# Patient Record
Sex: FEMALE | Race: WHITE | NOT HISPANIC OR LATINO | Employment: OTHER | URBAN - METROPOLITAN AREA
[De-identification: names, ages, dates, MRNs, and addresses within clinical notes are randomized per-mention and may not be internally consistent; named-entity substitution may affect disease eponyms.]

---

## 2017-01-09 ENCOUNTER — TREATMENT (OUTPATIENT)
Dept: CARDIAC REHAB | Facility: HOSPITAL | Age: 77
End: 2017-01-09

## 2017-01-09 DIAGNOSIS — Z98.890 S/P MITRAL VALVE REPAIR: Primary | ICD-10-CM

## 2017-01-11 ENCOUNTER — TREATMENT (OUTPATIENT)
Dept: CARDIAC REHAB | Facility: HOSPITAL | Age: 77
End: 2017-01-11

## 2017-01-11 DIAGNOSIS — Z98.890 S/P MITRAL VALVE REPAIR: Primary | ICD-10-CM

## 2017-01-13 ENCOUNTER — TREATMENT (OUTPATIENT)
Dept: CARDIAC REHAB | Facility: HOSPITAL | Age: 77
End: 2017-01-13

## 2017-01-13 DIAGNOSIS — Z98.890 S/P MITRAL VALVE REPAIR: Primary | ICD-10-CM

## 2017-01-16 ENCOUNTER — TREATMENT (OUTPATIENT)
Dept: CARDIAC REHAB | Facility: HOSPITAL | Age: 77
End: 2017-01-16

## 2017-01-16 DIAGNOSIS — Z98.890 S/P MITRAL VALVE REPAIR: Primary | ICD-10-CM

## 2017-01-18 ENCOUNTER — TREATMENT (OUTPATIENT)
Dept: CARDIAC REHAB | Facility: HOSPITAL | Age: 77
End: 2017-01-18

## 2017-01-18 DIAGNOSIS — Z98.890 S/P MITRAL VALVE REPAIR: Primary | ICD-10-CM

## 2017-01-20 ENCOUNTER — TREATMENT (OUTPATIENT)
Dept: CARDIAC REHAB | Facility: HOSPITAL | Age: 77
End: 2017-01-20

## 2017-01-20 DIAGNOSIS — Z98.890 S/P MITRAL VALVE REPAIR: Primary | ICD-10-CM

## 2017-01-23 ENCOUNTER — TREATMENT (OUTPATIENT)
Dept: CARDIAC REHAB | Facility: HOSPITAL | Age: 77
End: 2017-01-23

## 2017-01-23 DIAGNOSIS — Z98.890 S/P MITRAL VALVE REPAIR: Primary | ICD-10-CM

## 2017-01-27 ENCOUNTER — TREATMENT (OUTPATIENT)
Dept: CARDIAC REHAB | Facility: HOSPITAL | Age: 77
End: 2017-01-27

## 2017-01-27 DIAGNOSIS — Z98.890 S/P MITRAL VALVE REPAIR: Primary | ICD-10-CM

## 2017-01-30 ENCOUNTER — TREATMENT (OUTPATIENT)
Dept: CARDIAC REHAB | Facility: HOSPITAL | Age: 77
End: 2017-01-30

## 2017-01-30 DIAGNOSIS — Z98.890 S/P MITRAL VALVE REPAIR: Primary | ICD-10-CM

## 2017-02-01 ENCOUNTER — TREATMENT (OUTPATIENT)
Dept: CARDIAC REHAB | Facility: HOSPITAL | Age: 77
End: 2017-02-01

## 2017-02-01 DIAGNOSIS — Z98.890 S/P MITRAL VALVE REPAIR: Primary | ICD-10-CM

## 2017-02-03 ENCOUNTER — TREATMENT (OUTPATIENT)
Dept: CARDIAC REHAB | Facility: HOSPITAL | Age: 77
End: 2017-02-03

## 2017-02-03 DIAGNOSIS — Z98.890 S/P MITRAL VALVE REPAIR: Primary | ICD-10-CM

## 2017-02-06 ENCOUNTER — TREATMENT (OUTPATIENT)
Dept: CARDIAC REHAB | Facility: HOSPITAL | Age: 77
End: 2017-02-06

## 2017-02-06 DIAGNOSIS — Z98.890 S/P MITRAL VALVE REPAIR: Primary | ICD-10-CM

## 2017-02-08 ENCOUNTER — TREATMENT (OUTPATIENT)
Dept: CARDIAC REHAB | Facility: HOSPITAL | Age: 77
End: 2017-02-08

## 2017-02-08 DIAGNOSIS — Z98.890 S/P MITRAL VALVE REPAIR: Primary | ICD-10-CM

## 2017-02-13 ENCOUNTER — TREATMENT (OUTPATIENT)
Dept: CARDIAC REHAB | Facility: HOSPITAL | Age: 77
End: 2017-02-13

## 2017-02-13 DIAGNOSIS — Z98.890 S/P MITRAL VALVE REPAIR: Primary | ICD-10-CM

## 2017-02-17 ENCOUNTER — TREATMENT (OUTPATIENT)
Dept: CARDIAC REHAB | Facility: HOSPITAL | Age: 77
End: 2017-02-17

## 2017-02-17 DIAGNOSIS — Z98.890 S/P MITRAL VALVE REPAIR: Primary | ICD-10-CM

## 2017-02-20 ENCOUNTER — TREATMENT (OUTPATIENT)
Dept: CARDIAC REHAB | Facility: HOSPITAL | Age: 77
End: 2017-02-20

## 2017-02-20 DIAGNOSIS — Z98.890 S/P MITRAL VALVE REPAIR: Primary | ICD-10-CM

## 2017-02-24 ENCOUNTER — TREATMENT (OUTPATIENT)
Dept: CARDIAC REHAB | Facility: HOSPITAL | Age: 77
End: 2017-02-24

## 2017-02-24 DIAGNOSIS — Z98.890 S/P MITRAL VALVE REPAIR: Primary | ICD-10-CM

## 2017-02-27 ENCOUNTER — TREATMENT (OUTPATIENT)
Dept: CARDIAC REHAB | Facility: HOSPITAL | Age: 77
End: 2017-02-27

## 2017-02-27 DIAGNOSIS — Z98.890 S/P MITRAL VALVE REPAIR: Primary | ICD-10-CM

## 2017-03-01 ENCOUNTER — TREATMENT (OUTPATIENT)
Dept: CARDIAC REHAB | Facility: HOSPITAL | Age: 77
End: 2017-03-01

## 2017-03-01 DIAGNOSIS — Z98.890 S/P MITRAL VALVE REPAIR: Primary | ICD-10-CM

## 2017-03-03 ENCOUNTER — TREATMENT (OUTPATIENT)
Dept: CARDIAC REHAB | Facility: HOSPITAL | Age: 77
End: 2017-03-03

## 2017-03-03 DIAGNOSIS — Z98.890 S/P MITRAL VALVE REPAIR: Primary | ICD-10-CM

## 2017-03-06 ENCOUNTER — TREATMENT (OUTPATIENT)
Dept: CARDIAC REHAB | Facility: HOSPITAL | Age: 77
End: 2017-03-06

## 2017-03-06 DIAGNOSIS — Z98.890 S/P MITRAL VALVE REPAIR: Primary | ICD-10-CM

## 2017-03-13 ENCOUNTER — TREATMENT (OUTPATIENT)
Dept: CARDIAC REHAB | Facility: HOSPITAL | Age: 77
End: 2017-03-13

## 2017-03-13 DIAGNOSIS — Z98.890 S/P MITRAL VALVE REPAIR: Primary | ICD-10-CM

## 2017-03-15 ENCOUNTER — OFFICE VISIT (OUTPATIENT)
Dept: CARDIAC SURGERY | Facility: CLINIC | Age: 77
End: 2017-03-15

## 2017-03-15 VITALS
RESPIRATION RATE: 20 BRPM | BODY MASS INDEX: 37.76 KG/M2 | OXYGEN SATURATION: 96 % | HEIGHT: 61 IN | TEMPERATURE: 97.6 F | WEIGHT: 200 LBS | HEART RATE: 81 BPM | SYSTOLIC BLOOD PRESSURE: 125 MMHG | DIASTOLIC BLOOD PRESSURE: 81 MMHG

## 2017-03-15 DIAGNOSIS — Z98.890 S/P MITRAL VALVE REPAIR: ICD-10-CM

## 2017-03-15 DIAGNOSIS — Z98.890 S/P TRICUSPID VALVE REPAIR: ICD-10-CM

## 2017-03-15 DIAGNOSIS — Z98.890 S/P MVR (MITRAL VALVE REPAIR): Primary | ICD-10-CM

## 2017-03-15 DIAGNOSIS — Z98.890 S/P MAZE OPERATION FOR ATRIAL FIBRILLATION: ICD-10-CM

## 2017-03-15 DIAGNOSIS — Z86.79 S/P MAZE OPERATION FOR ATRIAL FIBRILLATION: ICD-10-CM

## 2017-03-15 PROCEDURE — 99212 OFFICE O/P EST SF 10 MIN: CPT | Performed by: THORACIC SURGERY (CARDIOTHORACIC VASCULAR SURGERY)

## 2017-03-15 RX ORDER — ESTRADIOL 0.1 MG/G
1 CREAM VAGINAL DAILY
COMMUNITY
Start: 2014-12-29 | End: 2017-09-19

## 2017-03-15 RX ORDER — ARFORMOTEROL TARTRATE 15 UG/2ML
15 SOLUTION RESPIRATORY (INHALATION) AS NEEDED
COMMUNITY
End: 2018-02-16

## 2017-03-15 RX ORDER — VITAMIN B COMPLEX
1 CAPSULE ORAL
COMMUNITY
End: 2017-09-19

## 2017-03-15 RX ORDER — POTASSIUM CHLORIDE 1500 MG/1
20 TABLET, FILM COATED, EXTENDED RELEASE ORAL DAILY
COMMUNITY
Start: 2016-06-15 | End: 2018-02-16 | Stop reason: SDUPTHER

## 2017-03-15 RX ORDER — ATORVASTATIN CALCIUM 40 MG/1
20 TABLET, FILM COATED ORAL TAKE AS DIRECTED
COMMUNITY
End: 2018-02-16 | Stop reason: SDUPTHER

## 2017-03-15 RX ORDER — MULTIVITAMIN
1 TABLET ORAL DAILY
COMMUNITY

## 2017-03-15 RX ORDER — UBIDECARENONE 200 MG
200 CAPSULE ORAL
COMMUNITY
End: 2017-09-19 | Stop reason: SDUPTHER

## 2017-03-15 RX ORDER — PANTOPRAZOLE SODIUM 40 MG/1
40 TABLET, DELAYED RELEASE ORAL DAILY
COMMUNITY
End: 2018-02-16 | Stop reason: SDUPTHER

## 2017-03-15 RX ORDER — AMOXICILLIN 500 MG
CAPSULE ORAL
COMMUNITY
End: 2017-09-19 | Stop reason: SDUPTHER

## 2017-03-15 RX ORDER — AZELASTINE HCL 205.5 UG/1
2 SPRAY NASAL 2 TIMES DAILY
COMMUNITY
Start: 2017-03-07 | End: 2017-09-19

## 2017-03-15 RX ORDER — METFORMIN HYDROCHLORIDE 500 MG/1
TABLET, EXTENDED RELEASE ORAL
COMMUNITY
Start: 2016-07-07 | End: 2017-03-15 | Stop reason: DRUGHIGH

## 2017-03-15 RX ORDER — FUROSEMIDE 40 MG/1
60 TABLET ORAL DAILY
COMMUNITY
End: 2018-02-16 | Stop reason: SDUPTHER

## 2017-03-15 RX ORDER — DIGOXIN 250 MCG
TABLET ORAL
COMMUNITY
Start: 2016-05-05 | End: 2017-03-15

## 2017-03-15 RX ORDER — MULTIVIT-MIN/IRON/FOLIC ACID/K 18-600-40
CAPSULE ORAL
COMMUNITY
End: 2017-09-19 | Stop reason: SDUPTHER

## 2017-03-15 RX ORDER — SPIRONOLACTONE 25 MG/1
25 TABLET ORAL
COMMUNITY
End: 2017-03-15

## 2017-03-15 RX ORDER — LEVOTHYROXINE AND LIOTHYRONINE 57; 13.5 UG/1; UG/1
90 TABLET ORAL EVERY MORNING
COMMUNITY
Start: 2016-06-27 | End: 2017-11-30 | Stop reason: SDUPTHER

## 2017-03-15 RX ORDER — POTASSIUM CHLORIDE 1500 MG/1
TABLET, FILM COATED, EXTENDED RELEASE ORAL
COMMUNITY
Start: 2017-02-28 | End: 2017-03-15

## 2017-03-15 RX ORDER — PEN NEEDLE, DIABETIC 32GX 5/32"
NEEDLE, DISPOSABLE MISCELLANEOUS
COMMUNITY
Start: 2017-01-16 | End: 2017-12-12 | Stop reason: SDUPTHER

## 2017-03-15 NOTE — PROGRESS NOTES
"CARDIOVASCULAR SURGERY FOLLOW-UP PROGRESS NOTE  Chief Complaint: Here for follow-up after mitral valve repair tricuspid valve repair and a cryo-maze        HPI:   Dear Dr. Blair Torres MD and colleagues:    It was nice to see Fernanda Molina in follow up 15 months  after surgery.  As you know, she is a 76 y.o. female with mitral incompetence tricuspid incompetence and chronic persistent atrial fibrillation who underwent mitral valve repair tricuspid valve repair and a cryo-maze.. She did well postoperatively and continues to do well. She comes in today complaining of nothing.  Her activity level has been She continues to do cardiac rehabilitation 3 times a week.       Physical Exam:         Visit Vitals   • /81 (BP Location: Right arm, Patient Position: Sitting, Cuff Size: Adult)   • Pulse 81   • Temp 97.6 °F (36.4 °C) (Oral)   • Resp 20   • Ht 61\" (154.9 cm)   • Wt 200 lb (90.7 kg)   • SpO2 96%   • BMI 37.79 kg/m2     Heart:  regular rate and rhythm, S1, S2 normal, no murmur, click, rub or gallop, normal prosthetic valve sounds  Lungs:  clear to auscultation bilaterally  Extremities:  no edema  Incision(s):  mid chest healing well, sternum stable    Assessment/Plan:     S/P mitral valve repair, tricuspid valve repair and Maze procedure. Overall, she is doing well.    I think she is in regular rhythm now and she is doing well and her continued cardiac rehabilitation.    Return to clinic in 1 year(s) with an echocardiogram that I ordered today.    No restrictions of activity.      Thank you for allowing me to participate in the care of your   patient.  Regards,  Ben Saavedra MD  "

## 2017-03-17 ENCOUNTER — TREATMENT (OUTPATIENT)
Dept: CARDIAC REHAB | Facility: HOSPITAL | Age: 77
End: 2017-03-17

## 2017-03-17 DIAGNOSIS — Z98.890 S/P MITRAL VALVE REPAIR: Primary | ICD-10-CM

## 2017-03-20 ENCOUNTER — TREATMENT (OUTPATIENT)
Dept: CARDIAC REHAB | Facility: HOSPITAL | Age: 77
End: 2017-03-20

## 2017-03-20 DIAGNOSIS — Z98.890 S/P MITRAL VALVE REPAIR: Primary | ICD-10-CM

## 2017-03-21 ENCOUNTER — APPOINTMENT (OUTPATIENT)
Dept: CARDIOLOGY | Facility: HOSPITAL | Age: 77
End: 2017-03-21

## 2017-03-22 ENCOUNTER — HOSPITAL ENCOUNTER (OUTPATIENT)
Dept: CARDIOLOGY | Facility: HOSPITAL | Age: 77
Discharge: HOME | End: 2017-03-22
Admitting: THORACIC SURGERY (CARDIOTHORACIC VASCULAR SURGERY)

## 2017-03-22 VITALS
BODY MASS INDEX: 37.76 KG/M2 | HEART RATE: 68 BPM | DIASTOLIC BLOOD PRESSURE: 61 MMHG | HEIGHT: 61 IN | SYSTOLIC BLOOD PRESSURE: 140 MMHG | WEIGHT: 200 LBS

## 2017-03-22 DIAGNOSIS — Z98.890 S/P MVR (MITRAL VALVE REPAIR): ICD-10-CM

## 2017-03-22 LAB
AORTIC ARCH: 2.3 CM
ASCENDING AORTA: 3.1 CM
BH CV ECHO MEAS - ACS: 1.7 CM
BH CV ECHO MEAS - AO MAX PG (FULL): 7.8 MMHG
BH CV ECHO MEAS - AO MAX PG: 11.5 MMHG
BH CV ECHO MEAS - AO MEAN PG (FULL): 4.7 MMHG
BH CV ECHO MEAS - AO MEAN PG: 6.7 MMHG
BH CV ECHO MEAS - AO ROOT AREA (BSA CORRECTED): 1.5
BH CV ECHO MEAS - AO ROOT AREA: 6.2 CM^2
BH CV ECHO MEAS - AO ROOT DIAM: 2.8 CM
BH CV ECHO MEAS - AO V2 MAX: 169.4 CM/SEC
BH CV ECHO MEAS - AO V2 MEAN: 122.5 CM/SEC
BH CV ECHO MEAS - AO V2 VTI: 35.4 CM
BH CV ECHO MEAS - ASC AORTA: 3.1 CM
BH CV ECHO MEAS - AVA(I,A): 1.8 CM^2
BH CV ECHO MEAS - AVA(I,D): 1.8 CM^2
BH CV ECHO MEAS - AVA(V,A): 1.7 CM^2
BH CV ECHO MEAS - AVA(V,D): 1.7 CM^2
BH CV ECHO MEAS - BSA(HAYCOCK): 2 M^2
BH CV ECHO MEAS - BSA: 1.9 M^2
BH CV ECHO MEAS - BZI_BMI: 37.8 KILOGRAMS/M^2
BH CV ECHO MEAS - BZI_METRIC_HEIGHT: 154.9 CM
BH CV ECHO MEAS - BZI_METRIC_WEIGHT: 90.7 KG
BH CV ECHO MEAS - CONTRAST EF (2CH): 52.8 ML/M^2
BH CV ECHO MEAS - CONTRAST EF 4CH: 54.7 ML/M^2
BH CV ECHO MEAS - EDV(CUBED): 162.4 ML
BH CV ECHO MEAS - EDV(MOD-SP2): 159 ML
BH CV ECHO MEAS - EDV(MOD-SP4): 192 ML
BH CV ECHO MEAS - EDV(TEICH): 144.7 ML
BH CV ECHO MEAS - EF(CUBED): 64.3 %
BH CV ECHO MEAS - EF(MOD-SP2): 52.8 %
BH CV ECHO MEAS - EF(MOD-SP4): 54.7 %
BH CV ECHO MEAS - EF(TEICH): 55.3 %
BH CV ECHO MEAS - ESV(CUBED): 58 ML
BH CV ECHO MEAS - ESV(MOD-SP2): 75 ML
BH CV ECHO MEAS - ESV(MOD-SP4): 87 ML
BH CV ECHO MEAS - ESV(TEICH): 64.7 ML
BH CV ECHO MEAS - FS: 29.1 %
BH CV ECHO MEAS - IVS/LVPW: 1.4
BH CV ECHO MEAS - IVSD: 1.2 CM
BH CV ECHO MEAS - LA DIMENSION: 4.3 CM
BH CV ECHO MEAS - LA/AO: 1.5
BH CV ECHO MEAS - LAT PEAK E' VEL: 7 CM/SEC
BH CV ECHO MEAS - LV DIASTOLIC VOL/BSA (35-75): 101.6 ML/M^2
BH CV ECHO MEAS - LV MASS(C)D: 226.2 GRAMS
BH CV ECHO MEAS - LV MASS(C)DI: 119.7 GRAMS/M^2
BH CV ECHO MEAS - LV MAX PG: 3.7 MMHG
BH CV ECHO MEAS - LV MEAN PG: 2 MMHG
BH CV ECHO MEAS - LV SYSTOLIC VOL/BSA (12-30): 46.1 ML/M^2
BH CV ECHO MEAS - LV V1 MAX: 96 CM/SEC
BH CV ECHO MEAS - LV V1 MEAN: 66.4 CM/SEC
BH CV ECHO MEAS - LV V1 VTI: 20.6 CM
BH CV ECHO MEAS - LVIDD: 5.5 CM
BH CV ECHO MEAS - LVIDS: 3.9 CM
BH CV ECHO MEAS - LVLD AP2: 7.6 CM
BH CV ECHO MEAS - LVLD AP4: 8 CM
BH CV ECHO MEAS - LVLS AP2: 6.5 CM
BH CV ECHO MEAS - LVLS AP4: 7 CM
BH CV ECHO MEAS - LVOT AREA (M): 3.1 CM^2
BH CV ECHO MEAS - LVOT AREA: 3 CM^2
BH CV ECHO MEAS - LVOT DIAM: 2 CM
BH CV ECHO MEAS - LVPWD: 0.88 CM
BH CV ECHO MEAS - MED PEAK E' VEL: 6 CM/SEC
BH CV ECHO MEAS - MR MAX PG: 87.2 MMHG
BH CV ECHO MEAS - MR MAX VEL: 466.9 CM/SEC
BH CV ECHO MEAS - MV A DUR: 0.09 SEC
BH CV ECHO MEAS - MV A MAX VEL: 52.8 CM/SEC
BH CV ECHO MEAS - MV DEC SLOPE: 561.3 CM/SEC^2
BH CV ECHO MEAS - MV DEC TIME: 0.3 SEC
BH CV ECHO MEAS - MV E MAX VEL: 209.1 CM/SEC
BH CV ECHO MEAS - MV E/A: 4
BH CV ECHO MEAS - MV MAX PG: 22.2 MMHG
BH CV ECHO MEAS - MV MEAN PG: 6.7 MMHG
BH CV ECHO MEAS - MV P1/2T MAX VEL: 242.3 CM/SEC
BH CV ECHO MEAS - MV P1/2T: 126.4 MSEC
BH CV ECHO MEAS - MV V2 MAX: 235.5 CM/SEC
BH CV ECHO MEAS - MV V2 MEAN: 111.3 CM/SEC
BH CV ECHO MEAS - MV V2 VTI: 71.1 CM
BH CV ECHO MEAS - MVA P1/2T LCG: 0.91 CM^2
BH CV ECHO MEAS - MVA(P1/2T): 1.7 CM^2
BH CV ECHO MEAS - MVA(VTI): 0.88 CM^2
BH CV ECHO MEAS - PA ACC SLOPE: 61.7 CM/SEC^2
BH CV ECHO MEAS - PA ACC TIME: 0.09 SEC
BH CV ECHO MEAS - PA MAX PG (FULL): 2.8 MMHG
BH CV ECHO MEAS - PA MAX PG: 4.5 MMHG
BH CV ECHO MEAS - PA PR(ACCEL): 39.4 MMHG
BH CV ECHO MEAS - PA V2 MAX: 105.9 CM/SEC
BH CV ECHO MEAS - PI END-D VEL: 95 CM/SEC
BH CV ECHO MEAS - PULM A REVS DUR: 0.08 SEC
BH CV ECHO MEAS - PULM A REVS VEL: 14.3 CM/SEC
BH CV ECHO MEAS - PULM DIAS VEL: 93.1 CM/SEC
BH CV ECHO MEAS - PULM S/D: 0.33
BH CV ECHO MEAS - PULM SYS VEL: 30.3 CM/SEC
BH CV ECHO MEAS - PVA(V,A): 3.2 CM^2
BH CV ECHO MEAS - PVA(V,D): 3.2 CM^2
BH CV ECHO MEAS - QP/QS: 0.91
BH CV ECHO MEAS - RAP SYSTOLE: 15 MMHG
BH CV ECHO MEAS - RV MAX PG: 1.7 MMHG
BH CV ECHO MEAS - RV MEAN PG: 1 MMHG
BH CV ECHO MEAS - RV V1 MAX: 64.7 CM/SEC
BH CV ECHO MEAS - RV V1 MEAN: 47.8 CM/SEC
BH CV ECHO MEAS - RV V1 VTI: 10.8 CM
BH CV ECHO MEAS - RVOT AREA: 5.3 CM^2
BH CV ECHO MEAS - RVOT DIAM: 2.6 CM
BH CV ECHO MEAS - RVSP: 83 MMHG
BH CV ECHO MEAS - SI(AO): 115.4 ML/M^2
BH CV ECHO MEAS - SI(CUBED): 55.3 ML/M^2
BH CV ECHO MEAS - SI(LVOT): 32.9 ML/M^2
BH CV ECHO MEAS - SI(MOD-SP2): 44.5 ML/M^2
BH CV ECHO MEAS - SI(MOD-SP4): 55.6 ML/M^2
BH CV ECHO MEAS - SI(TEICH): 42.3 ML/M^2
BH CV ECHO MEAS - SUP REN AO DIAM: 2 CM
BH CV ECHO MEAS - SV(AO): 218.1 ML
BH CV ECHO MEAS - SV(CUBED): 104.4 ML
BH CV ECHO MEAS - SV(LVOT): 62.2 ML
BH CV ECHO MEAS - SV(MOD-SP2): 84 ML
BH CV ECHO MEAS - SV(MOD-SP4): 105 ML
BH CV ECHO MEAS - SV(RVOT): 56.8 ML
BH CV ECHO MEAS - SV(TEICH): 80 ML
BH CV ECHO MEAS - TAPSE (>1.6): 1.7 CM2
BH CV ECHO MEAS - TR MAX VEL: 411 CM/SEC
BH CV ECHO MEAS - TV MAX PG: 44.6 MMHG
BH CV ECHO MEAS - TV V2 MAX: 328.9 CM/SEC
BH CV XLRA - RV BASE: 3.8 CM
BH CV XLRA - TDI S': 10 CM/SEC
E/E' RATIO: 34
LEFT ATRIUM VOLUME INDEX: 53 ML/M2
LV EF 2D ECHO EST: 55 %

## 2017-03-22 PROCEDURE — 93306 TTE W/DOPPLER COMPLETE: CPT

## 2017-03-22 PROCEDURE — 93306 TTE W/DOPPLER COMPLETE: CPT | Performed by: INTERNAL MEDICINE

## 2017-04-07 ENCOUNTER — TREATMENT (OUTPATIENT)
Dept: CARDIAC REHAB | Facility: HOSPITAL | Age: 77
End: 2017-04-07

## 2017-04-07 DIAGNOSIS — Z98.890 S/P MITRAL VALVE REPAIR: Primary | ICD-10-CM

## 2017-04-10 ENCOUNTER — TREATMENT (OUTPATIENT)
Dept: CARDIAC REHAB | Facility: HOSPITAL | Age: 77
End: 2017-04-10

## 2017-04-10 DIAGNOSIS — Z98.890 S/P MITRAL VALVE REPAIR: Primary | ICD-10-CM

## 2017-04-12 ENCOUNTER — TREATMENT (OUTPATIENT)
Dept: CARDIAC REHAB | Facility: HOSPITAL | Age: 77
End: 2017-04-12

## 2017-04-12 DIAGNOSIS — Z98.890 S/P MITRAL VALVE REPAIR: Primary | ICD-10-CM

## 2017-04-17 ENCOUNTER — TREATMENT (OUTPATIENT)
Dept: CARDIAC REHAB | Facility: HOSPITAL | Age: 77
End: 2017-04-17

## 2017-04-17 DIAGNOSIS — Z98.890 S/P MITRAL VALVE REPAIR: Primary | ICD-10-CM

## 2017-04-19 ENCOUNTER — TREATMENT (OUTPATIENT)
Dept: CARDIAC REHAB | Facility: HOSPITAL | Age: 77
End: 2017-04-19

## 2017-04-19 DIAGNOSIS — Z98.890 S/P MITRAL VALVE REPAIR: Primary | ICD-10-CM

## 2017-04-21 ENCOUNTER — TREATMENT (OUTPATIENT)
Dept: CARDIAC REHAB | Facility: HOSPITAL | Age: 77
End: 2017-04-21

## 2017-04-21 DIAGNOSIS — Z98.890 S/P MITRAL VALVE REPAIR: Primary | ICD-10-CM

## 2017-04-24 ENCOUNTER — TREATMENT (OUTPATIENT)
Dept: CARDIAC REHAB | Facility: HOSPITAL | Age: 77
End: 2017-04-24

## 2017-04-24 DIAGNOSIS — Z98.890 S/P MITRAL VALVE REPAIR: Primary | ICD-10-CM

## 2017-04-26 ENCOUNTER — TREATMENT (OUTPATIENT)
Dept: CARDIAC REHAB | Facility: HOSPITAL | Age: 77
End: 2017-04-26

## 2017-04-26 DIAGNOSIS — Z98.890 S/P MITRAL VALVE REPAIR: Primary | ICD-10-CM

## 2017-04-28 ENCOUNTER — TREATMENT (OUTPATIENT)
Dept: CARDIAC REHAB | Facility: HOSPITAL | Age: 77
End: 2017-04-28

## 2017-04-28 DIAGNOSIS — Z98.890 S/P MITRAL VALVE REPAIR: Primary | ICD-10-CM

## 2017-05-03 ENCOUNTER — TREATMENT (OUTPATIENT)
Dept: CARDIAC REHAB | Facility: HOSPITAL | Age: 77
End: 2017-05-03

## 2017-05-03 DIAGNOSIS — Z98.890 S/P MITRAL VALVE REPAIR: Primary | ICD-10-CM

## 2017-05-05 ENCOUNTER — TREATMENT (OUTPATIENT)
Dept: CARDIAC REHAB | Facility: HOSPITAL | Age: 77
End: 2017-05-05

## 2017-05-05 DIAGNOSIS — Z98.890 S/P MITRAL VALVE REPAIR: Primary | ICD-10-CM

## 2017-05-08 ENCOUNTER — TREATMENT (OUTPATIENT)
Dept: CARDIAC REHAB | Facility: HOSPITAL | Age: 77
End: 2017-05-08

## 2017-05-08 DIAGNOSIS — Z98.890 S/P MITRAL VALVE REPAIR: Primary | ICD-10-CM

## 2017-05-10 ENCOUNTER — TREATMENT (OUTPATIENT)
Dept: CARDIAC REHAB | Facility: HOSPITAL | Age: 77
End: 2017-05-10

## 2017-05-10 DIAGNOSIS — Z98.890 S/P MITRAL VALVE REPAIR: Primary | ICD-10-CM

## 2017-05-22 ENCOUNTER — TREATMENT (OUTPATIENT)
Dept: CARDIAC REHAB | Facility: HOSPITAL | Age: 77
End: 2017-05-22

## 2017-05-22 DIAGNOSIS — Z98.890 S/P MITRAL VALVE REPAIR: Primary | ICD-10-CM

## 2017-05-24 ENCOUNTER — TREATMENT (OUTPATIENT)
Dept: CARDIAC REHAB | Facility: HOSPITAL | Age: 77
End: 2017-05-24

## 2017-05-24 DIAGNOSIS — Z98.890 S/P MITRAL VALVE REPAIR: Primary | ICD-10-CM

## 2017-05-26 ENCOUNTER — TREATMENT (OUTPATIENT)
Dept: CARDIAC REHAB | Facility: HOSPITAL | Age: 77
End: 2017-05-26

## 2017-05-26 DIAGNOSIS — Z98.890 S/P MITRAL VALVE REPAIR: Primary | ICD-10-CM

## 2017-06-02 ENCOUNTER — TREATMENT (OUTPATIENT)
Dept: CARDIAC REHAB | Facility: HOSPITAL | Age: 77
End: 2017-06-02

## 2017-06-02 DIAGNOSIS — Z98.890 S/P MITRAL VALVE REPAIR: Primary | ICD-10-CM

## 2017-06-05 ENCOUNTER — TREATMENT (OUTPATIENT)
Dept: CARDIAC REHAB | Facility: HOSPITAL | Age: 77
End: 2017-06-05

## 2017-06-05 DIAGNOSIS — Z98.890 S/P MITRAL VALVE REPAIR: Primary | ICD-10-CM

## 2017-06-07 ENCOUNTER — TREATMENT (OUTPATIENT)
Dept: CARDIAC REHAB | Facility: HOSPITAL | Age: 77
End: 2017-06-07

## 2017-06-07 DIAGNOSIS — Z98.890 S/P MITRAL VALVE REPAIR: Primary | ICD-10-CM

## 2017-06-09 ENCOUNTER — TREATMENT (OUTPATIENT)
Dept: CARDIAC REHAB | Facility: HOSPITAL | Age: 77
End: 2017-06-09

## 2017-06-09 DIAGNOSIS — Z98.890 S/P MITRAL VALVE REPAIR: Primary | ICD-10-CM

## 2017-06-12 ENCOUNTER — TREATMENT (OUTPATIENT)
Dept: CARDIAC REHAB | Facility: HOSPITAL | Age: 77
End: 2017-06-12

## 2017-06-12 DIAGNOSIS — Z98.890 S/P MITRAL VALVE REPAIR: Primary | ICD-10-CM

## 2017-06-14 ENCOUNTER — TREATMENT (OUTPATIENT)
Dept: CARDIAC REHAB | Facility: HOSPITAL | Age: 77
End: 2017-06-14

## 2017-06-14 DIAGNOSIS — Z98.890 S/P MITRAL VALVE REPAIR: Primary | ICD-10-CM

## 2017-06-16 ENCOUNTER — TREATMENT (OUTPATIENT)
Dept: CARDIAC REHAB | Facility: HOSPITAL | Age: 77
End: 2017-06-16

## 2017-06-16 DIAGNOSIS — Z98.890 S/P MITRAL VALVE REPAIR: Primary | ICD-10-CM

## 2017-06-19 ENCOUNTER — TREATMENT (OUTPATIENT)
Dept: CARDIAC REHAB | Facility: HOSPITAL | Age: 77
End: 2017-06-19

## 2017-06-19 DIAGNOSIS — Z98.890 S/P MITRAL VALVE REPAIR: Primary | ICD-10-CM

## 2017-06-21 ENCOUNTER — TREATMENT (OUTPATIENT)
Dept: CARDIAC REHAB | Facility: HOSPITAL | Age: 77
End: 2017-06-21

## 2017-06-21 DIAGNOSIS — Z98.890 S/P MITRAL VALVE REPAIR: Primary | ICD-10-CM

## 2017-06-28 ENCOUNTER — TREATMENT (OUTPATIENT)
Dept: CARDIAC REHAB | Facility: HOSPITAL | Age: 77
End: 2017-06-28

## 2017-06-28 DIAGNOSIS — Z98.890 S/P MITRAL VALVE REPAIR: Primary | ICD-10-CM

## 2017-06-30 ENCOUNTER — TREATMENT (OUTPATIENT)
Dept: CARDIAC REHAB | Facility: HOSPITAL | Age: 77
End: 2017-06-30

## 2017-06-30 DIAGNOSIS — Z98.890 S/P MITRAL VALVE REPAIR: Primary | ICD-10-CM

## 2017-07-03 ENCOUNTER — TREATMENT (OUTPATIENT)
Dept: CARDIAC REHAB | Facility: HOSPITAL | Age: 77
End: 2017-07-03

## 2017-07-03 DIAGNOSIS — Z98.890 S/P MITRAL VALVE REPAIR: Primary | ICD-10-CM

## 2017-07-07 ENCOUNTER — TREATMENT (OUTPATIENT)
Dept: CARDIAC REHAB | Facility: HOSPITAL | Age: 77
End: 2017-07-07

## 2017-07-07 DIAGNOSIS — Z98.890 S/P MITRAL VALVE REPAIR: Primary | ICD-10-CM

## 2017-07-10 ENCOUNTER — TREATMENT (OUTPATIENT)
Dept: CARDIAC REHAB | Facility: HOSPITAL | Age: 77
End: 2017-07-10

## 2017-07-10 DIAGNOSIS — Z98.890 S/P MITRAL VALVE REPAIR: Primary | ICD-10-CM

## 2017-07-12 ENCOUNTER — TREATMENT (OUTPATIENT)
Dept: CARDIAC REHAB | Facility: HOSPITAL | Age: 77
End: 2017-07-12

## 2017-07-12 DIAGNOSIS — Z98.890 S/P MITRAL VALVE REPAIR: Primary | ICD-10-CM

## 2017-07-13 ENCOUNTER — HOSPITAL ENCOUNTER (OUTPATIENT)
Dept: SLEEP MEDICINE | Facility: HOSPITAL | Age: 77
Discharge: HOME | End: 2017-07-13
Attending: INTERNAL MEDICINE

## 2017-07-13 PROCEDURE — 99213 OFFICE O/P EST LOW 20 MIN: CPT | Performed by: INTERNAL MEDICINE

## 2017-07-14 ENCOUNTER — TREATMENT (OUTPATIENT)
Dept: CARDIAC REHAB | Facility: HOSPITAL | Age: 77
End: 2017-07-14

## 2017-07-14 DIAGNOSIS — Z98.890 S/P MITRAL VALVE REPAIR: Primary | ICD-10-CM

## 2017-07-17 ENCOUNTER — TREATMENT (OUTPATIENT)
Dept: CARDIAC REHAB | Facility: HOSPITAL | Age: 77
End: 2017-07-17

## 2017-07-17 DIAGNOSIS — Z98.890 S/P MITRAL VALVE REPAIR: Primary | ICD-10-CM

## 2017-07-19 ENCOUNTER — TREATMENT (OUTPATIENT)
Dept: CARDIAC REHAB | Facility: HOSPITAL | Age: 77
End: 2017-07-19

## 2017-07-19 DIAGNOSIS — Z98.890 S/P MITRAL VALVE REPAIR: Primary | ICD-10-CM

## 2017-07-21 ENCOUNTER — TREATMENT (OUTPATIENT)
Dept: CARDIAC REHAB | Facility: HOSPITAL | Age: 77
End: 2017-07-21

## 2017-07-21 DIAGNOSIS — Z98.890 S/P MITRAL VALVE REPAIR: Primary | ICD-10-CM

## 2017-07-22 NOTE — PROGRESS NOTES
[facility] Up [facility] Note       Patient Care Team:  Thong Jones MD as Consulting Physician (Cardiology)  Mahin Galeas MD as Consulting Physician (Sleep Medicine)    Chief Complaint:  HAMLET     Interval History:   The patient was last seen by me in July 2016.  He feels like she is worse?  He goes to bed 11 PM and awakens at 8 AM.  She awakens once during the nighttime.  She feels like she has less energy and she is increasingly sleepy.  Her Washburn Sleepiness Scale is abnormal at 12.  Previously it was 6.    Review of Systems:  Recorded on the Sleep Questionnaire.  Unremarkable except for BROOKS.    Social History:  She does not smoke cigarettes.  No alcohol.  One caffeinated beverage a day.    Allergies:  Reviewed.     Medication Review:  Her list was reviewed.      Vital Signs:  Height 61 inches and weight is stable at 200 pounds and she is obese with a body mass index of 37-38.    Physical Exam:    Constitutional:  Well developed white female and appears in no apparent distress.  Awake & oriented times 3.  Normal mood with normal recent and remote memory and normal judgement.  Eyes:  Conjunctivae normal.  Oropharynx:  moist mucous membranes without exudate and a large tongue and class III MP airway and posterior pharyngeal region not well seen.      Results Review:  DME is [employer] and she uses nasal pillows.  Downloads between June 13 and July 12, 2017 compliances 100% and average usage 8 hours and 29 minutes and average AHI is normal without leak.  95th percentile pressure is 14.2 and her auto CPAP is 12-16.       Impression:   Obstructive sleep apnea adequately treated with auto titrating CPAP with good compliance and usage.  The patient does have some worsening complaints of hypersomnolence.  He does not appear to be related to her obstructive sleep apnea?.      Plan:  Good sleep hygiene measures should be maintained.  Weight loss would be beneficial in this patient who is obese by  BMI.    The patient will continue auto CPAP as she is doing.  A new prescription will be sent to her DME for all needed supplies.    The patient is in need of a new primary care and this will be arranged.  She also wishes to follow-up with pulmonary.    The patient will call for any problems and will follow up in one year.      Mahin Galeas MD  07/22/17  8:35 AM

## 2017-07-26 ENCOUNTER — TREATMENT (OUTPATIENT)
Dept: CARDIAC REHAB | Facility: HOSPITAL | Age: 77
End: 2017-07-26

## 2017-07-26 DIAGNOSIS — Z98.890 S/P MITRAL VALVE REPAIR: Primary | ICD-10-CM

## 2017-07-28 ENCOUNTER — TREATMENT (OUTPATIENT)
Dept: CARDIAC REHAB | Facility: HOSPITAL | Age: 77
End: 2017-07-28

## 2017-07-28 DIAGNOSIS — Z98.890 S/P MITRAL VALVE REPAIR: Primary | ICD-10-CM

## 2017-08-02 ENCOUNTER — TREATMENT (OUTPATIENT)
Dept: CARDIAC REHAB | Facility: HOSPITAL | Age: 77
End: 2017-08-02

## 2017-08-02 DIAGNOSIS — Z98.890 S/P MITRAL VALVE REPAIR: Primary | ICD-10-CM

## 2017-08-04 ENCOUNTER — TREATMENT (OUTPATIENT)
Dept: CARDIAC REHAB | Facility: HOSPITAL | Age: 77
End: 2017-08-04

## 2017-08-04 DIAGNOSIS — Z98.890 S/P MITRAL VALVE REPAIR: Primary | ICD-10-CM

## 2017-08-07 ENCOUNTER — TREATMENT (OUTPATIENT)
Dept: CARDIAC REHAB | Facility: HOSPITAL | Age: 77
End: 2017-08-07

## 2017-08-07 DIAGNOSIS — Z98.890 S/P MITRAL VALVE REPAIR: Primary | ICD-10-CM

## 2017-08-09 ENCOUNTER — TREATMENT (OUTPATIENT)
Dept: CARDIAC REHAB | Facility: HOSPITAL | Age: 77
End: 2017-08-09

## 2017-08-09 DIAGNOSIS — Z98.890 S/P MITRAL VALVE REPAIR: Primary | ICD-10-CM

## 2017-08-11 ENCOUNTER — TREATMENT (OUTPATIENT)
Dept: CARDIAC REHAB | Facility: HOSPITAL | Age: 77
End: 2017-08-11

## 2017-08-11 DIAGNOSIS — Z98.890 S/P MITRAL VALVE REPAIR: Primary | ICD-10-CM

## 2017-08-18 ENCOUNTER — TREATMENT (OUTPATIENT)
Dept: CARDIAC REHAB | Facility: HOSPITAL | Age: 77
End: 2017-08-18

## 2017-08-18 DIAGNOSIS — Z98.890 S/P MITRAL VALVE REPAIR: Primary | ICD-10-CM

## 2017-08-21 ENCOUNTER — TREATMENT (OUTPATIENT)
Dept: CARDIAC REHAB | Facility: HOSPITAL | Age: 77
End: 2017-08-21

## 2017-08-21 DIAGNOSIS — Z98.890 S/P MITRAL VALVE REPAIR: Primary | ICD-10-CM

## 2017-08-23 ENCOUNTER — TREATMENT (OUTPATIENT)
Dept: CARDIAC REHAB | Facility: HOSPITAL | Age: 77
End: 2017-08-23

## 2017-08-23 DIAGNOSIS — Z98.890 S/P MITRAL VALVE REPAIR: Primary | ICD-10-CM

## 2017-08-25 ENCOUNTER — TREATMENT (OUTPATIENT)
Dept: CARDIAC REHAB | Facility: HOSPITAL | Age: 77
End: 2017-08-25

## 2017-08-25 DIAGNOSIS — Z98.890 S/P MITRAL VALVE REPAIR: Primary | ICD-10-CM

## 2017-08-30 ENCOUNTER — TREATMENT (OUTPATIENT)
Dept: CARDIAC REHAB | Facility: HOSPITAL | Age: 77
End: 2017-08-30

## 2017-08-30 DIAGNOSIS — Z98.890 S/P MITRAL VALVE REPAIR: Primary | ICD-10-CM

## 2017-09-12 ENCOUNTER — TRANSCRIBE ORDERS (OUTPATIENT)
Dept: ADMINISTRATIVE | Facility: HOSPITAL | Age: 77
End: 2017-09-12

## 2017-09-12 ENCOUNTER — HOSPITAL ENCOUNTER (OUTPATIENT)
Dept: GENERAL RADIOLOGY | Facility: HOSPITAL | Age: 77
Discharge: HOME | End: 2017-09-12
Attending: UROLOGY | Admitting: UROLOGY

## 2017-09-12 DIAGNOSIS — N20.0 KIDNEY STONE: Primary | ICD-10-CM

## 2017-09-12 DIAGNOSIS — N20.0 KIDNEY STONE: ICD-10-CM

## 2017-09-12 PROCEDURE — 74000 HC ABDOMEN KUB: CPT

## 2017-09-15 ENCOUNTER — TREATMENT (OUTPATIENT)
Dept: CARDIAC REHAB | Facility: HOSPITAL | Age: 77
End: 2017-09-15

## 2017-09-15 DIAGNOSIS — Z98.890 S/P MITRAL VALVE REPAIR: Primary | ICD-10-CM

## 2017-09-19 RX ORDER — SPIRONOLACTONE 25 MG/1
12.5 TABLET ORAL EVERY OTHER DAY
COMMUNITY
End: 2018-02-16 | Stop reason: SDUPTHER

## 2017-09-20 ENCOUNTER — ANESTHESIA EVENT (OUTPATIENT)
Dept: PERIOP | Facility: HOSPITAL | Age: 77
End: 2017-09-20

## 2017-09-20 ENCOUNTER — HOSPITAL ENCOUNTER (OUTPATIENT)
Facility: HOSPITAL | Age: 77
Setting detail: HOSPITAL OUTPATIENT SURGERY
Discharge: HOME | End: 2017-09-20
Attending: UROLOGY | Admitting: UROLOGY

## 2017-09-20 ENCOUNTER — ANESTHESIA (OUTPATIENT)
Dept: PERIOP | Facility: HOSPITAL | Age: 77
End: 2017-09-20

## 2017-09-20 VITALS
HEIGHT: 61 IN | OXYGEN SATURATION: 95 % | BODY MASS INDEX: 37.76 KG/M2 | SYSTOLIC BLOOD PRESSURE: 114 MMHG | TEMPERATURE: 97.6 F | HEART RATE: 71 BPM | RESPIRATION RATE: 16 BRPM | DIASTOLIC BLOOD PRESSURE: 78 MMHG | WEIGHT: 200 LBS

## 2017-09-20 LAB
GLUCOSE BLDC GLUCOMTR-MCNC: 123 MG/DL (ref 70–130)
INR PPP: 1.07 (ref 0.9–1.1)
PROTHROMBIN TIME: 13.5 SECONDS (ref 11.7–14.2)

## 2017-09-20 PROCEDURE — 25010000002 MIDAZOLAM PER 1 MG: Performed by: ANESTHESIOLOGY

## 2017-09-20 PROCEDURE — 82962 GLUCOSE BLOOD TEST: CPT

## 2017-09-20 PROCEDURE — 25010000002 ONDANSETRON PER 1 MG: Performed by: ANESTHESIOLOGY

## 2017-09-20 PROCEDURE — 25010000002 PROPOFOL 10 MG/ML EMULSION: Performed by: ANESTHESIOLOGY

## 2017-09-20 PROCEDURE — 85610 PROTHROMBIN TIME: CPT | Performed by: UROLOGY

## 2017-09-20 PROCEDURE — 25010000002 KETOROLAC TROMETHAMINE PER 15 MG: Performed by: ANESTHESIOLOGY

## 2017-09-20 RX ORDER — HYDRALAZINE HYDROCHLORIDE 20 MG/ML
5 INJECTION INTRAMUSCULAR; INTRAVENOUS
Status: DISCONTINUED | OUTPATIENT
Start: 2017-09-20 | End: 2017-09-20 | Stop reason: HOSPADM

## 2017-09-20 RX ORDER — FAMOTIDINE 10 MG/ML
20 INJECTION, SOLUTION INTRAVENOUS ONCE
Status: COMPLETED | OUTPATIENT
Start: 2017-09-20 | End: 2017-09-20

## 2017-09-20 RX ORDER — PROPOFOL 10 MG/ML
VIAL (ML) INTRAVENOUS AS NEEDED
Status: DISCONTINUED | OUTPATIENT
Start: 2017-09-20 | End: 2017-09-20 | Stop reason: SURG

## 2017-09-20 RX ORDER — SODIUM CHLORIDE, SODIUM LACTATE, POTASSIUM CHLORIDE, CALCIUM CHLORIDE 600; 310; 30; 20 MG/100ML; MG/100ML; MG/100ML; MG/100ML
9 INJECTION, SOLUTION INTRAVENOUS CONTINUOUS
Status: DISCONTINUED | OUTPATIENT
Start: 2017-09-20 | End: 2017-09-20 | Stop reason: HOSPADM

## 2017-09-20 RX ORDER — FENTANYL CITRATE 50 UG/ML
50 INJECTION, SOLUTION INTRAMUSCULAR; INTRAVENOUS
Status: DISCONTINUED | OUTPATIENT
Start: 2017-09-20 | End: 2017-09-20 | Stop reason: HOSPADM

## 2017-09-20 RX ORDER — PROMETHAZINE HYDROCHLORIDE 25 MG/ML
6.25 INJECTION, SOLUTION INTRAMUSCULAR; INTRAVENOUS ONCE AS NEEDED
Status: DISCONTINUED | OUTPATIENT
Start: 2017-09-20 | End: 2017-09-20 | Stop reason: HOSPADM

## 2017-09-20 RX ORDER — PROMETHAZINE HYDROCHLORIDE 25 MG/1
25 TABLET ORAL ONCE AS NEEDED
Status: DISCONTINUED | OUTPATIENT
Start: 2017-09-20 | End: 2017-09-20 | Stop reason: HOSPADM

## 2017-09-20 RX ORDER — HYDROMORPHONE HYDROCHLORIDE 1 MG/ML
0.25 INJECTION, SOLUTION INTRAMUSCULAR; INTRAVENOUS; SUBCUTANEOUS
Status: DISCONTINUED | OUTPATIENT
Start: 2017-09-20 | End: 2017-09-20 | Stop reason: HOSPADM

## 2017-09-20 RX ORDER — ONDANSETRON 2 MG/ML
INJECTION INTRAMUSCULAR; INTRAVENOUS AS NEEDED
Status: DISCONTINUED | OUTPATIENT
Start: 2017-09-20 | End: 2017-09-20 | Stop reason: SURG

## 2017-09-20 RX ORDER — PROMETHAZINE HYDROCHLORIDE 25 MG/1
25 SUPPOSITORY RECTAL ONCE AS NEEDED
Status: DISCONTINUED | OUTPATIENT
Start: 2017-09-20 | End: 2017-09-20 | Stop reason: HOSPADM

## 2017-09-20 RX ORDER — OXYCODONE HYDROCHLORIDE AND ACETAMINOPHEN 5; 325 MG/1; MG/1
1 TABLET ORAL ONCE AS NEEDED
Status: DISCONTINUED | OUTPATIENT
Start: 2017-09-20 | End: 2017-09-20 | Stop reason: HOSPADM

## 2017-09-20 RX ORDER — NALOXONE HCL 0.4 MG/ML
0.4 VIAL (ML) INJECTION AS NEEDED
Status: DISCONTINUED | OUTPATIENT
Start: 2017-09-20 | End: 2017-09-20 | Stop reason: HOSPADM

## 2017-09-20 RX ORDER — KETOROLAC TROMETHAMINE 30 MG/ML
INJECTION, SOLUTION INTRAMUSCULAR; INTRAVENOUS AS NEEDED
Status: DISCONTINUED | OUTPATIENT
Start: 2017-09-20 | End: 2017-09-20 | Stop reason: SURG

## 2017-09-20 RX ORDER — LIDOCAINE HYDROCHLORIDE 10 MG/ML
0.5 INJECTION, SOLUTION EPIDURAL; INFILTRATION; INTRACAUDAL; PERINEURAL ONCE AS NEEDED
Status: COMPLETED | OUTPATIENT
Start: 2017-09-20 | End: 2017-09-20

## 2017-09-20 RX ORDER — DIPHENHYDRAMINE HYDROCHLORIDE 50 MG/ML
12.5 INJECTION INTRAMUSCULAR; INTRAVENOUS
Status: DISCONTINUED | OUTPATIENT
Start: 2017-09-20 | End: 2017-09-20 | Stop reason: HOSPADM

## 2017-09-20 RX ORDER — MIDAZOLAM HYDROCHLORIDE 1 MG/ML
2 INJECTION INTRAMUSCULAR; INTRAVENOUS
Status: DISCONTINUED | OUTPATIENT
Start: 2017-09-20 | End: 2017-09-20 | Stop reason: HOSPADM

## 2017-09-20 RX ORDER — MIDAZOLAM HYDROCHLORIDE 1 MG/ML
1 INJECTION INTRAMUSCULAR; INTRAVENOUS
Status: DISCONTINUED | OUTPATIENT
Start: 2017-09-20 | End: 2017-09-20 | Stop reason: HOSPADM

## 2017-09-20 RX ORDER — SODIUM CHLORIDE 0.9 % (FLUSH) 0.9 %
1-10 SYRINGE (ML) INJECTION AS NEEDED
Status: DISCONTINUED | OUTPATIENT
Start: 2017-09-20 | End: 2017-09-20 | Stop reason: HOSPADM

## 2017-09-20 RX ADMIN — SODIUM CHLORIDE, POTASSIUM CHLORIDE, SODIUM LACTATE AND CALCIUM CHLORIDE 9 ML/HR: 600; 310; 30; 20 INJECTION, SOLUTION INTRAVENOUS at 14:46

## 2017-09-20 RX ADMIN — MIDAZOLAM 1 MG: 1 INJECTION INTRAMUSCULAR; INTRAVENOUS at 14:46

## 2017-09-20 RX ADMIN — PROPOFOL 150 MG: 10 INJECTION, EMULSION INTRAVENOUS at 15:29

## 2017-09-20 RX ADMIN — OXYCODONE HYDROCHLORIDE AND ACETAMINOPHEN 1 TABLET: 5; 325 TABLET ORAL at 16:26

## 2017-09-20 RX ADMIN — KETOROLAC TROMETHAMINE 30 MG: 30 INJECTION, SOLUTION INTRAMUSCULAR; INTRAVENOUS at 15:29

## 2017-09-20 RX ADMIN — ONDANSETRON 4 MG: 2 INJECTION INTRAMUSCULAR; INTRAVENOUS at 15:32

## 2017-09-20 RX ADMIN — FAMOTIDINE 20 MG: 10 INJECTION, SOLUTION INTRAVENOUS at 14:45

## 2017-09-20 RX ADMIN — LIDOCAINE HYDROCHLORIDE 5 ML: 10 INJECTION, SOLUTION EPIDURAL; INFILTRATION; INTRACAUDAL; PERINEURAL at 14:46

## 2017-09-20 NOTE — H&P
[facility] History and Physical    Patient Care Team:  Rashid Ge MD as PCP - General (Family Medicine)  Thong Jones MD as Consulting Physician (Cardiology)  Mahin Galeas MD as Consulting Physician (Sleep Medicine)    Chief complaint left flank pain    Subjective     Patient is a 76 y.o. female presents with multiple stones on left for ESWL. Recent right eswl. Onset of symptoms was gradual starting several weeks ago.   Associated symptoms include pain and rare nausea. KUB with residual stones oin proximal ureter and small stone on left.      Review of Systems   Pertinent items are noted in HPI    Past Medical History:   Diagnosis Date   • Arthritis    • Atrial fibrillation    • CAD (coronary artery disease)    • Cancer     BASAL CELL MELANOMA   • Congenital heart disease    • COPD (chronic obstructive pulmonary disease)    • Hypertension    • Hypothyroidism    • Kidney stone     RIGHT SIDE   • Lymph edema    • Myocardial infarction 2010   • Obesity    • PONV (postoperative nausea and vomiting)     long ago   • Sleep apnea     USES CPAP     Past Surgical History:   Procedure Laterality Date   • CARDIAC CATHETERIZATION     • CARDIAC DEFIBRILLATOR PLACEMENT     •  SECTION     •  SECTION     • CHOLECYSTECTOMY     • EPIDURAL BLOCK  [id number]    LUMBAR   • EXTRACORPOREAL SHOCK WAVE LITHOTRIPSY (ESWL) Right 2017   • EYE SURGERY Right 09/10/2007    CATARACT   • EYE SURGERY Left 10/29/2007    CATARACT   • JOINT REPLACEMENT Left 2007    KNEE   • JOINT REPLACEMENT Right 2010    KNEE   • KNEE ARTHROSCOPY     • MITRAL VALVE REPAIR/REPLACEMENT     • OTHER SURGICAL HISTORY      cryoMaze 2015   • PACEMAKER IMPLANTATION     • ID ERCP DX COLLECTION SPECIMEN BRUSHING/WASHING N/A 2016    Procedure: ENDOSCOPIC RETROGRADE CHOLANGIOPANCREATOGRAPHY WITH SPHINCTEROTOMY AND BALLOON SWEEP OF CBD;  Surgeon: Lucas Campos MD;  Location: [facility]  ENDOSCOPY;  Service: Gastroenterology   • TRICUSPID VALVE SURGERY  07/31/2015    Dr Saavedra     Family History   Problem Relation Age of Onset   • Heart attack Mother    • Heart attack Father    • Malig Hyperthermia Neg Hx      Social History   Substance Use Topics   • Smoking status: Never Smoker   • Smokeless tobacco: Never Used   • Alcohol use No     Prescriptions Prior to Admission   Medication Sig Dispense Refill Last Dose   • arformoterol (BROVANA) 15 MCG/2ML nebulizer solution Inhale 15 mcg As Needed.   Taking   • Ascorbic Acid (VITAMIN C) 500 MG capsule Take 500 mg by mouth 2 (Two) Times a Day.   9/19/2017 at Unknown time   • atorvastatin (LIPITOR) 40 MG tablet Take 40 mg by mouth Every Night.   9/19/2017 at Unknown time   • B Complex Vitamins (VITAMIN B COMPLEX IJ) Take 1 tablet by mouth Daily.   9/19/2017 at Unknown time   • BD PEN NEEDLE YAIR U/F 32G X 4 MM misc    Taking   • Canagliflozin 300 MG tablet Take 300 mg by mouth Daily.   9/19/2017 at Unknown time   • Coenzyme Q10 (COQ10) 100 MG capsule Take 120 mg by mouth Daily.   9/19/2017 at Unknown time   • DHEA 10 MG capsule Take 10 mg by mouth daily. Takes two 5 mg tabs   9/19/2017 at Unknown time   • digoxin (LANOXIN) 125 MCG tablet Take 0.25 mg by mouth Daily.   9/20/2017 at Unknown time   • ferrous sulfate 325 (65 FE) MG EC tablet Take 65 mg by mouth 1 (one) time per week. On Sunday 9/19/2017 at Unknown time   • Fish Oil-Cholecalciferol (FISH OIL + D3) 9175-7545 MG-UNIT capsule Take 2 capsules by mouth every night.   9/1/2017   • furosemide (LASIX) 40 MG tablet Take 60 mg by mouth Daily.   9/19/2017 at Unknown time   • insulin detemir (LEVEMIR FLEXTOUCH) 100 UNIT/ML injection Inject 35 Units under the skin Daily.   9/19/2017 at Unknown time   • Insulin Pen Needle (BD PEN NEEDLE YAIR U/F) 32G X 4 MM misc    9/19/2017 at Unknown time   • Magnesium 200 MG tablet Take 200 mg by mouth daily.   9/19/2017 at Unknown time   • metFORMIN (GLUCOPHAGE) 1000 MG  "tablet Take 1,000 mg by mouth Every 12 (Twelve) Hours. 500mg AM, 1000mg PM   9/19/2017 at Unknown time   • metoprolol tartrate (LOPRESSOR) 25 MG tablet Take 75 mg by mouth 2 (two) times a day.   9/20/2017 at Unknown time   • Multiple Vitamin (MULTIVITAMIN) tablet Take 1 tablet by mouth Daily.   9/19/2017 at Unknown time   • pantoprazole (PROTONIX) 40 MG EC tablet Take 40 mg by mouth Daily.   9/19/2017 at Unknown time   • potassium chloride ER (K-TAB) 20 MEQ tablet controlled-release ER tablet Take 20 mEq by mouth Daily.   9/19/2017 at Unknown time   • Probiotic capsule Take 1 capsule by mouth Daily.   9/19/2017 at Unknown time   • spironolactone (ALDACTONE) 25 MG tablet Take 12.5 mg by mouth Every Other Day.   9/19/2017 at Unknown time   • Thyroid (ARMOUR THYROID) 90 MG PO tablet Take 90 mg by mouth Daily.   9/19/2017 at Unknown time   • vitamin B-12 (CYANOCOBALAMIN) 500 MCG tablet Take 500 mcg by mouth Daily.   9/19/2017 at Unknown time   • Vitamin D, Cholecalciferol, 1000 UNITS capsule Take 8,000 Int'l Units by mouth daily. Takes four 2000 IU daily   9/19/2017 at Unknown time   • warfarin (COUMADIN) 5 MG tablet 5 mg daily and check Protime INR with cardiology (Patient taking differently: Take 5 mg by mouth Daily. 5 mg daily and check Protime INR with cardiology  HOLDING FOR PROCEDURE)   9/14/2017   • Zinc 30 MG capsule Take 30 mg by mouth Daily.   9/19/2017 at Unknown time     Allergies:  Corticosteroids; Hydrocodone-acetaminophen; Adhesive tape; Hydrocodone; and Lisinopril    Objective     Vital Signs  Temp:  [98.4 °F (36.9 °C)] 98.4 °F (36.9 °C)  Heart Rate:  [67] 67  Resp:  [16] 16  BP: (128)/(74) 128/74  No intake or output data in the 24 hours ending 09/20/17 1447  Flowsheet Rows         First Filed Value    Admission Height  61\" (154.9 cm) Documented at 09/19/2017 1016    Admission Weight  200 lb (90.7 kg) Documented at 09/19/2017 1016           Physical Exam:      General Appearance:    Alert, cooperative, " in no acute distress   Head:    Normocephalic, without obvious abnormality, atraumatic   Eyes:            Lids and lashes normal, conjunctivae and sclerae normal, no   icterus, no pallor, corneas clear, PERRLA   Ears:    Ears appear intact with no abnormalities noted   Throat:   No oral lesions, no thrush, oral mucosa moist   Neck:   No adenopathy, supple, trachea midline, no thyromegaly, no   carotid bruit, no JVD   Back:     No kyphosis present, no scoliosis present, no skin lesions,      erythema or scars, no tenderness to percussion or                   palpation,   range of motion normal   Lungs:     Clear to auscultation,respirations regular, even and                  unlabored    Heart:    Regular rhythm and normal rate, normal S1 and S2, no            murmur, no gallop, no rub, no click   Chest Wall:    No abnormalities observed   Abdomen:     Normal bowel sounds, no masses, no organomegaly, soft        non-tender, non-distended, no guarding, no rebound                tenderness   Rectal:     Deferred   Extremities:   Moves all extremities well, no edema, no cyanosis, no             redness   Pulses:   Pulses palpable and equal bilaterally   Skin:   No bleeding, bruising or rash   Lymph nodes:   No palpable adenopathy   Neurologic:   Cranial nerves 2 - 12 grossly intact, sensation intact, DTR       present and equal bilaterally       Results Review:    I reviewed the patient's new clinical results.Results for orders placed or performed during the hospital encounter of 09/20/17   POC Glucose Fingerstick   Result Value Ref Range    Glucose 123 70 - 130 mg/dL        Assessment/Plan:  Assessment/Plan     Active Problems:    * No active hospital problems. *      Right Renal Stone    Plan- ESWL    I discussed the patients findings and my recommendations with patient.     Moisés Everett MD  09/20/17  2:47 PM

## 2017-09-20 NOTE — ANESTHESIA PROCEDURE NOTES
Airway  Urgency: elective      General Information and Staff    Patient location during procedure: OR  Anesthesiologist: RENDER, MARIEL RAY    Indications and Patient Condition  Indications for airway management: airway protection    Preoxygenated: yes  Mask difficulty assessment: 1 - vent by mask    Final Airway Details  Final airway type: supraglottic airway      Successful airway: classic

## 2017-09-20 NOTE — PLAN OF CARE
Problem: Patient Care Overview (Adult)  Goal: Plan of Care Review    09/20/17 1648   Coping/Psychosocial Response Interventions   Plan Of Care Reviewed With patient   Patient Care Overview   Progress improving         09/20/17 1648   Coping/Psychosocial Response Interventions   Plan Of Care Reviewed With patient   Patient Care Overview   Progress improving   Outcome Evaluation   Outcome Summary/Follow up Plan vss, pt tolerating po and pain meds       Goal: Adult Individualization and Mutuality  Outcome: Ongoing (interventions implemented as appropriate)  Goal: Discharge Needs Assessment  Outcome: Ongoing (interventions implemented as appropriate)    09/20/17 1648   Discharge Needs Assessment   Concerns To Be Addressed no discharge needs identified         Problem: Perioperative Period (Adult)  Goal: Signs and Symptoms of Listed Potential Problems Will be Absent or Manageable (Perioperative Period)  Outcome: Ongoing (interventions implemented as appropriate)    09/20/17 1648   Perioperative Period   Problems Assessed (Perioperative Period) all   Problems Present (Perioperative Period) none

## 2017-09-20 NOTE — PLAN OF CARE
Problem: Patient Care Overview (Adult)  Goal: Plan of Care Review  Outcome: Ongoing (interventions implemented as appropriate)    09/20/17 1341   Coping/Psychosocial Response Interventions   Plan Of Care Reviewed With patient   Patient Care Overview   Progress improving       Goal: Adult Individualization and Mutuality  Outcome: Ongoing (interventions implemented as appropriate)  Goal: Discharge Needs Assessment  Outcome: Ongoing (interventions implemented as appropriate)    09/20/17 1341   Discharge Needs Assessment   Concerns To Be Addressed no discharge needs identified

## 2017-09-20 NOTE — ANESTHESIA POSTPROCEDURE EVALUATION
Patient: Fernanda Molina    Procedure Summary     Date Anesthesia Start Anesthesia Stop Room / Location    09/20/17 1524 1610  [facility] /  [facility]       Procedure Diagnosis Surgeon Provider    RT EXTRACORPOREAL SHOCKWAVE LITHOTRIPSY (Right ) Renal calculus, right MD Rick Frost MD          Anesthesia Type: general  Last vitals  BP        Temp        Pulse       Resp        SpO2          Post Anesthesia Care and Evaluation    Patient location during evaluation: PACU  Patient participation: complete - patient participated  Level of consciousness: awake and alert  Pain management: adequate  Airway patency: patent  Anesthetic complications: No anesthetic complications    Cardiovascular status: acceptable  Respiratory status: acceptable  Hydration status: acceptable    Comments: --------------------            09/20/17               1656     --------------------   BP:       114/78     Pulse:      71       Resp:       16       Temp:                SpO2:      95%      --------------------

## 2017-09-20 NOTE — PLAN OF CARE
Problem: Patient Care Overview (Adult)  Goal: Plan of Care Review  Outcome: Outcome(s) achieved Date Met:  09/20/17  Goal: Discharge Needs Assessment  Outcome: Outcome(s) achieved Date Met:  09/20/17    Problem: Perioperative Period (Adult)  Intervention: Promote Pulmonary Hygiene and Secretion Clearance    09/20/17 1721   Promote Aggressive Pulmonary Hygiene/Secretion Management   Cough And Deep Breathing done with encouragement         Goal: Signs and Symptoms of Listed Potential Problems Will be Absent or Manageable (Perioperative Period)  Outcome: Outcome(s) achieved Date Met:  09/20/17

## 2017-09-20 NOTE — PLAN OF CARE
Problem: Perioperative Period (Adult)  Goal: Signs and Symptoms of Listed Potential Problems Will be Absent or Manageable (Perioperative Period)  Outcome: Ongoing (interventions implemented as appropriate)    09/20/17 1341   Perioperative Period   Problems Assessed (Perioperative Period) all   Problems Present (Perioperative Period) none

## 2017-09-20 NOTE — OP NOTE
Operative Report     [facility]    Patient: Fernanda Molina  Age:      76 y.o.  :     1940  Sex:      female    Medical Record:  [id number]    Date of Operation/Procedure:  2017    Pre-operative Diagnosis Code: [id number]    Pre-operative Diagnosis Free Text:  Right Renal Calculus    Post-operative Diagnosis: same    Surgeon(s) and Role:     * Moisés Everett MD - Primary     Name of Operation/Procedure:  Procedure(s) and Anesthesia Type:     * RT EXTRACORPOREAL SHOCKWAVE LITHOTRIPSY - General with Block    Findings/Complications:  Large stone in the lower pole the kidney adjacent to the UPJ    Description of procedure:  The patient was taken to the lithotripsy suite and placed under general anesthesia in supine position.  Bi-planar fluoroscopic imaging was used to identify and target the 11 mm stone in the lower pole right kidney.  ESWL was commenced using 18 kV at 60 shocks/minute for 300 shocks followed by a 3 minute pause.  The rate was increased to 120 shocks/minute at rapidly escalating voltage to 22 kV.  Intermittent fluoroscopy to confirm proper stone targeting was used throughout the case.  Possible pulverization was observed.    Specimens: none    Estimated Blood Loss: none    Fluids/Drains: confirmed    Moisés Everett MD  2017  3:55 PM

## 2017-09-25 ENCOUNTER — TREATMENT (OUTPATIENT)
Dept: CARDIAC REHAB | Facility: HOSPITAL | Age: 77
End: 2017-09-25

## 2017-09-25 DIAGNOSIS — Z98.890 S/P MITRAL VALVE REPAIR: Primary | ICD-10-CM

## 2017-09-27 ENCOUNTER — TREATMENT (OUTPATIENT)
Dept: CARDIAC REHAB | Facility: HOSPITAL | Age: 77
End: 2017-09-27

## 2017-09-27 DIAGNOSIS — Z98.890 S/P MITRAL VALVE REPAIR: Primary | ICD-10-CM

## 2017-09-29 ENCOUNTER — TREATMENT (OUTPATIENT)
Dept: CARDIAC REHAB | Facility: HOSPITAL | Age: 77
End: 2017-09-29

## 2017-09-29 DIAGNOSIS — Z98.890 S/P MITRAL VALVE REPAIR: Primary | ICD-10-CM

## 2017-10-02 ENCOUNTER — TREATMENT (OUTPATIENT)
Dept: CARDIAC REHAB | Facility: HOSPITAL | Age: 77
End: 2017-10-02

## 2017-10-02 DIAGNOSIS — Z98.890 S/P MITRAL VALVE REPAIR: Primary | ICD-10-CM

## 2017-10-04 ENCOUNTER — TREATMENT (OUTPATIENT)
Dept: CARDIAC REHAB | Facility: HOSPITAL | Age: 77
End: 2017-10-04

## 2017-10-04 DIAGNOSIS — Z98.890 S/P MITRAL VALVE REPAIR: Primary | ICD-10-CM

## 2017-10-09 ENCOUNTER — TREATMENT (OUTPATIENT)
Dept: CARDIAC REHAB | Facility: HOSPITAL | Age: 77
End: 2017-10-09

## 2017-10-09 DIAGNOSIS — Z98.890 S/P MITRAL VALVE REPAIR: Primary | ICD-10-CM

## 2017-10-11 ENCOUNTER — TREATMENT (OUTPATIENT)
Dept: CARDIAC REHAB | Facility: HOSPITAL | Age: 77
End: 2017-10-11

## 2017-10-11 DIAGNOSIS — Z98.890 S/P MITRAL VALVE REPAIR: Primary | ICD-10-CM

## 2017-10-13 ENCOUNTER — TREATMENT (OUTPATIENT)
Dept: CARDIAC REHAB | Facility: HOSPITAL | Age: 77
End: 2017-10-13

## 2017-10-13 DIAGNOSIS — Z98.890 S/P MITRAL VALVE REPAIR: Primary | ICD-10-CM

## 2017-10-16 ENCOUNTER — TREATMENT (OUTPATIENT)
Dept: CARDIAC REHAB | Facility: HOSPITAL | Age: 77
End: 2017-10-16

## 2017-10-16 DIAGNOSIS — Z98.890 S/P MITRAL VALVE REPAIR: Primary | ICD-10-CM

## 2017-10-18 ENCOUNTER — TREATMENT (OUTPATIENT)
Dept: CARDIAC REHAB | Facility: HOSPITAL | Age: 77
End: 2017-10-18

## 2017-10-18 DIAGNOSIS — Z98.890 S/P MITRAL VALVE REPAIR: Primary | ICD-10-CM

## 2017-10-30 ENCOUNTER — TREATMENT (OUTPATIENT)
Dept: CARDIAC REHAB | Facility: HOSPITAL | Age: 77
End: 2017-10-30

## 2017-10-30 DIAGNOSIS — Z98.890 S/P MITRAL VALVE REPAIR: Primary | ICD-10-CM

## 2017-11-01 ENCOUNTER — TREATMENT (OUTPATIENT)
Dept: CARDIAC REHAB | Facility: HOSPITAL | Age: 77
End: 2017-11-01

## 2017-11-01 DIAGNOSIS — Z98.890 S/P MITRAL VALVE REPAIR: Primary | ICD-10-CM

## 2017-11-03 ENCOUNTER — TREATMENT (OUTPATIENT)
Dept: CARDIAC REHAB | Facility: HOSPITAL | Age: 77
End: 2017-11-03

## 2017-11-03 DIAGNOSIS — Z98.890 S/P MITRAL VALVE REPAIR: Primary | ICD-10-CM

## 2017-11-06 ENCOUNTER — TREATMENT (OUTPATIENT)
Dept: CARDIAC REHAB | Facility: HOSPITAL | Age: 77
End: 2017-11-06

## 2017-11-06 DIAGNOSIS — Z98.890 S/P MITRAL VALVE REPAIR: Primary | ICD-10-CM

## 2017-11-08 ENCOUNTER — TREATMENT (OUTPATIENT)
Dept: CARDIAC REHAB | Facility: HOSPITAL | Age: 77
End: 2017-11-08

## 2017-11-08 DIAGNOSIS — Z98.890 S/P MITRAL VALVE REPAIR: Primary | ICD-10-CM

## 2017-11-09 ENCOUNTER — APPOINTMENT (OUTPATIENT)
Dept: PREADMISSION TESTING | Facility: HOSPITAL | Age: 77
End: 2017-11-09

## 2017-11-09 VITALS
SYSTOLIC BLOOD PRESSURE: 106 MMHG | OXYGEN SATURATION: 96 % | HEART RATE: 65 BPM | WEIGHT: 201.4 LBS | TEMPERATURE: 97.1 F | RESPIRATION RATE: 16 BRPM | BODY MASS INDEX: 38.02 KG/M2 | HEIGHT: 61 IN | DIASTOLIC BLOOD PRESSURE: 64 MMHG

## 2017-11-09 LAB
ANION GAP SERPL CALCULATED.3IONS-SCNC: 14.4 MMOL/L
BACTERIA UR QL AUTO: ABNORMAL /HPF
BILIRUB UR QL STRIP: NEGATIVE
BUN BLD-MCNC: 22 MG/DL (ref 8–23)
BUN/CREAT SERPL: 21.6 (ref 7–25)
CALCIUM SPEC-SCNC: 10.8 MG/DL (ref 8.6–10.5)
CHLORIDE SERPL-SCNC: 101 MMOL/L (ref 98–107)
CLARITY UR: CLEAR
CO2 SERPL-SCNC: 22.6 MMOL/L (ref 22–29)
COLOR UR: YELLOW
CREAT BLD-MCNC: 1.02 MG/DL (ref 0.57–1)
DEPRECATED RDW RBC AUTO: 49.5 FL (ref 37–54)
ERYTHROCYTE [DISTWIDTH] IN BLOOD BY AUTOMATED COUNT: 15.3 % (ref 11.7–13)
GFR SERPL CREATININE-BSD FRML MDRD: 53 ML/MIN/1.73
GLUCOSE BLD-MCNC: 122 MG/DL (ref 65–99)
GLUCOSE UR STRIP-MCNC: ABNORMAL MG/DL
HCT VFR BLD AUTO: 42.8 % (ref 35.6–45.5)
HGB BLD-MCNC: 13.9 G/DL (ref 11.9–15.5)
HGB UR QL STRIP.AUTO: ABNORMAL
HYALINE CASTS UR QL AUTO: ABNORMAL /LPF
INR PPP: 2.6 (ref 0.9–1.1)
KETONES UR QL STRIP: NEGATIVE
LEUKOCYTE ESTERASE UR QL STRIP.AUTO: ABNORMAL
MCH RBC QN AUTO: 28.8 PG (ref 26.9–32)
MCHC RBC AUTO-ENTMCNC: 32.5 G/DL (ref 32.4–36.3)
MCV RBC AUTO: 88.6 FL (ref 80.5–98.2)
NITRITE UR QL STRIP: NEGATIVE
PH UR STRIP.AUTO: 6.5 [PH] (ref 5–8)
PLATELET # BLD AUTO: 187 10*3/MM3 (ref 140–500)
PMV BLD AUTO: 11 FL (ref 6–12)
POTASSIUM BLD-SCNC: 4.1 MMOL/L (ref 3.5–5.2)
PROT UR QL STRIP: NEGATIVE
PROTHROMBIN TIME: 27 SECONDS (ref 11.7–14.2)
RBC # BLD AUTO: 4.83 10*6/MM3 (ref 3.9–5.2)
RBC # UR: ABNORMAL /HPF
REF LAB TEST METHOD: ABNORMAL
SODIUM BLD-SCNC: 138 MMOL/L (ref 136–145)
SP GR UR STRIP: 1.01 (ref 1–1.03)
SQUAMOUS #/AREA URNS HPF: ABNORMAL /HPF
UROBILINOGEN UR QL STRIP: ABNORMAL
WBC NRBC COR # BLD: 6.46 10*3/MM3 (ref 4.5–10.7)
WBC UR QL AUTO: ABNORMAL /HPF

## 2017-11-09 PROCEDURE — 85610 PROTHROMBIN TIME: CPT | Performed by: UROLOGY

## 2017-11-09 PROCEDURE — 87186 SC STD MICRODIL/AGAR DIL: CPT | Performed by: UROLOGY

## 2017-11-09 PROCEDURE — 85027 COMPLETE CBC AUTOMATED: CPT | Performed by: UROLOGY

## 2017-11-09 PROCEDURE — 80048 BASIC METABOLIC PNL TOTAL CA: CPT | Performed by: UROLOGY

## 2017-11-09 PROCEDURE — 87086 URINE CULTURE/COLONY COUNT: CPT | Performed by: UROLOGY

## 2017-11-09 PROCEDURE — 36415 COLL VENOUS BLD VENIPUNCTURE: CPT

## 2017-11-09 PROCEDURE — 81001 URINALYSIS AUTO W/SCOPE: CPT | Performed by: UROLOGY

## 2017-11-09 RX ORDER — WARFARIN SODIUM 7.5 MG/1
7.5 TABLET ORAL
COMMUNITY
End: 2017-11-10 | Stop reason: DRUGHIGH

## 2017-11-09 RX ORDER — WARFARIN SODIUM 5 MG/1
5 TABLET ORAL
COMMUNITY
End: 2019-05-10

## 2017-11-09 RX ORDER — LEVOTHYROXINE AND LIOTHYRONINE 57; 13.5 UG/1; UG/1
45 TABLET ORAL EVERY EVENING
COMMUNITY
End: 2017-11-10 | Stop reason: CLARIF

## 2017-11-09 RX ORDER — AMOXICILLIN 500 MG
2400 CAPSULE ORAL DAILY
COMMUNITY
End: 2017-11-10

## 2017-11-09 RX ORDER — DIGOXIN 250 MCG
250 TABLET ORAL
COMMUNITY
End: 2018-02-16 | Stop reason: SDUPTHER

## 2017-11-09 RX ORDER — UBIDECARENONE 60 MG
120 CAPSULE ORAL EVERY MORNING
COMMUNITY
End: 2017-11-10

## 2017-11-09 NOTE — DISCHARGE INSTRUCTIONS
Take the following medications the morning of surgery with a small sip of water:  PANTOPRAZOLE, DIGOXIN, METOPROLOL    Arrive to hospital on your day of surgery at 7:30 AM.      General Instructions:  • Do not eat or drink anything after midnight or 8 hours prior to surgery.  • Infants may have breast milk up to four hours before surgery.  • Infants drinking formula may drink formula up to six hours before surgery.   • Patients who avoid smoking, chewing tobacco and alcohol for 4 weeks prior to surgery have a reduced risk of post-operative complications.  Quit smoking as many days before surgery as you can.  • Do not smoke, use chewing tobacco or drink alcohol the day of surgery.   • If applicable bring your C-PAP/ BI-PAP machine.  • Bring any papers given to you in the doctor’s office.  • Wear clean comfortable clothes and socks.  • Do not wear contact lenses or make-up.  Bring a case for your glasses.   • Bring crutches or walker if applicable.  • Remove all piercings.  Leave jewelry and any other valuables at home.  • The Pre-Admission Testing nurse will instruct you to bring medications if unable to obtain an accurate list in Pre-Admission Testing.        If you were given a blood bank ID arm band remember to bring it with you the day of surgery.    Preventing a Surgical Site Infection:  • For 2 to 3 days before surgery, avoid shaving with a razor because the razor can irritate skin and make it easier to develop an infection.  • The night prior to surgery sleep in a clean bed with clean clothing.  Do not allow pets to sleep with you.  • Shower on the morning of surgery using a fresh bar of anti-bacterial soap (such as Dial) and clean washcloth.  Dry with a clean towel and dress in clean clothing.  • Ask your surgeon if you will be receiving antibiotics prior to surgery.  • Make sure you, your family, and all healthcare providers clean their hands with soap and water or an alcohol based hand  before  caring for you or your wound.    Day of surgery:  Upon arrival, a Pre-op nurse and Anesthesiologist will review your health history, obtain vital signs, and answer questions you may have.  The only belongings needed at this time will be your home medications and if applicable your C-PAP/BI-PAP machine.  If you are staying overnight your family can leave the rest of your belongings in the car and bring them to your room later.  A Pre-op nurse will start an IV and you may receive medication in preparation for surgery, including something to help you relax.  Your family will be able to see you in the Pre-op area.  While you are in surgery your family should notify the waiting room  if they leave the waiting room area and provide a contact phone number.    Please be aware that surgery does come with discomfort.  We want to make every effort to control your discomfort so please discuss any uncontrolled symptoms with your nurse.   Your doctor will most likely have prescribed pain medications.      If you are going home after surgery you will receive individualized written care instructions before being discharged.  A responsible adult must drive you to and from the hospital on the day of your surgery and stay with you for 24 hours.    If you are staying overnight following surgery, you will be transported to your hospital room following the recovery period.  [facility] has all private rooms.    If you have any questions please call Pre-Admission Testing at [phone].  Deductibles and co-payments are collected on the day of service. Please be prepared to pay the required co-pay, deductible or deposit on the day of service as defined by your plan.

## 2017-11-10 ENCOUNTER — OFFICE VISIT (OUTPATIENT)
Dept: FAMILY MEDICINE CLINIC | Facility: CLINIC | Age: 77
End: 2017-11-10

## 2017-11-10 VITALS
SYSTOLIC BLOOD PRESSURE: 118 MMHG | HEART RATE: 79 BPM | OXYGEN SATURATION: 95 % | DIASTOLIC BLOOD PRESSURE: 68 MMHG | WEIGHT: 200.5 LBS | HEIGHT: 61 IN | BODY MASS INDEX: 37.86 KG/M2 | TEMPERATURE: 97.5 F

## 2017-11-10 DIAGNOSIS — IMO0001 CLASS 2 OBESITY DUE TO EXCESS CALORIES WITH SERIOUS COMORBIDITY AND BODY MASS INDEX (BMI) OF 37.0 TO 37.9 IN ADULT: ICD-10-CM

## 2017-11-10 DIAGNOSIS — R94.31 ABNORMAL ELECTROCARDIOGRAM: ICD-10-CM

## 2017-11-10 DIAGNOSIS — Z51.81 MEDICATION MONITORING ENCOUNTER: ICD-10-CM

## 2017-11-10 DIAGNOSIS — E11.9 TYPE 2 DIABETES MELLITUS WITHOUT COMPLICATION, WITHOUT LONG-TERM CURRENT USE OF INSULIN (HCC): ICD-10-CM

## 2017-11-10 DIAGNOSIS — E03.9 ACQUIRED HYPOTHYROIDISM: Primary | ICD-10-CM

## 2017-11-10 DIAGNOSIS — Z79.01 WARFARIN ANTICOAGULATION: ICD-10-CM

## 2017-11-10 PROCEDURE — 99203 OFFICE O/P NEW LOW 30 MIN: CPT | Performed by: FAMILY MEDICINE

## 2017-11-10 RX ORDER — WARFARIN SODIUM 2.5 MG/1
TABLET ORAL
COMMUNITY
Start: 2017-08-07 | End: 2018-01-03 | Stop reason: SDUPTHER

## 2017-11-10 NOTE — PROGRESS NOTES
Subjective   Fernanda Molina is a 76 y.o. female who is here for   Chief Complaint   Patient presents with   • New Patient   • Hypothyroidism   • Diabetes   .     History of Present Illness   New patient from Dr KEO Torres's closed practice  See's Dr keo Everett for kidney stones.  Is an insulin requiring DM2  And on coumadin for life for cardiac valve replacement  Her  is also here today for est care.  She is struggling on diet at home, trying to balance vit K coumadin diet, diabetes and heart healthy and weight loss.  Is ok on meds  We reviewed her list  Ok to stop most of her OTC supplements.  Ok on labs.          The following portions of the patient's history were reviewed and updated as appropriate: allergies, current medications, past family history, past medical history, past social history, past surgical history and problem list.    Review of Systems    Objective   Physical Exam   Constitutional: She appears well-developed and well-nourished.   Eyes: Conjunctivae are normal.   Cardiovascular: Normal rate.    Pulmonary/Chest: Effort normal.   Neurological: She is alert.   Nursing note and vitals reviewed.      Assessment/Plan   Fernanda was seen today for new patient, hypothyroidism and diabetes.    Diagnoses and all orders for this visit:    Acquired hypothyroidism  -     Lipid Panel; Future  -     TSH; Future    Type 2 diabetes mellitus without complication, without long-term current use of insulin  -     Ambulatory Referral to Diabetic Education  -     Lipid Panel; Future  -     MicroAlbumin, Urine, Random - Urine, Clean Catch; Future  -     Basic Metabolic Panel; Future  -     Hemoglobin A1c; Future    Warfarin anticoagulation  -     Ambulatory Referral to Diabetic Education    Class 2 obesity due to excess calories with serious comorbidity and body mass index (BMI) of 37.0 to 37.9 in adult    Medication monitoring encounter  -     ALT; Future  -     Digoxin Level; Future    Abnormal electrocardiogram    -     Digoxin Level; Future      There are no Patient Instructions on file for this visit.    Medications Discontinued During This Encounter   Medication Reason   • warfarin (COUMADIN) 7.5 MG tablet Dose adjustment   • Thyroid 90 MG PO tablet Formulary change   • Ascorbic Acid (VITAMIN C) 500 MG capsule Not Efficacious   • B Complex Vitamins (VITAMIN B COMPLEX IJ) Not Efficacious   • Coenzyme Q10 (CO Q10) 60 MG capsule Not Efficacious   • DHEA 10 MG capsule Not Efficacious   • Magnesium 200 MG tablet Not Efficacious   • Omega-3 Fatty Acids (FISH OIL) 1200 MG capsule capsule Not Efficacious   • Unable to find Not Efficacious   • vitamin B-12 (CYANOCOBALAMIN) 500 MCG tablet Not Efficacious   • Zinc 30 MG capsule Not Efficacious        Return in about 3 months (around 2/10/2018) for diabetes.    Dr. Rashid Ge  [facility]  [place], [place]

## 2017-11-11 LAB
BACTERIA SPEC AEROBE CULT: ABNORMAL
BACTERIA SPEC AEROBE CULT: ABNORMAL

## 2017-11-13 ENCOUNTER — TREATMENT (OUTPATIENT)
Dept: CARDIAC REHAB | Facility: HOSPITAL | Age: 77
End: 2017-11-13

## 2017-11-13 DIAGNOSIS — Z98.890 S/P MITRAL VALVE REPAIR: Primary | ICD-10-CM

## 2017-11-13 PROBLEM — Z51.81 MEDICATION MONITORING ENCOUNTER: Status: ACTIVE | Noted: 2017-11-13

## 2017-11-15 ENCOUNTER — HOSPITAL ENCOUNTER (OUTPATIENT)
Facility: HOSPITAL | Age: 77
Setting detail: HOSPITAL OUTPATIENT SURGERY
Discharge: HOME | End: 2017-11-15
Attending: UROLOGY | Admitting: UROLOGY

## 2017-11-15 ENCOUNTER — ANESTHESIA (OUTPATIENT)
Dept: PERIOP | Facility: HOSPITAL | Age: 77
End: 2017-11-15

## 2017-11-15 ENCOUNTER — ANESTHESIA EVENT (OUTPATIENT)
Dept: PERIOP | Facility: HOSPITAL | Age: 77
End: 2017-11-15

## 2017-11-15 ENCOUNTER — APPOINTMENT (OUTPATIENT)
Dept: GENERAL RADIOLOGY | Facility: HOSPITAL | Age: 77
End: 2017-11-15

## 2017-11-15 VITALS
HEART RATE: 69 BPM | OXYGEN SATURATION: 94 % | WEIGHT: 202.8 LBS | DIASTOLIC BLOOD PRESSURE: 73 MMHG | RESPIRATION RATE: 16 BRPM | BODY MASS INDEX: 38.29 KG/M2 | HEIGHT: 61 IN | TEMPERATURE: 98.7 F | SYSTOLIC BLOOD PRESSURE: 124 MMHG

## 2017-11-15 DIAGNOSIS — N20.0 RENAL CALCULUS, RIGHT: Primary | ICD-10-CM

## 2017-11-15 LAB — GLUCOSE BLDC GLUCOMTR-MCNC: 149 MG/DL (ref 70–130)

## 2017-11-15 PROCEDURE — 25010000002 MIDAZOLAM PER 1 MG: Performed by: ANESTHESIOLOGY

## 2017-11-15 PROCEDURE — 82962 GLUCOSE BLOOD TEST: CPT

## 2017-11-15 PROCEDURE — 25010000002 LEVOFLOXACIN PER 250 MG: Performed by: UROLOGY

## 2017-11-15 PROCEDURE — 76000 FLUOROSCOPY <1 HR PHYS/QHP: CPT

## 2017-11-15 PROCEDURE — 25010000002 FENTANYL CITRATE (PF) 100 MCG/2ML SOLUTION: Performed by: ANESTHESIOLOGY

## 2017-11-15 PROCEDURE — C2617 STENT, NON-COR, TEM W/O DEL: HCPCS | Performed by: UROLOGY

## 2017-11-15 PROCEDURE — 25010000002 ONDANSETRON PER 1 MG: Performed by: ANESTHESIOLOGY

## 2017-11-15 PROCEDURE — 74000 HC ABDOMEN KUB: CPT

## 2017-11-15 PROCEDURE — 25010000002 PROPOFOL 10 MG/ML EMULSION: Performed by: ANESTHESIOLOGY

## 2017-11-15 DEVICE — URETERAL STENT
Type: IMPLANTABLE DEVICE | Site: URETHRA | Status: FUNCTIONAL
Brand: CONTOUR™

## 2017-11-15 RX ORDER — PROMETHAZINE HYDROCHLORIDE 25 MG/1
25 SUPPOSITORY RECTAL ONCE AS NEEDED
Status: DISCONTINUED | OUTPATIENT
Start: 2017-11-15 | End: 2017-11-15 | Stop reason: HOSPADM

## 2017-11-15 RX ORDER — HYDROMORPHONE HYDROCHLORIDE 1 MG/ML
0.5 INJECTION, SOLUTION INTRAMUSCULAR; INTRAVENOUS; SUBCUTANEOUS
Status: DISCONTINUED | OUTPATIENT
Start: 2017-11-15 | End: 2017-11-15 | Stop reason: HOSPADM

## 2017-11-15 RX ORDER — OXYCODONE AND ACETAMINOPHEN 7.5; 325 MG/1; MG/1
1 TABLET ORAL ONCE AS NEEDED
Status: DISCONTINUED | OUTPATIENT
Start: 2017-11-15 | End: 2017-11-15 | Stop reason: HOSPADM

## 2017-11-15 RX ORDER — FAMOTIDINE 10 MG/ML
20 INJECTION, SOLUTION INTRAVENOUS ONCE
Status: COMPLETED | OUTPATIENT
Start: 2017-11-15 | End: 2017-11-15

## 2017-11-15 RX ORDER — MIDAZOLAM HYDROCHLORIDE 1 MG/ML
2 INJECTION INTRAMUSCULAR; INTRAVENOUS
Status: DISCONTINUED | OUTPATIENT
Start: 2017-11-15 | End: 2017-11-15 | Stop reason: HOSPADM

## 2017-11-15 RX ORDER — HYDRALAZINE HYDROCHLORIDE 20 MG/ML
5 INJECTION INTRAMUSCULAR; INTRAVENOUS
Status: DISCONTINUED | OUTPATIENT
Start: 2017-11-15 | End: 2017-11-15 | Stop reason: HOSPADM

## 2017-11-15 RX ORDER — DIPHENHYDRAMINE HYDROCHLORIDE 50 MG/ML
12.5 INJECTION INTRAMUSCULAR; INTRAVENOUS
Status: DISCONTINUED | OUTPATIENT
Start: 2017-11-15 | End: 2017-11-15 | Stop reason: HOSPADM

## 2017-11-15 RX ORDER — CEPHALEXIN 500 MG/1
500 CAPSULE ORAL 3 TIMES DAILY
Qty: 21 CAPSULE | Refills: 0 | Status: SHIPPED | OUTPATIENT
Start: 2017-11-15 | End: 2017-11-25

## 2017-11-15 RX ORDER — ONDANSETRON 2 MG/ML
INJECTION INTRAMUSCULAR; INTRAVENOUS AS NEEDED
Status: DISCONTINUED | OUTPATIENT
Start: 2017-11-15 | End: 2017-11-15 | Stop reason: SURG

## 2017-11-15 RX ORDER — ONDANSETRON 2 MG/ML
4 INJECTION INTRAMUSCULAR; INTRAVENOUS ONCE AS NEEDED
Status: COMPLETED | OUTPATIENT
Start: 2017-11-15 | End: 2017-11-15

## 2017-11-15 RX ORDER — FLUMAZENIL 0.1 MG/ML
0.2 INJECTION INTRAVENOUS AS NEEDED
Status: DISCONTINUED | OUTPATIENT
Start: 2017-11-15 | End: 2017-11-15 | Stop reason: HOSPADM

## 2017-11-15 RX ORDER — PROMETHAZINE HYDROCHLORIDE 25 MG/1
12.5 TABLET ORAL ONCE AS NEEDED
Status: DISCONTINUED | OUTPATIENT
Start: 2017-11-15 | End: 2017-11-15 | Stop reason: HOSPADM

## 2017-11-15 RX ORDER — LABETALOL HYDROCHLORIDE 5 MG/ML
5 INJECTION, SOLUTION INTRAVENOUS
Status: DISCONTINUED | OUTPATIENT
Start: 2017-11-15 | End: 2017-11-15 | Stop reason: HOSPADM

## 2017-11-15 RX ORDER — SODIUM CHLORIDE, SODIUM LACTATE, POTASSIUM CHLORIDE, CALCIUM CHLORIDE 600; 310; 30; 20 MG/100ML; MG/100ML; MG/100ML; MG/100ML
9 INJECTION, SOLUTION INTRAVENOUS CONTINUOUS
Status: DISCONTINUED | OUTPATIENT
Start: 2017-11-15 | End: 2017-11-15 | Stop reason: HOSPADM

## 2017-11-15 RX ORDER — LIDOCAINE HYDROCHLORIDE 20 MG/ML
INJECTION, SOLUTION INFILTRATION; PERINEURAL AS NEEDED
Status: DISCONTINUED | OUTPATIENT
Start: 2017-11-15 | End: 2017-11-15 | Stop reason: SURG

## 2017-11-15 RX ORDER — MAGNESIUM HYDROXIDE 1200 MG/15ML
LIQUID ORAL AS NEEDED
Status: DISCONTINUED | OUTPATIENT
Start: 2017-11-15 | End: 2017-11-15 | Stop reason: HOSPADM

## 2017-11-15 RX ORDER — PROMETHAZINE HYDROCHLORIDE 25 MG/ML
12.5 INJECTION, SOLUTION INTRAMUSCULAR; INTRAVENOUS ONCE AS NEEDED
Status: DISCONTINUED | OUTPATIENT
Start: 2017-11-15 | End: 2017-11-15 | Stop reason: HOSPADM

## 2017-11-15 RX ORDER — NALOXONE HCL 0.4 MG/ML
0.2 VIAL (ML) INJECTION AS NEEDED
Status: DISCONTINUED | OUTPATIENT
Start: 2017-11-15 | End: 2017-11-15 | Stop reason: HOSPADM

## 2017-11-15 RX ORDER — LEVOFLOXACIN 5 MG/ML
500 INJECTION, SOLUTION INTRAVENOUS ONCE
Status: COMPLETED | OUTPATIENT
Start: 2017-11-15 | End: 2017-11-15

## 2017-11-15 RX ORDER — SODIUM CHLORIDE 0.9 % (FLUSH) 0.9 %
1-10 SYRINGE (ML) INJECTION AS NEEDED
Status: DISCONTINUED | OUTPATIENT
Start: 2017-11-15 | End: 2017-11-15 | Stop reason: HOSPADM

## 2017-11-15 RX ORDER — PROPOFOL 10 MG/ML
VIAL (ML) INTRAVENOUS AS NEEDED
Status: DISCONTINUED | OUTPATIENT
Start: 2017-11-15 | End: 2017-11-15 | Stop reason: SURG

## 2017-11-15 RX ORDER — EPHEDRINE SULFATE 50 MG/ML
5 INJECTION, SOLUTION INTRAVENOUS ONCE AS NEEDED
Status: DISCONTINUED | OUTPATIENT
Start: 2017-11-15 | End: 2017-11-15 | Stop reason: HOSPADM

## 2017-11-15 RX ORDER — FENTANYL CITRATE 50 UG/ML
25 INJECTION, SOLUTION INTRAMUSCULAR; INTRAVENOUS
Status: DISCONTINUED | OUTPATIENT
Start: 2017-11-15 | End: 2017-11-15 | Stop reason: HOSPADM

## 2017-11-15 RX ORDER — MIDAZOLAM HYDROCHLORIDE 1 MG/ML
1 INJECTION INTRAMUSCULAR; INTRAVENOUS
Status: DISCONTINUED | OUTPATIENT
Start: 2017-11-15 | End: 2017-11-15 | Stop reason: HOSPADM

## 2017-11-15 RX ORDER — PROMETHAZINE HYDROCHLORIDE 25 MG/1
25 TABLET ORAL ONCE AS NEEDED
Status: DISCONTINUED | OUTPATIENT
Start: 2017-11-15 | End: 2017-11-15 | Stop reason: HOSPADM

## 2017-11-15 RX ORDER — FENTANYL CITRATE 50 UG/ML
50 INJECTION, SOLUTION INTRAMUSCULAR; INTRAVENOUS
Status: DISCONTINUED | OUTPATIENT
Start: 2017-11-15 | End: 2017-11-15 | Stop reason: HOSPADM

## 2017-11-15 RX ADMIN — FAMOTIDINE 20 MG: 10 INJECTION, SOLUTION INTRAVENOUS at 08:43

## 2017-11-15 RX ADMIN — ONDANSETRON 4 MG: 2 INJECTION INTRAMUSCULAR; INTRAVENOUS at 11:29

## 2017-11-15 RX ADMIN — LIDOCAINE HYDROCHLORIDE 50 MG: 20 INJECTION, SOLUTION INFILTRATION; PERINEURAL at 09:26

## 2017-11-15 RX ADMIN — MIDAZOLAM 1 MG: 1 INJECTION INTRAMUSCULAR; INTRAVENOUS at 08:43

## 2017-11-15 RX ADMIN — LEVOFLOXACIN 500 MG: 5 INJECTION, SOLUTION INTRAVENOUS at 08:43

## 2017-11-15 RX ADMIN — SODIUM CHLORIDE, POTASSIUM CHLORIDE, SODIUM LACTATE AND CALCIUM CHLORIDE 9 ML/HR: 600; 310; 30; 20 INJECTION, SOLUTION INTRAVENOUS at 08:43

## 2017-11-15 RX ADMIN — FENTANYL CITRATE 50 MCG: 50 INJECTION INTRAMUSCULAR; INTRAVENOUS at 10:22

## 2017-11-15 RX ADMIN — FENTANYL CITRATE 50 MCG: 50 INJECTION, SOLUTION INTRAMUSCULAR; INTRAVENOUS at 09:26

## 2017-11-15 RX ADMIN — FENTANYL CITRATE 50 MCG: 50 INJECTION INTRAMUSCULAR; INTRAVENOUS at 10:12

## 2017-11-15 RX ADMIN — ONDANSETRON 4 MG: 2 INJECTION INTRAMUSCULAR; INTRAVENOUS at 09:35

## 2017-11-15 RX ADMIN — PROPOFOL 200 MG: 10 INJECTION, EMULSION INTRAVENOUS at 09:26

## 2017-11-15 NOTE — H&P
[facility] History and Physical    Patient Care Team:  Rashid Ge MD as PCP - General (Family Medicine)  Thong Jones MD as Consulting Physician (Cardiology)  Mahin Galeas MD as Consulting Physician (Sleep Medicine)  Lara Ortiz MD as Consulting Physician (Obstetrics and Gynecology)  Betty Horowitz MD as Consulting Physician (Dermatology)  Ben Saavedra MD as Surgeon (Cardiothoracic Surgery)  Mahin Galeas MD as Consulting Physician (Pulmonary Disease)  Chirag Rai MD as Consulting Physician (Ophthalmology)    Chief complaint right flank pain    Subjective     Patient is a 76 y.o. female presents with large right renal stone who recently underwent ESWL with incomplete fragmentation. She will undergo right ureteroscopy. Onset of symptoms was abrupt starting several weeks ago.   Associated symptoms include nausea.      Review of Systems   Pertinent items are noted in HPI    Past Medical History:   Diagnosis Date   • AICD (automatic cardioverter/defibrillator) present    • Arthritis    • Atrial fibrillation    • Cancer     HISTORY OF MELANOMA ON KNEE   • CHF (congestive heart failure)    • Congenital heart disease    • COPD (chronic obstructive pulmonary disease)    • Coronary artery disease    • Diabetes mellitus    • GERD (gastroesophageal reflux disease)    • High cholesterol    • History of MI (myocardial infarction) 2010   • Hypertension    • Hypothyroidism     SINCE HAD IRRADIATED FOR HYPERTHYROIDISM   • Kidney stone     RIGHT SIDE   • Lymph edema    • Obesity    • On anticoagulant therapy    • PONV (postoperative nausea and vomiting)    • Shortness of breath    • Sleep apnea     USES CPAP   • Spinal stenosis    • Valvular heart disease 08/31/2015    HAD MITRAL AND TRICUSPID VALVES REPAIRED     Past Surgical History:   Procedure Laterality Date   • CARDIAC CATHETERIZATION     • CARDIAC DEFIBRILLATOR PLACEMENT     • CARDIAC DEFIBRILLATOR PLACEMENT      •  SECTION     •  SECTION     • CHOLECYSTECTOMY     • EPIDURAL BLOCK  [id number]    LUMBAR   • EXTRACORPOREAL SHOCK WAVE LITHOTRIPSY (ESWL) Right 2017   • EXTRACORPOREAL SHOCK WAVE LITHOTRIPSY (ESWL) Right 2017    Procedure: RT EXTRACORPOREAL SHOCKWAVE LITHOTRIPSY;  Surgeon: Moisés Everett MD;  Location: [facility] OR [facility];  Service:    • EYE SURGERY Right 09/10/2007    CATARACT   • EYE SURGERY Left 10/29/2007    CATARACT   • JOINT REPLACEMENT Left 2007    KNEE   • JOINT REPLACEMENT Right 2010    KNEE   • KNEE ARTHROSCOPY  1980   • MITRAL VALVE REPAIR/REPLACEMENT     • OTHER SURGICAL HISTORY      cryoMaze 2015   • PA ERCP DX COLLECTION SPECIMEN BRUSHING/WASHING N/A 2016    Procedure: ENDOSCOPIC RETROGRADE CHOLANGIOPANCREATOGRAPHY WITH SPHINCTEROTOMY AND BALLOON SWEEP OF CBD;  Surgeon: Lucas Campos MD;  Location: [facility] ENDOSCOPY;  Service: Gastroenterology   • TRICUSPID VALVE SURGERY  2015    Dr Saavedra     Family History   Problem Relation Age of Onset   • Heart attack Mother    • Heart disease Mother    • Hypertension Mother    • Stroke Mother    • Macular degeneration Mother    • Heart attack Father    • Malig Hyperthermia Neg Hx      Social History   Substance Use Topics   • Smoking status: Never Smoker   • Smokeless tobacco: Never Used   • Alcohol use No     Prescriptions Prior to Admission   Medication Sig Dispense Refill Last Dose   • Canagliflozin 300 MG tablet Take 300 mg by mouth Daily.   2017 at 0800   • digoxin (LANOXIN) 250 MCG tablet Take 250 mcg by mouth Daily.   11/15/2017 at 0630   • furosemide (LASIX) 40 MG tablet Take 60 mg by mouth Daily.   2017 at 0800   • insulin detemir (LEVEMIR FLEXTOUCH) 100 UNIT/ML injection Inject 35 Units under the skin Daily.   2017 at 0800   • metFORMIN (GLUCOPHAGE) 1000 MG tablet Take 1,000 mg by mouth Every Evening. 500mg AM, 1000mg PM   2017 at 1900   • metFORMIN  (GLUCOPHAGE) 500 MG tablet Take 500 mg by mouth Every Morning.   11/14/2017 at 0800   • metoprolol tartrate (LOPRESSOR) 25 MG tablet Take 75 mg by mouth 2 (two) times a day.   11/15/2017 at 0630   • Multiple Vitamin (MULTIVITAMIN) tablet Take 1 tablet by mouth Daily.   Past Week at Unknown time   • pantoprazole (PROTONIX) 40 MG EC tablet Take 40 mg by mouth Daily.   11/15/2017 at 0630   • potassium chloride ER (K-TAB) 20 MEQ tablet controlled-release ER tablet Take 20 mEq by mouth Daily.   11/14/2017 at 0800   • Probiotic capsule Take 1 capsule by mouth Daily.   11/14/2017 at 0800   • spironolactone (ALDACTONE) 25 MG tablet Take 12.5 mg by mouth Every Other Day.   11/14/2017 at 0800   • Thyroid (ARMOUR THYROID) 90 MG PO tablet Take 90 mg by mouth Every Morning.   11/14/2017 at 1700   • Vitamin D, Cholecalciferol, 1000 UNITS capsule Take 8,000 Int'l Units by mouth Daily. Takes four 2000 IU daily  PT HOLDING FOR SURGERY   Past Week at Unknown time   • arformoterol (BROVANA) 15 MCG/2ML nebulizer solution Inhale 15 mcg As Needed.   Unknown at Unknown time   • atorvastatin (LIPITOR) 40 MG tablet Take 20 mg by mouth Take As Directed. MONDAYS AND FRIDAYS 11/13/2017   • BD PEN NEEDLE YAIR U/F 32G X 4 MM misc    Unknown at Unknown time   • ferrous sulfate 325 (65 FE) MG EC tablet Take 65 mg by mouth 1 (one) time per week. On Sunday 11/12/2017   • Insulin Pen Needle (BD PEN NEEDLE YAIR U/F) 32G X 4 MM misc    Unknown at Unknown time   • warfarin (COUMADIN) 2.5 MG tablet    11/7/2017   • warfarin (COUMADIN) 5 MG tablet Take 5 mg by mouth Daily. MONDAYS, WEDNESDAYS AND FRIDAYS  PT TO HOLD 7 DAYS PRIOR TO SURGERY   11/7/2017     Allergies:  Corticosteroids; Hydrocodone-acetaminophen; Adhesive tape; Hydrocodone; and Lisinopril    Objective     Vital Signs  Temp:  [97.5 °F (36.4 °C)] 97.5 °F (36.4 °C)  Heart Rate:  [71] 71  Resp:  [20] 20  BP: (128)/(83) 128/83  No intake or output data in the 24 hours ending 11/15/17  "0827  Flowsheet Rows         First Filed Value    Admission Height  61\" (154.9 cm) Documented at 11/15/2017 0821    Admission Weight  202 lb 12.8 oz (92 kg) Documented at 11/15/2017 0821           Physical Exam:      General Appearance:    Alert, cooperative, in no acute distress   Head:    Normocephalic, without obvious abnormality, atraumatic   Eyes:            Lids and lashes normal, conjunctivae and sclerae normal, no   icterus, no pallor, corneas clear, PERRLA   Ears:    Ears appear intact with no abnormalities noted   Throat:   No oral lesions, no thrush, oral mucosa moist   Neck:   No adenopathy, supple, trachea midline, no thyromegaly, no     carotid bruit, no JVD   Back:     No kyphosis present, no scoliosis present, no skin lesions,       erythema or scars, no tenderness to percussion or                   palpation,   range of motion normal   Lungs:     Clear to auscultation,respirations regular, even and                   unlabored    Heart:    Regular rhythm and normal rate, normal S1 and S2, no            murmur, no gallop, no rub, no click   Breast Exam:    Deferred   Abdomen:     Normal bowel sounds, no masses, no organomegaly, soft        non-tender, non-distended, no guarding, no rebound                 tenderness   Genitalia:    Deferred   Extremities:   Moves all extremities well, no edema, no cyanosis, no              redness   Pulses:   Pulses palpable and equal bilaterally   Skin:   No bleeding, bruising or rash   Lymph nodes:   No palpable adenopathy   Neurologic:   Cranial nerves 2 - 12 grossly intact, sensation intact, DTR        present and equal bilaterally       Results Review:    I reviewed the patient's new clinical results.Results for orders placed or performed during the hospital encounter of 11/15/17   POC Glucose Fingerstick   Result Value Ref Range    Glucose 149 (H) 70 - 130 mg/dL        Assessment/Plan:  Assessment/Plan     Active Problems:    * No active hospital problems. " *      Right Renal Stone    Plan- cysto, uscope laserlitho    I discussed the patients findings and my recommendations with patient.     Moisés Everett MD  11/15/17  8:27 AM

## 2017-11-15 NOTE — PLAN OF CARE
Problem: Patient Care Overview (Adult)  Goal: Plan of Care Review  Outcome: Ongoing (interventions implemented as appropriate)    11/15/17 0820   Coping/Psychosocial Response Interventions   Plan Of Care Reviewed With patient   Patient Care Overview   Progress no change       Goal: Adult Individualization and Mutuality  Outcome: Ongoing (interventions implemented as appropriate)    11/15/17 0820   Individualization   Patient Specific Preferences Call pt Fernanda   Patient Specific Goals No infection after surgery.   Patient Specific Interventions Teach good hand hygiene.       Goal: Discharge Needs Assessment  Outcome: Ongoing (interventions implemented as appropriate)    11/15/17 0820   Discharge Needs Assessment   Concerns To Be Addressed denies needs/concerns at this time   Current Health   Anticipated Changes Related to Illness none   Self-Care   Equipment Currently Used at Home none         Problem: Perioperative Period (Adult)  Goal: Signs and Symptoms of Listed Potential Problems Will be Absent or Manageable (Perioperative Period)  Outcome: Ongoing (interventions implemented as appropriate)    11/15/17 0820   Perioperative Period   Problems Present (Perioperative Period) none

## 2017-11-15 NOTE — ANESTHESIA PREPROCEDURE EVALUATION
Anesthesia Evaluation     Patient summary reviewed and Nursing notes reviewed   history of anesthetic complications: PONV  NPO Solid Status: > 6 hours  NPO Liquid Status: > 6 hours     Airway   Mallampati: II  TM distance: >3 FB  Neck ROM: full  no difficulty expected  Dental - normal exam     Pulmonary - normal exam    breath sounds clear to auscultation  (+) COPD, shortness of breath, sleep apnea,   (-) rhonchi, decreased breath sounds, wheezes, rales, stridor  Cardiovascular - normal exam    Rhythm: regular  Rate: normal    (+) pacemaker ICD, hypertension, valvular problems/murmurs, CAD, dysrhythmias Atrial Fib, CHF, hyperlipidemia  (-) murmur, weak pulses, friction rub, systolic click, carotid bruits, JVD, peripheral edema      Neuro/Psych- negative ROS  GI/Hepatic/Renal/Endo    (+) obesity, morbid obesity, GERD, diabetes mellitus type 2 well controlled, hypothyroidism,     Musculoskeletal     Abdominal  - normal exam    Abdomen: soft.   Substance History - negative use     OB/GYN negative ob/gyn ROS         Other   (+) arthritis                                   Anesthesia Plan    ASA 3     general     intravenous induction   Anesthetic plan and risks discussed with patient.

## 2017-11-15 NOTE — ANESTHESIA POSTPROCEDURE EVALUATION
"Patient: Fernanda Molina    Procedure Summary     Date Anesthesia Start Anesthesia Stop Room / Location    11/15/17 0915 1001  [facility] / [facility]       Procedure Diagnosis Surgeon Provider    CYSTOSCOPY; RIGHT URETEROSCOPY LASER LITHOTRIPSY WITH STENT INSERTION (Right ) Renal calculus, right MD Ky Frost MD          Anesthesia Type: general  Last vitals  BP   124/73 (11/15/17 1130)   Temp   37.1 °C (98.7 °F) (11/15/17 1058)   Pulse   69 (11/15/17 1130)   Resp   16 (11/15/17 1130)     SpO2   94 % (11/15/17 1130)     Post Anesthesia Care and Evaluation    Patient location during evaluation: bedside  Patient participation: complete - patient participated  Level of consciousness: awake  Pain score: 0  Pain management: adequate  Airway patency: patent  Anesthetic complications: No anesthetic complications    Cardiovascular status: acceptable  Respiratory status: acceptable  Hydration status: acceptable    Comments: Blood pressure 124/73, pulse 69, temperature 37.1 °C (98.7 °F), temperature source Oral, resp. rate 16, height 61\" (154.9 cm), weight 202 lb 12.8 oz (92 kg), SpO2 94 %.    No anesthesia care post op    "

## 2017-11-15 NOTE — ANESTHESIA PROCEDURE NOTES
Airway  Urgency: elective    Date/Time: 11/15/2017 9:27 AM  Airway not difficult    General Information and Staff    Patient location during procedure: OR  Anesthesiologist: LAVELL RUBIN    Indications and Patient Condition  Indications for airway management: airway protection      Final Airway Details  Final airway type: supraglottic airway      Successful airway: classic  Size 4    Number of attempts at approach: 1

## 2017-11-15 NOTE — PLAN OF CARE
Problem: Patient Care Overview (Adult)  Goal: Plan of Care Review  Outcome: Outcome(s) achieved Date Met:  11/15/17    11/15/17 1148   Coping/Psychosocial Response Interventions   Plan Of Care Reviewed With patient;spouse   Patient Care Overview   Progress improving   Outcome Evaluation   Outcome Summary/Follow up Plan ready for dc       Goal: Adult Individualization and Mutuality  Outcome: Outcome(s) achieved Date Met:  11/15/17  Goal: Discharge Needs Assessment  Outcome: Outcome(s) achieved Date Met:  11/15/17    Problem: Perioperative Period (Adult)  Goal: Signs and Symptoms of Listed Potential Problems Will be Absent or Manageable (Perioperative Period)  Outcome: Outcome(s) achieved Date Met:  11/15/17

## 2017-11-15 NOTE — OP NOTE
URETEROSCOPY LASER LITHOTRIPSY WITH STENT INSERTION  Procedure Note    Fernanda Molina  11/15/2017    Pre-op Diagnosis:   Right renal stones    Post-op Diagnosis:     Post-Op Diagnosis Codes:     * Renal calculus, right [N20.0]    Procedure(s):  CYSTOSCOPY; RIGHT URETEROSCOPY LASER LITHOTRIPSY WITH STENT INSERTION    Surgeon(s):  Moisés Everett MD    Anesthesia: General    Staff:   Circulator: Mary Sanz RN  Radiology Technologist: Estephania Clark RRT  Scrub Person: Betty Solo  Other: Palomo Galeas RN    Estimated Blood Loss: 0 mL    Specimens:                * No orders in the log *      Drains:           Findings: small renal stone fragments    Complications: none    Indications: 77yo CF with bilat renal stones with incomplete fragmentation    Procedure: Patient was taken up she given general anesthesia.  She's placed in lithotomy.  She is prepped and draped in sterile fashion.  20 [place]bourger scope was inserted in the bladder.  The bladder surgery visualized.  She had no evidence of any abnormalities of her bladder lumen.  A single orifice on each side.  The right orifice was cannulated with a guidewire passed up the renal pelvis.  She had a low-lying almost pelvic right kidney with a transverse lie.  Guidewires passed up the renal pelvis.  Flexor ureteroscope passed over the guidewire.  Pelvis was thoroughly visualized.  Each infundibulum wasn't cannulated.  Multiple stone fractures were seen throughout the kidney disease were then laser lithotripsy and broke up in their entirety.  All debris was then flushed out.  She had no sizable fragments that I could appreciate.  Guidewire was replaced.  A 6 Luxembourger by 24 cm doubly sent was in place with a tether which was trimmed and tucked into her vagina.  She was woken taken to recovery in stable condition.       Moisés Everett MD     Date: 11/15/2017  Time: 10:11 AM

## 2017-11-18 ENCOUNTER — RESULTS ENCOUNTER (OUTPATIENT)
Dept: FAMILY MEDICINE CLINIC | Facility: CLINIC | Age: 77
End: 2017-11-18

## 2017-11-18 DIAGNOSIS — R94.31 ABNORMAL ELECTROCARDIOGRAM: ICD-10-CM

## 2017-11-18 DIAGNOSIS — E03.9 ACQUIRED HYPOTHYROIDISM: ICD-10-CM

## 2017-11-18 DIAGNOSIS — Z51.81 MEDICATION MONITORING ENCOUNTER: ICD-10-CM

## 2017-11-18 DIAGNOSIS — E11.9 TYPE 2 DIABETES MELLITUS WITHOUT COMPLICATION, WITHOUT LONG-TERM CURRENT USE OF INSULIN (HCC): ICD-10-CM

## 2017-11-27 ENCOUNTER — TREATMENT (OUTPATIENT)
Dept: CARDIAC REHAB | Facility: HOSPITAL | Age: 77
End: 2017-11-27

## 2017-11-27 DIAGNOSIS — Z98.890 S/P MITRAL VALVE REPAIR: Primary | ICD-10-CM

## 2017-11-29 ENCOUNTER — TREATMENT (OUTPATIENT)
Dept: CARDIAC REHAB | Facility: HOSPITAL | Age: 77
End: 2017-11-29

## 2017-11-29 DIAGNOSIS — Z98.890 S/P MITRAL VALVE REPAIR: Primary | ICD-10-CM

## 2017-11-30 RX ORDER — LEVOTHYROXINE AND LIOTHYRONINE 57; 13.5 UG/1; UG/1
90 TABLET ORAL EVERY MORNING
Qty: 30 TABLET | Refills: 2 | Status: SHIPPED | OUTPATIENT
Start: 2017-11-30 | End: 2018-02-16 | Stop reason: SDUPTHER

## 2017-12-01 ENCOUNTER — HOSPITAL ENCOUNTER (OUTPATIENT)
Dept: DIABETES SERVICES | Facility: HOSPITAL | Age: 77
Discharge: HOME | End: 2017-12-01
Admitting: FAMILY MEDICINE

## 2017-12-01 PROCEDURE — G0108 DIAB MANAGE TRN  PER INDIV: HCPCS

## 2017-12-01 PROCEDURE — G0109 DIAB MANAGE TRN IND/GROUP: HCPCS

## 2017-12-04 ENCOUNTER — OFFICE VISIT (OUTPATIENT)
Dept: FAMILY MEDICINE CLINIC | Facility: CLINIC | Age: 77
End: 2017-12-04

## 2017-12-04 VITALS
BODY MASS INDEX: 38.89 KG/M2 | OXYGEN SATURATION: 97 % | HEART RATE: 54 BPM | WEIGHT: 206 LBS | HEIGHT: 61 IN | RESPIRATION RATE: 22 BRPM | TEMPERATURE: 97.3 F

## 2017-12-04 DIAGNOSIS — J01.10 ACUTE NON-RECURRENT FRONTAL SINUSITIS: Primary | ICD-10-CM

## 2017-12-04 PROCEDURE — 99213 OFFICE O/P EST LOW 20 MIN: CPT | Performed by: NURSE PRACTITIONER

## 2017-12-04 RX ORDER — AMOXICILLIN AND CLAVULANATE POTASSIUM 875; 125 MG/1; MG/1
1 TABLET, FILM COATED ORAL 2 TIMES DAILY
Qty: 14 TABLET | Refills: 0 | Status: SHIPPED | OUTPATIENT
Start: 2017-12-04 | End: 2017-12-11

## 2017-12-04 NOTE — PROGRESS NOTES
Subjective   Fernanda Molina is a 77 y.o. female.     Chief Complaint   Patient presents with   • Sinusitis     cough, congestion, drainage x 3 days     Social History   Substance Use Topics   • Smoking status: Never Smoker   • Smokeless tobacco: Never Used   • Alcohol use No       HPI Comments: Got this last year and was hospitalized for pneumonia.  Previously doctor (Dr. Torres) told her to always come in for medicine when this due to her history of massive MI and pneumonia. Has a defib/pacemaker.     URI    This is a new problem. The current episode started in the past 7 days (3 days). The problem has been gradually worsening. There has been no fever. Associated symptoms include congestion, coughing (sometimes productive), headaches, a plugged ear sensation, rhinorrhea and wheezing. Pertinent negatives include no sinus pain. Associated symptoms comments: Hoarse voice  . She has tried antihistamine and inhaler use for the symptoms. The treatment provided mild relief.     Review of Systems   Constitutional: Positive for fatigue. Negative for chills, diaphoresis and fever.   HENT: Positive for congestion and rhinorrhea. Negative for sinus pain.    Respiratory: Positive for cough (sometimes productive), shortness of breath and wheezing.    Neurological: Positive for headaches.   All other systems reviewed and are negative.    Physical Exam   Gen: mildly ill appearing, alert  Ears: Tm's bulging without redness  Nose:  Congestion  Throat:  Red without exudate, some drainage, tonsils okay  Neck: no LAD  Lung: good air movement, regular RR  Heart: RR without murmur  Skin: no rash.    The following portions of the patient's history were reviewed and updated as appropriate: allergies, current medications,past medical hx, family hx, past social history an...    Chief Complaint   Patient presents with   • Sinusitis     cough, congestion, drainage x 3 days       Vitals:    12/04/17 1157   Pulse: 54   Resp: 22   Temp: 97.3 °F  "(36.3 °C)   TempSrc: Oral   SpO2: 97%   Weight: 206 lb (93.4 kg)   Height: 61\" (154.9 cm)       Assessment/Plan   Problems Addressed this Visit     None      Visit Diagnoses     Acute non-recurrent frontal sinusitis    -  Primary    Relevant Medications    amoxicillin-clavulanate (AUGMENTIN) 875-125 MG per tablet      Please try to wait at least 2-3 days before starting antibiotic and only take if symptoms continue to worsen.          Tylenol or Advil as needed for pain, fever, muscle aches  Plenty of fluids  Hand washing discussed  Off work or school note given if needed.  Warm tea for throat.      Deepika DAHL  Family Practice  [place], [place]  [facility]  "

## 2017-12-08 ENCOUNTER — TREATMENT (OUTPATIENT)
Dept: CARDIAC REHAB | Facility: HOSPITAL | Age: 77
End: 2017-12-08

## 2017-12-08 DIAGNOSIS — Z98.890 S/P MITRAL VALVE REPAIR: Primary | ICD-10-CM

## 2017-12-11 ENCOUNTER — TREATMENT (OUTPATIENT)
Dept: CARDIAC REHAB | Facility: HOSPITAL | Age: 77
End: 2017-12-11

## 2017-12-11 DIAGNOSIS — Z98.890 S/P MITRAL VALVE REPAIR: Primary | ICD-10-CM

## 2017-12-12 DIAGNOSIS — E11.8 TYPE 2 DIABETES MELLITUS WITH COMPLICATION, WITH LONG-TERM CURRENT USE OF INSULIN (HCC): Primary | ICD-10-CM

## 2017-12-12 DIAGNOSIS — Z79.4 TYPE 2 DIABETES MELLITUS WITH COMPLICATION, WITH LONG-TERM CURRENT USE OF INSULIN (HCC): Primary | ICD-10-CM

## 2017-12-12 RX ORDER — PEN NEEDLE, DIABETIC 32GX 5/32"
NEEDLE, DISPOSABLE MISCELLANEOUS
Qty: 300 EACH | Refills: 3 | Status: SHIPPED | OUTPATIENT
Start: 2017-12-12 | End: 2019-05-13

## 2017-12-13 ENCOUNTER — TELEPHONE (OUTPATIENT)
Dept: FAMILY MEDICINE CLINIC | Facility: CLINIC | Age: 77
End: 2017-12-13

## 2017-12-13 RX ORDER — CEPHALEXIN 500 MG/1
500 CAPSULE ORAL 3 TIMES DAILY
Qty: 21 CAPSULE | Refills: 0 | Status: SHIPPED | OUTPATIENT
Start: 2017-12-13 | End: 2018-02-16

## 2017-12-13 NOTE — TELEPHONE ENCOUNTER
Pt called and said she saw Deepika about a week and half ago. She gave her antibiotic and told her if she was not better to call back. Pt stopped antibiotic after 3 days because of diarrhea and now her sickness is coming back. Would like something different.

## 2017-12-15 ENCOUNTER — TREATMENT (OUTPATIENT)
Dept: CARDIAC REHAB | Facility: HOSPITAL | Age: 77
End: 2017-12-15

## 2017-12-15 DIAGNOSIS — Z98.890 S/P MITRAL VALVE REPAIR: Primary | ICD-10-CM

## 2017-12-18 ENCOUNTER — TREATMENT (OUTPATIENT)
Dept: CARDIAC REHAB | Facility: HOSPITAL | Age: 77
End: 2017-12-18

## 2017-12-18 DIAGNOSIS — Z98.890 S/P MITRAL VALVE REPAIR: Primary | ICD-10-CM

## 2017-12-20 ENCOUNTER — TREATMENT (OUTPATIENT)
Dept: CARDIAC REHAB | Facility: HOSPITAL | Age: 77
End: 2017-12-20

## 2017-12-20 DIAGNOSIS — Z98.890 S/P MITRAL VALVE REPAIR: Primary | ICD-10-CM

## 2017-12-27 ENCOUNTER — TREATMENT (OUTPATIENT)
Dept: CARDIAC REHAB | Facility: HOSPITAL | Age: 77
End: 2017-12-27

## 2017-12-27 DIAGNOSIS — Z98.890 S/P MITRAL VALVE REPAIR: Primary | ICD-10-CM

## 2018-01-03 ENCOUNTER — TREATMENT (OUTPATIENT)
Dept: CARDIAC REHAB | Facility: HOSPITAL | Age: 78
End: 2018-01-03

## 2018-01-03 DIAGNOSIS — Z98.890 S/P MITRAL VALVE REPAIR: Primary | ICD-10-CM

## 2018-01-03 RX ORDER — WARFARIN SODIUM 2.5 MG/1
TABLET ORAL
Qty: 40 TABLET | Refills: 0 | Status: SHIPPED | OUTPATIENT
Start: 2018-01-03 | End: 2019-05-10

## 2018-01-05 ENCOUNTER — TREATMENT (OUTPATIENT)
Dept: CARDIAC REHAB | Facility: HOSPITAL | Age: 78
End: 2018-01-05

## 2018-01-05 DIAGNOSIS — Z98.890 S/P MITRAL VALVE REPAIR: Primary | ICD-10-CM

## 2018-01-15 ENCOUNTER — TREATMENT (OUTPATIENT)
Dept: CARDIAC REHAB | Facility: HOSPITAL | Age: 78
End: 2018-01-15

## 2018-01-15 DIAGNOSIS — Z98.890 S/P MITRAL VALVE REPAIR: Primary | ICD-10-CM

## 2018-01-19 ENCOUNTER — TREATMENT (OUTPATIENT)
Dept: CARDIAC REHAB | Facility: HOSPITAL | Age: 78
End: 2018-01-19

## 2018-01-19 DIAGNOSIS — Z98.890 S/P MITRAL VALVE REPAIR: Primary | ICD-10-CM

## 2018-01-22 ENCOUNTER — TREATMENT (OUTPATIENT)
Dept: CARDIAC REHAB | Facility: HOSPITAL | Age: 78
End: 2018-01-22

## 2018-01-22 DIAGNOSIS — Z98.890 S/P MITRAL VALVE REPAIR: Primary | ICD-10-CM

## 2018-01-24 ENCOUNTER — TREATMENT (OUTPATIENT)
Dept: CARDIAC REHAB | Facility: HOSPITAL | Age: 78
End: 2018-01-24

## 2018-01-24 DIAGNOSIS — Z98.890 S/P MITRAL VALVE REPAIR: Primary | ICD-10-CM

## 2018-01-31 ENCOUNTER — TREATMENT (OUTPATIENT)
Dept: CARDIAC REHAB | Facility: HOSPITAL | Age: 78
End: 2018-01-31

## 2018-01-31 DIAGNOSIS — Z98.890 S/P MITRAL VALVE REPAIR: Primary | ICD-10-CM

## 2018-02-02 ENCOUNTER — TREATMENT (OUTPATIENT)
Dept: CARDIAC REHAB | Facility: HOSPITAL | Age: 78
End: 2018-02-02

## 2018-02-02 DIAGNOSIS — Z98.890 S/P MITRAL VALVE REPAIR: Primary | ICD-10-CM

## 2018-02-09 ENCOUNTER — TREATMENT (OUTPATIENT)
Dept: CARDIAC REHAB | Facility: HOSPITAL | Age: 78
End: 2018-02-09

## 2018-02-09 DIAGNOSIS — Z98.890 S/P MITRAL VALVE REPAIR: Primary | ICD-10-CM

## 2018-02-12 ENCOUNTER — TRANSCRIBE ORDERS (OUTPATIENT)
Dept: ADMINISTRATIVE | Facility: HOSPITAL | Age: 78
End: 2018-02-12

## 2018-02-12 ENCOUNTER — HOSPITAL ENCOUNTER (OUTPATIENT)
Dept: GENERAL RADIOLOGY | Facility: HOSPITAL | Age: 78
Discharge: HOME | End: 2018-02-12
Attending: UROLOGY | Admitting: UROLOGY

## 2018-02-12 DIAGNOSIS — N20.0 CALCULUS, RENAL: ICD-10-CM

## 2018-02-12 DIAGNOSIS — N20.0 CALCULUS, RENAL: Primary | ICD-10-CM

## 2018-02-12 DIAGNOSIS — N20.0 RENAL CALCULUS: Primary | ICD-10-CM

## 2018-02-12 DIAGNOSIS — N13.30 HYDRONEPHROSIS, UNSPECIFIED HYDRONEPHROSIS TYPE: ICD-10-CM

## 2018-02-12 PROCEDURE — 74018 RADEX ABDOMEN 1 VIEW: CPT

## 2018-02-13 ENCOUNTER — RESULTS ENCOUNTER (OUTPATIENT)
Dept: FAMILY MEDICINE CLINIC | Facility: CLINIC | Age: 78
End: 2018-02-13

## 2018-02-13 DIAGNOSIS — E03.9 ACQUIRED HYPOTHYROIDISM: ICD-10-CM

## 2018-02-13 DIAGNOSIS — E11.9 TYPE 2 DIABETES MELLITUS WITHOUT COMPLICATION, WITHOUT LONG-TERM CURRENT USE OF INSULIN (HCC): ICD-10-CM

## 2018-02-14 ENCOUNTER — TREATMENT (OUTPATIENT)
Dept: CARDIAC REHAB | Facility: HOSPITAL | Age: 78
End: 2018-02-14

## 2018-02-14 ENCOUNTER — HOSPITAL ENCOUNTER (OUTPATIENT)
Dept: ULTRASOUND IMAGING | Facility: HOSPITAL | Age: 78
Discharge: HOME | End: 2018-02-14
Attending: UROLOGY | Admitting: UROLOGY

## 2018-02-14 DIAGNOSIS — N13.30 HYDRONEPHROSIS, UNSPECIFIED HYDRONEPHROSIS TYPE: ICD-10-CM

## 2018-02-14 DIAGNOSIS — Z98.890 S/P MITRAL VALVE REPAIR: Primary | ICD-10-CM

## 2018-02-14 DIAGNOSIS — N20.0 RENAL CALCULUS: ICD-10-CM

## 2018-02-14 PROCEDURE — 76775 US EXAM ABDO BACK WALL LIM: CPT

## 2018-02-16 ENCOUNTER — OFFICE VISIT (OUTPATIENT)
Dept: FAMILY MEDICINE CLINIC | Facility: CLINIC | Age: 78
End: 2018-02-16

## 2018-02-16 ENCOUNTER — RESULTS ENCOUNTER (OUTPATIENT)
Dept: FAMILY MEDICINE CLINIC | Facility: CLINIC | Age: 78
End: 2018-02-16

## 2018-02-16 VITALS
WEIGHT: 199.7 LBS | BODY MASS INDEX: 37.7 KG/M2 | HEIGHT: 61 IN | HEART RATE: 83 BPM | OXYGEN SATURATION: 93 % | DIASTOLIC BLOOD PRESSURE: 70 MMHG | TEMPERATURE: 98.1 F | SYSTOLIC BLOOD PRESSURE: 118 MMHG

## 2018-02-16 DIAGNOSIS — E11.9 TYPE 2 DIABETES MELLITUS WITHOUT COMPLICATION, WITHOUT LONG-TERM CURRENT USE OF INSULIN (HCC): ICD-10-CM

## 2018-02-16 DIAGNOSIS — Z12.11 SCREEN FOR COLON CANCER: ICD-10-CM

## 2018-02-16 DIAGNOSIS — E03.9 ACQUIRED HYPOTHYROIDISM: Primary | ICD-10-CM

## 2018-02-16 DIAGNOSIS — I48.20 CHRONIC ATRIAL FIBRILLATION (HCC): ICD-10-CM

## 2018-02-16 DIAGNOSIS — I25.10 CORONARY ARTERY DISEASE INVOLVING NATIVE HEART WITHOUT ANGINA PECTORIS, UNSPECIFIED VESSEL OR LESION TYPE: ICD-10-CM

## 2018-02-16 DIAGNOSIS — Z00.00 MEDICARE ANNUAL WELLNESS VISIT, SUBSEQUENT: ICD-10-CM

## 2018-02-16 PROBLEM — Z95.0 PACEMAKER: Status: ACTIVE | Noted: 2018-02-16

## 2018-02-16 PROBLEM — Z95.810 AICD (AUTOMATIC CARDIOVERTER/DEFIBRILLATOR) PRESENT: Status: ACTIVE | Noted: 2018-02-16

## 2018-02-16 PROCEDURE — G0439 PPPS, SUBSEQ VISIT: HCPCS | Performed by: FAMILY MEDICINE

## 2018-02-16 RX ORDER — POTASSIUM CHLORIDE 1500 MG/1
20 TABLET, FILM COATED, EXTENDED RELEASE ORAL DAILY
Qty: 90 TABLET | Refills: 3 | Status: SHIPPED | OUTPATIENT
Start: 2018-02-16 | End: 2019-02-16 | Stop reason: SDUPTHER

## 2018-02-16 RX ORDER — ALBUTEROL SULFATE 2.5 MG/3ML
SOLUTION RESPIRATORY (INHALATION)
COMMUNITY
Start: 2017-10-20 | End: 2019-05-13

## 2018-02-16 RX ORDER — FUROSEMIDE 40 MG/1
60 TABLET ORAL DAILY
Qty: 135 TABLET | Refills: 3 | Status: SHIPPED | OUTPATIENT
Start: 2018-02-16 | End: 2019-02-16 | Stop reason: SDUPTHER

## 2018-02-16 RX ORDER — ATORVASTATIN CALCIUM 20 MG/1
20 TABLET, FILM COATED ORAL 2 TIMES WEEKLY
Qty: 24 TABLET | Refills: 3 | Status: SHIPPED | OUTPATIENT
Start: 2018-02-19 | End: 2019-06-01 | Stop reason: SDUPTHER

## 2018-02-16 RX ORDER — PANTOPRAZOLE SODIUM 40 MG/1
40 TABLET, DELAYED RELEASE ORAL DAILY
Qty: 90 TABLET | Refills: 3 | Status: SHIPPED | OUTPATIENT
Start: 2018-02-16 | End: 2019-02-16 | Stop reason: SDUPTHER

## 2018-02-16 RX ORDER — FLUCONAZOLE 150 MG/1
150 TABLET ORAL DAILY
Qty: 3 TABLET | Refills: 0 | Status: SHIPPED | OUTPATIENT
Start: 2018-02-16 | End: 2018-08-31

## 2018-02-16 RX ORDER — LEVOTHYROXINE AND LIOTHYRONINE 57; 13.5 UG/1; UG/1
135 TABLET ORAL EVERY MORNING
Qty: 135 TABLET | Refills: 3 | Status: SHIPPED | OUTPATIENT
Start: 2018-02-16 | End: 2018-05-17

## 2018-02-16 RX ORDER — SPIRONOLACTONE 25 MG/1
12.5 TABLET ORAL EVERY OTHER DAY
Qty: 23 TABLET | Refills: 3 | Status: SHIPPED | OUTPATIENT
Start: 2018-02-16 | End: 2019-06-01 | Stop reason: SDUPTHER

## 2018-02-16 RX ORDER — SOLIFENACIN SUCCINATE 5 MG/1
TABLET, FILM COATED ORAL DAILY
COMMUNITY
End: 2018-08-31 | Stop reason: SINTOL

## 2018-02-16 RX ORDER — DIGOXIN 250 MCG
250 TABLET ORAL
Qty: 90 TABLET | Refills: 3 | Status: SHIPPED | OUTPATIENT
Start: 2018-02-16 | End: 2019-04-16 | Stop reason: SDUPTHER

## 2018-02-16 NOTE — PROGRESS NOTES
QUICK REFERENCE INFORMATION:  The ABCs of the Annual Wellness Visit    Subsequent Medicare Wellness Visit    HEALTH RISK ASSESSMENT    1940    Recent Hospitalizations:  Recently treated at the following:  [facility].        Current Medical Providers:  Patient Care Team:  Rashid Ge MD as PCP - General (Family Medicine)  Thong Jones MD as Consulting Physician (Cardiology)  Mahin Galeas MD as Consulting Physician (Sleep Medicine)  Lara Ortiz MD as Consulting Physician (Obstetrics and Gynecology)  Betty Horowitz MD as Consulting Physician (Dermatology)  Ben Saavedra MD as Surgeon (Cardiothoracic Surgery)  Mahin Galeas MD as Consulting Physician (Pulmonary Disease)  Chirag Rai MD as Consulting Physician (Ophthalmology)  Moisés Everett MD as Consulting Physician (Urology)        Smoking Status:  History   Smoking Status   • Never Smoker   Smokeless Tobacco   • Never Used       Alcohol Consumption:  History   Alcohol Use No       Depression Screen:   PHQ-2/PHQ-9 Depression Screening 2/16/2018   Little interest or pleasure in doing things 0   Feeling down, depressed, or hopeless 0   Total Score 0       Health Habits and Functional and Cognitive Screening:  Functional & Cognitive Status 2/16/2018   Do you have difficulty preparing food and eating? No   Do you have difficulty bathing yourself, getting dressed or grooming yourself? No   Do you have difficulty using the toilet? No   Do you have difficulty moving around from place to place? No   Do you have trouble with steps or getting out of a bed or a chair? No   In the past year have you fallen or experienced a near fall? No   Current Diet Well Balanced Diet   Dental Exam Up to date   Eye Exam Up to date   Exercise (times per week) 3 times per week   Current Exercise Activities Include Cardiovasular Workout on Exercise Equipment   Do you need help using the phone?  No   Are you deaf or do  you have serious difficulty hearing?  No   Do you need help with transportation? No   Do you need help shopping? No   Do you need help preparing meals?  No   Do you need help with housework?  No   Do you need help with laundry? No   Do you need help taking your medications? No   Do you need help managing money? No   Have you felt unusual stress, anger or loneliness in the last month? No   Who do you live with? Spouse   If you need help, do you have trouble finding someone available to you? No   Have you been bothered in the last four weeks by sexual problems? No   Do you have difficulty concentrating, remembering or making decisions? No           Does the patient have evidence of cognitive impairment? No    Aspirin use counseling: on coumadin      Recent Lab Results:  CMP:  Lab Results   Component Value Date    BUN 22 11/09/2017    CREATININE 1.02 (H) 11/09/2017    EGFRIFNONA 53 (L) 11/09/2017    EGFRIFAFRI  08/25/2016      Comment:      <15 Indicative of kidney failure.    BCR 21.6 11/09/2017     11/09/2017    K 4.1 11/09/2017    CO2 22.6 11/09/2017    CALCIUM 10.8 (H) 11/09/2017    ALBUMIN 4.10 08/25/2016    LABIL2 1.5 08/25/2016    BILITOT 1.6 (H) 08/25/2016    ALKPHOS 46 08/25/2016    AST 27 08/25/2016    ALT 28 08/25/2016     Lipid Panel:     HbA1c:  Lab Results   Component Value Date    HGBA1C 6.09 (H) 08/17/2016       Visual Acuity:  No exam data present    Age-appropriate Screening Schedule:  Refer to the list below for future screening recommendations based on patient's age, sex and/or medical conditions. Orders for these recommended tests are listed in the plan section. The patient has been provided with a written plan.    Health Maintenance   Topic Date Due   • DIABETIC FOOT EXAM  1940   • URINE MICROALBUMIN  1940   • TDAP/TD VACCINES (1 - Tdap) 11/28/1961   • PNEUMOCOCCAL VACCINES (65+ LOW/MEDIUM RISK) (1 of 2 - PCV13) 11/28/2007   • HEMOGLOBIN A1C  02/17/2017   • LIPID PANEL   11/09/2017   • DIABETIC EYE EXAM  09/15/2018   • MAMMOGRAM  01/15/2020   • COLONOSCOPY  10/31/2024   • INFLUENZA VACCINE  Completed   • ZOSTER VACCINE  Completed        Subjective   History of Present Illness    Fernanda Molina is a 77 y.o. female who presents for an Subsequent Wellness Visit.    The following portions of the patient's history were reviewed and updated as appropriate: allergies, current medications, past medical history, past social history, past surgical history and problem list.    Outpatient Medications Prior to Visit   Medication Sig Dispense Refill   • BD PEN NEEDLE YAIR U/F 32G X 4 MM misc Use with insulin pens 300 each 3   • ferrous sulfate 325 (65 FE) MG EC tablet Take 65 mg by mouth 1 (one) time per week. On Sunday     • Insulin Pen Needle (BD PEN NEEDLE YAIR U/F) 32G X 4 MM misc      • Multiple Vitamin (MULTIVITAMIN) tablet Take 1 tablet by mouth Daily.     • warfarin (COUMADIN) 2.5 MG tablet Take one tablet by mouth daily or as directed by MD. 40 tablet 0   • warfarin (COUMADIN) 5 MG tablet Take 5 mg by mouth Daily. MONDAYS, WEDNESDAYS AND FRIDAYS  PT TO HOLD 7 DAYS PRIOR TO SURGERY     • atorvastatin (LIPITOR) 40 MG tablet Take 20 mg by mouth Take As Directed. MONDAYS AND FRIDAYS     • Canagliflozin (INVOKANA) 300 MG tablet Take 300 mg by mouth Daily. 90 tablet 0   • cephalexin (KEFLEX) 500 MG capsule Take 1 capsule by mouth 3 (Three) Times a Day. 21 capsule 0   • digoxin (LANOXIN) 250 MCG tablet Take 250 mcg by mouth Daily.     • furosemide (LASIX) 40 MG tablet Take 60 mg by mouth Daily.     • insulin detemir (LEVEMIR FLEXTOUCH) 100 UNIT/ML injection Inject 35 Units under the skin Daily. 9 pen 0   • metFORMIN (GLUCOPHAGE) 1000 MG tablet Take 1,000 mg by mouth Every Evening. 500mg AM, 1000mg PM     • metFORMIN (GLUCOPHAGE) 500 MG tablet Take 500 mg by mouth Every Morning.     • metoprolol tartrate (LOPRESSOR) 25 MG tablet Take 75 mg by mouth 2 (two) times a day.     • pantoprazole  (PROTONIX) 40 MG EC tablet Take 40 mg by mouth Daily.     • potassium chloride ER (K-TAB) 20 MEQ tablet controlled-release ER tablet Take 20 mEq by mouth Daily.     • spironolactone (ALDACTONE) 25 MG tablet Take 12.5 mg by mouth Every Other Day.     • Thyroid (ARMOUR THYROID) 90 MG PO tablet Take 1 tablet by mouth Every Morning. 30 tablet 2   • arformoterol (BROVANA) 15 MCG/2ML nebulizer solution Inhale 15 mcg As Needed.     • Probiotic capsule Take 1 capsule by mouth Daily.     • Vitamin D, Cholecalciferol, 1000 UNITS capsule Take 8,000 Int'l Units by mouth Daily. Takes four 2000 IU daily  PT HOLDING FOR SURGERY       No facility-administered medications prior to visit.        Patient Active Problem List   Diagnosis   • S/P mitral valve repair   • S/P tricuspid valve repair   • S/P Maze operation for atrial fibrillation   • Class 2 obesity due to excess calories with serious comorbidity and body mass index (BMI) of 37.0 to 37.9 in adult   • Atrial fibrillation   • Acquired hypothyroidism   • COPD (chronic obstructive pulmonary disease)   • CAD (coronary artery disease), hx MI   • HAMLET on autoCPAP   • Hypercalcemia   • Renal calculus, right   • Hyperparathyroidism   • Postmenopausal bleeding   • Type 2 diabetes mellitus without complication, without long-term current use of insulin   • Warfarin anticoagulation   • Medication monitoring encounter   • Medicare annual wellness visit, subsequent   • Pacemaker   • AICD (automatic cardioverter/defibrillator) present       Advance Care Planning:  has an advance directive - a copy HAS NOT been provided    Identification of Risk Factors:  Risk factors include: weight , unhealthy diet, cardiovascular risk and polypharmacy.    Review of Systems    Compared to one year ago, the patient feels her physical health is worse.  Compared to one year ago, the patient feels her mental health is worse.    Objective     Physical Exam   Constitutional: She appears well-developed and  "well-nourished. No distress.   Eyes: Conjunctivae are normal.   Cardiovascular: Normal rate.    Musculoskeletal: She exhibits edema and tenderness.   Neurological: She is alert.   Nursing note and vitals reviewed.      Vitals:    02/16/18 1512   BP: 118/70   BP Location: Left arm   Patient Position: Sitting   Pulse: 83   Temp: 98.1 °F (36.7 °C)   SpO2: 93%   Weight: 90.6 kg (199 lb 11.2 oz)   Height: 154.9 cm (61\")   PainSc: 0-No pain       Body mass index is 37.73 kg/(m^2).  Discussed the patient's BMI with her. BMI is above normal parameters. Follow-up plan includes:  no follow-up required.    Assessment/Plan   Patient Self-Management and Personalized Health Advice  The patient has been provided with information about: diet, prevention of cardiac or vascular disease and fall prevention and preventive services including:   · Colorectal cancer screening, cologuard test ordered.    Visit Diagnoses:    ICD-10-CM ICD-9-CM   1. Acquired hypothyroidism E03.9 244.9   2. Type 2 diabetes mellitus without complication, without long-term current use of insulin E11.9 250.00   3. Medicare annual wellness visit, subsequent Z00.00 V70.0   4. Screen for colon cancer Z12.11 V76.51   5. Chronic atrial fibrillation I48.2 427.31   6. Coronary artery disease involving native heart without angina pectoris, unspecified vessel or lesion type I25.10 414.01       Orders Placed This Encounter   Procedures   • Cologuard - Stool, Per Rectum     Standing Status:   Future     Standing Expiration Date:   2/16/2019       Outpatient Encounter Prescriptions as of 2/16/2018   Medication Sig Dispense Refill   • albuterol (PROVENTIL) (2.5 MG/3ML) 0.083% nebulizer solution as needed      • [START ON 2/19/2018] atorvastatin (LIPITOR) 20 MG tablet Take 1 tablet by mouth 2 (Two) Times a Week for 90 days. MONDAYS AND FRIDAYS  Indications: High Amount of Fats in the Blood 24 tablet 3   • BD PEN NEEDLE YAIR U/F 32G X 4 MM misc Use with insulin pens 300 each 3 "   • Canagliflozin (INVOKANA) 300 MG tablet Take 300 mg by mouth Daily. Indications: Type 2 Diabetes 90 tablet 3   • digoxin (LANOXIN) 250 MCG tablet Take 1 tablet by mouth Daily. Indications: Chronic Atrial Fibrillation 90 tablet 3   • ferrous sulfate 325 (65 FE) MG EC tablet Take 65 mg by mouth 1 (one) time per week. On Sunday     • furosemide (LASIX) 40 MG tablet Take 1.5 tablets by mouth Daily for 90 days. Indications: Edema 135 tablet 3   • insulin detemir (LEVEMIR FLEXTOUCH) 100 UNIT/ML injection Inject 35 Units under the skin Daily for 90 days. Indications: Type 2 Diabetes 11 pen 3   • Insulin Pen Needle (BD PEN NEEDLE YAIR U/F) 32G X 4 MM misc      • metFORMIN (GLUCOPHAGE) 1000 MG tablet Take 1 tablet by mouth Every Evening. 500mg AM, 1000mg PM  Indications: Type 2 Diabetes 90 tablet 3   • metFORMIN (GLUCOPHAGE) 500 MG tablet Take 1 tablet by mouth Every Morning. Indications: Type 2 Diabetes 90 tablet 3   • metoprolol tartrate (LOPRESSOR) 25 MG tablet Take 3 tablets by mouth Every 12 (Twelve) Hours for 90 days. Indications: Ischemic Heart Disease 540 tablet 3   • Multiple Vitamin (MULTIVITAMIN) tablet Take 1 tablet by mouth Daily.     • pantoprazole (PROTONIX) 40 MG EC tablet Take 1 tablet by mouth Daily. Indications: Gastroesophageal Reflux Disease 90 tablet 3   • potassium chloride ER (K-TAB) 20 MEQ tablet controlled-release ER tablet Take 1 tablet by mouth Daily. 90 tablet 3   • solifenacin (VESICARE) 5 MG tablet Take  by mouth Daily.     • spironolactone (ALDACTONE) 25 MG tablet Take 0.5 tablets by mouth Every Other Day for 90 days. Indications: Edema 23 tablet 3   • Thyroid (ARMOUR THYROID) 90 MG PO tablet Take 1.5 tablets by mouth Every Morning for 90 days. Indications: Underactive Thyroid 135 tablet 3   • warfarin (COUMADIN) 2.5 MG tablet Take one tablet by mouth daily or as directed by MD. 40 tablet 0   • warfarin (COUMADIN) 5 MG tablet Take 5 mg by mouth Daily. MONDAYS, WEDNESDAYS AND FRIDAYS  PT TO  HOLD 7 DAYS PRIOR TO SURGERY     • [DISCONTINUED] atorvastatin (LIPITOR) 40 MG tablet Take 20 mg by mouth Take As Directed. MONDAYS AND FRIDAYS     • [DISCONTINUED] Canagliflozin (INVOKANA) 300 MG tablet Take 300 mg by mouth Daily. 90 tablet 0   • [DISCONTINUED] cephalexin (KEFLEX) 500 MG capsule Take 1 capsule by mouth 3 (Three) Times a Day. 21 capsule 0   • [DISCONTINUED] digoxin (LANOXIN) 250 MCG tablet Take 250 mcg by mouth Daily.     • [DISCONTINUED] furosemide (LASIX) 40 MG tablet Take 60 mg by mouth Daily.     • [DISCONTINUED] insulin detemir (LEVEMIR FLEXTOUCH) 100 UNIT/ML injection Inject 35 Units under the skin Daily. 9 pen 0   • [DISCONTINUED] metFORMIN (GLUCOPHAGE) 1000 MG tablet Take 1,000 mg by mouth Every Evening. 500mg AM, 1000mg PM     • [DISCONTINUED] metFORMIN (GLUCOPHAGE) 500 MG tablet Take 500 mg by mouth Every Morning.     • [DISCONTINUED] metoprolol tartrate (LOPRESSOR) 25 MG tablet Take 75 mg by mouth 2 (two) times a day.     • [DISCONTINUED] pantoprazole (PROTONIX) 40 MG EC tablet Take 40 mg by mouth Daily.     • [DISCONTINUED] potassium chloride ER (K-TAB) 20 MEQ tablet controlled-release ER tablet Take 20 mEq by mouth Daily.     • [DISCONTINUED] spironolactone (ALDACTONE) 25 MG tablet Take 12.5 mg by mouth Every Other Day.     • [DISCONTINUED] Thyroid (ARMOUR THYROID) 90 MG PO tablet Take 1 tablet by mouth Every Morning. 30 tablet 2   • fluconazole (DIFLUCAN) 150 MG tablet Take 1 tablet by mouth Daily. 3 tablet 0   • [DISCONTINUED] arformoterol (BROVANA) 15 MCG/2ML nebulizer solution Inhale 15 mcg As Needed.     • [DISCONTINUED] Probiotic capsule Take 1 capsule by mouth Daily.     • [DISCONTINUED] Vitamin D, Cholecalciferol, 1000 UNITS capsule Take 8,000 Int'l Units by mouth Daily. Takes four 2000 IU daily  PT HOLDING FOR SURGERY       No facility-administered encounter medications on file as of 2/16/2018.        Reviewed use of high risk medication in the elderly: yes  Reviewed for  potential of harmful drug interactions in the elderly: yes   Battling kidney stones now  Reviewed her med record here and over at [facility].  Went thru all her meds and refilled all in detail.  Diflucan for her yeast infection  Bath tub soaks for her vulvar abscess.      Follow Up:  Return in about 6 months (around 8/16/2018) for blood pressure, diabetes.     An After Visit Summary and PPPS with all of these plans were given to the patient.

## 2018-02-21 ENCOUNTER — TREATMENT (OUTPATIENT)
Dept: CARDIAC REHAB | Facility: HOSPITAL | Age: 78
End: 2018-02-21

## 2018-02-21 DIAGNOSIS — Z98.890 S/P MITRAL VALVE REPAIR: Primary | ICD-10-CM

## 2018-02-23 ENCOUNTER — TREATMENT (OUTPATIENT)
Dept: CARDIAC REHAB | Facility: HOSPITAL | Age: 78
End: 2018-02-23

## 2018-02-23 DIAGNOSIS — Z98.890 S/P MITRAL VALVE REPAIR: Primary | ICD-10-CM

## 2018-02-23 LAB
ALT SERPL-CCNC: 35 U/L (ref 5–33)
BUN SERPL-MCNC: 19 MG/DL (ref 8–23)
BUN/CREAT SERPL: 21.3 (ref 7–25)
CALCIUM SERPL-MCNC: 10.5 MG/DL (ref 8.8–10.5)
CHLORIDE SERPL-SCNC: 98 MMOL/L (ref 98–107)
CO2 SERPL-SCNC: 27.4 MMOL/L (ref 22–29)
CREAT SERPL-MCNC: 0.89 MG/DL (ref 0.57–1)
DIGOXIN SERPL-MCNC: 1.11 NG/ML (ref 0.8–2)
GFR SERPLBLD CREATININE-BSD FMLA CKD-EPI: 62 ML/MIN/1.73
GFR SERPLBLD CREATININE-BSD FMLA CKD-EPI: 75 ML/MIN/1.73
GLUCOSE SERPL-MCNC: 145 MG/DL (ref 65–99)
HBA1C MFR BLD: 6.3 % (ref 4.8–5.6)
MICROALBUMIN UR-MCNC: 52 UG/ML
POTASSIUM SERPL-SCNC: 4.3 MMOL/L (ref 3.5–5.2)
SODIUM SERPL-SCNC: 139 MMOL/L (ref 136–145)
TSH SERPL DL<=0.005 MIU/L-ACNC: 0.59 MIU/ML (ref 0.27–4.2)

## 2018-02-26 ENCOUNTER — TREATMENT (OUTPATIENT)
Dept: CARDIAC REHAB | Facility: HOSPITAL | Age: 78
End: 2018-02-26

## 2018-02-26 DIAGNOSIS — Z98.890 S/P MITRAL VALVE REPAIR: Primary | ICD-10-CM

## 2018-03-02 ENCOUNTER — TREATMENT (OUTPATIENT)
Dept: CARDIAC REHAB | Facility: HOSPITAL | Age: 78
End: 2018-03-02

## 2018-03-02 DIAGNOSIS — Z98.890 S/P MITRAL VALVE REPAIR: Primary | ICD-10-CM

## 2018-03-05 ENCOUNTER — TREATMENT (OUTPATIENT)
Dept: CARDIAC REHAB | Facility: HOSPITAL | Age: 78
End: 2018-03-05

## 2018-03-05 DIAGNOSIS — Z98.890 S/P MITRAL VALVE REPAIR: Primary | ICD-10-CM

## 2018-03-07 ENCOUNTER — TREATMENT (OUTPATIENT)
Dept: CARDIAC REHAB | Facility: HOSPITAL | Age: 78
End: 2018-03-07

## 2018-03-07 DIAGNOSIS — Z98.890 S/P MITRAL VALVE REPAIR: Primary | ICD-10-CM

## 2018-03-12 ENCOUNTER — TREATMENT (OUTPATIENT)
Dept: CARDIAC REHAB | Facility: HOSPITAL | Age: 78
End: 2018-03-12

## 2018-03-12 DIAGNOSIS — Z98.890 S/P MITRAL VALVE REPAIR: Primary | ICD-10-CM

## 2018-03-14 ENCOUNTER — TREATMENT (OUTPATIENT)
Dept: CARDIAC REHAB | Facility: HOSPITAL | Age: 78
End: 2018-03-14

## 2018-03-14 DIAGNOSIS — Z98.890 S/P MITRAL VALVE REPAIR: Primary | ICD-10-CM

## 2018-03-15 ENCOUNTER — OFFICE VISIT (OUTPATIENT)
Dept: CARDIAC SURGERY | Facility: CLINIC | Age: 78
End: 2018-03-15

## 2018-03-15 VITALS
SYSTOLIC BLOOD PRESSURE: 104 MMHG | HEIGHT: 61 IN | DIASTOLIC BLOOD PRESSURE: 71 MMHG | TEMPERATURE: 97.7 F | OXYGEN SATURATION: 94 % | HEART RATE: 80 BPM | BODY MASS INDEX: 38.71 KG/M2 | WEIGHT: 205 LBS

## 2018-03-15 DIAGNOSIS — Z98.890 S/P MITRAL VALVE REPAIR: ICD-10-CM

## 2018-03-15 DIAGNOSIS — Z98.890 S/P MAZE OPERATION FOR ATRIAL FIBRILLATION: ICD-10-CM

## 2018-03-15 DIAGNOSIS — Z86.79 S/P MAZE OPERATION FOR ATRIAL FIBRILLATION: ICD-10-CM

## 2018-03-15 DIAGNOSIS — Z98.890 S/P TRICUSPID VALVE REPAIR: Primary | ICD-10-CM

## 2018-03-15 PROCEDURE — 99213 OFFICE O/P EST LOW 20 MIN: CPT | Performed by: THORACIC SURGERY (CARDIOTHORACIC VASCULAR SURGERY)

## 2018-03-15 NOTE — PROGRESS NOTES
"CARDIOVASCULAR SURGERY FOLLOW-UP PROGRESS NOTE  Chief Complaint: Here for follow-up after mitral valve repair tricuspid valve repair and Maze procedure.        HPI:   Dear Dr. Rashid Ge MD and colleagues:    It was nice to see Fernanda Molina in follow up 3 years  after surgery.  As you know, she is a 77 y.o. female with mitral and tricuspid incompetence and chronic atrial fibrillation who underwent mitral valve repair and tricuspid valve repair.. She did well postoperatively and continues to do well. She comes in today complaining of nothing.  Her activity level has been good.     I last echo shows trace mitral incompetence and trace tricuspid incompetence.  On this exam she had pulmonary hypertension around 80.  There was a mention of possible mitral stenosis but I really don't see this on the exam.  She follows up with  and he is pleased with her.      Physical Exam:         /71 (BP Location: Left arm, Patient Position: Sitting)   Pulse 80   Temp 97.7 °F (36.5 °C) (Oral)   Ht 154.9 cm (61\")   Wt 93 kg (205 lb)   SpO2 94%   BMI 38.73 kg/m²   Heart:  regular rate and rhythm, S1, S2 normal, no murmur, click, rub or gallop, normal prosthetic valve sounds  Lungs:  clear to auscultation bilaterally  Extremities:  no edema  Incision(s):  sternum stable    Assessment/Plan:     S/P mitral valve repair, tricuspid valve repair and Maze procedure. Overall, she is doing well.    No significant post-op complications    Follow-up as scheduled with cardiology  Follow-up as scheduled with PCP  Return to clinic in 1 year(s) with no new studies    No restrictions of activity.      Thank you for allowing me to participate in the care of your   patient.  Regards,  Ben Saavedra MD    "

## 2018-03-19 ENCOUNTER — TREATMENT (OUTPATIENT)
Dept: CARDIAC REHAB | Facility: HOSPITAL | Age: 78
End: 2018-03-19

## 2018-03-19 DIAGNOSIS — Z98.890 S/P MITRAL VALVE REPAIR: Primary | ICD-10-CM

## 2018-03-23 ENCOUNTER — TREATMENT (OUTPATIENT)
Dept: CARDIAC REHAB | Facility: HOSPITAL | Age: 78
End: 2018-03-23

## 2018-03-23 DIAGNOSIS — Z98.890 S/P MITRAL VALVE REPAIR: Primary | ICD-10-CM

## 2018-03-26 ENCOUNTER — TREATMENT (OUTPATIENT)
Dept: CARDIAC REHAB | Facility: HOSPITAL | Age: 78
End: 2018-03-26

## 2018-03-26 DIAGNOSIS — Z98.890 S/P MITRAL VALVE REPAIR: Primary | ICD-10-CM

## 2018-03-28 ENCOUNTER — TREATMENT (OUTPATIENT)
Dept: CARDIAC REHAB | Facility: HOSPITAL | Age: 78
End: 2018-03-28

## 2018-03-28 DIAGNOSIS — Z98.890 S/P MITRAL VALVE REPAIR: Primary | ICD-10-CM

## 2018-04-04 ENCOUNTER — TREATMENT (OUTPATIENT)
Dept: CARDIAC REHAB | Facility: HOSPITAL | Age: 78
End: 2018-04-04

## 2018-04-04 DIAGNOSIS — Z98.890 S/P MITRAL VALVE REPAIR: Primary | ICD-10-CM

## 2018-04-06 ENCOUNTER — TREATMENT (OUTPATIENT)
Dept: CARDIAC REHAB | Facility: HOSPITAL | Age: 78
End: 2018-04-06

## 2018-04-06 DIAGNOSIS — Z98.890 S/P MITRAL VALVE REPAIR: Primary | ICD-10-CM

## 2018-04-09 ENCOUNTER — TREATMENT (OUTPATIENT)
Dept: CARDIAC REHAB | Facility: HOSPITAL | Age: 78
End: 2018-04-09

## 2018-04-09 DIAGNOSIS — Z98.890 S/P MITRAL VALVE REPAIR: Primary | ICD-10-CM

## 2018-04-11 ENCOUNTER — TREATMENT (OUTPATIENT)
Dept: CARDIAC REHAB | Facility: HOSPITAL | Age: 78
End: 2018-04-11

## 2018-04-11 DIAGNOSIS — Z98.890 S/P MITRAL VALVE REPAIR: Primary | ICD-10-CM

## 2018-04-13 ENCOUNTER — TREATMENT (OUTPATIENT)
Dept: CARDIAC REHAB | Facility: HOSPITAL | Age: 78
End: 2018-04-13

## 2018-04-13 DIAGNOSIS — Z98.890 S/P MITRAL VALVE REPAIR: Primary | ICD-10-CM

## 2018-04-16 ENCOUNTER — TREATMENT (OUTPATIENT)
Dept: CARDIAC REHAB | Facility: HOSPITAL | Age: 78
End: 2018-04-16

## 2018-04-16 DIAGNOSIS — Z98.890 S/P MITRAL VALVE REPAIR: Primary | ICD-10-CM

## 2018-04-18 ENCOUNTER — TREATMENT (OUTPATIENT)
Dept: CARDIAC REHAB | Facility: HOSPITAL | Age: 78
End: 2018-04-18

## 2018-04-18 DIAGNOSIS — Z98.890 S/P MITRAL VALVE REPAIR: Primary | ICD-10-CM

## 2018-04-23 ENCOUNTER — TREATMENT (OUTPATIENT)
Dept: CARDIAC REHAB | Facility: HOSPITAL | Age: 78
End: 2018-04-23

## 2018-04-23 DIAGNOSIS — Z98.890 S/P MITRAL VALVE REPAIR: Primary | ICD-10-CM

## 2018-04-25 ENCOUNTER — TREATMENT (OUTPATIENT)
Dept: CARDIAC REHAB | Facility: HOSPITAL | Age: 78
End: 2018-04-25

## 2018-04-25 DIAGNOSIS — Z98.890 S/P MITRAL VALVE REPAIR: Primary | ICD-10-CM

## 2018-04-30 ENCOUNTER — TREATMENT (OUTPATIENT)
Dept: CARDIAC REHAB | Facility: HOSPITAL | Age: 78
End: 2018-04-30

## 2018-04-30 DIAGNOSIS — Z98.890 S/P MITRAL VALVE REPAIR: Primary | ICD-10-CM

## 2018-05-02 ENCOUNTER — TREATMENT (OUTPATIENT)
Dept: CARDIAC REHAB | Facility: HOSPITAL | Age: 78
End: 2018-05-02

## 2018-05-02 DIAGNOSIS — Z98.890 S/P MITRAL VALVE REPAIR: Primary | ICD-10-CM

## 2018-05-04 ENCOUNTER — TREATMENT (OUTPATIENT)
Dept: CARDIAC REHAB | Facility: HOSPITAL | Age: 78
End: 2018-05-04

## 2018-05-04 DIAGNOSIS — Z98.890 S/P MITRAL VALVE REPAIR: Primary | ICD-10-CM

## 2018-05-07 ENCOUNTER — TREATMENT (OUTPATIENT)
Dept: CARDIAC REHAB | Facility: HOSPITAL | Age: 78
End: 2018-05-07

## 2018-05-07 DIAGNOSIS — Z98.890 S/P MITRAL VALVE REPAIR: Primary | ICD-10-CM

## 2018-05-09 ENCOUNTER — TREATMENT (OUTPATIENT)
Dept: CARDIAC REHAB | Facility: HOSPITAL | Age: 78
End: 2018-05-09

## 2018-05-09 DIAGNOSIS — Z98.890 S/P MITRAL VALVE REPAIR: Primary | ICD-10-CM

## 2018-05-11 ENCOUNTER — TREATMENT (OUTPATIENT)
Dept: CARDIAC REHAB | Facility: HOSPITAL | Age: 78
End: 2018-05-11

## 2018-05-11 DIAGNOSIS — Z98.890 S/P MITRAL VALVE REPAIR: Primary | ICD-10-CM

## 2018-05-14 ENCOUNTER — TREATMENT (OUTPATIENT)
Dept: CARDIAC REHAB | Facility: HOSPITAL | Age: 78
End: 2018-05-14

## 2018-05-14 DIAGNOSIS — Z98.890 S/P MITRAL VALVE REPAIR: Primary | ICD-10-CM

## 2018-05-16 ENCOUNTER — TREATMENT (OUTPATIENT)
Dept: CARDIAC REHAB | Facility: HOSPITAL | Age: 78
End: 2018-05-16

## 2018-05-16 DIAGNOSIS — Z98.890 S/P MITRAL VALVE REPAIR: Primary | ICD-10-CM

## 2018-05-21 ENCOUNTER — TREATMENT (OUTPATIENT)
Dept: CARDIAC REHAB | Facility: HOSPITAL | Age: 78
End: 2018-05-21

## 2018-05-21 DIAGNOSIS — Z98.890 S/P MITRAL VALVE REPAIR: Primary | ICD-10-CM

## 2018-05-23 ENCOUNTER — TREATMENT (OUTPATIENT)
Dept: CARDIAC REHAB | Facility: HOSPITAL | Age: 78
End: 2018-05-23

## 2018-05-23 DIAGNOSIS — Z98.890 S/P MITRAL VALVE REPAIR: Primary | ICD-10-CM

## 2018-05-30 ENCOUNTER — TREATMENT (OUTPATIENT)
Dept: CARDIAC REHAB | Facility: HOSPITAL | Age: 78
End: 2018-05-30

## 2018-05-30 DIAGNOSIS — Z98.890 S/P MITRAL VALVE REPAIR: Primary | ICD-10-CM

## 2018-05-31 DIAGNOSIS — I25.10 CORONARY ARTERY DISEASE INVOLVING NATIVE HEART WITHOUT ANGINA PECTORIS, UNSPECIFIED VESSEL OR LESION TYPE: ICD-10-CM

## 2018-06-01 ENCOUNTER — TREATMENT (OUTPATIENT)
Dept: CARDIAC REHAB | Facility: HOSPITAL | Age: 78
End: 2018-06-01

## 2018-06-01 DIAGNOSIS — Z98.890 S/P MITRAL VALVE REPAIR: Primary | ICD-10-CM

## 2018-06-04 ENCOUNTER — TREATMENT (OUTPATIENT)
Dept: CARDIAC REHAB | Facility: HOSPITAL | Age: 78
End: 2018-06-04

## 2018-06-04 DIAGNOSIS — Z98.890 S/P MITRAL VALVE REPAIR: Primary | ICD-10-CM

## 2018-06-11 ENCOUNTER — TREATMENT (OUTPATIENT)
Dept: CARDIAC REHAB | Facility: HOSPITAL | Age: 78
End: 2018-06-11

## 2018-06-11 DIAGNOSIS — Z98.890 S/P MITRAL VALVE REPAIR: Primary | ICD-10-CM

## 2018-06-22 ENCOUNTER — TREATMENT (OUTPATIENT)
Dept: CARDIAC REHAB | Facility: HOSPITAL | Age: 78
End: 2018-06-22

## 2018-06-22 DIAGNOSIS — Z98.890 S/P MITRAL VALVE REPAIR: Primary | ICD-10-CM

## 2018-06-27 ENCOUNTER — TREATMENT (OUTPATIENT)
Dept: CARDIAC REHAB | Facility: HOSPITAL | Age: 78
End: 2018-06-27

## 2018-06-27 DIAGNOSIS — Z98.890 S/P MITRAL VALVE REPAIR: Primary | ICD-10-CM

## 2018-06-29 ENCOUNTER — TREATMENT (OUTPATIENT)
Dept: CARDIAC REHAB | Facility: HOSPITAL | Age: 78
End: 2018-06-29

## 2018-06-29 DIAGNOSIS — Z98.890 S/P MITRAL VALVE REPAIR: Primary | ICD-10-CM

## 2018-07-02 ENCOUNTER — TREATMENT (OUTPATIENT)
Dept: CARDIAC REHAB | Facility: HOSPITAL | Age: 78
End: 2018-07-02

## 2018-07-02 DIAGNOSIS — Z98.890 S/P MITRAL VALVE REPAIR: Primary | ICD-10-CM

## 2018-07-06 ENCOUNTER — TREATMENT (OUTPATIENT)
Dept: CARDIAC REHAB | Facility: HOSPITAL | Age: 78
End: 2018-07-06

## 2018-07-06 DIAGNOSIS — Z98.890 S/P MITRAL VALVE REPAIR: Primary | ICD-10-CM

## 2018-07-09 ENCOUNTER — TREATMENT (OUTPATIENT)
Dept: CARDIAC REHAB | Facility: HOSPITAL | Age: 78
End: 2018-07-09

## 2018-07-09 DIAGNOSIS — Z98.890 S/P MITRAL VALVE REPAIR: Primary | ICD-10-CM

## 2018-07-11 ENCOUNTER — TREATMENT (OUTPATIENT)
Dept: CARDIAC REHAB | Facility: HOSPITAL | Age: 78
End: 2018-07-11

## 2018-07-11 DIAGNOSIS — Z98.890 S/P MITRAL VALVE REPAIR: Primary | ICD-10-CM

## 2018-07-13 ENCOUNTER — TREATMENT (OUTPATIENT)
Dept: CARDIAC REHAB | Facility: HOSPITAL | Age: 78
End: 2018-07-13

## 2018-07-13 DIAGNOSIS — Z98.890 S/P MITRAL VALVE REPAIR: Primary | ICD-10-CM

## 2018-07-16 ENCOUNTER — TREATMENT (OUTPATIENT)
Dept: CARDIAC REHAB | Facility: HOSPITAL | Age: 78
End: 2018-07-16

## 2018-07-16 DIAGNOSIS — Z98.890 S/P MITRAL VALVE REPAIR: Primary | ICD-10-CM

## 2018-07-18 ENCOUNTER — TREATMENT (OUTPATIENT)
Dept: CARDIAC REHAB | Facility: HOSPITAL | Age: 78
End: 2018-07-18

## 2018-07-18 DIAGNOSIS — Z98.890 S/P MITRAL VALVE REPAIR: Primary | ICD-10-CM

## 2018-07-23 ENCOUNTER — TREATMENT (OUTPATIENT)
Dept: CARDIAC REHAB | Facility: HOSPITAL | Age: 78
End: 2018-07-23

## 2018-07-23 DIAGNOSIS — Z98.890 S/P MITRAL VALVE REPAIR: Primary | ICD-10-CM

## 2018-07-25 ENCOUNTER — TREATMENT (OUTPATIENT)
Dept: CARDIAC REHAB | Facility: HOSPITAL | Age: 78
End: 2018-07-25

## 2018-07-25 DIAGNOSIS — Z98.890 S/P MITRAL VALVE REPAIR: Primary | ICD-10-CM

## 2018-07-26 ENCOUNTER — OFFICE VISIT (OUTPATIENT)
Dept: SLEEP MEDICINE | Facility: HOSPITAL | Age: 78
End: 2018-07-26
Attending: INTERNAL MEDICINE

## 2018-07-26 DIAGNOSIS — G47.33 OSA ON CPAP: Primary | ICD-10-CM

## 2018-07-26 DIAGNOSIS — Z99.89 OSA ON CPAP: Primary | ICD-10-CM

## 2018-07-26 PROCEDURE — G0463 HOSPITAL OUTPT CLINIC VISIT: HCPCS

## 2018-07-26 PROCEDURE — 99213 OFFICE O/P EST LOW 20 MIN: CPT | Performed by: INTERNAL MEDICINE

## 2018-07-26 NOTE — PROGRESS NOTES
Follow Up [facility] Note     Chief Complaint:  HAMLET     Primary Care Physician: Rashdi Ge MD    Interval History:   The patient was last seen by me in July 2017.  She is stable.  She goes to bed 11 PM and awakens between 8 and 9 AM.  [place] Sleepiness Scale is normal at 6.    Since I last saw her, she has had kidney stones.    Review of Systems:  Recorded on the Sleep Questionnaire.  Unremarkable except for postnasal drip and BROOKS    Social History:    Social History     Social History   • Marital status:      Social History Main Topics   • Smoking status: Never Smoker   • Smokeless tobacco: Never Used   • Alcohol use No   • Drug use: No   • Sexual activity: Defer     Other Topics Concern   • Not on file       Allergies:  Corticosteroids; Hydrocodone-acetaminophen; Adhesive tape; Hydrocodone; and Lisinopril     Medication Review:  Reviewed.      Vital Signs:  Height 61 inches, weight 195 pounds and BMI obese at 37.    Physical Exam:    Constitutional:  Well developed white female and appears in no apparent distress.  Awake & oriented times 3.  Normal mood with normal recent and remote memory and normal judgement.  Eyes:  Conjunctivae normal.  Oropharynx:  moist mucous membranes without exudate and a large tongue and class III MP airway      Results Review:  DME is [facility] and she uses a nasal interface.  Downloads between April 27 and July 25, 2018 compliances 100%.  Average usage is 8 hours and 47 minutes.  Average AHI is normal without a significant leak.  Average AutoCPAP pressure is 14 and her auto CPAP is 12-60.       Impression:   Obstructive sleep apnea adequately treated with auto titrating CPAP with good compliance and usage and no significant complaints of hypersomnolence      Plan:  Good sleep hygiene measures should be maintained.  Weight loss would be beneficial in this patient who is obese by BMI.  The patient is benefiting from the treatment being provided.     The patient  will continue auto CPAP.  Orders will be sent to her DME.    The patient will call for any problems and will follow up in one year.      Mahin Galeas MD  Sleep Medicine  07/26/18  10:01 AM

## 2018-08-01 ENCOUNTER — TREATMENT (OUTPATIENT)
Dept: CARDIAC REHAB | Facility: HOSPITAL | Age: 78
End: 2018-08-01

## 2018-08-01 DIAGNOSIS — Z98.890 S/P MITRAL VALVE REPAIR: Primary | ICD-10-CM

## 2018-08-03 ENCOUNTER — TREATMENT (OUTPATIENT)
Dept: CARDIAC REHAB | Facility: HOSPITAL | Age: 78
End: 2018-08-03

## 2018-08-03 DIAGNOSIS — Z98.890 S/P MITRAL VALVE REPAIR: Primary | ICD-10-CM

## 2018-08-06 ENCOUNTER — TREATMENT (OUTPATIENT)
Dept: CARDIAC REHAB | Facility: HOSPITAL | Age: 78
End: 2018-08-06

## 2018-08-06 DIAGNOSIS — Z98.890 S/P MITRAL VALVE REPAIR: Primary | ICD-10-CM

## 2018-08-08 ENCOUNTER — TREATMENT (OUTPATIENT)
Dept: CARDIAC REHAB | Facility: HOSPITAL | Age: 78
End: 2018-08-08

## 2018-08-08 DIAGNOSIS — Z98.890 S/P MITRAL VALVE REPAIR: Primary | ICD-10-CM

## 2018-08-10 ENCOUNTER — TREATMENT (OUTPATIENT)
Dept: CARDIAC REHAB | Facility: HOSPITAL | Age: 78
End: 2018-08-10

## 2018-08-10 DIAGNOSIS — Z98.890 S/P MITRAL VALVE REPAIR: Primary | ICD-10-CM

## 2018-08-15 ENCOUNTER — TREATMENT (OUTPATIENT)
Dept: CARDIAC REHAB | Facility: HOSPITAL | Age: 78
End: 2018-08-15

## 2018-08-15 DIAGNOSIS — Z98.890 S/P MITRAL VALVE REPAIR: Primary | ICD-10-CM

## 2018-08-17 ENCOUNTER — TREATMENT (OUTPATIENT)
Dept: CARDIAC REHAB | Facility: HOSPITAL | Age: 78
End: 2018-08-17

## 2018-08-17 DIAGNOSIS — Z98.890 S/P MITRAL VALVE REPAIR: Primary | ICD-10-CM

## 2018-08-20 ENCOUNTER — TREATMENT (OUTPATIENT)
Dept: CARDIAC REHAB | Facility: HOSPITAL | Age: 78
End: 2018-08-20

## 2018-08-20 DIAGNOSIS — Z98.890 S/P MITRAL VALVE REPAIR: Primary | ICD-10-CM

## 2018-08-22 ENCOUNTER — TREATMENT (OUTPATIENT)
Dept: CARDIAC REHAB | Facility: HOSPITAL | Age: 78
End: 2018-08-22

## 2018-08-22 DIAGNOSIS — Z98.890 S/P MITRAL VALVE REPAIR: Primary | ICD-10-CM

## 2018-08-24 DIAGNOSIS — E03.9 ACQUIRED HYPOTHYROIDISM: ICD-10-CM

## 2018-08-24 DIAGNOSIS — Z51.81 MEDICATION MONITORING ENCOUNTER: ICD-10-CM

## 2018-08-24 DIAGNOSIS — E11.9 TYPE 2 DIABETES MELLITUS WITHOUT COMPLICATION, WITHOUT LONG-TERM CURRENT USE OF INSULIN (HCC): Primary | ICD-10-CM

## 2018-08-24 DIAGNOSIS — R53.83 FATIGUE, UNSPECIFIED TYPE: ICD-10-CM

## 2018-08-24 DIAGNOSIS — E78.00 ELEVATED LDL CHOLESTEROL LEVEL: ICD-10-CM

## 2018-08-24 DIAGNOSIS — E83.52 HYPERCALCEMIA: ICD-10-CM

## 2018-08-25 LAB
ALBUMIN SERPL-MCNC: 4.7 G/DL (ref 3.5–5.2)
ALBUMIN/CREAT UR: 22.1 MG/G CREAT (ref 0–30)
ALBUMIN/GLOB SERPL: 2 G/DL
ALP SERPL-CCNC: 55 U/L (ref 40–129)
ALT SERPL-CCNC: 35 U/L (ref 5–33)
APPEARANCE UR: CLEAR
AST SERPL-CCNC: 39 U/L (ref 5–32)
BACTERIA #/AREA URNS HPF: ABNORMAL /HPF
BILIRUB SERPL-MCNC: 0.9 MG/DL (ref 0.2–1.2)
BILIRUB UR QL STRIP: NEGATIVE
BUN SERPL-MCNC: 20 MG/DL (ref 8–23)
BUN/CREAT SERPL: 21.5 (ref 7–25)
CALCIUM SERPL-MCNC: 10.8 MG/DL (ref 8.8–10.5)
CHLORIDE SERPL-SCNC: 99 MMOL/L (ref 98–107)
CHOLEST SERPL-MCNC: 135 MG/DL (ref 0–200)
CO2 SERPL-SCNC: 27.2 MMOL/L (ref 22–29)
COLOR UR: YELLOW
CREAT SERPL-MCNC: 0.93 MG/DL (ref 0.57–1)
CREAT UR-MCNC: 144.5 MG/DL
DIGOXIN SERPL-MCNC: 0.92 NG/ML (ref 0.8–2)
EPI CELLS #/AREA URNS HPF: ABNORMAL /HPF
ERYTHROCYTE [DISTWIDTH] IN BLOOD BY AUTOMATED COUNT: 14.5 % (ref 11.5–14.5)
GLOBULIN SER CALC-MCNC: 2.4 GM/DL
GLUCOSE SERPL-MCNC: 138 MG/DL (ref 65–99)
GLUCOSE UR QL: ABNORMAL
HBA1C MFR BLD: 6.1 % (ref 4.8–5.6)
HCT VFR BLD AUTO: 42.9 % (ref 37–47)
HDLC SERPL-MCNC: 34 MG/DL (ref 40–60)
HGB BLD-MCNC: 13.9 G/DL (ref 12–16)
HGB UR QL STRIP: ABNORMAL
KETONES UR QL STRIP: ABNORMAL
LDLC SERPL CALC-MCNC: 52 MG/DL (ref 0–100)
LDLC/HDLC SERPL: 1.53 {RATIO}
LEUKOCYTE ESTERASE UR QL STRIP: ABNORMAL
MCH RBC QN AUTO: 29.3 PG (ref 27–31)
MCHC RBC AUTO-ENTMCNC: 32.4 G/DL (ref 31–37)
MCV RBC AUTO: 90.5 FL (ref 81–99)
MICROALBUMIN UR-MCNC: 31.9 UG/ML
NITRITE UR QL STRIP: NEGATIVE
PH UR STRIP: 6.5 [PH] (ref 4.5–8)
PLATELET # BLD AUTO: 206 10*3/MM3 (ref 140–500)
POTASSIUM SERPL-SCNC: 4.6 MMOL/L (ref 3.5–5.2)
PROT SERPL-MCNC: 7.1 G/DL (ref 6–8.5)
PROT UR QL STRIP: ABNORMAL
RBC # BLD AUTO: 4.74 10*6/MM3 (ref 4.2–5.4)
RBC #/AREA URNS HPF: ABNORMAL /HPF
SODIUM SERPL-SCNC: 140 MMOL/L (ref 136–145)
SP GR UR: 1.02 (ref 1–1.03)
TRIGL SERPL-MCNC: 245 MG/DL (ref 0–150)
TSH SERPL DL<=0.005 MIU/L-ACNC: 0.3 MIU/ML (ref 0.27–4.2)
UROBILINOGEN UR STRIP-MCNC: ABNORMAL MG/DL
VLDLC SERPL CALC-MCNC: 49 MG/DL (ref 7–27)
WBC # BLD AUTO: 6.52 10*3/MM3 (ref 4.8–10.8)
WBC #/AREA URNS HPF: ABNORMAL /HPF

## 2018-08-27 ENCOUNTER — TREATMENT (OUTPATIENT)
Dept: CARDIAC REHAB | Facility: HOSPITAL | Age: 78
End: 2018-08-27

## 2018-08-27 DIAGNOSIS — Z98.890 S/P MITRAL VALVE REPAIR: Primary | ICD-10-CM

## 2018-08-29 ENCOUNTER — TREATMENT (OUTPATIENT)
Dept: CARDIAC REHAB | Facility: HOSPITAL | Age: 78
End: 2018-08-29

## 2018-08-29 DIAGNOSIS — Z98.890 S/P MITRAL VALVE REPAIR: Primary | ICD-10-CM

## 2018-08-31 ENCOUNTER — OFFICE VISIT (OUTPATIENT)
Dept: FAMILY MEDICINE CLINIC | Facility: CLINIC | Age: 78
End: 2018-08-31

## 2018-08-31 VITALS
SYSTOLIC BLOOD PRESSURE: 112 MMHG | WEIGHT: 200 LBS | HEIGHT: 61 IN | DIASTOLIC BLOOD PRESSURE: 72 MMHG | BODY MASS INDEX: 37.76 KG/M2 | OXYGEN SATURATION: 92 % | HEART RATE: 88 BPM

## 2018-08-31 DIAGNOSIS — Z79.4 TYPE 2 DIABETES MELLITUS WITHOUT COMPLICATION, WITH LONG-TERM CURRENT USE OF INSULIN (HCC): ICD-10-CM

## 2018-08-31 DIAGNOSIS — E03.9 ACQUIRED HYPOTHYROIDISM: Primary | ICD-10-CM

## 2018-08-31 DIAGNOSIS — E11.9 TYPE 2 DIABETES MELLITUS WITHOUT COMPLICATION, WITH LONG-TERM CURRENT USE OF INSULIN (HCC): ICD-10-CM

## 2018-08-31 PROCEDURE — 99214 OFFICE O/P EST MOD 30 MIN: CPT | Performed by: FAMILY MEDICINE

## 2018-08-31 RX ORDER — ATORVASTATIN CALCIUM 20 MG/1
10 TABLET, FILM COATED ORAL 2 TIMES WEEKLY
COMMUNITY
Start: 2018-05-29 | End: 2019-08-29 | Stop reason: SDUPTHER

## 2018-08-31 RX ORDER — THYROID,PORK 90 MG
TABLET ORAL
COMMUNITY
Start: 2018-06-17 | End: 2019-04-16 | Stop reason: SDUPTHER

## 2018-08-31 RX ORDER — INSULIN DETEMIR 100 [IU]/ML
INJECTION, SOLUTION SUBCUTANEOUS
COMMUNITY
Start: 2018-07-06 | End: 2019-01-14 | Stop reason: CLARIF

## 2018-08-31 RX ORDER — SPIRONOLACTONE 25 MG/1
TABLET ORAL
COMMUNITY
Start: 2018-07-23 | End: 2018-08-31

## 2018-08-31 RX ORDER — FUROSEMIDE 40 MG/1
60 TABLET ORAL DAILY
Status: ON HOLD | COMMUNITY
Start: 2018-05-29 | End: 2019-05-20 | Stop reason: SDUPTHER

## 2018-09-05 ENCOUNTER — TREATMENT (OUTPATIENT)
Dept: CARDIAC REHAB | Facility: HOSPITAL | Age: 78
End: 2018-09-05

## 2018-09-05 DIAGNOSIS — Z98.890 S/P MITRAL VALVE REPAIR: Primary | ICD-10-CM

## 2018-09-07 ENCOUNTER — TREATMENT (OUTPATIENT)
Dept: CARDIAC REHAB | Facility: HOSPITAL | Age: 78
End: 2018-09-07

## 2018-09-07 DIAGNOSIS — Z98.890 S/P MITRAL VALVE REPAIR: Primary | ICD-10-CM

## 2018-09-10 ENCOUNTER — TREATMENT (OUTPATIENT)
Dept: CARDIAC REHAB | Facility: HOSPITAL | Age: 78
End: 2018-09-10

## 2018-09-10 DIAGNOSIS — Z98.890 S/P MITRAL VALVE REPAIR: Primary | ICD-10-CM

## 2018-09-12 ENCOUNTER — TREATMENT (OUTPATIENT)
Dept: CARDIAC REHAB | Facility: HOSPITAL | Age: 78
End: 2018-09-12

## 2018-09-12 DIAGNOSIS — Z98.890 S/P MITRAL VALVE REPAIR: Primary | ICD-10-CM

## 2018-09-17 ENCOUNTER — TREATMENT (OUTPATIENT)
Dept: CARDIAC REHAB | Facility: HOSPITAL | Age: 78
End: 2018-09-17

## 2018-09-17 DIAGNOSIS — Z98.890 S/P MITRAL VALVE REPAIR: Primary | ICD-10-CM

## 2018-09-18 DIAGNOSIS — Z79.4 TYPE 2 DIABETES MELLITUS WITHOUT COMPLICATION, WITH LONG-TERM CURRENT USE OF INSULIN (HCC): Primary | ICD-10-CM

## 2018-09-18 DIAGNOSIS — E11.9 TYPE 2 DIABETES MELLITUS WITHOUT COMPLICATION, WITH LONG-TERM CURRENT USE OF INSULIN (HCC): Primary | ICD-10-CM

## 2018-09-18 RX ORDER — LANCETS 33 GAUGE
EACH MISCELLANEOUS
Qty: 100 EACH | Refills: 5 | Status: SHIPPED | OUTPATIENT
Start: 2018-09-18 | End: 2019-05-10 | Stop reason: ALTCHOICE

## 2018-09-19 ENCOUNTER — TREATMENT (OUTPATIENT)
Dept: CARDIAC REHAB | Facility: HOSPITAL | Age: 78
End: 2018-09-19

## 2018-09-19 DIAGNOSIS — Z98.890 S/P MITRAL VALVE REPAIR: Primary | ICD-10-CM

## 2018-09-21 ENCOUNTER — TREATMENT (OUTPATIENT)
Dept: CARDIAC REHAB | Facility: HOSPITAL | Age: 78
End: 2018-09-21

## 2018-09-21 DIAGNOSIS — Z98.890 S/P MITRAL VALVE REPAIR: Primary | ICD-10-CM

## 2018-09-26 ENCOUNTER — TREATMENT (OUTPATIENT)
Dept: CARDIAC REHAB | Facility: HOSPITAL | Age: 78
End: 2018-09-26

## 2018-09-26 DIAGNOSIS — Z98.890 S/P MITRAL VALVE REPAIR: Primary | ICD-10-CM

## 2018-09-28 ENCOUNTER — TREATMENT (OUTPATIENT)
Dept: CARDIAC REHAB | Facility: HOSPITAL | Age: 78
End: 2018-09-28

## 2018-09-28 DIAGNOSIS — Z98.890 S/P MITRAL VALVE REPAIR: Primary | ICD-10-CM

## 2018-10-03 ENCOUNTER — TREATMENT (OUTPATIENT)
Dept: CARDIAC REHAB | Facility: HOSPITAL | Age: 78
End: 2018-10-03

## 2018-10-03 DIAGNOSIS — Z98.890 S/P MITRAL VALVE REPAIR: Primary | ICD-10-CM

## 2018-10-08 ENCOUNTER — TREATMENT (OUTPATIENT)
Dept: CARDIAC REHAB | Facility: HOSPITAL | Age: 78
End: 2018-10-08

## 2018-10-08 DIAGNOSIS — Z98.890 S/P MITRAL VALVE REPAIR: Primary | ICD-10-CM

## 2018-10-10 ENCOUNTER — TREATMENT (OUTPATIENT)
Dept: CARDIAC REHAB | Facility: HOSPITAL | Age: 78
End: 2018-10-10

## 2018-10-10 DIAGNOSIS — Z98.890 S/P MITRAL VALVE REPAIR: Primary | ICD-10-CM

## 2018-10-12 ENCOUNTER — TREATMENT (OUTPATIENT)
Dept: CARDIAC REHAB | Facility: HOSPITAL | Age: 78
End: 2018-10-12

## 2018-10-12 DIAGNOSIS — Z98.890 S/P MITRAL VALVE REPAIR: Primary | ICD-10-CM

## 2018-10-16 ENCOUNTER — TRANSCRIBE ORDERS (OUTPATIENT)
Dept: ADMINISTRATIVE | Facility: HOSPITAL | Age: 78
End: 2018-10-16

## 2018-10-16 DIAGNOSIS — N20.0 RENAL CALCULUS: Primary | ICD-10-CM

## 2018-10-17 ENCOUNTER — APPOINTMENT (OUTPATIENT)
Dept: CARDIAC REHAB | Facility: HOSPITAL | Age: 78
End: 2018-10-17

## 2018-10-18 NOTE — ANESTHESIA PREPROCEDURE EVALUATION
Anesthesia Evaluation     history of anesthetic complications: difficult airway  NPO Solid Status: > 8 hours       Airway   Mallampati: II  TM distance: >3 FB  Neck ROM: full  no difficulty expected  Dental - normal exam     Pulmonary - normal exam   (+) COPD, sleep apnea,   Cardiovascular     Rhythm: regular    (+) hypertension, past MI , CAD, dysrhythmias Tachycardia,   (-) murmur      Neuro/Psych  GI/Hepatic/Renal/Endo    (+) obesity, morbid obesity, hypothyroidism,     Musculoskeletal     Abdominal  - normal exam   Substance History      OB/GYN          Other   (+) arthritis                                     Anesthesia Plan    ASA 3     general   ([employer] ICD- needs magnet but no post op interrogation.    No steroids  Percocet OK)  intravenous induction        10/18/2018  Date of Hospital Admission: 10/5/18  Date of Hospital Discharge: 10/11/18  Risk of hospital readmission (high >=14%.  Medium >=10%) :Readmission Risk Score: 13    Care management risk score Rising risk (score 2-5) and Complex Care (Scores >=6): 9     Non face to face  following discharge, date last encounter closed (first attempt may have been earlier): 10/15/2018  2:31 PM    Call initiated 2 business days of discharge: Yes    Patient Active Problem List   Diagnosis    Abnormality of gait    Osteoporosis    Peptic ulcer    Disorder of kidney and ureter    Anemia    Pneumonia due to organism    Acute bronchitis    Osteoarthrosis, unspecified whether generalized or localized, pelvic region and thigh    Closed fracture of thoracic vertebra ([address]    Displacement of lumbar intervertebral disc without myelopathy    Scoliosis (and kyphoscoliosis), idiopathic    Enthesopathy of hip region    Thoracic or lumbosacral neuritis or radiculitis, unspecified    Dermatitis due to drug taken internally    Acute cystitis without hematuria    Edema    Lumbago    CTS (carpal tunnel syndrome)    Depression    Anxiety    Depression    Femoral distal fracture ([address]    Fibrotic lung diseases ([address]    Periprosthetic fracture around internal prosthetic left knee joint    Fracture of femur ([address]    Acute on chronic respiratory failure with hypoxia ([address]    Acute blood loss anemia    Pain from implanted hardware    Idiopathic pulmonary fibrosis (HCC)    Hypoxia    Abnormal CT scan, chest    Exertional dyspnea    Scleroderma ([address]    Closed intertrochanteric fracture of right femur ([address]    Chronic respiratory failure with hypoxia ([address]    Cystic-bullous disease of lung    ILD (interstitial lung disease) ([address]    Obstructive nephropathy    Open wound of hip, complicated, left, initial encounter    Acute post-operative pain    Sepsis ([address]    Fever    Tachycardia    Tachypnea    Pressure injury of skin of left buttock    Multifocal pneumonia    Abnormal chest CT    Urinary tract infection without hematuria       Allergies   Allergen Reactions    Ampicillin Hives    Ciprofloxacin Other (See Comments)     Sores in mouth      Nitrofurantoin Other (See Comments)     Unable to recall reaction    Sulfa Antibiotics Other (See Comments)     Unable to recall reaction    Penicillins Hives and Rash       Medications listed as ordered at the time of discharge from hospital   [address] Medication Instructions FELICE:    Printed on:10/18/18 2047   Medication Information                      alendronate (FOSAMAX) 35 MG tablet  Take 70 mg by mouth every 7 days             aspirin 81 MG tablet  Take 81 mg by mouth daily             buPROPion (WELLBUTRIN SR) 150 MG extended release tablet  TAKE 1 TABLET BY MOUTH TWICE DAILY             calcitRIOL (ROCALTROL) 0.25 MCG capsule  TAKE 1 CAPSULE BY MOUTH DAILY             Cholecalciferol ([employer] VITAMIN D) 1000 UNIT CAPS  Take 1 capsule by mouth daily              Cyanocobalamin (VITAMIN B 12 PO)  Take 1 tablet by mouth daily              Multiple Vitamins-Minerals (MULTIVITAMIN PO)  Take 1 tablet by mouth daily.              omeprazole (PRILOSEC) 40 MG delayed release capsule  TAKE 1 CAPSULE BY MOUTH TWICE DAILY             OXYGEN  Inhale 4 L into the lungs continuous             sertraline (ZOLOFT) 100 MG tablet  TAKE 1 TABLET BY MOUTH EVERY DAY             vitamin C (ASCORBIC ACID) 500 MG tablet  Take 500 mg by mouth daily                   Medications marked \"taking\" at this time  Outpatient Prescriptions Marked as Taking for the 10/18/18 encounter (Office Visit) with Rhonda Marie DO   Medication Sig Dispense Refill    omeprazole (PRILOSEC) 40 MG delayed release capsule TAKE 1 CAPSULE BY MOUTH TWICE DAILY 180 capsule 0    alendronate (FOSAMAX) 35 MG tablet Take 70 mg by mouth every 7 days      calcitRIOL (ROCALTROL) 0.25 MCG capsule TAKE 1 CAPSULE

## 2018-10-22 ENCOUNTER — APPOINTMENT (OUTPATIENT)
Dept: CT IMAGING | Facility: HOSPITAL | Age: 78
End: 2018-10-22
Attending: UROLOGY

## 2018-10-30 ENCOUNTER — FLU SHOT (OUTPATIENT)
Dept: FAMILY MEDICINE CLINIC | Facility: CLINIC | Age: 78
End: 2018-10-30

## 2018-10-30 DIAGNOSIS — Z23 NEED FOR IMMUNIZATION AGAINST INFLUENZA: Primary | ICD-10-CM

## 2018-10-30 PROCEDURE — G0008 ADMIN INFLUENZA VIRUS VAC: HCPCS | Performed by: FAMILY MEDICINE

## 2018-10-30 PROCEDURE — 90662 IIV NO PRSV INCREASED AG IM: CPT | Performed by: FAMILY MEDICINE

## 2018-10-31 ENCOUNTER — TREATMENT (OUTPATIENT)
Dept: CARDIAC REHAB | Facility: HOSPITAL | Age: 78
End: 2018-10-31

## 2018-10-31 DIAGNOSIS — Z98.890 S/P MITRAL VALVE REPAIR: Primary | ICD-10-CM

## 2018-11-02 ENCOUNTER — TREATMENT (OUTPATIENT)
Dept: CARDIAC REHAB | Facility: HOSPITAL | Age: 78
End: 2018-11-02

## 2018-11-02 DIAGNOSIS — Z98.890 S/P MITRAL VALVE REPAIR: Primary | ICD-10-CM

## 2018-11-05 ENCOUNTER — TREATMENT (OUTPATIENT)
Dept: CARDIAC REHAB | Facility: HOSPITAL | Age: 78
End: 2018-11-05

## 2018-11-05 DIAGNOSIS — Z98.890 S/P MITRAL VALVE REPAIR: Primary | ICD-10-CM

## 2018-11-07 ENCOUNTER — TREATMENT (OUTPATIENT)
Dept: CARDIAC REHAB | Facility: HOSPITAL | Age: 78
End: 2018-11-07

## 2018-11-07 DIAGNOSIS — Z98.890 S/P MITRAL VALVE REPAIR: Primary | ICD-10-CM

## 2018-11-09 ENCOUNTER — TREATMENT (OUTPATIENT)
Dept: CARDIAC REHAB | Facility: HOSPITAL | Age: 78
End: 2018-11-09

## 2018-11-09 DIAGNOSIS — Z98.890 S/P MITRAL VALVE REPAIR: Primary | ICD-10-CM

## 2018-11-12 ENCOUNTER — TREATMENT (OUTPATIENT)
Dept: CARDIAC REHAB | Facility: HOSPITAL | Age: 78
End: 2018-11-12

## 2018-11-12 DIAGNOSIS — Z98.890 S/P MITRAL VALVE REPAIR: Primary | ICD-10-CM

## 2018-11-16 ENCOUNTER — TREATMENT (OUTPATIENT)
Dept: CARDIAC REHAB | Facility: HOSPITAL | Age: 78
End: 2018-11-16

## 2018-11-16 DIAGNOSIS — Z98.890 S/P MITRAL VALVE REPAIR: Primary | ICD-10-CM

## 2018-11-26 ENCOUNTER — TREATMENT (OUTPATIENT)
Dept: CARDIAC REHAB | Facility: HOSPITAL | Age: 78
End: 2018-11-26

## 2018-11-26 DIAGNOSIS — Z98.890 S/P MITRAL VALVE REPAIR: Primary | ICD-10-CM

## 2018-11-28 ENCOUNTER — TREATMENT (OUTPATIENT)
Dept: CARDIAC REHAB | Facility: HOSPITAL | Age: 78
End: 2018-11-28

## 2018-11-28 DIAGNOSIS — Z98.890 S/P MITRAL VALVE REPAIR: Primary | ICD-10-CM

## 2018-12-03 ENCOUNTER — TREATMENT (OUTPATIENT)
Dept: CARDIAC REHAB | Facility: HOSPITAL | Age: 78
End: 2018-12-03

## 2018-12-03 DIAGNOSIS — Z98.890 S/P MITRAL VALVE REPAIR: Primary | ICD-10-CM

## 2018-12-05 ENCOUNTER — TREATMENT (OUTPATIENT)
Dept: CARDIAC REHAB | Facility: HOSPITAL | Age: 78
End: 2018-12-05

## 2018-12-05 DIAGNOSIS — Z98.890 S/P MITRAL VALVE REPAIR: Primary | ICD-10-CM

## 2018-12-07 ENCOUNTER — TREATMENT (OUTPATIENT)
Dept: CARDIAC REHAB | Facility: HOSPITAL | Age: 78
End: 2018-12-07

## 2018-12-07 DIAGNOSIS — Z98.890 S/P MITRAL VALVE REPAIR: Primary | ICD-10-CM

## 2018-12-12 ENCOUNTER — TREATMENT (OUTPATIENT)
Dept: CARDIAC REHAB | Facility: HOSPITAL | Age: 78
End: 2018-12-12

## 2018-12-12 DIAGNOSIS — Z98.890 S/P MITRAL VALVE REPAIR: Primary | ICD-10-CM

## 2018-12-17 ENCOUNTER — TREATMENT (OUTPATIENT)
Dept: CARDIAC REHAB | Facility: HOSPITAL | Age: 78
End: 2018-12-17

## 2018-12-17 DIAGNOSIS — Z98.890 S/P MITRAL VALVE REPAIR: Primary | ICD-10-CM

## 2018-12-19 ENCOUNTER — TREATMENT (OUTPATIENT)
Dept: CARDIAC REHAB | Facility: HOSPITAL | Age: 78
End: 2018-12-19

## 2018-12-19 DIAGNOSIS — Z98.890 S/P MITRAL VALVE REPAIR: Primary | ICD-10-CM

## 2018-12-21 ENCOUNTER — TREATMENT (OUTPATIENT)
Dept: CARDIAC REHAB | Facility: HOSPITAL | Age: 78
End: 2018-12-21

## 2018-12-21 DIAGNOSIS — Z98.890 S/P MITRAL VALVE REPAIR: Primary | ICD-10-CM

## 2018-12-26 ENCOUNTER — TREATMENT (OUTPATIENT)
Dept: CARDIAC REHAB | Facility: HOSPITAL | Age: 78
End: 2018-12-26

## 2018-12-26 DIAGNOSIS — Z98.890 S/P MITRAL VALVE REPAIR: Primary | ICD-10-CM

## 2018-12-28 ENCOUNTER — TREATMENT (OUTPATIENT)
Dept: CARDIAC REHAB | Facility: HOSPITAL | Age: 78
End: 2018-12-28

## 2018-12-28 DIAGNOSIS — Z98.890 S/P MITRAL VALVE REPAIR: Primary | ICD-10-CM

## 2019-01-02 ENCOUNTER — TREATMENT (OUTPATIENT)
Dept: CARDIAC REHAB | Facility: HOSPITAL | Age: 79
End: 2019-01-02

## 2019-01-02 DIAGNOSIS — Z98.890 S/P MITRAL VALVE REPAIR: Primary | ICD-10-CM

## 2019-01-04 ENCOUNTER — TREATMENT (OUTPATIENT)
Dept: CARDIAC REHAB | Facility: HOSPITAL | Age: 79
End: 2019-01-04

## 2019-01-04 DIAGNOSIS — Z98.890 S/P MITRAL VALVE REPAIR: Primary | ICD-10-CM

## 2019-01-07 ENCOUNTER — TREATMENT (OUTPATIENT)
Dept: CARDIAC REHAB | Facility: HOSPITAL | Age: 79
End: 2019-01-07

## 2019-01-07 DIAGNOSIS — Z98.890 S/P MITRAL VALVE REPAIR: Primary | ICD-10-CM

## 2019-01-11 ENCOUNTER — HOSPITAL ENCOUNTER (OUTPATIENT)
Dept: GENERAL RADIOLOGY | Facility: HOSPITAL | Age: 79
Discharge: HOME | End: 2019-01-11
Attending: FAMILY MEDICINE | Admitting: FAMILY MEDICINE

## 2019-01-11 ENCOUNTER — OFFICE VISIT (OUTPATIENT)
Dept: FAMILY MEDICINE CLINIC | Facility: CLINIC | Age: 79
End: 2019-01-11

## 2019-01-11 VITALS
BODY MASS INDEX: 37.95 KG/M2 | WEIGHT: 201 LBS | OXYGEN SATURATION: 91 % | DIASTOLIC BLOOD PRESSURE: 80 MMHG | SYSTOLIC BLOOD PRESSURE: 126 MMHG | HEART RATE: 82 BPM | HEIGHT: 61 IN

## 2019-01-11 DIAGNOSIS — J31.0 CHRONIC NONALLERGIC RHINITIS: ICD-10-CM

## 2019-01-11 DIAGNOSIS — M79.671 RIGHT FOOT PAIN: ICD-10-CM

## 2019-01-11 DIAGNOSIS — E03.9 ACQUIRED HYPOTHYROIDISM: Primary | ICD-10-CM

## 2019-01-11 DIAGNOSIS — Z79.4 TYPE 2 DIABETES MELLITUS WITHOUT COMPLICATION, WITH LONG-TERM CURRENT USE OF INSULIN (HCC): ICD-10-CM

## 2019-01-11 DIAGNOSIS — Z51.81 MEDICATION MONITORING ENCOUNTER: ICD-10-CM

## 2019-01-11 DIAGNOSIS — E11.9 TYPE 2 DIABETES MELLITUS WITHOUT COMPLICATION, WITH LONG-TERM CURRENT USE OF INSULIN (HCC): ICD-10-CM

## 2019-01-11 PROCEDURE — 99214 OFFICE O/P EST MOD 30 MIN: CPT | Performed by: FAMILY MEDICINE

## 2019-01-11 PROCEDURE — 73630 X-RAY EXAM OF FOOT: CPT

## 2019-01-11 RX ORDER — MOMETASONE FUROATE 50 UG/1
1 SPRAY, METERED NASAL 2 TIMES DAILY
Qty: 1 EACH | Refills: 11 | Status: SHIPPED | OUTPATIENT
Start: 2019-01-11 | End: 2019-05-13

## 2019-01-11 NOTE — PROGRESS NOTES
Chief Complaint   Patient presents with   • Follow-up   • Diabetes   • Earache     Right ear x 1 month    • Nasal Congestion   • Foot Pain     Right foot x 1 month        Subjective   Fernanda Molina is an 78 y.o. female who presents for follow up of diabetes. Current symptoms include: none. Patient denies foot ulcerations, hyperglycemia, hypoglycemia , paresthesia of the feet and visual disturbances. Evaluation to date has included: fasting blood sugar, fasting lipid panel, hemoglobin A1C and microalbuminuria. Home sugars: BGs consistently in an acceptable range. Current treatments: more intensive attention to diet which has been somewhat effective, low cholesterol diet which has been somewhat effective, increased aerobic exercise which has been somewhat effective, Continued insulin which has been effective, Continued metformin which has been effective, Continued statin which has been effective, Continued ACE inhibitor/ARB which has been effective and Continued Invokana which has been effective. Discussed importance of yearly eye exams and checking feet for skin integrity.    Her right mid foot hurts. No injury  Had a remote stress fracture, current exam is neg. Xray and try lace up foot/ankle brace    Year around watery dripping from both nostrils    Right ear feels plugged    Right upper quad abd pain  Remote bile duct surgery  If not better call her previous GI MD for eval.      The following portions of the patient's history were reviewed and updated as appropriate: allergies, current medications, past medical history, past social history, past surgical history and problem list.        Review of Systems  A comprehensive review of systems was negative except for: Ears, nose, mouth, throat, and face: positive for earaches, hoarseness and nasal congestion  Gastrointestinal: positive for abdominal pain  Musculoskeletal: positive for arthralgias and stiff joints     Vitals:    01/11/19 1107   BP: 126/80   BP  "Location: Left arm   Patient Position: Sitting   Pulse: 82   SpO2: 91%   Weight: 91.2 kg (201 lb)   Height: 154.9 cm (61\")       Objective    Gen:  Alert, pleasant  Ears: canals clear, TMs normal  Throat: clear , no thrush, teeth ok  Neck: no bruit, no LAD  Lungs: clear  Heart: RR no murmur  Feet:  No rash, no skin breakdown, sensation grossly normal.  Right foot; benign exam    Laboratory:       Assessment/Plan        Discussed general issues about diabetes pathophysiology and management.  Continued insulin: Levemir for now, but insurace will change brand this yr, she will call with name to change to.  Continued metformin; see  medication orders.  Continued statin drug see medication orders.  Continued ACE inhibitor; see medication orders.  Follow up in 6 months or as needed.    Fernanda was seen today for follow-up, diabetes, earache, nasal congestion and foot pain.    Diagnoses and all orders for this visit:    Acquired hypothyroidism  -     TSH; Future    Type 2 diabetes mellitus without complication, with long-term current use of insulin (CMS/[facility])  -     Lipid Panel; Future  -     Basic Metabolic Panel; Future  -     Hemoglobin A1c; Future  -     MicroAlbumin, Urine, Random - Urine, Clean Catch; Future    Right foot pain  -     XR Foot 3+ View Right; Future    Chronic nonallergic rhinitis  -     mometasone (NASONEX) 50 MCG/ACT nasal spray; 1 spray into the nostril(s) as directed by provider 2 (Two) Times a Day.    Medication monitoring encounter  -     ALT; Future        Return in about 6 months (around 7/11/2019) for blood pressure, Medicare Wellness visit, diabetes.  Patient Instructions   Zyrtec is ok to take for nose drip    Call back with name of insulin on formulary.      Medications Discontinued During This Encounter   Medication Reason   • Insulin Pen Needle (BD PEN NEEDLE YAIR U/F) 32G X 4 MM misc Duplicate order         Dr. Rashid Ge MD  [place], [place]  [facility] " Group.

## 2019-01-14 ENCOUNTER — RESULTS ENCOUNTER (OUTPATIENT)
Dept: FAMILY MEDICINE CLINIC | Facility: CLINIC | Age: 79
End: 2019-01-14

## 2019-01-14 ENCOUNTER — TREATMENT (OUTPATIENT)
Dept: CARDIAC REHAB | Facility: HOSPITAL | Age: 79
End: 2019-01-14

## 2019-01-14 DIAGNOSIS — E03.9 ACQUIRED HYPOTHYROIDISM: ICD-10-CM

## 2019-01-14 DIAGNOSIS — Z51.81 MEDICATION MONITORING ENCOUNTER: ICD-10-CM

## 2019-01-14 DIAGNOSIS — E11.9 TYPE 2 DIABETES MELLITUS WITHOUT COMPLICATION, WITH LONG-TERM CURRENT USE OF INSULIN (HCC): ICD-10-CM

## 2019-01-14 DIAGNOSIS — Z79.4 TYPE 2 DIABETES MELLITUS WITHOUT COMPLICATION, WITH LONG-TERM CURRENT USE OF INSULIN (HCC): ICD-10-CM

## 2019-01-14 DIAGNOSIS — Z98.890 S/P MITRAL VALVE REPAIR: Primary | ICD-10-CM

## 2019-01-16 ENCOUNTER — TREATMENT (OUTPATIENT)
Dept: CARDIAC REHAB | Facility: HOSPITAL | Age: 79
End: 2019-01-16

## 2019-01-16 DIAGNOSIS — Z98.890 S/P MITRAL VALVE REPAIR: Primary | ICD-10-CM

## 2019-01-17 LAB
ALT SERPL-CCNC: 23 U/L (ref 5–33)
BUN SERPL-MCNC: 13 MG/DL (ref 8–23)
BUN/CREAT SERPL: 19.7 (ref 7–25)
CALCIUM SERPL-MCNC: 10.5 MG/DL (ref 8.8–10.5)
CHLORIDE SERPL-SCNC: 104 MMOL/L (ref 98–107)
CHOLEST SERPL-MCNC: 105 MG/DL (ref 0–200)
CO2 SERPL-SCNC: 25.4 MMOL/L (ref 22–29)
CREAT SERPL-MCNC: 0.66 MG/DL (ref 0.57–1)
GLUCOSE SERPL-MCNC: 115 MG/DL (ref 65–99)
HBA1C MFR BLD: 5.8 % (ref 4.8–5.6)
HDLC SERPL-MCNC: 31 MG/DL (ref 40–60)
LDLC SERPL CALC-MCNC: 43 MG/DL (ref 0–100)
MICROALBUMIN UR-MCNC: 155.2 UG/ML
POTASSIUM SERPL-SCNC: 4.2 MMOL/L (ref 3.5–5.2)
SODIUM SERPL-SCNC: 142 MMOL/L (ref 136–145)
TRIGL SERPL-MCNC: 153 MG/DL (ref 0–150)
TSH SERPL DL<=0.005 MIU/L-ACNC: 0.9 MIU/ML (ref 0.27–4.2)
VLDLC SERPL CALC-MCNC: 30.6 MG/DL (ref 7–27)

## 2019-01-22 ENCOUNTER — OFFICE VISIT (OUTPATIENT)
Dept: FAMILY MEDICINE CLINIC | Facility: CLINIC | Age: 79
End: 2019-01-22

## 2019-01-22 ENCOUNTER — HOSPITAL ENCOUNTER (OUTPATIENT)
Dept: GENERAL RADIOLOGY | Facility: HOSPITAL | Age: 79
Discharge: HOME | End: 2019-01-22
Admitting: NURSE PRACTITIONER

## 2019-01-22 VITALS
DIASTOLIC BLOOD PRESSURE: 84 MMHG | HEART RATE: 99 BPM | OXYGEN SATURATION: 97 % | HEIGHT: 61 IN | WEIGHT: 203 LBS | TEMPERATURE: 97.6 F | BODY MASS INDEX: 38.33 KG/M2 | SYSTOLIC BLOOD PRESSURE: 140 MMHG | RESPIRATION RATE: 16 BRPM

## 2019-01-22 DIAGNOSIS — R06.02 SHORTNESS OF BREATH: Primary | ICD-10-CM

## 2019-01-22 DIAGNOSIS — J06.9 UPPER RESPIRATORY TRACT INFECTION, UNSPECIFIED TYPE: ICD-10-CM

## 2019-01-22 PROCEDURE — 99213 OFFICE O/P EST LOW 20 MIN: CPT | Performed by: NURSE PRACTITIONER

## 2019-01-22 PROCEDURE — 71046 X-RAY EXAM CHEST 2 VIEWS: CPT

## 2019-01-22 RX ORDER — ALBUTEROL SULFATE 90 UG/1
2 AEROSOL, METERED RESPIRATORY (INHALATION) EVERY 4 HOURS PRN
Qty: 1 INHALER | Refills: 0 | Status: SHIPPED | OUTPATIENT
Start: 2019-01-22 | End: 2019-05-13

## 2019-01-22 RX ORDER — LEVOFLOXACIN 500 MG/1
500 TABLET, FILM COATED ORAL DAILY
Qty: 10 TABLET | Refills: 0 | Status: SHIPPED | OUTPATIENT
Start: 2019-01-22 | End: 2019-02-01

## 2019-01-22 RX ORDER — PREDNISONE 10 MG/1
10 TABLET ORAL DAILY
Qty: 10 TABLET | Refills: 0 | Status: SHIPPED | OUTPATIENT
Start: 2019-01-22 | End: 2019-02-01

## 2019-01-22 NOTE — PROGRESS NOTES
"Chief Complaint   Patient presents with   • Sinusitis   • Diarrhea   • Shortness of Breath       Upper Respiratory Infection: Patient complains of symptoms of a URI. Symptoms include congestion, cough and shortness of breath. Onset of symptoms was 5 days ago, gradually worsening since that time. She also c/o achiness, congestion, low grade fever, nasal congestion and shortness of breath for the past 5 days .  She is drinking plenty of fluids. Evaluation to date: Seen at [facility] on 1/19/2019. Treatment to date: antibiotics (Augmentin) which has been causing her diarrhea.  Has been using breathing treatments about 4 times per day.  Unable to tell if helping.    Flu testing at urgent care was negative.    The following portions of the patient's history were reviewed and updated as appropriate: allergies, current medications, past family history, past medical history, past social history, past surgical history and problem list.    A comprehensive review of systems was negative except for: Ears, nose, mouth, throat, and face: positive for nasal congestion  Respiratory: positive for cough and shortness of breath  Gastrointestinal: positive for diarrhea    Vitals:    01/22/19 1304   BP: 140/84   BP Location: Left arm   Patient Position: Sitting   Cuff Size: Adult   Pulse: 99   Resp: 16   Temp: 97.6 °F (36.4 °C)   SpO2: 97%   Weight: 92.1 kg (203 lb)   Height: 154.9 cm (61\")     Gen: Mildly ill appearing, alert  Ears: TM's bulging without redness  Nose:  Congestion  Throat:  Red without exudate, some drainage, tonsils okay  Neck: No LAD  Lung: Good air movement, regular RR  Heart: RR without murmur  Skin: No rash      Assessment/Plan   Fernanda was seen today for sinusitis, diarrhea and shortness of breath.    Diagnoses and all orders for this visit:    Shortness of breath  -     XR Chest PA & Lateral    Upper respiratory tract infection, unspecified type  -     predniSONE (DELTASONE) 10 MG tablet; Take 1 tablet " "by mouth Daily for 10 days.  -     levoFLOXacin (LEVAQUIN) 500 MG tablet; Take 1 tablet by mouth Daily for 10 days.    Other orders  -     albuterol sulfate  (90 Base) MCG/ACT inhaler; Inhale 2 puffs Every 4 (Four) Hours As Needed for Wheezing or Shortness of Air.           Patient Instructions   Upper Respiratory Infection, Adult  Most upper respiratory infections (URIs) are caused by a virus. A URI affects the nose, throat, and upper air passages. The most common type of URI is often called \"the common cold.\"  Follow these instructions at home:  · Take medicines only as told by your doctor.  · Gargle warm saltwater or take cough drops to comfort your throat as told by your doctor.  · Use a warm mist humidifier or inhale steam from a shower to increase air moisture. This may make it easier to breathe.  · Drink enough fluid to keep your pee (urine) clear or pale yellow.  · Eat soups and other clear broths.  · Have a healthy diet.  · Rest as needed.  · Go back to work when your fever is gone or your doctor says it is okay.  ? You may need to stay home longer to avoid giving your URI to others.  ? You can also wear a face mask and wash your hands often to prevent spread of the virus.  · Use your inhaler more if you have asthma.  · Do not use any tobacco products, including cigarettes, chewing tobacco, or electronic cigarettes. If you need help quitting, ask your doctor.  Contact a doctor if:  · You are getting worse, not better.  · Your symptoms are not helped by medicine.  · You have chills.  · You are getting more short of breath.  · You have brown or red mucus.  · You have yellow or brown discharge from your nose.  · You have pain in your face, especially when you bend forward.  · You have a fever.  · You have puffy (swollen) neck glands.  · You have pain while swallowing.  · You have white areas in the back of your throat.  Get help right away if:  · You have very bad or constant:  ? Headache.  ? Ear " pain.  ? Pain in your forehead, behind your eyes, and over your cheekbones (sinus pain).  ? Chest pain.  · You have long-lasting (chronic) lung disease and any of the following:  ? Wheezing.  ? Long-lasting cough.  ? Coughing up blood.  ? A change in your usual mucus.  · You have a stiff neck.  · You have changes in your:  ? Vision.  ? Hearing.  ? Thinking.  ? Mood.  This information is not intended to replace advice given to you by your health care provider. Make sure you discuss any questions you have with your health care provider.  Document Released: 06/05/2009 Document Revised: 08/20/2017 Document Reviewed: 03/25/2015  [employer] Patient Education © 2018 [employer]        Tylenol or Advil as needed for pain, fever, muscle aches  Plenty of fluids  Hand washing discussed  Off work or school note given if needed.  Warm tea for throat.  Pros and cons of antibiotic use discussed     Stop Augmentin due to diarrhea.  Start Levaquin as directed which she has tolerated in the past.  Prednisone low dose of 10 mg daily for 10 days.  She states able to tolerate low dose.  Higher doses increase B/P.  Instructed to monitor B/P at home and notify us if worsen.  Antibiotics can also affect INR levels which she is scheduled for INR check on Friday.  Will call with CXR results.  If symptoms worsen, go to nearest ER.     HESHAM Amaya  Family Practice  [facility] [place]

## 2019-01-22 NOTE — PATIENT INSTRUCTIONS

## 2019-01-24 NOTE — PROGRESS NOTES
Notify Fernanda Molina her CXR was stable.  No evidence of pneumonia.  Low lung volumes noted however inhaler should help and focus on deep breathing.

## 2019-02-04 ENCOUNTER — TREATMENT (OUTPATIENT)
Dept: CARDIAC REHAB | Facility: HOSPITAL | Age: 79
End: 2019-02-04

## 2019-02-04 DIAGNOSIS — Z98.890 S/P MITRAL VALVE REPAIR: Primary | ICD-10-CM

## 2019-02-06 ENCOUNTER — TREATMENT (OUTPATIENT)
Dept: CARDIAC REHAB | Facility: HOSPITAL | Age: 79
End: 2019-02-06

## 2019-02-06 DIAGNOSIS — Z98.890 S/P MITRAL VALVE REPAIR: Primary | ICD-10-CM

## 2019-02-08 ENCOUNTER — TREATMENT (OUTPATIENT)
Dept: CARDIAC REHAB | Facility: HOSPITAL | Age: 79
End: 2019-02-08

## 2019-02-08 DIAGNOSIS — Z98.890 S/P MITRAL VALVE REPAIR: Primary | ICD-10-CM

## 2019-02-11 ENCOUNTER — TREATMENT (OUTPATIENT)
Dept: CARDIAC REHAB | Facility: HOSPITAL | Age: 79
End: 2019-02-11

## 2019-02-11 DIAGNOSIS — Z98.890 S/P MITRAL VALVE REPAIR: Primary | ICD-10-CM

## 2019-02-13 ENCOUNTER — TREATMENT (OUTPATIENT)
Dept: CARDIAC REHAB | Facility: HOSPITAL | Age: 79
End: 2019-02-13

## 2019-02-13 DIAGNOSIS — Z98.890 S/P MITRAL VALVE REPAIR: Primary | ICD-10-CM

## 2019-02-16 DIAGNOSIS — I25.10 CORONARY ARTERY DISEASE INVOLVING NATIVE HEART WITHOUT ANGINA PECTORIS, UNSPECIFIED VESSEL OR LESION TYPE: ICD-10-CM

## 2019-02-16 DIAGNOSIS — E11.9 TYPE 2 DIABETES MELLITUS WITHOUT COMPLICATION, WITHOUT LONG-TERM CURRENT USE OF INSULIN (HCC): ICD-10-CM

## 2019-02-18 ENCOUNTER — TREATMENT (OUTPATIENT)
Dept: CARDIAC REHAB | Facility: HOSPITAL | Age: 79
End: 2019-02-18

## 2019-02-18 DIAGNOSIS — Z98.890 S/P MITRAL VALVE REPAIR: Primary | ICD-10-CM

## 2019-02-18 RX ORDER — POTASSIUM CHLORIDE 20 MEQ/1
TABLET, EXTENDED RELEASE ORAL
Qty: 90 TABLET | Refills: 0 | Status: SHIPPED | OUTPATIENT
Start: 2019-02-18 | End: 2019-05-02 | Stop reason: SDUPTHER

## 2019-02-18 RX ORDER — PANTOPRAZOLE SODIUM 40 MG/1
TABLET, DELAYED RELEASE ORAL
Qty: 90 TABLET | Refills: 0 | Status: SHIPPED | OUTPATIENT
Start: 2019-02-18 | End: 2019-03-06

## 2019-02-18 RX ORDER — FUROSEMIDE 40 MG/1
TABLET ORAL
Qty: 135 TABLET | Refills: 0 | Status: SHIPPED | OUTPATIENT
Start: 2019-02-18 | End: 2019-03-06

## 2019-02-20 ENCOUNTER — TREATMENT (OUTPATIENT)
Dept: CARDIAC REHAB | Facility: HOSPITAL | Age: 79
End: 2019-02-20

## 2019-02-20 DIAGNOSIS — Z98.890 S/P MITRAL VALVE REPAIR: Primary | ICD-10-CM

## 2019-02-25 ENCOUNTER — TREATMENT (OUTPATIENT)
Dept: CARDIAC REHAB | Facility: HOSPITAL | Age: 79
End: 2019-02-25

## 2019-02-25 DIAGNOSIS — Z98.890 S/P MITRAL VALVE REPAIR: Primary | ICD-10-CM

## 2019-02-27 ENCOUNTER — TREATMENT (OUTPATIENT)
Dept: CARDIAC REHAB | Facility: HOSPITAL | Age: 79
End: 2019-02-27

## 2019-02-27 DIAGNOSIS — Z98.890 S/P MITRAL VALVE REPAIR: Primary | ICD-10-CM

## 2019-03-01 ENCOUNTER — TREATMENT (OUTPATIENT)
Dept: CARDIAC REHAB | Facility: HOSPITAL | Age: 79
End: 2019-03-01

## 2019-03-01 DIAGNOSIS — Z98.890 S/P MITRAL VALVE REPAIR: Primary | ICD-10-CM

## 2019-03-04 ENCOUNTER — TREATMENT (OUTPATIENT)
Dept: CARDIAC REHAB | Facility: HOSPITAL | Age: 79
End: 2019-03-04

## 2019-03-04 DIAGNOSIS — Z98.890 S/P MITRAL VALVE REPAIR: Primary | ICD-10-CM

## 2019-03-05 DIAGNOSIS — I25.10 CORONARY ARTERY DISEASE INVOLVING NATIVE HEART WITHOUT ANGINA PECTORIS, UNSPECIFIED VESSEL OR LESION TYPE: ICD-10-CM

## 2019-03-06 ENCOUNTER — OFFICE VISIT (OUTPATIENT)
Dept: FAMILY MEDICINE CLINIC | Facility: CLINIC | Age: 79
End: 2019-03-06

## 2019-03-06 VITALS
SYSTOLIC BLOOD PRESSURE: 124 MMHG | DIASTOLIC BLOOD PRESSURE: 78 MMHG | HEIGHT: 61 IN | OXYGEN SATURATION: 94 % | BODY MASS INDEX: 37 KG/M2 | WEIGHT: 196 LBS | HEART RATE: 67 BPM

## 2019-03-06 DIAGNOSIS — E03.9 ACQUIRED HYPOTHYROIDISM: Primary | ICD-10-CM

## 2019-03-06 DIAGNOSIS — Z79.4 TYPE 2 DIABETES MELLITUS WITHOUT COMPLICATION, WITH LONG-TERM CURRENT USE OF INSULIN (HCC): ICD-10-CM

## 2019-03-06 DIAGNOSIS — E11.9 TYPE 2 DIABETES MELLITUS WITHOUT COMPLICATION, WITHOUT LONG-TERM CURRENT USE OF INSULIN (HCC): ICD-10-CM

## 2019-03-06 DIAGNOSIS — Z00.00 MEDICARE ANNUAL WELLNESS VISIT, SUBSEQUENT: ICD-10-CM

## 2019-03-06 DIAGNOSIS — E11.9 TYPE 2 DIABETES MELLITUS WITHOUT COMPLICATION, WITH LONG-TERM CURRENT USE OF INSULIN (HCC): ICD-10-CM

## 2019-03-06 DIAGNOSIS — Z79.899 HIGH RISK MEDICATION USE: ICD-10-CM

## 2019-03-06 PROCEDURE — G0439 PPPS, SUBSEQ VISIT: HCPCS | Performed by: FAMILY MEDICINE

## 2019-03-06 NOTE — PROGRESS NOTES
QUICK REFERENCE INFORMATION:  The ABCs of the Annual Wellness Visit    Subsequent Medicare Wellness Visit    HEALTH RISK ASSESSMENT    1940    Recent Hospitalizations:  No hospitalization(s) within the last year..        Current Medical Providers:  Patient Care Team:  Rashid Ge MD as PCP - General (Family Medicine)  Thong Jones MD as Consulting Physician (Cardiology)  Mahin Galeas MD as Consulting Physician (Sleep Medicine)  Lara Ortiz MD as Consulting Physician (Obstetrics and Gynecology)  Betty Horowitz MD as Consulting Physician (Dermatology)  Ben Saavedra MD as Surgeon (Cardiothoracic Surgery)  Mahin Galeas MD as Consulting Physician (Pulmonary Disease)  Chirag Rai MD as Consulting Physician (Ophthalmology)  Moisés Everett MD as Consulting Physician (Urology)        Smoking Status:  Social History     Tobacco Use   Smoking Status Never Smoker   Smokeless Tobacco Never Used       Alcohol Consumption:  Social History     Substance and Sexual Activity   Alcohol Use No       Depression Screen:   PHQ-2/PHQ-9 Depression Screening 3/6/2019   Little interest or pleasure in doing things 0   Feeling down, depressed, or hopeless 0   Total Score 0       Health Habits and Functional and Cognitive Screening:  Functional & Cognitive Status 3/6/2019   Do you have difficulty preparing food and eating? No   Do you have difficulty bathing yourself, getting dressed or grooming yourself? No   Do you have difficulty using the toilet? No   Do you have difficulty moving around from place to place? No   Do you have trouble with steps or getting out of a bed or a chair? No   In the past year have you fallen or experienced a near fall? No   Current Diet Well Balanced Diet   Dental Exam Up to date   Eye Exam Up to date   Exercise (times per week) 3 times per week   Current Exercise Activities Include Cardiovasular Workout on Exercise Equipment   Do you need  help using the phone?  No   Are you deaf or do you have serious difficulty hearing?  No   Do you need help with transportation? No   Do you need help shopping? No   Do you need help preparing meals?  No   Do you need help with housework?  No   Do you need help with laundry? No   Do you need help taking your medications? No   Do you need help managing money? No   Do you ever drive or ride in a car without wearing a seat belt? No   Have you felt unusual stress, anger or loneliness in the last month? No   Who do you live with? Spouse   If you need help, do you have trouble finding someone available to you? No   Have you been bothered in the last four weeks by sexual problems? No   Do you have difficulty concentrating, remembering or making decisions? No           Does the patient have evidence of cognitive impairment? No    Aspirin use counseling: Contraindicated from taking ASA      Recent Lab Results:  CMP:  Lab Results   Component Value Date     (H) 01/16/2019    BUN 13 01/16/2019    CREATININE 0.66 01/16/2019    EGFRIFNONA 87 01/16/2019    EGFRIFAFRI 106 01/16/2019    BCR 19.7 01/16/2019     01/16/2019    K 4.2 01/16/2019    CO2 25.4 01/16/2019    CALCIUM 10.5 01/16/2019    PROTENTOTREF 7.1 08/24/2018    ALBUMIN 4.70 08/24/2018    LABGLOBREF 2.4 08/24/2018    LABIL2 2.0 08/24/2018    BILITOT 0.9 08/24/2018    ALKPHOS 55 08/24/2018    AST 39 (H) 08/24/2018    ALT 23 01/16/2019     Lipid Panel:  Lab Results   Component Value Date    TRIG 153 (H) 01/16/2019    HDL 31 (L) 01/16/2019    VLDL 30.6 (H) 01/16/2019    LDLHDL 1.53 08/24/2018     HbA1c:  Lab Results   Component Value Date    HGBA1C 5.80 (H) 01/16/2019       Visual Acuity:  No exam data present    Age-appropriate Screening Schedule:  Refer to the list below for future screening recommendations based on patient's age, sex and/or medical conditions. Orders for these recommended tests are listed in the plan section. The patient has been provided with  a written plan.    Health Maintenance   Topic Date Due   • TDAP/TD VACCINES (1 - Tdap) 11/28/1961   • ZOSTER VACCINE (2 of 3) 08/10/2014   • HEMOGLOBIN A1C  07/16/2019   • LIPID PANEL  01/16/2020   • URINE MICROALBUMIN  01/16/2020   • MAMMOGRAM  12/10/2020   • COLONOSCOPY  10/31/2024   • INFLUENZA VACCINE  Completed   • PNEUMOCOCCAL VACCINES (65+ LOW/MEDIUM RISK)  Completed        Subjective   History of Present Illness    Fernanda Molina is a 78 y.o. female who presents for an Subsequent Wellness Visit.    The following portions of the patient's history were reviewed and updated as appropriate: allergies, current medications, past family history, past medical history, past social history, past surgical history and problem list.    Outpatient Medications Prior to Visit   Medication Sig Dispense Refill   • albuterol (PROVENTIL) (2.5 MG/3ML) 0.083% nebulizer solution as needed      • albuterol sulfate  (90 Base) MCG/ACT inhaler Inhale 2 puffs Every 4 (Four) Hours As Needed for Wheezing or Shortness of Air. 1 inhaler 0   • ARMOUR THYROID 90 MG tablet      • atorvastatin (LIPITOR) 20 MG tablet      • BD PEN NEEDLE YAIR U/F 32G X 4 MM misc Use with insulin pens 300 each 3   • digoxin (LANOXIN) 250 MCG tablet Take 1 tablet by mouth Daily. Indications: Chronic Atrial Fibrillation 90 tablet 3   • ferrous sulfate 325 (65 FE) MG EC tablet Take 65 mg by mouth 1 (one) time per week. On Sunday     • furosemide (LASIX) 40 MG tablet      • glucose blood test strip Use to test three times a day 100 each 11   • metoprolol tartrate (LOPRESSOR) 25 MG tablet TAKE 3 TABLETS EVERY 12 HOURS FOR ISCHEMIC HEART DISEASE 540 tablet 1   • mometasone (NASONEX) 50 MCG/ACT nasal spray 1 spray into the nostril(s) as directed by provider 2 (Two) Times a Day. 1 each 11   • Multiple Vitamin (MULTIVITAMIN) tablet Take 1 tablet by mouth Daily.     • ONETOUCH DELICA LANCETS 33G misc Use with test sugar three times a day. DX code: E11.9 100 each 5    • potassium chloride (K-DUR,KLOR-CON) 20 MEQ CR tablet TAKE 1 TABLET DAILY 90 tablet 0   • warfarin (COUMADIN) 2.5 MG tablet Take one tablet by mouth daily or as directed by MD. 40 tablet 0   • warfarin (COUMADIN) 5 MG tablet Take 5 mg by mouth Daily. MONDAYS, WEDNESDAYS AND FRIDAYS  PT TO HOLD 7 DAYS PRIOR TO SURGERY     • Canagliflozin (INVOKANA) 300 MG tablet Take 300 mg by mouth Daily. Indications: Type 2 Diabetes 90 tablet 3   • Insulin Glargine (LANTUS SOLOSTAR) 100 UNIT/ML injection pen Inject 35 Units under the skin Daily for 90 days. Indications: Type 2 Diabetes 4 pen 11   • metFORMIN (GLUCOPHAGE) 1000 MG tablet TAKE 1 TABLET AT BEDTIME (TAKE ONE METFORMIN 500 MG EVERY MORNING) 90 tablet 0   • pantoprazole (PROTONIX) 40 MG EC tablet TAKE 1 TABLET DAILY FOR GASTROESOPHAGEAL REFLUX DISEASE 90 tablet 0   • furosemide (LASIX) 40 MG tablet TAKE ONE AND ONE-HALF TABLETS DAILY FOR EDEMA 135 tablet 0   • metFORMIN (GLUCOPHAGE) 500 MG tablet TAKE 1 TABLET EVERY MORNING FOR TYPE 2 DIABETES 90 tablet 0     No facility-administered medications prior to visit.        Patient Active Problem List   Diagnosis   • S/P mitral valve repair   • S/P tricuspid valve repair   • S/P Maze operation for atrial fibrillation   • Class 2 obesity due to excess calories with serious comorbidity and body mass index (BMI) of 37.0 to 37.9 in adult   • Atrial fibrillation (CMS/HCC)   • Acquired hypothyroidism   • COPD (chronic obstructive pulmonary disease) (CMS/HCC)   • CAD (coronary artery disease), hx MI   • HAMLET on autoCPAP   • Hypercalcemia   • Renal calculus, right   • Hyperparathyroidism (CMS/HCC)   • Type 2 diabetes mellitus without complication, with long-term current use of insulin (CMS/HCC)   • Warfarin anticoagulation   • Medication monitoring encounter   • Medicare annual wellness visit, subsequent   • Pacemaker   • AICD (automatic cardioverter/defibrillator) present   • Right foot pain   • Chronic nonallergic rhinitis  "      Advance Care Planning:  has an advance directive - a copy HAS NOT been provided    Identification of Risk Factors:  Risk factors include: weight , unhealthy diet, cardiovascular risk and polypharmacy.    Review of Systems    Compared to one year ago, the patient feels her physical health is the same.  Compared to one year ago, the patient feels her mental health is the same.    Objective     Physical Exam   Constitutional: She appears well-developed and well-nourished.   HENT:   Mouth/Throat: Oropharynx is clear and moist.   Eyes: Conjunctivae are normal.   Neck: Normal range of motion.   Cardiovascular: Normal rate.   No murmur heard.  Pulmonary/Chest: Effort normal.   Musculoskeletal: She exhibits no edema.   Neurological: She is alert.   Psychiatric: She has a normal mood and affect.   Nursing note and vitals reviewed.      Vitals:    03/06/19 1002   BP: 124/78   BP Location: Left arm   Patient Position: Sitting   Pulse: 67   SpO2: 94%   Weight: 88.9 kg (196 lb)   Height: 154.9 cm (61\")   PainSc: 0-No pain       Patient's Body mass index is 37.03 kg/m². BMI is above normal parameters. Recommendations include: exercise counseling and nutrition counseling.      Assessment/Plan   Patient Self-Management and Personalized Health Advice  The patient has been provided with information about: diet, exercise, weight management and prevention of cardiac or vascular disease and preventive services including:   · Diabetes screening, see lab orders.    Visit Diagnoses:    ICD-10-CM ICD-9-CM   1. Acquired hypothyroidism E03.9 244.9   2. Medicare annual wellness visit, subsequent Z00.00 V70.0   3. Type 2 diabetes mellitus without complication, with long-term current use of insulin (CMS/[facility]) E11.9 250.00    Z79.4 V58.67   4. Type 2 diabetes mellitus without complication, without long-term current use of insulin (CMS/[facility]) E11.9 250.00   5. High risk medication use Z79.899 V58.69       Orders Placed This Encounter "   Procedures   • Lipid Panel     Standing Status:   Future     Standing Expiration Date:   3/7/2020   • MicroAlbumin, Urine, Random - Urine, Clean Catch     Standing Status:   Future   • ALT     Standing Status:   Future     Standing Expiration Date:   3/7/2020   • Basic Metabolic Panel     Standing Status:   Future     Standing Expiration Date:   3/7/2020   • Hemoglobin A1c     Standing Status:   Future   • TSH     Standing Status:   Future     Standing Expiration Date:   3/7/2020   • Digoxin level     Standing Status:   Future     Standing Expiration Date:   3/7/2020       Outpatient Encounter Medications as of 3/6/2019   Medication Sig Dispense Refill   • albuterol (PROVENTIL) (2.5 MG/3ML) 0.083% nebulizer solution as needed      • albuterol sulfate  (90 Base) MCG/ACT inhaler Inhale 2 puffs Every 4 (Four) Hours As Needed for Wheezing or Shortness of Air. 1 inhaler 0   • ARMOUR THYROID 90 MG tablet      • atorvastatin (LIPITOR) 20 MG tablet      • BD PEN NEEDLE YAIR U/F 32G X 4 MM misc Use with insulin pens 300 each 3   • Canagliflozin (INVOKANA) 300 MG tablet Take 300 mg by mouth Daily. 90 tablet 3   • digoxin (LANOXIN) 250 MCG tablet Take 1 tablet by mouth Daily. Indications: Chronic Atrial Fibrillation 90 tablet 3   • ferrous sulfate 325 (65 FE) MG EC tablet Take 65 mg by mouth 1 (one) time per week. On Sunday     • furosemide (LASIX) 40 MG tablet      • glucose blood test strip Use to test three times a day 100 each 11   • Insulin Glargine (LANTUS SOLOSTAR) 100 UNIT/ML injection pen Inject 40 Units under the skin Daily for 90 days. Indications: Type 2 Diabetes 4 pen 11   • metFORMIN (GLUCOPHAGE) 1000 MG tablet Take 1 tablet by mouth Daily Before Supper. 90 tablet 3   • metoprolol tartrate (LOPRESSOR) 25 MG tablet TAKE 3 TABLETS EVERY 12 HOURS FOR ISCHEMIC HEART DISEASE 540 tablet 1   • mometasone (NASONEX) 50 MCG/ACT nasal spray 1 spray into the nostril(s) as directed by provider 2 (Two) Times a Day. 1  each 11   • Multiple Vitamin (MULTIVITAMIN) tablet Take 1 tablet by mouth Daily.     • ONETOUCH DELICA LANCETS 33G misc Use with test sugar three times a day. DX code: E11.9 100 each 5   • potassium chloride (K-DUR,KLOR-CON) 20 MEQ CR tablet TAKE 1 TABLET DAILY 90 tablet 0   • warfarin (COUMADIN) 2.5 MG tablet Take one tablet by mouth daily or as directed by MD. 40 tablet 0   • warfarin (COUMADIN) 5 MG tablet Take 5 mg by mouth Daily. MONDAYS, WEDNESDAYS AND FRIDAYS  PT TO HOLD 7 DAYS PRIOR TO SURGERY     • [DISCONTINUED] Canagliflozin (INVOKANA) 300 MG tablet Take 300 mg by mouth Daily. Indications: Type 2 Diabetes 90 tablet 3   • [DISCONTINUED] Insulin Glargine (LANTUS SOLOSTAR) 100 UNIT/ML injection pen Inject 35 Units under the skin Daily for 90 days. Indications: Type 2 Diabetes 4 pen 11   • [DISCONTINUED] metFORMIN (GLUCOPHAGE) 1000 MG tablet TAKE 1 TABLET AT BEDTIME (TAKE ONE METFORMIN 500 MG EVERY MORNING) 90 tablet 0   • [DISCONTINUED] pantoprazole (PROTONIX) 40 MG EC tablet TAKE 1 TABLET DAILY FOR GASTROESOPHAGEAL REFLUX DISEASE 90 tablet 0   • metFORMIN (GLUCOPHAGE) 500 MG tablet Take 1 tablet by mouth Daily With Breakfast. 90 tablet 3   • [DISCONTINUED] furosemide (LASIX) 40 MG tablet TAKE ONE AND ONE-HALF TABLETS DAILY FOR EDEMA 135 tablet 0   • [DISCONTINUED] metFORMIN (GLUCOPHAGE) 500 MG tablet TAKE 1 TABLET EVERY MORNING FOR TYPE 2 DIABETES 90 tablet 0     No facility-administered encounter medications on file as of 3/6/2019.        Reviewed use of high risk medication in the elderly: yes  Reviewed for potential of harmful drug interactions in the elderly: yes   Diabetes well controlled  Refilled meds  Labs reviewed  On warfarin, enrolled in coumadin clinic  O/w UTD on most all preventive issues.  Negative colo Guard last yr    Follow Up:  Return in about 6 months (around 9/6/2019) for diabetes.     An After Visit Summary and PPPS with all of these plans were given to the patient.

## 2019-03-11 ENCOUNTER — TREATMENT (OUTPATIENT)
Dept: CARDIAC REHAB | Facility: HOSPITAL | Age: 79
End: 2019-03-11

## 2019-03-11 DIAGNOSIS — Z98.890 S/P MITRAL VALVE REPAIR: Primary | ICD-10-CM

## 2019-03-13 ENCOUNTER — TREATMENT (OUTPATIENT)
Dept: CARDIAC REHAB | Facility: HOSPITAL | Age: 79
End: 2019-03-13

## 2019-03-13 DIAGNOSIS — Z98.890 S/P MITRAL VALVE REPAIR: Primary | ICD-10-CM

## 2019-03-18 ENCOUNTER — OFFICE VISIT (OUTPATIENT)
Dept: CARDIAC SURGERY | Facility: CLINIC | Age: 79
End: 2019-03-18

## 2019-03-18 VITALS
TEMPERATURE: 97.5 F | WEIGHT: 195 LBS | OXYGEN SATURATION: 93 % | HEIGHT: 62 IN | RESPIRATION RATE: 20 BRPM | SYSTOLIC BLOOD PRESSURE: 117 MMHG | HEART RATE: 89 BPM | BODY MASS INDEX: 35.88 KG/M2 | DIASTOLIC BLOOD PRESSURE: 71 MMHG

## 2019-03-18 DIAGNOSIS — Z98.890 S/P MITRAL VALVE REPAIR: ICD-10-CM

## 2019-03-18 DIAGNOSIS — E66.01 MORBIDLY OBESE (HCC): ICD-10-CM

## 2019-03-18 DIAGNOSIS — Z98.890 S/P TRICUSPID VALVE REPAIR: ICD-10-CM

## 2019-03-18 DIAGNOSIS — Z95.810 AICD (AUTOMATIC CARDIOVERTER/DEFIBRILLATOR) PRESENT: Primary | ICD-10-CM

## 2019-03-18 DIAGNOSIS — Z98.890 S/P MAZE OPERATION FOR ATRIAL FIBRILLATION: ICD-10-CM

## 2019-03-18 DIAGNOSIS — I48.20 CHRONIC ATRIAL FIBRILLATION (HCC): ICD-10-CM

## 2019-03-18 DIAGNOSIS — J44.9 CHRONIC OBSTRUCTIVE PULMONARY DISEASE, UNSPECIFIED COPD TYPE (HCC): ICD-10-CM

## 2019-03-18 DIAGNOSIS — E21.3 HYPERPARATHYROIDISM (HCC): ICD-10-CM

## 2019-03-18 DIAGNOSIS — I27.20 PULMONARY HYPERTENSION (HCC): ICD-10-CM

## 2019-03-18 DIAGNOSIS — Z86.79 S/P MAZE OPERATION FOR ATRIAL FIBRILLATION: ICD-10-CM

## 2019-03-18 PROCEDURE — 99213 OFFICE O/P EST LOW 20 MIN: CPT | Performed by: THORACIC SURGERY (CARDIOTHORACIC VASCULAR SURGERY)

## 2019-03-18 NOTE — PROGRESS NOTES
"CARDIOVASCULAR SURGERY FOLLOW-UP PROGRESS NOTE  Chief Complaint: Here for follow-up 4 years after mitral and tricuspid repairs.        HPI:   Dear Dr. Ge, Rashid JOHN MD and colleagues:    It was nice to see Fernanda Molina in follow up for years after surgery.  As you know, she is a 78 y.o. female with mitral and tricuspid incompetence with pulmonary hypertension who underwent mitral valve repair and tricuspid valve repair. She  continues to do well.  She is felt very well the last couple of months. She comes in today complaining of mild shortness of air.  Her activity level has been good.   He continues in cardiac rehab.  She has paroxysmal atrial fibrillation and I think is in regular rhythm today.  Continues on Coumadin.    Physical Exam:         /71   Pulse 89   Temp 97.5 °F (36.4 °C)   Resp 20   Ht 157.5 cm (62\")   Wt 88.5 kg (195 lb)   SpO2 93%   BMI 35.67 kg/m²   Heart:  regular rate and rhythm, S1, S2 normal, no murmur, click, rub or gallop  Lungs:  clear to auscultation bilaterally  Extremities:  no edema  Incision(s):  sternum stable    Assessment/Plan:     S/P mitral valve repair and tricuspid valve repair with Maze procedure. Overall, she is doing well.  She still has pulmonary artery hypertension.  Her last echo I can see was satisfactory with trace MR and trace TR    She continues to do well.  She still has pulmonary hypertension that may be fixed.    Follow-up as scheduled with cardiology  Follow-up as scheduled with PCP  Return to clinic in 1 year(s) with no new studies    No restrictions of activity.      Thank you for allowing me to participate in the care of your   patient.  Regards,  Ben Saavedra MD    "

## 2019-03-20 ENCOUNTER — TREATMENT (OUTPATIENT)
Dept: CARDIAC REHAB | Facility: HOSPITAL | Age: 79
End: 2019-03-20

## 2019-03-20 DIAGNOSIS — Z98.890 S/P MITRAL VALVE REPAIR: Primary | ICD-10-CM

## 2019-03-22 ENCOUNTER — TREATMENT (OUTPATIENT)
Dept: CARDIAC REHAB | Facility: HOSPITAL | Age: 79
End: 2019-03-22

## 2019-03-22 DIAGNOSIS — Z98.890 S/P MITRAL VALVE REPAIR: Primary | ICD-10-CM

## 2019-03-27 ENCOUNTER — TREATMENT (OUTPATIENT)
Dept: CARDIAC REHAB | Facility: HOSPITAL | Age: 79
End: 2019-03-27

## 2019-03-27 DIAGNOSIS — Z98.890 S/P MITRAL VALVE REPAIR: Primary | ICD-10-CM

## 2019-04-01 ENCOUNTER — TREATMENT (OUTPATIENT)
Dept: CARDIAC REHAB | Facility: HOSPITAL | Age: 79
End: 2019-04-01

## 2019-04-01 DIAGNOSIS — Z98.890 S/P MITRAL VALVE REPAIR: Primary | ICD-10-CM

## 2019-04-03 ENCOUNTER — TREATMENT (OUTPATIENT)
Dept: CARDIAC REHAB | Facility: HOSPITAL | Age: 79
End: 2019-04-03

## 2019-04-03 DIAGNOSIS — Z98.890 S/P MITRAL VALVE REPAIR: Primary | ICD-10-CM

## 2019-04-15 ENCOUNTER — TREATMENT (OUTPATIENT)
Dept: CARDIAC REHAB | Facility: HOSPITAL | Age: 79
End: 2019-04-15

## 2019-04-15 DIAGNOSIS — Z98.890 S/P MITRAL VALVE REPAIR: Primary | ICD-10-CM

## 2019-04-16 DIAGNOSIS — I25.10 CORONARY ARTERY DISEASE INVOLVING NATIVE HEART WITHOUT ANGINA PECTORIS, UNSPECIFIED VESSEL OR LESION TYPE: ICD-10-CM

## 2019-04-16 RX ORDER — THYROID,PORK 90 MG
TABLET ORAL
Qty: 135 TABLET | Refills: 3 | Status: SHIPPED | OUTPATIENT
Start: 2019-04-16 | End: 2019-05-13

## 2019-04-16 RX ORDER — DIGOXIN 250 UG/1
TABLET ORAL
Qty: 90 TABLET | Refills: 3 | Status: SHIPPED | OUTPATIENT
Start: 2019-04-16 | End: 2019-05-13

## 2019-04-29 ENCOUNTER — APPOINTMENT (OUTPATIENT)
Dept: CARDIAC REHAB | Facility: HOSPITAL | Age: 79
End: 2019-04-29

## 2019-05-01 ENCOUNTER — APPOINTMENT (OUTPATIENT)
Dept: CARDIAC REHAB | Facility: HOSPITAL | Age: 79
End: 2019-05-01

## 2019-05-02 DIAGNOSIS — I25.10 CORONARY ARTERY DISEASE INVOLVING NATIVE HEART WITHOUT ANGINA PECTORIS, UNSPECIFIED VESSEL OR LESION TYPE: ICD-10-CM

## 2019-05-02 RX ORDER — POTASSIUM CHLORIDE 20 MEQ/1
TABLET, EXTENDED RELEASE ORAL
Qty: 90 TABLET | Refills: 0 | Status: SHIPPED | OUTPATIENT
Start: 2019-05-02 | End: 2019-05-13

## 2019-05-03 ENCOUNTER — APPOINTMENT (OUTPATIENT)
Dept: CARDIAC REHAB | Facility: HOSPITAL | Age: 79
End: 2019-05-03

## 2019-05-06 ENCOUNTER — APPOINTMENT (OUTPATIENT)
Dept: CARDIAC REHAB | Facility: HOSPITAL | Age: 79
End: 2019-05-06

## 2019-05-08 ENCOUNTER — APPOINTMENT (OUTPATIENT)
Dept: CARDIAC REHAB | Facility: HOSPITAL | Age: 79
End: 2019-05-08

## 2019-05-10 ENCOUNTER — TELEPHONE (OUTPATIENT)
Dept: CARDIAC SURGERY | Facility: CLINIC | Age: 79
End: 2019-05-10

## 2019-05-10 ENCOUNTER — PREP FOR SURGERY (OUTPATIENT)
Dept: OTHER | Facility: HOSPITAL | Age: 79
End: 2019-05-10

## 2019-05-10 ENCOUNTER — OFFICE VISIT (OUTPATIENT)
Dept: CARDIAC SURGERY | Facility: CLINIC | Age: 79
End: 2019-05-10

## 2019-05-10 ENCOUNTER — APPOINTMENT (OUTPATIENT)
Dept: CARDIAC REHAB | Facility: HOSPITAL | Age: 79
End: 2019-05-10

## 2019-05-10 VITALS
WEIGHT: 200 LBS | HEART RATE: 77 BPM | HEIGHT: 62 IN | OXYGEN SATURATION: 94 % | BODY MASS INDEX: 36.8 KG/M2 | RESPIRATION RATE: 20 BRPM | SYSTOLIC BLOOD PRESSURE: 115 MMHG | TEMPERATURE: 97.8 F | DIASTOLIC BLOOD PRESSURE: 69 MMHG

## 2019-05-10 DIAGNOSIS — R79.1 ABNORMAL COAGULATION PROFILE: ICD-10-CM

## 2019-05-10 DIAGNOSIS — I05.9 MITRAL VALVE DISEASE: Primary | ICD-10-CM

## 2019-05-10 DIAGNOSIS — G82.21 COMPLETE PARAPLEGIA (HCC): ICD-10-CM

## 2019-05-10 DIAGNOSIS — I48.20 CHRONIC ATRIAL FIBRILLATION (HCC): ICD-10-CM

## 2019-05-10 DIAGNOSIS — R79.9 ABNORMAL FINDING OF BLOOD CHEMISTRY: ICD-10-CM

## 2019-05-10 DIAGNOSIS — Z98.890 S/P MAZE OPERATION FOR ATRIAL FIBRILLATION: ICD-10-CM

## 2019-05-10 DIAGNOSIS — Z86.79 S/P MAZE OPERATION FOR ATRIAL FIBRILLATION: ICD-10-CM

## 2019-05-10 DIAGNOSIS — R93.3 ABNORMAL FINDINGS ON DIAGNOSTIC IMAGING OF OTHER PARTS OF DIGESTIVE TRACT: ICD-10-CM

## 2019-05-10 DIAGNOSIS — Z98.890 S/P MITRAL VALVE REPAIR: ICD-10-CM

## 2019-05-10 DIAGNOSIS — Z98.890 S/P TRICUSPID VALVE REPAIR: ICD-10-CM

## 2019-05-10 DIAGNOSIS — I13.0 HYPERTENSIVE HEART AND CHRONIC KIDNEY DISEASE WITH HEART FAILURE AND STAGE 1 THROUGH STAGE 4 CHRONIC KIDNEY DISEASE, OR CHRONIC KIDNEY DISEASE (HCC): ICD-10-CM

## 2019-05-10 PROCEDURE — 99024 POSTOP FOLLOW-UP VISIT: CPT | Performed by: THORACIC SURGERY (CARDIOTHORACIC VASCULAR SURGERY)

## 2019-05-10 RX ORDER — CEFAZOLIN SODIUM 2 G/100ML
2 INJECTION, SOLUTION INTRAVENOUS
Status: CANCELLED | OUTPATIENT
Start: 2019-05-14 | End: 2019-05-15

## 2019-05-10 RX ORDER — CHLORHEXIDINE GLUCONATE 0.12 MG/ML
15 RINSE ORAL ONCE
Status: CANCELLED | OUTPATIENT
Start: 2019-05-14 | End: 2019-05-10

## 2019-05-10 RX ORDER — CHLORHEXIDINE GLUCONATE 500 MG/1
1 CLOTH TOPICAL EVERY 12 HOURS PRN
Status: CANCELLED | OUTPATIENT
Start: 2019-05-14

## 2019-05-10 RX ORDER — CHLORHEXIDINE GLUCONATE 500 MG/1
1 CLOTH TOPICAL EVERY 12 HOURS PRN
Status: CANCELLED | OUTPATIENT
Start: 2019-05-10

## 2019-05-10 RX ORDER — CHLORHEXIDINE GLUCONATE 0.12 MG/ML
15 RINSE ORAL EVERY 12 HOURS
Status: CANCELLED | OUTPATIENT
Start: 2019-05-10 | End: 2019-05-11

## 2019-05-10 NOTE — PROGRESS NOTES
She has had worsening of her shortness of air.  A transesophageal echo done at [facility] shows worsened mitral incompetence.  In March it was mild.  It looks to me like there is a paravalvular leak around the ring.  Therefore we need to reoperate.  She will stop her Coumadin today and will check a PT/INR next Monday.  We may have to replace the valve although I would like to try to re-repair it if we can.  I discussed all this with the patient and family.

## 2019-05-10 NOTE — TELEPHONE ENCOUNTER
Spoke to Mrs. Molina with her PAT and surgery times for Monday, May 13 at 0630 at the Respiratory Department. She is to arrive at 0500 on 5- for surgery. Dr. Saavedra instructed her in the stoppage of her COUMADIN.

## 2019-05-13 ENCOUNTER — HOSPITAL ENCOUNTER (OUTPATIENT)
Dept: GENERAL RADIOLOGY | Facility: HOSPITAL | Age: 79
Discharge: HOME | End: 2019-05-13

## 2019-05-13 ENCOUNTER — APPOINTMENT (OUTPATIENT)
Dept: PREADMISSION TESTING | Facility: HOSPITAL | Age: 79
End: 2019-05-13

## 2019-05-13 ENCOUNTER — ANESTHESIA EVENT (OUTPATIENT)
Dept: PERIOP | Facility: HOSPITAL | Age: 79
End: 2019-05-13

## 2019-05-13 ENCOUNTER — APPOINTMENT (OUTPATIENT)
Dept: CARDIAC REHAB | Facility: HOSPITAL | Age: 79
End: 2019-05-13

## 2019-05-13 ENCOUNTER — HOSPITAL ENCOUNTER (OUTPATIENT)
Dept: CARDIOLOGY | Facility: HOSPITAL | Age: 79
Discharge: HOME | End: 2019-05-13

## 2019-05-13 ENCOUNTER — HOSPITAL ENCOUNTER (OUTPATIENT)
Dept: RESPIRATORY THERAPY | Facility: HOSPITAL | Age: 79
Discharge: HOME | End: 2019-05-13
Admitting: FAMILY MEDICINE

## 2019-05-13 VITALS
DIASTOLIC BLOOD PRESSURE: 68 MMHG | SYSTOLIC BLOOD PRESSURE: 103 MMHG | RESPIRATION RATE: 20 BRPM | TEMPERATURE: 97.5 F | HEIGHT: 62 IN | WEIGHT: 203 LBS | BODY MASS INDEX: 37.36 KG/M2 | HEART RATE: 62 BPM | OXYGEN SATURATION: 95 %

## 2019-05-13 DIAGNOSIS — I13.0 HYPERTENSIVE HEART AND CHRONIC KIDNEY DISEASE WITH HEART FAILURE AND STAGE 1 THROUGH STAGE 4 CHRONIC KIDNEY DISEASE, OR CHRONIC KIDNEY DISEASE (HCC): ICD-10-CM

## 2019-05-13 DIAGNOSIS — G82.21 COMPLETE PARAPLEGIA (HCC): ICD-10-CM

## 2019-05-13 DIAGNOSIS — I05.9 MITRAL VALVE DISEASE: ICD-10-CM

## 2019-05-13 DIAGNOSIS — R93.3 ABNORMAL FINDINGS ON DIAGNOSTIC IMAGING OF OTHER PARTS OF DIGESTIVE TRACT: ICD-10-CM

## 2019-05-13 DIAGNOSIS — Z01.811 PREOP RESPIRATORY EXAM: Primary | ICD-10-CM

## 2019-05-13 DIAGNOSIS — R79.1 ABNORMAL COAGULATION PROFILE: ICD-10-CM

## 2019-05-13 DIAGNOSIS — R79.9 ABNORMAL FINDING OF BLOOD CHEMISTRY: ICD-10-CM

## 2019-05-13 LAB
ABO GROUP BLD: NORMAL
ALBUMIN SERPL-MCNC: 4.5 G/DL (ref 3.5–5.2)
ALBUMIN/GLOB SERPL: 1.6 G/DL
ALP SERPL-CCNC: 67 U/L (ref 39–117)
ALT SERPL W P-5'-P-CCNC: 25 U/L (ref 1–33)
ANION GAP SERPL CALCULATED.3IONS-SCNC: 14.1 MMOL/L
APTT PPP: 41 SECONDS (ref 22.7–35.4)
ARTERIAL PATENCY WRIST A: POSITIVE
AST SERPL-CCNC: 38 U/L (ref 1–32)
ATMOSPHERIC PRESS: 748 MMHG
BACTERIA UR QL AUTO: ABNORMAL /HPF
BASE EXCESS BLDA CALC-SCNC: -3.1 MMOL/L (ref 0–2)
BASOPHILS # BLD AUTO: 0.05 10*3/MM3 (ref 0–0.2)
BASOPHILS NFR BLD AUTO: 0.8 % (ref 0–1.5)
BDY SITE: ABNORMAL
BH CV XLRA MEAS LEFT DIST CCA EDV: 12.2 CM/SEC
BH CV XLRA MEAS LEFT DIST CCA PSV: 58.1 CM/SEC
BH CV XLRA MEAS LEFT DIST ICA EDV: 16.1 CM/SEC
BH CV XLRA MEAS LEFT DIST ICA PSV: 42.6 CM/SEC
BH CV XLRA MEAS LEFT ICA/CCA RATIO: 0.97
BH CV XLRA MEAS LEFT MID ICA EDV: -18.6 CM/SEC
BH CV XLRA MEAS LEFT MID ICA PSV: -49.5 CM/SEC
BH CV XLRA MEAS LEFT PROX CCA EDV: -10.2 CM/SEC
BH CV XLRA MEAS LEFT PROX CCA PSV: -48.1 CM/SEC
BH CV XLRA MEAS LEFT PROX ECA EDV: -10.2 CM/SEC
BH CV XLRA MEAS LEFT PROX ECA PSV: -63.6 CM/SEC
BH CV XLRA MEAS LEFT PROX ICA EDV: 13.1 CM/SEC
BH CV XLRA MEAS LEFT PROX ICA PSV: 44 CM/SEC
BH CV XLRA MEAS LEFT PROX SCLA PSV: 104 CM/SEC
BH CV XLRA MEAS LEFT VERTEBRAL A EDV: 12.6 CM/SEC
BH CV XLRA MEAS LEFT VERTEBRAL A PSV: 40.7 CM/SEC
BH CV XLRA MEAS RIGHT CCA RATIO VEL: 69.9 CM/SEC
BH CV XLRA MEAS RIGHT DIST CCA EDV: 15.7 CM/SEC
BH CV XLRA MEAS RIGHT DIST CCA PSV: 69.9 CM/SEC
BH CV XLRA MEAS RIGHT DIST ICA EDV: -20 CM/SEC
BH CV XLRA MEAS RIGHT DIST ICA PSV: -56.6 CM/SEC
BH CV XLRA MEAS RIGHT ICA RATIO VEL: -60.9 CM/SEC
BH CV XLRA MEAS RIGHT ICA/CCA RATIO: -0.87
BH CV XLRA MEAS RIGHT MID ICA EDV: -17.6 CM/SEC
BH CV XLRA MEAS RIGHT MID ICA PSV: -52.3 CM/SEC
BH CV XLRA MEAS RIGHT PROX CCA EDV: 12.3 CM/SEC
BH CV XLRA MEAS RIGHT PROX CCA PSV: 75 CM/SEC
BH CV XLRA MEAS RIGHT PROX ECA EDV: -6.7 CM/SEC
BH CV XLRA MEAS RIGHT PROX ECA PSV: -71.1 CM/SEC
BH CV XLRA MEAS RIGHT PROX ICA EDV: -13.8 CM/SEC
BH CV XLRA MEAS RIGHT PROX ICA PSV: -44.4 CM/SEC
BH CV XLRA MEAS RIGHT PROX SCLA PSV: 57.4 CM/SEC
BH CV XLRA MEAS RIGHT VERTEBRAL A EDV: 10.5 CM/SEC
BH CV XLRA MEAS RIGHT VERTEBRAL A PSV: 40.9 CM/SEC
BILIRUB SERPL-MCNC: 1.2 MG/DL (ref 0.2–1.2)
BILIRUB UR QL STRIP: NEGATIVE
BLD GP AB SCN SERPL QL: NEGATIVE
BUN BLD-MCNC: 19 MG/DL (ref 8–23)
BUN/CREAT SERPL: 22.9 (ref 7–25)
CALCIUM SPEC-SCNC: 10.6 MG/DL (ref 8.6–10.5)
CHLORIDE SERPL-SCNC: 103 MMOL/L (ref 98–107)
CHOLEST SERPL-MCNC: 103 MG/DL (ref 0–200)
CLARITY UR: CLEAR
CLOSE TME COLL+ADP + EPINEP PNL BLD: 76 % (ref 86–100)
CO2 SERPL-SCNC: 21.9 MMOL/L (ref 22–29)
COLOR UR: YELLOW
CREAT BLD-MCNC: 0.83 MG/DL (ref 0.57–1)
DEPRECATED RDW RBC AUTO: 50.1 FL (ref 37–54)
EOSINOPHIL # BLD AUTO: 0.17 10*3/MM3 (ref 0–0.4)
EOSINOPHIL NFR BLD AUTO: 2.6 % (ref 0.3–6.2)
ERYTHROCYTE [DISTWIDTH] IN BLOOD BY AUTOMATED COUNT: 15.2 % (ref 12.3–15.4)
GFR SERPL CREATININE-BSD FRML MDRD: 67 ML/MIN/1.73
GLOBULIN UR ELPH-MCNC: 2.9 GM/DL
GLUCOSE BLD-MCNC: 143 MG/DL (ref 65–99)
GLUCOSE UR STRIP-MCNC: ABNORMAL MG/DL
HBA1C MFR BLD: 5.6 % (ref 4.8–5.6)
HCO3 BLDA-SCNC: 20.7 MMOL/L (ref 22–28)
HCT VFR BLD AUTO: 43 % (ref 34–46.6)
HDLC SERPL-MCNC: 33 MG/DL (ref 40–60)
HGB BLD-MCNC: 13.7 G/DL (ref 12–15.9)
HGB UR QL STRIP.AUTO: ABNORMAL
HYALINE CASTS UR QL AUTO: ABNORMAL /LPF
IMM GRANULOCYTES # BLD AUTO: 0.01 10*3/MM3 (ref 0–0.05)
IMM GRANULOCYTES NFR BLD AUTO: 0.2 % (ref 0–0.5)
INR PPP: 1.47 (ref 0.9–1.1)
KETONES UR QL STRIP: NEGATIVE
LDLC SERPL CALC-MCNC: 20 MG/DL (ref 0–100)
LDLC/HDLC SERPL: 0.62 {RATIO}
LEFT ARM BP: NORMAL MMHG
LEUKOCYTE ESTERASE UR QL STRIP.AUTO: NEGATIVE
LYMPHOCYTES # BLD AUTO: 1.59 10*3/MM3 (ref 0.7–3.1)
LYMPHOCYTES NFR BLD AUTO: 24.7 % (ref 19.6–45.3)
MAGNESIUM SERPL-MCNC: 1.8 MG/DL (ref 1.6–2.4)
MCH RBC QN AUTO: 28.7 PG (ref 26.6–33)
MCHC RBC AUTO-ENTMCNC: 31.9 G/DL (ref 31.5–35.7)
MCV RBC AUTO: 90.1 FL (ref 79–97)
MODALITY: ABNORMAL
MONOCYTES # BLD AUTO: 0.6 10*3/MM3 (ref 0.1–0.9)
MONOCYTES NFR BLD AUTO: 9.3 % (ref 5–12)
NEUTROPHILS # BLD AUTO: 4.01 10*3/MM3 (ref 1.7–7)
NEUTROPHILS NFR BLD AUTO: 62.4 % (ref 42.7–76)
NITRITE UR QL STRIP: NEGATIVE
NRBC BLD AUTO-RTO: 0 /100 WBC (ref 0–0.2)
NT-PROBNP SERPL-MCNC: 971.5 PG/ML (ref 5–1800)
PCO2 BLDA: 32.7 MM HG (ref 35–45)
PH BLDA: 7.41 PH UNITS (ref 7.35–7.45)
PH UR STRIP.AUTO: 6 [PH] (ref 5–8)
PLATELET # BLD AUTO: 197 10*3/MM3 (ref 140–450)
PMV BLD AUTO: 11.8 FL (ref 6–12)
PO2 BLDA: 72.5 MM HG (ref 80–100)
POTASSIUM BLD-SCNC: 3.7 MMOL/L (ref 3.5–5.2)
PROT SERPL-MCNC: 7.4 G/DL (ref 6–8.5)
PROT UR QL STRIP: ABNORMAL
PROTHROMBIN TIME: 17.5 SECONDS (ref 11.7–14.2)
RBC # BLD AUTO: 4.77 10*6/MM3 (ref 3.77–5.28)
RBC # UR: ABNORMAL /HPF
REF LAB TEST METHOD: ABNORMAL
RH BLD: POSITIVE
RIGHT ARM BP: NORMAL MMHG
SAO2 % BLDCOA: 94.7 % (ref 92–99)
SODIUM BLD-SCNC: 139 MMOL/L (ref 136–145)
SP GR UR STRIP: 1.02 (ref 1–1.03)
SQUAMOUS #/AREA URNS HPF: ABNORMAL /HPF
T&S EXPIRATION DATE: NORMAL
TOTAL RATE: 20 BREATHS/MINUTE
TRIGL SERPL-MCNC: 248 MG/DL (ref 0–150)
UROBILINOGEN UR QL STRIP: ABNORMAL
VLDLC SERPL-MCNC: 49.6 MG/DL (ref 5–40)
WBC NRBC COR # BLD: 6.43 10*3/MM3 (ref 3.4–10.8)
WBC UR QL AUTO: ABNORMAL /HPF

## 2019-05-13 PROCEDURE — S0260 H&P FOR SURGERY: HCPCS | Performed by: NURSE PRACTITIONER

## 2019-05-13 PROCEDURE — 86850 RBC ANTIBODY SCREEN: CPT | Performed by: NURSE PRACTITIONER

## 2019-05-13 PROCEDURE — 83735 ASSAY OF MAGNESIUM: CPT | Performed by: NURSE PRACTITIONER

## 2019-05-13 PROCEDURE — 85576 BLOOD PLATELET AGGREGATION: CPT | Performed by: NURSE PRACTITIONER

## 2019-05-13 PROCEDURE — 93005 ELECTROCARDIOGRAM TRACING: CPT

## 2019-05-13 PROCEDURE — 86901 BLOOD TYPING SEROLOGIC RH(D): CPT | Performed by: NURSE PRACTITIONER

## 2019-05-13 PROCEDURE — 82803 BLOOD GASES ANY COMBINATION: CPT

## 2019-05-13 PROCEDURE — 71046 X-RAY EXAM CHEST 2 VIEWS: CPT

## 2019-05-13 PROCEDURE — 86900 BLOOD TYPING SEROLOGIC ABO: CPT | Performed by: NURSE PRACTITIONER

## 2019-05-13 PROCEDURE — 83880 ASSAY OF NATRIURETIC PEPTIDE: CPT | Performed by: NURSE PRACTITIONER

## 2019-05-13 PROCEDURE — 93880 EXTRACRANIAL BILAT STUDY: CPT

## 2019-05-13 PROCEDURE — 36600 WITHDRAWAL OF ARTERIAL BLOOD: CPT

## 2019-05-13 PROCEDURE — 94799 UNLISTED PULMONARY SVC/PX: CPT

## 2019-05-13 PROCEDURE — 81001 URINALYSIS AUTO W/SCOPE: CPT | Performed by: NURSE PRACTITIONER

## 2019-05-13 PROCEDURE — 85730 THROMBOPLASTIN TIME PARTIAL: CPT | Performed by: NURSE PRACTITIONER

## 2019-05-13 PROCEDURE — 86920 COMPATIBILITY TEST SPIN: CPT

## 2019-05-13 PROCEDURE — 85610 PROTHROMBIN TIME: CPT | Performed by: NURSE PRACTITIONER

## 2019-05-13 PROCEDURE — 80061 LIPID PANEL: CPT | Performed by: NURSE PRACTITIONER

## 2019-05-13 PROCEDURE — 83036 HEMOGLOBIN GLYCOSYLATED A1C: CPT | Performed by: NURSE PRACTITIONER

## 2019-05-13 PROCEDURE — 85025 COMPLETE CBC W/AUTO DIFF WBC: CPT | Performed by: NURSE PRACTITIONER

## 2019-05-13 PROCEDURE — 80053 COMPREHEN METABOLIC PANEL: CPT | Performed by: NURSE PRACTITIONER

## 2019-05-13 PROCEDURE — 87086 URINE CULTURE/COLONY COUNT: CPT | Performed by: NURSE PRACTITIONER

## 2019-05-13 PROCEDURE — 36415 COLL VENOUS BLD VENIPUNCTURE: CPT

## 2019-05-13 RX ORDER — ALBUTEROL SULFATE 90 UG/1
2 AEROSOL, METERED RESPIRATORY (INHALATION) EVERY 4 HOURS PRN
COMMUNITY
End: 2021-03-15 | Stop reason: SDUPTHER

## 2019-05-13 RX ORDER — PANTOPRAZOLE SODIUM 40 MG/1
40 TABLET, DELAYED RELEASE ORAL DAILY
COMMUNITY
End: 2019-06-01 | Stop reason: SDUPTHER

## 2019-05-13 RX ORDER — LEVOTHYROXINE AND LIOTHYRONINE 57; 13.5 UG/1; UG/1
90 TABLET ORAL DAILY
COMMUNITY
End: 2019-11-25 | Stop reason: SDUPTHER

## 2019-05-13 RX ORDER — LEVOTHYROXINE AND LIOTHYRONINE 57; 13.5 UG/1; UG/1
45 TABLET ORAL NIGHTLY
COMMUNITY
End: 2019-11-25 | Stop reason: SDUPTHER

## 2019-05-13 RX ORDER — ALBUTEROL SULFATE 2.5 MG/3ML
2.5 SOLUTION RESPIRATORY (INHALATION) EVERY 4 HOURS PRN
COMMUNITY
End: 2020-01-02 | Stop reason: SDUPTHER

## 2019-05-13 RX ORDER — CHLORHEXIDINE GLUCONATE 0.12 MG/ML
15 RINSE ORAL EVERY 12 HOURS SCHEDULED
Status: DISCONTINUED | OUTPATIENT
Start: 2019-05-13 | End: 2019-05-13

## 2019-05-13 RX ORDER — CHLORHEXIDINE GLUCONATE 500 MG/1
CLOTH TOPICAL SEE ADMIN INSTRUCTIONS
COMMUNITY
End: 2019-05-20 | Stop reason: HOSPADM

## 2019-05-13 RX ORDER — CHLORHEXIDINE GLUCONATE 0.12 MG/ML
15 RINSE ORAL EVERY 12 HOURS
Status: DISPENSED | OUTPATIENT
Start: 2019-05-13 | End: 2019-05-14

## 2019-05-13 RX ORDER — MOMETASONE FUROATE 50 UG/1
1 SPRAY, METERED NASAL AS NEEDED
Status: ON HOLD | COMMUNITY
End: 2020-03-26

## 2019-05-13 RX ORDER — WARFARIN SODIUM 5 MG/1
5 TABLET ORAL NIGHTLY
COMMUNITY

## 2019-05-13 RX ORDER — WARFARIN SODIUM 7.5 MG/1
7.5 TABLET ORAL
COMMUNITY
End: 2020-01-28

## 2019-05-13 RX ORDER — POTASSIUM CHLORIDE 1500 MG/1
20 TABLET, FILM COATED, EXTENDED RELEASE ORAL DAILY
COMMUNITY
End: 2019-11-25 | Stop reason: SDUPTHER

## 2019-05-13 RX ORDER — DIGOXIN 250 MCG
250 TABLET ORAL
COMMUNITY
End: 2019-11-25 | Stop reason: SDUPTHER

## 2019-05-13 NOTE — DISCHARGE INSTRUCTIONS
2% CHLORAHEXIDINE GLUCONATE* CLOTH  Preparing or “prepping” skin before surgery can reduce the risk of infection at the surgical site. To make the process easier, [facility] has chosen disposable cloths moistened with a rinse-free, 2% Chlorhexidine Gluconate (CHG) antiseptic solution. The steps below outline the prepping process and should be carefully followed.        Use the prep cloth on the area that is circled in the diagram             Directions Night before Surgery  1) Shower using a fresh bar of anti-bacterial soap (such as Dial) and clean washcloth.  Use a clean towel to completely dry your skin.  2) Do not use any lotions, oils or creams on your skin.  3) Open the package and remove 1 cloth, wipe your skin for 30 seconds in a circular motion.  Allow to dry for 3 minutes.  4) Repeat #3 with second cloth.  5) Do not touch your eyes, ears, or mouth with the prep cloth.  6) Allow the wet prep solution to air dry.  7) Discard the prep cloth and wash your hands with soap and water.   8) Dress in clean bed clothes and sleep on fresh clean bed sheets.   9) You may experience some temporary itching after the prep.    Directions Day of Surgery  1) Repeat steps 1,2,3,4,5,6,7, and 9.   2) Dress in clean clothes before coming to the hospital.    BACTROBAN NASAL OINTMENT  There are many germs normally in your nose. Bactroban is an ointment that will help reduce these germs. Please follow these instructions for Bactroban use:      ____The day before surgery in the morning  Date________    ____The day before surgery in the evening              Date________    ____The day of surgery in the morning    Date________    **Squirt ½ package of Bactroban Ointment onto a cotton applicator and apply to inside of 1st nostril.  Squirt the remaining Bactroban and apply to the inside of the other nostril.    PERIDEX- ORAL:  Use only if your surgeon has ordered  Use the night before and morning of surgery - Kalyansh,  gargle, and spit - do not swallow.      Take the following medications the morning of surgery with a small sip of water:  NONE    ARRIVAL TIME 0500 TO MAIN OR         General Instructions:  • Do not eat solid food after midnight the night before surgery.  • You may drink clear liquids day of surgery but must stop at least one hour before your hospital arrival time.  • It is beneficial for you to have a clear drink that contains carbohydrates the day of surgery.  We suggest a 12 to 20 ounce bottle of Gatorade or Powerade for non-diabetic patients or a 12 to 20 ounce bottle of G2 or Powerade Zero for diabetic patients. (Pediatric patients, are not advised to drink a 12 to 20 ounce carbohydrate drink)    Clear liquids are liquids you can see through.  Nothing red in color.     Plain water                               Sports drinks  Sodas                                   Gelatin (Jell-O)  Fruit juices without pulp such as white grape juice and apple juice  Popsicles that contain no fruit or yogurt  Tea or coffee (no cream or milk added)  Gatorade / Powerade  G2 / Powerade Zero    • Infants may have breast milk up to four hours before surgery.  • Infants drinking formula may drink formula up to six hours before surgery.   • Patients who avoid smoking, chewing tobacco and alcohol for 4 weeks prior to surgery have a reduced risk of post-operative complications.  Quit smoking as many days before surgery as you can.  • Do not smoke, use chewing tobacco or drink alcohol the day of surgery.   • If applicable bring your C-PAP/ BI-PAP machine.  • Bring any papers given to you in the doctor’s office.  • Wear clean comfortable clothes and socks.  • Do not wear contact lenses, false eyelashes or make-up.  Bring a case for your glasses.   • Bring crutches or walker if applicable.  • Remove all piercings.  Leave jewelry and any other valuables at home.  • Hair extensions with metal clips must be removed prior to surgery.  • The  Pre-Admission Testing nurse will instruct you to bring medications if unable to obtain an accurate list in Pre-Admission Testing.        If you were given a blood bank ID arm band remember to bring it with you the day of surgery.    Preventing a Surgical Site Infection:  • For 2 to 3 days before surgery, avoid shaving with a razor because the razor can irritate skin and make it easier to develop an infection.    • Any areas of open skin can increase the risk of a post-operative wound infection by allowing bacteria to enter and travel throughout the body.  Notify your surgeon if you have any skin wounds / rashes even if it is not near the expected surgical site.  The area will need assessed to determine if surgery should be delayed until it is healed.  • The night prior to surgery sleep in a clean bed with clean clothing.  Do not allow pets to sleep with you.  • Shower on the morning of surgery using a fresh bar of anti-bacterial soap (such as Dial) and clean washcloth.  Dry with a clean towel and dress in clean clothing.  • Ask your surgeon if you will be receiving antibiotics prior to surgery.  • Make sure you, your family, and all healthcare providers clean their hands with soap and water or an alcohol based hand  before caring for you or your wound.    Day of surgery:  Upon arrival, a Pre-op nurse and Anesthesiologist will review your health history, obtain vital signs, and answer questions you may have.  The only belongings needed at this time will be a list of your home medications and if applicable your C-PAP/BI-PAP machine.  If you are staying overnight your family can leave the rest of your belongings in the car and bring them to your room later.  A Pre-op nurse will start an IV and you may receive medication in preparation for surgery, including something to help you relax.  Your family will be able to see you in the Pre-op area.  While you are in surgery your family should notify the waiting room   if they leave the waiting room area and provide a contact phone number.    Please be aware that surgery does come with discomfort.  We want to make every effort to control your discomfort so please discuss any uncontrolled symptoms with your nurse.   Your doctor will most likely have prescribed pain medications.      If you are going home after surgery you will receive individualized written care instructions before being discharged.  A responsible adult must drive you to and from the hospital on the day of your surgery and stay with you for 24 hours.    If you are staying overnight following surgery, you will be transported to your hospital room following the recovery period.  [facility] has all private rooms.    You have received a list of surgical assistants for your reference.  If you have any questions please call Pre-Admission Testing at [phone].  Deductibles and co-payments are collected on the day of service. Please be prepared to pay the required co-pay, deductible or deposit on the day of service as defined by your plan.

## 2019-05-13 NOTE — H&P
Patient Care Team:  Rashid Ge MD as PCP - General (Family Medicine)  Thong Jones MD as Consulting Physician (Cardiology)  Mahin Galeas MD as Consulting Physician (Sleep Medicine)  Lara Ortiz MD as Consulting Physician (Obstetrics and Gynecology)  Betty Horowitz MD as Consulting Physician (Dermatology)  Ben Saavedra MD as Surgeon (Cardiothoracic Surgery)  Mahin Galeas MD as Consulting Physician (Pulmonary Disease)  Chirag Rai MD as Consulting Physician (Ophthalmology)  Moisés Everett MD as Consulting Physician (Urology)    Chief complaint:  PAT, preop MVR    Subjective     History of Present Illness: Mrs. Molina is a 78-year-old female I am seeing for the first time in [facility].  She last saw Dr. Saavedra in the office on 5/10/2019, she states that there have been no changes to her H&P since then.  She underwent a mitral valve repair, tricuspid valve repair, Maze procedure in 2015.      Review of Systems   Constitutional: Negative for activity change.   HENT: Negative for dental problem and mouth sores.    Genitourinary: Negative for difficulty urinating, dysuria, flank pain, frequency, hematuria and urgency.   Skin: Negative for rash and wound.        Past Medical History:   Diagnosis Date   • AICD (automatic cardioverter/defibrillator) present    • Arthritis    • Atrial fibrillation (CMS/[facility])    • CHF (congestive heart failure) (CMS/[facility])    • Congenital heart disease    • COPD (chronic obstructive pulmonary disease) (CMS/[facility])    • Coronary artery disease    • Diabetes mellitus (CMS/[facility])    • GERD (gastroesophageal reflux disease)    • High cholesterol    • History of kidney stones    • History of melanoma 2002    RIGHT KNEE   • History of MI (myocardial infarction) 2010   • Hypertension    • Hypothyroidism    • Lymph edema    • On anticoagulant therapy    • HAMLET on CPAP    • PONV (postoperative nausea and vomiting)    • Shortness of breath    •  Spinal stenosis    • Valvular heart disease 2015    S/p MVR & TVR      Past Surgical History:   Procedure Laterality Date   • CARDIAC CATHETERIZATION     • CARDIAC DEFIBRILLATOR PLACEMENT      MEDTRONIC   • CARDIAC DEFIBRILLATOR PLACEMENT      MEDTRONIC   •  SECTION     •  SECTION     • CHOLECYSTECTOMY     • COLONOSCOPY     • EPIDURAL BLOCK  [id number]    Lumbar    • EXTRACORPOREAL SHOCK WAVE LITHOTRIPSY (ESWL) Right 2017   • EXTRACORPOREAL SHOCK WAVE LITHOTRIPSY (ESWL) Right 2017    Procedure: RT EXTRACORPOREAL SHOCKWAVE LITHOTRIPSY;  Surgeon: Moisés Everett MD;  Location: [facility];  Service:    • EYE SURGERY Right 09/10/2007    Cataract   • EYE SURGERY Left 10/29/2007    Cataract   • JOINT REPLACEMENT Left 2007    Knee   • JOINT REPLACEMENT Right 2010    Knee   • KNEE ARTHROSCOPY     • MAZE PROCEDURE  2015    Dr. Saavedra   • MITRAL VALVE REPAIR/REPLACEMENT     • MA ERCP DX COLLECTION SPECIMEN BRUSHING/WASHING N/A 2016    Procedure: ENDOSCOPIC RETROGRADE CHOLANGIOPANCREATOGRAPHY WITH SPHINCTEROTOMY AND BALLOON SWEEP OF CBD;  Surgeon: Lucas Campos MD;  Location: [facility] ENDOSCOPY;  Service: Gastroenterology   • SKIN CANCER EXCISION      MELANOMA RIGHT KNEE AREA -    • TRICUSPID VALVE SURGERY  2015    Dr. Saavedra   • URETEROSCOPY LASER LITHOTRIPSY WITH STENT INSERTION Right 11/15/2017    Procedure: CYSTOSCOPY; RIGHT URETEROSCOPY LASER LITHOTRIPSY WITH STENT INSERTION;  Surgeon: Moisés Everett MD;  Location: [facility] OR;  Service:      Family History   Problem Relation Age of Onset   • Heart attack Mother    • Heart disease Mother    • Hypertension Mother    • Stroke Mother    • Macular degeneration Mother    • Heart attack Father    • Malig Hyperthermia Neg Hx      Social History     Tobacco Use   • Smoking status: Never Smoker   • Smokeless tobacco: Never Used   Substance Use Topics   • Alcohol use:  No   • Drug use: No     No medications prior to admission.       Allergies:  Corticosteroids; Adhesive tape; Hydrocodone; and Lisinopril    Objective      Vital Signs  Temp:  [97.5 °F (36.4 °C)] 97.5 °F (36.4 °C)  Heart Rate:  [62] 62  Resp:  [20] 20  BP: (103)/(68) 103/68           Physical Exam   Constitutional: She is oriented to person, place, and time. She appears well-developed and well-nourished.   Pulmonary/Chest: Effort normal.   Neurological: She is alert and oriented to person, place, and time.   Skin: Skin is warm and dry. No rash noted. No erythema.   Psychiatric: She has a normal mood and affect. Her behavior is normal. Judgment and thought content normal.       Results Review:   Lab Results (last 24 hours)     ** No results found for the last 24 hours. **              Assessment/Plan       Mitral valve disease      Assessment & Plan    She states that her last dose of Coumadin was on Thursday, her INR is 1.4 today with a platelet aggregation of 76% and platelet count of 197.  Her CMP is within normal limits except for an AST of 38 which is consistent with previous AST in 2018.  She did have 6-12 white cells in her urine, no bacteria, no epithelials, no leukocytes, no nitrates, and she is asymptomatic.  Her chest x-ray and carotid studies are still pending.  She is scheduled for a preop mitral valve replacement tomorrow with Dr. Saavedra.  I answered all questions that she and her spouse may have had.    Qi Gleason, HESHAM  05/13/19  4:03 PM

## 2019-05-14 ENCOUNTER — APPOINTMENT (OUTPATIENT)
Dept: GENERAL RADIOLOGY | Facility: HOSPITAL | Age: 79
End: 2019-05-14

## 2019-05-14 ENCOUNTER — ANESTHESIA (OUTPATIENT)
Dept: PERIOP | Facility: HOSPITAL | Age: 79
End: 2019-05-14

## 2019-05-14 ENCOUNTER — HOSPITAL ENCOUNTER (INPATIENT)
Facility: HOSPITAL | Age: 79
LOS: 6 days | Discharge: HOME HEALTH | End: 2019-05-20
Attending: THORACIC SURGERY (CARDIOTHORACIC VASCULAR SURGERY) | Admitting: THORACIC SURGERY (CARDIOTHORACIC VASCULAR SURGERY)

## 2019-05-14 ENCOUNTER — ANCILLARY PROCEDURE (OUTPATIENT)
Dept: PERIOP | Facility: HOSPITAL | Age: 79
End: 2019-05-14

## 2019-05-14 DIAGNOSIS — I05.9 MITRAL VALVE DISEASE: ICD-10-CM

## 2019-05-14 DIAGNOSIS — I48.20 CHRONIC ATRIAL FIBRILLATION (HCC): ICD-10-CM

## 2019-05-14 DIAGNOSIS — Z95.2 S/P MVR (MITRAL VALVE REPLACEMENT): ICD-10-CM

## 2019-05-14 DIAGNOSIS — I48.0 PAROXYSMAL ATRIAL FIBRILLATION (HCC): ICD-10-CM

## 2019-05-14 DIAGNOSIS — Z74.09 IMPAIRED MOBILITY: Primary | ICD-10-CM

## 2019-05-14 LAB
ACT BLD: 120 SECONDS (ref 82–152)
ACT BLD: 136 SECONDS (ref 82–152)
ACT BLD: 467 SECONDS (ref 82–152)
ACT BLD: 477 SECONDS (ref 82–152)
ACT BLD: 521 SECONDS (ref 82–152)
ALBUMIN SERPL-MCNC: 4.1 G/DL (ref 3.5–5.2)
ALBUMIN SERPL-MCNC: 4.9 G/DL (ref 3.5–5.2)
ANION GAP SERPL CALCULATED.3IONS-SCNC: 12.7 MMOL/L
ANION GAP SERPL CALCULATED.3IONS-SCNC: 18.3 MMOL/L
APTT PPP: 35.2 SECONDS (ref 22.7–35.4)
APTT PPP: 36.4 SECONDS (ref 22.7–35.4)
ARTERIAL PATENCY WRIST A: ABNORMAL
ATMOSPHERIC PRESS: 751.1 MMHG
ATMOSPHERIC PRESS: 753.1 MMHG
ATMOSPHERIC PRESS: 753.6 MMHG
BACTERIA SPEC AEROBE CULT: NO GROWTH
BASE EXCESS BLDA CALC-SCNC: -0.8 MMOL/L (ref 0–2)
BASE EXCESS BLDA CALC-SCNC: -0.9 MMOL/L (ref 0–2)
BASE EXCESS BLDA CALC-SCNC: -3.4 MMOL/L (ref 0–2)
BASE EXCESS BLDA CALC-SCNC: -4 MMOL/L (ref -5–5)
BASE EXCESS BLDA CALC-SCNC: 0 MMOL/L (ref -5–5)
BASE EXCESS BLDA CALC-SCNC: 0 MMOL/L (ref -5–5)
BASE EXCESS BLDA CALC-SCNC: 2 MMOL/L (ref -5–5)
BASE EXCESS BLDA CALC-SCNC: 4 MMOL/L (ref -5–5)
BASOPHILS # BLD AUTO: 0.03 10*3/MM3 (ref 0–0.2)
BASOPHILS NFR BLD AUTO: 0.3 % (ref 0–1.5)
BDY SITE: ABNORMAL
BUN BLD-MCNC: 19 MG/DL (ref 8–23)
BUN BLD-MCNC: 20 MG/DL (ref 8–23)
BUN/CREAT SERPL: 24.7 (ref 7–25)
BUN/CREAT SERPL: 25 (ref 7–25)
CA-I BLD-MCNC: 5.4 MG/DL (ref 4.6–5.4)
CA-I SERPL ISE-MCNC: 1.36 MMOL/L (ref 1.15–1.35)
CALCIUM SPEC-SCNC: 10.2 MG/DL (ref 8.6–10.5)
CALCIUM SPEC-SCNC: 9.3 MG/DL (ref 8.6–10.5)
CHLORIDE SERPL-SCNC: 104 MMOL/L (ref 98–107)
CHLORIDE SERPL-SCNC: 107 MMOL/L (ref 98–107)
CO2 BLDA-SCNC: 23 MMOL/L (ref 24–29)
CO2 BLDA-SCNC: 26 MMOL/L (ref 24–29)
CO2 BLDA-SCNC: 27 MMOL/L (ref 24–29)
CO2 BLDA-SCNC: 29 MMOL/L (ref 24–29)
CO2 BLDA-SCNC: 29 MMOL/L (ref 24–29)
CO2 SERPL-SCNC: 20.7 MMOL/L (ref 22–29)
CO2 SERPL-SCNC: 23.3 MMOL/L (ref 22–29)
CREAT BLD-MCNC: 0.77 MG/DL (ref 0.57–1)
CREAT BLD-MCNC: 0.8 MG/DL (ref 0.57–1)
DEPRECATED RDW RBC AUTO: 49.2 FL (ref 37–54)
DEPRECATED RDW RBC AUTO: 49.5 FL (ref 37–54)
EOSINOPHIL # BLD AUTO: 0.17 10*3/MM3 (ref 0–0.4)
EOSINOPHIL NFR BLD AUTO: 1.5 % (ref 0.3–6.2)
ERYTHROCYTE [DISTWIDTH] IN BLOOD BY AUTOMATED COUNT: 15.2 % (ref 12.3–15.4)
ERYTHROCYTE [DISTWIDTH] IN BLOOD BY AUTOMATED COUNT: 15.3 % (ref 12.3–15.4)
FIBRINOGEN PPP-MCNC: 197 MG/DL (ref 219–464)
GFR SERPL CREATININE-BSD FRML MDRD: 70 ML/MIN/1.73
GFR SERPL CREATININE-BSD FRML MDRD: 73 ML/MIN/1.73
GLUCOSE BLD-MCNC: 161 MG/DL (ref 65–99)
GLUCOSE BLD-MCNC: 162 MG/DL (ref 65–99)
GLUCOSE BLDC GLUCOMTR-MCNC: 110 MG/DL (ref 70–130)
GLUCOSE BLDC GLUCOMTR-MCNC: 119 MG/DL (ref 70–130)
GLUCOSE BLDC GLUCOMTR-MCNC: 121 MG/DL (ref 70–130)
GLUCOSE BLDC GLUCOMTR-MCNC: 124 MG/DL (ref 70–130)
GLUCOSE BLDC GLUCOMTR-MCNC: 140 MG/DL (ref 70–130)
GLUCOSE BLDC GLUCOMTR-MCNC: 144 MG/DL (ref 70–130)
GLUCOSE BLDC GLUCOMTR-MCNC: 149 MG/DL (ref 70–130)
GLUCOSE BLDC GLUCOMTR-MCNC: 151 MG/DL (ref 70–130)
GLUCOSE BLDC GLUCOMTR-MCNC: 152 MG/DL (ref 70–130)
GLUCOSE BLDC GLUCOMTR-MCNC: 152 MG/DL (ref 70–130)
GLUCOSE BLDC GLUCOMTR-MCNC: 155 MG/DL (ref 70–130)
GLUCOSE BLDC GLUCOMTR-MCNC: 156 MG/DL (ref 70–130)
GLUCOSE BLDC GLUCOMTR-MCNC: 157 MG/DL (ref 70–130)
GLUCOSE BLDC GLUCOMTR-MCNC: 162 MG/DL (ref 70–130)
GLUCOSE BLDC GLUCOMTR-MCNC: 166 MG/DL (ref 70–130)
GLUCOSE BLDC GLUCOMTR-MCNC: 167 MG/DL (ref 70–130)
GLUCOSE BLDC GLUCOMTR-MCNC: 167 MG/DL (ref 70–130)
HCO3 BLDA-SCNC: 21.4 MMOL/L (ref 22–28)
HCO3 BLDA-SCNC: 21.7 MMOL/L (ref 22–26)
HCO3 BLDA-SCNC: 24.1 MMOL/L (ref 22–28)
HCO3 BLDA-SCNC: 24.2 MMOL/L (ref 22–28)
HCO3 BLDA-SCNC: 24.7 MMOL/L (ref 22–26)
HCO3 BLDA-SCNC: 25.7 MMOL/L (ref 22–26)
HCO3 BLDA-SCNC: 27.8 MMOL/L (ref 22–26)
HCO3 BLDA-SCNC: 27.8 MMOL/L (ref 22–26)
HCT VFR BLD AUTO: 33.4 % (ref 34–46.6)
HCT VFR BLD AUTO: 33.5 % (ref 34–46.6)
HCT VFR BLDA CALC: 27 % (ref 38–51)
HCT VFR BLDA CALC: 27 % (ref 38–51)
HCT VFR BLDA CALC: 28 % (ref 38–51)
HCT VFR BLDA CALC: 28 % (ref 38–51)
HCT VFR BLDA CALC: 36 % (ref 38–51)
HGB BLD-MCNC: 10.7 G/DL (ref 12–15.9)
HGB BLD-MCNC: 10.8 G/DL (ref 12–15.9)
HGB BLDA-MCNC: 12.2 G/DL (ref 12–17)
HGB BLDA-MCNC: 9.2 G/DL (ref 12–17)
HGB BLDA-MCNC: 9.2 G/DL (ref 12–17)
HGB BLDA-MCNC: 9.5 G/DL (ref 12–17)
HGB BLDA-MCNC: 9.5 G/DL (ref 12–17)
HOROWITZ INDEX BLD+IHG-RTO: 100 %
HOROWITZ INDEX BLD+IHG-RTO: 30 %
HOROWITZ INDEX BLD+IHG-RTO: 40 %
IMM GRANULOCYTES # BLD AUTO: 0.08 10*3/MM3 (ref 0–0.05)
IMM GRANULOCYTES NFR BLD AUTO: 0.7 % (ref 0–0.5)
INR PPP: 1.32 (ref 0.9–1.1)
INR PPP: 1.4 (ref 0.8–1.2)
INR PPP: 1.62 (ref 0.9–1.1)
LYMPHOCYTES # BLD AUTO: 1.16 10*3/MM3 (ref 0.7–3.1)
LYMPHOCYTES NFR BLD AUTO: 10.3 % (ref 19.6–45.3)
MAGNESIUM SERPL-MCNC: 1.9 MG/DL (ref 1.6–2.4)
MAGNESIUM SERPL-MCNC: 2 MG/DL (ref 1.6–2.4)
MAGNESIUM SERPL-MCNC: 2.1 MG/DL (ref 1.6–2.4)
MCH RBC QN AUTO: 28.2 PG (ref 26.6–33)
MCH RBC QN AUTO: 28.6 PG (ref 26.6–33)
MCHC RBC AUTO-ENTMCNC: 31.9 G/DL (ref 31.5–35.7)
MCHC RBC AUTO-ENTMCNC: 32.3 G/DL (ref 31.5–35.7)
MCV RBC AUTO: 88.2 FL (ref 79–97)
MCV RBC AUTO: 88.4 FL (ref 79–97)
MODALITY: ABNORMAL
MONOCYTES # BLD AUTO: 0.73 10*3/MM3 (ref 0.1–0.9)
MONOCYTES NFR BLD AUTO: 6.5 % (ref 5–12)
NEUTROPHILS # BLD AUTO: 9.08 10*3/MM3 (ref 1.7–7)
NEUTROPHILS NFR BLD AUTO: 80.7 % (ref 42.7–76)
NRBC BLD AUTO-RTO: 0 /100 WBC (ref 0–0.2)
O2 A-A PPRESDIFF RESPIRATORY: 0.2 MMHG
O2 A-A PPRESDIFF RESPIRATORY: 0.4 MMHG
O2 A-A PPRESDIFF RESPIRATORY: 0.5 MMHG
PCO2 BLDA: 36.9 MM HG (ref 35–45)
PCO2 BLDA: 39.3 MM HG (ref 35–45)
PCO2 BLDA: 40.6 MM HG (ref 35–45)
PCO2 BLDA: 40.7 MM HG (ref 35–45)
PCO2 BLDA: 40.8 MM HG (ref 35–45)
PCO2 BLDA: 42 MM HG (ref 35–45)
PCO2 BLDA: 45.1 MM HG (ref 35–45)
PCO2 BLDA: 53.8 MM HG (ref 35–45)
PEEP RESPIRATORY: 5 CM[H2O]
PH BLDA: 7.32 PH UNITS (ref 7.35–7.6)
PH BLDA: 7.35 PH UNITS (ref 7.35–7.6)
PH BLDA: 7.36 PH UNITS (ref 7.35–7.6)
PH BLDA: 7.37 PH UNITS (ref 7.35–7.45)
PH BLDA: 7.38 PH UNITS (ref 7.35–7.45)
PH BLDA: 7.38 PH UNITS (ref 7.35–7.45)
PH BLDA: 7.38 PH UNITS (ref 7.35–7.6)
PH BLDA: 7.44 PH UNITS (ref 7.35–7.6)
PHOSPHATE SERPL-MCNC: 3 MG/DL (ref 2.5–4.5)
PHOSPHATE SERPL-MCNC: 3.4 MG/DL (ref 2.5–4.5)
PLATELET # BLD AUTO: 171 10*3/MM3 (ref 140–450)
PLATELET # BLD AUTO: 206 10*3/MM3 (ref 140–450)
PMV BLD AUTO: 11.2 FL (ref 6–12)
PMV BLD AUTO: 11.4 FL (ref 6–12)
PO2 BLDA: 105.4 MM HG (ref 80–100)
PO2 BLDA: 128.7 MM HG (ref 80–100)
PO2 BLDA: 375 MMHG (ref 80–105)
PO2 BLDA: 439 MMHG (ref 80–105)
PO2 BLDA: 45 MMHG (ref 80–105)
PO2 BLDA: 47 MMHG (ref 80–105)
PO2 BLDA: 484 MMHG (ref 80–105)
PO2 BLDA: 97.7 MM HG (ref 80–100)
POTASSIUM BLD-SCNC: 3.5 MMOL/L (ref 3.5–5.2)
POTASSIUM BLD-SCNC: 3.6 MMOL/L (ref 3.5–5.2)
POTASSIUM BLD-SCNC: 4 MMOL/L (ref 3.5–5.2)
POTASSIUM BLDA-SCNC: 3 MMOL/L (ref 3.5–4.9)
POTASSIUM BLDA-SCNC: 3.2 MMOL/L (ref 3.5–4.9)
POTASSIUM BLDA-SCNC: 3.5 MMOL/L (ref 3.5–4.9)
POTASSIUM BLDA-SCNC: 3.7 MMOL/L (ref 3.5–4.9)
POTASSIUM BLDA-SCNC: 4.6 MMOL/L (ref 3.5–4.9)
PROTHROMBIN TIME: 16.1 SECONDS (ref 11.7–14.2)
PROTHROMBIN TIME: 16.3 SECONDS (ref 12.8–15.2)
PROTHROMBIN TIME: 18.9 SECONDS (ref 11.7–14.2)
PSV: 10 CMH2O
RBC # BLD AUTO: 3.78 10*6/MM3 (ref 3.77–5.28)
RBC # BLD AUTO: 3.8 10*6/MM3 (ref 3.77–5.28)
SAO2 % BLDA: 100 % (ref 95–98)
SAO2 % BLDA: 78 % (ref 95–98)
SAO2 % BLDA: 81 % (ref 95–98)
SAO2 % BLDCOA: 97.4 % (ref 92–99)
SAO2 % BLDCOA: 98 % (ref 92–99)
SAO2 % BLDCOA: 98.9 % (ref 92–99)
SET MECH RESP RATE: 12
SET MECH RESP RATE: 12
SET MECH RESP RATE: 26
SODIUM BLD-SCNC: 143 MMOL/L (ref 136–145)
SODIUM BLD-SCNC: 143 MMOL/L (ref 136–145)
TOTAL RATE: 14 BREATHS/MINUTE
TOTAL RATE: 14 BREATHS/MINUTE
VENTILATOR MODE: ABNORMAL
VENTILATOR MODE: AC
VENTILATOR MODE: AC
VT ON VENT VENT: 500 ML
VT ON VENT VENT: 500 ML
VT ON VENT VENT: 510 ML
WBC NRBC COR # BLD: 11.25 10*3/MM3 (ref 3.4–10.8)
WBC NRBC COR # BLD: 11.66 10*3/MM3 (ref 3.4–10.8)

## 2019-05-14 PROCEDURE — 25010000002 VANCOMYCIN 1 G RECONSTITUTED SOLUTION

## 2019-05-14 PROCEDURE — 83735 ASSAY OF MAGNESIUM: CPT | Performed by: THORACIC SURGERY (CARDIOTHORACIC VASCULAR SURGERY)

## 2019-05-14 PROCEDURE — 94799 UNLISTED PULMONARY SVC/PX: CPT

## 2019-05-14 PROCEDURE — 25010000003 CEFAZOLIN IN DEXTROSE 2-4 GM/100ML-% SOLUTION: Performed by: THORACIC SURGERY (CARDIOTHORACIC VASCULAR SURGERY)

## 2019-05-14 PROCEDURE — 86901 BLOOD TYPING SEROLOGIC RH(D): CPT

## 2019-05-14 PROCEDURE — 25010000002 MIDAZOLAM PER 1 MG: Performed by: ANESTHESIOLOGY

## 2019-05-14 PROCEDURE — 93005 ELECTROCARDIOGRAM TRACING: CPT | Performed by: THORACIC SURGERY (CARDIOTHORACIC VASCULAR SURGERY)

## 2019-05-14 PROCEDURE — 84132 ASSAY OF SERUM POTASSIUM: CPT | Performed by: THORACIC SURGERY (CARDIOTHORACIC VASCULAR SURGERY)

## 2019-05-14 PROCEDURE — 5A1221Z PERFORMANCE OF CARDIAC OUTPUT, CONTINUOUS: ICD-10-PCS | Performed by: THORACIC SURGERY (CARDIOTHORACIC VASCULAR SURGERY)

## 2019-05-14 PROCEDURE — 85025 COMPLETE CBC W/AUTO DIFF WBC: CPT | Performed by: THORACIC SURGERY (CARDIOTHORACIC VASCULAR SURGERY)

## 2019-05-14 PROCEDURE — 02PA0YZ REMOVAL OF OTHER DEVICE FROM HEART, OPEN APPROACH: ICD-10-PCS | Performed by: THORACIC SURGERY (CARDIOTHORACIC VASCULAR SURGERY)

## 2019-05-14 PROCEDURE — 02RG08Z REPLACEMENT OF MITRAL VALVE WITH ZOOPLASTIC TISSUE, OPEN APPROACH: ICD-10-PCS | Performed by: THORACIC SURGERY (CARDIOTHORACIC VASCULAR SURGERY)

## 2019-05-14 PROCEDURE — 82803 BLOOD GASES ANY COMBINATION: CPT

## 2019-05-14 PROCEDURE — 25010000003 CEFAZOLIN IN DEXTROSE 2-4 GM/100ML-% SOLUTION: Performed by: NURSE PRACTITIONER

## 2019-05-14 PROCEDURE — 33530 CORONARY ARTERY BYPASS/REOP: CPT | Performed by: THORACIC SURGERY (CARDIOTHORACIC VASCULAR SURGERY)

## 2019-05-14 PROCEDURE — 85347 COAGULATION TIME ACTIVATED: CPT

## 2019-05-14 PROCEDURE — 25010000002 HEPARIN (PORCINE) PER 1000 UNITS

## 2019-05-14 PROCEDURE — C1751 CATH, INF, PER/CENT/MIDLINE: HCPCS | Performed by: ANESTHESIOLOGY

## 2019-05-14 PROCEDURE — 86900 BLOOD TYPING SEROLOGIC ABO: CPT

## 2019-05-14 PROCEDURE — 85730 THROMBOPLASTIN TIME PARTIAL: CPT | Performed by: THORACIC SURGERY (CARDIOTHORACIC VASCULAR SURGERY)

## 2019-05-14 PROCEDURE — 36430 TRANSFUSION BLD/BLD COMPNT: CPT

## 2019-05-14 PROCEDURE — 25010000002 ALBUMIN HUMAN 25% PER 50 ML

## 2019-05-14 PROCEDURE — 85027 COMPLETE CBC AUTOMATED: CPT | Performed by: THORACIC SURGERY (CARDIOTHORACIC VASCULAR SURGERY)

## 2019-05-14 PROCEDURE — 25010000002 FENTANYL CITRATE (PF) 100 MCG/2ML SOLUTION: Performed by: THORACIC SURGERY (CARDIOTHORACIC VASCULAR SURGERY)

## 2019-05-14 PROCEDURE — 25010000002 ALBUMIN HUMAN 5% PER 50 ML: Performed by: THORACIC SURGERY (CARDIOTHORACIC VASCULAR SURGERY)

## 2019-05-14 PROCEDURE — 88305 TISSUE EXAM BY PATHOLOGIST: CPT | Performed by: THORACIC SURGERY (CARDIOTHORACIC VASCULAR SURGERY)

## 2019-05-14 PROCEDURE — C1713 ANCHOR/SCREW BN/BN,TIS/BN: HCPCS | Performed by: THORACIC SURGERY (CARDIOTHORACIC VASCULAR SURGERY)

## 2019-05-14 PROCEDURE — 82947 ASSAY GLUCOSE BLOOD QUANT: CPT

## 2019-05-14 PROCEDURE — 25010000003 POTASSIUM CHLORIDE PER 2 MEQ: Performed by: THORACIC SURGERY (CARDIOTHORACIC VASCULAR SURGERY)

## 2019-05-14 PROCEDURE — 93318 ECHO TRANSESOPHAGEAL INTRAOP: CPT

## 2019-05-14 PROCEDURE — P9047 ALBUMIN (HUMAN), 25%, 50ML: HCPCS

## 2019-05-14 PROCEDURE — C1729 CATH, DRAINAGE: HCPCS | Performed by: THORACIC SURGERY (CARDIOTHORACIC VASCULAR SURGERY)

## 2019-05-14 PROCEDURE — 33430 REPLACEMENT OF MITRAL VALVE: CPT | Performed by: THORACIC SURGERY (CARDIOTHORACIC VASCULAR SURGERY)

## 2019-05-14 PROCEDURE — 25010000002 PROPOFOL 10 MG/ML EMULSION: Performed by: ANESTHESIOLOGY

## 2019-05-14 PROCEDURE — 25010000002 MORPHINE PER 10 MG: Performed by: THORACIC SURGERY (CARDIOTHORACIC VASCULAR SURGERY)

## 2019-05-14 PROCEDURE — B246ZZ4 ULTRASONOGRAPHY OF RIGHT AND LEFT HEART, TRANSESOPHAGEAL: ICD-10-PCS | Performed by: ANESTHESIOLOGY

## 2019-05-14 PROCEDURE — 85014 HEMATOCRIT: CPT

## 2019-05-14 PROCEDURE — 82962 GLUCOSE BLOOD TEST: CPT

## 2019-05-14 PROCEDURE — 25010000002 FENTANYL CITRATE (PF) 100 MCG/2ML SOLUTION: Performed by: ANESTHESIOLOGY

## 2019-05-14 PROCEDURE — P9035 PLATELET PHERES LEUKOREDUCED: HCPCS

## 2019-05-14 PROCEDURE — P9041 ALBUMIN (HUMAN),5%, 50ML: HCPCS | Performed by: THORACIC SURGERY (CARDIOTHORACIC VASCULAR SURGERY)

## 2019-05-14 PROCEDURE — 25010000002 FUROSEMIDE PER 20 MG

## 2019-05-14 PROCEDURE — 25010000003 PROTAMINE SULFATE PER 10 MG: Performed by: THORACIC SURGERY (CARDIOTHORACIC VASCULAR SURGERY)

## 2019-05-14 PROCEDURE — 25010000002 HEPARIN (PORCINE) PER 1000 UNITS: Performed by: ANESTHESIOLOGY

## 2019-05-14 PROCEDURE — 94002 VENT MGMT INPAT INIT DAY: CPT

## 2019-05-14 PROCEDURE — 71045 X-RAY EXAM CHEST 1 VIEW: CPT

## 2019-05-14 PROCEDURE — 4A10X4G MONITORING OF CENTRAL NERVOUS ELECTRICAL ACTIVITY, INTRAOPERATIVE, EXTERNAL APPROACH: ICD-10-PCS | Performed by: THORACIC SURGERY (CARDIOTHORACIC VASCULAR SURGERY)

## 2019-05-14 PROCEDURE — 80069 RENAL FUNCTION PANEL: CPT | Performed by: THORACIC SURGERY (CARDIOTHORACIC VASCULAR SURGERY)

## 2019-05-14 PROCEDURE — 25010000002 PHENYLEPHRINE PER 1 ML: Performed by: ANESTHESIOLOGY

## 2019-05-14 PROCEDURE — 25010000002 MAGNESIUM SULFATE IN D5W 1G/100ML (PREMIX) 1-5 GM/100ML-% SOLUTION: Performed by: THORACIC SURGERY (CARDIOTHORACIC VASCULAR SURGERY)

## 2019-05-14 PROCEDURE — 82330 ASSAY OF CALCIUM: CPT | Performed by: THORACIC SURGERY (CARDIOTHORACIC VASCULAR SURGERY)

## 2019-05-14 PROCEDURE — 85384 FIBRINOGEN ACTIVITY: CPT | Performed by: THORACIC SURGERY (CARDIOTHORACIC VASCULAR SURGERY)

## 2019-05-14 PROCEDURE — 25010000002 METOCLOPRAMIDE PER 10 MG: Performed by: THORACIC SURGERY (CARDIOTHORACIC VASCULAR SURGERY)

## 2019-05-14 PROCEDURE — 25010000002 ONDANSETRON PER 1 MG: Performed by: ANESTHESIOLOGY

## 2019-05-14 PROCEDURE — 85018 HEMOGLOBIN: CPT

## 2019-05-14 PROCEDURE — 85610 PROTHROMBIN TIME: CPT | Performed by: THORACIC SURGERY (CARDIOTHORACIC VASCULAR SURGERY)

## 2019-05-14 PROCEDURE — 85610 PROTHROMBIN TIME: CPT

## 2019-05-14 DEVICE — COR-KNOT MINI® COMBO KITBASE PACKAGE TYPE - KITEACH STERILE PACKAGE KIT CONTAINS (2) SINGLE PATIENT USE COR-KNOT MINI® DEVICES AND (12) COR-KNOT® QUICK LOADS®.
Type: IMPLANTABLE DEVICE | Site: HEART | Status: FUNCTIONAL
Brand: COR-KNOT MINI®

## 2019-05-14 DEVICE — VLV MITRAL HANCOCK 2 ULTR T510 27MM: Type: IMPLANTABLE DEVICE | Site: HEART | Status: FUNCTIONAL

## 2019-05-14 DEVICE — SS SUTURE, 3 PER SLEEVE
Type: IMPLANTABLE DEVICE | Site: STERNUM | Status: FUNCTIONAL
Brand: MYO/WIRE II

## 2019-05-14 DEVICE — SS SUTURE, 4 PER SLEEVE
Type: IMPLANTABLE DEVICE | Site: STERNUM | Status: FUNCTIONAL
Brand: MYO/WIRE II

## 2019-05-14 RX ORDER — POTASSIUM CHLORIDE 7.45 MG/ML
10 INJECTION INTRAVENOUS
Status: DISCONTINUED | OUTPATIENT
Start: 2019-05-14 | End: 2019-05-20 | Stop reason: HOSPADM

## 2019-05-14 RX ORDER — SODIUM CHLORIDE 0.9 % (FLUSH) 0.9 %
1-10 SYRINGE (ML) INJECTION AS NEEDED
Status: DISCONTINUED | OUTPATIENT
Start: 2019-05-14 | End: 2019-05-14 | Stop reason: HOSPADM

## 2019-05-14 RX ORDER — MEPERIDINE HYDROCHLORIDE 25 MG/ML
25 INJECTION INTRAMUSCULAR; INTRAVENOUS; SUBCUTANEOUS EVERY 4 HOURS PRN
Status: ACTIVE | OUTPATIENT
Start: 2019-05-14 | End: 2019-05-14

## 2019-05-14 RX ORDER — HEPARIN SODIUM 1000 [USP'U]/ML
INJECTION, SOLUTION INTRAVENOUS; SUBCUTANEOUS AS NEEDED
Status: DISCONTINUED | OUTPATIENT
Start: 2019-05-14 | End: 2019-05-14 | Stop reason: SURG

## 2019-05-14 RX ORDER — LIDOCAINE HYDROCHLORIDE 10 MG/ML
0.5 INJECTION, SOLUTION EPIDURAL; INFILTRATION; INTRACAUDAL; PERINEURAL ONCE AS NEEDED
Status: DISCONTINUED | OUTPATIENT
Start: 2019-05-14 | End: 2019-05-14 | Stop reason: HOSPADM

## 2019-05-14 RX ORDER — PANTOPRAZOLE SODIUM 40 MG/1
40 TABLET, DELAYED RELEASE ORAL
Status: DISCONTINUED | OUTPATIENT
Start: 2019-05-15 | End: 2019-05-20 | Stop reason: HOSPADM

## 2019-05-14 RX ORDER — CHLORHEXIDINE GLUCONATE 500 MG/1
1 CLOTH TOPICAL EVERY 12 HOURS PRN
Status: DISCONTINUED | OUTPATIENT
Start: 2019-05-14 | End: 2019-05-14 | Stop reason: HOSPADM

## 2019-05-14 RX ORDER — SODIUM CHLORIDE 0.9 % (FLUSH) 0.9 %
30 SYRINGE (ML) INJECTION ONCE AS NEEDED
Status: DISCONTINUED | OUTPATIENT
Start: 2019-05-14 | End: 2019-05-20 | Stop reason: HOSPADM

## 2019-05-14 RX ORDER — FAMOTIDINE 10 MG/ML
20 INJECTION, SOLUTION INTRAVENOUS ONCE
Status: COMPLETED | OUTPATIENT
Start: 2019-05-14 | End: 2019-05-14

## 2019-05-14 RX ORDER — SODIUM CHLORIDE 9 MG/ML
9 INJECTION, SOLUTION INTRAVENOUS CONTINUOUS
Status: DISCONTINUED | OUTPATIENT
Start: 2019-05-14 | End: 2019-05-14

## 2019-05-14 RX ORDER — PROTAMINE SULFATE 10 MG/ML
INJECTION, SOLUTION INTRAVENOUS AS NEEDED
Status: DISCONTINUED | OUTPATIENT
Start: 2019-05-14 | End: 2019-05-14 | Stop reason: HOSPADM

## 2019-05-14 RX ORDER — POTASSIUM CHLORIDE 750 MG/1
40 CAPSULE, EXTENDED RELEASE ORAL AS NEEDED
Status: DISCONTINUED | OUTPATIENT
Start: 2019-05-14 | End: 2019-05-20 | Stop reason: HOSPADM

## 2019-05-14 RX ORDER — CHLORHEXIDINE GLUCONATE 500 MG/1
1 CLOTH TOPICAL EVERY 12 HOURS PRN
Status: DISCONTINUED | OUTPATIENT
Start: 2019-05-14 | End: 2019-05-20 | Stop reason: HOSPADM

## 2019-05-14 RX ORDER — NALOXONE HCL 0.4 MG/ML
0.1 VIAL (ML) INJECTION
Status: DISCONTINUED | OUTPATIENT
Start: 2019-05-14 | End: 2019-05-20 | Stop reason: HOSPADM

## 2019-05-14 RX ORDER — ASPIRIN 81 MG/1
81 TABLET ORAL DAILY
Status: DISCONTINUED | OUTPATIENT
Start: 2019-05-15 | End: 2019-05-16

## 2019-05-14 RX ORDER — MIDAZOLAM HYDROCHLORIDE 1 MG/ML
INJECTION INTRAMUSCULAR; INTRAVENOUS AS NEEDED
Status: DISCONTINUED | OUTPATIENT
Start: 2019-05-14 | End: 2019-05-14 | Stop reason: SURG

## 2019-05-14 RX ORDER — OXYCODONE HYDROCHLORIDE 5 MG/1
10 TABLET ORAL EVERY 4 HOURS PRN
Status: DISCONTINUED | OUTPATIENT
Start: 2019-05-14 | End: 2019-05-16

## 2019-05-14 RX ORDER — FUROSEMIDE 10 MG/ML
40 INJECTION INTRAMUSCULAR; INTRAVENOUS EVERY 6 HOURS PRN
Status: DISCONTINUED | OUTPATIENT
Start: 2019-05-14 | End: 2019-05-15

## 2019-05-14 RX ORDER — DOPAMINE HYDROCHLORIDE 160 MG/100ML
2-20 INJECTION, SOLUTION INTRAVENOUS CONTINUOUS PRN
Status: DISCONTINUED | OUTPATIENT
Start: 2019-05-14 | End: 2019-05-15

## 2019-05-14 RX ORDER — MIDAZOLAM HYDROCHLORIDE 1 MG/ML
2 INJECTION INTRAMUSCULAR; INTRAVENOUS
Status: DISCONTINUED | OUTPATIENT
Start: 2019-05-14 | End: 2019-05-14 | Stop reason: HOSPADM

## 2019-05-14 RX ORDER — MIDAZOLAM HYDROCHLORIDE 1 MG/ML
2 INJECTION INTRAMUSCULAR; INTRAVENOUS
Status: DISCONTINUED | OUTPATIENT
Start: 2019-05-14 | End: 2019-05-15

## 2019-05-14 RX ORDER — DEXMEDETOMIDINE HYDROCHLORIDE 4 UG/ML
.2-1.5 INJECTION, SOLUTION INTRAVENOUS
Status: DISCONTINUED | OUTPATIENT
Start: 2019-05-14 | End: 2019-05-15

## 2019-05-14 RX ORDER — SODIUM CHLORIDE, SODIUM LACTATE, POTASSIUM CHLORIDE, CALCIUM CHLORIDE 600; 310; 30; 20 MG/100ML; MG/100ML; MG/100ML; MG/100ML
9 INJECTION, SOLUTION INTRAVENOUS CONTINUOUS
Status: DISCONTINUED | OUTPATIENT
Start: 2019-05-14 | End: 2019-05-14

## 2019-05-14 RX ORDER — SUFENTANIL CITRATE 50 UG/ML
INJECTION EPIDURAL; INTRAVENOUS AS NEEDED
Status: DISCONTINUED | OUTPATIENT
Start: 2019-05-14 | End: 2019-05-14 | Stop reason: SURG

## 2019-05-14 RX ORDER — ROCURONIUM BROMIDE 10 MG/ML
INJECTION, SOLUTION INTRAVENOUS AS NEEDED
Status: DISCONTINUED | OUTPATIENT
Start: 2019-05-14 | End: 2019-05-14 | Stop reason: SURG

## 2019-05-14 RX ORDER — ONDANSETRON 2 MG/ML
INJECTION INTRAMUSCULAR; INTRAVENOUS AS NEEDED
Status: DISCONTINUED | OUTPATIENT
Start: 2019-05-14 | End: 2019-05-14 | Stop reason: SURG

## 2019-05-14 RX ORDER — HYDROCODONE BITARTRATE AND ACETAMINOPHEN 5; 325 MG/1; MG/1
2 TABLET ORAL EVERY 4 HOURS PRN
Status: DISCONTINUED | OUTPATIENT
Start: 2019-05-14 | End: 2019-05-14

## 2019-05-14 RX ORDER — NITROGLYCERIN 20 MG/100ML
5-200 INJECTION INTRAVENOUS
Status: DISCONTINUED | OUTPATIENT
Start: 2019-05-14 | End: 2019-05-15

## 2019-05-14 RX ORDER — ATORVASTATIN CALCIUM 20 MG/1
20 TABLET, FILM COATED ORAL 2 TIMES WEEKLY
Status: DISCONTINUED | OUTPATIENT
Start: 2019-05-16 | End: 2019-05-20 | Stop reason: HOSPADM

## 2019-05-14 RX ORDER — SODIUM CHLORIDE 9 MG/ML
30 INJECTION, SOLUTION INTRAVENOUS CONTINUOUS
Status: DISCONTINUED | OUTPATIENT
Start: 2019-05-14 | End: 2019-05-20 | Stop reason: HOSPADM

## 2019-05-14 RX ORDER — MAGNESIUM SULFATE 1 G/100ML
1 INJECTION INTRAVENOUS EVERY 8 HOURS
Status: DISCONTINUED | OUTPATIENT
Start: 2019-05-14 | End: 2019-05-15

## 2019-05-14 RX ORDER — NALOXONE HCL 0.4 MG/ML
0.4 VIAL (ML) INJECTION
Status: DISCONTINUED | OUTPATIENT
Start: 2019-05-14 | End: 2019-05-20 | Stop reason: HOSPADM

## 2019-05-14 RX ORDER — POTASSIUM CHLORIDE 1.5 G/1.77G
40 POWDER, FOR SOLUTION ORAL AS NEEDED
Status: DISCONTINUED | OUTPATIENT
Start: 2019-05-14 | End: 2019-05-20 | Stop reason: HOSPADM

## 2019-05-14 RX ORDER — PROPOFOL 10 MG/ML
VIAL (ML) INTRAVENOUS CONTINUOUS PRN
Status: DISCONTINUED | OUTPATIENT
Start: 2019-05-14 | End: 2019-05-14 | Stop reason: SURG

## 2019-05-14 RX ORDER — POTASSIUM CHLORIDE 29.8 MG/ML
20 INJECTION INTRAVENOUS
Status: DISCONTINUED | OUTPATIENT
Start: 2019-05-14 | End: 2019-05-20 | Stop reason: HOSPADM

## 2019-05-14 RX ORDER — MIDAZOLAM HYDROCHLORIDE 1 MG/ML
1 INJECTION INTRAMUSCULAR; INTRAVENOUS
Status: DISCONTINUED | OUTPATIENT
Start: 2019-05-14 | End: 2019-05-14 | Stop reason: HOSPADM

## 2019-05-14 RX ORDER — MILRINONE LACTATE 0.2 MG/ML
.25-.75 INJECTION, SOLUTION INTRAVENOUS CONTINUOUS PRN
Status: DISCONTINUED | OUTPATIENT
Start: 2019-05-14 | End: 2019-05-15

## 2019-05-14 RX ORDER — ALPRAZOLAM 0.25 MG/1
0.25 TABLET ORAL EVERY 8 HOURS PRN
Status: DISCONTINUED | OUTPATIENT
Start: 2019-05-14 | End: 2019-05-20 | Stop reason: HOSPADM

## 2019-05-14 RX ORDER — PROMETHAZINE HYDROCHLORIDE 25 MG/ML
12.5 INJECTION, SOLUTION INTRAMUSCULAR; INTRAVENOUS EVERY 6 HOURS PRN
Status: DISCONTINUED | OUTPATIENT
Start: 2019-05-14 | End: 2019-05-20 | Stop reason: HOSPADM

## 2019-05-14 RX ORDER — CEFAZOLIN SODIUM 2 G/100ML
2 INJECTION, SOLUTION INTRAVENOUS
Status: COMPLETED | OUTPATIENT
Start: 2019-05-14 | End: 2019-05-14

## 2019-05-14 RX ORDER — CHLORHEXIDINE GLUCONATE 0.12 MG/ML
15 RINSE ORAL ONCE
Status: COMPLETED | OUTPATIENT
Start: 2019-05-14 | End: 2019-05-14

## 2019-05-14 RX ORDER — CEFAZOLIN SODIUM 2 G/100ML
2 INJECTION, SOLUTION INTRAVENOUS EVERY 8 HOURS
Status: COMPLETED | OUTPATIENT
Start: 2019-05-14 | End: 2019-05-16

## 2019-05-14 RX ORDER — MILRINONE LACTATE 1 MG/ML
INJECTION, SOLUTION INTRAVENOUS AS NEEDED
Status: DISCONTINUED | OUTPATIENT
Start: 2019-05-14 | End: 2019-05-14 | Stop reason: SURG

## 2019-05-14 RX ORDER — CYCLOBENZAPRINE HCL 10 MG
10 TABLET ORAL EVERY 8 HOURS PRN
Status: DISCONTINUED | OUTPATIENT
Start: 2019-05-15 | End: 2019-05-20 | Stop reason: HOSPADM

## 2019-05-14 RX ORDER — FENTANYL CITRATE 50 UG/ML
25 INJECTION, SOLUTION INTRAMUSCULAR; INTRAVENOUS
Status: DISCONTINUED | OUTPATIENT
Start: 2019-05-14 | End: 2019-05-20 | Stop reason: HOSPADM

## 2019-05-14 RX ORDER — POTASSIUM CHLORIDE 29.8 MG/ML
20 INJECTION INTRAVENOUS
Status: COMPLETED | OUTPATIENT
Start: 2019-05-14 | End: 2019-05-14

## 2019-05-14 RX ORDER — NOREPINEPHRINE BITARTRATE 1 MG/ML
INJECTION, SOLUTION INTRAVENOUS CONTINUOUS PRN
Status: DISCONTINUED | OUTPATIENT
Start: 2019-05-14 | End: 2019-05-14 | Stop reason: SURG

## 2019-05-14 RX ORDER — PROMETHAZINE HYDROCHLORIDE 25 MG/1
12.5 TABLET ORAL EVERY 6 HOURS PRN
Status: DISCONTINUED | OUTPATIENT
Start: 2019-05-14 | End: 2019-05-20 | Stop reason: HOSPADM

## 2019-05-14 RX ORDER — FENTANYL CITRATE 50 UG/ML
50 INJECTION, SOLUTION INTRAMUSCULAR; INTRAVENOUS
Status: DISCONTINUED | OUTPATIENT
Start: 2019-05-14 | End: 2019-05-14 | Stop reason: HOSPADM

## 2019-05-14 RX ORDER — SENNA AND DOCUSATE SODIUM 50; 8.6 MG/1; MG/1
2 TABLET, FILM COATED ORAL NIGHTLY
Status: DISCONTINUED | OUTPATIENT
Start: 2019-05-15 | End: 2019-05-20 | Stop reason: HOSPADM

## 2019-05-14 RX ORDER — ALBUMIN, HUMAN INJ 5% 5 %
1500 SOLUTION INTRAVENOUS AS NEEDED
Status: DISPENSED | OUTPATIENT
Start: 2019-05-14 | End: 2019-05-15

## 2019-05-14 RX ORDER — BISACODYL 5 MG/1
10 TABLET, DELAYED RELEASE ORAL DAILY PRN
Status: DISCONTINUED | OUTPATIENT
Start: 2019-05-14 | End: 2019-05-20 | Stop reason: HOSPADM

## 2019-05-14 RX ORDER — SODIUM CHLORIDE 9 MG/ML
30 INJECTION, SOLUTION INTRAVENOUS CONTINUOUS PRN
Status: DISCONTINUED | OUTPATIENT
Start: 2019-05-14 | End: 2019-05-20 | Stop reason: HOSPADM

## 2019-05-14 RX ORDER — PROPOFOL 10 MG/ML
VIAL (ML) INTRAVENOUS AS NEEDED
Status: DISCONTINUED | OUTPATIENT
Start: 2019-05-14 | End: 2019-05-14 | Stop reason: SURG

## 2019-05-14 RX ORDER — SODIUM CHLORIDE 9 MG/ML
INJECTION, SOLUTION INTRAVENOUS CONTINUOUS PRN
Status: DISCONTINUED | OUTPATIENT
Start: 2019-05-14 | End: 2019-05-14 | Stop reason: SURG

## 2019-05-14 RX ORDER — AMINOCAPROIC ACID 250 MG/ML
INJECTION, SOLUTION INTRAVENOUS AS NEEDED
Status: DISCONTINUED | OUTPATIENT
Start: 2019-05-14 | End: 2019-05-14 | Stop reason: SURG

## 2019-05-14 RX ORDER — ONDANSETRON 2 MG/ML
4 INJECTION INTRAMUSCULAR; INTRAVENOUS EVERY 6 HOURS PRN
Status: DISCONTINUED | OUTPATIENT
Start: 2019-05-14 | End: 2019-05-20 | Stop reason: HOSPADM

## 2019-05-14 RX ORDER — MORPHINE SULFATE 2 MG/ML
1 INJECTION, SOLUTION INTRAMUSCULAR; INTRAVENOUS EVERY 4 HOURS PRN
Status: DISCONTINUED | OUTPATIENT
Start: 2019-05-14 | End: 2019-05-20 | Stop reason: HOSPADM

## 2019-05-14 RX ORDER — ACETAMINOPHEN 650 MG/1
650 SUPPOSITORY RECTAL EVERY 4 HOURS PRN
Status: DISCONTINUED | OUTPATIENT
Start: 2019-05-14 | End: 2019-05-20 | Stop reason: HOSPADM

## 2019-05-14 RX ORDER — CHLORHEXIDINE GLUCONATE 0.12 MG/ML
15 RINSE ORAL EVERY 12 HOURS
Status: DISCONTINUED | OUTPATIENT
Start: 2019-05-14 | End: 2019-05-15

## 2019-05-14 RX ORDER — METOCLOPRAMIDE HYDROCHLORIDE 5 MG/ML
10 INJECTION INTRAMUSCULAR; INTRAVENOUS EVERY 6 HOURS
Status: DISCONTINUED | OUTPATIENT
Start: 2019-05-14 | End: 2019-05-15

## 2019-05-14 RX ORDER — FENTANYL CITRATE 50 UG/ML
INJECTION, SOLUTION INTRAMUSCULAR; INTRAVENOUS AS NEEDED
Status: DISCONTINUED | OUTPATIENT
Start: 2019-05-14 | End: 2019-05-14 | Stop reason: SURG

## 2019-05-14 RX ORDER — ACETAMINOPHEN 325 MG/1
650 TABLET ORAL EVERY 4 HOURS PRN
Status: DISCONTINUED | OUTPATIENT
Start: 2019-05-14 | End: 2019-05-20 | Stop reason: HOSPADM

## 2019-05-14 RX ORDER — BISACODYL 10 MG
10 SUPPOSITORY, RECTAL RECTAL DAILY PRN
Status: DISCONTINUED | OUTPATIENT
Start: 2019-05-15 | End: 2019-05-20 | Stop reason: HOSPADM

## 2019-05-14 RX ADMIN — SODIUM CHLORIDE: 9 INJECTION, SOLUTION INTRAVENOUS at 07:42

## 2019-05-14 RX ADMIN — POTASSIUM CHLORIDE 20 MEQ: 29.8 INJECTION, SOLUTION INTRAVENOUS at 12:24

## 2019-05-14 RX ADMIN — ROCURONIUM BROMIDE 50 MG: 10 INJECTION INTRAVENOUS at 07:19

## 2019-05-14 RX ADMIN — METOPROLOL TARTRATE 12.5 MG: 25 TABLET ORAL at 06:18

## 2019-05-14 RX ADMIN — SODIUM CHLORIDE 2.4 UNITS/HR: 9 INJECTION, SOLUTION INTRAVENOUS at 13:00

## 2019-05-14 RX ADMIN — SODIUM CHLORIDE 9 ML/HR: 9 INJECTION, SOLUTION INTRAVENOUS at 06:22

## 2019-05-14 RX ADMIN — FENTANYL CITRATE 50 MCG: 50 INJECTION INTRAMUSCULAR; INTRAVENOUS at 07:08

## 2019-05-14 RX ADMIN — HEPARIN SODIUM 28000 UNITS: 1000 INJECTION, SOLUTION INTRAVENOUS; SUBCUTANEOUS at 08:26

## 2019-05-14 RX ADMIN — SODIUM CHLORIDE: 9 INJECTION, SOLUTION INTRAVENOUS at 07:03

## 2019-05-14 RX ADMIN — CEFAZOLIN SODIUM 2 G: 2 INJECTION, SOLUTION INTRAVENOUS at 18:31

## 2019-05-14 RX ADMIN — ALBUMIN HUMAN 250 ML: 0.05 INJECTION, SOLUTION INTRAVENOUS at 13:18

## 2019-05-14 RX ADMIN — MILRINONE LACTATE 0.25 MCG/KG/MIN: 200 INJECTION, SOLUTION INTRAVENOUS at 20:55

## 2019-05-14 RX ADMIN — PROPOFOL 150 MG: 10 INJECTION, EMULSION INTRAVENOUS at 07:19

## 2019-05-14 RX ADMIN — MORPHINE SULFATE 2 MG: 2 INJECTION, SOLUTION INTRAMUSCULAR; INTRAVENOUS at 20:39

## 2019-05-14 RX ADMIN — MUPIROCIN 1 APPLICATION: 20 OINTMENT TOPICAL at 20:03

## 2019-05-14 RX ADMIN — SUFENTANIL CITRATE 25 MCG: 50 INJECTION, SOLUTION EPIDURAL; INTRAVENOUS at 10:00

## 2019-05-14 RX ADMIN — Medication 1 MG: at 07:10

## 2019-05-14 RX ADMIN — SODIUM CHLORIDE 1 MCG/KG/HR: 900 INJECTION, SOLUTION INTRAVENOUS at 07:48

## 2019-05-14 RX ADMIN — SUFENTANIL CITRATE 25 MCG: 50 INJECTION, SOLUTION EPIDURAL; INTRAVENOUS at 08:07

## 2019-05-14 RX ADMIN — NOREPINEPHRINE BITARTRATE 0.02 MCG/KG/MIN: 1 INJECTION, SOLUTION, CONCENTRATE INTRAVENOUS at 09:36

## 2019-05-14 RX ADMIN — SODIUM CHLORIDE 30 ML/HR: 9 INJECTION, SOLUTION INTRAVENOUS at 13:00

## 2019-05-14 RX ADMIN — METOCLOPRAMIDE 10 MG: 5 INJECTION, SOLUTION INTRAMUSCULAR; INTRAVENOUS at 18:32

## 2019-05-14 RX ADMIN — CHLORHEXIDINE GLUCONATE 15 ML: 1.2 RINSE ORAL at 12:14

## 2019-05-14 RX ADMIN — CHLORHEXIDINE GLUCONATE 15 ML: 1.2 RINSE ORAL at 06:18

## 2019-05-14 RX ADMIN — MUPIROCIN 1 APPLICATION: 20 OINTMENT TOPICAL at 12:14

## 2019-05-14 RX ADMIN — SODIUM CHLORIDE 1 MCG/KG/HR: 9 INJECTION, SOLUTION INTRAVENOUS at 21:58

## 2019-05-14 RX ADMIN — SUFENTANIL CITRATE 25 MCG: 50 INJECTION, SOLUTION EPIDURAL; INTRAVENOUS at 09:37

## 2019-05-14 RX ADMIN — PHENYLEPHRINE HYDROCHLORIDE 0.7 MCG/KG/MIN: 10 INJECTION, SOLUTION INTRAMUSCULAR; INTRAVENOUS; SUBCUTANEOUS at 07:17

## 2019-05-14 RX ADMIN — FENTANYL CITRATE 50 MCG: 50 INJECTION INTRAMUSCULAR; INTRAVENOUS at 07:16

## 2019-05-14 RX ADMIN — AMINOCAPROIC ACID 10 G: 250 INJECTION, SOLUTION INTRAVENOUS at 07:53

## 2019-05-14 RX ADMIN — SUFENTANIL CITRATE 25 MCG: 50 INJECTION, SOLUTION EPIDURAL; INTRAVENOUS at 09:54

## 2019-05-14 RX ADMIN — ONDANSETRON 4 MG: 2 INJECTION INTRAMUSCULAR; INTRAVENOUS at 10:22

## 2019-05-14 RX ADMIN — PROPOFOL 50 MCG/KG/MIN: 10 INJECTION, EMULSION INTRAVENOUS at 08:26

## 2019-05-14 RX ADMIN — FAMOTIDINE 20 MG: 10 INJECTION INTRAVENOUS at 06:40

## 2019-05-14 RX ADMIN — AMINOCAPROIC ACID 10 G: 250 INJECTION, SOLUTION INTRAVENOUS at 10:00

## 2019-05-14 RX ADMIN — POTASSIUM CHLORIDE 20 MEQ: 29.8 INJECTION, SOLUTION INTRAVENOUS at 13:35

## 2019-05-14 RX ADMIN — Medication 2 MG: at 09:28

## 2019-05-14 RX ADMIN — Medication 1 MG: at 08:06

## 2019-05-14 RX ADMIN — CEFAZOLIN SODIUM 2 G: 2 INJECTION, SOLUTION INTRAVENOUS at 07:30

## 2019-05-14 RX ADMIN — SUFENTANIL CITRATE 50 MCG: 50 INJECTION, SOLUTION EPIDURAL; INTRAVENOUS at 08:11

## 2019-05-14 RX ADMIN — FENTANYL CITRATE 25 MCG: 50 INJECTION, SOLUTION INTRAMUSCULAR; INTRAVENOUS at 21:03

## 2019-05-14 RX ADMIN — CEFAZOLIN SODIUM 2 G: 2 INJECTION, SOLUTION INTRAVENOUS at 09:53

## 2019-05-14 RX ADMIN — MIDAZOLAM 2 MG: 1 INJECTION INTRAMUSCULAR; INTRAVENOUS at 06:40

## 2019-05-14 RX ADMIN — MAGNESIUM SULFATE HEPTAHYDRATE 1 G: 1 INJECTION, SOLUTION INTRAVENOUS at 12:40

## 2019-05-14 RX ADMIN — Medication 1 MG: at 07:08

## 2019-05-14 RX ADMIN — MAGNESIUM SULFATE HEPTAHYDRATE 1 G: 1 INJECTION, SOLUTION INTRAVENOUS at 19:47

## 2019-05-14 RX ADMIN — MILRINONE LACTATE 1 MG: 1 INJECTION, SOLUTION INTRAVENOUS at 09:10

## 2019-05-14 RX ADMIN — MILRINONE LACTATE 1 MG: 1 INJECTION, SOLUTION INTRAVENOUS at 09:17

## 2019-05-14 RX ADMIN — ROCURONIUM BROMIDE 20 MG: 10 INJECTION INTRAVENOUS at 09:18

## 2019-05-14 RX ADMIN — SUFENTANIL CITRATE 25 MCG: 50 INJECTION, SOLUTION EPIDURAL; INTRAVENOUS at 07:52

## 2019-05-14 RX ADMIN — SODIUM CHLORIDE 0.25 MCG/KG/MIN: 0.9 INJECTION, SOLUTION INTRAVENOUS at 09:11

## 2019-05-14 RX ADMIN — MORPHINE SULFATE 1 MG: 2 INJECTION, SOLUTION INTRAMUSCULAR; INTRAVENOUS at 17:14

## 2019-05-14 NOTE — OP NOTE
[facility] CARDIAC SURGERY OP NOTE    Preop Diagnosis: Recurrent congestive heart failure class IV left systolic acute and chronic.  Recurrent mitral incompetence and mitral stenosis status post mitral valve repair with a 24 mm ring and tricuspid valve repair.  Severe pulmonary hypertension in the 100s.  Normal sinus rhythm status post Maze procedure.  Complete closed left atrial appendage status post atrial appendage closure.  Coumadin therapy.  Anemia.  Vaginal bleeding.    Postop Diagnosis: Same with mitral stenosis secondary to fibrous scarring and pannus formation around the mitral valve ring.  Trace tricuspid incompetence.    Indications: This patient had recurrent symptoms after mitral tricuspid repair.  Latest transesophageal echo tended to show severe mitral incompetence and I thought there was mitral stenosis.  The STS Risk and all risks and alternatives were discussed with the patient and family. Operation was advisable to prolong life and relieve symptoms.They understand and wish to proceed.    Procedure: Redo secondary sternotomy.  Removal of mitral valve ring mitral valve replacement with a 27 mm Medtronic Hutchinson 2 porcine valve with preservation of all sub-valvular apparatus.  Temporary cardiopulmonary bypass.  Antegrade and retrograde cold blood cardioplegia with warm reperfusion.  Neurologic monitoring.  Transesophageal echo.    Surgeon: Ben Saavedra MD    Assisistant: Leann Zhang CSA    Anesthesia: GET    Findings : With a systolic pressure of 110 the pulmonary pressure was 100.  The mitral valve ring showed something I never seen before.  I thought there might of been a paravalvular leak but the ring was well-seated.  There was pannus growth over the ring itself and it caused constriction of the leaflets and stenosis with distortion with more incompetence.  It could not be re-repaired.  Postoperatively with a blood pressure 120 the PA pressures are now in  the 50s.  The valve was well-seated with no paravalvular leaks.  The aortic valve was satisfactory and the LV function was good with ejection fraction of 60%.  There was minimal tricuspid incompetence.  She was in sinus rhythm.  The defibrillator went off one time before we placed a magnet on it secondary to the Bovie.    Operative Procedure: We lowered the systemic blood pressure to 80 and a secondary median sternotomy was safely made.  Adhesions were taken down as usual.  Cardiopulmonary bypass was established for 65 minutes drifting to 34 °C at appropriate flow rates.  The aorta was crossclamped 47 minutes and we gave a liter of antegrade cold blood cardioplegia than a liter of retrograde cold blood cardioplegia repeating doses every 10 to 15 minutes to good effect.  The left atrium was opened on the right side and the repair was examined.  We found that the pannus overgrowth it was obvious that this could not be repaired so the ring was removed and the annulus cleaned up.  The anterior leaflet was incised and split in the portion removed.  It was sewn into the posterior annulus that we placed our valve sutures.  A 27 mm porcine valve was washed and rinsed in the usual fashion and sewn into place with interrupted horizontal pledgeted 2-0 Ethibond sutures with pledgets on the ventricular side.  The valve was lowered into place seated nicely and the sutures were fixed with Cor Knot device.  The left atrium was closed with running 3-0 Prolene.  A warm dose of retrograde cardioplegia was given and then with strong suction on the aortic needle vent the cross-clamp was released.  The patient was rewarmed and complete de-airing was performed.  We gave 2 units of platelets because of the reoperation.  3 chest tubes were inserted and we applied vancomycin platelets plasma.  The sternum was closed with stainless steel wire.  The fascia and soft tissues were closed as usual.  The sponge, needle and instrument counts noted  to be correct.  The postoperative echo was satisfactory.  She went to the open heart recovery room in good condition.  It was noticed that she had some blood in her vagina preoperatively in holding area and we will investigate this postoperatively.  She was quite ill from the mitral disease so operation was advisable and work-up the bleeding later.    Complications: Platelet coagulopathy with 2 units platelets given.    Tubes: 3    Epicardial Wires: 3    Blood Loss: Minimal    Aortic X time: 47 min    CPB time: 65 min     Specimen: Portion of anterior leaflet.    Condition: Improved.      Patient Care Team:  Rashid Ge MD as PCP - General (Family Medicine)  Thong Jones MD as Consulting Physician (Cardiology)  Mahin Galeas MD as Consulting Physician (Sleep Medicine)  Lara Ortiz MD as Consulting Physician (Obstetrics and Gynecology)  Betty Horowitz MD as Consulting Physician (Dermatology)  Ben Saavedra MD as Surgeon (Cardiothoracic Surgery)  Mahin Galeas MD as Consulting Physician (Pulmonary Disease)  Chirag Rai MD as Consulting Physician (Ophthalmology)  Moisés Everett MD as Consulting Physician (Urology)        Ben Saavedra MD  5/14/2019  10:46 AM

## 2019-05-14 NOTE — PERIOPERATIVE NURSING NOTE
Dr Saavedra called x 3 this am with no response-OR RN Dario Sauceda contacted Dr Saavedra & notified of small amount of vaginal bleeding that started this am as stated by patient & notified of elevated pt/inr/ptt- ok'ed to go to OR.

## 2019-05-14 NOTE — ANESTHESIA PROCEDURE NOTES
Central Line      Patient reassessed immediately prior to procedure    Patient location during procedure: OR  Stop Time:5/14/2019 7:42 AM  Indications: vascular access, MD/Surgeon request and central pressure monitoring  Staff  Anesthesiologist: Crispin James MD  Preanesthetic Checklist  Completed: patient identified, surgical consent, pre-op evaluation, timeout performed, IV checked, risks and benefits discussed and monitors and equipment checked  Central Line Prep  Sterile Tech:cap, gloves, gown, mask and sterile barriers  Prep: chloraprep  Patient monitoring: blood pressure monitoring, continuous pulse oximetry and EKG  Central Line Procedure  Laterality:right  Location:internal jugular  Catheter Type:Union-August  Guidance:landmark technique  Assessment  Post procedure:biopatch applied, line sutured and occlusive dressing applied  Assessement:blood return through all ports, free fluid flow and Nabeel Test  Complications:no  Patient Tolerance:patient tolerated the procedure well with no apparent complications

## 2019-05-14 NOTE — ANESTHESIA PROCEDURE NOTES
Arterial Line      Patient reassessed immediately prior to procedure    Patient location during procedure: OR   Line placed for hemodynamic monitoring.  Performed By   Anesthesiologist: Crispin James MD  Preanesthetic Checklist  Completed: patient identified, surgical consent, pre-op evaluation, timeout performed, IV checked, risks and benefits discussed and monitors and equipment checked  Arterial Line Prep   Sterile Tech: gloves and cap  Prep: ChloraPrep  Patient monitoring: blood pressure monitoring, continuous pulse oximetry and EKG  Arterial Line Procedure   Laterality:left  Location:  radial artery  Catheter size: 20 G   Guidance: landmark technique  Number of attempts: 1  Successful placement: yes          Post Assessment   Dressing Type: occlusive dressing applied and wrist guard applied.   Complications no  Circ/Move/Sens Assessment: unchanged and normal.   Patient Tolerance: patient tolerated the procedure well with no apparent complications

## 2019-05-14 NOTE — ANESTHESIA PROCEDURE NOTES
Airway  Urgency: elective      General Information and Staff    Patient location during procedure: OR  Anesthesiologist: Crispin James MD    Indications and Patient Condition    Preoxygenated: yes      Final Airway Details  Final airway type: endotracheal airway      Successful airway: ETT  Cuffed: yes   Successful intubation technique: direct laryngoscopy  Endotracheal tube insertion site: oral  Blade: Michelle  Blade size: 3  ETT size (mm): 7.5  Cormack-Lehane Classification: grade I - full view of glottis  Placement verified by: chest auscultation   Number of attempts at approach: 1

## 2019-05-14 NOTE — ANESTHESIA PREPROCEDURE EVALUATION
Anesthesia Evaluation     history of anesthetic complications: PONV  NPO Solid Status: > 8 hours  NPO Liquid Status: > 8 hours           Airway   Mallampati: II  Dental      Pulmonary - normal exam   (+) COPD, shortness of breath, sleep apnea,   Cardiovascular - normal exam    (+) pacemaker ICD, hypertension less than 2 medications, CAD, dysrhythmias Atrial Fib, CHF, hyperlipidemia,       Neuro/Psych  GI/Hepatic/Renal/Endo    (+) obesity,  GERD,  diabetes mellitus type 2, hypothyroidism,     Musculoskeletal     Abdominal    Substance History      OB/GYN          Other   (+) arthritis                     Anesthesia Plan    ASA 4     general     intravenous induction   Anesthetic plan, all risks, benefits, and alternatives have been provided, discussed and informed consent has been obtained with: patient.

## 2019-05-14 NOTE — ANESTHESIA PROCEDURE NOTES
Central Line      Patient reassessed immediately prior to procedure    Patient location during procedure: OR  Start time: 5/14/2019 7:30 AM  Stop Time:5/14/2019 7:42 AM  Indications: vascular access, MD/Surgeon request and central pressure monitoring  Staff  Anesthesiologist: Crispin James MD  Preanesthetic Checklist  Completed: patient identified, surgical consent, pre-op evaluation, timeout performed, IV checked, risks and benefits discussed and monitors and equipment checked  Central Line Prep  Sterile Tech:cap, gloves, gown, mask and sterile barriers  Prep: chloraprep  Patient monitoring: blood pressure monitoring, continuous pulse oximetry and EKG  Central Line Procedure  Laterality:right  Location:internal jugular  Catheter Type:Cordis  Catheter Size:9 Fr  Guidance:landmark technique  Assessment  Post procedure:biopatch applied, line sutured and occlusive dressing applied  Assessement:blood return through all ports, free fluid flow and Nabeel Test  Complications:no  Patient Tolerance:patient tolerated the procedure well with no apparent complications

## 2019-05-14 NOTE — PRE-PROCEDURE NOTE
INFORMED DR. TORRES OF LABS AND VAGINAL BLEEDING.  DR. TORRES SAID TO CONTINUE WITH CASE.  SANDRA FREEMAN RN.

## 2019-05-14 NOTE — ANESTHESIA POSTPROCEDURE EVALUATION
Patient: Fernanda Molina    Procedure Summary     Date:  05/14/19 Room / Location:  [facility] OR [address] [place] [address] MAIN OR    Anesthesia Start:  0700 Anesthesia Stop:  1116    Procedure:  ALINE RE-OP STERNOTOMY MITRAL VALVE REPLACEMENT AND PRP (N/A Chest) Diagnosis:       Mitral valve disease      (Mitral valve disease [I05.9])    Surgeon:  Ben Saavedra MD Provider:  Crispin James MD    Anesthesia Type:  general ASA Status:  4          Anesthesia Type: general  Last vitals  BP   103/61 (05/14/19 1303)   Temp   36.7 °C (98.1 °F) (05/14/19 0550)   Pulse   80 (05/14/19 1303)   Resp   18 (05/14/19 0641)     SpO2   98 % (05/14/19 1303)     Post Anesthesia Care and Evaluation    Patient location during evaluation: ICU  Patient participation: waiting for patient participation  Pain management: adequate  Airway patency: patent  Anesthetic complications: No anesthetic complications  PONV Status: none  Cardiovascular status: acceptable  Respiratory status: acceptable, ETT, intubated and ventilator  Hydration status: acceptable

## 2019-05-14 NOTE — ANESTHESIA PROCEDURE NOTES
Procedure Performed: Emergent/Open-Heart Anesthesia ALINE     Start Time:        End Time:      Preanesthesia Checklist:  Patient identified, IV assessed, risks and benefits discussed, monitors and equipment assessed, procedure being performed at surgeon's request and anesthesia consent obtained.    General Procedure Information  ALINE Placed for monitoring purposes only -- This is not a diagnostic ALINE          Anesthesia Information      Echocardiogram Comments:       ALINE placed easily x 1 atempt by Dr Saavedra  DMP

## 2019-05-15 ENCOUNTER — APPOINTMENT (OUTPATIENT)
Dept: CARDIAC REHAB | Facility: HOSPITAL | Age: 79
End: 2019-05-15

## 2019-05-15 ENCOUNTER — APPOINTMENT (OUTPATIENT)
Dept: GENERAL RADIOLOGY | Facility: HOSPITAL | Age: 79
End: 2019-05-15

## 2019-05-15 LAB
ABO + RH BLD: NORMAL
ABO + RH BLD: NORMAL
ALBUMIN SERPL-MCNC: 4.8 G/DL (ref 3.5–5.2)
ANION GAP SERPL CALCULATED.3IONS-SCNC: 11.3 MMOL/L
BASOPHILS # BLD AUTO: 0.03 10*3/MM3 (ref 0–0.2)
BASOPHILS NFR BLD AUTO: 0.3 % (ref 0–1.5)
BH BB BLOOD EXPIRATION DATE: NORMAL
BH BB BLOOD EXPIRATION DATE: NORMAL
BH BB BLOOD TYPE BARCODE: 5100
BH BB BLOOD TYPE BARCODE: 600
BH BB DISPENSE STATUS: NORMAL
BH BB DISPENSE STATUS: NORMAL
BH BB PRODUCT CODE: NORMAL
BH BB PRODUCT CODE: NORMAL
BH BB UNIT NUMBER: NORMAL
BH BB UNIT NUMBER: NORMAL
BUN BLD-MCNC: 17 MG/DL (ref 8–23)
BUN/CREAT SERPL: 22.1 (ref 7–25)
CALCIUM SPEC-SCNC: 9.8 MG/DL (ref 8.6–10.5)
CHLORIDE SERPL-SCNC: 109 MMOL/L (ref 98–107)
CO2 SERPL-SCNC: 23.7 MMOL/L (ref 22–29)
CREAT BLD-MCNC: 0.77 MG/DL (ref 0.57–1)
DEPRECATED RDW RBC AUTO: 50 FL (ref 37–54)
EOSINOPHIL # BLD AUTO: 0 10*3/MM3 (ref 0–0.4)
EOSINOPHIL NFR BLD AUTO: 0 % (ref 0.3–6.2)
ERYTHROCYTE [DISTWIDTH] IN BLOOD BY AUTOMATED COUNT: 15.2 % (ref 12.3–15.4)
GFR SERPL CREATININE-BSD FRML MDRD: 73 ML/MIN/1.73
GLUCOSE BLD-MCNC: 135 MG/DL (ref 65–99)
GLUCOSE BLDC GLUCOMTR-MCNC: 117 MG/DL (ref 70–130)
GLUCOSE BLDC GLUCOMTR-MCNC: 119 MG/DL (ref 70–130)
GLUCOSE BLDC GLUCOMTR-MCNC: 124 MG/DL (ref 70–130)
GLUCOSE BLDC GLUCOMTR-MCNC: 125 MG/DL (ref 70–130)
GLUCOSE BLDC GLUCOMTR-MCNC: 127 MG/DL (ref 70–130)
GLUCOSE BLDC GLUCOMTR-MCNC: 135 MG/DL (ref 70–130)
GLUCOSE BLDC GLUCOMTR-MCNC: 172 MG/DL (ref 70–130)
GLUCOSE BLDC GLUCOMTR-MCNC: 187 MG/DL (ref 70–130)
GLUCOSE BLDC GLUCOMTR-MCNC: 192 MG/DL (ref 70–130)
GLUCOSE BLDC GLUCOMTR-MCNC: 195 MG/DL (ref 70–130)
HCT VFR BLD AUTO: 32.2 % (ref 34–46.6)
HGB BLD-MCNC: 10.2 G/DL (ref 12–15.9)
IMM GRANULOCYTES # BLD AUTO: 0.07 10*3/MM3 (ref 0–0.05)
IMM GRANULOCYTES NFR BLD AUTO: 0.7 % (ref 0–0.5)
INR PPP: 1.38 (ref 0.9–1.1)
LYMPHOCYTES # BLD AUTO: 0.46 10*3/MM3 (ref 0.7–3.1)
LYMPHOCYTES NFR BLD AUTO: 4.3 % (ref 19.6–45.3)
MAGNESIUM SERPL-MCNC: 2.4 MG/DL (ref 1.6–2.4)
MCH RBC QN AUTO: 28.1 PG (ref 26.6–33)
MCHC RBC AUTO-ENTMCNC: 31.7 G/DL (ref 31.5–35.7)
MCV RBC AUTO: 88.7 FL (ref 79–97)
MONOCYTES # BLD AUTO: 1 10*3/MM3 (ref 0.1–0.9)
MONOCYTES NFR BLD AUTO: 9.3 % (ref 5–12)
NEUTROPHILS # BLD AUTO: 9.2 10*3/MM3 (ref 1.7–7)
NEUTROPHILS NFR BLD AUTO: 85.4 % (ref 42.7–76)
NRBC BLD AUTO-RTO: 0 /100 WBC (ref 0–0.2)
PHOSPHATE SERPL-MCNC: 2.8 MG/DL (ref 2.5–4.5)
PLATELET # BLD AUTO: 169 10*3/MM3 (ref 140–450)
PMV BLD AUTO: 11 FL (ref 6–12)
POTASSIUM BLD-SCNC: 3.7 MMOL/L (ref 3.5–5.2)
POTASSIUM BLD-SCNC: 4.2 MMOL/L (ref 3.5–5.2)
PROTHROMBIN TIME: 16.7 SECONDS (ref 11.7–14.2)
RBC # BLD AUTO: 3.63 10*6/MM3 (ref 3.77–5.28)
SODIUM BLD-SCNC: 144 MMOL/L (ref 136–145)
UNIT  ABO: NORMAL
UNIT  ABO: NORMAL
UNIT  RH: NORMAL
UNIT  RH: NORMAL
WBC NRBC COR # BLD: 10.76 10*3/MM3 (ref 3.4–10.8)

## 2019-05-15 PROCEDURE — 97110 THERAPEUTIC EXERCISES: CPT

## 2019-05-15 PROCEDURE — 71045 X-RAY EXAM CHEST 1 VIEW: CPT

## 2019-05-15 PROCEDURE — 25010000002 FUROSEMIDE PER 20 MG: Performed by: NURSE PRACTITIONER

## 2019-05-15 PROCEDURE — 25010000002 ONDANSETRON PER 1 MG: Performed by: THORACIC SURGERY (CARDIOTHORACIC VASCULAR SURGERY)

## 2019-05-15 PROCEDURE — 84132 ASSAY OF SERUM POTASSIUM: CPT | Performed by: THORACIC SURGERY (CARDIOTHORACIC VASCULAR SURGERY)

## 2019-05-15 PROCEDURE — 85610 PROTHROMBIN TIME: CPT | Performed by: THORACIC SURGERY (CARDIOTHORACIC VASCULAR SURGERY)

## 2019-05-15 PROCEDURE — 25010000003 CEFAZOLIN IN DEXTROSE 2-4 GM/100ML-% SOLUTION: Performed by: THORACIC SURGERY (CARDIOTHORACIC VASCULAR SURGERY)

## 2019-05-15 PROCEDURE — 93005 ELECTROCARDIOGRAM TRACING: CPT | Performed by: THORACIC SURGERY (CARDIOTHORACIC VASCULAR SURGERY)

## 2019-05-15 PROCEDURE — 93005 ELECTROCARDIOGRAM TRACING: CPT | Performed by: NURSE PRACTITIONER

## 2019-05-15 PROCEDURE — 80069 RENAL FUNCTION PANEL: CPT | Performed by: THORACIC SURGERY (CARDIOTHORACIC VASCULAR SURGERY)

## 2019-05-15 PROCEDURE — 63710000001 INSULIN LISPRO (HUMAN) PER 5 UNITS: Performed by: THORACIC SURGERY (CARDIOTHORACIC VASCULAR SURGERY)

## 2019-05-15 PROCEDURE — 25010000002 ENOXAPARIN PER 10 MG: Performed by: THORACIC SURGERY (CARDIOTHORACIC VASCULAR SURGERY)

## 2019-05-15 PROCEDURE — 85025 COMPLETE CBC W/AUTO DIFF WBC: CPT | Performed by: THORACIC SURGERY (CARDIOTHORACIC VASCULAR SURGERY)

## 2019-05-15 PROCEDURE — 25010000003 POTASSIUM CHLORIDE PER 2 MEQ: Performed by: THORACIC SURGERY (CARDIOTHORACIC VASCULAR SURGERY)

## 2019-05-15 PROCEDURE — 82962 GLUCOSE BLOOD TEST: CPT

## 2019-05-15 PROCEDURE — 25010000002 DIGOXIN PER 500 MCG: Performed by: NURSE PRACTITIONER

## 2019-05-15 PROCEDURE — 93010 ELECTROCARDIOGRAM REPORT: CPT | Performed by: INTERNAL MEDICINE

## 2019-05-15 PROCEDURE — 97162 PT EVAL MOD COMPLEX 30 MIN: CPT

## 2019-05-15 PROCEDURE — 25010000002 METOCLOPRAMIDE PER 10 MG: Performed by: THORACIC SURGERY (CARDIOTHORACIC VASCULAR SURGERY)

## 2019-05-15 PROCEDURE — 83735 ASSAY OF MAGNESIUM: CPT | Performed by: THORACIC SURGERY (CARDIOTHORACIC VASCULAR SURGERY)

## 2019-05-15 PROCEDURE — 25010000002 MORPHINE PER 10 MG: Performed by: THORACIC SURGERY (CARDIOTHORACIC VASCULAR SURGERY)

## 2019-05-15 RX ORDER — FUROSEMIDE 10 MG/ML
40 INJECTION INTRAMUSCULAR; INTRAVENOUS ONCE
Status: COMPLETED | OUTPATIENT
Start: 2019-05-15 | End: 2019-05-15

## 2019-05-15 RX ORDER — DIGOXIN 0.25 MG/ML
250 INJECTION INTRAMUSCULAR; INTRAVENOUS ONCE
Status: COMPLETED | OUTPATIENT
Start: 2019-05-15 | End: 2019-05-15

## 2019-05-15 RX ORDER — IPRATROPIUM BROMIDE AND ALBUTEROL SULFATE 2.5; .5 MG/3ML; MG/3ML
3 SOLUTION RESPIRATORY (INHALATION) EVERY 6 HOURS PRN
Status: DISCONTINUED | OUTPATIENT
Start: 2019-05-15 | End: 2019-05-20 | Stop reason: HOSPADM

## 2019-05-15 RX ORDER — GUAIFENESIN 600 MG/1
1200 TABLET, EXTENDED RELEASE ORAL EVERY 12 HOURS SCHEDULED
Status: DISCONTINUED | OUTPATIENT
Start: 2019-05-15 | End: 2019-05-20 | Stop reason: HOSPADM

## 2019-05-15 RX ORDER — NICOTINE POLACRILEX 4 MG
15 LOZENGE BUCCAL
Status: DISCONTINUED | OUTPATIENT
Start: 2019-05-15 | End: 2019-05-20 | Stop reason: HOSPADM

## 2019-05-15 RX ORDER — DIGOXIN 250 MCG
250 TABLET ORAL
Status: DISCONTINUED | OUTPATIENT
Start: 2019-05-15 | End: 2019-05-15

## 2019-05-15 RX ORDER — DIGOXIN 250 MCG
250 TABLET ORAL
Status: DISCONTINUED | OUTPATIENT
Start: 2019-05-16 | End: 2019-05-20 | Stop reason: HOSPADM

## 2019-05-15 RX ORDER — POTASSIUM CHLORIDE 750 MG/1
20 CAPSULE, EXTENDED RELEASE ORAL DAILY
Status: DISCONTINUED | OUTPATIENT
Start: 2019-05-15 | End: 2019-05-20 | Stop reason: HOSPADM

## 2019-05-15 RX ORDER — DEXTROSE MONOHYDRATE 25 G/50ML
25 INJECTION, SOLUTION INTRAVENOUS
Status: DISCONTINUED | OUTPATIENT
Start: 2019-05-15 | End: 2019-05-20 | Stop reason: HOSPADM

## 2019-05-15 RX ORDER — CHLORHEXIDINE GLUCONATE 0.12 MG/ML
15 RINSE ORAL EVERY 12 HOURS
Status: COMPLETED | OUTPATIENT
Start: 2019-05-15 | End: 2019-05-17

## 2019-05-15 RX ADMIN — PANTOPRAZOLE SODIUM 40 MG: 40 TABLET, DELAYED RELEASE ORAL at 05:23

## 2019-05-15 RX ADMIN — OXYCODONE HYDROCHLORIDE 10 MG: 5 TABLET ORAL at 06:57

## 2019-05-15 RX ADMIN — GUAIFENESIN 1200 MG: 600 TABLET, EXTENDED RELEASE ORAL at 21:20

## 2019-05-15 RX ADMIN — METOCLOPRAMIDE 10 MG: 5 INJECTION, SOLUTION INTRAMUSCULAR; INTRAVENOUS at 00:03

## 2019-05-15 RX ADMIN — GUAIFENESIN 1200 MG: 600 TABLET, EXTENDED RELEASE ORAL at 09:17

## 2019-05-15 RX ADMIN — CEFAZOLIN SODIUM 2 G: 2 INJECTION, SOLUTION INTRAVENOUS at 17:30

## 2019-05-15 RX ADMIN — METOPROLOL TARTRATE 25 MG: 25 TABLET ORAL at 21:07

## 2019-05-15 RX ADMIN — ASPIRIN 81 MG: 81 TABLET, DELAYED RELEASE ORAL at 09:17

## 2019-05-15 RX ADMIN — FUROSEMIDE 40 MG: 10 INJECTION, SOLUTION INTRAMUSCULAR; INTRAVENOUS at 09:17

## 2019-05-15 RX ADMIN — MUPIROCIN 1 APPLICATION: 20 OINTMENT TOPICAL at 21:07

## 2019-05-15 RX ADMIN — ACETAMINOPHEN 650 MG: 325 TABLET, FILM COATED ORAL at 16:09

## 2019-05-15 RX ADMIN — INSULIN LISPRO 2 UNITS: 100 INJECTION, SOLUTION INTRAVENOUS; SUBCUTANEOUS at 12:45

## 2019-05-15 RX ADMIN — MORPHINE SULFATE 1 MG: 2 INJECTION, SOLUTION INTRAMUSCULAR; INTRAVENOUS at 06:20

## 2019-05-15 RX ADMIN — CHLORHEXIDINE GLUCONATE 15 ML: 1.2 RINSE ORAL at 12:45

## 2019-05-15 RX ADMIN — INSULIN LISPRO 2 UNITS: 100 INJECTION, SOLUTION INTRAVENOUS; SUBCUTANEOUS at 21:07

## 2019-05-15 RX ADMIN — OXYCODONE HYDROCHLORIDE 10 MG: 5 TABLET ORAL at 11:06

## 2019-05-15 RX ADMIN — MUPIROCIN 1 APPLICATION: 20 OINTMENT TOPICAL at 09:17

## 2019-05-15 RX ADMIN — CEFAZOLIN SODIUM 2 G: 2 INJECTION, SOLUTION INTRAVENOUS at 01:09

## 2019-05-15 RX ADMIN — INSULIN LISPRO 2 UNITS: 100 INJECTION, SOLUTION INTRAVENOUS; SUBCUTANEOUS at 17:29

## 2019-05-15 RX ADMIN — CHLORHEXIDINE GLUCONATE 15 ML: 1.2 RINSE ORAL at 23:08

## 2019-05-15 RX ADMIN — SENNOSIDES AND DOCUSATE SODIUM 2 TABLET: 8.6; 5 TABLET ORAL at 21:20

## 2019-05-15 RX ADMIN — CEFAZOLIN SODIUM 2 G: 2 INJECTION, SOLUTION INTRAVENOUS at 09:20

## 2019-05-15 RX ADMIN — ENOXAPARIN SODIUM 40 MG: 40 INJECTION SUBCUTANEOUS at 17:29

## 2019-05-15 RX ADMIN — CYCLOBENZAPRINE 10 MG: 10 TABLET, FILM COATED ORAL at 06:26

## 2019-05-15 RX ADMIN — CHLORHEXIDINE GLUCONATE 15 ML: 1.2 RINSE ORAL at 00:03

## 2019-05-15 RX ADMIN — ONDANSETRON 4 MG: 2 INJECTION INTRAMUSCULAR; INTRAVENOUS at 05:25

## 2019-05-15 RX ADMIN — INSULIN LISPRO 2 UNITS: 100 INJECTION, SOLUTION INTRAVENOUS; SUBCUTANEOUS at 09:17

## 2019-05-15 RX ADMIN — POTASSIUM CHLORIDE 20 MEQ: 29.8 INJECTION, SOLUTION INTRAVENOUS at 05:01

## 2019-05-15 RX ADMIN — DIGOXIN 250 MCG: 0.25 INJECTION INTRAMUSCULAR; INTRAVENOUS at 10:35

## 2019-05-15 RX ADMIN — POTASSIUM CHLORIDE 20 MEQ: 750 CAPSULE, EXTENDED RELEASE ORAL at 09:17

## 2019-05-15 RX ADMIN — METOCLOPRAMIDE 10 MG: 5 INJECTION, SOLUTION INTRAMUSCULAR; INTRAVENOUS at 05:23

## 2019-05-15 RX ADMIN — METOPROLOL TARTRATE 12.5 MG: 25 TABLET ORAL at 08:31

## 2019-05-15 NOTE — THERAPY EVALUATION
Acute Care - Physical Therapy Initial Evaluation  [facility]     Patient Name: Fernanda Molina  : 1940  MRN: [id number]  Today's Date: 5/15/2019   Onset of Illness/Injury or Date of Surgery: 19     Referring Physician: Quentin      Admit Date: 2019    Visit Dx:     ICD-10-CM ICD-9-CM   1. Impaired mobility Z74.09 799.89   2. Mitral valve disease I05.9 394.9     Patient Active Problem List   Diagnosis   • S/P mitral valve repair   • S/P tricuspid valve repair   • S/P Maze operation for atrial fibrillation   • Morbidly obese (CMS/[facility])   • Atrial fibrillation (CMS/[facility])   • Acquired hypothyroidism   • COPD (chronic obstructive pulmonary disease) (CMS/[facility])   • CAD (coronary artery disease), hx MI   • HAMLET on autoCPAP   • Hypercalcemia   • Renal calculus, right   • Hyperparathyroidism (CMS/[facility])   • Type 2 diabetes mellitus without complication, with long-term current use of insulin (CMS/[facility])   • Warfarin anticoagulation   • Medication monitoring encounter   • Medicare annual wellness visit, subsequent   • Pacemaker   • AICD (automatic cardioverter/defibrillator) present   • Right foot pain   • Chronic nonallergic rhinitis   • Pulmonary hypertension (CMS/[facility])   • Mitral valve disease     Past Medical History:   Diagnosis Date   • AICD (automatic cardioverter/defibrillator) present    • Arthritis    • Atrial fibrillation (CMS/[facility])    • CHF (congestive heart failure) (CMS/[facility])    • Congenital heart disease    • COPD (chronic obstructive pulmonary disease) (CMS/[facility])    • Coronary artery disease    • Diabetes mellitus (CMS/[facility])    • GERD (gastroesophageal reflux disease)    • High cholesterol    • History of kidney stones    • History of melanoma 2002    RIGHT KNEE   • History of MI (myocardial infarction)    • Hypertension    • Hypothyroidism    • Lymph edema    • On anticoagulant therapy    • HAMLET on CPAP    • PONV (postoperative nausea and vomiting)    • Shortness of breath    • Spinal stenosis    •  "Valvular heart disease 2015    S/p MVR & TVR      Past Surgical History:   Procedure Laterality Date   • CARDIAC CATHETERIZATION     • CARDIAC DEFIBRILLATOR PLACEMENT      MEDTRONIC   • CARDIAC DEFIBRILLATOR PLACEMENT      MEDTRONIC   •  SECTION     •  SECTION     • CHOLECYSTECTOMY     • COLONOSCOPY     • EPIDURAL BLOCK  [id number]    Lumbar    • EXTRACORPOREAL SHOCK WAVE LITHOTRIPSY (ESWL) Right 2017   • EXTRACORPOREAL SHOCK WAVE LITHOTRIPSY (ESWL) Right 2017    Procedure: RT EXTRACORPOREAL SHOCKWAVE LITHOTRIPSY;  Surgeon: Moisés Everett MD;  Location: [facility] OR OSC;  Service:    • EYE SURGERY Right 09/10/2007    Cataract   • EYE SURGERY Left 10/29/2007    Cataract   • JOINT REPLACEMENT Left 2007    Knee   • JOINT REPLACEMENT Right 2010    Knee   • KNEE ARTHROSCOPY     • MAZE PROCEDURE  2015    Dr. Saavedra   • MITRAL VALVE REPAIR/REPLACEMENT     • OK ERCP DX COLLECTION SPECIMEN BRUSHING/WASHING N/A 2016    Procedure: ENDOSCOPIC RETROGRADE CHOLANGIOPANCREATOGRAPHY WITH SPHINCTEROTOMY AND BALLOON SWEEP OF CBD;  Surgeon: Lucas Campos MD;  Location: [facility] ENDOSCOPY;  Service: Gastroenterology   • SKIN CANCER EXCISION      MELANOMA RIGHT KNEE AREA -    • TRICUSPID VALVE SURGERY  2015    Dr. Saavedra   • URETEROSCOPY LASER LITHOTRIPSY WITH STENT INSERTION Right 11/15/2017    Procedure: CYSTOSCOPY; RIGHT URETEROSCOPY LASER LITHOTRIPSY WITH STENT INSERTION;  Surgeon: Moisés Everett MD;  Location: [facility] OR;  Service:         PT ASSESSMENT (last 12 hours)      Physical Therapy Evaluation     Row Name 05/15/19 0908          PT Evaluation Time/Intention    Subjective Information  no complaints initially states \"I am much better than yesterday\"   -EM     Document Type  evaluation  -EM     Mode of Treatment  physical therapy  -EM     Patient Effort  good  -EM     Row Name 05/15/19 0908          General " Information    Onset of Illness/Injury or Date of Surgery  05/14/19  -EM     Referring Physician  Quentin  -EM     Patient Observations  alert;cooperative;agree to therapy  -EM     General Observations of Patient  elderly female sitting up in recliner, awake and alert   -EM     Prior Level of Function  independent:;community mobility  -EM     Equipment Currently Used at Home  none  -EM     Pertinent History of Current Functional Problem  re-op MVR   -EM     Existing Precautions/Restrictions  cardiac;sternal;fall  -EM     Row Name 05/15/19 0908          Relationship/Environment    Lives With  spouse  -EM     Row Name 05/15/19 0908          Resource/Environmental Concerns    Current Living Arrangements  home/apartment/condo  -EM     Row Name 05/15/19 0908          Cognitive Assessment/Intervention- PT/OT    Orientation Status (Cognition)  oriented x 3  -EM     Follows Commands (Cognition)  follows one step commands;75-90% accuracy;increased processing time needed constant cues to answer questions, cues for ther ex   -EM     Row Name 05/15/19 0908          Bed Mobility Assessment/Treatment    Bed Mobility Assessment/Treatment  supine-sit;sit-supine  -EM     Sit-Supine [place] (Bed Mobility)  moderate assist (50% patient effort);2 person assist  -EM     Assistive Device (Bed Mobility)  head of bed elevated  -EM     Row Name 05/15/19 0908          Transfer Assessment/Treatment    Transfer Assessment/Treatment  sit-stand transfer;stand-sit transfer  -EM     Sit-Stand [place] (Transfers)  moderate assist (50% patient effort);2 person assist;verbal cues  -EM     Stand-Sit [place] (Transfers)  moderate assist (50% patient effort);2 person assist  -EM     Row Name 05/15/19 0908          Gait/Stairs Assessment/Training    [place] Level (Gait)  moderate assist (50% patient effort);2 person assist  -EM     Assistive Device (Gait)  -- micaela HHA  -EM     Distance in Feet (Gait)  20  -EM     Deviations/Abnormal  Patterns (Gait)  stride length decreased;gait speed decreased  -EM     Bilateral Gait Deviations  forward flexed posture  -EM     Comment (Gait/Stairs)  constant verbal cues for upright posture and to increase step length   -EM     [facility] Name 05/15/19 0908          General ROM    GENERAL ROM COMMENTS  ROM WNL   -EM     [facility] Name 05/15/19 0908          MMT (Manual Muscle Testing)    General MMT Comments  no focal deficits identified  -EM     [facility] Name 05/15/19 0908          Motor Assessment/Intervention    Additional Documentation  Balance (Group);Therapeutic Exercise (Group);Therapeutic Exercise Interventions (Group)  -EM     [facility] Name 05/15/19 0908          Therapeutic Exercise    Comment (Therapeutic Exercise)  5 reps per cardiac protocol, constant tactile and verbal cueing, level 2   -EM     [facility] Name 05/15/19 0908          Balance    Balance  static sitting balance;static standing balance  -EM     Row Name 05/15/19 0908          Static Sitting Balance    Level of [place] (Unsupported Sitting, Static Balance)  contact guard assist  -EM     Sitting Position (Unsupported Sitting, Static Balance)  sitting on edge of bed  -EM     Row Name 05/15/19 0908          Static Standing Balance    Level of [place] (Supported Standing, Static Balance)  minimal assist, 75% patient effort  -EM     [facility] Name 05/15/19 0908          Pain Assessment    Additional Documentation  Pain Scale: Numbers Pre/Post-Treatment (Group)  -EM     Row Name 05/15/19 0908          Pain Scale: Numbers Pre/Post-Treatment    Pain Scale: Numbers, Pretreatment  0/10 - no pain  -EM     Pain Scale: Numbers, Post-Treatment  10/10  -EM     Pain Location  chest  -EM     Pain Intervention(s)  Medication (See MAR);Repositioned  -EM     Row Name             Wound 05/14/19 0957 chest incision    Wound - Properties Group Date first assessed: 05/14/19  -SR Time first assessed: 0957  -SR Location: chest  -SR Type: incision  -SR    [facility] Name 05/15/19 0908           Plan of Care Review    Plan of Care Reviewed With  patient  -EM     Row Name 05/15/19 0908          Physical Therapy Clinical Impression    Patient/Family Goals Statement (PT Clinical Impression)  go home   -EM     Criteria for Skilled Interventions Met (PT Clinical Impression)  yes;treatment indicated  -EM     Impairments Found (describe specific impairments)  gait, locomotion, and balance;aerobic capacity/endurance;arousal, attention, and cognition  -EM     Rehab Potential (PT Clinical Summary)  good, to achieve stated therapy goals  -EM     Row Name 05/15/19 0908          Vital Signs    Pre Systolic BP Rehab  125  -EM     Pre Treatment Diastolic BP  70  -EM     Pretreatment Heart Rate (beats/min)  80  -EM     Posttreatment Heart Rate (beats/min)  89  -EM     Pre SpO2 (%)  99  -EM     O2 Delivery Pre Treatment  supplemental O2  -EM     Post SpO2 (%)  97  -EM     O2 Delivery Post Treatment  supplemental O2  -EM     Row Name 05/15/19 0908          Physical Therapy Goals    Additional Documentation  Cardiac Level Goal Selection (PT) (Group)  -EM     Row Name 05/15/19 0908          Cardiac Level Goal (PT)    Cardiac Level (Cardiac Goal, PT)  Level 5  -EM     Time Frame (Cardiac Goal, PT)  1 week  -EM     Row Name 05/15/19 0908          Positioning and Restraints    Pre-Treatment Position  sitting in chair/recliner  -EM     Post Treatment Position  bed  -EM     In Bed  supine;notified nsg  -EM       User Key  (r) = Recorded By, (t) = Taken By, (c) = Cosigned By    Initials Name Provider Type    EM Mary Harper PT Physical Therapist    Shanice Sierra RN Registered Nurse        Physical Therapy Education     Title: PT OT SLP Therapies (In Progress)     Topic: Physical Therapy (In Progress)     Point: Mobility training (In Progress)     Learning Progress Summary           Patient Acceptance, E, NR by EM at 5/15/2019  9:15 AM                   Point: Home exercise program (In Progress)     Learning Progress  Summary           Patient Acceptance, E, NR by EM at 5/15/2019  9:15 AM                               User Key     Initials Effective Dates Name Provider Type Discipline    EM 04/03/18 -  Mary Harper, PT Physical Therapist PT              PT Recommendation and Plan  Anticipated Discharge Disposition (PT): skilled nursing facility, home with home health  Planned Therapy Interventions (PT Eval): bed mobility training, gait training, home exercise program  Therapy Frequency (PT Clinical Impression): daily  Outcome Summary/Treatment Plan (PT)  Anticipated Discharge Disposition (PT): skilled nursing facility, home with home health  Plan of Care Reviewed With: patient  Outcome Summary: Patient is a 78 y.o. female admitted to [facility] for re-op MVR on 5/14/2019. PMHx includes MVR, TVR in 2015. Patient is independent at baseline and lives with spouse. Patient reports no use of AD and no stairs to enter home. Today, patient performed bed mobility with modAx2, required modAx2 for transfers, and ambulated 20 feet with modAx2, micaela HHA, constant verbal cueing to stand upright and take longer steps.  Patient demonstrates impairments consisting of generalized post op weakness and pain, decreased activity tolerance, decreased balance. Patient may benefit from skilled PT services acutely to address functional deficits as well as improve level of independence prior to discharge. Anticipate SNU or home with assist and HH upon DC.  Outcome Measures     Row Name 05/15/19 0900             How much help from another person do you currently need...    Turning from your back to your side while in flat bed without using bedrails?  2  -EM      Moving from lying on back to sitting on the side of a flat bed without bedrails?  2  -EM      Moving to and from a bed to a chair (including a wheelchair)?  2  -EM      Standing up from a chair using your arms (e.g., wheelchair, bedside chair)?  2  -EM      Climbing 3-5 steps with a railing?  1  -EM       To walk in hospital room?  2  -EM      AM-PAC 6 Clicks Score  11  -EM         Functional Assessment    Outcome Measure Options  AM-PAC 6 Clicks Basic Mobility (PT)  -EM        User Key  (r) = Recorded By, (t) = Taken By, (c) = Cosigned By    Initials Name Provider Type    EM Mary Harper PT Physical Therapist         Time Calculation:   PT Charges     Row Name 05/15/19 0918             Time Calculation    Start Time  0835  -EM      Stop Time  0858  -EM      Time Calculation (min)  23 min  -EM      PT Received On  05/15/19  -EM      PT - Next Appointment  05/16/19  -EM      PT Goal Re-Cert Due Date  05/22/19  -EM         Time Calculation- PT    Total Timed Code Minutes- PT  10 minute(s)  -EM        User Key  (r) = Recorded By, (t) = Taken By, (c) = Cosigned By    Initials Name Provider Type    EM Mary Harper PT Physical Therapist        Therapy Charges for Today     Code Description Service Date Service Provider Modifiers Qty    43742265440 HC PT EVAL MOD COMPLEXITY 2 5/15/2019 Mary Harper, PT GP 1    53751900472 HC PT THER PROC EA 15 MIN 5/15/2019 Mary Harper, PT GP 1    14146360900 HC PT THER SUPP EA 15 MIN 5/15/2019 Mary Harper, PT GP 1          PT G-Codes  Outcome Measure Options: AM-PAC 6 Clicks Basic Mobility (PT)  AM-PAC 6 Clicks Score: 11      Mary Harper PT  5/15/2019

## 2019-05-15 NOTE — PROGRESS NOTES
LOS: 1 day   Patient Care Team:  Rashid Ge MD as PCP - General (Family Medicine)  Thong Jones MD as Consulting Physician (Cardiology)  Mahin Galeas MD as Consulting Physician (Sleep Medicine)  Lara Ortiz MD as Consulting Physician (Obstetrics and Gynecology)  Betty Horowitz MD as Consulting Physician (Dermatology)  Ben Saavedra MD as Surgeon (Cardiothoracic Surgery)  Mahin Galeas MD as Consulting Physician (Pulmonary Disease)  Chirag Rai MD as Consulting Physician (Ophthalmology)  Moisés Everett MD as Consulting Physician (Urology)    Chief Complaint: post op    Subjective      Vital Signs  Heart Rate:  [60-89] 80  Resp:  [12-29] 29  BP: ()/(51-85) 119/64  Arterial Line BP: ()/(51-77) 128/62  FiO2 (%):  [29 %-100 %] 29 %  Body mass index is 36.95 kg/m².    Intake/Output Summary (Last 24 hours) at 5/15/2019 0725  Last data filed at 5/15/2019 0600  Gross per 24 hour   Intake 4360 ml   Output 7593 ml   Net -3233 ml     No intake/output data recorded.    Chest tube drainage last 8 hours 18/80 05/14/19  0550   Weight: 91.6 kg (202 lb)         Objective    Results Review:        WBC WBC   Date Value Ref Range Status   05/15/2019 10.76 3.40 - 10.80 10*3/mm3 Final   05/14/2019 11.66 (H) 3.40 - 10.80 10*3/mm3 Final   05/14/2019 11.25 (H) 3.40 - 10.80 10*3/mm3 Final   05/13/2019 6.43 3.40 - 10.80 10*3/mm3 Final      HGB Hemoglobin   Date Value Ref Range Status   05/15/2019 10.2 (L) 12.0 - 15.9 g/dL Final   05/14/2019 10.8 (L) 12.0 - 15.9 g/dL Final   05/14/2019 10.7 (L) 12.0 - 15.9 g/dL Final   05/14/2019 9.2 (L) 12.0 - 17.0 g/dL Final   05/14/2019 9.2 (L) 12.0 - 17.0 g/dL Final   05/14/2019 9.5 (L) 12.0 - 17.0 g/dL Final   05/14/2019 9.5 (L) 12.0 - 17.0 g/dL Final   05/14/2019 12.2 12.0 - 17.0 g/dL Final   05/13/2019 13.7 12.0 - 15.9 g/dL Final      HCT Hematocrit   Date Value Ref Range Status   05/15/2019 32.2 (L) 34.0 - 46.6 %  Final   05/14/2019 33.4 (L) 34.0 - 46.6 % Final   05/14/2019 33.5 (L) 34.0 - 46.6 % Final   05/14/2019 27 (L) 38 - 51 % Final   05/14/2019 27 (L) 38 - 51 % Final   05/14/2019 28 (L) 38 - 51 % Final   05/14/2019 28 (L) 38 - 51 % Final   05/14/2019 36 (L) 38 - 51 % Final   05/13/2019 43.0 34.0 - 46.6 % Final      Platelets Platelets   Date Value Ref Range Status   05/15/2019 169 140 - 450 10*3/mm3 Final   05/14/2019 206 140 - 450 10*3/mm3 Final   05/14/2019 171 140 - 450 10*3/mm3 Final   05/13/2019 197 140 - 450 10*3/mm3 Final        PT/INR:    Protime   Date Value Ref Range Status   05/15/2019 16.7 (H) 11.7 - 14.2 Seconds Final   05/14/2019 18.9 (H) 11.7 - 14.2 Seconds Final   05/14/2019 16.3 (H) 12.8 - 15.2 seconds Final     Comment:     Serial Number: [id number]:  [id number]   05/14/2019 16.1 (H) 11.7 - 14.2 Seconds Final   05/13/2019 17.5 (H) 11.7 - 14.2 Seconds Final   /  INR   Date Value Ref Range Status   05/15/2019 1.38 (H) 0.90 - 1.10 Final   05/14/2019 1.62 (H) 0.90 - 1.10 Final   05/14/2019 1.4 (H) 0.8 - 1.2 Final   05/14/2019 1.32 (H) 0.90 - 1.10 Final   05/13/2019 1.47 (H) 0.90 - 1.10 Final       Sodium Sodium   Date Value Ref Range Status   05/15/2019 144 136 - 145 mmol/L Final   05/14/2019 143 136 - 145 mmol/L Final   05/14/2019 143 136 - 145 mmol/L Final   05/13/2019 139 136 - 145 mmol/L Final      Potassium Potassium   Date Value Ref Range Status   05/15/2019 3.7 3.5 - 5.2 mmol/L Final   05/14/2019 3.6 3.5 - 5.2 mmol/L Final   05/14/2019 4.0 3.5 - 5.2 mmol/L Final   05/14/2019 3.5 3.5 - 5.2 mmol/L Final   05/13/2019 3.7 3.5 - 5.2 mmol/L Final      Chloride Chloride   Date Value Ref Range Status   05/15/2019 109 (H) 98 - 107 mmol/L Final   05/14/2019 107 98 - 107 mmol/L Final   05/14/2019 104 98 - 107 mmol/L Final   05/13/2019 103 98 - 107 mmol/L Final      Bicarbonate CO2   Date Value Ref Range Status   05/15/2019 23.7 22.0 - 29.0 mmol/L Final   05/14/2019 23.3 22.0 - 29.0 mmol/L Final   05/14/2019 20.7  (L) 22.0 - 29.0 mmol/L Final   05/13/2019 21.9 (L) 22.0 - 29.0 mmol/L Final      BUN BUN   Date Value Ref Range Status   05/15/2019 17 8 - 23 mg/dL Final   05/14/2019 19 8 - 23 mg/dL Final   05/14/2019 20 8 - 23 mg/dL Final   05/13/2019 19 8 - 23 mg/dL Final      Creatinine Creatinine   Date Value Ref Range Status   05/15/2019 0.77 0.57 - 1.00 mg/dL Final   05/14/2019 0.77 0.57 - 1.00 mg/dL Final   05/14/2019 0.80 0.57 - 1.00 mg/dL Final   05/13/2019 0.83 0.57 - 1.00 mg/dL Final      Calcium Calcium   Date Value Ref Range Status   05/15/2019 9.8 8.6 - 10.5 mg/dL Final   05/14/2019 9.3 8.6 - 10.5 mg/dL Final   05/14/2019 10.2 8.6 - 10.5 mg/dL Final   05/13/2019 10.6 (H) 8.6 - 10.5 mg/dL Final      Magnesium Magnesium   Date Value Ref Range Status   05/15/2019 2.4 1.6 - 2.4 mg/dL Final   05/14/2019 2.0 1.6 - 2.4 mg/dL Final   05/14/2019 2.1 1.6 - 2.4 mg/dL Final   05/14/2019 1.9 1.6 - 2.4 mg/dL Final   05/13/2019 1.8 1.6 - 2.4 mg/dL Final            aspirin 81 mg Oral Daily   [START ON 5/16/2019] atorvastatin 20 mg Oral Once per day on Mon Thu   ceFAZolin 2 g Intravenous Q8H   chlorhexidine 15 mL Mouth/Throat Q12H   enoxaparin 40 mg Subcutaneous Daily   magnesium sulfate 1 g Intravenous Q8H   metoclopramide 10 mg Intravenous Q6H   metoprolol tartrate 12.5 mg Oral Q12H   mupirocin  Each Nare BID   pantoprazole 40 mg Oral Q AM   sennosides-docusate sodium 2 tablet Oral Nightly       clevidipine 2-32 mg/hr    dexmedetomidine 0.2-1.5 mcg/kg/hr Last Rate: Stopped (05/15/19 4089)   DOPamine 2-20 mcg/kg/min    EPINEPHrine 0.02-0.3 mcg/kg/min    insulin 0-50 Units/hr Last Rate: Stopped (05/15/19 2004)   milrinone 0.25-0.75 mcg/kg/min Last Rate: 0.25 mcg/kg/min (05/14/19 6115)   niCARdipine 5-15 mg/hr    nitroglycerin 5-200 mcg/min    norepinephrine 0.02-0.3 mcg/kg/min Last Rate: Stopped (05/15/19 1992)   phenylephrine 0.2-3 mcg/kg/min    propofol 5-50 mcg/kg/min Last Rate: Stopped (05/14/19 7764)   sodium chloride 30 mL/hr     sodium chloride 30 mL/hr Last Rate: 30 mL/hr (05/14/19 1300)   vasopressin 0.02-0.1 Units/min            Patient Active Problem List   Diagnosis Code   • S/P mitral valve repair Z98.890   • S/P tricuspid valve repair Z98.890   • S/P Maze operation for atrial fibrillation Z98.890, Z86.79   • Morbidly obese (CMS/HCC) E66.01   • Atrial fibrillation (CMS/HCC) I48.91   • Acquired hypothyroidism E03.9   • COPD (chronic obstructive pulmonary disease) (CMS/[facility]) J44.9   • CAD (coronary artery disease), hx MI I25.10   • HAMLET on autoCPAP G47.33, Z99.89   • Hypercalcemia E83.52   • Renal calculus, right N20.0   • Hyperparathyroidism (CMS/HCC) E21.3   • Type 2 diabetes mellitus without complication, with long-term current use of insulin (CMS/HCC) E11.9, Z79.4   • Warfarin anticoagulation Z79.01   • Medication monitoring encounter Z51.81   • Medicare annual wellness visit, subsequent Z00.00   • Pacemaker Z95.0   • AICD (automatic cardioverter/defibrillator) present Z95.810   • Right foot pain M79.671   • Chronic nonallergic rhinitis J31.0   • Pulmonary hypertension (CMS/HCC) I27.20   • Mitral valve disease I05.9       Assessment & Plan    -Recurrent mitral incompetence and stenosis--s/p reop MVR (tissue)-POD#1 [place]  -hx of MV and TV repair, closure of INO, MAZE  -Diabetes- add oral medications when increased PO intake  -hx paroxysmal atrial fibrillation with chronic coumadin therapy  -BiV-AICD- Medrontic  -Severe Pulmonary hypertension- PAs 100  -Vaginal bleeding  -Post op anemia- expected acute blood loss    Encourage pulmonary toilet.  Rhonchi, add mucinex, flutter valve and PRN duo nebs.  IV diurese.  Mobilize.  Reassess chest tube output after ambulation.   Transfer to stepdown.    Stacy Mccloud, APRN  05/15/19  7:25 AM

## 2019-05-15 NOTE — PLAN OF CARE
Problem: Patient Care Overview  Goal: Plan of Care Review  Outcome: Ongoing (interventions implemented as appropriate)   05/15/19 1625 05/15/19 3903   OTHER   Outcome Summary --  Paced with permanent pacemaker, connceted to box but off. Underlying rhythm AFib. 3 L BNC. Complains of back pain, repositioned multiple times. Pt is groggy and has psychosomatic symptoms at times. CT's to remain intact for another day, puffy/soft area to upper abdomen. [place] intact.   Plan of Care Review   Progress --  improving   Coping/Psychosocial   Plan of Care Reviewed With patient;spouse --

## 2019-05-15 NOTE — PLAN OF CARE
Problem: Patient Care Overview  Goal: Plan of Care Review   05/15/19 0916   OTHER   Outcome Summary Patient is a 78 y.o. female admitted to [facility] for re-op MVR on 5/14/2019. PMHx includes MVR, TVR in 2015. Patient is independent at baseline and lives with spouse. Patient reports no use of AD and no stairs to enter home. Today, patient performed bed mobility with modAx2, required modAx2 for transfers, and ambulated 20 feet with modAx2, micaela HHA, constant verbal cueing to stand upright and take longer steps. Patient demonstrates impairments consisting of generalized post op weakness and pain, decreased activity tolerance, decreased balance. Patient may benefit from skilled PT services acutely to address functional deficits as well as improve level of independence prior to discharge. Anticipate SNU or home with assist and HH upon DC.

## 2019-05-16 ENCOUNTER — APPOINTMENT (OUTPATIENT)
Dept: GENERAL RADIOLOGY | Facility: HOSPITAL | Age: 79
End: 2019-05-16

## 2019-05-16 LAB
ANION GAP SERPL CALCULATED.3IONS-SCNC: 11.1 MMOL/L
BUN BLD-MCNC: 16 MG/DL (ref 8–23)
BUN/CREAT SERPL: 23.5 (ref 7–25)
CALCIUM SPEC-SCNC: 10.6 MG/DL (ref 8.6–10.5)
CHLORIDE SERPL-SCNC: 101 MMOL/L (ref 98–107)
CO2 SERPL-SCNC: 22.9 MMOL/L (ref 22–29)
CREAT BLD-MCNC: 0.68 MG/DL (ref 0.57–1)
CYTO UR: NORMAL
DEPRECATED RDW RBC AUTO: 53 FL (ref 37–54)
DIGOXIN SERPL-MCNC: 0.7 NG/ML (ref 0.6–1.2)
ERYTHROCYTE [DISTWIDTH] IN BLOOD BY AUTOMATED COUNT: 15.7 % (ref 12.3–15.4)
GFR SERPL CREATININE-BSD FRML MDRD: 84 ML/MIN/1.73
GLUCOSE BLD-MCNC: 180 MG/DL (ref 65–99)
GLUCOSE BLDC GLUCOMTR-MCNC: 154 MG/DL (ref 70–130)
GLUCOSE BLDC GLUCOMTR-MCNC: 165 MG/DL (ref 70–130)
GLUCOSE BLDC GLUCOMTR-MCNC: 168 MG/DL (ref 70–130)
GLUCOSE BLDC GLUCOMTR-MCNC: 170 MG/DL (ref 70–130)
HCT VFR BLD AUTO: 33.7 % (ref 34–46.6)
HGB BLD-MCNC: 10.2 G/DL (ref 12–15.9)
LAB AP CASE REPORT: NORMAL
MCH RBC QN AUTO: 27.9 PG (ref 26.6–33)
MCHC RBC AUTO-ENTMCNC: 30.3 G/DL (ref 31.5–35.7)
MCV RBC AUTO: 92.3 FL (ref 79–97)
PATH REPORT.FINAL DX SPEC: NORMAL
PATH REPORT.GROSS SPEC: NORMAL
PLATELET # BLD AUTO: 152 10*3/MM3 (ref 140–450)
PMV BLD AUTO: 11.7 FL (ref 6–12)
POTASSIUM BLD-SCNC: 4.1 MMOL/L (ref 3.5–5.2)
RBC # BLD AUTO: 3.65 10*6/MM3 (ref 3.77–5.28)
SODIUM BLD-SCNC: 135 MMOL/L (ref 136–145)
WBC NRBC COR # BLD: 13.29 10*3/MM3 (ref 3.4–10.8)

## 2019-05-16 PROCEDURE — 93005 ELECTROCARDIOGRAM TRACING: CPT | Performed by: THORACIC SURGERY (CARDIOTHORACIC VASCULAR SURGERY)

## 2019-05-16 PROCEDURE — 94799 UNLISTED PULMONARY SVC/PX: CPT

## 2019-05-16 PROCEDURE — 97166 OT EVAL MOD COMPLEX 45 MIN: CPT | Performed by: OCCUPATIONAL THERAPIST

## 2019-05-16 PROCEDURE — 25010000002 KETOROLAC TROMETHAMINE PER 15 MG: Performed by: NURSE PRACTITIONER

## 2019-05-16 PROCEDURE — 85027 COMPLETE CBC AUTOMATED: CPT | Performed by: THORACIC SURGERY (CARDIOTHORACIC VASCULAR SURGERY)

## 2019-05-16 PROCEDURE — 71045 X-RAY EXAM CHEST 1 VIEW: CPT

## 2019-05-16 PROCEDURE — 25010000003 CEFAZOLIN IN DEXTROSE 2-4 GM/100ML-% SOLUTION: Performed by: THORACIC SURGERY (CARDIOTHORACIC VASCULAR SURGERY)

## 2019-05-16 PROCEDURE — 25010000002 FUROSEMIDE PER 20 MG: Performed by: THORACIC SURGERY (CARDIOTHORACIC VASCULAR SURGERY)

## 2019-05-16 PROCEDURE — 93010 ELECTROCARDIOGRAM REPORT: CPT | Performed by: INTERNAL MEDICINE

## 2019-05-16 PROCEDURE — 82962 GLUCOSE BLOOD TEST: CPT

## 2019-05-16 PROCEDURE — 80162 ASSAY OF DIGOXIN TOTAL: CPT | Performed by: NURSE PRACTITIONER

## 2019-05-16 PROCEDURE — 97112 NEUROMUSCULAR REEDUCATION: CPT | Performed by: OCCUPATIONAL THERAPIST

## 2019-05-16 PROCEDURE — 25010000002 ENOXAPARIN PER 10 MG: Performed by: THORACIC SURGERY (CARDIOTHORACIC VASCULAR SURGERY)

## 2019-05-16 PROCEDURE — 97110 THERAPEUTIC EXERCISES: CPT

## 2019-05-16 PROCEDURE — 80048 BASIC METABOLIC PNL TOTAL CA: CPT | Performed by: THORACIC SURGERY (CARDIOTHORACIC VASCULAR SURGERY)

## 2019-05-16 PROCEDURE — 63710000001 INSULIN LISPRO (HUMAN) PER 5 UNITS: Performed by: THORACIC SURGERY (CARDIOTHORACIC VASCULAR SURGERY)

## 2019-05-16 RX ORDER — HYDRALAZINE HYDROCHLORIDE 25 MG/1
25 TABLET, FILM COATED ORAL EVERY 8 HOURS SCHEDULED
Status: DISCONTINUED | OUTPATIENT
Start: 2019-05-16 | End: 2019-05-16

## 2019-05-16 RX ORDER — METOPROLOL TARTRATE 50 MG/1
50 TABLET, FILM COATED ORAL EVERY 12 HOURS SCHEDULED
Status: DISCONTINUED | OUTPATIENT
Start: 2019-05-16 | End: 2019-05-20 | Stop reason: HOSPADM

## 2019-05-16 RX ORDER — OXYCODONE HYDROCHLORIDE 5 MG/1
10 TABLET ORAL EVERY 6 HOURS PRN
Status: DISCONTINUED | OUTPATIENT
Start: 2019-05-16 | End: 2019-05-20 | Stop reason: HOSPADM

## 2019-05-16 RX ORDER — GABAPENTIN 100 MG/1
100 CAPSULE ORAL EVERY 12 HOURS SCHEDULED
Status: COMPLETED | OUTPATIENT
Start: 2019-05-16 | End: 2019-05-18

## 2019-05-16 RX ORDER — GABAPENTIN 300 MG/1
300 CAPSULE ORAL EVERY 12 HOURS SCHEDULED
Status: DISCONTINUED | OUTPATIENT
Start: 2019-05-16 | End: 2019-05-16

## 2019-05-16 RX ORDER — POLYETHYLENE GLYCOL 3350 17 G/17G
17 POWDER, FOR SOLUTION ORAL 2 TIMES DAILY
Status: DISCONTINUED | OUTPATIENT
Start: 2019-05-16 | End: 2019-05-20 | Stop reason: HOSPADM

## 2019-05-16 RX ORDER — FUROSEMIDE 10 MG/ML
40 INJECTION INTRAMUSCULAR; INTRAVENOUS EVERY 12 HOURS
Status: DISCONTINUED | OUTPATIENT
Start: 2019-05-16 | End: 2019-05-19

## 2019-05-16 RX ORDER — TRAMADOL HYDROCHLORIDE 50 MG/1
25 TABLET ORAL EVERY 6 HOURS PRN
Status: DISCONTINUED | OUTPATIENT
Start: 2019-05-16 | End: 2019-05-20

## 2019-05-16 RX ORDER — KETOROLAC TROMETHAMINE 30 MG/ML
15 INJECTION, SOLUTION INTRAMUSCULAR; INTRAVENOUS ONCE
Status: COMPLETED | OUTPATIENT
Start: 2019-05-16 | End: 2019-05-16

## 2019-05-16 RX ADMIN — MUPIROCIN 1 APPLICATION: 20 OINTMENT TOPICAL at 20:57

## 2019-05-16 RX ADMIN — CEFAZOLIN SODIUM 2 G: 2 INJECTION, SOLUTION INTRAVENOUS at 01:17

## 2019-05-16 RX ADMIN — ENOXAPARIN SODIUM 40 MG: 40 INJECTION SUBCUTANEOUS at 17:21

## 2019-05-16 RX ADMIN — POLYETHYLENE GLYCOL 3350 17 G: 17 POWDER, FOR SOLUTION ORAL at 20:56

## 2019-05-16 RX ADMIN — METOPROLOL TARTRATE 25 MG: 25 TABLET ORAL at 08:25

## 2019-05-16 RX ADMIN — GUAIFENESIN 1200 MG: 600 TABLET, EXTENDED RELEASE ORAL at 08:26

## 2019-05-16 RX ADMIN — MUPIROCIN 1 APPLICATION: 20 OINTMENT TOPICAL at 08:26

## 2019-05-16 RX ADMIN — INSULIN LISPRO 2 UNITS: 100 INJECTION, SOLUTION INTRAVENOUS; SUBCUTANEOUS at 11:51

## 2019-05-16 RX ADMIN — INSULIN LISPRO 2 UNITS: 100 INJECTION, SOLUTION INTRAVENOUS; SUBCUTANEOUS at 06:55

## 2019-05-16 RX ADMIN — GABAPENTIN 300 MG: 300 CAPSULE ORAL at 10:57

## 2019-05-16 RX ADMIN — INSULIN LISPRO 2 UNITS: 100 INJECTION, SOLUTION INTRAVENOUS; SUBCUTANEOUS at 17:21

## 2019-05-16 RX ADMIN — SENNOSIDES AND DOCUSATE SODIUM 2 TABLET: 8.6; 5 TABLET ORAL at 20:56

## 2019-05-16 RX ADMIN — ATORVASTATIN CALCIUM 20 MG: 20 TABLET, FILM COATED ORAL at 08:26

## 2019-05-16 RX ADMIN — POLYETHYLENE GLYCOL 3350 17 G: 17 POWDER, FOR SOLUTION ORAL at 10:57

## 2019-05-16 RX ADMIN — CHLORHEXIDINE GLUCONATE 15 ML: 1.2 RINSE ORAL at 23:47

## 2019-05-16 RX ADMIN — POTASSIUM CHLORIDE 20 MEQ: 750 CAPSULE, EXTENDED RELEASE ORAL at 08:25

## 2019-05-16 RX ADMIN — GUAIFENESIN 1200 MG: 600 TABLET, EXTENDED RELEASE ORAL at 20:56

## 2019-05-16 RX ADMIN — CYCLOBENZAPRINE 10 MG: 10 TABLET, FILM COATED ORAL at 01:31

## 2019-05-16 RX ADMIN — OXYCODONE HYDROCHLORIDE 10 MG: 5 TABLET ORAL at 00:13

## 2019-05-16 RX ADMIN — METOPROLOL TARTRATE 50 MG: 50 TABLET, FILM COATED ORAL at 20:56

## 2019-05-16 RX ADMIN — HYDRALAZINE HYDROCHLORIDE 25 MG: 25 TABLET, FILM COATED ORAL at 08:25

## 2019-05-16 RX ADMIN — GABAPENTIN 100 MG: 100 CAPSULE ORAL at 20:56

## 2019-05-16 RX ADMIN — PANTOPRAZOLE SODIUM 40 MG: 40 TABLET, DELAYED RELEASE ORAL at 05:19

## 2019-05-16 RX ADMIN — DIGOXIN 250 MCG: 250 TABLET ORAL at 11:51

## 2019-05-16 RX ADMIN — KETOROLAC TROMETHAMINE 15 MG: 30 INJECTION INTRAMUSCULAR; INTRAVENOUS at 17:20

## 2019-05-16 RX ADMIN — FUROSEMIDE 40 MG: 10 INJECTION, SOLUTION INTRAMUSCULAR; INTRAVENOUS at 06:19

## 2019-05-16 RX ADMIN — CHLORHEXIDINE GLUCONATE 15 ML: 1.2 RINSE ORAL at 11:51

## 2019-05-16 RX ADMIN — INSULIN LISPRO 2 UNITS: 100 INJECTION, SOLUTION INTRAVENOUS; SUBCUTANEOUS at 20:57

## 2019-05-16 RX ADMIN — FUROSEMIDE 40 MG: 10 INJECTION, SOLUTION INTRAMUSCULAR; INTRAVENOUS at 20:56

## 2019-05-16 NOTE — PLAN OF CARE
Problem: Patient Care Overview  Goal: Plan of Care Review  Outcome: Ongoing (interventions implemented as appropriate)   05/16/19 0216   OTHER   Outcome Summary Pt. ran v-paced on heart monitor a-fib underlying. F/C in place. RIJ in place has blood return to both lumens. Pt. has 3 MS chest tubes. Blood sugar check. Pt. still has AV wires hooked to pulse generator but turned off at this time. Pt. did not sleep well even after giving pt. ocycodone and flexeril for pain at this time.    Plan of Care Review   Progress improving   Coping/Psychosocial   Plan of Care Reviewed With patient

## 2019-05-16 NOTE — PLAN OF CARE
"Problem: Patient Care Overview  Goal: Plan of Care Review  Outcome: Ongoing (interventions implemented as appropriate)   05/16/19 3735   OTHER   Outcome Summary patient lethargic, requires maxA x2 to get to sitting on EOB, modAx2 to stand, pt stands in flexed posture with head down, unable to weight shift or advance feet. Patient did not complete any exercises, does not follow any commands for motor movement even with constant verbal and tactile cueing. Patient oriented to self, states \"I don't know\" when asked other orientation questions. RN in room when attempting to get patient up, VSS but patient not consistently following commands or actively participating. Family aware, states patient took a few days after last surgery to \"come around\".    Coping/Psychosocial   Plan of Care Reviewed With patient         "

## 2019-05-16 NOTE — CONSULTS
Met with patient and family to discuss the  benefits of cardiac rehab.Patient is currently in cardiac rehab at [facility]. Stated they did not need any information at this time. She will return there after she is discharged. I explained if receiving home health would not be able to attend cardiac rehab until finished with home health. They verbalized understanding regarding this.

## 2019-05-16 NOTE — THERAPY TREATMENT NOTE
Acute Care - Physical Therapy Treatment Note  [facility]     Patient Name: Fernanda Molina  : 1940  MRN: [id number]  Today's Date: 2019  Onset of Illness/Injury or Date of Surgery: 19     Referring Physician: Quentin    Admit Date: 2019    Visit Dx:    ICD-10-CM ICD-9-CM   1. Impaired mobility Z74.09 799.89   2. Mitral valve disease I05.9 394.9     Patient Active Problem List   Diagnosis   • S/P mitral valve repair   • S/P tricuspid valve repair   • S/P Maze operation for atrial fibrillation   • Morbidly obese (CMS/[facility])   • Atrial fibrillation (CMS/[facility])   • Acquired hypothyroidism   • COPD (chronic obstructive pulmonary disease) (CMS/[facility])   • CAD (coronary artery disease), hx MI   • HAMLET on autoCPAP   • Hypercalcemia   • Renal calculus, right   • Hyperparathyroidism (CMS/[facility])   • Type 2 diabetes mellitus without complication, with long-term current use of insulin (CMS/[facility])   • Warfarin anticoagulation   • Medication monitoring encounter   • Medicare annual wellness visit, subsequent   • Pacemaker   • AICD (automatic cardioverter/defibrillator) present   • Right foot pain   • Chronic nonallergic rhinitis   • Pulmonary hypertension (CMS/[facility])   • Mitral valve disease       Therapy Treatment    Rehabilitation Treatment Summary     Row Name 19 0952             Treatment Time/Intention    Discipline  physical therapist  -EM      Document Type  therapy note (daily note)  -EM      Subjective Information  complains of;pain  -EM      Mode of Treatment  physical therapy  -EM      Patient/Family Observations  family in hallway during session, pt supine in bed, lethargic but arousable   -EM      Patient Effort  fair  -EM      Existing Precautions/Restrictions  cardiac;fall;sternal  -EM      Recorded by [EM] Mary Harper, PT 19 0958      Row Name 19 0952             Vital Signs    Pre Systolic BP Rehab  130  -EM      Pre Treatment Diastolic BP  74  -EM      Pretreatment Heart  "Rate (beats/min)  79  -EM      Pre SpO2 (%)  95  -EM      O2 Delivery Pre Treatment  supplemental O2  -EM      Post SpO2 (%)  95  -EM      O2 Delivery Post Treatment  supplemental O2  -EM      Recorded by [EM] Mary Harper, PT 05/16/19 0958      Row Name 05/16/19 0952             Cognitive Assessment/Intervention- PT/OT    Orientation Status (Cognition)  oriented to;person pt states \"I don't know\" when asked where she is   -EM      Follows Commands (Cognition)  follows one step commands;0-24% accuracy  -EM      Recorded by [EM] Mary Harper, PT 05/16/19 0958      Row Name 05/16/19 0952             Bed Mobility Assessment/Treatment    Bed Mobility Assessment/Treatment  supine-sit  -EM      Supine-Sit [place] (Bed Mobility)  maximum assist (25% patient effort);2 person assist  -EM      Sit-Supine [place] (Bed Mobility)  maximum assist (25% patient effort);2 person assist  -EM      Assistive Device (Bed Mobility)  head of bed elevated;draw sheet  -EM      Recorded by [EM] Mary Harper, PT 05/16/19 0958      Row Name 05/16/19 0952             Sit-Stand Transfer    Sit-Stand [place] (Transfers)  moderate assist (50% patient effort);2 person assist pt stands in flexed posture, head down   -EM      Recorded by [EM] Mary Harper, PT 05/16/19 0958      Row Name 05/16/19 0952             Stand-Sit Transfer    Stand-Sit [place] (Transfers)  moderate assist (50% patient effort);2 person assist  -EM      Recorded by [EM] Mary Harper, PT 05/16/19 0958      Row Name 05/16/19 0952             Gait/Stairs Assessment/Training    Comment (Gait/Stairs)  pt states \"I can't\" and does not weight shift or move her feet   -EM      Recorded by [EM] Mary Harper, PT 05/16/19 0958      Row Name 05/16/19 0952             Therapeutic Exercise    Comment (Therapeutic Exercise)  attempted but patient not following any commands for exercises, even with constant verbal and tactile " "cueing   -EM      Recorded by [EM] Mary Harper, PT 05/16/19 0958      Row Name 05/16/19 0952             Static Sitting Balance    Level of [place] (Unsupported Sitting, Static Balance)  contact guard assist;minimal assist, 75% patient effort  -EM      Sitting Position (Unsupported Sitting, Static Balance)  sitting on edge of bed  -EM      Recorded by [EM] Mary Harper, PT 05/16/19 0958      Row Name 05/16/19 0952             Static Standing Balance    Level of [place] (Supported Standing, Static Balance)  maximal assist, 25 to 49% patient effort  -EM      Recorded by [EM] Mary Harper, PT 05/16/19 0958      Row Name 05/16/19 0952             Positioning and Restraints    Pre-Treatment Position  in bed  -EM      Post Treatment Position  bed  -EM      In Bed  side lying left;call light within reach;exit alarm on;with family/caregiver nsg in room during PT session   -EM      Recorded by [EM] Mary Harper, PT 05/16/19 0958      Row Name 05/16/19 0952             Pain Scale: Numbers Pre/Post-Treatment    Pain Scale: Numbers, Pretreatment  -- pt c/o of back pain but states \"I can't\" when asked to rate  -EM      Pain Location  back  -EM      Pain Intervention(s)  Repositioned;Medication (See MAR)  -EM      Recorded by [EM] Mary Harper, PT 05/16/19 0958      Row Name                Wound 05/14/19 0957 chest incision    Wound - Properties Group Date first assessed: 05/14/19 [SR] Time first assessed: 0957 [SR] Location: chest [SR] Type: incision [SR] Recorded by:  [SR] Shanice Cast RN 05/14/19 0957    Row Name 05/16/19 0952             Plan of Care Review    Plan of Care Reviewed With  patient;family  -EM      Recorded by [EM] Mary Harper, PT 05/16/19 0958      Row Name 05/16/19 0952             Outcome Summary/Treatment Plan (PT)    Anticipated Discharge Disposition (PT)  skilled nursing facility  -EM      Recorded by [EM] Mary Harper, PT 05/16/19 0958   " "     User Key  (r) = Recorded By, (t) = Taken By, (c) = Cosigned By    Initials Name Effective Dates Discipline     Mary Harper PT 04/03/18 -  PT    Shanice Sierra RN 06/16/16 -  Nurse          Wound 05/14/19 0957 chest incision (Active)   Dressing Appearance dry;intact;no drainage 5/16/2019  8:45 AM   Closure ART 5/16/2019  8:45 AM   Base dressing in place, unable to visualize 5/16/2019  8:45 AM   Drainage Amount none 5/16/2019  8:45 AM   Dressing Care, Wound foam;low-adherent 5/16/2019  4:25 AM           Physical Therapy Education     Title: PT OT SLP Therapies (In Progress)     Topic: Physical Therapy (In Progress)     Point: Mobility training (In Progress)     Learning Progress Summary           Patient Nonacceptance, E, NL,NR by EM at 5/16/2019  9:59 AM    Acceptance, E, NR by EM at 5/15/2019  9:15 AM                   Point: Home exercise program (In Progress)     Learning Progress Summary           Patient Acceptance, E, NR by EM at 5/15/2019  9:15 AM                               User Key     Initials Effective Dates Name Provider Type Discipline     04/03/18 -  Mary Harper PT Physical Therapist PT                PT Recommendation and Plan  Anticipated Discharge Disposition (PT): skilled nursing facility  Planned Therapy Interventions (PT Eval): bed mobility training, gait training, home exercise program  Therapy Frequency (PT Clinical Impression): daily  Outcome Summary/Treatment Plan (PT)  Anticipated Discharge Disposition (PT): skilled nursing facility  Plan of Care Reviewed With: patient  Outcome Summary: patient lethargic, requires maxA x2 to get to sitting on EOB, modAx2 to stand, pt stands in flexed posture with head down, unable to weight shift or advance feet. Patient did not complete any exercises, does not follow any commands for motor movement even with constant verbal and tactile cueing. Patient oriented to self, states \"I don't know\" when asked other orientation " "questions. RN in room when attempting to get patient up, VSS but patient not consistently following commands or actively participating. Family aware, states patient took a few days after last surgery to \"come around\".   Outcome Measures     Row Name 05/16/19 1000 05/15/19 0900          How much help from another person do you currently need...    Turning from your back to your side while in flat bed without using bedrails?  2  -EM  2  -EM     Moving from lying on back to sitting on the side of a flat bed without bedrails?  2  -EM  2  -EM     Moving to and from a bed to a chair (including a wheelchair)?  2  -EM  2  -EM     Standing up from a chair using your arms (e.g., wheelchair, bedside chair)?  2  -EM  2  -EM     Climbing 3-5 steps with a railing?  1  -EM  1  -EM     To walk in hospital room?  1  -EM  2  -EM     AM-PAC 6 Clicks Score  10  -EM  11  -EM        Functional Assessment    Outcome Measure Options  --  AM-PAC 6 Clicks Basic Mobility (PT)  -EM       User Key  (r) = Recorded By, (t) = Taken By, (c) = Cosigned By    Initials Name Provider Type    EM Mary Harper PT Physical Therapist         Time Calculation:   PT Charges     Row Name 05/16/19 1002             Time Calculation    Start Time  0921  -EM      Stop Time  0945  -EM      Time Calculation (min)  24 min  -EM      PT Received On  05/16/19  -EM      PT - Next Appointment  05/17/19  -EM         Time Calculation- PT    Total Timed Code Minutes- PT  24 minute(s)  -EM        User Key  (r) = Recorded By, (t) = Taken By, (c) = Cosigned By    Initials Name Provider Type    EM Mary Harper PT Physical Therapist        Therapy Charges for Today     Code Description Service Date Service Provider Modifiers Qty    40623126653 HC PT EVAL MOD COMPLEXITY 2 5/15/2019 Mary Harper, PT GP 1    10217222974 HC PT THER PROC EA 15 MIN 5/15/2019 Mary Harper, PT GP 1    32725565551 HC PT THER SUPP EA 15 MIN 5/15/2019 Mary Harper, " PT GP 1    91448353383 HC PT THER PROC EA 15 MIN 5/16/2019 Mary Harper, PT GP 2    06186899446 HC PT THER SUPP EA 15 MIN 5/16/2019 Mary Harper, PT GP 1          PT G-Codes  Outcome Measure Options: AM-PAC 6 Clicks Basic Mobility (PT)  AM-PAC 6 Clicks Score: 10    Mary Harper, PT  5/16/2019

## 2019-05-16 NOTE — DISCHARGE PLACEMENT REQUEST
"Franki Molina (78 y.o. Female)     Date of Birth Social Security Number Address Home Phone MRN    1940  [id number] [address] [phone] [id number]    [employer] Marital Status          Non-Yarsani        Admission Date Admission Type Admitting Provider Attending Provider Department, Room/Bed    5/14/19 Elective Ben Saavedra MD Pollock, Samuel B., MD [facility] CARDIOVASC UNIT, 2230/1    Discharge Date Discharge Disposition Discharge Destination                       Attending Provider:  Ben Saavedra MD    Allergies:  Corticosteroids, Adhesive Tape, Hydrocodone, Lisinopril    Isolation:  None   Infection:  None   Code Status:  CPR    Ht:  157.5 cm (62\")   Wt:  89 kg (196 lb 2 oz)    Admission Cmt:  None   Principal Problem:  Mitral valve disease [I05.9] More...                 Active Insurance as of 5/14/2019     Primary Coverage     Payor Plan Insurance Group Employer/Plan Group    [facility] MEDICARE REPLACEMENT [facility] [id number]     Payor Plan Address Payor Plan Phone Number Payor Plan Fax Number Effective Dates    [address] [address]   1/1/2016 - None Entered    [facility] [id number]       Subscriber Name Subscriber Birth Date Member ID       FRANKI MOLINA 1940 [id number]                 Emergency Contacts      (Rel.) Home Phone Work Phone Mobile Phone    Crispin Molina (Spouse) [phone] -- [phone]              "

## 2019-05-16 NOTE — THERAPY EVALUATION
Acute Care - Occupational Therapy Initial Evaluation  [facility]     Patient Name: Fernanda Molina  : 1940  MRN: [id number]  Today's Date: 2019  Onset of Illness/Injury or Date of Surgery: 19  Date of Referral to OT: 19  Referring Physician: Ana    Admit Date: 2019       ICD-10-CM ICD-9-CM   1. Impaired mobility Z74.09 799.89   2. Mitral valve disease I05.9 394.9     Patient Active Problem List   Diagnosis   • S/P mitral valve repair   • S/P tricuspid valve repair   • S/P Maze operation for atrial fibrillation   • Morbidly obese (CMS/[facility])   • Atrial fibrillation (CMS/[facility])   • Acquired hypothyroidism   • COPD (chronic obstructive pulmonary disease) (CMS/[facility])   • CAD (coronary artery disease), hx MI   • HAMLET on autoCPAP   • Hypercalcemia   • Renal calculus, right   • Hyperparathyroidism (CMS/[facility])   • Type 2 diabetes mellitus without complication, with long-term current use of insulin (CMS/[facility])   • Warfarin anticoagulation   • Medication monitoring encounter   • Medicare annual wellness visit, subsequent   • Pacemaker   • AICD (automatic cardioverter/defibrillator) present   • Right foot pain   • Chronic nonallergic rhinitis   • Pulmonary hypertension (CMS/[facility])   • Mitral valve disease     Past Medical History:   Diagnosis Date   • AICD (automatic cardioverter/defibrillator) present    • Arthritis    • Atrial fibrillation (CMS/[facility])    • CHF (congestive heart failure) (CMS/[facility])    • Congenital heart disease    • COPD (chronic obstructive pulmonary disease) (CMS/[facility])    • Coronary artery disease    • Diabetes mellitus (CMS/[facility])    • GERD (gastroesophageal reflux disease)    • High cholesterol    • History of kidney stones    • History of melanoma 2002    RIGHT KNEE   • History of MI (myocardial infarction)    • Hypertension    • Hypothyroidism    • Lymph edema    • On anticoagulant therapy    • HAMLET on CPAP    • PONV (postoperative nausea and vomiting)    • Shortness of breath    •  Spinal stenosis    • Valvular heart disease 2015    S/p MVR & TVR      Past Surgical History:   Procedure Laterality Date   • CARDIAC CATHETERIZATION     • CARDIAC DEFIBRILLATOR PLACEMENT      [employer]   • CARDIAC DEFIBRILLATOR PLACEMENT      [employer]   •  SECTION     •  SECTION     • CHOLECYSTECTOMY     • COLONOSCOPY     • EPIDURAL BLOCK  [id number]    Lumbar    • EXTRACORPOREAL SHOCK WAVE LITHOTRIPSY (ESWL) Right 2017   • EXTRACORPOREAL SHOCK WAVE LITHOTRIPSY (ESWL) Right 2017    Procedure: RT EXTRACORPOREAL SHOCKWAVE LITHOTRIPSY;  Surgeon: Moisés Everett MD;  Location: [facility];  Service:    • EYE SURGERY Right 09/10/2007    Cataract   • EYE SURGERY Left 10/29/2007    Cataract   • JOINT REPLACEMENT Left 2007    Knee   • JOINT REPLACEMENT Right 2010    Knee   • KNEE ARTHROSCOPY     • MAZE PROCEDURE  2015    Dr. Saavedra   • MITRAL VALVE REPAIR/REPLACEMENT     • MITRAL VALVE REPAIR/REPLACEMENT N/A 2019    Procedure: ALINE RE-OP STERNOTOMY MITRAL VALVE REPLACEMENT AND PRP;  Surgeon: Ben Saavedra MD;  Location: [facility];  Service: Cardiothoracic   • MO ERCP DX COLLECTION SPECIMEN BRUSHING/WASHING N/A 2016    Procedure: ENDOSCOPIC RETROGRADE CHOLANGIOPANCREATOGRAPHY WITH SPHINCTEROTOMY AND BALLOON SWEEP OF CBD;  Surgeon: Lucas Campos MD;  Location: [facility] ENDOSCOPY;  Service: Gastroenterology   • SKIN CANCER EXCISION      MELANOMA RIGHT KNEE AREA -    • TRICUSPID VALVE SURGERY  2015    Dr. Saavedra   • URETEROSCOPY LASER LITHOTRIPSY WITH STENT INSERTION Right 11/15/2017    Procedure: CYSTOSCOPY; RIGHT URETEROSCOPY LASER LITHOTRIPSY WITH STENT INSERTION;  Surgeon: Moisés Everett MD;  Location: [facility];  Service:           OT ASSESSMENT FLOWSHEET (last 12 hours)      Occupational Therapy Evaluation     Row Name 19 1131                   OT Evaluation Time/Intention     Subjective Information  complains of;fatigue;pain  -SG        Document Type  evaluation  -SG        Mode of Treatment  individual therapy;occupational therapy  -SG        Patient Effort  poor  -SG        Symptoms Noted During/After Treatment  none  -SG        Comment  Pt needs max encouragement  -SG           General Information    Patient Profile Reviewed?  yes  -SG        Referring Physician  Ana  -SG        Patient Observations  poorly cooperative;lethargic  -SG        General Observations of Patient  Pt spuine in bed, does not answer questions; family present  -SG        Prior Level of Function  independent:;ADL's Per family  -SG        Equipment Currently Used at Home  none  -SG        Pertinent History of Current Functional Problem  MVR  -SG        Existing Precautions/Restrictions  cardiac;fall;sternal  -SG        Limitations/Impairments  safety/cognitive  -SG        Barriers to Rehab  medically complex;cognitive status;previous functional deficit  -SG           Relationship/Environment    Lives With  spouse  -SG           Cognitive Assessment/Intervention- PT/OT    Orientation Status (Cognition)  oriented to;person  -SG        Follows Commands (Cognition)  follows one step commands;0-24% accuracy  -SG        Safety Deficit (Cognitive)  severe deficit;insight into deficits/self awareness  -SG           Bed Mobility Assessment/Treatment    Bed Mobility Assessment/Treatment  supine-sit;sit-supine  -SG        Supine-Sit [place] (Bed Mobility)  maximum assist (25% patient effort);verbal cues;nonverbal cues (demo/gesture)  -SG        Sit-Supine [place] (Bed Mobility)  maximum assist (25% patient effort);verbal cues;nonverbal cues (demo/gesture)  -SG        Bed Mobility, Safety Issues  cognitive deficits limit understanding;decreased use of arms for pushing/pulling;decreased use of legs for bridging/pushing;impaired trunk control for bed mobility  -SG        Comment (Bed Mobility)  Pt does not attempt  to assist with bed mobility  -SG           Transfer Assessment/Treatment    Comment (Transfers)  Did not attempt, poor sitting balance and pt not following commands  -SG           ADL Assessment/Intervention    BADL Assessment/Intervention  -- Dep at this time  -SG           General ROM    GENERAL ROM COMMENTS  Does not follow commands for ROM but appear WFL  -SG           MMT (Manual Muscle Testing)    General MMT Comments  Gross gen weakness 3/5  -SG           Motor Assessment/Interventions    Additional Documentation  Balance (Group);Therapeutic Exercise (Group)  -SG           Therapeutic Exercise    Therapeutic Exercise  seated, upper extremities  -SG        Additional Documentation  Therapeutic Exercise (Row)  -SG           Upper Extremity Seated Therapeutic Exercise    Performed, Seated Upper Extremity (Therapeutic Exercise)  shoulder flexion/extension;scapular protraction/retraction;elbow flexion/extension  -SG        Exercise Type, Seated Upper Extremity (Therapeutic Exercise)  AAROM (active assistive range of motion)  -        Expected Outcomes, Seated Upper Extremity (Therapeutic Exercise)  improve functional tolerance, self-care activity;improve functional tolerance, single extremity activity  -SG        Sets/Reps Detail, Seated Upper Extremity (Therapeutic Exercise)  1/10  -SG        Comment, Seated Upper Extremity (Therapeutic Exercise)  Max assist for simple ther ex, pt closing eyes and leaning posteriorly  -SG           Balance    Balance  static sitting balance  -SG           Static Sitting Balance    Level of [place] (Unsupported Sitting, Static Balance)  minimal assist, 75% patient effort;moderate assist, 50 to 74% patient effort  -SG        Sitting Position (Unsupported Sitting, Static Balance)  sitting on edge of bed  -SG        Time Able to Maintain Position (Unsupported Sitting, Static Balance)  4 to 5 minutes  -SG           Positioning and Restraints    Pre-Treatment Position  in bed   -SG        Post Treatment Position  bed  -SG        In Bed  supine;call light within reach;encouraged to call for assist;exit alarm on;with family/caregiver;with nsg  -SG           Pain Scale: Numbers Pre/Post-Treatment    Pain Scale: Numbers, Pretreatment  -- Does not state but yells out with mvt  -SG           Wound 05/14/19 0957 chest incision    Wound - Properties Group Date first assessed: 05/14/19  -SR Time first assessed: 0957  -SR Location: chest  -SR Type: incision  -SR       Plan of Care Review    Plan of Care Reviewed With  patient;spouse;daughter  -SG           Clinical Impression (OT)    Date of Referral to OT  05/16/19  -SG        OT Diagnosis  Need for assist with personal care, dec strength and endurance  -SG        Criteria for Skilled Therapeutic Interventions Met (OT Eval)  yes;treatment indicated  -SG        Rehab Potential (OT Eval)  good, to achieve stated therapy goals  -SG        Therapy Frequency (OT Eval)  5 times/wk  -SG        Anticipated Discharge Disposition (OT)  skilled nursing facility  -SG           OT Goals    Transfer Goal Selection (OT)  transfer, OT goal 1  -SG        Grooming Goal Selection (OT)  grooming, OT goal 1  -SG        Balance Goal Selection (OT)  balance, OT goal 1  -SG        Endurance Goal Selection (OT)  endurance, OT goal 1  -SG        Additional Documentation  Balance Goal Selection (OT) (Row);Endurance Goal Selection (OT) (Row);Grooming Goal Selection (OT) (Row)  -SG           Transfer Goal 1 (OT)    Activity/Assistive Device (Transfer Goal 1, OT)  toilet;commode, bedside without drop arms  -SG        [place] Level/Cues Needed (Transfer Goal 1, OT)  maximum assist (25-49% patient effort)  -SG        Time Frame (Transfer Goal 1, OT)  short term goal (STG);1 week  -SG        Progress/Outcome (Transfer Goal 1, OT)  goal ongoing  -SG           Grooming Goal 1 (OT)    Activity/Device (Grooming Goal 1, OT)  grooming skills, all  -SG        [place] (Grooming  Goal 1, OT)  minimum assist (75% or more patient effort) Sitting up CGA at EOB  -SG        Time Frame (Grooming Goal 1, OT)  short term goal (STG);1 week  -SG        Progress/Outcome (Grooming Goal 1, OT)  goal ongoing  -SG           Balance Goal 1 (OT)    Activity/Assistive Device (Balance Goal 1, OT)  sitting, static  -SG        [place] Level/Cues Needed (Balance Goal 1, OT)  contact guard assist At EOB  -SG        Time Frame (Balance Goal 1, OT)  short term goal (STG);1 week  -SG        Progress/Outcomes (Balance Goal 1, OT)  goal ongoing  -SG            Endurance Goal 1 (OT)    Activity Level (Endurance Goal 1, OT)  to increase;endurance 2 fair +  -SG        Time Frame (Endurance Goal 1, OT)  short term goal (STG);1 week  -SG        Progress/Outcome (Endurance Goal 1, OT)  goal ongoing  -SG          User Key  (r) = Recorded By, (t) = Taken By, (c) = Cosigned By    Initials Name Effective Dates    Idalia Palacios OTR 12/26/18 -     Shanice Sierra RN 06/16/16 -          Occupational Therapy Education     Title: PT OT SLP Therapies (In Progress)     Topic: Occupational Therapy (In Progress)     Point: ADL training (Done)     Description: Instruct learner(s) on proper safety adaptation and remediation techniques during self care or transfers.   Instruct in proper use of assistive devices.    Learning Progress Summary           Family Acceptance, E,TB, VU by  at 5/16/2019 11:36 AM                               User Key     Initials Effective Dates Name Provider Type Discipline     12/26/18 -  Idalia Allison OTR Occupational Therapist OT                  OT Recommendation and Plan  Outcome Summary/Treatment Plan (OT)  Anticipated Discharge Disposition (OT): skilled nursing facility  Therapy Frequency (OT Eval): 5 times/wk  Plan of Care Review  Plan of Care Reviewed With: patient  Plan of Care Reviewed With: patient  Outcome Summary: Pt presents to OT eval following MVR; per family pt was indep PTA  and performed ADLs without assist. This date pt does not follow any commands or respond to questions appropraitely and yells out in pain with mvt. Pt requires max A for UE ther ex at EOB and would be dep for ADLs currenlty. Pt would benefit from OT to address deficits, rec d/c to SNF at this point.    Outcome Measures     Row Name 05/16/19 1100 05/16/19 1000 05/15/19 0900       How much help from another person do you currently need...    Turning from your back to your side while in flat bed without using bedrails?  --  2  -EM  2  -EM    Moving from lying on back to sitting on the side of a flat bed without bedrails?  --  2  -EM  2  -EM    Moving to and from a bed to a chair (including a wheelchair)?  --  2  -EM  2  -EM    Standing up from a chair using your arms (e.g., wheelchair, bedside chair)?  --  2  -EM  2  -EM    Climbing 3-5 steps with a railing?  --  1  -EM  1  -EM    To walk in hospital room?  --  1  -EM  2  -EM    AM-PAC 6 Clicks Score  --  10  -EM  11  -EM       How much help from another is currently needed...    Putting on and taking off regular lower body clothing?  1  -SG  --  --    Bathing (including washing, rinsing, and drying)  1  -SG  --  --    Toileting (which includes using toilet bed pan or urinal)  1  -SG  --  --    Putting on and taking off regular upper body clothing  1  -SG  --  --    Taking care of personal grooming (such as brushing teeth)  1  -SG  --  --    Eating meals  1  -SG  --  --    Score  6  -SG  --  --       Functional Assessment    Outcome Measure Options  AM-PAC 6 Clicks Daily Activity (OT)  -SG  --  AM-PAC 6 Clicks Basic Mobility (PT)  -EM      User Key  (r) = Recorded By, (t) = Taken By, (c) = Cosigned By    Initials Name Provider Type    Idalia Palacios OTR Occupational Therapist    EM Mary Harper PT Physical Therapist          Time Calculation:   Time Calculation- OT     Row Name 05/16/19 1138             Time Calculation- OT    OT Start Time  1040  -       OT Stop Time  1057  -      OT Time Calculation (min)  17 min  -      Total Timed Code Minutes- OT  8 minute(s)  -      OT Received On  05/16/19  -      OT Goal Re-Cert Due Date  05/23/19  -        User Key  (r) = Recorded By, (t) = Taken By, (c) = Cosigned By    Initials Name Provider Type     Idalia Allison OTR Occupational Therapist        Therapy Charges for Today     Code Description Service Date Service Provider Modifiers Qty    44996567044 HC OT EVAL MOD COMPLEXITY 2 5/16/2019 Idalia Allison OTR GO 1    [id number]  OT NEUROMUSC RE EDUCATION EA 15 MIN 5/16/2019 Idalia Allison OTR GO 1               REAGAN Graves  5/16/2019

## 2019-05-16 NOTE — PLAN OF CARE
Problem: Patient Care Overview  Goal: Plan of Care Review   05/16/19 1137   OTHER   Outcome Summary Pt presents to OT eval following MVR; per family pt was indep PTA and performed ADLs without assist. This date pt does not follow any commands or respond to questions appropraitely and yells out in pain with mvt. Pt requires max A for UE ther ex at EOB and would be dep for ADLs currenlty. Pt would benefit from OT to address deficits, rec d/c to SNF at this point.   Coping/Psychosocial   Plan of Care Reviewed With patient

## 2019-05-16 NOTE — PROGRESS NOTES
Discharge Planning Assessment  [facility]     Patient Name: Fernanda Molina  MRN: [id number]  Today's Date: 5/16/2019    Admit Date: 5/14/2019    Discharge Needs Assessment     Row Name 05/16/19 1449       Living Environment    Lives With  spouse    Name(s) of Who Lives With Patient  Crispin Molina, spouse    Current Living Arrangements  home/apartment/condo    Primary Care Provided by  self    Provides Primary Care For  no one    Family Caregiver if Needed  spouse    Family Caregiver Names  Crispin Molina, spouse    Quality of Family Relationships  helpful;involved;supportive    Able to Return to Prior Arrangements  yes       Resource/Environmental Concerns    Resource/Environmental Concerns  none       Transition Planning    Patient/Family Anticipates Transition to  home with family    Patient/Family Anticipated Services at Transition  home health care;skilled nursing    Transportation Anticipated  family or friend will provide       Discharge Needs Assessment    Concerns to be Addressed  discharge planning    Equipment Currently Used at Home  bipap/cpap;glucometer;nebulizer    Anticipated Changes Related to Illness  none    Outpatient/Agency/Support Group Needs  skilled nursing facility    Discharge Facility/Level of Care Needs  home with home health;nursing facility, skilled    Offered/Gave Vendor List  yes        Discharge Plan     Row Name 05/16/19 1458       Plan    Plan  Home w/ [facility] vs SNF    Plan Comments  Spoke with Pt, spouse Crispin Molina [phone] & daughter Brenda Welch at bedside.  CCP introduced self and role.  Pt confirmed information on face sheet.  Pt stated she is IADL'S, retired & drives.  Pt lives in a house with two entrance steps with her spouse.  Pt confirms [facility] as [facility] in [place].  Pt denies issues with affording her medications.  Pt has used home health in the past.  Pt has been to [facility] in past.  [place] [facility]  Pt daughter request [facility] follow Pt  when discharged.  Referral for HH called to Freya at 2:57 PM.  CCP will continue to follow Pt progress with physical therapy…KATARZYNA PATEL/CCP        Destination      No service coordination in this encounter.      Durable Medical Equipment      No service coordination in this encounter.      Dialysis/Infusion      No service coordination in this encounter.      Home Medical Care      Service Provider Request Status Selected Services Address Phone Number Fax Number    [facility] Pending - Request Sent N/A [address] [address] [phone]      Therapy      No service coordination in this encounter.      Community Resources      No service coordination in this encounter.          Demographic Summary     Row Name 05/16/19 1448       General Information    Admission Type  inpatient    Arrived From  operating room    Required Notices Provided  Important Message from Medicare    Referral Source  admission list;physician    Reason for Consult  discharge planning    Preferred Language  English     Used During This Interaction  no       Contact Information    Permission Granted to Share Info With  , insurance        Functional Status     Row Name 05/16/19 1448       Functional Status    Usual Activity Tolerance  good    Current Activity Tolerance  fair       Functional Status, IADL    Medications  independent    Meal Preparation  independent    Housekeeping  independent    Laundry  independent    Shopping  independent       Mental Status    General Appearance WDL  WDL       Mental Status Summary    Recent Changes in Mental Status/Cognitive Functioning  no changes       Employment/    Employment Status  retired    Current or Previous Occupation  education        Psychosocial    No documentation.       Abuse/Neglect     Row Name 05/16/19 1449       Personal Safety    Feels Unsafe at Home or Work/School  no    Feels Threatened  by Someone  no    Does Anyone Try to Keep You From Having Contact with Others or Doing Things Outside Your Home?  no    Physical Signs of Abuse Present  no        Legal    No documentation.       Substance Abuse    No documentation.       Patient Forms    No documentation.           [place] RN

## 2019-05-16 NOTE — PROGRESS NOTES
LOS: 2 days   Patient Care Team:  Rashid Ge MD as PCP - General (Family Medicine)  Thong Jones MD as Consulting Physician (Cardiology)  Mahin Galeas MD as Consulting Physician (Sleep Medicine)  Lara Ortiz MD as Consulting Physician (Obstetrics and Gynecology)  Betty Horowitz MD as Consulting Physician (Dermatology)  Ben Saavedra MD as Surgeon (Cardiothoracic Surgery)  Mahin Galeas MD as Consulting Physician (Pulmonary Disease)  Chirag Rai MD as Consulting Physician (Ophthalmology)  Moisés Everett MD as Consulting Physician (Urology)    Chief Complaint: post op    Subjective      Vital Signs  Temp:  [97.7 °F (36.5 °C)-98.4 °F (36.9 °C)] 98 °F (36.7 °C)  Heart Rate:  [] 79  Resp:  [18-20] 18  BP: (122-151)/(73-88) 150/79  Body mass index is 35.87 kg/m².    Intake/Output Summary (Last 24 hours) at 5/16/2019 0854  Last data filed at 5/16/2019 0737  Gross per 24 hour   Intake 0 ml   Output 1460 ml   Net -1460 ml     No intake/output data recorded.    Chest tube drainage last 8 hours 85        05/14/19  0550 05/16/19  0658   Weight: 91.6 kg (202 lb) 89 kg (196 lb 2 oz)         Objective    Results Review:        WBC WBC   Date Value Ref Range Status   05/16/2019 13.29 (H) 3.40 - 10.80 10*3/mm3 Final   05/15/2019 10.76 3.40 - 10.80 10*3/mm3 Final   05/14/2019 11.66 (H) 3.40 - 10.80 10*3/mm3 Final   05/14/2019 11.25 (H) 3.40 - 10.80 10*3/mm3 Final      HGB Hemoglobin   Date Value Ref Range Status   05/16/2019 10.2 (L) 12.0 - 15.9 g/dL Final   05/15/2019 10.2 (L) 12.0 - 15.9 g/dL Final   05/14/2019 10.8 (L) 12.0 - 15.9 g/dL Final   05/14/2019 10.7 (L) 12.0 - 15.9 g/dL Final   05/14/2019 9.2 (L) 12.0 - 17.0 g/dL Final   05/14/2019 9.2 (L) 12.0 - 17.0 g/dL Final   05/14/2019 9.5 (L) 12.0 - 17.0 g/dL Final   05/14/2019 9.5 (L) 12.0 - 17.0 g/dL Final   05/14/2019 12.2 12.0 - 17.0 g/dL Final      HCT Hematocrit   Date Value Ref Range Status    05/16/2019 33.7 (L) 34.0 - 46.6 % Final   05/15/2019 32.2 (L) 34.0 - 46.6 % Final   05/14/2019 33.4 (L) 34.0 - 46.6 % Final   05/14/2019 33.5 (L) 34.0 - 46.6 % Final   05/14/2019 27 (L) 38 - 51 % Final   05/14/2019 27 (L) 38 - 51 % Final   05/14/2019 28 (L) 38 - 51 % Final   05/14/2019 28 (L) 38 - 51 % Final   05/14/2019 36 (L) 38 - 51 % Final      Platelets Platelets   Date Value Ref Range Status   05/16/2019 152 140 - 450 10*3/mm3 Final   05/15/2019 169 140 - 450 10*3/mm3 Final   05/14/2019 206 140 - 450 10*3/mm3 Final   05/14/2019 171 140 - 450 10*3/mm3 Final        PT/INR:    Protime   Date Value Ref Range Status   05/15/2019 16.7 (H) 11.7 - 14.2 Seconds Final   05/14/2019 18.9 (H) 11.7 - 14.2 Seconds Final   05/14/2019 16.3 (H) 12.8 - 15.2 seconds Final     Comment:     Serial Number: [id number]:  [id number]   05/14/2019 16.1 (H) 11.7 - 14.2 Seconds Final   /  INR   Date Value Ref Range Status   05/15/2019 1.38 (H) 0.90 - 1.10 Final   05/14/2019 1.62 (H) 0.90 - 1.10 Final   05/14/2019 1.4 (H) 0.8 - 1.2 Final   05/14/2019 1.32 (H) 0.90 - 1.10 Final       Sodium Sodium   Date Value Ref Range Status   05/16/2019 135 (L) 136 - 145 mmol/L Final   05/15/2019 144 136 - 145 mmol/L Final   05/14/2019 143 136 - 145 mmol/L Final   05/14/2019 143 136 - 145 mmol/L Final      Potassium Potassium   Date Value Ref Range Status   05/16/2019 4.1 3.5 - 5.2 mmol/L Final   05/15/2019 4.2 3.5 - 5.2 mmol/L Final   05/15/2019 3.7 3.5 - 5.2 mmol/L Final   05/14/2019 3.6 3.5 - 5.2 mmol/L Final   05/14/2019 4.0 3.5 - 5.2 mmol/L Final   05/14/2019 3.5 3.5 - 5.2 mmol/L Final      Chloride Chloride   Date Value Ref Range Status   05/16/2019 101 98 - 107 mmol/L Final   05/15/2019 109 (H) 98 - 107 mmol/L Final   05/14/2019 107 98 - 107 mmol/L Final   05/14/2019 104 98 - 107 mmol/L Final      Bicarbonate CO2   Date Value Ref Range Status   05/16/2019 22.9 22.0 - 29.0 mmol/L Final   05/15/2019 23.7 22.0 - 29.0 mmol/L Final   05/14/2019 23.3  22.0 - 29.0 mmol/L Final   05/14/2019 20.7 (L) 22.0 - 29.0 mmol/L Final      BUN BUN   Date Value Ref Range Status   05/16/2019 16 8 - 23 mg/dL Final   05/15/2019 17 8 - 23 mg/dL Final   05/14/2019 19 8 - 23 mg/dL Final   05/14/2019 20 8 - 23 mg/dL Final      Creatinine Creatinine   Date Value Ref Range Status   05/16/2019 0.68 0.57 - 1.00 mg/dL Final   05/15/2019 0.77 0.57 - 1.00 mg/dL Final   05/14/2019 0.77 0.57 - 1.00 mg/dL Final   05/14/2019 0.80 0.57 - 1.00 mg/dL Final      Calcium Calcium   Date Value Ref Range Status   05/16/2019 10.6 (H) 8.6 - 10.5 mg/dL Final   05/15/2019 9.8 8.6 - 10.5 mg/dL Final   05/14/2019 9.3 8.6 - 10.5 mg/dL Final   05/14/2019 10.2 8.6 - 10.5 mg/dL Final      Magnesium Magnesium   Date Value Ref Range Status   05/15/2019 2.4 1.6 - 2.4 mg/dL Final   05/14/2019 2.0 1.6 - 2.4 mg/dL Final   05/14/2019 2.1 1.6 - 2.4 mg/dL Final   05/14/2019 1.9 1.6 - 2.4 mg/dL Final            atorvastatin 20 mg Oral Once per day on Mon Thu   chlorhexidine 15 mL Mouth/Throat Q12H   digoxin 250 mcg Oral Daily   enoxaparin 40 mg Subcutaneous Daily   furosemide 40 mg Intravenous Q12H   gabapentin 300 mg Oral Q12H   guaiFENesin 1,200 mg Oral Q12H   hydrALAZINE 25 mg Oral Q8H   insulin lispro 0-9 Units Subcutaneous 4x Daily With Meals & Nightly   metoprolol tartrate 25 mg Oral Q12H   mupirocin  Each Nare BID   pantoprazole 40 mg Oral Q AM   potassium chloride 20 mEq Oral Daily   sennosides-docusate sodium 2 tablet Oral Nightly       sodium chloride 30 mL/hr    sodium chloride 30 mL/hr Last Rate: 30 mL/hr (05/14/19 1300)           Patient Active Problem List   Diagnosis Code   • S/P mitral valve repair Z98.890   • S/P tricuspid valve repair Z98.890   • S/P Maze operation for atrial fibrillation Z98.890, Z86.79   • Morbidly obese (CMS/[facility]) E66.01   • Atrial fibrillation (CMS/[facility]) I48.91   • Acquired hypothyroidism E03.9   • COPD (chronic obstructive pulmonary disease) (CMS/[facility]) J44.9   • CAD (coronary artery  disease), hx MI I25.10   • HAMLET on autoCPAP G47.33, Z99.89   • Hypercalcemia E83.52   • Renal calculus, right N20.0   • Hyperparathyroidism (CMS/[facility]) E21.3   • Type 2 diabetes mellitus without complication, with long-term current use of insulin (CMS/[facility]) E11.9, Z79.4   • Warfarin anticoagulation Z79.01   • Medication monitoring encounter Z51.81   • Medicare annual wellness visit, subsequent Z00.00   • Pacemaker Z95.0   • AICD (automatic cardioverter/defibrillator) present Z95.810   • Right foot pain M79.671   • 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 reluctant to cough or move.  Discussed with patient the risk for pneumonia. Flat affect.  Complaints of back pain and being hot.  Will try to back off of oxycodone change frequency to Q6, add ultram, and scheduled Neurontin for 2 days. I think the narcotic is making her too drowsy and flat.    IV diurese. Consult OT as well, she needs to increase activity. Mobilize. Discontinue central line, chest tubes and ya catheter.        Stacy Mccloud, HESHAM  05/16/19  8:54 AM

## 2019-05-17 ENCOUNTER — APPOINTMENT (OUTPATIENT)
Dept: CARDIAC REHAB | Facility: HOSPITAL | Age: 79
End: 2019-05-17

## 2019-05-17 ENCOUNTER — APPOINTMENT (OUTPATIENT)
Dept: GENERAL RADIOLOGY | Facility: HOSPITAL | Age: 79
End: 2019-05-17

## 2019-05-17 LAB
ANION GAP SERPL CALCULATED.3IONS-SCNC: 14.2 MMOL/L
BUN BLD-MCNC: 20 MG/DL (ref 8–23)
BUN/CREAT SERPL: 32.8 (ref 7–25)
CALCIUM SPEC-SCNC: 10.4 MG/DL (ref 8.6–10.5)
CHLORIDE SERPL-SCNC: 99 MMOL/L (ref 98–107)
CO2 SERPL-SCNC: 23.8 MMOL/L (ref 22–29)
CREAT BLD-MCNC: 0.61 MG/DL (ref 0.57–1)
DEPRECATED RDW RBC AUTO: 52.2 FL (ref 37–54)
ERYTHROCYTE [DISTWIDTH] IN BLOOD BY AUTOMATED COUNT: 15.5 % (ref 12.3–15.4)
GFR SERPL CREATININE-BSD FRML MDRD: 95 ML/MIN/1.73
GLUCOSE BLD-MCNC: 147 MG/DL (ref 65–99)
GLUCOSE BLDC GLUCOMTR-MCNC: 127 MG/DL (ref 70–130)
GLUCOSE BLDC GLUCOMTR-MCNC: 156 MG/DL (ref 70–130)
GLUCOSE BLDC GLUCOMTR-MCNC: 182 MG/DL (ref 70–130)
GLUCOSE BLDC GLUCOMTR-MCNC: 189 MG/DL (ref 70–130)
HCT VFR BLD AUTO: 32.7 % (ref 34–46.6)
HGB BLD-MCNC: 10.1 G/DL (ref 12–15.9)
MCH RBC QN AUTO: 28.3 PG (ref 26.6–33)
MCHC RBC AUTO-ENTMCNC: 30.9 G/DL (ref 31.5–35.7)
MCV RBC AUTO: 91.6 FL (ref 79–97)
PLATELET # BLD AUTO: 164 10*3/MM3 (ref 140–450)
PMV BLD AUTO: 11.4 FL (ref 6–12)
POTASSIUM BLD-SCNC: 4.1 MMOL/L (ref 3.5–5.2)
RBC # BLD AUTO: 3.57 10*6/MM3 (ref 3.77–5.28)
SODIUM BLD-SCNC: 137 MMOL/L (ref 136–145)
WBC NRBC COR # BLD: 10.67 10*3/MM3 (ref 3.4–10.8)

## 2019-05-17 PROCEDURE — 97535 SELF CARE MNGMENT TRAINING: CPT | Performed by: OCCUPATIONAL THERAPIST

## 2019-05-17 PROCEDURE — 94799 UNLISTED PULMONARY SVC/PX: CPT

## 2019-05-17 PROCEDURE — 82962 GLUCOSE BLOOD TEST: CPT

## 2019-05-17 PROCEDURE — 25010000002 FUROSEMIDE PER 20 MG: Performed by: THORACIC SURGERY (CARDIOTHORACIC VASCULAR SURGERY)

## 2019-05-17 PROCEDURE — 63710000001 INSULIN LISPRO (HUMAN) PER 5 UNITS: Performed by: THORACIC SURGERY (CARDIOTHORACIC VASCULAR SURGERY)

## 2019-05-17 PROCEDURE — 71046 X-RAY EXAM CHEST 2 VIEWS: CPT

## 2019-05-17 PROCEDURE — 85027 COMPLETE CBC AUTOMATED: CPT | Performed by: THORACIC SURGERY (CARDIOTHORACIC VASCULAR SURGERY)

## 2019-05-17 PROCEDURE — 97110 THERAPEUTIC EXERCISES: CPT

## 2019-05-17 PROCEDURE — 71045 X-RAY EXAM CHEST 1 VIEW: CPT

## 2019-05-17 PROCEDURE — 25010000002 ENOXAPARIN PER 10 MG: Performed by: THORACIC SURGERY (CARDIOTHORACIC VASCULAR SURGERY)

## 2019-05-17 PROCEDURE — 80048 BASIC METABOLIC PNL TOTAL CA: CPT | Performed by: THORACIC SURGERY (CARDIOTHORACIC VASCULAR SURGERY)

## 2019-05-17 RX ORDER — WARFARIN SODIUM 5 MG/1
5 TABLET ORAL
Status: DISCONTINUED | OUTPATIENT
Start: 2019-05-17 | End: 2019-05-20

## 2019-05-17 RX ADMIN — GABAPENTIN 100 MG: 100 CAPSULE ORAL at 21:35

## 2019-05-17 RX ADMIN — SENNOSIDES AND DOCUSATE SODIUM 2 TABLET: 8.6; 5 TABLET ORAL at 21:35

## 2019-05-17 RX ADMIN — ACETAMINOPHEN 650 MG: 325 TABLET, FILM COATED ORAL at 08:52

## 2019-05-17 RX ADMIN — GUAIFENESIN 1200 MG: 600 TABLET, EXTENDED RELEASE ORAL at 08:57

## 2019-05-17 RX ADMIN — METOPROLOL TARTRATE 50 MG: 50 TABLET, FILM COATED ORAL at 08:58

## 2019-05-17 RX ADMIN — POTASSIUM CHLORIDE 20 MEQ: 750 CAPSULE, EXTENDED RELEASE ORAL at 08:57

## 2019-05-17 RX ADMIN — GABAPENTIN 100 MG: 100 CAPSULE ORAL at 08:57

## 2019-05-17 RX ADMIN — MUPIROCIN: 20 OINTMENT TOPICAL at 08:58

## 2019-05-17 RX ADMIN — POLYETHYLENE GLYCOL 3350 17 G: 17 POWDER, FOR SOLUTION ORAL at 08:58

## 2019-05-17 RX ADMIN — DIGOXIN 250 MCG: 250 TABLET ORAL at 12:39

## 2019-05-17 RX ADMIN — GUAIFENESIN 1200 MG: 600 TABLET, EXTENDED RELEASE ORAL at 21:35

## 2019-05-17 RX ADMIN — ENOXAPARIN SODIUM 40 MG: 40 INJECTION SUBCUTANEOUS at 17:52

## 2019-05-17 RX ADMIN — WARFARIN SODIUM 5 MG: 5 TABLET ORAL at 15:54

## 2019-05-17 RX ADMIN — MUPIROCIN: 20 OINTMENT TOPICAL at 21:39

## 2019-05-17 RX ADMIN — PANTOPRAZOLE SODIUM 40 MG: 40 TABLET, DELAYED RELEASE ORAL at 06:07

## 2019-05-17 RX ADMIN — METOPROLOL TARTRATE 50 MG: 50 TABLET, FILM COATED ORAL at 21:35

## 2019-05-17 RX ADMIN — INSULIN LISPRO 2 UNITS: 100 INJECTION, SOLUTION INTRAVENOUS; SUBCUTANEOUS at 07:03

## 2019-05-17 RX ADMIN — POLYETHYLENE GLYCOL 3350 17 G: 17 POWDER, FOR SOLUTION ORAL at 21:35

## 2019-05-17 RX ADMIN — INSULIN LISPRO 2 UNITS: 100 INJECTION, SOLUTION INTRAVENOUS; SUBCUTANEOUS at 12:39

## 2019-05-17 RX ADMIN — CHLORHEXIDINE GLUCONATE 15 ML: 1.2 RINSE ORAL at 12:39

## 2019-05-17 RX ADMIN — ACETAMINOPHEN 650 MG: 325 TABLET, FILM COATED ORAL at 15:53

## 2019-05-17 RX ADMIN — INSULIN LISPRO 2 UNITS: 100 INJECTION, SOLUTION INTRAVENOUS; SUBCUTANEOUS at 21:36

## 2019-05-17 RX ADMIN — METFORMIN HYDROCHLORIDE 500 MG: 500 TABLET ORAL at 09:00

## 2019-05-17 RX ADMIN — FUROSEMIDE 40 MG: 10 INJECTION, SOLUTION INTRAMUSCULAR; INTRAVENOUS at 17:52

## 2019-05-17 RX ADMIN — FUROSEMIDE 40 MG: 10 INJECTION, SOLUTION INTRAMUSCULAR; INTRAVENOUS at 06:07

## 2019-05-17 NOTE — PLAN OF CARE
Problem: Patient Care Overview  Goal: Plan of Care Review   05/17/19 1541   OTHER   Outcome Summary Pt cognition much improved compared to yesterday, pt able to walk to bathroom and perform commode transfer with min A and stand at sink for grooming.    Plan of Care Review   Progress improving   Coping/Psychosocial   Plan of Care Reviewed With patient

## 2019-05-17 NOTE — THERAPY TREATMENT NOTE
Acute Care - Occupational Therapy Treatment Note  [facility]     Patient Name: Fernanda Molina  : 1940  MRN: [id number]  Today's Date: 2019  Onset of Illness/Injury or Date of Surgery: 19  Date of Referral to OT: 19  Referring Physician: Ana    Admit Date: 2019       ICD-10-CM ICD-9-CM   1. Impaired mobility Z74.09 799.89   2. Mitral valve disease I05.9 394.9     Patient Active Problem List   Diagnosis   • S/P mitral valve repair   • S/P tricuspid valve repair   • S/P Maze operation for atrial fibrillation   • Morbidly obese (CMS/[facility])   • Atrial fibrillation (CMS/[facility])   • Acquired hypothyroidism   • COPD (chronic obstructive pulmonary disease) (CMS/[facility])   • CAD (coronary artery disease), hx MI   • HAMLET on autoCPAP   • Hypercalcemia   • Renal calculus, right   • Hyperparathyroidism (CMS/[facility])   • Type 2 diabetes mellitus without complication, with long-term current use of insulin (CMS/[facility])   • Warfarin anticoagulation   • Medication monitoring encounter   • Medicare annual wellness visit, subsequent   • Pacemaker   • AICD (automatic cardioverter/defibrillator) present   • Right foot pain   • Chronic nonallergic rhinitis   • Pulmonary hypertension (CMS/[facility])   • Mitral valve disease     Past Medical History:   Diagnosis Date   • AICD (automatic cardioverter/defibrillator) present    • Arthritis    • Atrial fibrillation (CMS/[facility])    • CHF (congestive heart failure) (CMS/[facility])    • Congenital heart disease    • COPD (chronic obstructive pulmonary disease) (CMS/[facility])    • Coronary artery disease    • Diabetes mellitus (CMS/[facility])    • GERD (gastroesophageal reflux disease)    • High cholesterol    • History of kidney stones    • History of melanoma 2002    RIGHT KNEE   • History of MI (myocardial infarction)    • Hypertension    • Hypothyroidism    • Lymph edema    • On anticoagulant therapy    • HAMLET on CPAP    • PONV (postoperative nausea and vomiting)    • Shortness of breath    •  Spinal stenosis    • Valvular heart disease 2015    S/p MVR & TVR      Past Surgical History:   Procedure Laterality Date   • CARDIAC CATHETERIZATION     • CARDIAC DEFIBRILLATOR PLACEMENT      MEDTRONIC   • CARDIAC DEFIBRILLATOR PLACEMENT      MEDTRONIC   •  SECTION     •  SECTION     • CHOLECYSTECTOMY     • COLONOSCOPY     • EPIDURAL BLOCK  [id number]    Lumbar    • EXTRACORPOREAL SHOCK WAVE LITHOTRIPSY (ESWL) Right 2017   • EXTRACORPOREAL SHOCK WAVE LITHOTRIPSY (ESWL) Right 2017    Procedure: RT EXTRACORPOREAL SHOCKWAVE LITHOTRIPSY;  Surgeon: Moisés Everett MD;  Location: [facility];  Service:    • EYE SURGERY Right 09/10/2007    Cataract   • EYE SURGERY Left 10/29/2007    Cataract   • JOINT REPLACEMENT Left 2007    Knee   • JOINT REPLACEMENT Right 2010    Knee   • KNEE ARTHROSCOPY     • MAZE PROCEDURE  2015    Dr. Saavedra   • MITRAL VALVE REPAIR/REPLACEMENT     • MITRAL VALVE REPAIR/REPLACEMENT N/A 2019    Procedure: ALINE RE-OP STERNOTOMY MITRAL VALVE REPLACEMENT AND PRP;  Surgeon: Ben Saavedra MD;  Location: [facility];  Service: Cardiothoracic   • HI ERCP DX COLLECTION SPECIMEN BRUSHING/WASHING N/A 2016    Procedure: ENDOSCOPIC RETROGRADE CHOLANGIOPANCREATOGRAPHY WITH SPHINCTEROTOMY AND BALLOON SWEEP OF CBD;  Surgeon: Lucas Campos MD;  Location: [facility];  Service: Gastroenterology   • SKIN CANCER EXCISION      MELANOMA RIGHT KNEE AREA -    • TRICUSPID VALVE SURGERY  2015    Dr. Saavedra   • URETEROSCOPY LASER LITHOTRIPSY WITH STENT INSERTION Right 11/15/2017    Procedure: CYSTOSCOPY; RIGHT URETEROSCOPY LASER LITHOTRIPSY WITH STENT INSERTION;  Surgeon: Moisés Everett MD;  Location: [facility];  Service:        Therapy Treatment    Rehabilitation Treatment Summary     Row Name 19 1546 19 1003          Treatment Time/Intention    Discipline  occupational  therapist  -SG  physical therapist  -EM     Document Type  therapy note (daily note)  -SG  therapy note (daily note)  -EM     Subjective Information  complains of;weakness  -SG  complains of;pain  -EM     Mode of Treatment  individual therapy;occupational therapy  -SG  physical therapy  -EM     Patient/Family Observations  Pt supine in bed asleep  -SG  patient sitting up in bed, awake and alert, family at bedside   -EM     Patient Effort  good  -SG  good  -EM     Existing Precautions/Restrictions  cardiac;fall;sternal;oxygen therapy device and L/min  -SG  cardiac;fall;sternal  -EM     Recorded by [SG] Idalia Allison, OTR 05/17/19 1549 [EM] Mary Harper, PT 05/17/19 1007     Row Name 05/17/19 1546 05/17/19 1003          Vital Signs    Pre Systolic BP Rehab  --  128  -EM     Pre Treatment Diastolic BP  --  69  -EM     Pretreatment Heart Rate (beats/min)  --  80  -EM     Posttreatment Heart Rate (beats/min)  --  82  -EM     Pre SpO2 (%)  --  99  -EM     O2 Delivery Pre Treatment  supplemental O2  -SG  supplemental O2  -EM     Intra SpO2 (%)  --  88  -EM     O2 Delivery Intra Treatment  --  supplemental O2  -EM     Post SpO2 (%)  --  90  -EM     O2 Delivery Post Treatment  --  supplemental O2  -EM     Recorded by [SG] Idalia Allison, OTR 05/17/19 1549 [EM] Mary Harper, PT 05/17/19 1007     Row Name 05/17/19 1546 05/17/19 1003          Cognitive Assessment/Intervention- PT/OT    Orientation Status (Cognition)  oriented x 3  -SG  oriented x 3  -EM     Follows Commands (Cognition)  follows one step commands;over 90% accuracy  -SG  follows one step commands;over 90% accuracy  -EM     Safety Deficit (Cognitive)  mild deficit;insight into deficits/self awareness  -SG  --     Recorded by [SG] Idalia Allison, OTR 05/17/19 1549 [EM] Mary Harper, PT 05/17/19 1007     Row Name 05/17/19 1546 05/17/19 1003          Bed Mobility Assessment/Treatment    Supine-Sit Alger (Bed Mobility)  minimum  assist (75% patient effort)  -SG  minimum assist (75% patient effort)  -EM     Sit-Supine Jones (Bed Mobility)  moderate assist (50% patient effort);verbal cues  -SG  --     Assistive Device (Bed Mobility)  --  head of bed elevated  -EM     Recorded by [SG] Idalia Allison, OTR 05/17/19 1549 [EM] Mary Harper, PT 05/17/19 1007     Row Name 05/17/19 1546             Functional Mobility    Functional Mobility- Ind. Level  minimum assist (75% patient effort);verbal cues required  -SG      Functional Mobility- Device  rolling walker  -SG      Functional Mobility- Comment  Walks to bathroom and back to bed  -SG      Recorded by [SG] Idalia Allison OTR 05/17/19 1549      Row Name 05/17/19 1546             Transfer Assessment/Treatment    Transfer Assessment/Treatment  sit-stand transfer;stand-sit transfer;toilet transfer  -SG      Recorded by [SG] Idalia Allison, OTR 05/17/19 1549      Row Name 05/17/19 1546 05/17/19 1003          Sit-Stand Transfer    Sit-Stand Jones (Transfers)  minimum assist (75% patient effort);verbal cues  -SG  minimum assist (75% patient effort)  -EM     Assistive Device (Sit-Stand Transfers)  walker, front-wheeled  -SG  --     Recorded by [SG] Idalia Allison, OTR 05/17/19 1549 [EM] Mary Harper, PT 05/17/19 1007     Row Name 05/17/19 1546 05/17/19 1003          Stand-Sit Transfer    Stand-Sit Jones (Transfers)  minimum assist (75% patient effort);verbal cues  -SG  minimum assist (75% patient effort)  -EM     Assistive Device (Stand-Sit Transfers)  walker, front-wheeled  -SG  --     Recorded by [SG] Idalia Allison, OTR 05/17/19 1549 [EM] Mary Harper, PT 05/17/19 1007     Row Name 05/17/19 1546             Toilet Transfer    Type (Toilet Transfer)  sit-stand;stand-sit  -SG      Jones Level (Toilet Transfer)  minimum assist (75% patient effort);verbal cues  -SG      Assistive Device (Toilet Transfer)  grab bars/safety frame  -SG       Recorded by [SG] Idalia Allison, OTR 05/17/19 1549      Row Name 05/17/19 1003             Gait/Stairs Assessment/Training    [place] Level (Gait)  minimum assist (75% patient effort);1 person to manage equipment  -EM      Distance in Feet (Gait)  50  -EM      Deviations/Abnormal Patterns (Gait)  stride length decreased;gait speed decreased  -EM      Bilateral Gait Deviations  forward flexed posture  -EM      Recorded by [EM] Mary Harper, PT 05/17/19 1007      Row Name 05/17/19 1546             ADL Assessment/Intervention    BADL Assessment/Intervention  toileting;grooming  -SG      Recorded by [SG] Idalia Allison, OTR 05/17/19 1549      Row Name 05/17/19 1546             Grooming Assessment/Training    [place] Level (Grooming)  grooming skills;hair care, combing/brushing;wash face, hands;contact guard assist;verbal cues  -SG      Grooming Position  sink side;supported standing  -SG      Recorded by [SG] Idalia Allison OTR 05/17/19 1549      Row Name 05/17/19 1546             Toileting Assessment/Training    [place] Level (Toileting)  toileting skills;adjust/manage clothing;perform perineal hygiene;contact guard assist;verbal cues  -SG      Assistive Devices (Toileting)  grab bar/safety frame  -SG      Toileting Position  supported sitting;supported standing  -SG      Comment (Toileting)  Would need more assist with street clothing vs gown  -SG      Recorded by [SG] Idalia Allison, OTR 05/17/19 1549      Row Name 05/17/19 1003             Therapeutic Exercise    Comment (Therapeutic Exercise)  5 reps per cardiac protocol, level 3   -EM      Recorded by [EM] Mary Harper, PT 05/17/19 1007      Row Name 05/17/19 1546 05/17/19 1003          Positioning and Restraints    Pre-Treatment Position  in bed  -SG  in bed  -EM     Post Treatment Position  bed  -SG  chair  -EM     In Bed  supine;call light within reach;encouraged to call for assist;exit alarm on  -SG  --     In Chair  --   reclined;call light within reach;exit alarm on;with family/caregiver  -EM     Recorded by [SG] Idalia Allison, OTR 05/17/19 1549 [EM] Mary Harper, PT 05/17/19 1007     Row Name 05/17/19 1546 05/17/19 1003          Pain Scale: Numbers Pre/Post-Treatment    Pain Scale: Numbers, Pretreatment  0/10 - no pain  -SG  5/10  -EM     Pain Location  --  back  -EM     Pain Intervention(s)  --  Medication (See MAR);Repositioned  -EM     Recorded by [SG] Idalia Allison, OTR 05/17/19 1549 [EM] Mary Harper, PT 05/17/19 1007     Row Name                Wound 05/14/19 0957 chest incision    Wound - Properties Group Date first assessed: 05/14/19 [SR] Time first assessed: 0957 [SR] Location: chest [SR] Type: incision [SR] Recorded by:  [SR] Shanice Cast RN 05/14/19 0957    Row Name 05/17/19 1003             Outcome Summary/Treatment Plan (PT)    Anticipated Discharge Disposition (PT)  home with home health;home with assist  -EM      Recorded by [EM] Mary Harper, PT 05/17/19 1007        User Key  (r) = Recorded By, (t) = Taken By, (c) = Cosigned By    Initials Name Effective Dates Discipline    SG Idalia Allison, OTR 12/26/18 -  OT    EM Mary Harper, PT 04/03/18 -  PT    SR Shanice Cast RN 06/16/16 -  Nurse        Wound 05/14/19 0957 chest incision (Active)   Dressing Appearance dry;intact;no drainage 5/17/2019  8:00 AM   Closure RAT 5/17/2019  8:00 AM   Base dressing in place, unable to visualize 5/17/2019  8:00 AM   Drainage Amount none 5/17/2019  8:00 AM   Dressing Care, Wound foam;low-adherent 5/17/2019  8:00 AM       Occupational Therapy Education     Title: PT OT SLP Therapies (In Progress)     Topic: Occupational Therapy (In Progress)     Point: ADL training (Done)     Description: Instruct learner(s) on proper safety adaptation and remediation techniques during self care or transfers.   Instruct in proper use of assistive devices.    Learning Progress Summary           Family  Acceptance, E,TB, VU by  at 5/16/2019 11:36 AM                               User Key     Initials Effective Dates Name Provider Type Discipline     12/26/18 -  Idalia Allison OTR Occupational Therapist OT                OT Recommendation and Plan  Outcome Summary/Treatment Plan (OT)  Anticipated Discharge Disposition (OT): skilled nursing facility  Therapy Frequency (OT Eval): 5 times/wk  Plan of Care Review  Plan of Care Reviewed With: patient  Plan of Care Reviewed With: patient  Outcome Summary: Pt cognition much improved compared to yesterday, pt able to walk to bathroom and perform commode transfer with min A and stand at sink for grooming.   Outcome Measures     Row Name 05/17/19 1500 05/17/19 1000 05/16/19 1100       How much help from another person do you currently need...    Turning from your back to your side while in flat bed without using bedrails?  --  3  -EM  --    Moving from lying on back to sitting on the side of a flat bed without bedrails?  --  3  -EM  --    Moving to and from a bed to a chair (including a wheelchair)?  --  3  -EM  --    Standing up from a chair using your arms (e.g., wheelchair, bedside chair)?  --  3  -EM  --    Climbing 3-5 steps with a railing?  --  2  -EM  --    To walk in hospital room?  --  3  -EM  --    AM-PAC 6 Clicks Score  --  17  -EM  --       How much help from another is currently needed...    Putting on and taking off regular lower body clothing?  2  -SG  --  1  -SG    Bathing (including washing, rinsing, and drying)  2  -SG  --  1  -SG    Toileting (which includes using toilet bed pan or urinal)  3  -SG  --  1  -SG    Putting on and taking off regular upper body clothing  3  -SG  --  1  -SG    Taking care of personal grooming (such as brushing teeth)  3  -SG  --  1  -SG    Eating meals  3  -SG  --  1  -SG    Score  16  -SG  --  6  -SG       Functional Assessment    Outcome Measure Options  AM-PAC 6 Clicks Daily Activity (OT)  -SG  --  AM-PAC 6 Clicks Daily  Activity (OT)  -    Row Name 05/16/19 1000 05/15/19 0900          How much help from another person do you currently need...    Turning from your back to your side while in flat bed without using bedrails?  2  -EM  2  -EM     Moving from lying on back to sitting on the side of a flat bed without bedrails?  2  -EM  2  -EM     Moving to and from a bed to a chair (including a wheelchair)?  2  -EM  2  -EM     Standing up from a chair using your arms (e.g., wheelchair, bedside chair)?  2  -EM  2  -EM     Climbing 3-5 steps with a railing?  1  -EM  1  -EM     To walk in hospital room?  1  -EM  2  -EM     AM-PAC 6 Clicks Score  10  -EM  11  -EM        Functional Assessment    Outcome Measure Options  --  AM-PAC 6 Clicks Basic Mobility (PT)  -EM       User Key  (r) = Recorded By, (t) = Taken By, (c) = Cosigned By    Initials Name Provider Type    Idalia Palacios OTR Occupational Therapist    EM Mary Harper, PT Physical Therapist           Time Calculation:   Time Calculation- OT     Row Name 05/17/19 1550 05/17/19 1309          Time Calculation- OT    OT Start Time  1510  -  --     OT Stop Time  1525  -  --     OT Time Calculation (min)  15 min  -  --     Total Timed Code Minutes- OT  15 minute(s)  -  --     OT Received On  05/17/19  -  --     OT - Next Appointment  --  05/17/19  -       User Key  (r) = Recorded By, (t) = Taken By, (c) = Cosigned By    Initials Name Provider Type    Idalia Palacios OTR Occupational Therapist        Therapy Charges for Today     Code Description Service Date Service Provider Modifiers Qty    66957186051 HC OT EVAL MOD COMPLEXITY 2 5/16/2019 Idalia Allison OTR GO 1    97018349438 HC OT NEUROMUSC RE EDUCATION EA 15 MIN 5/16/2019 Idalia Allison OTR GO 1    32029805963 HC OT SELF CARE/MGMT/TRAIN EA 15 MIN 5/17/2019 Idalia Allison OTR GO 1               REAGAN Graves  5/17/2019

## 2019-05-17 NOTE — PLAN OF CARE
Problem: Patient Care Overview  Goal: Plan of Care Review  Outcome: Ongoing (interventions implemented as appropriate)   05/17/19 1319   OTHER   Outcome Summary Instructed patient on use of flutter and IS. Will continue to monitor.   Plan of Care Review   Progress no change

## 2019-05-17 NOTE — THERAPY TREATMENT NOTE
Acute Care - Physical Therapy Treatment Note  [facility]     Patient Name: Fernanda Molina  : 1940  MRN: [id number]  Today's Date: 2019  Onset of Illness/Injury or Date of Surgery: 19     Referring Physician: Ana    Admit Date: 2019    Visit Dx:    ICD-10-CM ICD-9-CM   1. Impaired mobility Z74.09 799.89   2. Mitral valve disease I05.9 394.9     Patient Active Problem List   Diagnosis   • S/P mitral valve repair   • S/P tricuspid valve repair   • S/P Maze operation for atrial fibrillation   • Morbidly obese (CMS/[facility], located [facility])   • Atrial fibrillation (CMS/[facility] located [facility])   • Acquired hypothyroidism   • COPD (chronic obstructive pulmonary disease) (CMS/[facility])   • CAD (coronary artery disease), hx MI   • HAMLET on autoCPAP   • Hypercalcemia   • Renal calculus, right   • Hyperparathyroidism (CMS/[facility])   • Type 2 diabetes mellitus without complication, with long-term current use of insulin (CMS/[facility])   • Warfarin anticoagulation   • Medication monitoring encounter   • Medicare annual wellness visit, subsequent   • Pacemaker   • AICD (automatic cardioverter/defibrillator) present   • Right foot pain   • Chronic nonallergic rhinitis   • Pulmonary hypertension (CMS/[facility])   • Mitral valve disease       Therapy Treatment    Rehabilitation Treatment Summary     Row Name 19 1003             Treatment Time/Intention    Discipline  physical therapist  -EM      Document Type  therapy note (daily note)  -EM      Subjective Information  complains of;pain  -EM      Mode of Treatment  physical therapy  -EM      Patient/Family Observations  patient sitting up in bed, awake and alert, family at bedside   -EM      Patient Effort  good  -EM      Existing Precautions/Restrictions  cardiac;fall;sternal  -EM      Recorded by [EM] Mary Harper, PT 19 1007      Row Name 19 1003             Vital Signs    Pre Systolic BP Rehab  128  -EM      Pre Treatment Diastolic BP  69  -EM      Pretreatment Heart Rate  (beats/min)  80  -EM      Posttreatment Heart Rate (beats/min)  82  -EM      Pre SpO2 (%)  99  -EM      O2 Delivery Pre Treatment  supplemental O2  -EM      Intra SpO2 (%)  88  -EM      O2 Delivery Intra Treatment  supplemental O2  -EM      Post SpO2 (%)  90  -EM      O2 Delivery Post Treatment  supplemental O2  -EM      Recorded by [EM] Mary Harper, PT 05/17/19 1007      Row Name 05/17/19 1003             Cognitive Assessment/Intervention- PT/OT    Orientation Status (Cognition)  oriented x 3  -EM      Follows Commands (Cognition)  follows one step commands;over 90% accuracy  -EM      Recorded by [EM] Mary Harper, PT 05/17/19 1007      Row Name 05/17/19 1003             Bed Mobility Assessment/Treatment    Supine-Sit [place] (Bed Mobility)  minimum assist (75% patient effort)  -EM      Assistive Device (Bed Mobility)  head of bed elevated  -EM      Recorded by [EM] Mary Harper, PT 05/17/19 1007      Row Name 05/17/19 1003             Sit-Stand Transfer    Sit-Stand [place] (Transfers)  minimum assist (75% patient effort)  -EM      Recorded by [EM] Mary Harper, PT 05/17/19 1007      Row Name 05/17/19 1003             Stand-Sit Transfer    Stand-Sit [place] (Transfers)  minimum assist (75% patient effort)  -EM      Recorded by [EM] Mary Harper, PT 05/17/19 1007      Row Name 05/17/19 1003             Gait/Stairs Assessment/Training    [place] Level (Gait)  minimum assist (75% patient effort);1 person to manage equipment  -EM      Distance in Feet (Gait)  50  -EM      Deviations/Abnormal Patterns (Gait)  stride length decreased;gait speed decreased  -EM      Bilateral Gait Deviations  forward flexed posture  -EM      Recorded by [EM] Mary Harper, PT 05/17/19 1007      Row Name 05/17/19 1003             Therapeutic Exercise    Comment (Therapeutic Exercise)  5 reps per cardiac protocol, level 3   -EM      Recorded by [EM] Mary Harper, PT  05/17/19 1007      Row Name 05/17/19 1003             Positioning and Restraints    Pre-Treatment Position  in bed  -EM      Post Treatment Position  chair  -EM      In Chair  reclined;call light within reach;exit alarm on;with family/caregiver  -EM      Recorded by [EM] Mary Harper, PT 05/17/19 1007      Row Name 05/17/19 1003             Pain Scale: Numbers Pre/Post-Treatment    Pain Scale: Numbers, Pretreatment  5/10  -EM      Pain Location  back  -EM      Pain Intervention(s)  Medication (See MAR);Repositioned  -EM      Recorded by [EM] Mary Harper, PT 05/17/19 1007      Row Name                Wound 05/14/19 0957 chest incision    Wound - Properties Group Date first assessed: 05/14/19 [SR] Time first assessed: 0957 [SR] Location: chest [SR] Type: incision [SR] Recorded by:  [SR] Shanice Cast RN 05/14/19 0957    Row Name 05/17/19 1003             Outcome Summary/Treatment Plan (PT)    Anticipated Discharge Disposition (PT)  home with home health;home with assist  -EM      Recorded by [EM] Mary Harper, PT 05/17/19 1007        User Key  (r) = Recorded By, (t) = Taken By, (c) = Cosigned By    Initials Name Effective Dates Discipline    EM Mary Harper, PT 04/03/18 -  PT    SR Shanice Cast RN 06/16/16 -  Nurse          Wound 05/14/19 0957 chest incision (Active)   Dressing Appearance dry;intact;no drainage 5/16/2019 11:54 PM   Closure ART 5/16/2019 11:54 PM   Base dressing in place, unable to visualize 5/16/2019 11:54 PM   Drainage Amount none 5/16/2019 11:54 PM   Dressing Care, Wound foam;low-adherent 5/16/2019 11:54 PM           Physical Therapy Education     Title: PT OT SLP Therapies (In Progress)     Topic: Physical Therapy (In Progress)     Point: Mobility training (In Progress)     Learning Progress Summary           Patient Acceptance, E, NR by EM at 5/17/2019 10:08 AM    Nonacceptance, E, NL,NR by EM at 5/16/2019  9:59 AM    Acceptance, E, NR by EM at 5/15/2019  9:15 AM                    Point: Home exercise program (In Progress)     Learning Progress Summary           Patient Acceptance, E, NR by EM at 5/17/2019 10:08 AM    Acceptance, E, NR by EM at 5/15/2019  9:15 AM                               User Key     Initials Effective Dates Name Provider Type Discipline    EM 04/03/18 -  Mary Harper, PT Physical Therapist PT                PT Recommendation and Plan  Anticipated Discharge Disposition (PT): home with home health, home with assist  Planned Therapy Interventions (PT Eval): bed mobility training, gait training, home exercise program  Therapy Frequency (PT Clinical Impression): daily  Outcome Summary/Treatment Plan (PT)  Anticipated Discharge Disposition (PT): home with home health, home with assist  Plan of Care Reviewed With: patient  Progress: improving  Outcome Summary: patient much more awake and alert today, able to follow commands for all mobility and exercises. patient required Rashmi for bed mobility, sit to stand and ambulated 50 feet with Rashmi.   Outcome Measures     Row Name 05/17/19 1000 05/16/19 1100 05/16/19 1000       How much help from another person do you currently need...    Turning from your back to your side while in flat bed without using bedrails?  3  -EM  --  2  -EM    Moving from lying on back to sitting on the side of a flat bed without bedrails?  3  -EM  --  2  -EM    Moving to and from a bed to a chair (including a wheelchair)?  3  -EM  --  2  -EM    Standing up from a chair using your arms (e.g., wheelchair, bedside chair)?  3  -EM  --  2  -EM    Climbing 3-5 steps with a railing?  2  -EM  --  1  -EM    To walk in hospital room?  3  -EM  --  1  -EM    AM-PAC 6 Clicks Score  17  -EM  --  10  -EM       How much help from another is currently needed...    Putting on and taking off regular lower body clothing?  --  1  -SG  --    Bathing (including washing, rinsing, and drying)  --  1  -SG  --    Toileting (which includes using toilet bed pan  or urinal)  --  1  -SG  --    Putting on and taking off regular upper body clothing  --  1  -SG  --    Taking care of personal grooming (such as brushing teeth)  --  1  -SG  --    Eating meals  --  1  -SG  --    Score  --  6  -SG  --       Functional Assessment    Outcome Measure Options  --  AM-PAC 6 Clicks Daily Activity (OT)  -SG  --    Row Name 05/15/19 0900             How much help from another person do you currently need...    Turning from your back to your side while in flat bed without using bedrails?  2  -EM      Moving from lying on back to sitting on the side of a flat bed without bedrails?  2  -EM      Moving to and from a bed to a chair (including a wheelchair)?  2  -EM      Standing up from a chair using your arms (e.g., wheelchair, bedside chair)?  2  -EM      Climbing 3-5 steps with a railing?  1  -EM      To walk in hospital room?  2  -EM      AM-PAC 6 Clicks Score  11  -EM         Functional Assessment    Outcome Measure Options  AM-PAC 6 Clicks Basic Mobility (PT)  -EM        User Key  (r) = Recorded By, (t) = Taken By, (c) = Cosigned By    Initials Name Provider Type    Idalia Palacios, OTR Occupational Therapist    Mary Mak PT Physical Therapist         Time Calculation:   PT Charges     Row Name 05/17/19 1009             Time Calculation    Start Time  0938  -EM      Stop Time  1002  -EM      Time Calculation (min)  24 min  -EM      PT Received On  05/17/19  -EM      PT - Next Appointment  05/18/19  -EM         Time Calculation- PT    Total Timed Code Minutes- PT  24 minute(s)  -EM        User Key  (r) = Recorded By, (t) = Taken By, (c) = Cosigned By    Initials Name Provider Type    Mary Mak PT Physical Therapist        Therapy Charges for Today     Code Description Service Date Service Provider Modifiers Qty    95747466163 HC PT THER PROC EA 15 MIN 5/16/2019 Mary Harper PT GP 2    88207573223 HC PT THER SUPP EA 15 MIN 5/16/2019 Meredith  Mary JOHN, PT GP 1    07648617880 HC PT THER PROC EA 15 MIN 5/17/2019 Mary Harper, PT GP 2    76138149334 HC PT THER SUPP EA 15 MIN 5/17/2019 Mary Harper, PT GP 1          PT G-Codes  Outcome Measure Options: AM-PAC 6 Clicks Daily Activity (OT)  AM-PAC 6 Clicks Score: 17  Score: 6    Mary Harper, PT  5/17/2019

## 2019-05-17 NOTE — PROGRESS NOTES
LOS: 3 days   Patient Care Team:  Rashid Ge MD as PCP - General (Family Medicine)  Thong Jones MD as Consulting Physician (Cardiology)  Mahin Galeas MD as Consulting Physician (Sleep Medicine)  Lara Ortiz MD as Consulting Physician (Obstetrics and Gynecology)  Betty Horowitz MD as Consulting Physician (Dermatology)  Ben Saavedra MD as Surgeon (Cardiothoracic Surgery)  Mahin Galeas MD as Consulting Physician (Pulmonary Disease)  Chirag Rai MD as Consulting Physician (Ophthalmology)  Moisés Everett MD as Consulting Physician (Urology)    Chief Complaint:   Post-op follow-up, s/p re-op MVR    Subjective  Resting comfortably. States she is worn out from PT.    Vital Signs  Temp:  [97.7 °F (36.5 °C)-99.7 °F (37.6 °C)] 97.7 °F (36.5 °C)  Heart Rate:  [70-80] 80  Resp:  [18] 18  BP: (112-144)/(64-97) 112/64  Body mass index is 36.25 kg/m².    Intake/Output Summary (Last 24 hours) at 5/17/2019 0952  Last data filed at 5/17/2019 0816  Gross per 24 hour   Intake 240 ml   Output 2391.5 ml   Net -2151.5 ml     I/O this shift:  In: 240 [P.O.:240]  Out: 700 [Urine:700]          05/14/19  0550 05/16/19  0658 05/17/19  0500   Weight: 91.6 kg (202 lb) 89 kg (196 lb 2 oz) 89.9 kg (198 lb 3.2 oz)     Chest tubes: 46/33/102, no air leak     Objective    Physical Exam:   General Appearance: awake and alert, no acute distress   Lungs: respirations regular and respirations unlabored   Heart: regular rhythm & normal rate and normal S1, S2   Abdomen: soft or nontender, + bowel sounds    Skin: sternal incision clean, dry, intact   Neuro: alert and oriented, no focal deficits.     Results Review:        WBC WBC   Date Value Ref Range Status   05/17/2019 10.67 3.40 - 10.80 10*3/mm3 Final   05/16/2019 13.29 (H) 3.40 - 10.80 10*3/mm3 Final   05/15/2019 10.76 3.40 - 10.80 10*3/mm3 Final   05/14/2019 11.66 (H) 3.40 - 10.80 10*3/mm3 Final   05/14/2019 11.25 (H) 3.40 - 10.80  10*3/mm3 Final      HGB Hemoglobin   Date Value Ref Range Status   05/17/2019 10.1 (L) 12.0 - 15.9 g/dL Final   05/16/2019 10.2 (L) 12.0 - 15.9 g/dL Final   05/15/2019 10.2 (L) 12.0 - 15.9 g/dL Final   05/14/2019 10.8 (L) 12.0 - 15.9 g/dL Final   05/14/2019 10.7 (L) 12.0 - 15.9 g/dL Final   05/14/2019 9.2 (L) 12.0 - 17.0 g/dL Final      HCT Hematocrit   Date Value Ref Range Status   05/17/2019 32.7 (L) 34.0 - 46.6 % Final   05/16/2019 33.7 (L) 34.0 - 46.6 % Final   05/15/2019 32.2 (L) 34.0 - 46.6 % Final   05/14/2019 33.4 (L) 34.0 - 46.6 % Final   05/14/2019 33.5 (L) 34.0 - 46.6 % Final   05/14/2019 27 (L) 38 - 51 % Final      Platelets Platelets   Date Value Ref Range Status   05/17/2019 164 140 - 450 10*3/mm3 Final   05/16/2019 152 140 - 450 10*3/mm3 Final   05/15/2019 169 140 - 450 10*3/mm3 Final   05/14/2019 206 140 - 450 10*3/mm3 Final   05/14/2019 171 140 - 450 10*3/mm3 Final        PT/INR:    Protime   Date Value Ref Range Status   05/15/2019 16.7 (H) 11.7 - 14.2 Seconds Final   05/14/2019 18.9 (H) 11.7 - 14.2 Seconds Final   05/14/2019 16.3 (H) 12.8 - 15.2 seconds Final     Comment:     Serial Number: [id number]:  [id number]   /  INR   Date Value Ref Range Status   05/15/2019 1.38 (H) 0.90 - 1.10 Final   05/14/2019 1.62 (H) 0.90 - 1.10 Final   05/14/2019 1.4 (H) 0.8 - 1.2 Final       Sodium Sodium   Date Value Ref Range Status   05/17/2019 137 136 - 145 mmol/L Final   05/16/2019 135 (L) 136 - 145 mmol/L Final   05/15/2019 144 136 - 145 mmol/L Final   05/14/2019 143 136 - 145 mmol/L Final   05/14/2019 143 136 - 145 mmol/L Final      Potassium Potassium   Date Value Ref Range Status   05/17/2019 4.1 3.5 - 5.2 mmol/L Final   05/16/2019 4.1 3.5 - 5.2 mmol/L Final   05/15/2019 4.2 3.5 - 5.2 mmol/L Final   05/15/2019 3.7 3.5 - 5.2 mmol/L Final   05/14/2019 3.6 3.5 - 5.2 mmol/L Final   05/14/2019 4.0 3.5 - 5.2 mmol/L Final   05/14/2019 3.5 3.5 - 5.2 mmol/L Final      Chloride Chloride   Date Value Ref Range Status    05/17/2019 99 98 - 107 mmol/L Final   05/16/2019 101 98 - 107 mmol/L Final   05/15/2019 109 (H) 98 - 107 mmol/L Final   05/14/2019 107 98 - 107 mmol/L Final   05/14/2019 104 98 - 107 mmol/L Final      Bicarbonate CO2   Date Value Ref Range Status   05/17/2019 23.8 22.0 - 29.0 mmol/L Final   05/16/2019 22.9 22.0 - 29.0 mmol/L Final   05/15/2019 23.7 22.0 - 29.0 mmol/L Final   05/14/2019 23.3 22.0 - 29.0 mmol/L Final   05/14/2019 20.7 (L) 22.0 - 29.0 mmol/L Final      BUN BUN   Date Value Ref Range Status   05/17/2019 20 8 - 23 mg/dL Final   05/16/2019 16 8 - 23 mg/dL Final   05/15/2019 17 8 - 23 mg/dL Final   05/14/2019 19 8 - 23 mg/dL Final   05/14/2019 20 8 - 23 mg/dL Final      Creatinine Creatinine   Date Value Ref Range Status   05/17/2019 0.61 0.57 - 1.00 mg/dL Final   05/16/2019 0.68 0.57 - 1.00 mg/dL Final   05/15/2019 0.77 0.57 - 1.00 mg/dL Final   05/14/2019 0.77 0.57 - 1.00 mg/dL Final   05/14/2019 0.80 0.57 - 1.00 mg/dL Final      Calcium Calcium   Date Value Ref Range Status   05/17/2019 10.4 8.6 - 10.5 mg/dL Final   05/16/2019 10.6 (H) 8.6 - 10.5 mg/dL Final   05/15/2019 9.8 8.6 - 10.5 mg/dL Final   05/14/2019 9.3 8.6 - 10.5 mg/dL Final   05/14/2019 10.2 8.6 - 10.5 mg/dL Final      Magnesium Magnesium   Date Value Ref Range Status   05/15/2019 2.4 1.6 - 2.4 mg/dL Final   05/14/2019 2.0 1.6 - 2.4 mg/dL Final   05/14/2019 2.1 1.6 - 2.4 mg/dL Final   05/14/2019 1.9 1.6 - 2.4 mg/dL Final            atorvastatin 20 mg Oral Once per day on Mon Thu   chlorhexidine 15 mL Mouth/Throat Q12H   digoxin 250 mcg Oral Daily   enoxaparin 40 mg Subcutaneous Daily   furosemide 40 mg Intravenous Q12H   gabapentin 100 mg Oral Q12H   guaiFENesin 1,200 mg Oral Q12H   insulin lispro 0-9 Units Subcutaneous 4x Daily With Meals & Nightly   metFORMIN 500 mg Oral Daily With Breakfast   metoprolol tartrate 50 mg Oral Q12H   mupirocin  Each Nare BID   pantoprazole 40 mg Oral Q AM   polyethylene glycol 17 g Oral BID   potassium  chloride 20 mEq Oral Daily   sennosides-docusate sodium 2 tablet Oral Nightly       sodium chloride 30 mL/hr    sodium chloride 30 mL/hr Last Rate: 30 mL/hr (05/14/19 1300)           Patient Active Problem List   Diagnosis Code   • S/P mitral valve repair Z98.890   • S/P tricuspid valve repair Z98.890   • S/P Maze operation for atrial fibrillation Z98.890, Z86.79   • Morbidly obese (CMS/HCC) E66.01   • Atrial fibrillation (CMS/[facility]) I48.91   • Acquired hypothyroidism E03.9   • COPD (chronic obstructive pulmonary disease) (CMS/[facility]) J44.9   • CAD (coronary artery disease), hx MI I25.10   • HAMLET on autoCPAP G47.33, Z99.89   • Hypercalcemia E83.52   • Renal calculus, right N20.0   • Hyperparathyroidism (CMS/HCC) E21.3   • Type 2 diabetes mellitus without complication, with long-term current use of insulin (CMS/HCC) E11.9, Z79.4   • Warfarin anticoagulation Z79.01   • Medication monitoring encounter Z51.81   • Medicare annual wellness visit, subsequent Z00.00   • Pacemaker Z95.0   • AICD (automatic cardioverter/defibrillator) present Z95.810   • Right foot pain M79.671   • Chronic nonallergic rhinitis J31.0   • Pulmonary hypertension (CMS/[facility]) I27.20   • Mitral valve disease I05.9       Assessment & Plan    -Recurrent mitral incompetence and stenosis--s/p reop MVR (tissue)-POD#3 [place]  -hx of MV and TV repair, closure of INO, MAZE  -Diabetes- SSI, home meds  -hx paroxysmal atrial fibrillation with chronic coumadin therapy  -BiV-AICD- Medrontic  -Severe Pulmonary hypertension- PAs 100 intra-op  -Vaginal bleeding  -Post op anemia- expected acute blood loss, stable     Routine care.   Did better with PT today.  Continue IV diuresis.  Mobilize and encourage pulm toilet.   Discussed with Dr. Saavedra- d/c epicardial wires, d/c large bore chest tubs and keep drain to bulb, start home dose coumadin.       Ravinder Britton PA-C  05/17/19  9:52 AM

## 2019-05-17 NOTE — PLAN OF CARE
Problem: Patient Care Overview  Goal: Plan of Care Review  Outcome: Ongoing (interventions implemented as appropriate)   05/17/19 4779   OTHER   Outcome Summary Pt ran v-paced on heart monitor. Still has permanent pacer. Epicardial wires in place hooked to pulse generator not turned on. 3 mediastinal chest tubes with one to bulb suction. Blood sugar check.    Plan of Care Review   Progress improving   Coping/Psychosocial   Plan of Care Reviewed With patient

## 2019-05-17 NOTE — PLAN OF CARE
Problem: Patient Care Overview  Goal: Plan of Care Review  Outcome: Ongoing (interventions implemented as appropriate)   05/17/19 1008   OTHER   Outcome Summary patient much more awake and alert today, able to follow commands for all mobility and exercises. patient required Rashmi for bed mobility, sit to stand and ambulated 50 feet with Rashmi.    Plan of Care Review   Progress improving   Coping/Psychosocial   Plan of Care Reviewed With patient

## 2019-05-17 NOTE — PLAN OF CARE
Problem: Patient Care Overview  Goal: Plan of Care Review  Outcome: Ongoing (interventions implemented as appropriate)   05/17/19 8872   OTHER   Outcome Summary Encouraged pt to use IS and flutter device, pt able to follow commands today. Mediastinal Chest tube in place, pt encouraged to cough and move today and pt. verbalized understanding.   Plan of Care Review   Progress improving   Coping/Psychosocial   Plan of Care Reviewed With patient;spouse;family

## 2019-05-18 LAB
ANION GAP SERPL CALCULATED.3IONS-SCNC: 10.8 MMOL/L
BUN BLD-MCNC: 24 MG/DL (ref 8–23)
BUN/CREAT SERPL: 39.3 (ref 7–25)
CALCIUM SPEC-SCNC: 10.2 MG/DL (ref 8.6–10.5)
CHLORIDE SERPL-SCNC: 100 MMOL/L (ref 98–107)
CO2 SERPL-SCNC: 27.2 MMOL/L (ref 22–29)
CREAT BLD-MCNC: 0.61 MG/DL (ref 0.57–1)
DEPRECATED RDW RBC AUTO: 49.7 FL (ref 37–54)
ERYTHROCYTE [DISTWIDTH] IN BLOOD BY AUTOMATED COUNT: 15 % (ref 12.3–15.4)
GFR SERPL CREATININE-BSD FRML MDRD: 95 ML/MIN/1.73
GLUCOSE BLD-MCNC: 131 MG/DL (ref 65–99)
GLUCOSE BLDC GLUCOMTR-MCNC: 108 MG/DL (ref 70–130)
GLUCOSE BLDC GLUCOMTR-MCNC: 123 MG/DL (ref 70–130)
GLUCOSE BLDC GLUCOMTR-MCNC: 162 MG/DL (ref 70–130)
GLUCOSE BLDC GLUCOMTR-MCNC: 173 MG/DL (ref 70–130)
HCT VFR BLD AUTO: 29.5 % (ref 34–46.6)
HGB BLD-MCNC: 9.2 G/DL (ref 12–15.9)
INR PPP: 1.23 (ref 0.9–1.1)
MCH RBC QN AUTO: 28.1 PG (ref 26.6–33)
MCHC RBC AUTO-ENTMCNC: 31.2 G/DL (ref 31.5–35.7)
MCV RBC AUTO: 90.2 FL (ref 79–97)
PLATELET # BLD AUTO: 171 10*3/MM3 (ref 140–450)
PMV BLD AUTO: 11.3 FL (ref 6–12)
POTASSIUM BLD-SCNC: 4.3 MMOL/L (ref 3.5–5.2)
PROTHROMBIN TIME: 15.2 SECONDS (ref 11.7–14.2)
RBC # BLD AUTO: 3.27 10*6/MM3 (ref 3.77–5.28)
SODIUM BLD-SCNC: 138 MMOL/L (ref 136–145)
WBC NRBC COR # BLD: 9.13 10*3/MM3 (ref 3.4–10.8)

## 2019-05-18 PROCEDURE — 80048 BASIC METABOLIC PNL TOTAL CA: CPT | Performed by: THORACIC SURGERY (CARDIOTHORACIC VASCULAR SURGERY)

## 2019-05-18 PROCEDURE — 94799 UNLISTED PULMONARY SVC/PX: CPT

## 2019-05-18 PROCEDURE — 25010000002 ENOXAPARIN PER 10 MG: Performed by: THORACIC SURGERY (CARDIOTHORACIC VASCULAR SURGERY)

## 2019-05-18 PROCEDURE — 82962 GLUCOSE BLOOD TEST: CPT

## 2019-05-18 PROCEDURE — 25010000002 FUROSEMIDE PER 20 MG: Performed by: THORACIC SURGERY (CARDIOTHORACIC VASCULAR SURGERY)

## 2019-05-18 PROCEDURE — 85610 PROTHROMBIN TIME: CPT | Performed by: PHYSICIAN ASSISTANT

## 2019-05-18 PROCEDURE — 63710000001 INSULIN LISPRO (HUMAN) PER 5 UNITS: Performed by: THORACIC SURGERY (CARDIOTHORACIC VASCULAR SURGERY)

## 2019-05-18 PROCEDURE — 99024 POSTOP FOLLOW-UP VISIT: CPT | Performed by: NURSE PRACTITIONER

## 2019-05-18 PROCEDURE — 97110 THERAPEUTIC EXERCISES: CPT

## 2019-05-18 PROCEDURE — 85027 COMPLETE CBC AUTOMATED: CPT | Performed by: THORACIC SURGERY (CARDIOTHORACIC VASCULAR SURGERY)

## 2019-05-18 RX ADMIN — GABAPENTIN 100 MG: 100 CAPSULE ORAL at 21:20

## 2019-05-18 RX ADMIN — DIGOXIN 250 MCG: 250 TABLET ORAL at 11:28

## 2019-05-18 RX ADMIN — METOPROLOL TARTRATE 50 MG: 50 TABLET, FILM COATED ORAL at 21:19

## 2019-05-18 RX ADMIN — ENOXAPARIN SODIUM 40 MG: 40 INJECTION SUBCUTANEOUS at 18:15

## 2019-05-18 RX ADMIN — GABAPENTIN 100 MG: 100 CAPSULE ORAL at 08:37

## 2019-05-18 RX ADMIN — MUPIROCIN 1 APPLICATION: 20 OINTMENT TOPICAL at 08:37

## 2019-05-18 RX ADMIN — GUAIFENESIN 1200 MG: 600 TABLET, EXTENDED RELEASE ORAL at 08:37

## 2019-05-18 RX ADMIN — MUPIROCIN: 20 OINTMENT TOPICAL at 21:21

## 2019-05-18 RX ADMIN — POLYETHYLENE GLYCOL 3350 17 G: 17 POWDER, FOR SOLUTION ORAL at 08:41

## 2019-05-18 RX ADMIN — POTASSIUM CHLORIDE 20 MEQ: 750 CAPSULE, EXTENDED RELEASE ORAL at 08:36

## 2019-05-18 RX ADMIN — METOPROLOL TARTRATE 50 MG: 50 TABLET, FILM COATED ORAL at 08:36

## 2019-05-18 RX ADMIN — INSULIN LISPRO 2 UNITS: 100 INJECTION, SOLUTION INTRAVENOUS; SUBCUTANEOUS at 11:28

## 2019-05-18 RX ADMIN — INSULIN LISPRO 2 UNITS: 100 INJECTION, SOLUTION INTRAVENOUS; SUBCUTANEOUS at 21:20

## 2019-05-18 RX ADMIN — ACETAMINOPHEN 650 MG: 325 TABLET, FILM COATED ORAL at 08:37

## 2019-05-18 RX ADMIN — WARFARIN SODIUM 5 MG: 5 TABLET ORAL at 18:15

## 2019-05-18 RX ADMIN — PANTOPRAZOLE SODIUM 40 MG: 40 TABLET, DELAYED RELEASE ORAL at 06:42

## 2019-05-18 RX ADMIN — METFORMIN HYDROCHLORIDE 500 MG: 500 TABLET ORAL at 08:37

## 2019-05-18 RX ADMIN — GUAIFENESIN 1200 MG: 600 TABLET, EXTENDED RELEASE ORAL at 21:19

## 2019-05-18 RX ADMIN — FUROSEMIDE 40 MG: 10 INJECTION, SOLUTION INTRAMUSCULAR; INTRAVENOUS at 18:15

## 2019-05-18 RX ADMIN — FUROSEMIDE 40 MG: 10 INJECTION, SOLUTION INTRAMUSCULAR; INTRAVENOUS at 08:36

## 2019-05-18 NOTE — PLAN OF CARE
Problem: Patient Care Overview  Goal: Plan of Care Review  Outcome: Ongoing (interventions implemented as appropriate)   05/18/19 1047   OTHER   Outcome Summary Pt able to ambulate 75 ft with CGAx1 throughout. Pt did require one standing rest break secondary to fatigue. She navigated an entire flight of stiars with CGAx1 and no overt unsteadiness during performance. Pt's activity tolerance is poor so additional mobility held. Upon returning to the bedside chair her O2 sats dropped to 80%, and pt was visibly fatigued. Pt O2 sats returned to normal within one minute. Pt is progressing well but will benefit from continuing skilled PT to assist in improving her independence with functional mobility and improving her activity tolerance.    Plan of Care Review   Progress improving   Coping/Psychosocial   Plan of Care Reviewed With patient

## 2019-05-18 NOTE — PLAN OF CARE
Problem: Patient Care Overview  Goal: Plan of Care Review  Outcome: Ongoing (interventions implemented as appropriate)   05/18/19 6606   OTHER   Outcome Summary VSS throughout shift. paced rhythm. coumadin continued. remaining chest tube removed. pain treated with tylenol. IV lasix continued. will continue to monitor.    Plan of Care Review   Progress improving     Goal: Individualization and Mutuality  Outcome: Ongoing (interventions implemented as appropriate)      Problem: Fall Risk (Adult)  Goal: Identify Related Risk Factors and Signs and Symptoms  Outcome: Ongoing (interventions implemented as appropriate)      Problem: Skin Injury Risk (Adult)  Goal: Identify Related Risk Factors and Signs and Symptoms  Outcome: Ongoing (interventions implemented as appropriate)      Problem: Cardiac Surgery (Adult)  Goal: Signs and Symptoms of Listed Potential Problems Will be Absent, Minimized or Managed (Cardiac Surgery)  Outcome: Ongoing (interventions implemented as appropriate)

## 2019-05-18 NOTE — PLAN OF CARE
Problem: Patient Care Overview  Goal: Plan of Care Review  Outcome: Ongoing (interventions implemented as appropriate)   05/18/19 0057   OTHER   Outcome Summary A&O. VSS. Paced rhythm. 2LNC/CPAP. 1 CT TO BULB. No complaints of pain. Up with 1. Encouraged use of IS. Will continue to monitor.    Plan of Care Review   Progress improving   Coping/Psychosocial   Plan of Care Reviewed With patient       Problem: Fall Risk (Adult)  Goal: Absence of Fall  Outcome: Ongoing (interventions implemented as appropriate)      Problem: Skin Injury Risk (Adult)  Goal: Skin Health and Integrity  Outcome: Ongoing (interventions implemented as appropriate)      Problem: Cardiac Surgery (Adult)  Goal: Signs and Symptoms of Listed Potential Problems Will be Absent, Minimized or Managed (Cardiac Surgery)  Outcome: Ongoing (interventions implemented as appropriate)

## 2019-05-18 NOTE — THERAPY TREATMENT NOTE
Acute Care - Physical Therapy Treatment Note  [facility]     Patient Name: Fernanda Molina  : 1940  MRN: [id number]  Today's Date: 2019  Onset of Illness/Injury or Date of Surgery: 19     Referring Physician: Ana    Admit Date: 2019    Visit Dx:    ICD-10-CM ICD-9-CM   1. Impaired mobility Z74.09 799.89   2. Mitral valve disease I05.9 394.9     Patient Active Problem List   Diagnosis   • S/P mitral valve repair   • S/P tricuspid valve repair   • S/P Maze operation for atrial fibrillation   • Morbidly obese (CMS/[facility])   • Atrial fibrillation (CMS/HCC)   • Acquired hypothyroidism   • COPD (chronic obstructive pulmonary disease) (CMS/[facility])   • CAD (coronary artery disease), hx MI   • HAMLET on autoCPAP   • Hypercalcemia   • Renal calculus, right   • Hyperparathyroidism (CMS/[facility])   • Type 2 diabetes mellitus without complication, with long-term current use of insulin (CMS/[facility])   • Warfarin anticoagulation   • Medication monitoring encounter   • Medicare annual wellness visit, subsequent   • Pacemaker   • AICD (automatic cardioverter/defibrillator) present   • Right foot pain   • Chronic nonallergic rhinitis   • Pulmonary hypertension (CMS/[facility])   • Mitral valve disease       Therapy Treatment    Rehabilitation Treatment Summary     Row Name 19 1002             Treatment Time/Intention    Discipline  physical therapist  -DO      Document Type  therapy note (daily note)  -DO      Subjective Information  complains of;weakness;fatigue  -DO      Mode of Treatment  physical therapy  -DO      Patient/Family Observations  Pt sitting upright in chair in no acute distress upon entry to room. Family present.   -DO      Total Minutes, Physical Therapy Treatment  9  -DO      Therapy Frequency (PT Clinical Impression)  daily  -DO      Patient Effort  good  -DO      Existing Precautions/Restrictions  cardiac;fall;sternal  -DO      Patient Response to Treatment  Pt tolerated session well, however drop in  O2 sats noted upon returning from walk. O2 sats returned to normal within one minute and pt denies any adverse s/s.   -DO      Recorded by [DO] Vidal Acosta, PT 05/18/19 1047      Row Name 05/18/19 1002             Vital Signs    Pre Systolic BP Rehab  114  -DO      Pre Treatment Diastolic BP  63  -DO      Pretreatment Heart Rate (beats/min)  79  -DO      Posttreatment Heart Rate (beats/min)  90  -DO      Post SpO2 (%)  80  -DO      O2 Delivery Post Treatment  room air  -DO      Recorded by [DO] Vidal Acosta, PT 05/18/19 1047      Row Name 05/18/19 1002             Cognitive Assessment/Intervention- PT/OT    Orientation Status (Cognition)  oriented x 3  -DO      Follows Commands (Cognition)  follows one step commands;over 90% accuracy;verbal cues/prompting required  -DO      Safety Deficit (Cognitive)  mild deficit;awareness of need for assistance;insight into deficits/self awareness  -DO      Recorded by [DO] Vidal Acosta, PT 05/18/19 1047      Row Name 05/18/19 1002             Bed Mobility Assessment/Treatment    Supine-Sit [place] (Bed Mobility)  not tested  -DO      Sit-Supine [place] (Bed Mobility)  not tested  -DO      Comment (Bed Mobility)  Began and ended session in chair.   -DO      Recorded by [DO] Vidal Acosta, PT 05/18/19 1047      Row Name 05/18/19 1002             Sit-Stand Transfer    Sit-Stand [place] (Transfers)  minimum assist (75% patient effort);verbal cues;nonverbal cues (demo/gesture)  -DO      Assistive Device (Sit-Stand Transfers)  -- HHA  -DO      Recorded by [DO] Vidal Acosta, PT 05/18/19 1047      Row Name 05/18/19 1002             Stand-Sit Transfer    Stand-Sit [place] (Transfers)  minimum assist (75% patient effort);verbal cues;nonverbal cues (demo/gesture)  -DO      Assistive Device (Stand-Sit Transfers)  -- HHA  -DO      Recorded by [DO] Vidal Acosta, PT 05/18/19 1047      Row Name 05/18/19 1002             Gait/Stairs Assessment/Training     [place] Level (Gait)  minimum assist (75% patient effort);verbal cues;nonverbal cues (demo/gesture)  -DO      Assistive Device (Gait)  -- HHA  -DO      Distance in Feet (Gait)  75 one standing rest break.  -DO      Pattern (Gait)  step-through  -DO      Deviations/Abnormal Patterns (Gait)  galina decreased;gait speed decreased;stride length decreased  -DO      Bilateral Gait Deviations  forward flexed posture  -DO      Handrail Location (Stairs)  both sides  -DO      Number of Steps (Stairs)  -- one flight  -DO      Ascending Technique (Stairs)  step-to-step  -DO      Descending Technique (Stairs)  step-to-step  -DO      Stairs, Impairments  strength decreased  -DO      Comment (Gait/Stairs)  -- CGAx1 provided for stairs.   -DO      Recorded by [DO] Vidal Acosta, PT 05/18/19 1047      Row Name 05/18/19 1002             Therapeutic Exercise    Comment (Therapeutic Exercise)  10 reps cardiac protocol, level 3  -DO      Recorded by [DO] Vidal Acosta, PT 05/18/19 1047      Row Name 05/18/19 1002             Static Sitting Balance    Level of [place] (Unsupported Sitting, Static Balance)  supervision  -DO      Recorded by [DO] Vidal Acosta, PT 05/18/19 1047      Row Name 05/18/19 1002             Static Standing Balance    Level of [place] (Supported Standing, Static Balance)  contact guard assist  -DO      Recorded by [DO] Vidal Acosta, PT 05/18/19 1047      Row Name 05/18/19 1002             Positioning and Restraints    Pre-Treatment Position  sitting in chair/recliner  -DO      Post Treatment Position  chair  -DO      In Chair  sitting;call light within reach;encouraged to call for assist;with family/caregiver  -DO      Recorded by [DO] Vidal Acosta, PT 05/18/19 1047      Row Name 05/18/19 1002             Pain Scale: Numbers Pre/Post-Treatment    Pain Scale: Numbers, Pretreatment  0/10 - no pain  -DO      Pain Scale: Numbers, Post-Treatment  0/10 - no pain  -DO      Recorded by [DO]  Vidal Acosta, PT 05/18/19 1047      Row Name                Wound 05/14/19 0957 chest incision    Wound - Properties Group Date first assessed: 05/14/19 [SR] Time first assessed: 0957 [SR] Location: chest [SR] Type: incision [SR] Recorded by:  [SR] Shanice Cast RN 05/14/19 0957    Row Name                Wound 05/17/19 1800 Left posterior flank abrasion;skin tear    Wound - Properties Group Date first assessed: 05/17/19 [MIKAEL] Time first assessed: 1800 [MIKAEL] Present On Admission : no [MIKAEL] Side: Left [MIKAEL] Orientation: posterior [MIKAEL] Location: flank [MIKAEL] Type: abrasion;skin tear [MIKAEL] Additional Comments: liz [MIKAEL] Recorded by:  [MIKAEL] Micki Gold RN 05/17/19 1816    Row Name 05/18/19 1002             Outcome Summary/Treatment Plan (PT)    Daily Summary of Progress (PT)  progress toward functional goals as expected  -DO      Recorded by [DO] Vidal Acosta, PT 05/18/19 1047        User Key  (r) = Recorded By, (t) = Taken By, (c) = Cosigned By    Initials Name Effective Dates Discipline    SR Shanice Cast RN 06/16/16 -  Nurse    DO Vidal Acosta, PT 03/07/18 -  PT    Micki Young RN 01/02/19 -  Nurse          Wound 05/14/19 0957 chest incision (Active)   Dressing Appearance open to air 5/18/2019  8:22 AM   Closure Sutures 5/18/2019  8:22 AM   Base dressing in place, unable to visualize 5/17/2019  4:00 PM   Drainage Amount none 5/18/2019  8:22 AM   Care, Wound cleansed with;wound cleanser 5/17/2019  6:00 PM   Dressing Care, Wound low-adherent;foam 5/17/2019  4:00 PM       Wound 05/17/19 1800 Left posterior flank abrasion;skin tear (Active)   Dressing Appearance open to air;no drainage 5/18/2019  8:22 AM   Closure Open to air 5/18/2019  8:22 AM   Red (%), Wound Tissue Color 100 5/17/2019  6:00 PM   Edges open 5/17/2019  6:00 PM           Physical Therapy Education     Title: PT OT SLP Therapies (In Progress)     Topic: Physical Therapy (In Progress)     Point: Mobility training (In Progress)      Learning Progress Summary           Patient Acceptance, E, NR by DO at 5/18/2019 10:47 AM    Acceptance, E, NR by EM at 5/17/2019 10:08 AM    Nonacceptance, E, NL,NR by EM at 5/16/2019  9:59 AM    Acceptance, E, NR by EM at 5/15/2019  9:15 AM   Family Acceptance, E, NR by DO at 5/18/2019 10:47 AM                   Point: Home exercise program (In Progress)     Learning Progress Summary           Patient Acceptance, E, NR by DO at 5/18/2019 10:47 AM    Acceptance, E, NR by EM at 5/17/2019 10:08 AM    Acceptance, E, NR by EM at 5/15/2019  9:15 AM   Family Acceptance, E, NR by DO at 5/18/2019 10:47 AM                   Point: Body mechanics (In Progress)     Learning Progress Summary           Patient Acceptance, E, NR by DO at 5/18/2019 10:47 AM   Family Acceptance, E, NR by DO at 5/18/2019 10:47 AM                               User Key     Initials Effective Dates Name Provider Type Discipline    EM 04/03/18 -  Mary Harper, PT Physical Therapist PT    DO 03/07/18 -  Vidal Acosta, PT Physical Therapist PT                PT Recommendation and Plan  Therapy Frequency (PT Clinical Impression): daily  Outcome Summary/Treatment Plan (PT)  Daily Summary of Progress (PT): progress toward functional goals as expected  Plan of Care Reviewed With: patient  Progress: improving  Outcome Summary: Pt able to ambulate 75 ft with CGAx1 throughout. Pt did require one standing rest break secondary to fatigue. She navigated an entire flight of stiars with CGAx1 and no overt unsteadiness during performance. Pt's activity tolerance is poor so additional mobility held. Upon returning to the bedside chair her O2 sats dropped to 80%, and pt was visibly fatigued. Pt O2 sats returned to normal within one minute. Pt is progressing well but will benefit from continuing skilled PT to assist in improving her independence with functional mobility and improving her activity tolerance.   Outcome Measures     Row Name 05/18/19 1000  05/17/19 1500 05/17/19 1000       How much help from another person do you currently need...    Turning from your back to your side while in flat bed without using bedrails?  3  -DO  --  3  -EM    Moving from lying on back to sitting on the side of a flat bed without bedrails?  3  -DO  --  3  -EM    Moving to and from a bed to a chair (including a wheelchair)?  3  -DO  --  3  -EM    Standing up from a chair using your arms (e.g., wheelchair, bedside chair)?  3  -DO  --  3  -EM    Climbing 3-5 steps with a railing?  3  -DO  --  2  -EM    To walk in hospital room?  3  -DO  --  3  -EM    AM-[facility] 6 Clicks Score  18  -DO  --  17  -EM       How much help from another is currently needed...    Putting on and taking off regular lower body clothing?  --  2  -SG  --    Bathing (including washing, rinsing, and drying)  --  2  -SG  --    Toileting (which includes using toilet bed pan or urinal)  --  3  -SG  --    Putting on and taking off regular upper body clothing  --  3  -SG  --    Taking care of personal grooming (such as brushing teeth)  --  3  -SG  --    Eating meals  --  3  -SG  --    Score  --  16  -SG  --       Functional Assessment    Outcome Measure Options  AM-[facility] 6 Clicks Basic Mobility (PT)  -DO  -[facility] 6 Clicks Daily Activity (OT)  -SG  --    Row Name 05/16/19 1100 05/16/19 1000          How much help from another person do you currently need...    Turning from your back to your side while in flat bed without using bedrails?  --  2  -EM     Moving from lying on back to sitting on the side of a flat bed without bedrails?  --  2  -EM     Moving to and from a bed to a chair (including a wheelchair)?  --  2  -EM     Standing up from a chair using your arms (e.g., wheelchair, bedside chair)?  --  2  -EM     Climbing 3-5 steps with a railing?  --  1  -EM     To walk in hospital room?  --  1  -EM     AM-[facility] 6 Clicks Score  --  10  -EM        How much help from another is currently needed...    Putting on and taking off  regular lower body clothing?  1  -SG  --     Bathing (including washing, rinsing, and drying)  1  -SG  --     Toileting (which includes using toilet bed pan or urinal)  1  -SG  --     Putting on and taking off regular upper body clothing  1  -SG  --     Taking care of personal grooming (such as brushing teeth)  1  -SG  --     Eating meals  1  -SG  --     Score  6  -SG  --        Functional Assessment    Outcome Measure Options  AM-[facility] 6 Clicks Daily Activity (OT)  -SG  --       User Key  (r) = Recorded By, (t) = Taken By, (c) = Cosigned By    Initials Name Provider Type    Idalia Palacios, OTR Occupational Therapist    Mary Mak PT Physical Therapist    Vidal Rojas, PT Physical Therapist         Time Calculation:   PT Charges     Row Name 05/18/19 1049             Time Calculation    Start Time  0102  -DO      Stop Time  1011  -DO      Time Calculation (min)  549 min  -DO      PT Received On  05/18/19  -DO      PT - Next Appointment  05/19/19  -DO        User Key  (r) = Recorded By, (t) = Taken By, (c) = Cosigned By    Initials Name Provider Type    Vidal Rojas, PT Physical Therapist        Therapy Charges for Today     Code Description Service Date Service Provider Modifiers Qty    65521418492 HC PT THER PROC EA 15 MIN 5/18/2019 Vidal Acosta, PT GP 1          PT G-Codes  Outcome Measure Options: AM-PAC 6 Clicks Basic Mobility (PT)  AM-PAC 6 Clicks Score: 18  Score: 16    Vidal Acosta PT  5/18/2019

## 2019-05-18 NOTE — PROGRESS NOTES
LOS: 4 days   Patient Care Team:  Rashid Ge MD as PCP - General (Family Medicine)  Thong Jones MD as Consulting Physician (Cardiology)  Mahin Galeas MD as Consulting Physician (Sleep Medicine)  Lara Ortiz MD as Consulting Physician (Obstetrics and Gynecology)  Betty Horowitz MD as Consulting Physician (Dermatology)  Ben Saavedra MD as Surgeon (Cardiothoracic Surgery)  Mahin Galeas MD as Consulting Physician (Pulmonary Disease)  Chirag Rai MD as Consulting Physician (Ophthalmology)  Moisés Everett MD as Consulting Physician (Urology)    Chief Complaint: post op fu    Subjective      Vital Signs  Temp:  [97.8 °F (36.6 °C)-98.2 °F (36.8 °C)] 98 °F (36.7 °C)  Heart Rate:  [79-90] 80  Resp:  [18] 18  BP: (114-144)/(59-97) 119/67  Body mass index is 36.07 kg/m².    Intake/Output Summary (Last 24 hours) at 5/18/2019 1328  Last data filed at 5/18/2019 1213  Gross per 24 hour   Intake 1200 ml   Output 620 ml   Net 580 ml     I/O this shift:  In: 360 [P.O.:360]  Out: 300 [Urine:300]    Chest tube drainage last 8 hours:  20        05/16/19  0658 05/17/19  0500 05/18/19  0351   Weight: 89 kg (196 lb 2 oz) 89.9 kg (198 lb 3.2 oz) 89.4 kg (197 lb 3.2 oz)         Objective    Results Review:        WBC WBC   Date Value Ref Range Status   05/18/2019 9.13 3.40 - 10.80 10*3/mm3 Final   05/17/2019 10.67 3.40 - 10.80 10*3/mm3 Final   05/16/2019 13.29 (H) 3.40 - 10.80 10*3/mm3 Final      HGB Hemoglobin   Date Value Ref Range Status   05/18/2019 9.2 (L) 12.0 - 15.9 g/dL Final   05/17/2019 10.1 (L) 12.0 - 15.9 g/dL Final   05/16/2019 10.2 (L) 12.0 - 15.9 g/dL Final      HCT Hematocrit   Date Value Ref Range Status   05/18/2019 29.5 (L) 34.0 - 46.6 % Final   05/17/2019 32.7 (L) 34.0 - 46.6 % Final   05/16/2019 33.7 (L) 34.0 - 46.6 % Final      Platelets Platelets   Date Value Ref Range Status   05/18/2019 171 140 - 450 10*3/mm3 Final   05/17/2019 164 140 - 450  10*3/mm3 Final   05/16/2019 152 140 - 450 10*3/mm3 Final        PT/INR:    Protime   Date Value Ref Range Status   05/18/2019 15.2 (H) 11.7 - 14.2 Seconds Final   /  INR   Date Value Ref Range Status   05/18/2019 1.23 (H) 0.90 - 1.10 Final       Sodium Sodium   Date Value Ref Range Status   05/18/2019 138 136 - 145 mmol/L Final   05/17/2019 137 136 - 145 mmol/L Final   05/16/2019 135 (L) 136 - 145 mmol/L Final      Potassium Potassium   Date Value Ref Range Status   05/18/2019 4.3 3.5 - 5.2 mmol/L Final   05/17/2019 4.1 3.5 - 5.2 mmol/L Final   05/16/2019 4.1 3.5 - 5.2 mmol/L Final      Chloride Chloride   Date Value Ref Range Status   05/18/2019 100 98 - 107 mmol/L Final   05/17/2019 99 98 - 107 mmol/L Final   05/16/2019 101 98 - 107 mmol/L Final      Bicarbonate CO2   Date Value Ref Range Status   05/18/2019 27.2 22.0 - 29.0 mmol/L Final   05/17/2019 23.8 22.0 - 29.0 mmol/L Final   05/16/2019 22.9 22.0 - 29.0 mmol/L Final      BUN BUN   Date Value Ref Range Status   05/18/2019 24 (H) 8 - 23 mg/dL Final   05/17/2019 20 8 - 23 mg/dL Final   05/16/2019 16 8 - 23 mg/dL Final      Creatinine Creatinine   Date Value Ref Range Status   05/18/2019 0.61 0.57 - 1.00 mg/dL Final   05/17/2019 0.61 0.57 - 1.00 mg/dL Final   05/16/2019 0.68 0.57 - 1.00 mg/dL Final      Calcium Calcium   Date Value Ref Range Status   05/18/2019 10.2 8.6 - 10.5 mg/dL Final   05/17/2019 10.4 8.6 - 10.5 mg/dL Final   05/16/2019 10.6 (H) 8.6 - 10.5 mg/dL Final      Magnesium No results found for: MG         atorvastatin 20 mg Oral Once per day on Mon Thu   digoxin 250 mcg Oral Daily   enoxaparin 40 mg Subcutaneous Daily   furosemide 40 mg Intravenous Q12H   gabapentin 100 mg Oral Q12H   guaiFENesin 1,200 mg Oral Q12H   insulin lispro 0-9 Units Subcutaneous 4x Daily With Meals & Nightly   metFORMIN 500 mg Oral Daily With Breakfast   metoprolol tartrate 50 mg Oral Q12H   mupirocin  Each Nare BID   pantoprazole 40 mg Oral Q AM   polyethylene glycol 17  g Oral BID   potassium chloride 20 mEq Oral Daily   sennosides-docusate sodium 2 tablet Oral Nightly   warfarin 5 mg Oral Daily       sodium chloride 30 mL/hr    sodium chloride 30 mL/hr Last Rate: 30 mL/hr (05/14/19 1300)           Patient Active Problem List   Diagnosis Code   • S/P mitral valve repair Z98.890   • S/P tricuspid valve repair Z98.890   • S/P Maze operation for atrial fibrillation Z98.890, Z86.79   • Morbidly obese (CMS/HCC) E66.01   • Atrial fibrillation (CMS/[facility]) I48.91   • Acquired hypothyroidism E03.9   • COPD (chronic obstructive pulmonary disease) (CMS/[facility]) J44.9   • CAD (coronary artery disease), hx MI I25.10   • HAMLET on autoCPAP G47.33, Z99.89   • Hypercalcemia E83.52   • Renal calculus, right N20.0   • Hyperparathyroidism (CMS/HCC) E21.3   • Type 2 diabetes mellitus without complication, with long-term current use of insulin (CMS/HCC) E11.9, Z79.4   • Warfarin anticoagulation Z79.01   • Medication monitoring encounter Z51.81   • Medicare annual wellness visit, subsequent Z00.00   • Pacemaker Z95.0   • AICD (automatic cardioverter/defibrillator) present Z95.810   • Right foot pain M79.671   • Chronic nonallergic rhinitis J31.0   • Pulmonary hypertension (CMS/[facility]) I27.20   • Mitral valve disease I05.9       Assessment & Plan    -Recurrent mitral incompetence and stenosis--s/p reop MVR (tissue)-POD#4 [place]  -hx of MV and TV repair, closure of INO, MAZE  -Diabetes- SSI, home meds  -hx paroxysmal atrial fibrillation with chronic coumadin therapy  -BiV-AICD- [employer]  -Severe Pulmonary hypertension- PAs 100 intra-op  -Vaginal bleeding  -Post op anemia- expected acute blood loss, stable    Looks much better today, smiling, alert and oriented.  Remove chest tube, continue coumadin load.  Still weak with ambulation, sats dropped with PT today, obtain walking oximetry tomorrow.  Anticipate discharge on Monday, drop pacer rate to 60 backup prior to discharge.        HESHAM Banks  05/18/19  1:28  PM

## 2019-05-19 LAB
ANION GAP SERPL CALCULATED.3IONS-SCNC: 10.1 MMOL/L
BUN BLD-MCNC: 20 MG/DL (ref 8–23)
BUN/CREAT SERPL: 37 (ref 7–25)
CALCIUM SPEC-SCNC: 9.8 MG/DL (ref 8.6–10.5)
CHLORIDE SERPL-SCNC: 94 MMOL/L (ref 98–107)
CO2 SERPL-SCNC: 26.9 MMOL/L (ref 22–29)
CREAT BLD-MCNC: 0.54 MG/DL (ref 0.57–1)
GFR SERPL CREATININE-BSD FRML MDRD: 110 ML/MIN/1.73
GLUCOSE BLD-MCNC: 122 MG/DL (ref 65–99)
GLUCOSE BLDC GLUCOMTR-MCNC: 113 MG/DL (ref 70–130)
GLUCOSE BLDC GLUCOMTR-MCNC: 135 MG/DL (ref 70–130)
GLUCOSE BLDC GLUCOMTR-MCNC: 153 MG/DL (ref 70–130)
GLUCOSE BLDC GLUCOMTR-MCNC: 163 MG/DL (ref 70–130)
INR PPP: 1.23 (ref 0.9–1.1)
POTASSIUM BLD-SCNC: 3.6 MMOL/L (ref 3.5–5.2)
POTASSIUM BLD-SCNC: 4.5 MMOL/L (ref 3.5–5.2)
PROTHROMBIN TIME: 15.2 SECONDS (ref 11.7–14.2)
SODIUM BLD-SCNC: 131 MMOL/L (ref 136–145)

## 2019-05-19 PROCEDURE — 80048 BASIC METABOLIC PNL TOTAL CA: CPT | Performed by: THORACIC SURGERY (CARDIOTHORACIC VASCULAR SURGERY)

## 2019-05-19 PROCEDURE — 99024 POSTOP FOLLOW-UP VISIT: CPT | Performed by: NURSE PRACTITIONER

## 2019-05-19 PROCEDURE — 97110 THERAPEUTIC EXERCISES: CPT

## 2019-05-19 PROCEDURE — 94799 UNLISTED PULMONARY SVC/PX: CPT

## 2019-05-19 PROCEDURE — 82962 GLUCOSE BLOOD TEST: CPT

## 2019-05-19 PROCEDURE — 84132 ASSAY OF SERUM POTASSIUM: CPT | Performed by: THORACIC SURGERY (CARDIOTHORACIC VASCULAR SURGERY)

## 2019-05-19 PROCEDURE — 85610 PROTHROMBIN TIME: CPT | Performed by: NURSE PRACTITIONER

## 2019-05-19 PROCEDURE — 63710000001 INSULIN LISPRO (HUMAN) PER 5 UNITS: Performed by: THORACIC SURGERY (CARDIOTHORACIC VASCULAR SURGERY)

## 2019-05-19 PROCEDURE — 25010000002 ENOXAPARIN PER 10 MG: Performed by: THORACIC SURGERY (CARDIOTHORACIC VASCULAR SURGERY)

## 2019-05-19 RX ORDER — FUROSEMIDE 40 MG/1
40 TABLET ORAL DAILY
Status: DISCONTINUED | OUTPATIENT
Start: 2019-05-20 | End: 2019-05-20 | Stop reason: HOSPADM

## 2019-05-19 RX ADMIN — GUAIFENESIN 1200 MG: 600 TABLET, EXTENDED RELEASE ORAL at 08:39

## 2019-05-19 RX ADMIN — ACETAMINOPHEN 650 MG: 325 TABLET, FILM COATED ORAL at 16:47

## 2019-05-19 RX ADMIN — INSULIN LISPRO 2 UNITS: 100 INJECTION, SOLUTION INTRAVENOUS; SUBCUTANEOUS at 21:29

## 2019-05-19 RX ADMIN — METFORMIN HYDROCHLORIDE 500 MG: 500 TABLET ORAL at 08:39

## 2019-05-19 RX ADMIN — ACETAMINOPHEN 650 MG: 325 TABLET, FILM COATED ORAL at 06:36

## 2019-05-19 RX ADMIN — DIGOXIN 250 MCG: 250 TABLET ORAL at 11:23

## 2019-05-19 RX ADMIN — INSULIN LISPRO 2 UNITS: 100 INJECTION, SOLUTION INTRAVENOUS; SUBCUTANEOUS at 11:24

## 2019-05-19 RX ADMIN — METOPROLOL TARTRATE 50 MG: 50 TABLET, FILM COATED ORAL at 08:38

## 2019-05-19 RX ADMIN — MUPIROCIN 1 APPLICATION: 20 OINTMENT TOPICAL at 08:39

## 2019-05-19 RX ADMIN — PANTOPRAZOLE SODIUM 40 MG: 40 TABLET, DELAYED RELEASE ORAL at 06:37

## 2019-05-19 RX ADMIN — WARFARIN SODIUM 5 MG: 5 TABLET ORAL at 18:05

## 2019-05-19 RX ADMIN — POTASSIUM CHLORIDE 40 MEQ: 750 CAPSULE, EXTENDED RELEASE ORAL at 06:37

## 2019-05-19 RX ADMIN — GUAIFENESIN 1200 MG: 600 TABLET, EXTENDED RELEASE ORAL at 21:29

## 2019-05-19 RX ADMIN — ENOXAPARIN SODIUM 40 MG: 40 INJECTION SUBCUTANEOUS at 18:05

## 2019-05-19 RX ADMIN — POTASSIUM CHLORIDE 20 MEQ: 750 CAPSULE, EXTENDED RELEASE ORAL at 08:38

## 2019-05-19 RX ADMIN — METOPROLOL TARTRATE 50 MG: 50 TABLET, FILM COATED ORAL at 21:29

## 2019-05-19 NOTE — THERAPY TREATMENT NOTE
Acute Care - Physical Therapy Treatment Note  [facility]     Patient Name: Fernanda Molina  : 1940  MRN: [id number]  Today's Date: 2019  Onset of Illness/Injury or Date of Surgery: 19     Referring Physician: Ana    Admit Date: 2019    Visit Dx:    ICD-10-CM ICD-9-CM   1. Impaired mobility Z74.09 799.89   2. Mitral valve disease I05.9 394.9     Patient Active Problem List   Diagnosis   • S/P mitral valve repair   • S/P tricuspid valve repair   • S/P Maze operation for atrial fibrillation   • Morbidly obese (CMS/[facility])   • Atrial fibrillation (CMS/HCC)   • Acquired hypothyroidism   • COPD (chronic obstructive pulmonary disease) (CMS/[facility])   • CAD (coronary artery disease), hx MI   • HAMLET on autoCPAP   • Hypercalcemia   • Renal calculus, right   • Hyperparathyroidism (CMS/[facility])   • Type 2 diabetes mellitus without complication, with long-term current use of insulin (CMS/[facility])   • Warfarin anticoagulation   • Medication monitoring encounter   • Medicare annual wellness visit, subsequent   • Pacemaker   • AICD (automatic cardioverter/defibrillator) present   • Right foot pain   • Chronic nonallergic rhinitis   • Pulmonary hypertension (CMS/[facility])   • Mitral valve disease       Therapy Treatment    Rehabilitation Treatment Summary     Row Name 19 0942             Treatment Time/Intention    Discipline  physical therapist  -DO      Document Type  therapy note (daily note)  -DO      Subjective Information  no complaints  -DO      Mode of Treatment  physical therapy  -DO      Patient/Family Observations  Pt sitting upright in chair in no acute distress. Family present.  -DO2      Total Minutes, Physical Therapy Treatment  12  -DO      Therapy Frequency (PT Clinical Impression)  daily  -DO      Patient Effort  excellent  -DO      Existing Precautions/Restrictions  cardiac;fall;sternal  -DO      Patient Response to Treatment  Pt tolerated session well, however O2 sats dropped to 78% after her  walk and she required about 30 secs to return to 90%. She denies any adverse s/s like lightheadedness/dizziness throughout her session.   -DO2      Recorded by [DO] Vidal Acosta, PT 05/19/19 1022  [DO2] Vidal Acosta, PT 05/19/19 1025      Row Name 05/19/19 0942             Vital Signs    Pre Systolic BP Rehab  114  -DO      Pre Treatment Diastolic BP  52  -DO      Posttreatment Heart Rate (beats/min)  85  -DO2      Pre SpO2 (%)  93  -DO2      O2 Delivery Pre Treatment  room air  -DO2      Intra SpO2 (%)  78  -DO2      O2 Delivery Intra Treatment  room air  -DO2      Post SpO2 (%)  91  -DO2      O2 Delivery Post Treatment  room air  -DO2      Recorded by [DO] Vidal Acosta, PT 05/19/19 1022  [DO2] Vidal Acosta, PT 05/19/19 1025      Row Name 05/19/19 0942             Cognitive Assessment/Intervention- PT/OT    Orientation Status (Cognition)  oriented x 3  -DO      Follows Commands (Cognition)  WFL  -DO      Recorded by [DO] Vidal Acosta, PT 05/19/19 1025      Row Name 05/19/19 0942             Bed Mobility Assessment/Treatment    Supine-Sit [place] (Bed Mobility)  not tested  -DO      Sit-Supine [place] (Bed Mobility)  not tested  -DO      Comment (Bed Mobility)  Began and ended session in chair.   -DO      Recorded by [DO] Vidal Acosta, PT 05/19/19 1025      Row Name 05/19/19 0942             Sit-Stand Transfer    Sit-Stand [place] (Transfers)  contact guard;verbal cues;nonverbal cues (demo/gesture)  -DO      Assistive Device (Sit-Stand Transfers)  -- HHA  -DO      Recorded by [DO] Vidal Acosta, PT 05/19/19 1025      Row Name 05/19/19 0942             Stand-Sit Transfer    Stand-Sit [place] (Transfers)  contact guard;verbal cues;nonverbal cues (demo/gesture)  -DO      Assistive Device (Stand-Sit Transfers)  -- HHA  -DO      Recorded by [DO] Vidal Acosta, PT 05/19/19 1025      Row Name 05/19/19 0942             Gait/Stairs Assessment/Training    [place] Level (Gait)   stand by assist;contact guard;verbal cues;nonverbal cues (demo/gesture)  -DO      Assistive Device (Gait)  -- HHA  -DO      Distance in Feet (Gait)  170  -DO      Pattern (Gait)  step-through  -DO      Deviations/Abnormal Patterns (Gait)  galina decreased;gait speed decreased;stride length decreased  -DO      Bilateral Gait Deviations  forward flexed posture;weight shift ability decreased  -DO      Recorded by [DO] Vidal Acosta, PT 05/19/19 1025      Row Name 05/19/19 0942             Therapeutic Exercise    Comment (Therapeutic Exercise)  -- 10 reps cardiac protocol, level 4  -DO      Recorded by [DO] Vidal Acosta, PT 05/19/19 1025      Row Name 05/19/19 0942             Static Sitting Balance    Level of [place] (Unsupported Sitting, Static Balance)  supervision  -DO      Recorded by [DO] Vidal Acosta, PT 05/19/19 1025      Row Name 05/19/19 0942             Static Standing Balance    Level of [place] (Supported Standing, Static Balance)  standby assist  -DO      Recorded by [DO] Vidal Acosta, PT 05/19/19 1025      Row Name 05/19/19 0942             Positioning and Restraints    Pre-Treatment Position  sitting in chair/recliner  -DO      Post Treatment Position  chair  -DO      In Chair  sitting;call light within reach;encouraged to call for assist;with family/caregiver  -DO      Recorded by [DO] Vidal Acosta, PT 05/19/19 1025      Row Name 05/19/19 0942             Pain Scale: Numbers Pre/Post-Treatment    Pain Scale: Numbers, Pretreatment  0/10 - no pain  -DO      Pain Scale: Numbers, Post-Treatment  0/10 - no pain  -DO      Recorded by [DO] Vidal Acosta, PT 05/19/19 1025      Row Name                Wound 05/14/19 0957 chest incision    Wound - Properties Group Date first assessed: 05/14/19 [SR] Time first assessed: 0957 [SR] Location: chest [SR] Type: incision [SR] Recorded by:  [SR] Shanice Cast RN 05/14/19 0957    Row Name                Wound 05/17/19 1800 Left posterior flank  abrasion;skin tear    Wound - Properties Group Date first assessed: 05/17/19 [MIKAEL] Time first assessed: 1800 [MIKAEL] Present On Admission : no [MIKAEL] Side: Left [MIKAEL] Orientation: posterior [MIKAEL] Location: flank [MIKAEL] Type: abrasion;skin tear [MIKAEL] Additional Comments: liz [MIKAEL] Recorded by:  [MIKAEL] Micki Gold RN 05/17/19 1816    Row Name 05/19/19 0942             Outcome Summary/Treatment Plan (PT)    Daily Summary of Progress (PT)  progress toward functional goals as expected  -DO      Recorded by [DO] Vidal Acosta, PT 05/19/19 1025        User Key  (r) = Recorded By, (t) = Taken By, (c) = Cosigned By    Initials Name Effective Dates Discipline    SR Shanice Cast RN 06/16/16 -  Nurse    DO Vidal Acosta, PT 03/07/18 -  PT    Micki Young RN 01/02/19 -  Nurse          Wound 05/14/19 0957 chest incision (Active)   Dressing Appearance open to air 5/19/2019  8:08 AM   Closure Approximated 5/19/2019  8:08 AM   Base dressing in place, unable to visualize 5/19/2019  8:08 AM   Drainage Amount none 5/19/2019  8:08 AM   Care, Wound cleansed with;antimicrobial agent applied 5/18/2019  4:25 PM       Wound 05/17/19 1800 Left posterior flank abrasion;skin tear (Active)   Dressing Appearance open to air;no drainage 5/19/2019  8:08 AM   Closure Open to air 5/19/2019  8:08 AM           Physical Therapy Education     Title: PT OT SLP Therapies (In Progress)     Topic: Physical Therapy (Done)     Point: Mobility training (Done)     Learning Progress Summary           Patient Acceptance, E, VU,DU by DO at 5/19/2019 10:25 AM    Acceptance, E, NR by DO at 5/18/2019 10:47 AM    Acceptance, E, NR by EM at 5/17/2019 10:08 AM    Nonacceptance, E, NL,NR by EM at 5/16/2019  9:59 AM    Acceptance, E, NR by EM at 5/15/2019  9:15 AM   Family Acceptance, E, VU,DU by DO at 5/19/2019 10:25 AM    Acceptance, E, NR by DO at 5/18/2019 10:47 AM                   Point: Home exercise program (Done)     Learning Progress Summary            Patient Acceptance, E, VU,DU by DO at 5/19/2019 10:25 AM    Acceptance, E, NR by DO at 5/18/2019 10:47 AM    Acceptance, E, NR by EM at 5/17/2019 10:08 AM    Acceptance, E, NR by EM at 5/15/2019  9:15 AM   Family Acceptance, E, VU,DU by DO at 5/19/2019 10:25 AM    Acceptance, E, NR by DO at 5/18/2019 10:47 AM                   Point: Body mechanics (Done)     Learning Progress Summary           Patient Acceptance, E, VU,DU by DO at 5/19/2019 10:25 AM    Acceptance, E, NR by DO at 5/18/2019 10:47 AM   Family Acceptance, E, VU,DU by DO at 5/19/2019 10:25 AM    Acceptance, E, NR by DO at 5/18/2019 10:47 AM                   Point: Precautions (Done)     Learning Progress Summary           Patient Acceptance, E, VU,DU by DO at 5/19/2019 10:25 AM   Family Acceptance, E, VU,DU by DO at 5/19/2019 10:25 AM                               User Key     Initials Effective Dates Name Provider Type Discipline    EM 04/03/18 -  Mary Harper, PT Physical Therapist PT    DO 03/07/18 -  Vidal Acosta, PT Physical Therapist PT                PT Recommendation and Plan  Therapy Frequency (PT Clinical Impression): daily  Outcome Summary/Treatment Plan (PT)  Daily Summary of Progress (PT): progress toward functional goals as expected  Plan of Care Reviewed With: patient, family  Progress: improving  Outcome Summary: Pt able to ambulate 170ft today with SBA-CGAx1 and one standing rest break. Following the standing rest break she requires CGAx1/HHA as fatigue sets in, along with cuing to improve posture and foot clearance while ambulating as she begins to slightly shuffle her feet. She denies any lightheadedness/dizziness during mobility, however upon returning to her chair her O2 sats were at 78% and marii to 90% within 30 seconds. Pt appears to be holding her breath throughout some mobility tasks/seated therex, so educated her on breathing pattern to help regulate her O2 sats better. She is progressing well and will benefit  from continuing skilled PT at this time to improve her independence with functional mobility and to increase her activity tolerance.   Outcome Measures     Row Name 05/19/19 1000 05/18/19 1000 05/17/19 1500       How much help from another person do you currently need...    Turning from your back to your side while in flat bed without using bedrails?  3  -DO  3  -DO  --    Moving from lying on back to sitting on the side of a flat bed without bedrails?  3  -DO  3  -DO  --    Moving to and from a bed to a chair (including a wheelchair)?  3  -DO  3  -DO  --    Standing up from a chair using your arms (e.g., wheelchair, bedside chair)?  3  -DO  3  -DO  --    Climbing 3-5 steps with a railing?  3  -DO  3  -DO  --    To walk in hospital room?  3  -DO  3  -DO  --    AM-PAC 6 Clicks Score  18  -DO  18  -DO  --       How much help from another is currently needed...    Putting on and taking off regular lower body clothing?  --  --  2  -SG    Bathing (including washing, rinsing, and drying)  --  --  2  -SG    Toileting (which includes using toilet bed pan or urinal)  --  --  3  -SG    Putting on and taking off regular upper body clothing  --  --  3  -SG    Taking care of personal grooming (such as brushing teeth)  --  --  3  -SG    Eating meals  --  --  3  -SG    Score  --  --  16  -SG       Functional Assessment    Outcome Measure Options  AM-PAC 6 Clicks Basic Mobility (PT)  -DO  AM-PAC 6 Clicks Basic Mobility (PT)  -DO  AM-PAC 6 Clicks Daily Activity (OT)  -SG    Row Name 05/17/19 1000 05/16/19 1100          How much help from another person do you currently need...    Turning from your back to your side while in flat bed without using bedrails?  3  -EM  --     Moving from lying on back to sitting on the side of a flat bed without bedrails?  3  -EM  --     Moving to and from a bed to a chair (including a wheelchair)?  3  -EM  --     Standing up from a chair using your arms (e.g., wheelchair, bedside chair)?  3  -EM  --      Climbing 3-5 steps with a railing?  2  -EM  --     To walk in hospital room?  3  -EM  --     AM-PAC 6 Clicks Score  17  -EM  --        How much help from another is currently needed...    Putting on and taking off regular lower body clothing?  --  1  -SG     Bathing (including washing, rinsing, and drying)  --  1  -SG     Toileting (which includes using toilet bed pan or urinal)  --  1  -SG     Putting on and taking off regular upper body clothing  --  1  -SG     Taking care of personal grooming (such as brushing teeth)  --  1  -SG     Eating meals  --  1  -SG     Score  --  6  -SG        Functional Assessment    Outcome Measure Options  --  AM-PAC 6 Clicks Daily Activity (OT)  -SG       User Key  (r) = Recorded By, (t) = Taken By, (c) = Cosigned By    Initials Name Provider Type    Idalia Palacios, OTR Occupational Therapist    EM Mary Harper, PT Physical Therapist    Vidal Rojas, PT Physical Therapist         Time Calculation:   PT Charges     Row Name 05/19/19 1028             Time Calculation    Start Time  0942  -DO      Stop Time  0954  -DO      Time Calculation (min)  12 min  -DO      PT Received On  05/19/19  -DO      PT - Next Appointment  05/20/19  -DO        User Key  (r) = Recorded By, (t) = Taken By, (c) = Cosigned By    Initials Name Provider Type    Vidal Rojas, PT Physical Therapist        Therapy Charges for Today     Code Description Service Date Service Provider Modifiers Qty    62873918910 HC PT THER PROC EA 15 MIN 5/18/2019 Vidal Acosta, PT GP 1    73986491072 HC PT THER PROC EA 15 MIN 5/19/2019 Vidal Acosta, PT GP 1          PT G-Codes  Outcome Measure Options: AM-PAC 6 Clicks Basic Mobility (PT)  AM-PAC 6 Clicks Score: 18  Score: 16    Vidal Acosta, PT  5/19/2019

## 2019-05-19 NOTE — PROGRESS NOTES
LOS: 5 days   Patient Care Team:  Rashid Ge MD as PCP - General (Family Medicine)  Thong Jones MD as Consulting Physician (Cardiology)  Mahin Galeas MD as Consulting Physician (Sleep Medicine)  Lara Ortiz MD as Consulting Physician (Obstetrics and Gynecology)  Betty Horowitz MD as Consulting Physician (Dermatology)  Ben Saavedra MD as Surgeon (Cardiothoracic Surgery)  Mahin Galeas MD as Consulting Physician (Pulmonary Disease)  Chirag Rai MD as Consulting Physician (Ophthalmology)  Moisés Everett MD as Consulting Physician (Urology)    Chief Complaint: post op fu    Subjective      Vital Signs  Temp:  [97.6 °F (36.4 °C)-98.6 °F (37 °C)] 98.2 °F (36.8 °C)  Heart Rate:  [79-80] 79  Resp:  [16-18] 16  BP: (112-128)/(49-70) 114/52  Body mass index is 35.34 kg/m².    Intake/Output Summary (Last 24 hours) at 5/19/2019 1041  Last data filed at 5/19/2019 0820  Gross per 24 hour   Intake 1200 ml   Output 1720 ml   Net -520 ml     I/O this shift:  In: 480 [P.O.:480]  Out: -           05/17/19  0500 05/18/19  0351 05/19/19  0612   Weight: 89.9 kg (198 lb 3.2 oz) 89.4 kg (197 lb 3.2 oz) 87.6 kg (193 lb 3.2 oz)         Objective    Results Review:        WBC WBC   Date Value Ref Range Status   05/18/2019 9.13 3.40 - 10.80 10*3/mm3 Final   05/17/2019 10.67 3.40 - 10.80 10*3/mm3 Final      HGB Hemoglobin   Date Value Ref Range Status   05/18/2019 9.2 (L) 12.0 - 15.9 g/dL Final   05/17/2019 10.1 (L) 12.0 - 15.9 g/dL Final      HCT Hematocrit   Date Value Ref Range Status   05/18/2019 29.5 (L) 34.0 - 46.6 % Final   05/17/2019 32.7 (L) 34.0 - 46.6 % Final      Platelets Platelets   Date Value Ref Range Status   05/18/2019 171 140 - 450 10*3/mm3 Final   05/17/2019 164 140 - 450 10*3/mm3 Final        PT/INR:    Protime   Date Value Ref Range Status   05/19/2019 15.2 (H) 11.7 - 14.2 Seconds Final   05/18/2019 15.2 (H) 11.7 - 14.2 Seconds Final   /  INR   Date  Value Ref Range Status   05/19/2019 1.23 (H) 0.90 - 1.10 Final   05/18/2019 1.23 (H) 0.90 - 1.10 Final       Sodium Sodium   Date Value Ref Range Status   05/19/2019 131 (L) 136 - 145 mmol/L Final   05/18/2019 138 136 - 145 mmol/L Final   05/17/2019 137 136 - 145 mmol/L Final      Potassium Potassium   Date Value Ref Range Status   05/19/2019 3.6 3.5 - 5.2 mmol/L Final   05/18/2019 4.3 3.5 - 5.2 mmol/L Final   05/17/2019 4.1 3.5 - 5.2 mmol/L Final      Chloride Chloride   Date Value Ref Range Status   05/19/2019 94 (L) 98 - 107 mmol/L Final   05/18/2019 100 98 - 107 mmol/L Final   05/17/2019 99 98 - 107 mmol/L Final      Bicarbonate CO2   Date Value Ref Range Status   05/19/2019 26.9 22.0 - 29.0 mmol/L Final   05/18/2019 27.2 22.0 - 29.0 mmol/L Final   05/17/2019 23.8 22.0 - 29.0 mmol/L Final      BUN BUN   Date Value Ref Range Status   05/19/2019 20 8 - 23 mg/dL Final   05/18/2019 24 (H) 8 - 23 mg/dL Final   05/17/2019 20 8 - 23 mg/dL Final      Creatinine Creatinine   Date Value Ref Range Status   05/19/2019 0.54 (L) 0.57 - 1.00 mg/dL Final   05/18/2019 0.61 0.57 - 1.00 mg/dL Final   05/17/2019 0.61 0.57 - 1.00 mg/dL Final      Calcium Calcium   Date Value Ref Range Status   05/19/2019 9.8 8.6 - 10.5 mg/dL Final   05/18/2019 10.2 8.6 - 10.5 mg/dL Final   05/17/2019 10.4 8.6 - 10.5 mg/dL Final      Magnesium No results found for: MG         atorvastatin 20 mg Oral Once per day on Mon Thu   digoxin 250 mcg Oral Daily   enoxaparin 40 mg Subcutaneous Daily   [START ON 5/20/2019] furosemide 40 mg Oral Daily   guaiFENesin 1,200 mg Oral Q12H   insulin lispro 0-9 Units Subcutaneous 4x Daily With Meals & Nightly   metFORMIN 500 mg Oral Daily With Breakfast   metoprolol tartrate 50 mg Oral Q12H   mupirocin  Each Nare BID   pantoprazole 40 mg Oral Q AM   polyethylene glycol 17 g Oral BID   potassium chloride 20 mEq Oral Daily   sennosides-docusate sodium 2 tablet Oral Nightly   warfarin 5 mg Oral Daily       sodium chloride  30 mL/hr    sodium chloride 30 mL/hr Last Rate: 30 mL/hr (05/14/19 1300)           Patient Active Problem List   Diagnosis Code   • S/P mitral valve repair Z98.890   • S/P tricuspid valve repair Z98.890   • S/P Maze operation for atrial fibrillation Z98.890, Z86.79   • Morbidly obese (CMS/HCC) E66.01   • Atrial fibrillation (CMS/HCC) I48.91   • Acquired hypothyroidism E03.9   • COPD (chronic obstructive pulmonary disease) (CMS/[facility]) J44.9   • CAD (coronary artery disease), hx MI I25.10   • HAMLET on autoCPAP G47.33, Z99.89   • Hypercalcemia E83.52   • Renal calculus, right N20.0   • Hyperparathyroidism (CMS/HCC) E21.3   • Type 2 diabetes mellitus without complication, with long-term current use of insulin (CMS/HCC) E11.9, Z79.4   • Warfarin anticoagulation Z79.01   • Medication monitoring encounter Z51.81   • Medicare annual wellness visit, subsequent Z00.00   • Pacemaker Z95.0   • AICD (automatic cardioverter/defibrillator) present Z95.810   • Right foot pain M79.671   • Chronic nonallergic rhinitis J31.0   • Pulmonary hypertension (CMS/HCC) I27.20   • Mitral valve disease I05.9       Assessment & Plan    -Recurrent mitral incompetence and stenosis--s/p reop MVR (tissue)-POD#5 [place]  -hx of MV and TV repair, closure of INO, MAZE  -Diabetes- SSI, home meds  -hx paroxysmal atrial fibrillation with chronic coumadin therapy  -BiV-AICD- [employer]  -Severe Pulmonary hypertension- PAs 100 intra-op  -Vaginal bleeding  -Post op anemia- expected acute blood loss, stable     Stop IV lasix with hyponatremia, start po tomorrow and follow Na+.  Potential discharge 1-2 days,drop pacer rate to 60 backup prior to discharge.  Walking oximetry today.  Reeval gait and steadiness with ambulation tomorrow for home with home health vs rehab.       HESHAM Banks  05/19/19  10:41 AM

## 2019-05-19 NOTE — PLAN OF CARE
Problem: Patient Care Overview  Goal: Plan of Care Review  Outcome: Ongoing (interventions implemented as appropriate)   05/19/19 0563   OTHER   Outcome Summary VSS. No complaints of pain. Paced rhythm. RA/CPAP at night. Up with standby. Will continue to monitor.    Plan of Care Review   Progress improving   Coping/Psychosocial   Plan of Care Reviewed With patient       Problem: Fall Risk (Adult)  Goal: Absence of Fall  Outcome: Ongoing (interventions implemented as appropriate)      Problem: Skin Injury Risk (Adult)  Goal: Skin Health and Integrity  Outcome: Ongoing (interventions implemented as appropriate)      Problem: Cardiac Surgery (Adult)  Goal: Signs and Symptoms of Listed Potential Problems Will be Absent, Minimized or Managed (Cardiac Surgery)  Outcome: Ongoing (interventions implemented as appropriate)

## 2019-05-19 NOTE — PLAN OF CARE
Problem: Patient Care Overview  Goal: Plan of Care Review  Outcome: Ongoing (interventions implemented as appropriate)   05/19/19 1025   OTHER   Outcome Summary Pt able to ambulate 170ft today with SBA-CGAx1 and one standing rest break. Following the standing rest break she requires CGAx1/HHA as fatigue sets in, along with cuing to improve posture and foot clearance while ambulating as she begins to slightly shuffle her feet. She denies any lightheadedness/dizziness during mobility, however upon returning to her chair her O2 sats were at 78% and marii to 90% within 30 seconds. Pt appears to be holding her breath throughout some mobility tasks/seated therex, so educated her on breathing pattern to help regulate her O2 sats better. She is progressing well and will benefit from continuing skilled PT at this time to improve her independence with functional mobility and to increase her activity tolerance.    Plan of Care Review   Progress improving   Coping/Psychosocial   Plan of Care Reviewed With patient;family

## 2019-05-20 ENCOUNTER — APPOINTMENT (OUTPATIENT)
Dept: CARDIAC REHAB | Facility: HOSPITAL | Age: 79
End: 2019-05-20

## 2019-05-20 VITALS
HEART RATE: 92 BPM | OXYGEN SATURATION: 97 % | SYSTOLIC BLOOD PRESSURE: 119 MMHG | DIASTOLIC BLOOD PRESSURE: 73 MMHG | HEIGHT: 62 IN | RESPIRATION RATE: 16 BRPM | BODY MASS INDEX: 34.39 KG/M2 | WEIGHT: 186.9 LBS | TEMPERATURE: 97.9 F

## 2019-05-20 LAB
ANION GAP SERPL CALCULATED.3IONS-SCNC: 9.5 MMOL/L
BUN BLD-MCNC: 13 MG/DL (ref 8–23)
BUN/CREAT SERPL: 22.8 (ref 7–25)
CALCIUM SPEC-SCNC: 10.5 MG/DL (ref 8.6–10.5)
CHLORIDE SERPL-SCNC: 100 MMOL/L (ref 98–107)
CO2 SERPL-SCNC: 26.5 MMOL/L (ref 22–29)
CREAT BLD-MCNC: 0.57 MG/DL (ref 0.57–1)
GFR SERPL CREATININE-BSD FRML MDRD: 103 ML/MIN/1.73
GLUCOSE BLD-MCNC: 131 MG/DL (ref 65–99)
GLUCOSE BLDC GLUCOMTR-MCNC: 121 MG/DL (ref 70–130)
GLUCOSE BLDC GLUCOMTR-MCNC: 122 MG/DL (ref 70–130)
INR PPP: 1.25 (ref 0.9–1.1)
POTASSIUM BLD-SCNC: 4 MMOL/L (ref 3.5–5.2)
PROTHROMBIN TIME: 15.4 SECONDS (ref 11.7–14.2)
SODIUM BLD-SCNC: 136 MMOL/L (ref 136–145)

## 2019-05-20 PROCEDURE — 82962 GLUCOSE BLOOD TEST: CPT

## 2019-05-20 PROCEDURE — 97110 THERAPEUTIC EXERCISES: CPT

## 2019-05-20 PROCEDURE — 80048 BASIC METABOLIC PNL TOTAL CA: CPT | Performed by: THORACIC SURGERY (CARDIOTHORACIC VASCULAR SURGERY)

## 2019-05-20 PROCEDURE — 99024 POSTOP FOLLOW-UP VISIT: CPT | Performed by: PHYSICIAN ASSISTANT

## 2019-05-20 PROCEDURE — 97535 SELF CARE MNGMENT TRAINING: CPT

## 2019-05-20 PROCEDURE — 94799 UNLISTED PULMONARY SVC/PX: CPT

## 2019-05-20 PROCEDURE — 85610 PROTHROMBIN TIME: CPT | Performed by: NURSE PRACTITIONER

## 2019-05-20 PROCEDURE — 94618 PULMONARY STRESS TESTING: CPT

## 2019-05-20 RX ORDER — GUAIFENESIN 600 MG/1
1200 TABLET, EXTENDED RELEASE ORAL EVERY 12 HOURS SCHEDULED
COMMUNITY
Start: 2019-05-20 | End: 2019-05-27

## 2019-05-20 RX ORDER — OXYCODONE HYDROCHLORIDE 10 MG/1
10 TABLET ORAL EVERY 6 HOURS PRN
Qty: 60 TABLET | Refills: 0 | Status: SHIPPED | OUTPATIENT
Start: 2019-05-20 | End: 2019-05-20 | Stop reason: HOSPADM

## 2019-05-20 RX ORDER — WARFARIN SODIUM 5 MG/1
5 TABLET ORAL 3 TIMES WEEKLY
Status: DISCONTINUED | OUTPATIENT
Start: 2019-05-22 | End: 2019-05-20 | Stop reason: HOSPADM

## 2019-05-20 RX ORDER — ACETAMINOPHEN 325 MG/1
650 TABLET ORAL EVERY 4 HOURS PRN
COMMUNITY
Start: 2019-05-20

## 2019-05-20 RX ORDER — FUROSEMIDE 40 MG/1
40 TABLET ORAL DAILY
Qty: 30 TABLET | Refills: 1 | Status: SHIPPED | OUTPATIENT
Start: 2019-05-20 | End: 2019-06-01 | Stop reason: SDUPTHER

## 2019-05-20 RX ORDER — WARFARIN SODIUM 7.5 MG/1
7.5 TABLET ORAL
Status: DISCONTINUED | OUTPATIENT
Start: 2019-05-21 | End: 2019-05-20 | Stop reason: HOSPADM

## 2019-05-20 RX ORDER — METOPROLOL TARTRATE 50 MG/1
50 TABLET, FILM COATED ORAL 2 TIMES DAILY
Qty: 60 TABLET | Refills: 1 | Status: SHIPPED | OUTPATIENT
Start: 2019-05-20 | End: 2019-11-25 | Stop reason: DRUGHIGH

## 2019-05-20 RX ADMIN — GUAIFENESIN 1200 MG: 600 TABLET, EXTENDED RELEASE ORAL at 09:23

## 2019-05-20 RX ADMIN — METFORMIN HYDROCHLORIDE 500 MG: 500 TABLET ORAL at 09:23

## 2019-05-20 RX ADMIN — ACETAMINOPHEN 650 MG: 325 TABLET, FILM COATED ORAL at 07:27

## 2019-05-20 RX ADMIN — FUROSEMIDE 40 MG: 40 TABLET ORAL at 09:23

## 2019-05-20 RX ADMIN — METOPROLOL TARTRATE 50 MG: 50 TABLET, FILM COATED ORAL at 09:23

## 2019-05-20 RX ADMIN — DIGOXIN 250 MCG: 250 TABLET ORAL at 11:55

## 2019-05-20 RX ADMIN — POTASSIUM CHLORIDE 20 MEQ: 750 CAPSULE, EXTENDED RELEASE ORAL at 09:23

## 2019-05-20 RX ADMIN — PANTOPRAZOLE SODIUM 40 MG: 40 TABLET, DELAYED RELEASE ORAL at 06:58

## 2019-05-20 NOTE — THERAPY TREATMENT NOTE
Acute Care - Physical Therapy Treatment Note  [facility]     Patient Name: Fernanda Molina  : 1940  MRN: [id number]  Today's Date: 2019  Onset of Illness/Injury or Date of Surgery: 19     Referring Physician: Ana    Admit Date: 2019    Visit Dx:    ICD-10-CM ICD-9-CM   1. Impaired mobility Z74.09 799.89   2. Mitral valve disease I05.9 394.9     Patient Active Problem List   Diagnosis   • S/P mitral valve repair   • S/P tricuspid valve repair   • S/P Maze operation for atrial fibrillation   • Morbidly obese (CMS/[facility])   • Atrial fibrillation (CMS/HCC)   • Acquired hypothyroidism   • COPD (chronic obstructive pulmonary disease) (CMS/[facility])   • CAD (coronary artery disease), hx MI   • HAMLET on autoCPAP   • Hypercalcemia   • Renal calculus, right   • Hyperparathyroidism (CMS/[facility])   • Type 2 diabetes mellitus without complication, with long-term current use of insulin (CMS/[facility])   • Warfarin anticoagulation   • Medication monitoring encounter   • Medicare annual wellness visit, subsequent   • Pacemaker   • AICD (automatic cardioverter/defibrillator) present   • Right foot pain   • Chronic nonallergic rhinitis   • Pulmonary hypertension (CMS/[facility])   • Mitral valve disease       Therapy Treatment    Rehabilitation Treatment Summary     Row Name 19 1108             Treatment Time/Intention    Discipline  physical therapist  -PC      Document Type  therapy note (daily note)  -PC      Subjective Information  no complaints  -PC      Mode of Treatment  physical therapy  -PC      Patient/Family Observations  pt is in chair, no acute distress, on room air  -PC      Therapy Frequency (PT Clinical Impression)  daily  -PC      Patient Effort  excellent  -PC      Comment  pt o2 sats monitored during walking, o2 sats dropped to 82%/84% frequently while walking, pt would stop and rest, o2 sats would return to 92%  -PC      Existing Precautions/Restrictions  cardiac;fall;sternal  -PC      Recorded by [PC]  Olivia Camacho, PT 05/20/19 1113      Row Name 05/20/19 1108             Vital Signs    Pre SpO2 (%)  92  -PC      O2 Delivery Pre Treatment  room air  -PC      Intra SpO2 (%)  82  -PC      O2 Delivery Intra Treatment  room air  -PC      Post SpO2 (%)  90  -PC      O2 Delivery Post Treatment  room air  -PC      Recorded by [PC] Olivia Camacho, PT 05/20/19 1113      Row Name 05/20/19 1108             Cognitive Assessment/Intervention- PT/OT    Orientation Status (Cognition)  oriented x 4  -PC      Follows Commands (Cognition)  WFL  -PC      Recorded by [PC] Olivia Camacho, PT 05/20/19 1113      Row Name 05/20/19 1108             Bed Mobility Assessment/Treatment    Supine-Sit [place] (Bed Mobility)  not tested  -PC      Sit-Supine [place] (Bed Mobility)  not tested  -PC      Recorded by [PC] Olivia Camacho, PT 05/20/19 1113      Row Name 05/20/19 1108             Sit-Stand Transfer    Sit-Stand [place] (Transfers)  independent  -PC      Recorded by [PC] Olivia Camacho, PT 05/20/19 1113      Row Name 05/20/19 1108             Stand-Sit Transfer    Stand-Sit [place] (Transfers)  independent  -PC      Recorded by [PC] Olivia Camacho, PT 05/20/19 1113      Row Name 05/20/19 1108             Gait/Stairs Assessment/Training    [place] Level (Gait)  supervision  -PC      Distance in Feet (Gait)  240  -PC      Pattern (Gait)  step-through  -PC      Deviations/Abnormal Patterns (Gait)  gait speed decreased  -PC      Comment (Gait/Stairs)  pt requ frequent standing rests due to dec o2 sats, pt never visibly or subjectively reporting SOA  -PC      Recorded by [PC] Olivia Camacho, PT 05/20/19 1113      Row Name 05/20/19 1108             Therapeutic Exercise    Comment (Therapeutic Exercise)  5 reps cardiac protocol, level V  -PC      Recorded by [PC] Olivia Camacho, PT 05/20/19 1113      Row Name 05/20/19 1108             Positioning and Restraints    Pre-Treatment Position  sitting in  chair/recliner  -PC      Post Treatment Position  chair  -PC      In Chair  sitting;call light within reach;encouraged to call for assist;with family/caregiver  -PC      Recorded by [PC] Olivia Camacho, PT 05/20/19 1113      Row Name 05/20/19 1108             Pain Scale: Numbers Pre/Post-Treatment    Pain Scale: Numbers, Pretreatment  0/10 - no pain  -PC      Pain Scale: Numbers, Post-Treatment  0/10 - no pain  -PC      Recorded by [PC] Olivia Camacho, PT 05/20/19 1113      Row Name                Wound 05/14/19 0957 chest incision    Wound - Properties Group Date first assessed: 05/14/19 [SR] Time first assessed: 0957 [SR] Location: chest [SR] Type: incision [SR] Recorded by:  [SR] Shanice Cast RN 05/14/19 0957    Row Name                Wound 05/17/19 1800 Left posterior flank abrasion;skin tear    Wound - Properties Group Date first assessed: 05/17/19 [MIKAEL] Time first assessed: 1800 [MIKAEL] Present On Admission : no [MIKAEL] Side: Left [MIKAEL] Orientation: posterior [MIKAEL] Location: flank [MIKAEL] Type: abrasion;skin tear [MIKAEL] Additional Comments: liz [MIKAEL] Recorded by:  [MIKAEL] Micki Gold RN 05/17/19 1816    Row Name 05/20/19 1108             Outcome Summary/Treatment Plan (PT)    Anticipated Discharge Disposition (PT)  home with assist notified RN of dec o2 sats with activity  -PC      Recorded by [PC] Olivia Camacho, PT 05/20/19 1113        User Key  (r) = Recorded By, (t) = Taken By, (c) = Cosigned By    Initials Name Effective Dates Discipline    PC Olivia Camacho, PT 04/03/18 -  PT    SR Shanice Cast RN 06/16/16 -  Nurse    MIKAEL Micki Gold RN 01/02/19 -  Nurse          Wound 05/14/19 0957 chest incision (Active)   Dressing Appearance open to air 5/20/2019  7:30 AM   Closure Approximated;Open to air 5/20/2019  7:30 AM   Base dressing in place, unable to visualize 5/19/2019  4:43 PM   Drainage Amount none 5/20/2019  7:30 AM   Care, Wound cleansed with;antimicrobial agent applied 5/19/2019 12:00 PM        Wound 05/17/19 1800 Left posterior flank abrasion;skin tear (Active)   Dressing Appearance open to air;no drainage 5/20/2019  7:30 AM   Closure Open to air 5/20/2019  7:30 AM           Physical Therapy Education     Title: PT OT SLP Therapies (In Progress)     Topic: Physical Therapy (Done)     Point: Mobility training (Done)     Learning Progress Summary           Patient Acceptance, E,D, DU by PC at 5/20/2019 11:13 AM    Acceptance, E, VU,DU by DO at 5/19/2019 10:25 AM    Acceptance, E, NR by DO at 5/18/2019 10:47 AM    Acceptance, E, NR by EM at 5/17/2019 10:08 AM    Nonacceptance, E, NL,NR by EM at 5/16/2019  9:59 AM    Acceptance, E, NR by EM at 5/15/2019  9:15 AM   Family Acceptance, E, VU,DU by DO at 5/19/2019 10:25 AM    Acceptance, E, NR by DO at 5/18/2019 10:47 AM                   Point: Home exercise program (Done)     Learning Progress Summary           Patient Acceptance, E,D, DU by PC at 5/20/2019 11:13 AM    Acceptance, E, VU,DU by DO at 5/19/2019 10:25 AM    Acceptance, E, NR by DO at 5/18/2019 10:47 AM    Acceptance, E, NR by EM at 5/17/2019 10:08 AM    Acceptance, E, NR by EM at 5/15/2019  9:15 AM   Family Acceptance, E, VU,DU by DO at 5/19/2019 10:25 AM    Acceptance, E, NR by DO at 5/18/2019 10:47 AM                   Point: Body mechanics (Done)     Learning Progress Summary           Patient Acceptance, E,D, DU by PC at 5/20/2019 11:13 AM    Acceptance, E, VU,DU by DO at 5/19/2019 10:25 AM    Acceptance, E, NR by DO at 5/18/2019 10:47 AM   Family Acceptance, E, VU,DU by DO at 5/19/2019 10:25 AM    Acceptance, E, NR by DO at 5/18/2019 10:47 AM                   Point: Precautions (Done)     Learning Progress Summary           Patient Acceptance, E,D, DU by PC at 5/20/2019 11:13 AM    Acceptance, E, VU,DU by DO at 5/19/2019 10:25 AM   Family DARLINE Cancino VU, DU by  at 5/19/2019 10:25 AM                               User Key     Initials Effective Dates Name Provider Type Discipline    PC  04/03/18 -  Olivia Camacho, PT Physical Therapist PT    EM 04/03/18 -  Mary Harper PT Physical Therapist PT    DO 03/07/18 -  Vidal Acosta, PT Physical Therapist PT                PT Recommendation and Plan  Anticipated Discharge Disposition (PT): home with assist(notified RN of dec o2 sats with activity)  Therapy Frequency (PT Clinical Impression): daily  Outcome Summary/Treatment Plan (PT)  Anticipated Discharge Disposition (PT): home with assist(notified RN of dec o2 sats with activity)  Plan of Care Reviewed With: patient  Progress: improving  Outcome Summary: pt showing good progress with mobility, she is independent, walking in the [place], but she did have dec o2 sats with activity on room air, recommend home with family, may need supplemental o2  Outcome Measures     Row Name 05/20/19 1100 05/19/19 1000 05/18/19 1000       How much help from another person do you currently need...    Turning from your back to your side while in flat bed without using bedrails?  4  -PC  3  -DO  3  -DO    Moving from lying on back to sitting on the side of a flat bed without bedrails?  4  -PC  3  -DO  3  -DO    Moving to and from a bed to a chair (including a wheelchair)?  4  -PC  3  -DO  3  -DO    Standing up from a chair using your arms (e.g., wheelchair, bedside chair)?  4  -PC  3  -DO  3  -DO    Climbing 3-5 steps with a railing?  3  -PC  3  -DO  3  -DO    To walk in hospital room?  4  -PC  3  -DO  3  -DO    AM-PAC 6 Clicks Score  23  -PC  18  -DO  18  -DO       Functional Assessment    Outcome Measure Options  --  AM-PAC 6 Clicks Basic Mobility (PT)  -DO  AM-PAC 6 Clicks Basic Mobility (PT)  -DO    Row Name 05/17/19 1500             How much help from another is currently needed...    Putting on and taking off regular lower body clothing?  2  -SG      Bathing (including washing, rinsing, and drying)  2  -SG      Toileting (which includes using toilet bed pan or urinal)  3  -SG      Putting on and taking off  regular upper body clothing  3  -SG      Taking care of personal grooming (such as brushing teeth)  3  -SG      Eating meals  3  -SG      Score  16  -SG         Functional Assessment    Outcome Measure Options  AM-PAC 6 Clicks Daily Activity (OT)  -SG        User Key  (r) = Recorded By, (t) = Taken By, (c) = Cosigned By    Initials Name Provider Type    SG Idalia Allison, OTR Occupational Therapist    PC Olivia Camacho, PT Physical Therapist    Vidal Rojas, PT Physical Therapist         Time Calculation:   PT Charges     Row Name 05/20/19 1116             Time Calculation    Start Time  1040  -PC      Stop Time  1054  -PC      Time Calculation (min)  14 min  -PC      PT Received On  05/20/19  -PC      PT - Next Appointment  05/21/19  -PC        User Key  (r) = Recorded By, (t) = Taken By, (c) = Cosigned By    Initials Name Provider Type    PC Olivia Camacho, PT Physical Therapist        Therapy Charges for Today     Code Description Service Date Service Provider Modifiers Qty    63328927334 HC PT THER PROC EA 15 MIN 5/20/2019 Olivia Camacho, PT GP 1          PT G-Codes  Outcome Measure Options: AM-PAC 6 Clicks Basic Mobility (PT)  AM-PAC 6 Clicks Score: 23  Score: 16    Olivia Camacho, PT  5/20/2019

## 2019-05-20 NOTE — PLAN OF CARE
Problem: Patient Care Overview  Goal: Plan of Care Review  Outcome: Ongoing (interventions implemented as appropriate)   05/20/19 1113   OTHER   Outcome Summary pt showing good progress with mobility, she is independent, walking in the [place], but she did have dec o2 sats with activity on room air, recommend home with family, may need supplemental o2   Plan of Care Review   Progress improving   Coping/Psychosocial   Plan of Care Reviewed With patient

## 2019-05-20 NOTE — PLAN OF CARE
Problem: Patient Care Overview  Goal: Plan of Care Review  Outcome: Ongoing (interventions implemented as appropriate)   05/20/19 0761   OTHER   Outcome Summary VSS. No complaints of pain. Paced rhythm. RA. Up with standby. Multiple BM's yesterday/tonight. Possible d/c today. Will continue to monitor.    Plan of Care Review   Progress improving   Coping/Psychosocial   Plan of Care Reviewed With patient       Problem: Fall Risk (Adult)  Goal: Absence of Fall  Outcome: Ongoing (interventions implemented as appropriate)      Problem: Skin Injury Risk (Adult)  Goal: Skin Health and Integrity  Outcome: Ongoing (interventions implemented as appropriate)      Problem: Cardiac Surgery (Adult)  Goal: Signs and Symptoms of Listed Potential Problems Will be Absent, Minimized or Managed (Cardiac Surgery)  Outcome: Ongoing (interventions implemented as appropriate)

## 2019-05-20 NOTE — DISCHARGE SUMMARY
Date of Admission:  5/14/2019  Date of Discharge:  5/20/2019    Discharge Diagnosis:   · Recurrent mitral incompetence & stenosis now s/p Reop MVR (tissue)  · Hx of MV and TV repairs, closure of INO, MAZE  · Acute & chronic systolic CHF class IV  · Diabetes  · Hx of paroxysmal atrial fibrillation with chronic Coumadin therapy  · BiV AICD (Medtronic)  · Severe pulmonary hypertension  · Vaginal bleeding  · Expected acute blood loss anemia post-op    Presenting Problem/History of Present Illness:  · Recurrent mitral incompetence & stenosis   · Hx of MV and TV repairs, closure of INO, MAZE  · Acute & chronic systolic CHF class IV  · Diabetes  · Hx of paroxysmal atrial fibrillation with chronic Coumadin therapy  · BiV AICD (Medtronic)  · Severe pulmonary hypertension  · Vaginal bleeding     Hospital Course:  Patient is a 78 y.o. female who was admitted to the hospital on 5/14/19 for elective same day surgery. That day she was taken to the Operating Room and under general anesthesia and via redo sternotomy underwent mitral valve replacement with a 27 mm Medtronic Hutchinson 2 porcine valve by Dr. Saavedra. Prior to surgery she was noted to have some vaginal bleeding, so it was recommended that she follow-up with her Gynecologist upon discharge. Intra-op patient received 2 units of platelets for a platelet coagulopathy. Patient tolerated the procedure well and was transported to the Open Heart Recovery Unit in stable condition. Over the next several hours, she was successfully weaned from the ventilator and extubated. On post-operative day #1 she was transferred out of the Open Heart Recovery Unit to the Cardiac step-down unit. On post-operative day #3 she was restarted on Coumadin for her Atrial Fibrillation. The rest of her post-operative course was uneventful and one of gradual improvement. On post-operative day #6, on 5/20/19, she was deemed stable for discharge home with Home Health. An exercise oximetry was performed  prior to discharge which did not show a need for supplemental oxygen. She was discharged on her home dosing regimen of Coumadin with plans for Home Health to check her INR the day following discharge. Her INR on the date of discharge was 1.25. Goal INR 2-3. Her Cardiologist, Dr. Damon, and Dr. Marinelli at a satellite office manage her Coumadin. Her appetite was not fully back to normal upon discharge and she was not requiring much sliding scale insulin, so her Lantus and Invokana were not restarted upon discharge. She will likely need these restarted once her po intake improves, so she has been instructed to see her PCP in 1 week.     Procedures Performed:  Procedure(s) 5/14/19 by Dr. Saavedra:  · Redo secondary sternotomy.  Removal of mitral valve ring mitral valve replacement with a 27 mm Medtronic Hutchinson 2 porcine valve with preservation of all sub-valvular apparatus.  Temporary cardiopulmonary bypass.  Antegrade and retrograde cold blood cardioplegia with warm reperfusion.  Neurologic monitoring. Transesophageal echo.       Consults:   Consults     No orders found from 4/15/2019 to 5/15/2019.          Pertinent Test Results:    Lab Results   Component Value Date    WBC 9.13 05/18/2019    HGB 9.2 (L) 05/18/2019    HCT 29.5 (L) 05/18/2019    MCV 90.2 05/18/2019     05/18/2019      Lab Results   Component Value Date    GLUCOSE 131 (H) 05/20/2019    CALCIUM 10.5 05/20/2019     05/20/2019    K 4.0 05/20/2019    CO2 26.5 05/20/2019     05/20/2019    BUN 13 05/20/2019    CREATININE 0.57 05/20/2019    EGFRIFAFRI 106 01/16/2019    EGFRIFNONA 103 05/20/2019    BCR 22.8 05/20/2019    ANIONGAP 9.5 05/20/2019     Lab Results   Component Value Date    INR 1.25 (H) 05/20/2019    PROTIME 15.4 (H) 05/20/2019       Condition on Discharge: Stable    Vital Signs  Temp:  [97.8 °F (36.6 °C)-98.5 °F (36.9 °C)] 97.9 °F (36.6 °C)  Heart Rate:  [79-92] 92  Resp:  [16-17] 16  BP: (118-138)/(59-82) 119/73      Discharge  Disposition  Home or Self Care    Discharge Medications     Discharge Medications      New Medications      Instructions Start Date   acetaminophen 325 MG tablet  Commonly known as:  TYLENOL   650 mg, Oral, Every 4 Hours PRN      guaiFENesin 600 MG 12 hr tablet  Commonly known as:  MUCINEX   1,200 mg, Oral, Every 12 Hours Scheduled      oxyCODONE 10 MG tablet  Commonly known as:  ROXICODONE   10 mg, Oral, Every 6 Hours PRN         Changes to Medications      Instructions Start Date   furosemide 40 MG tablet  Commonly known as:  LASIX  What changed:  how much to take   40 mg, Oral, Daily      metFORMIN 1000 MG tablet  Commonly known as:  GLUCOPHAGE  What changed:  when to take this   1,000 mg, Oral, Daily Before Supper      metFORMIN 500 MG tablet  Commonly known as:  GLUCOPHAGE  What changed:  Another medication with the same name was changed. Make sure you understand how and when to take each.   500 mg, Oral, Daily With Breakfast      metoprolol tartrate 50 MG tablet  Commonly known as:  LOPRESSOR  What changed:    · medication strength  · how much to take  · when to take this   50 mg, Oral, 2 Times Daily         Continue These Medications      Instructions Start Date   albuterol (2.5 MG/3ML) 0.083% nebulizer solution  Commonly known as:  PROVENTIL   2.5 mg, Nebulization, Every 4 Hours PRN      albuterol sulfate  (90 Base) MCG/ACT inhaler  Commonly known as:  PROVENTIL HFA;VENTOLIN HFA;PROAIR HFA   2 puffs, Inhalation, Every 4 Hours PRN      atorvastatin 20 MG tablet  Commonly known as:  LIPITOR   20 mg, Oral, 2 Times Weekly, MONDAYS AND FRIDAYS      digoxin 250 MCG tablet  Commonly known as:  LANOXIN   250 mcg, Oral, Daily Digoxin      ferrous sulfate 325 (65 FE) MG EC tablet   65 mg, Oral, Weekly, On Sunday      mometasone 50 MCG/ACT nasal spray  Commonly known as:  NASONEX   1 spray, Nasal, As Needed      multivitamin tablet   1 tablet, Oral, Daily      pantoprazole 40 MG EC tablet  Commonly known as:   PROTONIX   40 mg, Oral, Daily      potassium chloride ER 20 MEQ tablet controlled-release ER tablet  Commonly known as:  K-TAB   20 mEq, Oral, Daily      Thyroid 90 MG tablet  Commonly known as:  ARMOUR   90 mg, Oral, Daily      Thyroid 90 MG tablet  Commonly known as:  ARMOUR   45 mg, Oral, Nightly      warfarin 5 MG tablet  Commonly known as:  COUMADIN   5 mg, Oral, MONDAYS , WEDNESDAYS, AND FRIDAYS  - HELD FOR OR       warfarin 7.5 MG tablet  Commonly known as:  COUMADIN   7.5 mg, Oral, TUES, THURS, Saturday, AND Sunday  - HELD FOR OR          Stop These Medications    Canagliflozin 300 MG tablet  Commonly known as:  INVOKANA     Chlorhexidine Gluconate Cloth 2 % pads     Insulin Glargine 100 UNIT/ML injection pen  Commonly known as:  LANTUS SOLOSTAR     mupirocin 2 % nasal ointment  Commonly known as:  BACTROBAN     PERIDEX MT            Discharge Diet: Healthy heart    Activity at Discharge:   Activity Instructions     Measure Blood Pressure      Measure Weight      Other Activity Instructions      Activity Instructions:   1. No driving x 2 weeks.  2. No lifting > 10 pounds x 6 weeks.   3. Ambulate at least 10 minutes 3 times a day.          Follow-up Appointments  Future Appointments   Date Time Provider Department Center   6/20/2019 11:00 AM Ben Saavedra MD [facility] None   7/25/2019  9:45 AM Mahin Galeas MD [facility] None   9/4/2019  9:00 AM [facility] None   9/11/2019  9:30 AM Rashid Ge MD [facility] None   3/19/2020 11:00 AM Ben Saavedra MD [facility] None     Additional Instructions for the Follow-ups that You Need to Schedule     Call MD With Problems / Concerns   As directed      Instructions: Call for temperature > 101 or drainage or redness from incisions.    Order Comments:  Instructions: Call for temperature > 101 or drainage or redness from incisions.          Discharge Follow-up with PCP   As directed       Currently Documented PCP:     Rashid Ge MD    PCP Phone Number:    [phone]     Follow Up Details:  Follow-up with PCP in 1 week.         Discharge Follow-up with Specialty: Follow-up with Gynecologist.   As directed      Specialty:  Follow-up with Gynecologist.         Discharge Follow-up with Specified Provider: Follow-up with Dr. Saavedra on 6/20/19 at 11:00 am.   As directed      To:  Follow-up with Dr. Saavedra on 6/20/19 at 11:00 am.         Discharge Follow-up with Specified Provider: Follow-up with Dr. Jones (Cardiology) in 2-4 weeks.   As directed      To:  Follow-up with Dr. Jones (Cardiology) in 2-4 weeks.         Referral to Home Health   As directed      Face to Face Visit Date:  5/20/2019    Follow-up Provider for Plan of Care?:  I will be treating the patient on an ongoing basis.  Please send me the Plan of Care for signature.    Follow-up Provider:  ELMER SAAVEDRA [[id number]]    Reason/Clinical Findings:  s/p cardiac surgery    Describe mobility limitations that make leaving home difficult:  sternal precautions    Nursing/Therapeutic Services Requested:  Skilled Nursing    Skilled nursing orders:  Post CABG care Cardiopulmonary assessments    Frequency:  1 Week 1         Protime-INR    May 21, 2019 (Approximate)      INR to be checked on 5/21/19. Patient on Coumadin for Atrial Fibrillation. Goal INR 2-3. Results to be called to Dr. Jones/Dr. Marinelli.    Order Comments:  INR to be checked on 5/21/19. Patient on Coumadin for Atrial Fibrillation. Goal INR 2-3. Results to be called to Dr. Jones/Dr. Marinelli.     Is Patient on anti-coag:  Yes         Protime-INR    May 25, 2019 (Approximate)      Is Patient on anti-coag:  Yes                  DASIA Oneil  05/20/19  12:32 PM

## 2019-05-20 NOTE — PROGRESS NOTES
Exercise Oximetry    Patient Name:Fernanda oMlina   MRN: [id number]   Date: 05/20/19             ROOM AIR BASELINE   SpO2%- 97   Heart Rate- 72   Blood Pressure     EXERCISE ON ROOM AIR SpO2% EXERCISE ON O2 @ LPM SpO2%   1 MINUTE- 96  1 MINUTE    2 MINUTES- 94  2 MINUTES    3 MINUTES- 94  3 MINUTES    4 MINUTES- 93  4 MINUTES    5 MINUTES- 93  5 MINUTES    6 MINUTES- 91  6 MINUTES               Distance Walked - nursing unit for 6 mins Distance Walked   Dyspnea (Jacey Scale)   Dyspnea (Jacey Scale)   Fatigue (Jacey Scale)   Fatigue (Jacey Scale)   SpO2% Post Exercise - 96%  SpO2% Post Exercise   HR Post Exercise -87 HR Post Exercise   Time to Recovery.30 Time to Recovery     Comments: Patient ambulated the nursing unit for 6 minutes on room air.  Lowest sat was 91%.  No oxygen was required.  Patient was not SOA and did not have to take any rest breaks.  She walked at a slow, steady pace for the entire 6 mins.  Once returned to room, patient sats went up to 96% on room air while resting.

## 2019-05-20 NOTE — DISCHARGE PLACEMENT REQUEST
"Franki Molina (78 y.o. Female)     Date of Birth Social Security Number Address Home Phone MRN    1940  [id number] [address] [phone] [id number]    Oriental orthodox Marital Status          Non-Presybeterian        Admission Date Admission Type Admitting Provider Attending Provider Department, Room/Bed    5/14/19 Elective Ben Saavedra MD Pollock, Samuel B., MD [facility] CARDIOVASC UNIT, 2230/1    Discharge Date Discharge Disposition Discharge Destination                       Attending Provider:  Ben Saavedra MD    Allergies:  Corticosteroids, Adhesive Tape, Hydrocodone, Lisinopril    Isolation:  None   Infection:  None   Code Status:  CPR    Ht:  157.5 cm (62\")   Wt:  84.8 kg (186 lb 14.4 oz)    Admission Cmt:  None   Principal Problem:  Mitral valve disease [I05.9] More...                 Active Insurance as of 5/14/2019     Primary Coverage     Payor Plan Insurance Group Employer/Plan Group    [facility] MEDICARE REPLACEMENT [facility] [id number]     Payor Plan Address Payor Plan Phone Number Payor Plan Fax Number Effective Dates    [address] [address]   1/1/2016 - None Entered    [facility] [id number]       Subscriber Name Subscriber Birth Date Member ID       FRANKI MOLINA 1940 [id number]                 Emergency Contacts      (Rel.) Home Phone Work Phone Mobile Phone    Crispin Molina (Spouse) [phone] -- [phone]              "

## 2019-05-20 NOTE — THERAPY TREATMENT NOTE
Acute Care - Occupational Therapy Treatment Note  [facility]     Patient Name: Fernanda Molina  : 1940  MRN: [id number]  Today's Date: 2019  Onset of Illness/Injury or Date of Surgery: 19  Date of Referral to OT: 19  Referring Physician: Ana    Admit Date: 2019       ICD-10-CM ICD-9-CM   1. Impaired mobility Z74.09 799.89   2. Mitral valve disease I05.9 394.9   3. Chronic atrial fibrillation (CMS/HCC) I48.2 427.31   4. S/P MVR (mitral valve replacement) Z95.2 V43.3   5. Paroxysmal atrial fibrillation (CMS/[facility]) I48.0 427.31     Patient Active Problem List   Diagnosis   • S/P mitral valve repair   • S/P tricuspid valve repair   • S/P Maze operation for atrial fibrillation   • Morbidly obese (CMS/[facility])   • Atrial fibrillation (CMS/[facility])   • Acquired hypothyroidism   • COPD (chronic obstructive pulmonary disease) (CMS/[facility])   • CAD (coronary artery disease), hx MI   • HAMLET on autoCPAP   • Hypercalcemia   • Renal calculus, right   • Hyperparathyroidism (CMS/[facility])   • Type 2 diabetes mellitus without complication, with long-term current use of insulin (CMS/[facility])   • Warfarin anticoagulation   • Medication monitoring encounter   • Medicare annual wellness visit, subsequent   • Pacemaker   • AICD (automatic cardioverter/defibrillator) present   • Right foot pain   • Chronic nonallergic rhinitis   • Pulmonary hypertension (CMS/[facility])   • Mitral valve disease     Past Medical History:   Diagnosis Date   • AICD (automatic cardioverter/defibrillator) present    • Arthritis    • Atrial fibrillation (CMS/[facility])    • CHF (congestive heart failure) (CMS/[facility])    • Congenital heart disease    • COPD (chronic obstructive pulmonary disease) (CMS/[facility])    • Coronary artery disease    • Diabetes mellitus (CMS/[facility])    • GERD (gastroesophageal reflux disease)    • High cholesterol    • History of kidney stones    • History of melanoma 2002    RIGHT KNEE   • History of MI (myocardial infarction)    •  Hypertension    • Hypothyroidism    • Lymph edema    • On anticoagulant therapy    • HAMLET on CPAP    • PONV (postoperative nausea and vomiting)    • Shortness of breath    • Spinal stenosis    • Valvular heart disease 2015    S/p MVR & TVR      Past Surgical History:   Procedure Laterality Date   • CARDIAC CATHETERIZATION     • CARDIAC DEFIBRILLATOR PLACEMENT      [employer]   • CARDIAC DEFIBRILLATOR PLACEMENT      [employer]   •  SECTION  1971   •  SECTION     • CHOLECYSTECTOMY     • COLONOSCOPY     • EPIDURAL BLOCK  [id number]    Lumbar    • EXTRACORPOREAL SHOCK WAVE LITHOTRIPSY (ESWL) Right 2017   • EXTRACORPOREAL SHOCK WAVE LITHOTRIPSY (ESWL) Right 2017    Procedure: RT EXTRACORPOREAL SHOCKWAVE LITHOTRIPSY;  Surgeon: Moisés Everett MD;  Location: [facility];  Service:    • EYE SURGERY Right 09/10/2007    Cataract   • EYE SURGERY Left 10/29/2007    Cataract   • JOINT REPLACEMENT Left 2007    Knee   • JOINT REPLACEMENT Right 2010    Knee   • KNEE ARTHROSCOPY     • MAZE PROCEDURE  2015    Dr. Saavedra   • MITRAL VALVE REPAIR/REPLACEMENT     • MITRAL VALVE REPAIR/REPLACEMENT N/A 2019    Procedure: ALINE RE-OP STERNOTOMY MITRAL VALVE REPLACEMENT AND PRP;  Surgeon: Ben Saavedra MD;  Location: [facility] OR;  Service: Cardiothoracic   • MN ERCP DX COLLECTION SPECIMEN BRUSHING/WASHING N/A 2016    Procedure: ENDOSCOPIC RETROGRADE CHOLANGIOPANCREATOGRAPHY WITH SPHINCTEROTOMY AND BALLOON SWEEP OF CBD;  Surgeon: Lucas Campos MD;  Location: [facility] ENDOSCOPY;  Service: Gastroenterology   • SKIN CANCER EXCISION      MELANOMA RIGHT KNEE AREA -    • TRICUSPID VALVE SURGERY  2015    Dr. Saavedra   • URETEROSCOPY LASER LITHOTRIPSY WITH STENT INSERTION Right 11/15/2017    Procedure: CYSTOSCOPY; RIGHT URETEROSCOPY LASER LITHOTRIPSY WITH STENT INSERTION;  Surgeon: Moisés Everett MD;  Location: [facility] OR;  Service:         Therapy Treatment    Rehabilitation Treatment Summary     Row Name 05/20/19 1200 05/20/19 1108          Treatment Time/Intention    Discipline  occupational therapist  -MR  physical therapist  -PC     Document Type  therapy note (daily note)  -MR  therapy note (daily note)  -PC     Subjective Information  no complaints  -MR2  no complaints  -PC     Mode of Treatment  occupational therapy  -MR2  physical therapy  -PC     Patient/Family Observations  pt supine in bed, no acute distress  -MR2  pt is in chair, no acute distress, on room air  -PC     Therapy Frequency (PT Clinical Impression)  --  daily  -PC     Patient Effort  excellent  -MR2  excellent  -PC     Comment  --  pt o2 sats monitored during walking, o2 sats dropped to 82%/84% frequently while walking, pt would stop and rest, o2 sats would return to 92%  -PC     Existing Precautions/Restrictions  cardiac;fall;sternal  -MR2  cardiac;fall;sternal  -PC     Recorded by [MR] Hanna SunshineRose, OT 05/20/19 1249  [MR2] JongHanna, OT 05/20/19 1254 [PC] Olivia Camacho, PT 05/20/19 1113     Row Name 05/20/19 1108             Vital Signs    Pre SpO2 (%)  92  -PC      O2 Delivery Pre Treatment  room air  -PC      Intra SpO2 (%)  82  -PC      O2 Delivery Intra Treatment  room air  -PC      Post SpO2 (%)  90  -PC      O2 Delivery Post Treatment  room air  -PC      Recorded by [PC] Olivia Camacho, PT 05/20/19 1113      Row Name 05/20/19 1200 05/20/19 1108          Cognitive Assessment/Intervention- PT/OT    Orientation Status (Cognition)  oriented x 4  -MR  oriented x 4  -PC     Follows Commands (Cognition)  WFL  -MR  WFL  -PC     Recorded by [MR] Hanna SunshineRose, OT 05/20/19 1254 [PC] Olivia Camacho, PT 05/20/19 1113     Row Name 05/20/19 1200 05/20/19 1108          Bed Mobility Assessment/Treatment    Bed Mobility Assessment/Treatment  supine-sit  -MR  --     Supine-Sit [place] (Bed Mobility)  supervision;verbal cues  -MR  not tested  -PC      Sit-Supine [place] (Bed Mobility)  --  not tested  -PC     Recorded by [MR] Hanan Sunshine, OT 05/20/19 1254 [PC] Olivia Camacho, PT 05/20/19 1113     Row Name 05/20/19 1200             Functional Mobility    Functional Mobility- Ind. Level  supervision required  -MR      Functional Mobility- Comment  pt performs functional mobility to bathroom for ADLs  -MR      Recorded by [MR] Hanna Sunshine, OT 05/20/19 1254      Row Name 05/20/19 1200             Transfer Assessment/Treatment    Transfer Assessment/Treatment  sit-stand transfer;stand-sit transfer  -MR      Recorded by [MR] Hanna Susnhine, OT 05/20/19 1254      Row Name 05/20/19 1200 05/20/19 1108          Sit-Stand Transfer    Sit-Stand [place] (Transfers)  independent  -MR  independent  -PC     Recorded by [MR] Hanna Sunshine, OT 05/20/19 1254 [PC] Olivia Camacho, PT 05/20/19 1113     Row Name 05/20/19 1200 05/20/19 1108          Stand-Sit Transfer    Stand-Sit [place] (Transfers)  independent  -MR  independent  -PC     Recorded by [MR] Hanna Sunshine, OT 05/20/19 1254 [PC] Olivia Camacho, PT 05/20/19 1113     Row Name 05/20/19 1108             Gait/Stairs Assessment/Training    [place] Level (Gait)  supervision  -PC      Distance in Feet (Gait)  240  -PC      Pattern (Gait)  step-through  -PC      Deviations/Abnormal Patterns (Gait)  gait speed decreased  -PC      Comment (Gait/Stairs)  pt requ frequent standing rests due to dec o2 sats, pt never visibly or subjectively reporting SOA  -PC      Recorded by [PC] Olivia Camacho, PT 05/20/19 1113      Row Name 05/20/19 1200             ADL Assessment/Intervention    BADL Assessment/Intervention  grooming;lower body dressing  -MR      Recorded by [MR] Hanna Sunshine, OT 05/20/19 1254      Row Name 05/20/19 1200             Lower Body Dressing Assessment/Training    Lower Body Dressing [place] Level  don;socks;independent  -MR      Lower Body Dressing Position   edge of bed sitting  -MR      Recorded by [MR] Hanna Sunshine, OT 05/20/19 1254      Row Name 05/20/19 1200             Grooming Assessment/Training    [place] Level (Grooming)  grooming skills;supervision  -MR      Comment (Grooming)  pt performs grooming activities while standing at sink  -MR      Recorded by [MR] Hanna Sunshine, OT 05/20/19 1254      Row Name 05/20/19 1200             Motor Skills Assessment/Interventions    Additional Documentation  Therapeutic Exercise (Group);Therapeutic Exercise Interventions (Group)  -MR      Recorded by [MR] Hanna Sunshine, OT 05/20/19 1254      Row Name 05/20/19 1200 05/20/19 1108          Therapeutic Exercise    Upper Extremity Range of Motion (Therapeutic Exercise)  shoulder flexion/extension, left;shoulder flexion/extension, right;shoulder horizontal abduction/adduction, left;shoulder horizontal abduction/adduction, right;elbow flexion/extension, left;elbow flexion/extension, right;forearm supination/pronation, left;forearm supination/pronation, right  -MR  --     Hand (Therapeutic Exercise)  finger flexion/extension, left;finger flexion/extension, right  -MR  --     Exercise Type (Therapeutic Exercise)  AROM (active range of motion)  -MR  --     Position (Therapeutic Exercise)  seated  -MR  --     Expected Outcome (Therapeutic Exercise)  improve functional tolerance, self-care activity;improve performance, BADLs  -MR  --     Comment (Therapeutic Exercise)  cardiac/sternal precautions maintained with UE therapeutic exercises  -MR  5 reps cardiac protocol, level V  -PC     Recorded by [MR] Hanna Sunshine, OT 05/20/19 1254 [PC] Olivia Camacho PT 05/20/19 1113     Row Name 05/20/19 1200 05/20/19 1108          Positioning and Restraints    Pre-Treatment Position  in bed  -MR  sitting in chair/recliner  -PC     Post Treatment Position  chair  -MR  chair  -PC     In Chair  reclined;notified nsg;call light within reach;encouraged to call for assist  -MR   sitting;call light within reach;encouraged to call for assist;with family/caregiver  -PC     Recorded by [MR] Hanna Sunshine, OT 05/20/19 1254 [PC] Olivia Camacho, PT 05/20/19 1113     Row Name 05/20/19 1200 05/20/19 1108          Pain Scale: Numbers Pre/Post-Treatment    Pain Scale: Numbers, Pretreatment  0/10 - no pain  -MR  0/10 - no pain  -PC     Pain Scale: Numbers, Post-Treatment  0/10 - no pain  -MR  0/10 - no pain  -PC     Recorded by [MR] Hanna Sunshine, OT 05/20/19 1254 [PC] Olivia Camacho, PT 05/20/19 1113     Row Name                Wound 05/14/19 0957 chest incision    Wound - Properties Group Date first assessed: 05/14/19 [SR] Time first assessed: 0957 [SR] Location: chest [SR] Type: incision [SR] Recorded by:  [SR] Shanice Cast RN 05/14/19 0957    Row Name                Wound 05/17/19 1800 Left posterior flank abrasion;skin tear    Wound - Properties Group Date first assessed: 05/17/19 [MIKAEL] Time first assessed: 1800 [MIKAEL] Present On Admission : no [MIKAEL] Side: Left [MIKAEL] Orientation: posterior [MIKAEL] Location: flank [MIKAEL] Type: abrasion;skin tear [MIKAEL] Additional Comments: liz [MIKAEL] Recorded by:  [MIKAEL] Micki Gold RN 05/17/19 1816    Row Name 05/20/19 1108             Outcome Summary/Treatment Plan (PT)    Anticipated Discharge Disposition (PT)  home with assist notified RN of dec o2 sats with activity  -PC      Recorded by [PC] Olivia Camacho, PT 05/20/19 1113        User Key  (r) = Recorded By, (t) = Taken By, (c) = Cosigned By    Initials Name Effective Dates Discipline    PC Olivia Camacho, PT 04/03/18 -  PT    SR Shanice Cast RN 06/16/16 -  Nurse    Hanna Haddad, OT 06/22/16 -  OT    Micki Young RN 01/02/19 -  Nurse        Wound 05/14/19 0957 chest incision (Active)   Dressing Appearance open to air 5/20/2019 11:54 AM   Closure Approximated;Open to air 5/20/2019 11:54 AM   Base dressing in place, unable to visualize 5/19/2019  4:43 PM   Drainage Amount none  5/20/2019 11:54 AM       Wound 05/17/19 1800 Left posterior flank abrasion;skin tear (Active)   Dressing Appearance open to air;no drainage 5/20/2019 11:54 AM   Closure Open to air 5/20/2019 11:54 AM       Occupational Therapy Education     Title: PT OT SLP Therapies (In Progress)     Topic: Occupational Therapy (In Progress)     Point: ADL training (Done)     Description: Instruct learner(s) on proper safety adaptation and remediation techniques during self care or transfers.   Instruct in proper use of assistive devices.    Learning Progress Summary           Family Acceptance, E,TB, VU by  at 5/16/2019 11:36 AM                               User Key     Initials Effective Dates Name Provider Type Discipline     12/26/18 -  Idalia Allison OTR Occupational Therapist OT                OT Recommendation and Plan     Plan of Care Review  Plan of Care Reviewed With: patient  Plan of Care Reviewed With: patient  Outcome Summary: Pt howard good progress with OT, able to perform functional mobility to bathroom and complete grooming tasks while standing at the sink with supervision.  Outcome Measures     Row Name 05/20/19 1200 05/20/19 1100 05/19/19 1000       How much help from another person do you currently need...    Turning from your back to your side while in flat bed without using bedrails?  --  4  -PC  3  -DO    Moving from lying on back to sitting on the side of a flat bed without bedrails?  --  4  -PC  3  -DO    Moving to and from a bed to a chair (including a wheelchair)?  --  4  -PC  3  -DO    Standing up from a chair using your arms (e.g., wheelchair, bedside chair)?  --  4  -PC  3  -DO    Climbing 3-5 steps with a railing?  --  3  -PC  3  -DO    To walk in hospital room?  --  4  -PC  3  -DO    AM-PAC 6 Clicks Score  --  23  -PC  18  -DO       How much help from another is currently needed...    Putting on and taking off regular lower body clothing?  3  -MR  --  --    Bathing (including washing, rinsing,  and drying)  3  -MR  --  --    Toileting (which includes using toilet bed pan or urinal)  3  -MR  --  --    Putting on and taking off regular upper body clothing  3  -MR  --  --    Taking care of personal grooming (such as brushing teeth)  3  -MR  --  --    Eating meals  4  -MR  --  --    Score  19  -MR  --  --       Functional Assessment    Outcome Measure Options  --  --  AM-PAC 6 Clicks Basic Mobility (PT)  -DO    Row Name 05/18/19 1000 05/17/19 1500          How much help from another person do you currently need...    Turning from your back to your side while in flat bed without using bedrails?  3  -DO  --     Moving from lying on back to sitting on the side of a flat bed without bedrails?  3  -DO  --     Moving to and from a bed to a chair (including a wheelchair)?  3  -DO  --     Standing up from a chair using your arms (e.g., wheelchair, bedside chair)?  3  -DO  --     Climbing 3-5 steps with a railing?  3  -DO  --     To walk in hospital room?  3  -DO  --     AM-PAC 6 Clicks Score  18  -DO  --        How much help from another is currently needed...    Putting on and taking off regular lower body clothing?  --  2  -SG     Bathing (including washing, rinsing, and drying)  --  2  -SG     Toileting (which includes using toilet bed pan or urinal)  --  3  -SG     Putting on and taking off regular upper body clothing  --  3  -SG     Taking care of personal grooming (such as brushing teeth)  --  3  -SG     Eating meals  --  3  -SG     Score  --  16  -SG        Functional Assessment    Outcome Measure Options  AM-PAC 6 Clicks Basic Mobility (PT)  -DO  AM-PAC 6 Clicks Daily Activity (OT)  -SG       User Key  (r) = Recorded By, (t) = Taken By, (c) = Cosigned By    Initials Name Provider Type    Idalia Palacios, OTR Occupational Therapist    Olivia Barrientos, PT Physical Therapist    MR Sunshine Hanna Molina, OT Occupational Therapist    Vidal Rojas, PT Physical Therapist           Time Calculation:   Time  Calculation- OT     Row Name 05/20/19 1255             Time Calculation- OT    OT Start Time  0837  -MR      OT Stop Time  0902  -MR      OT Time Calculation (min)  25 min  -MR      Total Timed Code Minutes- OT  25 minute(s)  -MR      OT Received On  05/20/19  -MR        User Key  (r) = Recorded By, (t) = Taken By, (c) = Cosigned By    Initials Name Provider Type     Hanna Sunshine OT Occupational Therapist        Therapy Charges for Today     Code Description Service Date Service Provider Modifiers Qty    57105925429 HC OT THER PROC EA 15 MIN 5/20/2019 Hanna Sunshine OT GO 1    72656811312 HC OT SELF CARE/MGMT/TRAIN EA 15 MIN 5/20/2019 Hanna Sunshine OT GO 1               Hanna Sunshine OT  5/20/2019

## 2019-05-20 NOTE — PLAN OF CARE
Problem: Patient Care Overview  Goal: Plan of Care Review   05/20/19 8493   OTHER   Outcome Summary Pt demos good progress with OT, able to perform functional mobility to bathroom and complete grooming tasks while standing at the sink with supervision.   Coping/Psychosocial   Plan of Care Reviewed With patient

## 2019-05-20 NOTE — PROGRESS NOTES
" LOS: 6 days   Patient Care Team:  Rashid Ge MD as PCP - General (Family Medicine)  Thong Jones MD as Consulting Physician (Cardiology)  Mahin Galeas MD as Consulting Physician (Sleep Medicine)  Lara Ortiz MD as Consulting Physician (Obstetrics and Gynecology)  Betty Horowitz MD as Consulting Physician (Dermatology)  Ben Saavedra MD as Surgeon (Cardiothoracic Surgery)  Mahin Galeas MD as Consulting Physician (Pulmonary Disease)  Chirag Rai MD as Consulting Physician (Ophthalmology)  Moisés Everett MD as Consulting Physician (Urology)    Chief Complaint: post op fu    Subjective  No complaints. Reports ambulating better yesterday afternoon/evening compared to yesterday morning. Hopes to be able to go home with Home Health, but is agreeable to SNF if recommended by Physical Therapy. Reports expected incisional pain, but states it is adequately controlled with Tylenol.     Vital Signs  Temp:  [97.5 °F (36.4 °C)-98.5 °F (36.9 °C)] 98.1 °F (36.7 °C)  Heart Rate:  [79-83] 82  Resp:  [16-17] 16  BP: (118-138)/(58-82) 134/73  Body mass index is 34.18 kg/m².    Intake/Output Summary (Last 24 hours) at 5/20/2019 1049  Last data filed at 5/20/2019 0615  Gross per 24 hour   Intake 960 ml   Output 400 ml   Net 560 ml     No intake/output data recorded.          05/18/19  0351 05/19/19  0612 05/20/19  0615   Weight: 89.4 kg (197 lb 3.2 oz) 87.6 kg (193 lb 3.2 oz) 84.8 kg (186 lb 14.4 oz)         Objective:  General Appearance:  Comfortable, well-appearing and in no acute distress (Up in chair with  at bedside).    Vital signs: (most recent): Blood pressure 134/73, pulse 82, temperature 98.1 °F (36.7 °C), temperature source Oral, resp. rate 16, height 157.5 cm (62\"), weight 84.8 kg (186 lb 14.4 oz), SpO2 97 %.  Vital signs are normal.  No fever.  (97% on room air).    Output: Producing urine and producing stool.    Lungs:  Normal effort and normal " respiratory rate.  Breath sounds clear to auscultation.  She is not in respiratory distress.  No rales, wheezes or rhonchi.    Heart: Normal rate.  (PPM at 80)  Abdomen: Abdomen is soft and non-distended.  Bowel sounds are normal.   There is no abdominal tenderness.     Extremities: Normal range of motion.  There is dependent edema (trace LE edema bilaterally).    Neurological: Patient is alert and oriented to person, place and time.    Skin:  Warm and dry.  (Incision healing well without erythema or drainage. Sternum stable to palpation. )            Results Review:        WBC WBC   Date Value Ref Range Status   05/18/2019 9.13 3.40 - 10.80 10*3/mm3 Final      HGB Hemoglobin   Date Value Ref Range Status   05/18/2019 9.2 (L) 12.0 - 15.9 g/dL Final      HCT Hematocrit   Date Value Ref Range Status   05/18/2019 29.5 (L) 34.0 - 46.6 % Final      Platelets Platelets   Date Value Ref Range Status   05/18/2019 171 140 - 450 10*3/mm3 Final        PT/INR:    Protime   Date Value Ref Range Status   05/20/2019 15.4 (H) 11.7 - 14.2 Seconds Final   05/19/2019 15.2 (H) 11.7 - 14.2 Seconds Final   05/18/2019 15.2 (H) 11.7 - 14.2 Seconds Final   /  INR   Date Value Ref Range Status   05/20/2019 1.25 (H) 0.90 - 1.10 Final   05/19/2019 1.23 (H) 0.90 - 1.10 Final   05/18/2019 1.23 (H) 0.90 - 1.10 Final       Sodium Sodium   Date Value Ref Range Status   05/20/2019 136 136 - 145 mmol/L Final   05/19/2019 131 (L) 136 - 145 mmol/L Final   05/18/2019 138 136 - 145 mmol/L Final      Potassium Potassium   Date Value Ref Range Status   05/20/2019 4.0 3.5 - 5.2 mmol/L Final   05/19/2019 4.5 3.5 - 5.2 mmol/L Final   05/19/2019 3.6 3.5 - 5.2 mmol/L Final   05/18/2019 4.3 3.5 - 5.2 mmol/L Final      Chloride Chloride   Date Value Ref Range Status   05/20/2019 100 98 - 107 mmol/L Final   05/19/2019 94 (L) 98 - 107 mmol/L Final   05/18/2019 100 98 - 107 mmol/L Final      Bicarbonate CO2   Date Value Ref Range Status   05/20/2019 26.5 22.0 - 29.0  mmol/L Final   05/19/2019 26.9 22.0 - 29.0 mmol/L Final   05/18/2019 27.2 22.0 - 29.0 mmol/L Final      BUN BUN   Date Value Ref Range Status   05/20/2019 13 8 - 23 mg/dL Final   05/19/2019 20 8 - 23 mg/dL Final   05/18/2019 24 (H) 8 - 23 mg/dL Final      Creatinine Creatinine   Date Value Ref Range Status   05/20/2019 0.57 0.57 - 1.00 mg/dL Final   05/19/2019 0.54 (L) 0.57 - 1.00 mg/dL Final   05/18/2019 0.61 0.57 - 1.00 mg/dL Final      Calcium Calcium   Date Value Ref Range Status   05/20/2019 10.5 8.6 - 10.5 mg/dL Final   05/19/2019 9.8 8.6 - 10.5 mg/dL Final   05/18/2019 10.2 8.6 - 10.5 mg/dL Final      Magnesium No results found for: MG         atorvastatin 20 mg Oral Once per day on Mon Thu   digoxin 250 mcg Oral Daily   enoxaparin 40 mg Subcutaneous Daily   furosemide 40 mg Oral Daily   guaiFENesin 1,200 mg Oral Q12H   insulin lispro 0-9 Units Subcutaneous 4x Daily With Meals & Nightly   metFORMIN 500 mg Oral Daily With Breakfast   metoprolol tartrate 50 mg Oral Q12H   mupirocin  Each Nare BID   pantoprazole 40 mg Oral Q AM   polyethylene glycol 17 g Oral BID   potassium chloride 20 mEq Oral Daily   sennosides-docusate sodium 2 tablet Oral Nightly   [START ON 5/22/2019] warfarin 5 mg Oral Once per day on Mon Wed Fri   [START ON 5/21/2019] warfarin 7.5 mg Oral Once per day on Sun Tue Thu Sat       sodium chloride 30 mL/hr    sodium chloride 30 mL/hr Last Rate: 30 mL/hr (05/14/19 1300)           Patient Active Problem List   Diagnosis Code   • S/P mitral valve repair Z98.890   • S/P tricuspid valve repair Z98.890   • S/P Maze operation for atrial fibrillation Z98.890, Z86.79   • Morbidly obese (CMS/HCC) E66.01   • Atrial fibrillation (CMS/HCC) I48.91   • Acquired hypothyroidism E03.9   • COPD (chronic obstructive pulmonary disease) (CMS/[facility]) J44.9   • CAD (coronary artery disease), hx MI I25.10   • HAMLET on autoCPAP G47.33, Z99.89   • Hypercalcemia E83.52   • Renal calculus, right N20.0   • Hyperparathyroidism  (CMS/[facility]) E21.3   • Type 2 diabetes mellitus without complication, with long-term current use of insulin (CMS/[facility]) E11.9, Z79.4   • Warfarin anticoagulation Z79.01   • Medication monitoring encounter Z51.81   • Medicare annual wellness visit, subsequent Z00.00   • Pacemaker Z95.0   • AICD (automatic cardioverter/defibrillator) present Z95.810   • Right foot pain M79.671   • Chronic nonallergic rhinitis J31.0   • Pulmonary hypertension (CMS/[facility]) I27.20   • Mitral valve disease I05.9       Assessment & Plan    -Recurrent mitral incompetence and stenosis--s/p reop MVR (tissue)-POD#6 [facility]  -hx of MV and TV repair, closure of INO, MAZE  -Diabetes- SSI, home meds  -hx paroxysmal atrial fibrillation with chronic coumadin therapy  -BiV-AICD- [employer]  -Severe Pulmonary hypertension- PAs 100 intra-op  -Vaginal bleeding  -Post op anemia- expected acute blood loss, stable       Will re-evaluate gait and steadiness with ambulation when she works with PT today. If able to go home with Home Health, will discharge home later today. Walking oximetry today. Will adjust Coumadin dosing back to home regimen (5 mg MWF, 7.5 mg TThSS). Will need to have pacemaker settings adjusted to pre-op setting (rate 60) prior to discharge.        DASIA Oneil  05/20/19  10:49 AM

## 2019-05-20 NOTE — PROGRESS NOTES
Continued Stay Note  [facility]     Patient Name: Fernanda Molina  MRN: [id number]  Today's Date: 5/20/2019    Admit Date: 5/14/2019    Discharge Plan     Row Name 05/20/19 1231       Plan    Plan  Home w/ [facility]    Plan Comments  CCP s/w Pt and spouse Crispin and Pt d/c plan is to return home with [facility] to follow.  [facility] referral already placed on Friday and they accepted.  KATARZYNA PATEL/CCP        Discharge Codes    No documentation.       Expected Discharge Date and Time     Expected Discharge Date Expected Discharge Time    May 20, 2019             Vero [place] RN

## 2019-05-21 ENCOUNTER — READMISSION MANAGEMENT (OUTPATIENT)
Dept: CALL CENTER | Facility: HOSPITAL | Age: 79
End: 2019-05-21

## 2019-05-21 LAB
ABO + RH BLD: NORMAL
ABO + RH BLD: NORMAL
BH BB BLOOD EXPIRATION DATE: NORMAL
BH BB BLOOD EXPIRATION DATE: NORMAL
BH BB BLOOD TYPE BARCODE: 5100
BH BB BLOOD TYPE BARCODE: 5100
BH BB DISPENSE STATUS: NORMAL
BH BB DISPENSE STATUS: NORMAL
BH BB PRODUCT CODE: NORMAL
BH BB PRODUCT CODE: NORMAL
BH BB UNIT NUMBER: NORMAL
BH BB UNIT NUMBER: NORMAL
CROSSMATCH INTERPRETATION: NORMAL
CROSSMATCH INTERPRETATION: NORMAL
UNIT  ABO: NORMAL
UNIT  ABO: NORMAL
UNIT  RH: NORMAL
UNIT  RH: NORMAL

## 2019-05-21 PROCEDURE — 99024 POSTOP FOLLOW-UP VISIT: CPT | Performed by: PHYSICIAN ASSISTANT

## 2019-05-21 NOTE — OUTREACH NOTE
Prep Survey      Responses   Facility patient discharged from?  [place]   Is patient eligible?  Yes   Discharge diagnosis   via redo sternotomy underwent mitral valve replacement    Does the patient have one of the following disease processes/diagnoses(primary or secondary)?  Cardiothoracic surgery   Does the patient have Home health ordered?  Yes   What is the Home health agency?   [facility]    Is there a DME ordered?  No   Prep survey completed?  Yes          Shannen Conti RN

## 2019-05-21 NOTE — PROGRESS NOTES
Case Management Discharge Note    Final Note: Pt d/c home 5/20/19 with family.  Pt will be followed by [facility] at home.      Destination      No service has been selected for the patient.      Durable Medical Equipment      No service has been selected for the patient.      Dialysis/Infusion      No service has been selected for the patient.      Home Medical Care - Selection Complete      Service Provider Request Status Selected Services Address Phone Number Fax Number    [facility] Selected Home Health Services [address] [address] [phone]      Therapy      No service has been selected for the patient.      Community Resources      No service has been selected for the patient.        Transportation Services  Private: Car    Final Discharge Disposition Code: 06 - home with home health care

## 2019-05-22 ENCOUNTER — READMISSION MANAGEMENT (OUTPATIENT)
Dept: CALL CENTER | Facility: HOSPITAL | Age: 79
End: 2019-05-22

## 2019-05-22 ENCOUNTER — APPOINTMENT (OUTPATIENT)
Dept: CARDIAC REHAB | Facility: HOSPITAL | Age: 79
End: 2019-05-22

## 2019-05-22 NOTE — OUTREACH NOTE
CT Surgery Week 1 Survey      Responses   Facility patient discharged from?  [place]   Does the patient have one of the following disease processes/diagnoses(primary or secondary)?  Cardiothoracic surgery   Is there a successful TCM telephone encounter documented?  No   Week 1 attempt successful?  Yes   Call start time  1650   Call end time  1654   General alerts for this patient  Had CTS Surgery   Is patient permission given to speak with other caregiver?  Yes   List who call center can speak with  Spouse, Crispin Linton reviewed with patient/caregiver?  Yes   Is the patient having any side effects they believe may be caused by any medication additions or changes?  No   Does the patient have all medications related to this admission filled (includes all antibiotics, pain medications, cardiac medications, etc.)  Yes   Is the patient taking all medications as directed (includes completed medication regime)?  Yes   Does the patient have a primary care provider?   Yes   Does the patient have an appointment scheduled with their C/T surgeon?  Yes   Comments regarding PCP  Dr. Ge 5/28   Has the patient kept scheduled appointments due by today?  N/A   Comments  CTS Next week   What is the Home health agency?   [facility]    Has home health visited the patient within 72 hours of discharge?  Yes   Psychosocial issues?  No   Comments  HH Comes, incisions looking ok, 2 of the sites are weeping, no redness or odor   Did the patient receive a copy of their discharge instructions?  Yes   Nursing interventions  Reviewed instructions with patient, Educated on MyChart   What is the patient's perception of their health status since discharge?  Improving   Nursing interventions  Nurse provided patient education   Is the patient/caregiver able to teach back normal signs of recovery?  Nausea and lack of appetite, Constipation, Depression or irritability, Pain or discomfort at incisional site   Nursing interventions  Reassured on normal  signs of recovery   Is the patient /caregiver able to teach back basic post-op care?  Continue use of incentive spirometry at least 1 week post discharge, Practice 'cough and deep breath', Drive as instructed by MD in discharge instructions, Take showers only when approved by MD-sponge bathe until then, No tub bath, swimming, or hot tub until instructed by MD, Keep incision areas clean, dry and protected, Do not remove steri-strips, Lifting as instructed by MD in discharge instructions   Is the patient/caregiver able to teach back signs and symptoms of incisional infection?  Increased redness, swelling or pain at the incisonal site, Increased drainage or bleeding, Incisional warmth, Pus or odor from incision, Fever   Is the patient/caregiver able to teach back steps to recovery at home?  Set small, achievable goals for return to baseline health, Rest and rebuild strength, gradually increase activity, Weigh daily, Practice good oral hygiene, Eat a well-balance diet, Make a list of questions for surgeon's appointment   Is the patient /caregiver able to teach back the importance of cardiac rehab?  Yes   Nursing interventions  Provided education on importance of cardiac rehab   Is the patient/caregiver able to teach back the hierarchy of who to call/visit for symptoms/problems? PCP, Specialist, Home health nurse, Urgent Care, ED, 911  Yes   Additional teach back comments  HH Seeing wounds   Week 1 call completed?  Yes   Wrap up additional comments  feling well pain is under control using tylenol            Marimar Ansari, RN

## 2019-05-24 ENCOUNTER — APPOINTMENT (OUTPATIENT)
Dept: CARDIAC REHAB | Facility: HOSPITAL | Age: 79
End: 2019-05-24

## 2019-05-28 ENCOUNTER — OFFICE VISIT (OUTPATIENT)
Dept: FAMILY MEDICINE CLINIC | Facility: CLINIC | Age: 79
End: 2019-05-28

## 2019-05-28 VITALS
OXYGEN SATURATION: 95 % | BODY MASS INDEX: 35.7 KG/M2 | WEIGHT: 194 LBS | SYSTOLIC BLOOD PRESSURE: 104 MMHG | TEMPERATURE: 98.4 F | RESPIRATION RATE: 16 BRPM | DIASTOLIC BLOOD PRESSURE: 68 MMHG | HEART RATE: 70 BPM | HEIGHT: 62 IN

## 2019-05-28 DIAGNOSIS — Z09 HOSPITAL DISCHARGE FOLLOW-UP: ICD-10-CM

## 2019-05-28 DIAGNOSIS — Z79.4 TYPE 2 DIABETES MELLITUS WITHOUT COMPLICATION, WITH LONG-TERM CURRENT USE OF INSULIN (HCC): ICD-10-CM

## 2019-05-28 DIAGNOSIS — E11.9 TYPE 2 DIABETES MELLITUS WITHOUT COMPLICATION, WITH LONG-TERM CURRENT USE OF INSULIN (HCC): ICD-10-CM

## 2019-05-28 DIAGNOSIS — E03.9 ACQUIRED HYPOTHYROIDISM: Primary | ICD-10-CM

## 2019-05-28 PROCEDURE — 99214 OFFICE O/P EST MOD 30 MIN: CPT | Performed by: FAMILY MEDICINE

## 2019-05-28 NOTE — PROGRESS NOTES
Subjective   Fernanda Molina is a 78 y.o. female who is here for   Chief Complaint   Patient presents with   • Follow-up     hospital f/u ,open heart surgery   .     History of Present Illness   S/p  Procedure(s) 5/14/19 by Dr. Saavedra:  · Redo secondary sternotomy.  Removal of mitral valve ring mitral valve replacement with a 27 mm Medtronic Hutchinson 2 porcine valve with preservation of all sub-valvular apparatus.  Temporary cardiopulmonary bypass.     Doing well today  Had 2 pre syncope spells  On DC home her Toprol was dec to bid from tid  No hypoglycemia at home  Off insulin and Invokana    We reviewed her hospital notes.        The following portions of the patient's history were reviewed and updated as appropriate: allergies, current medications, past family history, past medical history, past social history, past surgical history and problem list.    Review of Systems  Vitals:    05/28/19 1602   BP: 104/68   Pulse: 70   Resp: 16   Temp: 98.4 °F (36.9 °C)   SpO2: 95%       Objective   Physical Exam   Constitutional: She appears well-developed and well-nourished.   Cardiovascular: Normal rate and regular rhythm.   No murmur heard.  Pulmonary/Chest: Effort normal.   Musculoskeletal: She exhibits no edema.   Nursing note and vitals reviewed.      Assessment/Plan   Fernanda was seen today for follow-up.    Diagnoses and all orders for this visit:    Acquired hypothyroidism    Type 2 diabetes mellitus without complication, with long-term current use of insulin (CMS/[facility])    Hospital discharge follow-up    Post Op anemia    ok to restart Lantus at 10 U a day next Monday  Then increase 10 U on mondays until back to previous dose of 40 U a day  Hold Invokana for now.  Bp is low, ok to stay on bid Toprol.  regular INR via home health and cardio MD    There are no Patient Instructions on file for this visit.    There are no discontinued medications.     Return in about 3 months (around 8/28/2019) for diabetes.      Rashid Ge  [facility] [place], [place]

## 2019-05-29 ENCOUNTER — READMISSION MANAGEMENT (OUTPATIENT)
Dept: CALL CENTER | Facility: HOSPITAL | Age: 79
End: 2019-05-29

## 2019-05-29 ENCOUNTER — APPOINTMENT (OUTPATIENT)
Dept: CARDIAC REHAB | Facility: HOSPITAL | Age: 79
End: 2019-05-29

## 2019-05-29 PROBLEM — Z09 HOSPITAL DISCHARGE FOLLOW-UP: Status: ACTIVE | Noted: 2019-05-29

## 2019-05-29 NOTE — OUTREACH NOTE
CT Surgery Week 2 Survey      Responses   Facility patient discharged from?  [place]   Does the patient have one of the following disease processes/diagnoses(primary or secondary)?  Cardiothoracic surgery   Week 2 attempt successful?  Yes   Call start time  1337   Call end time  1345   Meds reviewed with patient/caregiver?  Yes   Is the patient having any side effects they believe may be caused by any medication additions or changes?  No   Does the patient have all medications related to this admission filled (includes all antibiotics, pain medications, cardiac medications, etc.)  Yes   Is the patient taking all medications as directed (includes completed medication regime)?  Yes   Medication comments  Starting back on Lantus on 06/03/19, gradually increasing each week.   Comments regarding appointments  Kept PCP appt yesterday.   Does the patient have a primary care provider?   Yes   Does the patient have an appointment scheduled with their C/T surgeon?  Yes   Has the patient kept scheduled appointments due by today?  Yes   What is the Home health agency?   [facility]   Has home health visited the patient within 72 hours of discharge?  Yes   Home health comments  Nurse and PT   Psychosocial issues?  No   Psychosocial comments   available to help   Did the patient receive a copy of their discharge instructions?  Yes   Nursing interventions  Reviewed instructions with patient, Educated on MyChart [uses My Chart]   What is the patient's perception of their health status since discharge?  Improving   Nursing interventions  Nurse provided patient education   Is the patient/caregiver able to teach back normal signs of recovery?  Nausea and lack of appetite, Constipation, Depression or irritability, Pain or discomfort at incisional site   Nursing interventions  Reassured on normal signs of recovery   Is the patient /caregiver able to teach back basic post-op care?  Practice 'cough and deep breath', Drive as instructed  by MD in discharge instructions, Take showers only when approved by MD-sponge bathe until then, No tub bath, swimming, or hot tub until instructed by MD, Keep incision areas clean, dry and protected, Lifting as instructed by MD in discharge instructions   Is the patient/caregiver able to teach back signs and symptoms of incisional infection?  Increased redness, swelling or pain at the incisonal site, Increased drainage or bleeding, Incisional warmth, Pus or odor from incision, Fever   Is the patient/caregiver able to teach back steps to recovery at home?  Set small, achievable goals for return to baseline health, Rest and rebuild strength, gradually increase activity, Weigh daily, Practice good oral hygiene, Eat a well-balance diet, Make a list of questions for surgeon's appointment   Is the patient /caregiver able to teach back the importance of cardiac rehab?  Yes   Nursing interventions  Provided education on importance of cardiac rehab   If the patient is a current smoker, are they able to teach back resources for cessation?  -- [nonsmoker]   Is the patient/caregiver able to teach back the hierarchy of who to call/visit for symptoms/problems? PCP, Specialist, Home health nurse, Urgent Care, ED, 911  Yes   Week 2 call completed?  Yes   Revoked  No further contact(revokes)-requires comment   Graduated/Revoked comments  States feels is doing well-goals met.    Wrap up additional comments  States is doing well. States home health is visiting-states incision is healing without s/s of infection. Denies any complaints.          Ivonne Duron RN

## 2019-05-31 ENCOUNTER — APPOINTMENT (OUTPATIENT)
Dept: CARDIAC REHAB | Facility: HOSPITAL | Age: 79
End: 2019-05-31

## 2019-06-01 DIAGNOSIS — Z79.4 TYPE 2 DIABETES MELLITUS WITH COMPLICATION, WITH LONG-TERM CURRENT USE OF INSULIN (HCC): ICD-10-CM

## 2019-06-01 DIAGNOSIS — E11.8 TYPE 2 DIABETES MELLITUS WITH COMPLICATION, WITH LONG-TERM CURRENT USE OF INSULIN (HCC): ICD-10-CM

## 2019-06-01 DIAGNOSIS — I25.10 CORONARY ARTERY DISEASE INVOLVING NATIVE HEART WITHOUT ANGINA PECTORIS, UNSPECIFIED VESSEL OR LESION TYPE: ICD-10-CM

## 2019-06-03 ENCOUNTER — APPOINTMENT (OUTPATIENT)
Dept: CARDIAC REHAB | Facility: HOSPITAL | Age: 79
End: 2019-06-03

## 2019-06-03 RX ORDER — FUROSEMIDE 40 MG/1
TABLET ORAL
Qty: 135 TABLET | Refills: 0 | Status: SHIPPED | OUTPATIENT
Start: 2019-06-03 | End: 2019-09-22 | Stop reason: SDUPTHER

## 2019-06-03 RX ORDER — PANTOPRAZOLE SODIUM 40 MG/1
TABLET, DELAYED RELEASE ORAL
Qty: 90 TABLET | Refills: 0 | Status: SHIPPED | OUTPATIENT
Start: 2019-06-03 | End: 2019-10-07 | Stop reason: SDUPTHER

## 2019-06-03 RX ORDER — PEN NEEDLE, DIABETIC 32GX 5/32"
NEEDLE, DISPOSABLE MISCELLANEOUS
Qty: 270 EACH | Refills: 3 | Status: SHIPPED | OUTPATIENT
Start: 2019-06-03 | End: 2020-10-28

## 2019-06-03 RX ORDER — ATORVASTATIN CALCIUM 20 MG/1
TABLET, FILM COATED ORAL
Qty: 26 TABLET | Refills: 3 | Status: SHIPPED | OUTPATIENT
Start: 2019-06-03 | End: 2019-11-25 | Stop reason: SDUPTHER

## 2019-06-03 RX ORDER — SPIRONOLACTONE 25 MG/1
TABLET ORAL
Qty: 23 TABLET | Refills: 3 | Status: SHIPPED | OUTPATIENT
Start: 2019-06-03 | End: 2019-11-25

## 2019-06-04 ENCOUNTER — CLINICAL SUPPORT (OUTPATIENT)
Dept: CARDIAC SURGERY | Facility: CLINIC | Age: 79
End: 2019-06-04

## 2019-06-04 DIAGNOSIS — Z48.812 ENCOUNTER FOR SURGICAL AFTERCARE FOLLOWING SURGERY OF CIRCULATORY SYSTEM: Primary | ICD-10-CM

## 2019-06-04 DIAGNOSIS — I48.0 PAROXYSMAL ATRIAL FIBRILLATION (HCC): ICD-10-CM

## 2019-06-04 DIAGNOSIS — I50.23 ACUTE ON CHRONIC SYSTOLIC HEART FAILURE (HCC): ICD-10-CM

## 2019-06-04 DIAGNOSIS — I27.20 PULMONARY HYPERTENSION (HCC): ICD-10-CM

## 2019-06-04 PROCEDURE — 99374 HOME HEALTH CARE SUPERVISION: CPT | Performed by: THORACIC SURGERY (CARDIOTHORACIC VASCULAR SURGERY)

## 2019-06-05 ENCOUNTER — APPOINTMENT (OUTPATIENT)
Dept: CARDIAC REHAB | Facility: HOSPITAL | Age: 79
End: 2019-06-05

## 2019-06-07 ENCOUNTER — APPOINTMENT (OUTPATIENT)
Dept: CARDIAC REHAB | Facility: HOSPITAL | Age: 79
End: 2019-06-07

## 2019-06-10 ENCOUNTER — APPOINTMENT (OUTPATIENT)
Dept: CARDIAC REHAB | Facility: HOSPITAL | Age: 79
End: 2019-06-10

## 2019-06-12 ENCOUNTER — APPOINTMENT (OUTPATIENT)
Dept: CARDIAC REHAB | Facility: HOSPITAL | Age: 79
End: 2019-06-12

## 2019-06-14 ENCOUNTER — APPOINTMENT (OUTPATIENT)
Dept: CARDIAC REHAB | Facility: HOSPITAL | Age: 79
End: 2019-06-14

## 2019-06-17 ENCOUNTER — APPOINTMENT (OUTPATIENT)
Dept: CARDIAC REHAB | Facility: HOSPITAL | Age: 79
End: 2019-06-17

## 2019-06-19 ENCOUNTER — APPOINTMENT (OUTPATIENT)
Dept: CARDIAC REHAB | Facility: HOSPITAL | Age: 79
End: 2019-06-19

## 2019-06-20 ENCOUNTER — OFFICE VISIT (OUTPATIENT)
Dept: CARDIAC SURGERY | Facility: CLINIC | Age: 79
End: 2019-06-20

## 2019-06-20 VITALS
SYSTOLIC BLOOD PRESSURE: 112 MMHG | HEART RATE: 75 BPM | DIASTOLIC BLOOD PRESSURE: 72 MMHG | TEMPERATURE: 97.7 F | HEIGHT: 62 IN | OXYGEN SATURATION: 96 % | WEIGHT: 195 LBS | BODY MASS INDEX: 35.88 KG/M2

## 2019-06-20 DIAGNOSIS — Z98.890 S/P MITRAL VALVE REPAIR: Primary | ICD-10-CM

## 2019-06-20 PROCEDURE — 99024 POSTOP FOLLOW-UP VISIT: CPT | Performed by: NURSE PRACTITIONER

## 2019-06-20 RX ORDER — INSULIN GLARGINE 100 [IU]/ML
40 INJECTION, SOLUTION SUBCUTANEOUS DAILY
COMMUNITY
End: 2020-04-27 | Stop reason: SDUPTHER

## 2019-06-20 NOTE — PROGRESS NOTES
"CARDIOVASCULAR SURGERY FOLLOW-UP PROGRESS NOTE  Chief Complaint: Postop follow-up        HPI:   Dear Dr. Ge, Rashid JOHN MD and colleagues:    It was nice to see Fernanda Molina in follow up 5 weeks after surgery.  As you know, she is a 78 y.o. female with mitral incompetence who underwent redo sternotomy, removal of mitral valve ring and replacement with tissue MVR on 5/14/2019 with Dr. Saavedra. She did well postoperatively and continues to do well. She comes in today complaining of left chest wall pain and left posterior shoulder pain that is reproducible with palpation.  Her activity level has been good, she states that she is undergoing a stress test on Tuesday in anticipation of cardiac rehab.  From a surgical standpoint, the sternal incision is well approximated without erythema, edema, or drainage.  The sternum is stable to palpation, and the patient denies any popping or clicking with deep inspiration or coughing.      Physical Exam:         /72   Pulse 75   Temp 97.7 °F (36.5 °C)   Ht 157.5 cm (62\")   Wt 88.5 kg (195 lb)   SpO2 96%   BMI 35.67 kg/m²   Heart:  regular rate and rhythm, S1, S2 normal, no murmur, click, rub or gallop  Lungs:  clear to auscultation bilaterally  Extremities:  trace lower extremity edema bilaterally  Incision(s):  mid chest healing well, sternum stable    Assessment/Plan:     S/P MVR and redo sternotomy. Overall, she is doing well.    No significant post-op complications    OK to begin cardiac rehab  Follow-up as scheduled with cardiology  Follow-up with CT surgery prn    No restrictions of activity.    Return to clinic if any signs or symptoms of infection or sternal instability develop       Thank you for allowing me to participate in the care of your patient.    Regards,  HESHAM Banks      "

## 2019-06-21 ENCOUNTER — APPOINTMENT (OUTPATIENT)
Dept: CARDIAC REHAB | Facility: HOSPITAL | Age: 79
End: 2019-06-21

## 2019-06-24 ENCOUNTER — APPOINTMENT (OUTPATIENT)
Dept: CARDIAC REHAB | Facility: HOSPITAL | Age: 79
End: 2019-06-24

## 2019-06-26 ENCOUNTER — APPOINTMENT (OUTPATIENT)
Dept: CARDIAC REHAB | Facility: HOSPITAL | Age: 79
End: 2019-06-26

## 2019-06-28 ENCOUNTER — APPOINTMENT (OUTPATIENT)
Dept: CARDIAC REHAB | Facility: HOSPITAL | Age: 79
End: 2019-06-28

## 2019-07-01 ENCOUNTER — APPOINTMENT (OUTPATIENT)
Dept: CARDIAC REHAB | Facility: HOSPITAL | Age: 79
End: 2019-07-01

## 2019-07-03 ENCOUNTER — APPOINTMENT (OUTPATIENT)
Dept: CARDIAC REHAB | Facility: HOSPITAL | Age: 79
End: 2019-07-03

## 2019-07-05 ENCOUNTER — APPOINTMENT (OUTPATIENT)
Dept: CARDIAC REHAB | Facility: HOSPITAL | Age: 79
End: 2019-07-05

## 2019-07-08 ENCOUNTER — APPOINTMENT (OUTPATIENT)
Dept: CARDIAC REHAB | Facility: HOSPITAL | Age: 79
End: 2019-07-08

## 2019-07-10 ENCOUNTER — APPOINTMENT (OUTPATIENT)
Dept: CARDIAC REHAB | Facility: HOSPITAL | Age: 79
End: 2019-07-10

## 2019-07-12 ENCOUNTER — APPOINTMENT (OUTPATIENT)
Dept: CARDIAC REHAB | Facility: HOSPITAL | Age: 79
End: 2019-07-12

## 2019-07-15 ENCOUNTER — APPOINTMENT (OUTPATIENT)
Dept: CARDIAC REHAB | Facility: HOSPITAL | Age: 79
End: 2019-07-15

## 2019-07-17 ENCOUNTER — APPOINTMENT (OUTPATIENT)
Dept: CARDIAC REHAB | Facility: HOSPITAL | Age: 79
End: 2019-07-17

## 2019-07-19 ENCOUNTER — APPOINTMENT (OUTPATIENT)
Dept: CARDIAC REHAB | Facility: HOSPITAL | Age: 79
End: 2019-07-19

## 2019-07-22 ENCOUNTER — APPOINTMENT (OUTPATIENT)
Dept: CARDIAC REHAB | Facility: HOSPITAL | Age: 79
End: 2019-07-22

## 2019-07-24 ENCOUNTER — APPOINTMENT (OUTPATIENT)
Dept: CARDIAC REHAB | Facility: HOSPITAL | Age: 79
End: 2019-07-24

## 2019-07-25 ENCOUNTER — OFFICE VISIT (OUTPATIENT)
Dept: SLEEP MEDICINE | Facility: HOSPITAL | Age: 79
End: 2019-07-25
Attending: INTERNAL MEDICINE

## 2019-07-25 VITALS — HEART RATE: 75 BPM | HEIGHT: 62 IN | WEIGHT: 194.2 LBS | OXYGEN SATURATION: 96 % | BODY MASS INDEX: 35.74 KG/M2

## 2019-07-25 DIAGNOSIS — Z99.89 OSA ON CPAP: Primary | ICD-10-CM

## 2019-07-25 DIAGNOSIS — G47.33 OSA ON CPAP: Primary | ICD-10-CM

## 2019-07-25 DIAGNOSIS — G47.14 HYPERSOMNIA DUE TO MEDICAL CONDITION: ICD-10-CM

## 2019-07-25 PROCEDURE — 99213 OFFICE O/P EST LOW 20 MIN: CPT | Performed by: INTERNAL MEDICINE

## 2019-07-25 PROCEDURE — G0463 HOSPITAL OUTPT CLINIC VISIT: HCPCS
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
## 2019-07-25 NOTE — PROGRESS NOTES
"Follow Up [facility] Note     Chief Complaint:  HAMLET     Primary Care Physician: Rashid Ge MD    Interval History:   The patient one year ago.  She states she is stable from the sleep standpoint.  She goes to bed 11 PM and awakens at 8:30 AM.  She will use the restroom once during the nighttime.  For sleepiness scale is abnormal at 11    The patient did have a mitral valve replacement 8 weeks ago.    Review of Systems:    A complete review of systems was done and all were negative with the exception of postnasal drip and BROOKS    Social History:    Social History     Socioeconomic History   • Marital status:      Spouse name: Not on file   • Number of children: Not on file   • Years of education: Not on file   • Highest education level: Not on file   Tobacco Use   • Smoking status: Never Smoker   • Smokeless tobacco: Never Used   Substance and Sexual Activity   • Alcohol use: No   • Drug use: No   • Sexual activity: Defer     Birth control/protection: Post-menopausal       Allergies:  Corticosteroids; Adhesive tape; Hydrocodone; and Lisinopril     Medication Review:  Reviewed.      Vital Signs:    Vitals:    07/25/19 0900   Pulse: 75   SpO2: 96%   Weight: 88.1 kg (194 lb 3.2 oz)   Height: 157.5 cm (62\")     Body mass index is 35.52 kg/m².    Physical Exam:    Constitutional:  Well developed 78 y.o. female that appears in no apparent distress.  Awake & oriented times 3.  Normal mood with normal recent and remote memory and normal judgement.  Eyes:  Conjunctivae normal.  Oropharynx:  moist mucous membranes without exudate and a large tongue and class III MP airway     Results Review:  DME is [employer] and she is using a nasal interface.  Downloads between 4/24 and 7/23/2019 compliance is 97%.  Average usage is 9 hours and 2 minutes.  Average AHI is normal with a leak.  Average AutoCPAP pressure is 15.4 and her auto CPAP is 12-16.       Impression:   Obstructive sleep apnea adequately treated " with auto CPAP with good compliance and usage and persistent complaints of hypersomnolence.    Plan:  Good sleep hygiene measures should be maintained.  Weight loss would be beneficial in this patient who is obese by BMI.  The patient is benefiting from the treatment being provided.     Patient will continue auto CPAP.  Due to a leak, she is to try a [employer] fullface mask.  She will let me know those results.    The patient will call for any problems and will follow up in 1 year.      Mahin Galeas MD  Sleep Medicine  07/25/19  10:26 AM

## 2019-07-26 ENCOUNTER — APPOINTMENT (OUTPATIENT)
Dept: CARDIAC REHAB | Facility: HOSPITAL | Age: 79
End: 2019-07-26

## 2019-07-27 PROBLEM — G47.14 HYPERSOMNIA DUE TO MEDICAL CONDITION: Status: ACTIVE | Noted: 2019-07-27

## 2019-07-29 ENCOUNTER — APPOINTMENT (OUTPATIENT)
Dept: CARDIAC REHAB | Facility: HOSPITAL | Age: 79
End: 2019-07-29

## 2019-07-29 ENCOUNTER — DOCUMENTATION (OUTPATIENT)
Dept: CARDIAC REHAB | Facility: HOSPITAL | Age: 79
End: 2019-07-29

## 2019-07-31 ENCOUNTER — APPOINTMENT (OUTPATIENT)
Dept: CARDIAC REHAB | Facility: HOSPITAL | Age: 79
End: 2019-07-31

## 2019-08-02 ENCOUNTER — APPOINTMENT (OUTPATIENT)
Dept: CARDIAC REHAB | Facility: HOSPITAL | Age: 79
End: 2019-08-02

## 2019-08-05 ENCOUNTER — APPOINTMENT (OUTPATIENT)
Dept: CARDIAC REHAB | Facility: HOSPITAL | Age: 79
End: 2019-08-05

## 2019-08-07 ENCOUNTER — APPOINTMENT (OUTPATIENT)
Dept: CARDIAC REHAB | Facility: HOSPITAL | Age: 79
End: 2019-08-07

## 2019-08-09 ENCOUNTER — APPOINTMENT (OUTPATIENT)
Dept: CARDIAC REHAB | Facility: HOSPITAL | Age: 79
End: 2019-08-09

## 2019-08-12 ENCOUNTER — APPOINTMENT (OUTPATIENT)
Dept: CARDIAC REHAB | Facility: HOSPITAL | Age: 79
End: 2019-08-12

## 2019-08-12 DIAGNOSIS — Z95.2 S/P MVR (MITRAL VALVE REPLACEMENT): Primary | ICD-10-CM

## 2019-08-14 ENCOUNTER — APPOINTMENT (OUTPATIENT)
Dept: CARDIAC REHAB | Facility: HOSPITAL | Age: 79
End: 2019-08-14

## 2019-08-16 ENCOUNTER — TREATMENT (OUTPATIENT)
Dept: CARDIAC REHAB | Facility: HOSPITAL | Age: 79
End: 2019-08-16

## 2019-08-16 ENCOUNTER — APPOINTMENT (OUTPATIENT)
Dept: CARDIAC REHAB | Facility: HOSPITAL | Age: 79
End: 2019-08-16

## 2019-08-16 DIAGNOSIS — Z95.2 S/P MVR (MITRAL VALVE REPLACEMENT): Primary | ICD-10-CM

## 2019-08-16 LAB
GLUCOSE BLDC GLUCOMTR-MCNC: 108 MG/DL (ref 70–130)
GLUCOSE BLDC GLUCOMTR-MCNC: 153 MG/DL (ref 70–130)

## 2019-08-16 PROCEDURE — 93797 PHYS/QHP OP CAR RHAB WO ECG: CPT

## 2019-08-16 PROCEDURE — 93798 PHYS/QHP OP CAR RHAB W/ECG: CPT

## 2019-08-16 PROCEDURE — 82962 GLUCOSE BLOOD TEST: CPT

## 2019-08-19 ENCOUNTER — APPOINTMENT (OUTPATIENT)
Dept: CARDIAC REHAB | Facility: HOSPITAL | Age: 79
End: 2019-08-19

## 2019-08-21 ENCOUNTER — APPOINTMENT (OUTPATIENT)
Dept: CARDIAC REHAB | Facility: HOSPITAL | Age: 79
End: 2019-08-21

## 2019-08-21 ENCOUNTER — TREATMENT (OUTPATIENT)
Dept: CARDIAC REHAB | Facility: HOSPITAL | Age: 79
End: 2019-08-21

## 2019-08-21 DIAGNOSIS — Z95.2 S/P MVR (MITRAL VALVE REPLACEMENT): Primary | ICD-10-CM

## 2019-08-21 LAB — GLUCOSE BLDC GLUCOMTR-MCNC: 99 MG/DL (ref 70–130)

## 2019-08-21 PROCEDURE — 82962 GLUCOSE BLOOD TEST: CPT

## 2019-08-21 PROCEDURE — 93798 PHYS/QHP OP CAR RHAB W/ECG: CPT

## 2019-08-23 ENCOUNTER — TREATMENT (OUTPATIENT)
Dept: CARDIAC REHAB | Facility: HOSPITAL | Age: 79
End: 2019-08-23

## 2019-08-23 ENCOUNTER — APPOINTMENT (OUTPATIENT)
Dept: CARDIAC REHAB | Facility: HOSPITAL | Age: 79
End: 2019-08-23

## 2019-08-23 DIAGNOSIS — Z95.2 S/P MVR (MITRAL VALVE REPLACEMENT): Primary | ICD-10-CM

## 2019-08-23 LAB
GLUCOSE BLDC GLUCOMTR-MCNC: 101 MG/DL (ref 70–130)
GLUCOSE BLDC GLUCOMTR-MCNC: 102 MG/DL (ref 70–130)

## 2019-08-23 PROCEDURE — 82962 GLUCOSE BLOOD TEST: CPT

## 2019-08-23 PROCEDURE — 93798 PHYS/QHP OP CAR RHAB W/ECG: CPT

## 2019-08-26 ENCOUNTER — TREATMENT (OUTPATIENT)
Dept: CARDIAC REHAB | Facility: HOSPITAL | Age: 79
End: 2019-08-26

## 2019-08-26 ENCOUNTER — APPOINTMENT (OUTPATIENT)
Dept: CARDIAC REHAB | Facility: HOSPITAL | Age: 79
End: 2019-08-26

## 2019-08-26 DIAGNOSIS — Z95.2 S/P MVR (MITRAL VALVE REPLACEMENT): Primary | ICD-10-CM

## 2019-08-26 LAB — GLUCOSE BLDC GLUCOMTR-MCNC: 108 MG/DL (ref 70–130)

## 2019-08-26 PROCEDURE — 93798 PHYS/QHP OP CAR RHAB W/ECG: CPT

## 2019-08-26 PROCEDURE — 82962 GLUCOSE BLOOD TEST: CPT

## 2019-08-28 ENCOUNTER — TREATMENT (OUTPATIENT)
Dept: CARDIAC REHAB | Facility: HOSPITAL | Age: 79
End: 2019-08-28

## 2019-08-28 ENCOUNTER — APPOINTMENT (OUTPATIENT)
Dept: CARDIAC REHAB | Facility: HOSPITAL | Age: 79
End: 2019-08-28

## 2019-08-28 DIAGNOSIS — Z95.2 S/P MVR (MITRAL VALVE REPLACEMENT): Primary | ICD-10-CM

## 2019-08-28 LAB
GLUCOSE BLDC GLUCOMTR-MCNC: 108 MG/DL (ref 70–130)
GLUCOSE BLDC GLUCOMTR-MCNC: 115 MG/DL (ref 70–130)

## 2019-08-28 PROCEDURE — 93798 PHYS/QHP OP CAR RHAB W/ECG: CPT

## 2019-08-28 PROCEDURE — 82962 GLUCOSE BLOOD TEST: CPT

## 2019-08-29 ENCOUNTER — OFFICE VISIT (OUTPATIENT)
Dept: FAMILY MEDICINE CLINIC | Facility: CLINIC | Age: 79
End: 2019-08-29

## 2019-08-29 VITALS
OXYGEN SATURATION: 95 % | SYSTOLIC BLOOD PRESSURE: 126 MMHG | BODY MASS INDEX: 36.4 KG/M2 | WEIGHT: 197.8 LBS | HEIGHT: 62 IN | HEART RATE: 80 BPM | DIASTOLIC BLOOD PRESSURE: 84 MMHG

## 2019-08-29 DIAGNOSIS — E03.9 ACQUIRED HYPOTHYROIDISM: Primary | ICD-10-CM

## 2019-08-29 DIAGNOSIS — E11.9 TYPE 2 DIABETES MELLITUS WITHOUT COMPLICATION, WITHOUT LONG-TERM CURRENT USE OF INSULIN (HCC): ICD-10-CM

## 2019-08-29 DIAGNOSIS — I48.20 CHRONIC ATRIAL FIBRILLATION (HCC): ICD-10-CM

## 2019-08-29 DIAGNOSIS — Z51.81 MEDICATION MONITORING ENCOUNTER: ICD-10-CM

## 2019-08-29 DIAGNOSIS — E11.9 TYPE 2 DIABETES MELLITUS WITHOUT COMPLICATION, WITH LONG-TERM CURRENT USE OF INSULIN (HCC): ICD-10-CM

## 2019-08-29 DIAGNOSIS — Z09 HOSPITAL DISCHARGE FOLLOW-UP: ICD-10-CM

## 2019-08-29 DIAGNOSIS — Z79.4 TYPE 2 DIABETES MELLITUS WITHOUT COMPLICATION, WITH LONG-TERM CURRENT USE OF INSULIN (HCC): ICD-10-CM

## 2019-08-29 PROCEDURE — 99213 OFFICE O/P EST LOW 20 MIN: CPT | Performed by: FAMILY MEDICINE

## 2019-08-29 NOTE — PROGRESS NOTES
"  Chief Complaint   Patient presents with   • Diabetes   • Earache     rt ear      Is s/p revision of mitral valve repair.  Now in cardiac rehab  Still in A fib; on warfarin for life.      Subjective   Fernanda Molina is an 78 y.o. female who presents for follow up of diabetes. Current symptoms include: none. Patient denies foot ulcerations, hyperglycemia, hypoglycemia , paresthesia of the feet and visual disturbances. Evaluation to date has included: hemoglobin A1C. Home sugars: BGs consistently in an acceptable range. Current treatments: no recent interventions. Discussed importance of yearly eye exams and checking feet for skin integrity.      Had recent eval with GYN for fibroid bleeding.    The following portions of the patient's history were reviewed and updated as appropriate: allergies, current medications, past medical history, past social history, past surgical history and problem list.        Review of Systems  Pertinent items are noted in HPI.     Vitals:    08/29/19 1025 08/29/19 1047   BP: 126/84    BP Location: Left arm    Patient Position: Sitting    Cuff Size: Large Adult    Pulse: 51 80   SpO2: 95%    Weight: 89.7 kg (197 lb 12.8 oz)    Height: 157.5 cm (62\")        Objective    Gen:  Alert, pleasant  Ears: canals clear, TMs normal  Throat: clear , no thrush, teeth ok  Neck: no bruit, no LAD  Lungs: clear  Heart: irregular  no murmur. Sternal incision healing  Feet:  No rash, no skin breakdown, sensation grossly normal.    Laboratory:  Results for orders placed or performed in visit on 08/16/19   POC Glucose   Result Value Ref Range    Glucose 153 (H) 70 - 130 mg/dL   POC Glucose   Result Value Ref Range    Glucose 108 70 - 130 mg/dL   POC Glucose   Result Value Ref Range    Glucose 99 70 - 130 mg/dL   POC Glucose   Result Value Ref Range    Glucose 101 70 - 130 mg/dL   POC Glucose   Result Value Ref Range    Glucose 102 70 - 130 mg/dL   POC Glucose   Result Value Ref Range    Glucose 108 70 - 130 " mg/dL   POC Glucose   Result Value Ref Range    Glucose 115 70 - 130 mg/dL   POC Glucose   Result Value Ref Range    Glucose 108 70 - 130 mg/dL        Assessment/Plan        Discussed general issues about diabetes pathophysiology and management.  Continued insulin: same.  Continued metformin; see  medication orders.  Follow up in 3 months or as needed.    Fernanda was seen today for diabetes and earache.    Diagnoses and all orders for this visit:    Acquired hypothyroidism    Hospital discharge follow-up    Type 2 diabetes mellitus without complication, with long-term current use of insulin (CMS/[facility])    Type 2 diabetes mellitus without complication, without long-term current use of insulin (CMS/[facility])  -     ALT; Future  -     MicroAlbumin, Urine, Random - Urine, Clean Catch; Future  -     Basic Metabolic Panel; Future  -     Lipid Panel; Future  -     Hemoglobin A1c; Future  -     Digoxin level; Future    Chronic atrial fibrillation (CMS/[facility])  -     Digoxin level; Future    Medication monitoring encounter  -     ALT; Future        Discussed healthy diabetic eating plan.  May refer to ADA web site, diabetes.org    Return in about 3 months (around 11/29/2019) for diabetes.  There are no Patient Instructions on file for this visit.  Medications Discontinued During This Encounter   Medication Reason   • atorvastatin (LIPITOR) 20 MG tablet Duplicate order         Dr. Rashid Ge MD  [place], [place]  [facility].

## 2019-08-30 ENCOUNTER — APPOINTMENT (OUTPATIENT)
Dept: CARDIAC REHAB | Facility: HOSPITAL | Age: 79
End: 2019-08-30

## 2019-08-30 ENCOUNTER — TREATMENT (OUTPATIENT)
Dept: CARDIAC REHAB | Facility: HOSPITAL | Age: 79
End: 2019-08-30

## 2019-08-30 DIAGNOSIS — Z95.2 S/P MVR (MITRAL VALVE REPLACEMENT): Primary | ICD-10-CM

## 2019-08-30 PROCEDURE — 93798 PHYS/QHP OP CAR RHAB W/ECG: CPT

## 2019-09-03 RX ORDER — POTASSIUM CHLORIDE 20 MEQ/1
TABLET, EXTENDED RELEASE ORAL
Qty: 90 TABLET | Refills: 0 | Status: SHIPPED | OUTPATIENT
Start: 2019-09-03 | End: 2019-11-25 | Stop reason: SDUPTHER

## 2019-09-04 ENCOUNTER — TREATMENT (OUTPATIENT)
Dept: CARDIAC REHAB | Facility: HOSPITAL | Age: 79
End: 2019-09-04

## 2019-09-04 ENCOUNTER — APPOINTMENT (OUTPATIENT)
Dept: CARDIAC REHAB | Facility: HOSPITAL | Age: 79
End: 2019-09-04

## 2019-09-04 DIAGNOSIS — Z95.2 S/P MVR (MITRAL VALVE REPLACEMENT): Primary | ICD-10-CM

## 2019-09-04 PROCEDURE — 93798 PHYS/QHP OP CAR RHAB W/ECG: CPT

## 2019-09-06 ENCOUNTER — APPOINTMENT (OUTPATIENT)
Dept: CARDIAC REHAB | Facility: HOSPITAL | Age: 79
End: 2019-09-06

## 2019-09-06 ENCOUNTER — RESULTS ENCOUNTER (OUTPATIENT)
Dept: FAMILY MEDICINE CLINIC | Facility: CLINIC | Age: 79
End: 2019-09-06

## 2019-09-06 ENCOUNTER — TREATMENT (OUTPATIENT)
Dept: CARDIAC REHAB | Facility: HOSPITAL | Age: 79
End: 2019-09-06

## 2019-09-06 DIAGNOSIS — E03.9 ACQUIRED HYPOTHYROIDISM: ICD-10-CM

## 2019-09-06 DIAGNOSIS — E11.9 TYPE 2 DIABETES MELLITUS WITHOUT COMPLICATION, WITHOUT LONG-TERM CURRENT USE OF INSULIN (HCC): ICD-10-CM

## 2019-09-06 DIAGNOSIS — Z79.899 HIGH RISK MEDICATION USE: ICD-10-CM

## 2019-09-06 DIAGNOSIS — Z95.2 S/P MVR (MITRAL VALVE REPLACEMENT): Primary | ICD-10-CM

## 2019-09-06 PROCEDURE — 93798 PHYS/QHP OP CAR RHAB W/ECG: CPT

## 2019-09-09 ENCOUNTER — TREATMENT (OUTPATIENT)
Dept: CARDIAC REHAB | Facility: HOSPITAL | Age: 79
End: 2019-09-09

## 2019-09-09 ENCOUNTER — APPOINTMENT (OUTPATIENT)
Dept: CARDIAC REHAB | Facility: HOSPITAL | Age: 79
End: 2019-09-09

## 2019-09-09 DIAGNOSIS — Z95.2 S/P MVR (MITRAL VALVE REPLACEMENT): Primary | ICD-10-CM

## 2019-09-09 PROCEDURE — 93798 PHYS/QHP OP CAR RHAB W/ECG: CPT

## 2019-09-11 ENCOUNTER — APPOINTMENT (OUTPATIENT)
Dept: CARDIAC REHAB | Facility: HOSPITAL | Age: 79
End: 2019-09-11

## 2019-09-11 ENCOUNTER — TREATMENT (OUTPATIENT)
Dept: CARDIAC REHAB | Facility: HOSPITAL | Age: 79
End: 2019-09-11

## 2019-09-11 DIAGNOSIS — Z95.2 S/P MVR (MITRAL VALVE REPLACEMENT): Primary | ICD-10-CM

## 2019-09-11 PROCEDURE — 93798 PHYS/QHP OP CAR RHAB W/ECG: CPT

## 2019-09-13 ENCOUNTER — TREATMENT (OUTPATIENT)
Dept: CARDIAC REHAB | Facility: HOSPITAL | Age: 79
End: 2019-09-13

## 2019-09-13 ENCOUNTER — APPOINTMENT (OUTPATIENT)
Dept: CARDIAC REHAB | Facility: HOSPITAL | Age: 79
End: 2019-09-13

## 2019-09-13 DIAGNOSIS — Z95.2 S/P MVR (MITRAL VALVE REPLACEMENT): Primary | ICD-10-CM

## 2019-09-13 PROCEDURE — 93798 PHYS/QHP OP CAR RHAB W/ECG: CPT

## 2019-09-16 ENCOUNTER — TREATMENT (OUTPATIENT)
Dept: CARDIAC REHAB | Facility: HOSPITAL | Age: 79
End: 2019-09-16

## 2019-09-16 ENCOUNTER — APPOINTMENT (OUTPATIENT)
Dept: CARDIAC REHAB | Facility: HOSPITAL | Age: 79
End: 2019-09-16

## 2019-09-16 DIAGNOSIS — Z95.2 S/P MVR (MITRAL VALVE REPLACEMENT): Primary | ICD-10-CM

## 2019-09-16 PROCEDURE — 93798 PHYS/QHP OP CAR RHAB W/ECG: CPT

## 2019-09-18 ENCOUNTER — TREATMENT (OUTPATIENT)
Dept: CARDIAC REHAB | Facility: HOSPITAL | Age: 79
End: 2019-09-18

## 2019-09-18 ENCOUNTER — APPOINTMENT (OUTPATIENT)
Dept: CARDIAC REHAB | Facility: HOSPITAL | Age: 79
End: 2019-09-18

## 2019-09-18 DIAGNOSIS — Z95.2 S/P MVR (MITRAL VALVE REPLACEMENT): Primary | ICD-10-CM

## 2019-09-18 PROCEDURE — 93798 PHYS/QHP OP CAR RHAB W/ECG: CPT

## 2019-09-20 ENCOUNTER — APPOINTMENT (OUTPATIENT)
Dept: CARDIAC REHAB | Facility: HOSPITAL | Age: 79
End: 2019-09-20

## 2019-09-20 ENCOUNTER — TREATMENT (OUTPATIENT)
Dept: CARDIAC REHAB | Facility: HOSPITAL | Age: 79
End: 2019-09-20

## 2019-09-20 DIAGNOSIS — Z95.2 S/P MVR (MITRAL VALVE REPLACEMENT): Primary | ICD-10-CM

## 2019-09-20 PROCEDURE — 93798 PHYS/QHP OP CAR RHAB W/ECG: CPT

## 2019-09-23 ENCOUNTER — TREATMENT (OUTPATIENT)
Dept: CARDIAC REHAB | Facility: HOSPITAL | Age: 79
End: 2019-09-23

## 2019-09-23 ENCOUNTER — APPOINTMENT (OUTPATIENT)
Dept: CARDIAC REHAB | Facility: HOSPITAL | Age: 79
End: 2019-09-23

## 2019-09-23 DIAGNOSIS — Z95.2 S/P MVR (MITRAL VALVE REPLACEMENT): Primary | ICD-10-CM

## 2019-09-23 PROCEDURE — 93798 PHYS/QHP OP CAR RHAB W/ECG: CPT

## 2019-09-23 RX ORDER — FUROSEMIDE 40 MG/1
TABLET ORAL
Qty: 135 TABLET | Refills: 0 | Status: SHIPPED | OUTPATIENT
Start: 2019-09-23 | End: 2019-11-25 | Stop reason: SDUPTHER

## 2019-09-25 ENCOUNTER — APPOINTMENT (OUTPATIENT)
Dept: CARDIAC REHAB | Facility: HOSPITAL | Age: 79
End: 2019-09-25

## 2019-09-25 ENCOUNTER — TREATMENT (OUTPATIENT)
Dept: CARDIAC REHAB | Facility: HOSPITAL | Age: 79
End: 2019-09-25

## 2019-09-25 DIAGNOSIS — Z95.2 S/P MVR (MITRAL VALVE REPLACEMENT): Primary | ICD-10-CM

## 2019-09-25 PROCEDURE — 93798 PHYS/QHP OP CAR RHAB W/ECG: CPT

## 2019-09-27 ENCOUNTER — TREATMENT (OUTPATIENT)
Dept: CARDIAC REHAB | Facility: HOSPITAL | Age: 79
End: 2019-09-27

## 2019-09-27 ENCOUNTER — APPOINTMENT (OUTPATIENT)
Dept: CARDIAC REHAB | Facility: HOSPITAL | Age: 79
End: 2019-09-27

## 2019-09-27 DIAGNOSIS — Z95.2 S/P MVR (MITRAL VALVE REPLACEMENT): Primary | ICD-10-CM

## 2019-09-27 PROCEDURE — 93798 PHYS/QHP OP CAR RHAB W/ECG: CPT

## 2019-09-30 ENCOUNTER — TREATMENT (OUTPATIENT)
Dept: CARDIAC REHAB | Facility: HOSPITAL | Age: 79
End: 2019-09-30

## 2019-09-30 ENCOUNTER — APPOINTMENT (OUTPATIENT)
Dept: CARDIAC REHAB | Facility: HOSPITAL | Age: 79
End: 2019-09-30

## 2019-09-30 DIAGNOSIS — Z95.2 S/P MVR (MITRAL VALVE REPLACEMENT): Primary | ICD-10-CM

## 2019-09-30 PROCEDURE — 93798 PHYS/QHP OP CAR RHAB W/ECG: CPT

## 2019-10-02 ENCOUNTER — TREATMENT (OUTPATIENT)
Dept: CARDIAC REHAB | Facility: HOSPITAL | Age: 79
End: 2019-10-02

## 2019-10-02 ENCOUNTER — APPOINTMENT (OUTPATIENT)
Dept: CARDIAC REHAB | Facility: HOSPITAL | Age: 79
End: 2019-10-02

## 2019-10-02 DIAGNOSIS — Z95.2 S/P MVR (MITRAL VALVE REPLACEMENT): Primary | ICD-10-CM

## 2019-10-02 PROCEDURE — 93798 PHYS/QHP OP CAR RHAB W/ECG: CPT

## 2019-10-04 ENCOUNTER — APPOINTMENT (OUTPATIENT)
Dept: CARDIAC REHAB | Facility: HOSPITAL | Age: 79
End: 2019-10-04

## 2019-10-04 ENCOUNTER — TREATMENT (OUTPATIENT)
Dept: CARDIAC REHAB | Facility: HOSPITAL | Age: 79
End: 2019-10-04

## 2019-10-04 DIAGNOSIS — Z95.2 S/P MVR (MITRAL VALVE REPLACEMENT): Primary | ICD-10-CM

## 2019-10-04 PROCEDURE — 93798 PHYS/QHP OP CAR RHAB W/ECG: CPT

## 2019-10-07 ENCOUNTER — APPOINTMENT (OUTPATIENT)
Dept: CARDIAC REHAB | Facility: HOSPITAL | Age: 79
End: 2019-10-07

## 2019-10-07 RX ORDER — PANTOPRAZOLE SODIUM 40 MG/1
TABLET, DELAYED RELEASE ORAL
Qty: 90 TABLET | Refills: 3 | Status: SHIPPED | OUTPATIENT
Start: 2019-10-07 | End: 2020-10-09

## 2019-10-09 ENCOUNTER — APPOINTMENT (OUTPATIENT)
Dept: CARDIAC REHAB | Facility: HOSPITAL | Age: 79
End: 2019-10-09

## 2019-10-09 ENCOUNTER — TREATMENT (OUTPATIENT)
Dept: CARDIAC REHAB | Facility: HOSPITAL | Age: 79
End: 2019-10-09

## 2019-10-09 DIAGNOSIS — Z95.2 S/P MVR (MITRAL VALVE REPLACEMENT): Primary | ICD-10-CM

## 2019-10-09 PROCEDURE — 93798 PHYS/QHP OP CAR RHAB W/ECG: CPT

## 2019-10-11 ENCOUNTER — APPOINTMENT (OUTPATIENT)
Dept: CARDIAC REHAB | Facility: HOSPITAL | Age: 79
End: 2019-10-11

## 2019-10-11 ENCOUNTER — TREATMENT (OUTPATIENT)
Dept: CARDIAC REHAB | Facility: HOSPITAL | Age: 79
End: 2019-10-11

## 2019-10-11 DIAGNOSIS — Z95.2 S/P MVR (MITRAL VALVE REPLACEMENT): Primary | ICD-10-CM

## 2019-10-11 PROCEDURE — 93798 PHYS/QHP OP CAR RHAB W/ECG: CPT

## 2019-10-14 ENCOUNTER — APPOINTMENT (OUTPATIENT)
Dept: CARDIAC REHAB | Facility: HOSPITAL | Age: 79
End: 2019-10-14

## 2019-10-14 ENCOUNTER — TREATMENT (OUTPATIENT)
Dept: CARDIAC REHAB | Facility: HOSPITAL | Age: 79
End: 2019-10-14

## 2019-10-14 DIAGNOSIS — Z95.2 S/P MVR (MITRAL VALVE REPLACEMENT): Primary | ICD-10-CM

## 2019-10-14 PROCEDURE — 93798 PHYS/QHP OP CAR RHAB W/ECG: CPT

## 2019-10-15 ENCOUNTER — TRANSCRIBE ORDERS (OUTPATIENT)
Dept: ADMINISTRATIVE | Facility: HOSPITAL | Age: 79
End: 2019-10-15

## 2019-10-15 ENCOUNTER — LAB (OUTPATIENT)
Dept: LAB | Facility: HOSPITAL | Age: 79
End: 2019-10-15

## 2019-10-15 DIAGNOSIS — I50.40 CHF (CONGESTIVE HEART FAILURE), NYHA CLASS I, UNSPECIFIED FAILURE CHRONICITY, COMBINED (HCC): ICD-10-CM

## 2019-10-15 DIAGNOSIS — I50.40 CHF (CONGESTIVE HEART FAILURE), NYHA CLASS I, UNSPECIFIED FAILURE CHRONICITY, COMBINED (HCC): Primary | ICD-10-CM

## 2019-10-15 LAB
ANION GAP SERPL CALCULATED.3IONS-SCNC: 15.5 MMOL/L (ref 5–15)
BUN BLD-MCNC: 22 MG/DL (ref 8–23)
BUN/CREAT SERPL: 24.2 (ref 7–25)
CALCIUM SPEC-SCNC: 10.7 MG/DL (ref 8.6–10.5)
CHLORIDE SERPL-SCNC: 101 MMOL/L (ref 98–107)
CO2 SERPL-SCNC: 23.5 MMOL/L (ref 22–29)
CREAT BLD-MCNC: 0.91 MG/DL (ref 0.57–1)
GFR SERPL CREATININE-BSD FRML MDRD: 60 ML/MIN/1.73
GLUCOSE BLD-MCNC: 171 MG/DL (ref 65–99)
NT-PROBNP SERPL-MCNC: 782.3 PG/ML (ref 5–1800)
POTASSIUM BLD-SCNC: 4.1 MMOL/L (ref 3.5–5.2)
SODIUM BLD-SCNC: 140 MMOL/L (ref 136–145)

## 2019-10-15 PROCEDURE — 83880 ASSAY OF NATRIURETIC PEPTIDE: CPT

## 2019-10-15 PROCEDURE — 36415 COLL VENOUS BLD VENIPUNCTURE: CPT

## 2019-10-15 PROCEDURE — 80048 BASIC METABOLIC PNL TOTAL CA: CPT

## 2019-10-16 ENCOUNTER — APPOINTMENT (OUTPATIENT)
Dept: CARDIAC REHAB | Facility: HOSPITAL | Age: 79
End: 2019-10-16

## 2019-10-18 ENCOUNTER — TREATMENT (OUTPATIENT)
Dept: CARDIAC REHAB | Facility: HOSPITAL | Age: 79
End: 2019-10-18

## 2019-10-18 ENCOUNTER — APPOINTMENT (OUTPATIENT)
Dept: CARDIAC REHAB | Facility: HOSPITAL | Age: 79
End: 2019-10-18

## 2019-10-18 DIAGNOSIS — Z95.2 S/P MVR (MITRAL VALVE REPLACEMENT): Primary | ICD-10-CM

## 2019-10-18 PROCEDURE — 93798 PHYS/QHP OP CAR RHAB W/ECG: CPT

## 2019-10-21 ENCOUNTER — TREATMENT (OUTPATIENT)
Dept: CARDIAC REHAB | Facility: HOSPITAL | Age: 79
End: 2019-10-21

## 2019-10-21 ENCOUNTER — APPOINTMENT (OUTPATIENT)
Dept: CARDIAC REHAB | Facility: HOSPITAL | Age: 79
End: 2019-10-21

## 2019-10-21 DIAGNOSIS — Z95.2 S/P MVR (MITRAL VALVE REPLACEMENT): Primary | ICD-10-CM

## 2019-10-21 PROCEDURE — 93798 PHYS/QHP OP CAR RHAB W/ECG: CPT

## 2019-10-22 ENCOUNTER — HOSPITAL ENCOUNTER (OUTPATIENT)
Dept: GENERAL RADIOLOGY | Facility: HOSPITAL | Age: 79
Discharge: HOME | End: 2019-10-22
Admitting: UROLOGY

## 2019-10-22 ENCOUNTER — TRANSCRIBE ORDERS (OUTPATIENT)
Dept: GENERAL RADIOLOGY | Facility: HOSPITAL | Age: 79
End: 2019-10-22

## 2019-10-22 DIAGNOSIS — N20.0 URIC ACID NEPHROLITHIASIS: ICD-10-CM

## 2019-10-22 DIAGNOSIS — N20.0 URIC ACID NEPHROLITHIASIS: Primary | ICD-10-CM

## 2019-10-22 PROCEDURE — 74018 RADEX ABDOMEN 1 VIEW: CPT

## 2019-10-23 ENCOUNTER — APPOINTMENT (OUTPATIENT)
Dept: CARDIAC REHAB | Facility: HOSPITAL | Age: 79
End: 2019-10-23

## 2019-10-23 ENCOUNTER — TREATMENT (OUTPATIENT)
Dept: CARDIAC REHAB | Facility: HOSPITAL | Age: 79
End: 2019-10-23

## 2019-10-23 DIAGNOSIS — Z95.2 S/P MVR (MITRAL VALVE REPLACEMENT): Primary | ICD-10-CM

## 2019-10-23 PROCEDURE — 93798 PHYS/QHP OP CAR RHAB W/ECG: CPT

## 2019-10-25 ENCOUNTER — APPOINTMENT (OUTPATIENT)
Dept: CARDIAC REHAB | Facility: HOSPITAL | Age: 79
End: 2019-10-25

## 2019-10-25 ENCOUNTER — APPOINTMENT (OUTPATIENT)
Dept: GENERAL RADIOLOGY | Facility: HOSPITAL | Age: 79
End: 2019-10-25

## 2019-10-25 ENCOUNTER — HOSPITAL ENCOUNTER (EMERGENCY)
Facility: HOSPITAL | Age: 79
Discharge: HOME | End: 2019-10-25
Attending: EMERGENCY MEDICINE | Admitting: EMERGENCY MEDICINE

## 2019-10-25 VITALS
HEART RATE: 69 BPM | SYSTOLIC BLOOD PRESSURE: 132 MMHG | HEIGHT: 62 IN | BODY MASS INDEX: 36.9 KG/M2 | RESPIRATION RATE: 17 BRPM | OXYGEN SATURATION: 98 % | TEMPERATURE: 97.1 F | DIASTOLIC BLOOD PRESSURE: 70 MMHG | WEIGHT: 200.5 LBS

## 2019-10-25 DIAGNOSIS — R55 NEAR SYNCOPE: Primary | ICD-10-CM

## 2019-10-25 LAB
ALBUMIN SERPL-MCNC: 4.3 G/DL (ref 3.5–5.2)
ALBUMIN/GLOB SERPL: 1.5 G/DL
ALP SERPL-CCNC: 67 U/L (ref 39–117)
ALT SERPL W P-5'-P-CCNC: 21 U/L (ref 1–33)
ANION GAP SERPL CALCULATED.3IONS-SCNC: 15.7 MMOL/L (ref 5–15)
AST SERPL-CCNC: 23 U/L (ref 1–32)
BASOPHILS # BLD AUTO: 0.04 10*3/MM3 (ref 0–0.2)
BASOPHILS NFR BLD AUTO: 0.5 % (ref 0–1.5)
BILIRUB SERPL-MCNC: 0.5 MG/DL (ref 0.2–1.2)
BUN BLD-MCNC: 16 MG/DL (ref 8–23)
BUN/CREAT SERPL: 20.8 (ref 7–25)
CALCIUM SPEC-SCNC: 10.2 MG/DL (ref 8.6–10.5)
CHLORIDE SERPL-SCNC: 102 MMOL/L (ref 98–107)
CO2 SERPL-SCNC: 23.3 MMOL/L (ref 22–29)
CREAT BLD-MCNC: 0.77 MG/DL (ref 0.57–1)
DEPRECATED RDW RBC AUTO: 50.9 FL (ref 37–54)
EOSINOPHIL # BLD AUTO: 0.11 10*3/MM3 (ref 0–0.4)
EOSINOPHIL NFR BLD AUTO: 1.3 % (ref 0.3–6.2)
ERYTHROCYTE [DISTWIDTH] IN BLOOD BY AUTOMATED COUNT: 18 % (ref 12.3–15.4)
GFR SERPL CREATININE-BSD FRML MDRD: 73 ML/MIN/1.73
GLOBULIN UR ELPH-MCNC: 2.9 GM/DL
GLUCOSE BLD-MCNC: 99 MG/DL (ref 65–99)
GLUCOSE BLDC GLUCOMTR-MCNC: 100 MG/DL (ref 70–130)
HCT VFR BLD AUTO: 39.8 % (ref 34–46.6)
HGB BLD-MCNC: 12.7 G/DL (ref 12–15.9)
HOLD SPECIMEN: NORMAL
HOLD SPECIMEN: NORMAL
IMM GRANULOCYTES # BLD AUTO: 0.03 10*3/MM3 (ref 0–0.05)
IMM GRANULOCYTES NFR BLD AUTO: 0.3 % (ref 0–0.5)
INR PPP: 2.23 (ref 0.9–1.1)
LYMPHOCYTES # BLD AUTO: 1.08 10*3/MM3 (ref 0.7–3.1)
LYMPHOCYTES NFR BLD AUTO: 12.3 % (ref 19.6–45.3)
MAGNESIUM SERPL-MCNC: 1.6 MG/DL (ref 1.6–2.4)
MCH RBC QN AUTO: 25.3 PG (ref 26.6–33)
MCHC RBC AUTO-ENTMCNC: 31.9 G/DL (ref 31.5–35.7)
MCV RBC AUTO: 79.3 FL (ref 79–97)
MONOCYTES # BLD AUTO: 0.59 10*3/MM3 (ref 0.1–0.9)
MONOCYTES NFR BLD AUTO: 6.7 % (ref 5–12)
NEUTROPHILS # BLD AUTO: 6.95 10*3/MM3 (ref 1.7–7)
NEUTROPHILS NFR BLD AUTO: 78.9 % (ref 42.7–76)
NRBC BLD AUTO-RTO: 0 /100 WBC (ref 0–0.2)
PLATELET # BLD AUTO: 203 10*3/MM3 (ref 140–450)
PMV BLD AUTO: 10.7 FL (ref 6–12)
POTASSIUM BLD-SCNC: 3.7 MMOL/L (ref 3.5–5.2)
PROT SERPL-MCNC: 7.2 G/DL (ref 6–8.5)
PROTHROMBIN TIME: 24.4 SECONDS (ref 11.7–14.2)
RBC # BLD AUTO: 5.02 10*6/MM3 (ref 3.77–5.28)
SODIUM BLD-SCNC: 141 MMOL/L (ref 136–145)
TROPONIN T SERPL-MCNC: <0.01 NG/ML (ref 0–0.03)
WBC NRBC COR # BLD: 8.8 10*3/MM3 (ref 3.4–10.8)
WHOLE BLOOD HOLD SPECIMEN: NORMAL
WHOLE BLOOD HOLD SPECIMEN: NORMAL

## 2019-10-25 PROCEDURE — 83735 ASSAY OF MAGNESIUM: CPT | Performed by: PHYSICIAN ASSISTANT

## 2019-10-25 PROCEDURE — 82962 GLUCOSE BLOOD TEST: CPT

## 2019-10-25 PROCEDURE — 93005 ELECTROCARDIOGRAM TRACING: CPT | Performed by: EMERGENCY MEDICINE

## 2019-10-25 PROCEDURE — 84484 ASSAY OF TROPONIN QUANT: CPT | Performed by: PHYSICIAN ASSISTANT

## 2019-10-25 PROCEDURE — 85025 COMPLETE CBC W/AUTO DIFF WBC: CPT | Performed by: PHYSICIAN ASSISTANT

## 2019-10-25 PROCEDURE — 80053 COMPREHEN METABOLIC PANEL: CPT | Performed by: PHYSICIAN ASSISTANT

## 2019-10-25 PROCEDURE — 85610 PROTHROMBIN TIME: CPT | Performed by: PHYSICIAN ASSISTANT

## 2019-10-25 PROCEDURE — 71045 X-RAY EXAM CHEST 1 VIEW: CPT

## 2019-10-25 PROCEDURE — 99284 EMERGENCY DEPT VISIT MOD MDM: CPT

## 2019-10-25 PROCEDURE — 93005 ELECTROCARDIOGRAM TRACING: CPT

## 2019-10-25 PROCEDURE — 93010 ELECTROCARDIOGRAM REPORT: CPT | Performed by: INTERNAL MEDICINE

## 2019-10-25 RX ORDER — SODIUM CHLORIDE 0.9 % (FLUSH) 0.9 %
10 SYRINGE (ML) INJECTION AS NEEDED
Status: DISCONTINUED | OUTPATIENT
Start: 2019-10-25 | End: 2019-10-25 | Stop reason: HOSPADM

## 2019-10-28 ENCOUNTER — APPOINTMENT (OUTPATIENT)
Dept: CARDIAC REHAB | Facility: HOSPITAL | Age: 79
End: 2019-10-28

## 2019-10-28 ENCOUNTER — TREATMENT (OUTPATIENT)
Dept: CARDIAC REHAB | Facility: HOSPITAL | Age: 79
End: 2019-10-28

## 2019-10-28 DIAGNOSIS — Z95.2 S/P MVR (MITRAL VALVE REPLACEMENT): Primary | ICD-10-CM

## 2019-10-28 PROCEDURE — 93798 PHYS/QHP OP CAR RHAB W/ECG: CPT

## 2019-10-29 ENCOUNTER — RESULTS ENCOUNTER (OUTPATIENT)
Dept: FAMILY MEDICINE CLINIC | Facility: CLINIC | Age: 79
End: 2019-10-29

## 2019-10-29 DIAGNOSIS — Z51.81 MEDICATION MONITORING ENCOUNTER: ICD-10-CM

## 2019-10-29 DIAGNOSIS — I48.20 CHRONIC ATRIAL FIBRILLATION (HCC): ICD-10-CM

## 2019-10-29 DIAGNOSIS — E11.9 TYPE 2 DIABETES MELLITUS WITHOUT COMPLICATION, WITHOUT LONG-TERM CURRENT USE OF INSULIN (HCC): ICD-10-CM

## 2019-10-30 ENCOUNTER — TREATMENT (OUTPATIENT)
Dept: CARDIAC REHAB | Facility: HOSPITAL | Age: 79
End: 2019-10-30

## 2019-10-30 ENCOUNTER — APPOINTMENT (OUTPATIENT)
Dept: CARDIAC REHAB | Facility: HOSPITAL | Age: 79
End: 2019-10-30

## 2019-10-30 DIAGNOSIS — Z95.2 S/P MVR (MITRAL VALVE REPLACEMENT): Primary | ICD-10-CM

## 2019-10-30 PROCEDURE — 93798 PHYS/QHP OP CAR RHAB W/ECG: CPT

## 2019-11-01 ENCOUNTER — APPOINTMENT (OUTPATIENT)
Dept: CARDIAC REHAB | Facility: HOSPITAL | Age: 79
End: 2019-11-01

## 2019-11-04 ENCOUNTER — APPOINTMENT (OUTPATIENT)
Dept: CARDIAC REHAB | Facility: HOSPITAL | Age: 79
End: 2019-11-04

## 2019-11-06 ENCOUNTER — APPOINTMENT (OUTPATIENT)
Dept: CARDIAC REHAB | Facility: HOSPITAL | Age: 79
End: 2019-11-06

## 2019-11-06 ENCOUNTER — TREATMENT (OUTPATIENT)
Dept: CARDIAC REHAB | Facility: HOSPITAL | Age: 79
End: 2019-11-06

## 2019-11-06 DIAGNOSIS — Z95.2 S/P MVR (MITRAL VALVE REPLACEMENT): Primary | ICD-10-CM

## 2019-11-06 PROCEDURE — 93798 PHYS/QHP OP CAR RHAB W/ECG: CPT

## 2019-11-08 ENCOUNTER — APPOINTMENT (OUTPATIENT)
Dept: CARDIAC REHAB | Facility: HOSPITAL | Age: 79
End: 2019-11-08

## 2019-11-08 ENCOUNTER — TREATMENT (OUTPATIENT)
Dept: CARDIAC REHAB | Facility: HOSPITAL | Age: 79
End: 2019-11-08

## 2019-11-08 DIAGNOSIS — Z95.2 S/P MVR (MITRAL VALVE REPLACEMENT): Primary | ICD-10-CM

## 2019-11-08 PROCEDURE — 93798 PHYS/QHP OP CAR RHAB W/ECG: CPT

## 2019-11-11 ENCOUNTER — APPOINTMENT (OUTPATIENT)
Dept: CARDIAC REHAB | Facility: HOSPITAL | Age: 79
End: 2019-11-11

## 2019-11-13 ENCOUNTER — TREATMENT (OUTPATIENT)
Dept: CARDIAC REHAB | Facility: HOSPITAL | Age: 79
End: 2019-11-13

## 2019-11-13 ENCOUNTER — APPOINTMENT (OUTPATIENT)
Dept: CARDIAC REHAB | Facility: HOSPITAL | Age: 79
End: 2019-11-13

## 2019-11-13 DIAGNOSIS — Z95.2 S/P MVR (MITRAL VALVE REPLACEMENT): Primary | ICD-10-CM

## 2019-11-13 PROCEDURE — 93798 PHYS/QHP OP CAR RHAB W/ECG: CPT

## 2019-11-15 ENCOUNTER — TREATMENT (OUTPATIENT)
Dept: CARDIAC REHAB | Facility: HOSPITAL | Age: 79
End: 2019-11-15

## 2019-11-15 ENCOUNTER — APPOINTMENT (OUTPATIENT)
Dept: CARDIAC REHAB | Facility: HOSPITAL | Age: 79
End: 2019-11-15

## 2019-11-15 ENCOUNTER — TELEPHONE (OUTPATIENT)
Dept: FAMILY MEDICINE CLINIC | Facility: CLINIC | Age: 79
End: 2019-11-15

## 2019-11-15 DIAGNOSIS — Z95.2 S/P MVR (MITRAL VALVE REPLACEMENT): Primary | ICD-10-CM

## 2019-11-15 PROCEDURE — 93798 PHYS/QHP OP CAR RHAB W/ECG: CPT

## 2019-11-15 NOTE — TELEPHONE ENCOUNTER
Please be advised Patient was at [facility]. On 10/19 review her labs. Do you want her to still do labs next week. Just let me know. Thanks.

## 2019-11-18 ENCOUNTER — APPOINTMENT (OUTPATIENT)
Dept: CARDIAC REHAB | Facility: HOSPITAL | Age: 79
End: 2019-11-18

## 2019-11-18 ENCOUNTER — TREATMENT (OUTPATIENT)
Dept: CARDIAC REHAB | Facility: HOSPITAL | Age: 79
End: 2019-11-18

## 2019-11-18 DIAGNOSIS — Z95.2 S/P MVR (MITRAL VALVE REPLACEMENT): Primary | ICD-10-CM

## 2019-11-18 PROCEDURE — 93798 PHYS/QHP OP CAR RHAB W/ECG: CPT

## 2019-11-20 ENCOUNTER — APPOINTMENT (OUTPATIENT)
Dept: CARDIAC REHAB | Facility: HOSPITAL | Age: 79
End: 2019-11-20

## 2019-11-20 ENCOUNTER — TREATMENT (OUTPATIENT)
Dept: CARDIAC REHAB | Facility: HOSPITAL | Age: 79
End: 2019-11-20

## 2019-11-20 DIAGNOSIS — Z95.2 S/P MVR (MITRAL VALVE REPLACEMENT): Primary | ICD-10-CM

## 2019-11-20 PROCEDURE — 93798 PHYS/QHP OP CAR RHAB W/ECG: CPT

## 2019-11-22 ENCOUNTER — APPOINTMENT (OUTPATIENT)
Dept: CARDIAC REHAB | Facility: HOSPITAL | Age: 79
End: 2019-11-22

## 2019-11-25 ENCOUNTER — OFFICE VISIT (OUTPATIENT)
Dept: FAMILY MEDICINE CLINIC | Facility: CLINIC | Age: 79
End: 2019-11-25

## 2019-11-25 ENCOUNTER — TREATMENT (OUTPATIENT)
Dept: CARDIAC REHAB | Facility: HOSPITAL | Age: 79
End: 2019-11-25

## 2019-11-25 ENCOUNTER — APPOINTMENT (OUTPATIENT)
Dept: CARDIAC REHAB | Facility: HOSPITAL | Age: 79
End: 2019-11-25

## 2019-11-25 VITALS
OXYGEN SATURATION: 98 % | BODY MASS INDEX: 36.99 KG/M2 | HEART RATE: 69 BPM | WEIGHT: 201 LBS | HEIGHT: 62 IN | DIASTOLIC BLOOD PRESSURE: 84 MMHG | SYSTOLIC BLOOD PRESSURE: 130 MMHG

## 2019-11-25 DIAGNOSIS — E11.9 TYPE 2 DIABETES MELLITUS WITHOUT COMPLICATION, WITH LONG-TERM CURRENT USE OF INSULIN (HCC): Primary | ICD-10-CM

## 2019-11-25 DIAGNOSIS — I25.10 CORONARY ARTERY DISEASE INVOLVING NATIVE HEART WITHOUT ANGINA PECTORIS, UNSPECIFIED VESSEL OR LESION TYPE: ICD-10-CM

## 2019-11-25 DIAGNOSIS — Z79.4 TYPE 2 DIABETES MELLITUS WITHOUT COMPLICATION, WITH LONG-TERM CURRENT USE OF INSULIN (HCC): Primary | ICD-10-CM

## 2019-11-25 DIAGNOSIS — Z51.81 MEDICATION MONITORING ENCOUNTER: ICD-10-CM

## 2019-11-25 DIAGNOSIS — I25.10 CORONARY ARTERY DISEASE INVOLVING NATIVE CORONARY ARTERY OF NATIVE HEART WITHOUT ANGINA PECTORIS: ICD-10-CM

## 2019-11-25 DIAGNOSIS — E03.9 ACQUIRED HYPOTHYROIDISM: ICD-10-CM

## 2019-11-25 DIAGNOSIS — Z95.2 S/P MVR (MITRAL VALVE REPLACEMENT): Primary | ICD-10-CM

## 2019-11-25 DIAGNOSIS — I48.19 PERSISTENT ATRIAL FIBRILLATION (HCC): ICD-10-CM

## 2019-11-25 PROCEDURE — 99214 OFFICE O/P EST MOD 30 MIN: CPT | Performed by: FAMILY MEDICINE

## 2019-11-25 RX ORDER — FUROSEMIDE 40 MG/1
40 TABLET ORAL DAILY
Qty: 90 TABLET | Refills: 3 | Status: SHIPPED | OUTPATIENT
Start: 2019-11-25 | End: 2021-01-11

## 2019-11-25 RX ORDER — ATORVASTATIN CALCIUM 20 MG/1
10 TABLET, FILM COATED ORAL 2 TIMES WEEKLY
Qty: 12 TABLET | Refills: 3 | Status: SHIPPED | OUTPATIENT
Start: 2019-11-25 | End: 2020-11-30

## 2019-11-25 RX ORDER — LEVOTHYROXINE AND LIOTHYRONINE 57; 13.5 UG/1; UG/1
135 TABLET ORAL DAILY
Qty: 135 TABLET | Refills: 3 | Status: SHIPPED | OUTPATIENT
Start: 2019-11-25 | End: 2020-01-28

## 2019-11-25 RX ORDER — SACUBITRIL AND VALSARTAN 24; 26 MG/1; MG/1
2 TABLET, FILM COATED ORAL 2 TIMES DAILY
COMMUNITY
Start: 2019-10-14 | End: 2021-04-27 | Stop reason: SDUPTHER

## 2019-11-25 RX ORDER — DIGOXIN 250 MCG
250 TABLET ORAL
Qty: 90 TABLET | Refills: 3 | Status: SHIPPED | OUTPATIENT
Start: 2019-11-25 | End: 2020-04-06

## 2019-11-25 RX ORDER — POTASSIUM CHLORIDE 1500 MG/1
20 TABLET, FILM COATED, EXTENDED RELEASE ORAL DAILY
Qty: 90 TABLET | Refills: 3 | Status: SHIPPED | OUTPATIENT
Start: 2019-11-25 | End: 2020-11-30

## 2019-11-25 RX ORDER — METOPROLOL SUCCINATE 200 MG/1
200 TABLET, EXTENDED RELEASE ORAL EVERY MORNING
COMMUNITY
Start: 2019-09-22

## 2019-11-25 NOTE — PROGRESS NOTES
"  Chief Complaint   Patient presents with   • Diabetes       Subjective   Fernanda Molina is an 78 y.o. female who presents for follow up of diabetes. Current symptoms include: none. Patient denies foot ulcerations, hypoglycemia , paresthesia of the feet and visual disturbances. Evaluation to date has included: fasting blood sugar, fasting lipid panel, hemoglobin A1C and microalbuminuria. Home sugars: BGs consistently in an acceptable range. Current treatments: Continued insulin which has been effective. Discussed importance of yearly eye exams and checking feet for skin integrity.    Reviewed her ED note on 10/25  And f/u cardio note.        The following portions of the patient's history were reviewed and updated as appropriate: allergies, current medications, past family history, past medical history, past social history, past surgical history and problem list.        Review of Systems  Pertinent items are noted in HPI.     Vitals:    11/25/19 0926   BP: 130/84   BP Location: Left arm   Patient Position: Sitting   Cuff Size: Adult   Pulse: 69   SpO2: 98%   Weight: 91.2 kg (201 lb)   Height: 157.5 cm (62\")       Objective    Gen:  Alert, pleasant  Ears: canals clear, TMs normal  Throat: clear , no thrush, teeth ok  Neck: no bruit, no LAD  Lungs: clear  Heart: irregulat no murmur  Feet:  No rash, no skin breakdown, sensation grossly normal.  Varicose veins.    Laboratory:  Results for orders placed or performed during the hospital encounter of 10/25/19   Comprehensive Metabolic Panel   Result Value Ref Range    Glucose 99 65 - 99 mg/dL    BUN 16 8 - 23 mg/dL    Creatinine 0.77 0.57 - 1.00 mg/dL    Sodium 141 136 - 145 mmol/L    Potassium 3.7 3.5 - 5.2 mmol/L    Chloride 102 98 - 107 mmol/L    CO2 23.3 22.0 - 29.0 mmol/L    Calcium 10.2 8.6 - 10.5 mg/dL    Total Protein 7.2 6.0 - 8.5 g/dL    Albumin 4.30 3.50 - 5.20 g/dL    ALT (SGPT) 21 1 - 33 U/L    AST (SGOT) 23 1 - 32 U/L    Alkaline Phosphatase 67 39 - 117 U/L "    Total Bilirubin 0.5 0.2 - 1.2 mg/dL    eGFR Non African Amer 73 >60 mL/min/1.73    Globulin 2.9 gm/dL    A/G Ratio 1.5 g/dL    BUN/Creatinine Ratio 20.8 7.0 - 25.0    Anion Gap 15.7 (H) 5.0 - 15.0 mmol/L   Troponin   Result Value Ref Range    Troponin T <0.010 0.000 - 0.030 ng/mL   Protime-INR   Result Value Ref Range    Protime 24.4 (H) 11.7 - 14.2 Seconds    INR 2.23 (H) 0.90 - 1.10   CBC Auto Differential   Result Value Ref Range    WBC 8.80 3.40 - 10.80 10*3/mm3    RBC 5.02 3.77 - 5.28 10*6/mm3    Hemoglobin 12.7 12.0 - 15.9 g/dL    Hematocrit 39.8 34.0 - 46.6 %    MCV 79.3 79.0 - 97.0 fL    MCH 25.3 (L) 26.6 - 33.0 pg    MCHC 31.9 31.5 - 35.7 g/dL    RDW 18.0 (H) 12.3 - 15.4 %    RDW-SD 50.9 37.0 - 54.0 fl    MPV 10.7 6.0 - 12.0 fL    Platelets 203 140 - 450 10*3/mm3    Neutrophil % 78.9 (H) 42.7 - 76.0 %    Lymphocyte % 12.3 (L) 19.6 - 45.3 %    Monocyte % 6.7 5.0 - 12.0 %    Eosinophil % 1.3 0.3 - 6.2 %    Basophil % 0.5 0.0 - 1.5 %    Immature Grans % 0.3 0.0 - 0.5 %    Neutrophils, Absolute 6.95 1.70 - 7.00 10*3/mm3    Lymphocytes, Absolute 1.08 0.70 - 3.10 10*3/mm3    Monocytes, Absolute 0.59 0.10 - 0.90 10*3/mm3    Eosinophils, Absolute 0.11 0.00 - 0.40 10*3/mm3    Basophils, Absolute 0.04 0.00 - 0.20 10*3/mm3    Immature Grans, Absolute 0.03 0.00 - 0.05 10*3/mm3    nRBC 0.0 0.0 - 0.2 /100 WBC   Magnesium   Result Value Ref Range    Magnesium 1.6 1.6 - 2.4 mg/dL   POC Glucose Once   Result Value Ref Range    Glucose 100 70 - 130 mg/dL   Light Blue Top   Result Value Ref Range    Extra Tube hold for add-on    Green Top (Gel)   Result Value Ref Range    Extra Tube Hold for add-ons.    Lavender Top   Result Value Ref Range    Extra Tube hold for add-on    Gold Top - SST   Result Value Ref Range    Extra Tube Hold for add-ons.         Assessment/Plan        Discussed general issues about diabetes pathophysiology and management.  Continued insulin: same.  Continued metformin; see  medication  orders.  Continued statin drug see medication orders.  Continued ACE inhibitor; see medication orders.  Follow up in 3 months or as needed.    Fernanda was seen today for diabetes.    Diagnoses and all orders for this visit:    Type 2 diabetes mellitus without complication, with long-term current use of insulin (CMS/[facility])  -     glucose blood test strip; 1 each by Other route 3 (Three) Times a Day. Use as instructed  -     MicroAlbumin, Urine, Random - Urine, Clean Catch; Future  -     Lipid Panel; Future  -     Hemoglobin A1c; Future  -     Basic Metabolic Panel; Future    Coronary artery disease involving native heart without angina pectoris, unspecified vessel or lesion type  -     atorvastatin (LIPITOR) 20 MG tablet; Take 0.5 tablets by mouth 2 (Two) Times a Week.    Persistent atrial fibrillation  -     digoxin (LANOXIN) 250 MCG tablet; Take 1 tablet by mouth Daily.  -     Digoxin level; Future    Acquired hypothyroidism  -     Thyroid 90 MG PO tablet; Take 1.5 tablets by mouth Daily. Taking 1 and 1/2 daily    Coronary artery disease involving native coronary artery of native heart without angina pectoris  -     furosemide (LASIX) 40 MG tablet; Take 1 tablet by mouth Daily.  -     potassium chloride ER (K-TAB) 20 MEQ tablet controlled-release ER tablet; Take 1 tablet by mouth Daily.    Medication monitoring encounter  -     ALT; Future  -     CK; Future    needs refills on most.  Entresto added to med list.      Discussed healthy diabetic eating plan.  May refer to ADA web site, diabetes.org    Return in about 3 months (around 2/25/2020) for blood pressure, diabetes.  There are no Patient Instructions on file for this visit.  Medications Discontinued During This Encounter   Medication Reason   • Thyroid 90 MG PO tablet Duplicate order   • furosemide (LASIX) 40 MG tablet Reorder   • Thyroid 90 MG PO tablet Reorder   • atorvastatin (LIPITOR) 20 MG tablet Reorder   • potassium chloride ER (K-TAB) 20 MEQ tablet  controlled-release ER tablet Reorder   • digoxin (LANOXIN) 250 MCG tablet Reorder   • metoprolol tartrate (LOPRESSOR) 50 MG tablet Dose adjustment   • potassium chloride (K-DUR,KLOR-CON) 20 MEQ CR tablet Duplicate order   • spironolactone (ALDACTONE) 25 MG tablet *Therapy completed         Dr. Rashid Ge MD  [place], [place]  [facility].

## 2019-11-27 ENCOUNTER — APPOINTMENT (OUTPATIENT)
Dept: CARDIAC REHAB | Facility: HOSPITAL | Age: 79
End: 2019-11-27

## 2019-11-29 ENCOUNTER — APPOINTMENT (OUTPATIENT)
Dept: CARDIAC REHAB | Facility: HOSPITAL | Age: 79
End: 2019-11-29

## 2019-12-02 ENCOUNTER — APPOINTMENT (OUTPATIENT)
Dept: CARDIAC REHAB | Facility: HOSPITAL | Age: 79
End: 2019-12-02

## 2019-12-02 ENCOUNTER — TREATMENT (OUTPATIENT)
Dept: CARDIAC REHAB | Facility: HOSPITAL | Age: 79
End: 2019-12-02

## 2019-12-02 DIAGNOSIS — Z95.2 S/P MVR (MITRAL VALVE REPLACEMENT): Primary | ICD-10-CM

## 2019-12-04 ENCOUNTER — TREATMENT (OUTPATIENT)
Dept: CARDIAC REHAB | Facility: HOSPITAL | Age: 79
End: 2019-12-04

## 2019-12-04 ENCOUNTER — APPOINTMENT (OUTPATIENT)
Dept: CARDIAC REHAB | Facility: HOSPITAL | Age: 79
End: 2019-12-04

## 2019-12-04 DIAGNOSIS — Z95.2 S/P MVR (MITRAL VALVE REPLACEMENT): Primary | ICD-10-CM

## 2019-12-06 ENCOUNTER — APPOINTMENT (OUTPATIENT)
Dept: CARDIAC REHAB | Facility: HOSPITAL | Age: 79
End: 2019-12-06

## 2019-12-09 ENCOUNTER — APPOINTMENT (OUTPATIENT)
Dept: CARDIAC REHAB | Facility: HOSPITAL | Age: 79
End: 2019-12-09

## 2019-12-11 ENCOUNTER — APPOINTMENT (OUTPATIENT)
Dept: CARDIAC REHAB | Facility: HOSPITAL | Age: 79
End: 2019-12-11

## 2019-12-13 ENCOUNTER — TREATMENT (OUTPATIENT)
Dept: CARDIAC REHAB | Facility: HOSPITAL | Age: 79
End: 2019-12-13

## 2019-12-13 ENCOUNTER — APPOINTMENT (OUTPATIENT)
Dept: CARDIAC REHAB | Facility: HOSPITAL | Age: 79
End: 2019-12-13

## 2019-12-13 DIAGNOSIS — Z95.2 S/P MVR (MITRAL VALVE REPLACEMENT): Primary | ICD-10-CM

## 2019-12-16 ENCOUNTER — APPOINTMENT (OUTPATIENT)
Dept: CARDIAC REHAB | Facility: HOSPITAL | Age: 79
End: 2019-12-16

## 2019-12-16 ENCOUNTER — TREATMENT (OUTPATIENT)
Dept: CARDIAC REHAB | Facility: HOSPITAL | Age: 79
End: 2019-12-16

## 2019-12-16 DIAGNOSIS — Z95.2 S/P MVR (MITRAL VALVE REPLACEMENT): Primary | ICD-10-CM

## 2019-12-18 ENCOUNTER — APPOINTMENT (OUTPATIENT)
Dept: CARDIAC REHAB | Facility: HOSPITAL | Age: 79
End: 2019-12-18

## 2019-12-18 ENCOUNTER — TREATMENT (OUTPATIENT)
Dept: CARDIAC REHAB | Facility: HOSPITAL | Age: 79
End: 2019-12-18

## 2019-12-18 DIAGNOSIS — Z95.2 S/P MVR (MITRAL VALVE REPLACEMENT): Primary | ICD-10-CM

## 2019-12-20 ENCOUNTER — TREATMENT (OUTPATIENT)
Dept: CARDIAC REHAB | Facility: HOSPITAL | Age: 79
End: 2019-12-20

## 2019-12-20 ENCOUNTER — APPOINTMENT (OUTPATIENT)
Dept: CARDIAC REHAB | Facility: HOSPITAL | Age: 79
End: 2019-12-20

## 2019-12-20 DIAGNOSIS — Z95.2 S/P MVR (MITRAL VALVE REPLACEMENT): Primary | ICD-10-CM

## 2019-12-23 ENCOUNTER — TREATMENT (OUTPATIENT)
Dept: CARDIAC REHAB | Facility: HOSPITAL | Age: 79
End: 2019-12-23

## 2019-12-23 ENCOUNTER — APPOINTMENT (OUTPATIENT)
Dept: CARDIAC REHAB | Facility: HOSPITAL | Age: 79
End: 2019-12-23

## 2019-12-23 DIAGNOSIS — Z95.2 S/P MVR (MITRAL VALVE REPLACEMENT): Primary | ICD-10-CM

## 2019-12-27 ENCOUNTER — APPOINTMENT (OUTPATIENT)
Dept: CARDIAC REHAB | Facility: HOSPITAL | Age: 79
End: 2019-12-27

## 2019-12-27 ENCOUNTER — TREATMENT (OUTPATIENT)
Dept: CARDIAC REHAB | Facility: HOSPITAL | Age: 79
End: 2019-12-27

## 2019-12-27 DIAGNOSIS — Z95.2 S/P MVR (MITRAL VALVE REPLACEMENT): Primary | ICD-10-CM

## 2019-12-30 ENCOUNTER — APPOINTMENT (OUTPATIENT)
Dept: CARDIAC REHAB | Facility: HOSPITAL | Age: 79
End: 2019-12-30

## 2019-12-30 ENCOUNTER — TREATMENT (OUTPATIENT)
Dept: CARDIAC REHAB | Facility: HOSPITAL | Age: 79
End: 2019-12-30

## 2019-12-30 DIAGNOSIS — Z95.2 S/P MVR (MITRAL VALVE REPLACEMENT): Primary | ICD-10-CM

## 2020-01-02 ENCOUNTER — TELEPHONE (OUTPATIENT)
Dept: FAMILY MEDICINE CLINIC | Facility: CLINIC | Age: 80
End: 2020-01-02

## 2020-01-02 NOTE — TELEPHONE ENCOUNTER
Pt is requesting a refill. She stated that what she has at home is . She stated that she did not want a 90 day supply since she did not use it all from the last refill.

## 2020-01-02 NOTE — TELEPHONE ENCOUNTER
Returned call to patient regarding a refill for her nebulizer solution. Pt stated she is having difficulty breathing and needs a refill. She stated she did not want a qty of 90 due to it not being used last time.

## 2020-01-03 ENCOUNTER — APPOINTMENT (OUTPATIENT)
Dept: CARDIAC REHAB | Facility: HOSPITAL | Age: 80
End: 2020-01-03

## 2020-01-03 ENCOUNTER — TREATMENT (OUTPATIENT)
Dept: CARDIAC REHAB | Facility: HOSPITAL | Age: 80
End: 2020-01-03

## 2020-01-03 DIAGNOSIS — Z95.2 S/P MVR (MITRAL VALVE REPLACEMENT): Primary | ICD-10-CM

## 2020-01-03 RX ORDER — ALBUTEROL SULFATE 2.5 MG/3ML
2.5 SOLUTION RESPIRATORY (INHALATION) EVERY 4 HOURS PRN
Qty: 30 VIAL | Refills: 5 | Status: SHIPPED | OUTPATIENT
Start: 2020-01-03 | End: 2022-06-21 | Stop reason: SDUPTHER

## 2020-01-06 ENCOUNTER — TREATMENT (OUTPATIENT)
Dept: CARDIAC REHAB | Facility: HOSPITAL | Age: 80
End: 2020-01-06

## 2020-01-06 ENCOUNTER — APPOINTMENT (OUTPATIENT)
Dept: CARDIAC REHAB | Facility: HOSPITAL | Age: 80
End: 2020-01-06

## 2020-01-06 DIAGNOSIS — Z95.2 S/P MVR (MITRAL VALVE REPLACEMENT): Primary | ICD-10-CM

## 2020-01-08 ENCOUNTER — TREATMENT (OUTPATIENT)
Dept: CARDIAC REHAB | Facility: HOSPITAL | Age: 80
End: 2020-01-08

## 2020-01-08 ENCOUNTER — APPOINTMENT (OUTPATIENT)
Dept: CARDIAC REHAB | Facility: HOSPITAL | Age: 80
End: 2020-01-08

## 2020-01-08 DIAGNOSIS — Z95.2 S/P MVR (MITRAL VALVE REPLACEMENT): Primary | ICD-10-CM

## 2020-01-10 ENCOUNTER — APPOINTMENT (OUTPATIENT)
Dept: CARDIAC REHAB | Facility: HOSPITAL | Age: 80
End: 2020-01-10

## 2020-01-10 ENCOUNTER — TREATMENT (OUTPATIENT)
Dept: CARDIAC REHAB | Facility: HOSPITAL | Age: 80
End: 2020-01-10

## 2020-01-10 DIAGNOSIS — Z95.2 S/P MVR (MITRAL VALVE REPLACEMENT): Primary | ICD-10-CM

## 2020-01-13 ENCOUNTER — TRANSCRIBE ORDERS (OUTPATIENT)
Dept: ADMINISTRATIVE | Facility: HOSPITAL | Age: 80
End: 2020-01-13

## 2020-01-13 ENCOUNTER — APPOINTMENT (OUTPATIENT)
Dept: CARDIAC REHAB | Facility: HOSPITAL | Age: 80
End: 2020-01-13

## 2020-01-13 ENCOUNTER — HOSPITAL ENCOUNTER (OUTPATIENT)
Dept: GENERAL RADIOLOGY | Facility: HOSPITAL | Age: 80
Discharge: HOME | End: 2020-01-13
Admitting: UROLOGY

## 2020-01-13 DIAGNOSIS — N20.0 CALCULUS OF KIDNEY: ICD-10-CM

## 2020-01-13 DIAGNOSIS — N20.0 CALCULUS OF KIDNEY: Primary | ICD-10-CM

## 2020-01-13 PROCEDURE — 74018 RADEX ABDOMEN 1 VIEW: CPT

## 2020-01-15 ENCOUNTER — APPOINTMENT (OUTPATIENT)
Dept: CARDIAC REHAB | Facility: HOSPITAL | Age: 80
End: 2020-01-15

## 2020-01-17 ENCOUNTER — APPOINTMENT (OUTPATIENT)
Dept: CARDIAC REHAB | Facility: HOSPITAL | Age: 80
End: 2020-01-17

## 2020-01-20 ENCOUNTER — APPOINTMENT (OUTPATIENT)
Dept: CARDIAC REHAB | Facility: HOSPITAL | Age: 80
End: 2020-01-20

## 2020-01-22 ENCOUNTER — TREATMENT (OUTPATIENT)
Dept: CARDIAC REHAB | Facility: HOSPITAL | Age: 80
End: 2020-01-22

## 2020-01-22 ENCOUNTER — APPOINTMENT (OUTPATIENT)
Dept: CARDIAC REHAB | Facility: HOSPITAL | Age: 80
End: 2020-01-22

## 2020-01-22 DIAGNOSIS — Z95.2 S/P MVR (MITRAL VALVE REPLACEMENT): Primary | ICD-10-CM

## 2020-01-24 ENCOUNTER — APPOINTMENT (OUTPATIENT)
Dept: CARDIAC REHAB | Facility: HOSPITAL | Age: 80
End: 2020-01-24

## 2020-01-25 ENCOUNTER — RESULTS ENCOUNTER (OUTPATIENT)
Dept: FAMILY MEDICINE CLINIC | Facility: CLINIC | Age: 80
End: 2020-01-25

## 2020-01-25 DIAGNOSIS — E11.9 TYPE 2 DIABETES MELLITUS WITHOUT COMPLICATION, WITH LONG-TERM CURRENT USE OF INSULIN (HCC): ICD-10-CM

## 2020-01-25 DIAGNOSIS — Z51.81 MEDICATION MONITORING ENCOUNTER: ICD-10-CM

## 2020-01-25 DIAGNOSIS — I48.19 PERSISTENT ATRIAL FIBRILLATION (HCC): ICD-10-CM

## 2020-01-25 DIAGNOSIS — Z79.4 TYPE 2 DIABETES MELLITUS WITHOUT COMPLICATION, WITH LONG-TERM CURRENT USE OF INSULIN (HCC): ICD-10-CM

## 2020-01-27 ENCOUNTER — APPOINTMENT (OUTPATIENT)
Dept: CARDIAC REHAB | Facility: HOSPITAL | Age: 80
End: 2020-01-27

## 2020-01-28 ENCOUNTER — APPOINTMENT (OUTPATIENT)
Dept: PREADMISSION TESTING | Facility: HOSPITAL | Age: 80
End: 2020-01-28

## 2020-01-28 ENCOUNTER — TELEPHONE (OUTPATIENT)
Dept: FAMILY MEDICINE CLINIC | Facility: CLINIC | Age: 80
End: 2020-01-28

## 2020-01-28 VITALS
HEART RATE: 85 BPM | TEMPERATURE: 97.6 F | BODY MASS INDEX: 36.99 KG/M2 | SYSTOLIC BLOOD PRESSURE: 135 MMHG | RESPIRATION RATE: 20 BRPM | HEIGHT: 62 IN | DIASTOLIC BLOOD PRESSURE: 81 MMHG | OXYGEN SATURATION: 94 % | WEIGHT: 201 LBS

## 2020-01-28 LAB
ANION GAP SERPL CALCULATED.3IONS-SCNC: 15.4 MMOL/L (ref 5–15)
APTT PPP: 39.7 SECONDS (ref 22.7–35.4)
BUN BLD-MCNC: 18 MG/DL (ref 8–23)
BUN/CREAT SERPL: 23.4 (ref 7–25)
CALCIUM SPEC-SCNC: 11 MG/DL (ref 8.6–10.5)
CHLORIDE SERPL-SCNC: 97 MMOL/L (ref 98–107)
CO2 SERPL-SCNC: 23.6 MMOL/L (ref 22–29)
CREAT BLD-MCNC: 0.77 MG/DL (ref 0.57–1)
DEPRECATED RDW RBC AUTO: 44.1 FL (ref 37–54)
ERYTHROCYTE [DISTWIDTH] IN BLOOD BY AUTOMATED COUNT: 14.4 % (ref 12.3–15.4)
GFR SERPL CREATININE-BSD FRML MDRD: 73 ML/MIN/1.73
GLUCOSE BLD-MCNC: 131 MG/DL (ref 65–99)
HCT VFR BLD AUTO: 40.4 % (ref 34–46.6)
HGB BLD-MCNC: 13.5 G/DL (ref 12–15.9)
INR PPP: 1.32 (ref 0.9–1.1)
MCH RBC QN AUTO: 28.4 PG (ref 26.6–33)
MCHC RBC AUTO-ENTMCNC: 33.4 G/DL (ref 31.5–35.7)
MCV RBC AUTO: 84.9 FL (ref 79–97)
PLATELET # BLD AUTO: 203 10*3/MM3 (ref 140–450)
PMV BLD AUTO: 10.6 FL (ref 6–12)
POTASSIUM BLD-SCNC: 4.6 MMOL/L (ref 3.5–5.2)
PROTHROMBIN TIME: 16.1 SECONDS (ref 11.7–14.2)
RBC # BLD AUTO: 4.76 10*6/MM3 (ref 3.77–5.28)
SODIUM BLD-SCNC: 136 MMOL/L (ref 136–145)
WBC NRBC COR # BLD: 6.37 10*3/MM3 (ref 3.4–10.8)

## 2020-01-28 PROCEDURE — 80048 BASIC METABOLIC PNL TOTAL CA: CPT | Performed by: UROLOGY

## 2020-01-28 PROCEDURE — 85610 PROTHROMBIN TIME: CPT | Performed by: UROLOGY

## 2020-01-28 PROCEDURE — 85027 COMPLETE CBC AUTOMATED: CPT | Performed by: UROLOGY

## 2020-01-28 PROCEDURE — 36415 COLL VENOUS BLD VENIPUNCTURE: CPT

## 2020-01-28 PROCEDURE — 85730 THROMBOPLASTIN TIME PARTIAL: CPT | Performed by: UROLOGY

## 2020-01-28 RX ORDER — LEVOTHYROXINE AND LIOTHYRONINE 57; 13.5 UG/1; UG/1
45 TABLET ORAL NIGHTLY
COMMUNITY
End: 2021-04-27

## 2020-01-28 RX ORDER — LEVOTHYROXINE AND LIOTHYRONINE 57; 13.5 UG/1; UG/1
90 TABLET ORAL EVERY MORNING
COMMUNITY
End: 2021-01-11

## 2020-01-28 RX ORDER — WARFARIN SODIUM 2.5 MG/1
2.5 TABLET ORAL
COMMUNITY

## 2020-01-28 NOTE — DISCHARGE INSTRUCTIONS
Take the following medications the morning of surgery with a small sip of water:    Metoprolol   entresto  Digoxin pantoprazole    General Instructions:  • Do not eat or drink anything 8 hours before arrival time  • Infants may have breast milk up to four hours before surgery.  • Infants drinking formula may drink formula up to six hours before surgery.   • Patients who avoid smoking, chewing tobacco and alcohol for 4 weeks prior to surgery have a reduced risk of post-operative complications.  Quit smoking as many days before surgery as you can.  • Do not smoke, use chewing tobacco or drink alcohol the day of surgery.   • If applicable bring your C-PAP/ BI-PAP machine.  • Bring any papers given to you in the doctor’s office.  • Wear clean comfortable clothes.  • Do not wear contact lenses, false eyelashes or make-up.  Bring a case for your glasses.   • Bring crutches or walker if applicable.  • Remove all piercings.  Leave jewelry and any other valuables at home.  • Hair extensions with metal clips must be removed prior to surgery.  • The Pre-Admission Testing nurse will instruct you to bring medications if unable to obtain an accurate list in Pre-Admission Testing.        If you were given a blood bank ID arm band remember to bring it with you the day of surgery.    Preventing a Surgical Site Infection:  • For 2 to 3 days before surgery, avoid shaving with a razor because the razor can irritate skin and make it easier to develop an infection.    • Any areas of open skin can increase the risk of a post-operative wound infection by allowing bacteria to enter and travel throughout the body.  Notify your surgeon if you have any skin wounds / rashes even if it is not near the expected surgical site.  The area will need assessed to determine if surgery should be delayed until it is healed.  • The night prior to surgery sleep in a clean bed with clean clothing.  Do not allow pets to sleep with you.  • Shower on the  morning of surgery using a fresh bar of anti-bacterial soap (such as Dial) and clean washcloth.  Dry with a clean towel and dress in clean clothing.  • Ask your surgeon if you will be receiving antibiotics prior to surgery.  • Make sure you, your family, and all healthcare providers clean their hands with soap and water or an alcohol based hand  before caring for you or your wound.    Day of surgery:1/30/2020  1:30 pm  Your arrival time is approximately two hours before your scheduled surgery time.  Upon arrival, a Pre-op nurse and Anesthesiologist will review your health history, obtain vital signs, and answer questions you may have.  The only belongings needed at this time will be your home medications and if applicable your C-PAP/BI-PAP machine.  If you are staying overnight your family can leave the rest of your belongings in the car and bring them to your room later.  A Pre-op nurse will start an IV and you may receive medication in preparation for surgery, including something to help you relax.  Your family will be able to see you in the Pre-op area.  Two visitors at a time will be allowed in the Pre-op room.  While you are in surgery your family should notify the waiting room  if they leave the waiting room area and provide a contact phone number.    Please be aware that surgery does come with discomfort.  We want to make every effort to control your discomfort so please discuss any uncontrolled symptoms with your nurse.   Your doctor will most likely have prescribed pain medications.      If you are going home after surgery you will receive individualized written care instructions before being discharged.  A responsible adult must drive you to and from the hospital on the day of your surgery and stay with you for 24 hours.    If you are staying overnight following surgery, you will be transported to your hospital room following the recovery period.  [facility] has all  private rooms.    If you have any questions please call Pre-Admission Testing at [phone].  Deductibles and co-payments are collected on the day of service. Please be prepared to pay the required co-pay, deductible or deposit on the day of service as defined by your plan.

## 2020-01-28 NOTE — TELEPHONE ENCOUNTER
Pt is having surgery this Friday and needs to know if she can take her insulin that morning or wait. Please advise.

## 2020-01-29 ENCOUNTER — APPOINTMENT (OUTPATIENT)
Dept: CARDIAC REHAB | Facility: HOSPITAL | Age: 80
End: 2020-01-29

## 2020-01-30 ENCOUNTER — ANESTHESIA (OUTPATIENT)
Dept: PERIOP | Facility: HOSPITAL | Age: 80
End: 2020-01-30

## 2020-01-30 ENCOUNTER — ANESTHESIA EVENT (OUTPATIENT)
Dept: PERIOP | Facility: HOSPITAL | Age: 80
End: 2020-01-30

## 2020-01-30 ENCOUNTER — HOSPITAL ENCOUNTER (OUTPATIENT)
Facility: HOSPITAL | Age: 80
Setting detail: HOSPITAL OUTPATIENT SURGERY
Discharge: HOME | End: 2020-01-30
Attending: UROLOGY | Admitting: UROLOGY

## 2020-01-30 ENCOUNTER — APPOINTMENT (OUTPATIENT)
Dept: GENERAL RADIOLOGY | Facility: HOSPITAL | Age: 80
End: 2020-01-30

## 2020-01-30 VITALS
WEIGHT: 198.19 LBS | HEIGHT: 62 IN | BODY MASS INDEX: 36.47 KG/M2 | OXYGEN SATURATION: 94 % | HEART RATE: 64 BPM | DIASTOLIC BLOOD PRESSURE: 66 MMHG | RESPIRATION RATE: 16 BRPM | TEMPERATURE: 98 F | SYSTOLIC BLOOD PRESSURE: 121 MMHG

## 2020-01-30 DIAGNOSIS — N20.1 LEFT URETERAL STONE: ICD-10-CM

## 2020-01-30 DIAGNOSIS — Q63.2 PELVIC KIDNEY: ICD-10-CM

## 2020-01-30 DIAGNOSIS — N20.1 URETERAL CALCULUS, LEFT: Primary | ICD-10-CM

## 2020-01-30 LAB
GLUCOSE BLDC GLUCOMTR-MCNC: 118 MG/DL (ref 70–130)
GLUCOSE BLDC GLUCOMTR-MCNC: 146 MG/DL (ref 70–130)

## 2020-01-30 PROCEDURE — 25010000002 ONDANSETRON PER 1 MG: Performed by: NURSE ANESTHETIST, CERTIFIED REGISTERED

## 2020-01-30 PROCEDURE — 82962 GLUCOSE BLOOD TEST: CPT

## 2020-01-30 PROCEDURE — C1894 INTRO/SHEATH, NON-LASER: HCPCS | Performed by: UROLOGY

## 2020-01-30 PROCEDURE — C2617 STENT, NON-COR, TEM W/O DEL: HCPCS | Performed by: UROLOGY

## 2020-01-30 PROCEDURE — 25010000002 PROPOFOL 10 MG/ML EMULSION: Performed by: NURSE ANESTHETIST, CERTIFIED REGISTERED

## 2020-01-30 PROCEDURE — 82365 CALCULUS SPECTROSCOPY: CPT | Performed by: UROLOGY

## 2020-01-30 PROCEDURE — 25010000003 CEFAZOLIN IN DEXTROSE 2-4 GM/100ML-% SOLUTION: Performed by: UROLOGY

## 2020-01-30 PROCEDURE — 0 IOTHALAMATE 60 % SOLUTION: Performed by: UROLOGY

## 2020-01-30 PROCEDURE — 25010000002 FENTANYL CITRATE (PF) 100 MCG/2ML SOLUTION: Performed by: NURSE ANESTHETIST, CERTIFIED REGISTERED

## 2020-01-30 PROCEDURE — C1758 CATHETER, URETERAL: HCPCS | Performed by: UROLOGY

## 2020-01-30 PROCEDURE — 74420 UROGRAPHY RTRGR +-KUB: CPT

## 2020-01-30 DEVICE — URETERAL STENT
Type: IMPLANTABLE DEVICE | Site: URETER | Status: FUNCTIONAL
Brand: CONTOUR™

## 2020-01-30 RX ORDER — SODIUM CHLORIDE 0.9 % (FLUSH) 0.9 %
3-10 SYRINGE (ML) INJECTION AS NEEDED
Status: DISCONTINUED | OUTPATIENT
Start: 2020-01-30 | End: 2020-01-30 | Stop reason: HOSPADM

## 2020-01-30 RX ORDER — DIPHENHYDRAMINE HYDROCHLORIDE 50 MG/ML
12.5 INJECTION INTRAMUSCULAR; INTRAVENOUS
Status: DISCONTINUED | OUTPATIENT
Start: 2020-01-30 | End: 2020-01-30 | Stop reason: HOSPADM

## 2020-01-30 RX ORDER — OXYCODONE HYDROCHLORIDE AND ACETAMINOPHEN 5; 325 MG/1; MG/1
1 TABLET ORAL ONCE AS NEEDED
Status: DISCONTINUED | OUTPATIENT
Start: 2020-01-30 | End: 2020-01-30 | Stop reason: HOSPADM

## 2020-01-30 RX ORDER — DIPHENHYDRAMINE HCL 25 MG
25 CAPSULE ORAL
Status: DISCONTINUED | OUTPATIENT
Start: 2020-01-30 | End: 2020-01-30 | Stop reason: HOSPADM

## 2020-01-30 RX ORDER — ONDANSETRON 2 MG/ML
INJECTION INTRAMUSCULAR; INTRAVENOUS AS NEEDED
Status: DISCONTINUED | OUTPATIENT
Start: 2020-01-30 | End: 2020-01-30 | Stop reason: SURG

## 2020-01-30 RX ORDER — PROMETHAZINE HYDROCHLORIDE 25 MG/1
25 TABLET ORAL ONCE AS NEEDED
Status: DISCONTINUED | OUTPATIENT
Start: 2020-01-30 | End: 2020-01-30 | Stop reason: HOSPADM

## 2020-01-30 RX ORDER — LIDOCAINE HYDROCHLORIDE 10 MG/ML
0.5 INJECTION, SOLUTION EPIDURAL; INFILTRATION; INTRACAUDAL; PERINEURAL ONCE AS NEEDED
Status: DISCONTINUED | OUTPATIENT
Start: 2020-01-30 | End: 2020-01-30 | Stop reason: HOSPADM

## 2020-01-30 RX ORDER — PROMETHAZINE HYDROCHLORIDE 25 MG/ML
6.25 INJECTION, SOLUTION INTRAMUSCULAR; INTRAVENOUS
Status: DISCONTINUED | OUTPATIENT
Start: 2020-01-30 | End: 2020-01-30 | Stop reason: HOSPADM

## 2020-01-30 RX ORDER — CEPHALEXIN 500 MG/1
500 CAPSULE ORAL 3 TIMES DAILY
Qty: 21 CAPSULE | Refills: 0 | Status: SHIPPED | OUTPATIENT
Start: 2020-01-30 | End: 2020-02-06

## 2020-01-30 RX ORDER — PROMETHAZINE HYDROCHLORIDE 25 MG/1
25 SUPPOSITORY RECTAL ONCE AS NEEDED
Status: DISCONTINUED | OUTPATIENT
Start: 2020-01-30 | End: 2020-01-30 | Stop reason: HOSPADM

## 2020-01-30 RX ORDER — FENTANYL CITRATE 50 UG/ML
50 INJECTION, SOLUTION INTRAMUSCULAR; INTRAVENOUS
Status: DISCONTINUED | OUTPATIENT
Start: 2020-01-30 | End: 2020-01-30 | Stop reason: HOSPADM

## 2020-01-30 RX ORDER — NALOXONE HCL 0.4 MG/ML
0.2 VIAL (ML) INJECTION AS NEEDED
Status: DISCONTINUED | OUTPATIENT
Start: 2020-01-30 | End: 2020-01-30 | Stop reason: HOSPADM

## 2020-01-30 RX ORDER — EPHEDRINE SULFATE 50 MG/ML
5 INJECTION, SOLUTION INTRAVENOUS ONCE AS NEEDED
Status: DISCONTINUED | OUTPATIENT
Start: 2020-01-30 | End: 2020-01-30 | Stop reason: HOSPADM

## 2020-01-30 RX ORDER — OXYCODONE AND ACETAMINOPHEN 7.5; 325 MG/1; MG/1
1 TABLET ORAL ONCE AS NEEDED
Status: DISCONTINUED | OUTPATIENT
Start: 2020-01-30 | End: 2020-01-30 | Stop reason: HOSPADM

## 2020-01-30 RX ORDER — PROPOFOL 10 MG/ML
VIAL (ML) INTRAVENOUS AS NEEDED
Status: DISCONTINUED | OUTPATIENT
Start: 2020-01-30 | End: 2020-01-30 | Stop reason: SURG

## 2020-01-30 RX ORDER — HYDRALAZINE HYDROCHLORIDE 20 MG/ML
5 INJECTION INTRAMUSCULAR; INTRAVENOUS
Status: DISCONTINUED | OUTPATIENT
Start: 2020-01-30 | End: 2020-01-30 | Stop reason: HOSPADM

## 2020-01-30 RX ORDER — SODIUM CHLORIDE 0.9 % (FLUSH) 0.9 %
3 SYRINGE (ML) INJECTION EVERY 12 HOURS SCHEDULED
Status: DISCONTINUED | OUTPATIENT
Start: 2020-01-30 | End: 2020-01-30 | Stop reason: HOSPADM

## 2020-01-30 RX ORDER — SCOLOPAMINE TRANSDERMAL SYSTEM 1 MG/1
1 PATCH, EXTENDED RELEASE TRANSDERMAL
Status: DISCONTINUED | OUTPATIENT
Start: 2020-01-30 | End: 2020-01-30 | Stop reason: HOSPADM

## 2020-01-30 RX ORDER — ACETAMINOPHEN 325 MG/1
650 TABLET ORAL ONCE AS NEEDED
Status: DISCONTINUED | OUTPATIENT
Start: 2020-01-30 | End: 2020-01-30 | Stop reason: HOSPADM

## 2020-01-30 RX ORDER — FAMOTIDINE 10 MG/ML
20 INJECTION, SOLUTION INTRAVENOUS ONCE
Status: COMPLETED | OUTPATIENT
Start: 2020-01-30 | End: 2020-01-30

## 2020-01-30 RX ORDER — IPRATROPIUM BROMIDE AND ALBUTEROL SULFATE 2.5; .5 MG/3ML; MG/3ML
3 SOLUTION RESPIRATORY (INHALATION) ONCE AS NEEDED
Status: DISCONTINUED | OUTPATIENT
Start: 2020-01-30 | End: 2020-01-30 | Stop reason: HOSPADM

## 2020-01-30 RX ORDER — LIDOCAINE HYDROCHLORIDE 20 MG/ML
INJECTION, SOLUTION INFILTRATION; PERINEURAL AS NEEDED
Status: DISCONTINUED | OUTPATIENT
Start: 2020-01-30 | End: 2020-01-30 | Stop reason: SURG

## 2020-01-30 RX ORDER — SODIUM CHLORIDE, SODIUM LACTATE, POTASSIUM CHLORIDE, CALCIUM CHLORIDE 600; 310; 30; 20 MG/100ML; MG/100ML; MG/100ML; MG/100ML
100 INJECTION, SOLUTION INTRAVENOUS CONTINUOUS
Status: DISCONTINUED | OUTPATIENT
Start: 2020-01-30 | End: 2020-01-30 | Stop reason: HOSPADM

## 2020-01-30 RX ORDER — SODIUM CHLORIDE, SODIUM LACTATE, POTASSIUM CHLORIDE, CALCIUM CHLORIDE 600; 310; 30; 20 MG/100ML; MG/100ML; MG/100ML; MG/100ML
9 INJECTION, SOLUTION INTRAVENOUS CONTINUOUS
Status: DISCONTINUED | OUTPATIENT
Start: 2020-01-30 | End: 2020-01-30 | Stop reason: HOSPADM

## 2020-01-30 RX ORDER — PROMETHAZINE HYDROCHLORIDE 25 MG/ML
12.5 INJECTION, SOLUTION INTRAMUSCULAR; INTRAVENOUS ONCE AS NEEDED
Status: DISCONTINUED | OUTPATIENT
Start: 2020-01-30 | End: 2020-01-30 | Stop reason: HOSPADM

## 2020-01-30 RX ORDER — MAGNESIUM HYDROXIDE 1200 MG/15ML
LIQUID ORAL AS NEEDED
Status: DISCONTINUED | OUTPATIENT
Start: 2020-01-30 | End: 2020-01-30 | Stop reason: HOSPADM

## 2020-01-30 RX ORDER — FLUMAZENIL 0.1 MG/ML
0.2 INJECTION INTRAVENOUS AS NEEDED
Status: DISCONTINUED | OUTPATIENT
Start: 2020-01-30 | End: 2020-01-30 | Stop reason: HOSPADM

## 2020-01-30 RX ORDER — MIDAZOLAM HYDROCHLORIDE 1 MG/ML
1 INJECTION INTRAMUSCULAR; INTRAVENOUS
Status: DISCONTINUED | OUTPATIENT
Start: 2020-01-30 | End: 2020-01-30 | Stop reason: HOSPADM

## 2020-01-30 RX ORDER — HYDROMORPHONE HYDROCHLORIDE 1 MG/ML
0.5 INJECTION, SOLUTION INTRAMUSCULAR; INTRAVENOUS; SUBCUTANEOUS
Status: DISCONTINUED | OUTPATIENT
Start: 2020-01-30 | End: 2020-01-30 | Stop reason: HOSPADM

## 2020-01-30 RX ORDER — MIDAZOLAM HYDROCHLORIDE 1 MG/ML
2 INJECTION INTRAMUSCULAR; INTRAVENOUS
Status: DISCONTINUED | OUTPATIENT
Start: 2020-01-30 | End: 2020-01-30 | Stop reason: HOSPADM

## 2020-01-30 RX ORDER — ONDANSETRON 2 MG/ML
4 INJECTION INTRAMUSCULAR; INTRAVENOUS ONCE AS NEEDED
Status: COMPLETED | OUTPATIENT
Start: 2020-01-30 | End: 2020-01-30

## 2020-01-30 RX ORDER — CEFAZOLIN SODIUM 2 G/100ML
2 INJECTION, SOLUTION INTRAVENOUS ONCE
Status: COMPLETED | OUTPATIENT
Start: 2020-01-30 | End: 2020-01-30

## 2020-01-30 RX ADMIN — FENTANYL CITRATE 50 MCG: 50 INJECTION, SOLUTION INTRAMUSCULAR; INTRAVENOUS at 16:41

## 2020-01-30 RX ADMIN — FAMOTIDINE 20 MG: 10 INJECTION INTRAVENOUS at 13:53

## 2020-01-30 RX ADMIN — OXYCODONE AND ACETAMINOPHEN 1 TABLET: 5; 325 TABLET ORAL at 17:16

## 2020-01-30 RX ADMIN — PROPOFOL 70 MG: 10 INJECTION, EMULSION INTRAVENOUS at 15:41

## 2020-01-30 RX ADMIN — CEFAZOLIN SODIUM 2 G: 2 INJECTION, SOLUTION INTRAVENOUS at 15:40

## 2020-01-30 RX ADMIN — SODIUM CHLORIDE, POTASSIUM CHLORIDE, SODIUM LACTATE AND CALCIUM CHLORIDE 9 ML/HR: 600; 310; 30; 20 INJECTION, SOLUTION INTRAVENOUS at 13:52

## 2020-01-30 RX ADMIN — SCOPALAMINE 1 PATCH: 1 PATCH, EXTENDED RELEASE TRANSDERMAL at 13:54

## 2020-01-30 RX ADMIN — ONDANSETRON 4 MG: 2 INJECTION INTRAMUSCULAR; INTRAVENOUS at 16:35

## 2020-01-30 RX ADMIN — ONDANSETRON HYDROCHLORIDE 4 MG: 2 SOLUTION INTRAMUSCULAR; INTRAVENOUS at 15:44

## 2020-01-30 RX ADMIN — LIDOCAINE HYDROCHLORIDE 50 MG: 20 INJECTION, SOLUTION INFILTRATION; PERINEURAL at 15:40

## 2020-01-30 RX ADMIN — FENTANYL CITRATE 50 MCG: 50 INJECTION, SOLUTION INTRAMUSCULAR; INTRAVENOUS at 16:47

## 2020-01-30 NOTE — ANESTHESIA PROCEDURE NOTES
Airway  Urgency: elective    Date/Time: 1/30/2020 3:43 PM  Airway not difficult    General Information and Staff    Patient location during procedure: OR  Anesthesiologist: Tremaine Navarrete MD  CRNA: Bridget Marie CRNA    Indications and Patient Condition  Indications for airway management: airway protection    Preoxygenated: yes  MILS maintained throughout  Mask difficulty assessment: 0 - not attempted    Final Airway Details  Final airway type: supraglottic airway      Successful airway: classic  Size 4    Number of attempts at approach: 1  Assessment: lips, teeth, and gum same as pre-op    Additional Comments  Placed with ease. Vent with ease. Teeth as pre-op. Secured to face.

## 2020-01-30 NOTE — ANESTHESIA PREPROCEDURE EVALUATION
Anesthesia Evaluation     history of anesthetic complications: PONV  NPO Solid Status: > 8 hours  NPO Liquid Status: > 8 hours           Airway   Mallampati: II  Dental      Pulmonary - normal exam   (+) COPD, shortness of breath, sleep apnea,   Cardiovascular - normal exam    (+) pacemaker ICD, hypertension less than 2 medications, CAD, dysrhythmias Atrial Fib, CHF , hyperlipidemia,       Neuro/Psych  GI/Hepatic/Renal/Endo    (+) obesity,  GERD,  diabetes mellitus type 2,     Musculoskeletal     Abdominal    Substance History      OB/GYN          Other   arthritis,      ROS/Med Hx Other: Previous LMA 4      Phys Exam Other: Several chipped teeth                Anesthesia Plan    ASA 4     general     intravenous induction     Anesthetic plan, all risks, benefits, and alternatives have been provided, discussed and informed consent has been obtained with: patient.    Plan discussed with CRNA.

## 2020-01-31 ENCOUNTER — APPOINTMENT (OUTPATIENT)
Dept: CARDIAC REHAB | Facility: HOSPITAL | Age: 80
End: 2020-01-31

## 2020-01-31 NOTE — ANESTHESIA POSTPROCEDURE EVALUATION
Patient: Fernanda Molina    Procedure Summary     Date:  01/30/20 Room / Location:  [facility] OR 01 / [facility] MAIN OR    Anesthesia Start:  1537 Anesthesia Stop:  1633    Procedure:  URETEROSCOPY STONE BASKET EXTRACTION LASER LITHOTRIPSY WITH STENT INSERTION WITH STRING (Left ) Diagnosis:       Ureteral calculus, left      (Left Renal Calculus)    Surgeon:  Moisés Everett MD Provider:  Tremaine Navarrete MD    Anesthesia Type:  general ASA Status:  4          Anesthesia Type: general    Vitals  Vitals Value Taken Time   /65 1/30/2020  5:15 PM   Temp 36.7 °C (98 °F) 1/30/2020  5:15 PM   Pulse 59 1/30/2020  5:16 PM   Resp 16 1/30/2020  5:15 PM   SpO2 94 % 1/30/2020  5:18 PM   Vitals shown include unvalidated device data.        Anesthesia Post Evaluation

## 2020-02-03 ENCOUNTER — APPOINTMENT (OUTPATIENT)
Dept: CARDIAC REHAB | Facility: HOSPITAL | Age: 80
End: 2020-02-03

## 2020-02-05 ENCOUNTER — APPOINTMENT (OUTPATIENT)
Dept: CARDIAC REHAB | Facility: HOSPITAL | Age: 80
End: 2020-02-05

## 2020-02-07 ENCOUNTER — APPOINTMENT (OUTPATIENT)
Dept: CARDIAC REHAB | Facility: HOSPITAL | Age: 80
End: 2020-02-07

## 2020-02-10 ENCOUNTER — APPOINTMENT (OUTPATIENT)
Dept: CARDIAC REHAB | Facility: HOSPITAL | Age: 80
End: 2020-02-10

## 2020-02-11 ENCOUNTER — TRANSCRIBE ORDERS (OUTPATIENT)
Dept: ADMINISTRATIVE | Facility: HOSPITAL | Age: 80
End: 2020-02-11

## 2020-02-11 DIAGNOSIS — N13.30 HYDRONEPHROSIS, UNSPECIFIED HYDRONEPHROSIS TYPE: ICD-10-CM

## 2020-02-11 DIAGNOSIS — N20.1 CALCULUS, URETER: Primary | ICD-10-CM

## 2020-02-12 ENCOUNTER — APPOINTMENT (OUTPATIENT)
Dept: CARDIAC REHAB | Facility: HOSPITAL | Age: 80
End: 2020-02-12

## 2020-02-12 ENCOUNTER — TREATMENT (OUTPATIENT)
Dept: CARDIAC REHAB | Facility: HOSPITAL | Age: 80
End: 2020-02-12

## 2020-02-12 DIAGNOSIS — Z95.2 S/P MVR (MITRAL VALVE REPLACEMENT): Primary | ICD-10-CM

## 2020-02-12 LAB
COD CRY STONE QL IR: 70 %
COLOR STONE: NORMAL
COM CRY STONE QL IR: 30 %
COMPN STONE: NORMAL
LABORATORY COMMENT REPORT: NORMAL
LABORATORY COMMENT REPORT: NORMAL
Lab: NORMAL
PHOTO: NORMAL
SIZE STONE: NORMAL MM
SPECIMEN SOURCE: NORMAL
WT STONE: 15.8 MG

## 2020-02-14 ENCOUNTER — APPOINTMENT (OUTPATIENT)
Dept: CARDIAC REHAB | Facility: HOSPITAL | Age: 80
End: 2020-02-14

## 2020-02-17 ENCOUNTER — APPOINTMENT (OUTPATIENT)
Dept: CARDIAC REHAB | Facility: HOSPITAL | Age: 80
End: 2020-02-17

## 2020-02-17 ENCOUNTER — TREATMENT (OUTPATIENT)
Dept: CARDIAC REHAB | Facility: HOSPITAL | Age: 80
End: 2020-02-17

## 2020-02-17 DIAGNOSIS — Z95.2 S/P MVR (MITRAL VALVE REPLACEMENT): Primary | ICD-10-CM

## 2020-02-19 ENCOUNTER — APPOINTMENT (OUTPATIENT)
Dept: CARDIAC REHAB | Facility: HOSPITAL | Age: 80
End: 2020-02-19

## 2020-02-19 ENCOUNTER — TREATMENT (OUTPATIENT)
Dept: CARDIAC REHAB | Facility: HOSPITAL | Age: 80
End: 2020-02-19

## 2020-02-19 DIAGNOSIS — Z95.2 S/P MVR (MITRAL VALVE REPLACEMENT): Primary | ICD-10-CM

## 2020-02-21 ENCOUNTER — APPOINTMENT (OUTPATIENT)
Dept: CARDIAC REHAB | Facility: HOSPITAL | Age: 80
End: 2020-02-21

## 2020-02-21 ENCOUNTER — TREATMENT (OUTPATIENT)
Dept: CARDIAC REHAB | Facility: HOSPITAL | Age: 80
End: 2020-02-21

## 2020-02-21 DIAGNOSIS — Z95.2 S/P MVR (MITRAL VALVE REPLACEMENT): Primary | ICD-10-CM

## 2020-02-24 ENCOUNTER — TREATMENT (OUTPATIENT)
Dept: CARDIAC REHAB | Facility: HOSPITAL | Age: 80
End: 2020-02-24

## 2020-02-24 ENCOUNTER — APPOINTMENT (OUTPATIENT)
Dept: CARDIAC REHAB | Facility: HOSPITAL | Age: 80
End: 2020-02-24

## 2020-02-24 DIAGNOSIS — Z95.2 S/P MVR (MITRAL VALVE REPLACEMENT): Primary | ICD-10-CM

## 2020-02-26 ENCOUNTER — APPOINTMENT (OUTPATIENT)
Dept: CARDIAC REHAB | Facility: HOSPITAL | Age: 80
End: 2020-02-26

## 2020-02-26 ENCOUNTER — TREATMENT (OUTPATIENT)
Dept: CARDIAC REHAB | Facility: HOSPITAL | Age: 80
End: 2020-02-26

## 2020-02-26 DIAGNOSIS — Z95.2 S/P MVR (MITRAL VALVE REPLACEMENT): Primary | ICD-10-CM

## 2020-02-28 ENCOUNTER — APPOINTMENT (OUTPATIENT)
Dept: CARDIAC REHAB | Facility: HOSPITAL | Age: 80
End: 2020-02-28

## 2020-03-02 ENCOUNTER — APPOINTMENT (OUTPATIENT)
Dept: CARDIAC REHAB | Facility: HOSPITAL | Age: 80
End: 2020-03-02

## 2020-03-02 ENCOUNTER — TREATMENT (OUTPATIENT)
Dept: CARDIAC REHAB | Facility: HOSPITAL | Age: 80
End: 2020-03-02

## 2020-03-02 DIAGNOSIS — Z95.2 S/P MVR (MITRAL VALVE REPLACEMENT): Primary | ICD-10-CM

## 2020-03-04 ENCOUNTER — APPOINTMENT (OUTPATIENT)
Dept: CARDIAC REHAB | Facility: HOSPITAL | Age: 80
End: 2020-03-04

## 2020-03-04 ENCOUNTER — TREATMENT (OUTPATIENT)
Dept: CARDIAC REHAB | Facility: HOSPITAL | Age: 80
End: 2020-03-04

## 2020-03-04 DIAGNOSIS — Z95.2 S/P MVR (MITRAL VALVE REPLACEMENT): Primary | ICD-10-CM

## 2020-03-06 ENCOUNTER — APPOINTMENT (OUTPATIENT)
Dept: CARDIAC REHAB | Facility: HOSPITAL | Age: 80
End: 2020-03-06

## 2020-03-06 ENCOUNTER — TREATMENT (OUTPATIENT)
Dept: CARDIAC REHAB | Facility: HOSPITAL | Age: 80
End: 2020-03-06

## 2020-03-06 DIAGNOSIS — Z95.2 S/P MVR (MITRAL VALVE REPLACEMENT): Primary | ICD-10-CM

## 2020-03-09 ENCOUNTER — APPOINTMENT (OUTPATIENT)
Dept: CARDIAC REHAB | Facility: HOSPITAL | Age: 80
End: 2020-03-09

## 2020-03-09 ENCOUNTER — TREATMENT (OUTPATIENT)
Dept: CARDIAC REHAB | Facility: HOSPITAL | Age: 80
End: 2020-03-09

## 2020-03-09 DIAGNOSIS — Z95.2 S/P MVR (MITRAL VALVE REPLACEMENT): Primary | ICD-10-CM

## 2020-03-10 ENCOUNTER — HOSPITAL ENCOUNTER (OUTPATIENT)
Dept: ULTRASOUND IMAGING | Facility: HOSPITAL | Age: 80
Discharge: HOME | End: 2020-03-10
Admitting: UROLOGY

## 2020-03-10 DIAGNOSIS — N20.1 CALCULUS, URETER: ICD-10-CM

## 2020-03-10 DIAGNOSIS — N13.30 HYDRONEPHROSIS, UNSPECIFIED HYDRONEPHROSIS TYPE: ICD-10-CM

## 2020-03-10 PROCEDURE — 76775 US EXAM ABDO BACK WALL LIM: CPT

## 2020-03-11 ENCOUNTER — APPOINTMENT (OUTPATIENT)
Dept: CARDIAC REHAB | Facility: HOSPITAL | Age: 80
End: 2020-03-11

## 2020-03-13 ENCOUNTER — APPOINTMENT (OUTPATIENT)
Dept: CARDIAC REHAB | Facility: HOSPITAL | Age: 80
End: 2020-03-13

## 2020-03-16 ENCOUNTER — APPOINTMENT (OUTPATIENT)
Dept: CARDIAC REHAB | Facility: HOSPITAL | Age: 80
End: 2020-03-16

## 2020-03-17 LAB
ALT SERPL-CCNC: 24 U/L (ref 1–33)
BUN SERPL-MCNC: 16 MG/DL (ref 8–23)
BUN/CREAT SERPL: 21.1 (ref 7–25)
CALCIUM SERPL-MCNC: 10.3 MG/DL (ref 8.6–10.5)
CHLORIDE SERPL-SCNC: 101 MMOL/L (ref 98–107)
CHOLEST SERPL-MCNC: 155 MG/DL (ref 0–200)
CK SERPL-CCNC: 46 U/L (ref 20–180)
CO2 SERPL-SCNC: 25.1 MMOL/L (ref 22–29)
CREAT SERPL-MCNC: 0.76 MG/DL (ref 0.57–1)
DIGOXIN SERPL-MCNC: 1 NG/ML (ref 0.6–1.2)
GLUCOSE SERPL-MCNC: 134 MG/DL (ref 65–99)
HBA1C MFR BLD: 6.5 % (ref 4.8–5.6)
HDLC SERPL-MCNC: 35 MG/DL (ref 40–60)
LDLC SERPL CALC-MCNC: 72 MG/DL (ref 0–100)
MICROALBUMIN UR-MCNC: 221.1 UG/ML
POTASSIUM SERPL-SCNC: 4.2 MMOL/L (ref 3.5–5.2)
SODIUM SERPL-SCNC: 139 MMOL/L (ref 136–145)
TRIGL SERPL-MCNC: 241 MG/DL (ref 0–150)
VLDLC SERPL CALC-MCNC: 48.2 MG/DL

## 2020-03-18 ENCOUNTER — APPOINTMENT (OUTPATIENT)
Dept: CARDIAC REHAB | Facility: HOSPITAL | Age: 80
End: 2020-03-18

## 2020-03-20 ENCOUNTER — APPOINTMENT (OUTPATIENT)
Dept: CARDIAC REHAB | Facility: HOSPITAL | Age: 80
End: 2020-03-20

## 2020-03-23 ENCOUNTER — APPOINTMENT (OUTPATIENT)
Dept: CARDIAC REHAB | Facility: HOSPITAL | Age: 80
End: 2020-03-23

## 2020-03-25 ENCOUNTER — APPOINTMENT (OUTPATIENT)
Dept: CT IMAGING | Facility: HOSPITAL | Age: 80
End: 2020-03-25

## 2020-03-25 ENCOUNTER — APPOINTMENT (OUTPATIENT)
Dept: GENERAL RADIOLOGY | Facility: HOSPITAL | Age: 80
End: 2020-03-25

## 2020-03-25 ENCOUNTER — HOSPITAL ENCOUNTER (INPATIENT)
Facility: HOSPITAL | Age: 80
LOS: 1 days | Discharge: HOME | End: 2020-03-27
Attending: EMERGENCY MEDICINE | Admitting: FAMILY MEDICINE

## 2020-03-25 ENCOUNTER — TELEPHONE (OUTPATIENT)
Dept: INTERNAL MEDICINE | Facility: CLINIC | Age: 80
End: 2020-03-25

## 2020-03-25 ENCOUNTER — APPOINTMENT (OUTPATIENT)
Dept: CARDIAC REHAB | Facility: HOSPITAL | Age: 80
End: 2020-03-25

## 2020-03-25 DIAGNOSIS — D72.810 LYMPHOCYTOPENIA: ICD-10-CM

## 2020-03-25 DIAGNOSIS — E87.20 LACTIC ACIDOSIS: ICD-10-CM

## 2020-03-25 DIAGNOSIS — J18.9 PNEUMONIA OF LEFT LOWER LOBE DUE TO INFECTIOUS ORGANISM: Primary | ICD-10-CM

## 2020-03-25 LAB
ALBUMIN SERPL-MCNC: 4.2 G/DL (ref 3.5–5.2)
ALBUMIN/GLOB SERPL: 1.2 G/DL
ALP SERPL-CCNC: 63 U/L (ref 39–117)
ALT SERPL W P-5'-P-CCNC: 23 U/L (ref 1–33)
AMORPH URATE CRY URNS QL MICRO: ABNORMAL /HPF
ANION GAP SERPL CALCULATED.3IONS-SCNC: 18.1 MMOL/L (ref 5–15)
AST SERPL-CCNC: 24 U/L (ref 1–32)
BACTERIA UR QL AUTO: ABNORMAL /HPF
BASOPHILS # BLD AUTO: 0.03 10*3/MM3 (ref 0–0.2)
BASOPHILS NFR BLD AUTO: 0.3 % (ref 0–1.5)
BILIRUB SERPL-MCNC: 1.2 MG/DL (ref 0.2–1.2)
BILIRUB UR QL STRIP: ABNORMAL
BUN BLD-MCNC: 18 MG/DL (ref 8–23)
BUN/CREAT SERPL: 21.4 (ref 7–25)
CALCIUM SPEC-SCNC: 11.5 MG/DL (ref 8.6–10.5)
CHLORIDE SERPL-SCNC: 98 MMOL/L (ref 98–107)
CLARITY UR: CLEAR
CO2 SERPL-SCNC: 17.9 MMOL/L (ref 22–29)
COD CRY URNS QL: ABNORMAL /HPF
COLOR UR: YELLOW
CREAT BLD-MCNC: 0.84 MG/DL (ref 0.57–1)
D-LACTATE SERPL-SCNC: 3.7 MMOL/L (ref 0.5–2)
DEPRECATED RDW RBC AUTO: 45.4 FL (ref 37–54)
DIGOXIN SERPL-MCNC: 1.07 NG/ML (ref 0.6–1.2)
EOSINOPHIL # BLD AUTO: 0.22 10*3/MM3 (ref 0–0.4)
EOSINOPHIL NFR BLD AUTO: 2 % (ref 0.3–6.2)
ERYTHROCYTE [DISTWIDTH] IN BLOOD BY AUTOMATED COUNT: 14.6 % (ref 12.3–15.4)
FLUAV AG NPH QL: NEGATIVE
FLUBV AG NPH QL IA: NEGATIVE
GFR SERPL CREATININE-BSD FRML MDRD: 66 ML/MIN/1.73
GLOBULIN UR ELPH-MCNC: 3.6 GM/DL
GLUCOSE BLD-MCNC: 195 MG/DL (ref 65–99)
GLUCOSE UR STRIP-MCNC: NEGATIVE MG/DL
HCT VFR BLD AUTO: 39.5 % (ref 34–46.6)
HGB BLD-MCNC: 13.1 G/DL (ref 12–15.9)
HGB UR QL STRIP.AUTO: ABNORMAL
HYALINE CASTS UR QL AUTO: ABNORMAL /LPF
IMM GRANULOCYTES # BLD AUTO: 0.05 10*3/MM3 (ref 0–0.05)
IMM GRANULOCYTES NFR BLD AUTO: 0.4 % (ref 0–0.5)
INR PPP: 2.44 (ref 0.9–1.1)
KETONES UR QL STRIP: ABNORMAL
LDH SERPL-CCNC: 210 U/L (ref 135–214)
LEUKOCYTE ESTERASE UR QL STRIP.AUTO: NEGATIVE
LIPASE SERPL-CCNC: 31 U/L (ref 13–60)
LYMPHOCYTES # BLD AUTO: 0.58 10*3/MM3 (ref 0.7–3.1)
LYMPHOCYTES NFR BLD AUTO: 5.2 % (ref 19.6–45.3)
MCH RBC QN AUTO: 28.2 PG (ref 26.6–33)
MCHC RBC AUTO-ENTMCNC: 33.2 G/DL (ref 31.5–35.7)
MCV RBC AUTO: 85.1 FL (ref 79–97)
MONOCYTES # BLD AUTO: 0.54 10*3/MM3 (ref 0.1–0.9)
MONOCYTES NFR BLD AUTO: 4.9 % (ref 5–12)
NEUTROPHILS # BLD AUTO: 9.71 10*3/MM3 (ref 1.7–7)
NEUTROPHILS NFR BLD AUTO: 87.2 % (ref 42.7–76)
NITRITE UR QL STRIP: NEGATIVE
NRBC BLD AUTO-RTO: 0 /100 WBC (ref 0–0.2)
PH UR STRIP.AUTO: 5.5 [PH] (ref 4.5–8)
PLATELET # BLD AUTO: 170 10*3/MM3 (ref 140–450)
PMV BLD AUTO: 11.2 FL (ref 6–12)
POTASSIUM BLD-SCNC: 4 MMOL/L (ref 3.5–5.2)
PROCALCITONIN SERPL-MCNC: 0.31 NG/ML (ref 0.1–0.25)
PROT SERPL-MCNC: 7.8 G/DL (ref 6–8.5)
PROT UR QL STRIP: ABNORMAL
PROTHROMBIN TIME: 26.8 SECONDS (ref 12.1–15)
RBC # BLD AUTO: 4.64 10*6/MM3 (ref 3.77–5.28)
RBC # UR: ABNORMAL /HPF
REF LAB TEST METHOD: ABNORMAL
SODIUM BLD-SCNC: 134 MMOL/L (ref 136–145)
SP GR UR STRIP: 1.02 (ref 1–1.03)
SQUAMOUS #/AREA URNS HPF: ABNORMAL /HPF
UROBILINOGEN UR QL STRIP: ABNORMAL
WBC NRBC COR # BLD: 11.13 10*3/MM3 (ref 3.4–10.8)
WBC UR QL AUTO: ABNORMAL /HPF

## 2020-03-25 PROCEDURE — 99284 EMERGENCY DEPT VISIT MOD MDM: CPT | Performed by: EMERGENCY MEDICINE

## 2020-03-25 PROCEDURE — 85025 COMPLETE CBC W/AUTO DIFF WBC: CPT | Performed by: EMERGENCY MEDICINE

## 2020-03-25 PROCEDURE — 80162 ASSAY OF DIGOXIN TOTAL: CPT | Performed by: EMERGENCY MEDICINE

## 2020-03-25 PROCEDURE — 80053 COMPREHEN METABOLIC PANEL: CPT | Performed by: EMERGENCY MEDICINE

## 2020-03-25 PROCEDURE — 83690 ASSAY OF LIPASE: CPT | Performed by: EMERGENCY MEDICINE

## 2020-03-25 PROCEDURE — 0100U HC BIOFIRE FILMARRAY RESP PANEL 2: CPT | Performed by: EMERGENCY MEDICINE

## 2020-03-25 PROCEDURE — 93010 ELECTROCARDIOGRAM REPORT: CPT | Performed by: INTERNAL MEDICINE

## 2020-03-25 PROCEDURE — 84145 PROCALCITONIN (PCT): CPT | Performed by: EMERGENCY MEDICINE

## 2020-03-25 PROCEDURE — 87086 URINE CULTURE/COLONY COUNT: CPT | Performed by: EMERGENCY MEDICINE

## 2020-03-25 PROCEDURE — 93005 ELECTROCARDIOGRAM TRACING: CPT | Performed by: EMERGENCY MEDICINE

## 2020-03-25 PROCEDURE — 87804 INFLUENZA ASSAY W/OPTIC: CPT | Performed by: EMERGENCY MEDICINE

## 2020-03-25 PROCEDURE — 86140 C-REACTIVE PROTEIN: CPT | Performed by: FAMILY MEDICINE

## 2020-03-25 PROCEDURE — 71045 X-RAY EXAM CHEST 1 VIEW: CPT

## 2020-03-25 PROCEDURE — 81001 URINALYSIS AUTO W/SCOPE: CPT | Performed by: EMERGENCY MEDICINE

## 2020-03-25 PROCEDURE — 83615 LACTATE (LD) (LDH) ENZYME: CPT | Performed by: EMERGENCY MEDICINE

## 2020-03-25 PROCEDURE — 83605 ASSAY OF LACTIC ACID: CPT | Performed by: EMERGENCY MEDICINE

## 2020-03-25 PROCEDURE — 87040 BLOOD CULTURE FOR BACTERIA: CPT | Performed by: EMERGENCY MEDICINE

## 2020-03-25 PROCEDURE — 71250 CT THORAX DX C-: CPT

## 2020-03-25 PROCEDURE — 99284 EMERGENCY DEPT VISIT MOD MDM: CPT

## 2020-03-25 PROCEDURE — 85610 PROTHROMBIN TIME: CPT | Performed by: EMERGENCY MEDICINE

## 2020-03-25 RX ORDER — ACETAMINOPHEN 500 MG
1000 TABLET ORAL ONCE
Status: COMPLETED | OUTPATIENT
Start: 2020-03-25 | End: 2020-03-25

## 2020-03-25 RX ORDER — CEFTRIAXONE 2 G/1
INJECTION, POWDER, FOR SOLUTION INTRAMUSCULAR; INTRAVENOUS
Status: COMPLETED
Start: 2020-03-25 | End: 2020-03-26

## 2020-03-25 RX ORDER — CEFTRIAXONE 2 G/1
INJECTION, POWDER, FOR SOLUTION INTRAMUSCULAR; INTRAVENOUS
Status: DISPENSED
Start: 2020-03-25 | End: 2020-03-26

## 2020-03-25 RX ORDER — NITROFURANTOIN 25; 75 MG/1; MG/1
100 CAPSULE ORAL DAILY
COMMUNITY
Start: 2020-03-17 | End: 2020-03-27 | Stop reason: HOSPADM

## 2020-03-25 RX ORDER — AZITHROMYCIN 500 MG/1
INJECTION, POWDER, LYOPHILIZED, FOR SOLUTION INTRAVENOUS
Status: COMPLETED
Start: 2020-03-25 | End: 2020-03-26

## 2020-03-25 RX ORDER — SODIUM CHLORIDE 9 MG/ML
INJECTION, SOLUTION INTRAVENOUS
Status: DISPENSED
Start: 2020-03-25 | End: 2020-03-26

## 2020-03-25 RX ORDER — SODIUM CHLORIDE 9 MG/ML
INJECTION, SOLUTION INTRAVENOUS
Status: COMPLETED
Start: 2020-03-25 | End: 2020-03-25

## 2020-03-25 RX ORDER — CEFTRIAXONE 2 G/50ML
2 INJECTION, SOLUTION INTRAVENOUS ONCE
Status: DISCONTINUED | OUTPATIENT
Start: 2020-03-25 | End: 2020-03-27 | Stop reason: HOSPADM

## 2020-03-25 RX ORDER — SODIUM CHLORIDE 9 MG/ML
125 INJECTION, SOLUTION INTRAVENOUS CONTINUOUS
Status: DISCONTINUED | OUTPATIENT
Start: 2020-03-25 | End: 2020-03-27

## 2020-03-25 RX ORDER — SODIUM CHLORIDE 0.9 % (FLUSH) 0.9 %
10 SYRINGE (ML) INJECTION AS NEEDED
Status: DISCONTINUED | OUTPATIENT
Start: 2020-03-25 | End: 2020-03-27 | Stop reason: HOSPADM

## 2020-03-25 RX ADMIN — SODIUM CHLORIDE 125 ML/HR: 9 INJECTION, SOLUTION INTRAVENOUS at 23:01

## 2020-03-25 RX ADMIN — SODIUM CHLORIDE 125 ML/HR: 9 INJECTION, SOLUTION INTRAVENOUS at 23:20

## 2020-03-25 RX ADMIN — SODIUM CHLORIDE 500 ML: 9 INJECTION, SOLUTION INTRAVENOUS at 21:49

## 2020-03-25 RX ADMIN — ACETAMINOPHEN 1000 MG: 500 TABLET, FILM COATED ORAL at 21:49

## 2020-03-26 LAB
ANION GAP SERPL CALCULATED.3IONS-SCNC: 11.2 MMOL/L (ref 5–15)
B PARAPERT DNA SPEC QL NAA+PROBE: NOT DETECTED
B PERT DNA SPEC QL NAA+PROBE: NOT DETECTED
BUN BLD-MCNC: 17 MG/DL (ref 8–23)
BUN/CREAT SERPL: 23.3 (ref 7–25)
C PNEUM DNA NPH QL NAA+NON-PROBE: NOT DETECTED
CALCIUM SPEC-SCNC: 10.2 MG/DL (ref 8.6–10.5)
CHLORIDE SERPL-SCNC: 103 MMOL/L (ref 98–107)
CO2 SERPL-SCNC: 21.8 MMOL/L (ref 22–29)
CREAT BLD-MCNC: 0.73 MG/DL (ref 0.57–1)
CRP SERPL-MCNC: 8.38 MG/DL (ref 0–0.5)
D-LACTATE SERPL-SCNC: 2.6 MMOL/L (ref 0.5–2)
DEPRECATED RDW RBC AUTO: 47.4 FL (ref 37–54)
ERYTHROCYTE [DISTWIDTH] IN BLOOD BY AUTOMATED COUNT: 14.6 % (ref 12.3–15.4)
FERRITIN SERPL-MCNC: 168 NG/ML (ref 13–150)
FLUAV H1 2009 PAND RNA NPH QL NAA+PROBE: NOT DETECTED
FLUAV H1 HA GENE NPH QL NAA+PROBE: NOT DETECTED
FLUAV H3 RNA NPH QL NAA+PROBE: NOT DETECTED
FLUAV SUBTYP SPEC NAA+PROBE: NOT DETECTED
FLUBV RNA ISLT QL NAA+PROBE: NOT DETECTED
GFR SERPL CREATININE-BSD FRML MDRD: 77 ML/MIN/1.73
GLUCOSE BLD-MCNC: 164 MG/DL (ref 65–99)
GLUCOSE BLDC GLUCOMTR-MCNC: 112 MG/DL (ref 70–130)
GLUCOSE BLDC GLUCOMTR-MCNC: 121 MG/DL (ref 70–130)
GLUCOSE BLDC GLUCOMTR-MCNC: 157 MG/DL (ref 70–130)
GLUCOSE BLDC GLUCOMTR-MCNC: 231 MG/DL (ref 70–130)
GLUCOSE BLDC GLUCOMTR-MCNC: 359 MG/DL (ref 70–130)
HADV DNA SPEC NAA+PROBE: NOT DETECTED
HCOV 229E RNA SPEC QL NAA+PROBE: NOT DETECTED
HCOV HKU1 RNA SPEC QL NAA+PROBE: NOT DETECTED
HCOV NL63 RNA SPEC QL NAA+PROBE: NOT DETECTED
HCOV OC43 RNA SPEC QL NAA+PROBE: NOT DETECTED
HCT VFR BLD AUTO: 35.3 % (ref 34–46.6)
HGB BLD-MCNC: 11.2 G/DL (ref 12–15.9)
HMPV RNA NPH QL NAA+NON-PROBE: NOT DETECTED
HPIV1 RNA SPEC QL NAA+PROBE: NOT DETECTED
HPIV2 RNA SPEC QL NAA+PROBE: NOT DETECTED
HPIV3 RNA NPH QL NAA+PROBE: NOT DETECTED
HPIV4 P GENE NPH QL NAA+PROBE: NOT DETECTED
INR PPP: 2.52 (ref 0.9–1.1)
INR PPP: 2.53 (ref 0.9–1.1)
LACTATE HOLD SPECIMEN: NORMAL
M PNEUMO IGG SER IA-ACNC: NOT DETECTED
MCH RBC QN AUTO: 27.9 PG (ref 26.6–33)
MCHC RBC AUTO-ENTMCNC: 31.7 G/DL (ref 31.5–35.7)
MCV RBC AUTO: 88 FL (ref 79–97)
NT-PROBNP SERPL-MCNC: 2355 PG/ML (ref 5–1800)
PLATELET # BLD AUTO: 139 10*3/MM3 (ref 140–450)
PMV BLD AUTO: 11.4 FL (ref 6–12)
POTASSIUM BLD-SCNC: 4.2 MMOL/L (ref 3.5–5.2)
PROCALCITONIN SERPL-MCNC: 0.77 NG/ML (ref 0.1–0.25)
PROTHROMBIN TIME: 27.5 SECONDS (ref 12.1–15)
PROTHROMBIN TIME: 27.6 SECONDS (ref 12.1–15)
RBC # BLD AUTO: 4.01 10*6/MM3 (ref 3.77–5.28)
RHINOVIRUS RNA SPEC NAA+PROBE: NOT DETECTED
RSV RNA NPH QL NAA+NON-PROBE: NOT DETECTED
SODIUM BLD-SCNC: 136 MMOL/L (ref 136–145)
TSH SERPL DL<=0.05 MIU/L-ACNC: 0.86 UIU/ML (ref 0.27–4.2)
WBC NRBC COR # BLD: 8.47 10*3/MM3 (ref 3.4–10.8)

## 2020-03-26 PROCEDURE — 84443 ASSAY THYROID STIM HORMONE: CPT | Performed by: FAMILY MEDICINE

## 2020-03-26 PROCEDURE — 82962 GLUCOSE BLOOD TEST: CPT

## 2020-03-26 PROCEDURE — 63710000001 INSULIN DETEMIR PER 5 UNITS: Performed by: FAMILY MEDICINE

## 2020-03-26 PROCEDURE — 25010000002 AZITHROMYCIN PER 500 MG

## 2020-03-26 PROCEDURE — 84145 PROCALCITONIN (PCT): CPT | Performed by: FAMILY MEDICINE

## 2020-03-26 PROCEDURE — 83605 ASSAY OF LACTIC ACID: CPT | Performed by: EMERGENCY MEDICINE

## 2020-03-26 PROCEDURE — 82728 ASSAY OF FERRITIN: CPT | Performed by: HOSPITALIST

## 2020-03-26 PROCEDURE — 25010000002 CEFTRIAXONE PER 250 MG

## 2020-03-26 PROCEDURE — 63710000001 INSULIN ASPART PER 5 UNITS: Performed by: FAMILY MEDICINE

## 2020-03-26 PROCEDURE — 25010000002 CEFTRIAXONE SODIUM-DEXTROSE 1-3.74 GM-%(50ML) RECONSTITUTED SOLUTION: Performed by: HOSPITALIST

## 2020-03-26 PROCEDURE — U0001 2019-NCOV DIAGNOSTIC P: HCPCS | Performed by: FAMILY MEDICINE

## 2020-03-26 PROCEDURE — 85610 PROTHROMBIN TIME: CPT | Performed by: FAMILY MEDICINE

## 2020-03-26 PROCEDURE — 80048 BASIC METABOLIC PNL TOTAL CA: CPT | Performed by: FAMILY MEDICINE

## 2020-03-26 PROCEDURE — 94799 UNLISTED PULMONARY SVC/PX: CPT

## 2020-03-26 PROCEDURE — 83880 ASSAY OF NATRIURETIC PEPTIDE: CPT | Performed by: FAMILY MEDICINE

## 2020-03-26 PROCEDURE — 85027 COMPLETE CBC AUTOMATED: CPT | Performed by: FAMILY MEDICINE

## 2020-03-26 RX ORDER — WARFARIN SODIUM 5 MG/1
5 TABLET ORAL
Status: DISCONTINUED | OUTPATIENT
Start: 2020-03-27 | End: 2020-03-27 | Stop reason: HOSPADM

## 2020-03-26 RX ORDER — ALBUTEROL SULFATE 2.5 MG/3ML
2.5 SOLUTION RESPIRATORY (INHALATION) EVERY 4 HOURS PRN
Status: DISCONTINUED | OUTPATIENT
Start: 2020-03-26 | End: 2020-03-27 | Stop reason: HOSPADM

## 2020-03-26 RX ORDER — SODIUM CHLORIDE 9 MG/ML
40 INJECTION, SOLUTION INTRAVENOUS AS NEEDED
Status: DISCONTINUED | OUTPATIENT
Start: 2020-03-26 | End: 2020-03-27 | Stop reason: HOSPADM

## 2020-03-26 RX ORDER — CEFTRIAXONE 1 G/50ML
1 INJECTION, SOLUTION INTRAVENOUS EVERY 24 HOURS
Status: DISCONTINUED | OUTPATIENT
Start: 2020-03-26 | End: 2020-03-27 | Stop reason: HOSPADM

## 2020-03-26 RX ORDER — ACETAMINOPHEN 325 MG/1
650 TABLET ORAL EVERY 4 HOURS PRN
Status: DISCONTINUED | OUTPATIENT
Start: 2020-03-26 | End: 2020-03-27 | Stop reason: HOSPADM

## 2020-03-26 RX ORDER — LEVOTHYROXINE SODIUM 0.05 MG/1
50 TABLET ORAL
Status: DISCONTINUED | OUTPATIENT
Start: 2020-03-26 | End: 2020-03-26

## 2020-03-26 RX ORDER — NICOTINE POLACRILEX 4 MG
15 LOZENGE BUCCAL
Status: DISCONTINUED | OUTPATIENT
Start: 2020-03-26 | End: 2020-03-27 | Stop reason: HOSPADM

## 2020-03-26 RX ORDER — METOPROLOL SUCCINATE 50 MG/1
200 TABLET, EXTENDED RELEASE ORAL EVERY MORNING
Status: DISCONTINUED | OUTPATIENT
Start: 2020-03-26 | End: 2020-03-26

## 2020-03-26 RX ORDER — SODIUM CHLORIDE 0.9 % (FLUSH) 0.9 %
10 SYRINGE (ML) INJECTION EVERY 12 HOURS SCHEDULED
Status: DISCONTINUED | OUTPATIENT
Start: 2020-03-26 | End: 2020-03-27 | Stop reason: HOSPADM

## 2020-03-26 RX ORDER — LEVOTHYROXINE SODIUM 0.05 MG/1
50 TABLET ORAL DAILY
Status: DISCONTINUED | OUTPATIENT
Start: 2020-03-26 | End: 2020-03-27

## 2020-03-26 RX ORDER — DIGOXIN 250 MCG
250 TABLET ORAL
Status: DISCONTINUED | OUTPATIENT
Start: 2020-03-26 | End: 2020-03-27 | Stop reason: HOSPADM

## 2020-03-26 RX ORDER — ATORVASTATIN CALCIUM 10 MG/1
10 TABLET, FILM COATED ORAL 2 TIMES WEEKLY
Status: DISCONTINUED | OUTPATIENT
Start: 2020-03-26 | End: 2020-03-27 | Stop reason: HOSPADM

## 2020-03-26 RX ORDER — METOPROLOL SUCCINATE 50 MG/1
200 TABLET, EXTENDED RELEASE ORAL DAILY
Status: DISCONTINUED | OUTPATIENT
Start: 2020-03-26 | End: 2020-03-27 | Stop reason: HOSPADM

## 2020-03-26 RX ORDER — PANTOPRAZOLE SODIUM 40 MG/1
40 TABLET, DELAYED RELEASE ORAL DAILY
Status: DISCONTINUED | OUTPATIENT
Start: 2020-03-26 | End: 2020-03-27 | Stop reason: HOSPADM

## 2020-03-26 RX ORDER — SODIUM CHLORIDE 0.9 % (FLUSH) 0.9 %
10 SYRINGE (ML) INJECTION AS NEEDED
Status: DISCONTINUED | OUTPATIENT
Start: 2020-03-26 | End: 2020-03-27 | Stop reason: HOSPADM

## 2020-03-26 RX ORDER — DEXTROSE MONOHYDRATE 25 G/50ML
25 INJECTION, SOLUTION INTRAVENOUS
Status: DISCONTINUED | OUTPATIENT
Start: 2020-03-26 | End: 2020-03-27 | Stop reason: HOSPADM

## 2020-03-26 RX ORDER — AZITHROMYCIN 500 MG/1
INJECTION, POWDER, LYOPHILIZED, FOR SOLUTION INTRAVENOUS
Status: DISPENSED
Start: 2020-03-26 | End: 2020-03-27

## 2020-03-26 RX ORDER — WARFARIN SODIUM 2.5 MG/1
7.5 TABLET ORAL
Status: DISCONTINUED | OUTPATIENT
Start: 2020-03-26 | End: 2020-03-27 | Stop reason: HOSPADM

## 2020-03-26 RX ADMIN — SODIUM CHLORIDE, PRESERVATIVE FREE 10 ML: 5 INJECTION INTRAVENOUS at 02:15

## 2020-03-26 RX ADMIN — SODIUM CHLORIDE, PRESERVATIVE FREE 10 ML: 5 INJECTION INTRAVENOUS at 21:37

## 2020-03-26 RX ADMIN — DIGOXIN 250 MCG: 250 TABLET ORAL at 09:05

## 2020-03-26 RX ADMIN — METOPROLOL SUCCINATE 200 MG: 50 TABLET, EXTENDED RELEASE ORAL at 08:51

## 2020-03-26 RX ADMIN — CEFTRIAXONE SODIUM 2000 MG: 2 INJECTION, POWDER, FOR SOLUTION INTRAMUSCULAR; INTRAVENOUS at 00:13

## 2020-03-26 RX ADMIN — AZITHROMYCIN MONOHYDRATE 500 MG: 500 INJECTION, POWDER, LYOPHILIZED, FOR SOLUTION INTRAVENOUS at 00:21

## 2020-03-26 RX ADMIN — INSULIN DETEMIR 40 UNITS: 100 INJECTION, SOLUTION SUBCUTANEOUS at 09:05

## 2020-03-26 RX ADMIN — LEVOTHYROXINE SODIUM 50 MCG: 50 TABLET ORAL at 08:52

## 2020-03-26 RX ADMIN — SODIUM CHLORIDE 125 ML/HR: 9 INJECTION, SOLUTION INTRAVENOUS at 17:00

## 2020-03-26 RX ADMIN — WARFARIN SODIUM 7.5 MG: 2.5 TABLET ORAL at 17:02

## 2020-03-26 RX ADMIN — ATORVASTATIN CALCIUM 10 MG: 10 TABLET, FILM COATED ORAL at 08:52

## 2020-03-26 RX ADMIN — PANTOPRAZOLE SODIUM 40 MG: 40 TABLET, DELAYED RELEASE ORAL at 08:52

## 2020-03-26 RX ADMIN — CEFTRIAXONE 1 G: 1 INJECTION, SOLUTION INTRAVENOUS at 22:30

## 2020-03-26 RX ADMIN — INSULIN ASPART 3 UNITS: 100 INJECTION, SOLUTION INTRAVENOUS; SUBCUTANEOUS at 11:10

## 2020-03-26 RX ADMIN — SACUBITRIL AND VALSARTAN 1 TABLET: 24; 26 TABLET, FILM COATED ORAL at 08:52

## 2020-03-26 RX ADMIN — SACUBITRIL AND VALSARTAN 1 TABLET: 24; 26 TABLET, FILM COATED ORAL at 21:31

## 2020-03-26 RX ADMIN — INSULIN ASPART 2 UNITS: 100 INJECTION, SOLUTION INTRAVENOUS; SUBCUTANEOUS at 21:31

## 2020-03-26 NOTE — PLAN OF CARE
Problem: Patient Care Overview  Goal: Plan of Care Review  Outcome: Ongoing (interventions implemented as appropriate)  Flowsheets (Taken 3/26/2020 0606)  Progress: no change  Plan of Care Reviewed With: patient; spouse  Outcome Summary: pt admitted to the floor. In isolation awaiting results for RVP and Covid-19. A&O x4. call light in place Pt on 2L NC. Will continue to monitor.

## 2020-03-26 NOTE — NURSING NOTE
Discharge Planning Assessment  [facility]     Patient Name: Fernanda Molina  MRN: [id number]  Today's Date: 3/26/2020    Admit Date: 3/25/2020    Discharge Needs Assessment     Row Name 03/26/20 1304       Living Environment    Lives With  spouse    Name(s) of Who Lives With Patient   Crispin    Current Living Arrangements  home/apartment/condo    Primary Care Provided by  self    Provides Primary Care For  no one    Family Caregiver if Needed  spouse    Family Caregiver Names   Crispin    Quality of Family Relationships  helpful;involved;supportive    Able to Return to Prior Arrangements  yes       Resource/Environmental Concerns    Resource/Environmental Concerns  none       Transition Planning    Patient/Family Anticipates Transition to  home with family    Transportation Anticipated  family or friend will provide       Discharge Needs Assessment    Readmission Within the Last 30 Days  no previous admission in last 30 days    Concerns to be Addressed  discharge planning    Equipment Currently Used at Home  BiPAP/CPAP    Anticipated Changes Related to Illness  none    Equipment Needed After Discharge  -- Continue to assess        Discharge Plan     Row Name 03/26/20 7167       Plan    Plan  Home when stable    Provided Post Acute Provider List?  N/A    N/A Provider List Comment  No needs at this time    Provided Post Acute Provider Quality & Resource List?  N/A    N/A Quality & Resource List Comment  No needs at this time    Patient/Family in Agreement with Plan  yes    Plan Comments  Spoke with Mrs Molina via telephone as she is in isolation.  She and her  live in a home in [place].  Facesheet verified.  She does not have home health or oxygen at home.  She has a CPAP provided by  [employer].  She is independent with her ADL's and she drives herself.  Her pharmacy is [facility] in [place].  She has an advanced directive and a copy  was requested.  Plan is home when stable.  Will continue to follow         Destination      Coordination has not been started for this encounter.      Durable Medical Equipment      Coordination has not been started for this encounter.      Dialysis/Infusion      Coordination has not been started for this encounter.      Home Medical Care      Coordination has not been started for this encounter.      Therapy      Coordination has not been started for this encounter.      Community Resources      Coordination has not been started for this encounter.          Demographic Summary     Row Name 03/26/20 1305       General Information    Admission Type  inpatient    Arrived From  home    Referral Source  admission list    Reason for Consult  discharge planning    Preferred Language  English     Used During This Interaction  no       Contact Information    Permission Granted to Share Info With          Functional Status    No documentation.       Psychosocial    No documentation.       Abuse/Neglect    No documentation.       Legal    No documentation.       Substance Abuse    No documentation.       Patient Forms    No documentation.           Hanna Shelby RN

## 2020-03-26 NOTE — SIGNIFICANT NOTE
I spoke the [address] hotline regarding high clinical suspicion of disease.  Will proceed with testing.  I filled out her PUI form  Her ID is  [id number].    Rashid Ge MD

## 2020-03-26 NOTE — ED NOTES
Patient presents to the ER with c/o weakness x 3 days but it is severely impairing her mobility today. She saw her provider two days ago and is being treated for a UTI but is unsure of the ABX she is taking for it. While transferring from wheelchair to bed the patient became SOB and once she sat on the bed she could not hold her self up. She laid back and was not aligned with the bed. I attempted to help patient sit up in the bed to catch her breath and help her lay correctly in the bed. She did not have enough strength to pull on my hands to pull herself up. Patient was very anxious and very scared. I hit the call light for help and immediately the MD was at the door for his assessment. We were able to sit the patient up in bed and position her in a way she could catch her breath.      Kathleen Barragan RN  03/25/20 8692

## 2020-03-26 NOTE — PROGRESS NOTES
Adult Nutrition  Assessment/PES    Patient Name:  Fernanda Molina  YOB: 1940  MRN: [id number]  Admit Date:  3/25/2020    Assessment Date:  3/26/2020    Comments:  Add What You Need to Know about Warafin to AVS  Will cont to follow & provide edu as able.     Reason for Assessment     Row Name 03/26/20 1212          Reason for Assessment    Reason For Assessment  identified at risk by screening criteria coumadin     Diagnosis  pulmonary disease;infection/sepsis;diabetes diagnosis/complications PNA, Sepsis, recent UTI, r/o covid hx DM         Nutrition/Diet History     Row Name 03/26/20 1213          Nutrition/Diet History    Typical Food/Fluid Intake  Called pt room x 2, busy. Currently in co-vid r/o isolation. Pt on coumadin at home per med list         Anthropometrics     Row Name 03/26/20 1213          Anthropometrics    Weight  91.7 kg (202 lb 2.6 oz)        Body Mass Index (BMI)    BMI Assessment  BMI 35-39.9: obesity grade II         Labs/Tests/Procedures/Meds     Row Name 03/26/20 1214          Labs/Procedures/Meds    Lab Results Reviewed  reviewed     Lab Results Comments  glu [id number]        Diagnostic Tests/Procedures    Diagnostic Test/Procedure Reviewed  reviewed        Medications    Pertinent Medications Reviewed  reviewed     Pertinent Medications Comments  novolog, levemir, coumadin ( home med)            Estimated/Assessed Needs     Row Name 03/26/20 1214          Estimated/Assessed Needs    Additional Documentation  Calorie Requirements (Group);Protein Requirements (Group);Fluid Requirements (Group)        Calorie Requirements    Estimated Calorie Need Method  [place]-St Jeor     Estimated Calorie Requirement Comment  4004-7536 kcal ( mifflin 0-1.2 )  153-183 gm CHO, 45% kcal         Protein Requirements    Est Protein Requirement Amount (gms/kg)  0.8 gm protein 74 gm pro        Fluid Requirements    Estimated Fluid Requirement Method  RDA Method 1628 ml          Nutrition  Prescription Ordered     Row Name 03/26/20 1216          Nutrition Prescription PO    Common Modifiers  Cardiac;Consistent Carbohydrate         Evaluation of Received Nutrient/Fluid Intake     Row Name 03/26/20 1216          Fluid Intake Evaluation    Oral Fluid (mL)  -- insufficient data        PO Evaluation    Number of Days PO Intake Evaluated  Insufficient Data               Problem/Interventions:  Problem 1     Row Name 03/26/20 1217          Nutrition Diagnoses Problem 1    Problem 1  Knowledge Deficit     Etiology (related to)  Medication Interaction     Food/Medication Interaction  Anticoagulant     Signs/Symptoms (evidenced by)  Potential Information Deficit               Intervention Goal     Row Name 03/26/20 1217          Intervention Goal    General  Meet nutritional needs for age/condition;Provide information regarding MNT for treatment/condition     PO  PO intake (%)     PO Intake %  55 % or greater         Nutrition Intervention     Row Name 03/26/20 1217          Nutrition Intervention    RD/Tech Action  Follow Tx progress           Education/Evaluation     Row Name 03/26/20 1217          Education    Education  Education topics;Education not appropriate at this time Unable to speak w pt today, phone busy x 2      Education Topics  Vitamin K;Cardiac diabetic        Monitor/Evaluation    Monitor  Per protocol;I&O;PO intake;Pertinent labs;Weight;Symptoms           Electronically signed by:  Yaritza Schwab RD  03/26/20 12:18

## 2020-03-26 NOTE — TELEPHONE ENCOUNTER
On call coverage:     calling. Pt has chills, fever (102 F), hyperglycemia to 231. SpO2 94% (home pulseox), intermittent abd pain. No respiratory symptoms, n/v/d, dizziness, or chest pain.     Due to comorbidities recommend pt present to ER for further eval. Recommended that she wear a mask despite lack of respiratory sx in context of COVID19 pandemic. Gave checkout to  [facility]

## 2020-03-26 NOTE — ED PROVIDER NOTES
EMERGENCY DEPARTMENT ENCOUNTER      Room Number: 1B/1B      HPI:    Chief complaint: Fever, weakness and fatigue    Location: Not applicable    Quality/Severity: Severe    Timing/Duration: Symptoms started approximately 36 hours ago    Modifying Factors: None    Associated Symptoms: Too weak to ambulate    Narrative: Pt is a 79 y.o. female who presents complaining of fever, weakness and fatigue as noted above.  It should be noted that it is very difficult to get significant history out of this patient, but what could be ascertained as that the symptoms started approximately 36 hours ago.  Patient initially complained of severe fatigue and weakness which was followed by fever that started this evening.  Patient does admit to shortness of breath but states that she is always short of breath and her current symptoms are not worse than usual.  Patient denies headache, neck pain, chest pain, abdominal pain and also urinary symptoms.  Patient states that she is currently being treated for a urinary tract infection with an unknown antibiotic      PMD: Rashid Ge MD    REVIEW OF SYSTEMS  Review of Systems   Reason unable to perform ROS: Difficult to get complete history from patient and a review of systems should not be construed as complete.   Constitutional: Positive for activity change, fatigue and fever.   Respiratory: Positive for shortness of breath (Chronic). Negative for wheezing.    Cardiovascular: Positive for palpitations (Known A. fib). Negative for chest pain and leg swelling.   Gastrointestinal: Negative for abdominal pain.   Genitourinary: Positive for frequency (Chronic). Negative for dysuria.   Skin: Negative for rash.   Neurological: Positive for weakness (Generalized).   Psychiatric/Behavioral: Positive for confusion (Possible since symptoms started).   All other systems reviewed and are negative.      PAST MEDICAL HISTORY  Active Ambulatory Problems     Diagnosis Date Noted   • S/P mitral valve  repair 03/04/2016   • S/P tricuspid valve repair 03/04/2016   • S/P Maze operation for atrial fibrillation 03/04/2016   • Morbidly obese (CMS/HCC) 08/18/2016   • Atrial fibrillation (CMS/HCC) 08/18/2016   • Acquired hypothyroidism 08/18/2016   • COPD (chronic obstructive pulmonary disease) (CMS/HCC) 08/18/2016   • CAD (coronary artery disease), hx MI 08/18/2016   • HAMLET on autoCPAP 08/18/2016   • Renal calculus, right 08/18/2016   • Hyperparathyroidism (CMS/HCC) 08/19/2016   • Type 2 diabetes mellitus without complication, with long-term current use of insulin (CMS/HCC) 11/10/2017   • Warfarin anticoagulation 11/10/2017   • Medication monitoring encounter 11/13/2017   • Medicare annual wellness visit, subsequent 02/16/2018   • Pacemaker 02/16/2018   • AICD (automatic cardioverter/defibrillator) present 02/16/2018   • Right foot pain 01/11/2019   • Chronic nonallergic rhinitis 01/11/2019   • Pulmonary hypertension (CMS/HCC) 03/18/2019   • Mitral valve disease 05/10/2019   • Hospital discharge follow-up 05/29/2019   • Hypersomnia due to medical condition 07/27/2019   • Ureteral calculus, left 01/30/2020   • 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     Resolved Ambulatory Problems     Diagnosis Date Noted   • Epigastric pain 08/17/2016   • Hypercalcemia 08/18/2016   • Hematuria 08/18/2016   • Postmenopausal bleeding 07/27/2016     Past Medical History:   Diagnosis Date   • Anemia    • Arthritis    • Cancer (CMS/[facility]) 2002   • CHF (congestive heart failure) (CMS/HCC)    • Congenital heart disease    • Coronary artery disease    • Diabetes mellitus (CMS/[facility])    • GERD (gastroesophageal reflux disease)    • Graves disease    • High cholesterol    • History of kidney stones    • History of melanoma 2002   • History of MI (myocardial infarction) 2010   • Hypertension    • Hypothyroidism    • Incontinence of urine    • Kidney stones    • On anticoagulant therapy    • PONV (postoperative nausea and vomiting)    • Spinal stenosis    • Valvular  heart disease 2015       PAST SURGICAL HISTORY  Past Surgical History:   Procedure Laterality Date   • CARDIAC CATHETERIZATION     • CARDIAC DEFIBRILLATOR PLACEMENT Left     [facility]   • CARDIAC DEFIBRILLATOR PLACEMENT Left 2016    [facility]   •  SECTION     •  SECTION     • CHOLECYSTECTOMY     • COLONOSCOPY     • ENDOSCOPY     • EPIDURAL BLOCK  [id number]    Lumbar    • EXTRACORPOREAL SHOCK WAVE LITHOTRIPSY (ESWL) Right 2017   • EXTRACORPOREAL SHOCK WAVE LITHOTRIPSY (ESWL) Right 2017    Procedure: RT EXTRACORPOREAL SHOCKWAVE LITHOTRIPSY;  Surgeon: Moisés Everett MD;  Location: [facility];  Service:    • EYE SURGERY Right 09/10/2007    Cataract   • EYE SURGERY Left 10/29/2007    Cataract   • JOINT REPLACEMENT Left 2007    Knee   • JOINT REPLACEMENT Right 2010    Knee   • KNEE ARTHROSCOPY     • MAZE PROCEDURE  2015    Dr. Saavedra   • MITRAL VALVE REPAIR/REPLACEMENT     • MITRAL VALVE REPAIR/REPLACEMENT N/A 2019    Procedure: ALINE RE-OP STERNOTOMY MITRAL VALVE REPLACEMENT AND PRP;  Surgeon: Ben Saavedra MD;  Location: [facility] OR;  Service: Cardiothoracic   • RI ERCP DX COLLECTION SPECIMEN BRUSHING/WASHING N/A 2016    Procedure: ENDOSCOPIC RETROGRADE CHOLANGIOPANCREATOGRAPHY WITH SPHINCTEROTOMY AND BALLOON SWEEP OF CBD;  Surgeon: Lucas Campos MD;  Location: [facility] ENDOSCOPY;  Service: Gastroenterology   • SKIN CANCER EXCISION      MELANOMA RIGHT KNEE AREA -    • TRICUSPID VALVE SURGERY  2015    Dr. Saavedra  ring placed   • URETEROSCOPY LASER LITHOTRIPSY WITH STENT INSERTION Right 11/15/2017    Procedure: CYSTOSCOPY; RIGHT URETEROSCOPY LASER LITHOTRIPSY WITH STENT INSERTION;  Surgeon: Moisés Everett MD;  Location: [facility] OR;  Service:    • URETEROSCOPY LASER LITHOTRIPSY WITH STENT INSERTION Left 2020    Procedure: URETEROSCOPY STONE BASKET EXTRACTION LASER LITHOTRIPSY WITH STENT  INSERTION WITH STRING;  Surgeon: Moisés Everett MD;  Location: [facility] OR;  Service: Urology       FAMILY HISTORY  Family History   Problem Relation Age of Onset   • Heart attack Mother    • Heart disease Mother    • Hypertension Mother    • Stroke Mother    • Macular degeneration Mother    • Heart attack Father    • Malig Hyperthermia Neg Hx        SOCIAL HISTORY  Social History     Socioeconomic History   • Marital status:      Spouse name: Not on file   • Number of children: Not on file   • Years of education: Not on file   • Highest education level: Not on file   Tobacco Use   • Smoking status: Never Smoker   • Smokeless tobacco: Never Used   Substance and Sexual Activity   • Alcohol use: No   • Drug use: No   • Sexual activity: Defer       ALLERGIES  Corticosteroids; Adhesive tape; Hydrocodone; and Lisinopril    PHYSICAL EXAM  ED Triage Vitals [03/25/20 2127]   Temp Heart Rate Resp BP SpO2   (!) 101.5 °F (38.6 °C) 98 (!) 30 179/95 93 %      Temp src Heart Rate Source Patient Position BP Location FiO2 (%)   Oral Monitor Lying Right arm --       Physical Exam   Constitutional:   The patient is an obese, 79-year-old, white female who appears weak and ill.   HENT:   Head: Normocephalic and atraumatic.   Eyes: Conjunctivae and EOM are normal.   Neck: Normal range of motion. Neck supple.   Cardiovascular:   Mild tachycardia with an irregularly irregular rhythm.   Pulmonary/Chest:   Mild tachypnea with adequate exchange of air bilaterally.  No gross rales, rhonchi or wheezes.   Abdominal: Soft. There is no tenderness.   Musculoskeletal: Normal range of motion. She exhibits no edema.   Neurological:   Lethargic   Skin: Skin is warm and dry. No rash noted.   Psychiatric:   Unable to ascertain       LAB RESULTS  Results for orders placed or performed during the hospital encounter of 03/25/20   Influenza Antigen, Rapid - Swab, Nasopharynx   Result Value Ref Range    Influenza A Ag, EIA Negative Negative     Influenza B Ag, EIA Negative Negative   Comprehensive Metabolic Panel   Result Value Ref Range    Glucose 195 (H) 65 - 99 mg/dL    BUN 18 8 - 23 mg/dL    Creatinine 0.84 0.57 - 1.00 mg/dL    Sodium 134 (L) 136 - 145 mmol/L    Potassium 4.0 3.5 - 5.2 mmol/L    Chloride 98 98 - 107 mmol/L    CO2 17.9 (L) 22.0 - 29.0 mmol/L    Calcium 11.5 (H) 8.6 - 10.5 mg/dL    Total Protein 7.8 6.0 - 8.5 g/dL    Albumin 4.20 3.50 - 5.20 g/dL    ALT (SGPT) 23 1 - 33 U/L    AST (SGOT) 24 1 - 32 U/L    Alkaline Phosphatase 63 39 - 117 U/L    Total Bilirubin 1.2 0.2 - 1.2 mg/dL    eGFR Non African Amer 66 >60 mL/min/1.73    Globulin 3.6 gm/dL    A/G Ratio 1.2 g/dL    BUN/Creatinine Ratio 21.4 7.0 - 25.0    Anion Gap 18.1 (H) 5.0 - 15.0 mmol/L   Lactate Dehydrogenase   Result Value Ref Range     135 - 214 U/L   Procalcitonin   Result Value Ref Range    Procalcitonin 0.31 (H) 0.10 - 0.25 ng/mL   Lactic Acid, Plasma   Result Value Ref Range    Lactate 3.7 (C) 0.5 - 2.0 mmol/L   Lipase   Result Value Ref Range    Lipase 31 13 - 60 U/L   Urinalysis With Culture If Indicated - Urine, Catheter   Result Value Ref Range    Color, UA Yellow Yellow, Straw    Appearance, UA Clear Clear    pH, UA 5.5 4.5 - 8.0    Specific Gravity, UA 1.025 1.003 - 1.030    Glucose, UA Negative Negative    Ketones, UA Trace (A) Negative    Bilirubin, UA Small (1+) (A) Negative    Blood, UA Small (1+) (A) Negative    Protein, UA >=300 mg/dL (3+) (A) Negative    Leuk Esterase, UA Negative Negative    Nitrite, UA Negative Negative    Urobilinogen, UA 2.0 E.U./dL (A) 0.2 - 1.0 E.U./dL   Protime-INR   Result Value Ref Range    Protime 26.8 (H) 12.1 - 15.0 Seconds    INR 2.44 (H) 0.90 - 1.10   Digoxin Level   Result Value Ref Range    Digoxin 1.07 0.60 - 1.20 ng/mL   CBC Auto Differential   Result Value Ref Range    WBC 11.13 (H) 3.40 - 10.80 10*3/mm3    RBC 4.64 3.77 - 5.28 10*6/mm3    Hemoglobin 13.1 12.0 - 15.9 g/dL    Hematocrit 39.5 34.0 - 46.6 %    MCV 85.1  79.0 - 97.0 fL    MCH 28.2 26.6 - 33.0 pg    MCHC 33.2 31.5 - 35.7 g/dL    RDW 14.6 12.3 - 15.4 %    RDW-SD 45.4 37.0 - 54.0 fl    MPV 11.2 6.0 - 12.0 fL    Platelets 170 140 - 450 10*3/mm3    Neutrophil % 87.2 (H) 42.7 - 76.0 %    Lymphocyte % 5.2 (L) 19.6 - 45.3 %    Monocyte % 4.9 (L) 5.0 - 12.0 %    Eosinophil % 2.0 0.3 - 6.2 %    Basophil % 0.3 0.0 - 1.5 %    Immature Grans % 0.4 0.0 - 0.5 %    Neutrophils, Absolute 9.71 (H) 1.70 - 7.00 10*3/mm3    Lymphocytes, Absolute 0.58 (L) 0.70 - 3.10 10*3/mm3    Monocytes, Absolute 0.54 0.10 - 0.90 10*3/mm3    Eosinophils, Absolute 0.22 0.00 - 0.40 10*3/mm3    Basophils, Absolute 0.03 0.00 - 0.20 10*3/mm3    Immature Grans, Absolute 0.05 0.00 - 0.05 10*3/mm3    nRBC 0.0 0.0 - 0.2 /100 WBC   Urinalysis, Microscopic Only - Urine, Catheter   Result Value Ref Range    RBC, UA 3-5 (A) None Seen /HPF    WBC, UA 6-12 (A) None Seen /HPF    Bacteria, UA 3+ (A) None Seen /HPF    Squamous Epithelial Cells, UA 3-6 (A) None Seen, 0-2 /HPF    Hyaline Casts, UA None Seen None Seen /LPF    Calcium Oxalate Crystals, UA Small/1+ None Seen /HPF    Amorphous Crystals, UA Moderate/2+ None Seen /HPF    Methodology Manual Light Microscopy          I ordered the above labs and reviewed the results    RADIOLOGY  Ct Chest Without Contrast    Result Date: 3/25/2020  Narrative: CT Chest WO INDICATION: Shortness of air with fever and generalized malaise over the last 3 days. Leukocytosis. TECHNIQUE: CT of the thorax without IV contrast. Coronal and sagittal reconstructions were obtained.  Radiation dose reduction techniques included automated exposure control or exposure modulation based on body size. Count of known CT and cardiac nuc med studies performed in previous 12 months: 0. COMPARISON: None available. FINDINGS: Heart is enlarged. Patient is status post sternotomy and mitral valve replacement. There is a left-sided pacer in place. There is a trace amount of left-sided pleural fluid. There is  no right pleural effusion or pericardial effusion. There is coronary artery disease and atherosclerotic disease. Subcarinal adenopathy measures 1.9 cm. Right paratracheal adenopathy measures 1.0 cm. Right high paratracheal lymph node measures 1.1 cm. No other definite adenopathy is seen. Granulomatous calcifications are present in the right upper lobe. There is mild septal thickening in both lungs with a basilar predominance which may reflect developing edema or volume overload. There is some more confluent consolidation in the left lower lobe which may reflect atelectasis or pneumonia. There is some mild dependent atelectasis in the right lower lobe. Study is low confidence for COVID-19. Upper abdomen shows cholecystectomy. There is hepatic steatosis. The right kidney is ectopically located in the upper pelvis/lower abdomen.     Impression: 1. Cardiomegaly with mild mediastinal adenopathy, nonspecific. Follow-up chest CT in 3 months recommended. 2. Septal thickening in the lungs with a basilar predominance may reflect mild edema/volume overload. 3. Trace left pleural effusion with some mild alveolar consolidation in the left lower lobe may reflect atelectasis or pneumonia. There is some mild atelectasis in the right lower lobe. Study is low confidence for COVID-19. 4. Additional findings as above. Signer Name: Braulio Dominguez MD  Signed: 3/25/2020 11:34 PM  Workstation Name: [id number]  [facility]    Xr Chest 1 View    Result Date: 3/25/2020  Narrative: CR Chest 1 Vw INDICATION: Shortness of breath with fever and malaise for the past 3 days COMPARISON:  1/22/2019 FINDINGS: Single portable AP view(s) of the chest.  Cardiomegaly is unchanged. Pacemaker leads appear satisfactory. Lung volumes are low. Vascular markings are within normal limits. No effusions or focal new infiltrates are seen. Linear scarring versus atelectasis is seen at the left base and is unchanged.     Impression: No acute  cardiopulmonary findings. Signer Name: Braulio Dubois MD  Signed: 3/25/2020 10:22 PM  Workstation Name: [id number]  [facility] Renal Bilateral    Result Date: 3/10/2020  Narrative: Renal ultrasound 03/10/2020  INDICATION: Hydronephrosis bilaterally on previous outside ultrasound 02/14/2018. Diabetes and hypertension.  FINDINGS: The right kidney measures 10.3 cm while the left kidney measures 10.6 cm in longitudinal dimensions. There is no evidence of hydronephrosis or nephrolithiasis. No cystic or solid mass lesions were seen on either kidney. There is normal renal cortical echogenicity. Images of the bladder are normal.      Impression: Normal renal ultrasound.  This report was finalized on 3/10/2020 10:54 AM by Dr. Braydon Gabriel MD.        I ordered the above radiologic testing and reviewed the results    PROCEDURES  Procedures      PROGRESS AND CONSULTS  ED Course as of Mar 26 0004   Wed Mar 25, 2020   2153 Old records reviewed and patient did undergo cystoscopy, stone removal and stent placement on January 30 of this year.    [ML]   2222 Fever with lymphocytopenia along with elevated lactic acid indicates this patient is at high risk of Covid- 19 infection.  Chest CT ordered.    [ML]   2235 EKG tracing was contemporaneously reviewed at 2155 hrs. and showed an underlying atrial fibrillation with a ventricular rate of 94 bpm.  LVH with secondary re-pole changes.  Inferior Q waves suggestive of an old myocardial infarction.    [ML]   2347 Case and findings discussed with the on-call hospitalist, Dr. Rashid Ge, who also happens to be the patient's primary care provider.  Dr. Ge will evaluate the patient in the emergency department and the patient will be admitted to a negative pressure room.    [ML]   Thu Mar 26, 2020   Brenda Wang in the lab was spoken to and the patient's respiratory panel will be sent out on a stat basis.    [ML]      ED Course User Index  [ML]  Rashid Singh MD           MEDICAL DECISION MAKING  Results were reviewed/discussed with the patient and they were also made aware of online access. Pt also made aware that some labs, such as cultures, will not be resulted during ER visit and follow up with PMD is necessary.     MDM       DIAGNOSIS  Final diagnoses:   Pneumonia of left lower lobe due to infectious organism (CMS/HCC)   Lymphocytopenia   Lactic acidosis       Latest Documented Vital Signs:  As of 00:04  BP- 129/75 HR- 87 Temp- 100.5 °F (38.1 °C) (Oral) O2 sat- 96%    DISPOSITION  Patient admitted to [facility]       Medication List      No changes were made to your prescriptions during this visit.              Rashid Singh MD  03/26/20 0005

## 2020-03-26 NOTE — PLAN OF CARE
Problem: Patient Care Overview  Goal: Plan of Care Review  Outcome: Ongoing (interventions implemented as appropriate)  Flowsheets (Taken 3/26/2020 0136)  Progress: improving  Plan of Care Reviewed With: patient     Problem: Pneumonia (Adult)  Goal: Signs and Symptoms of Listed Potential Problems Will be Absent, Minimized or Managed (Pneumonia)  Outcome: Ongoing (interventions implemented as appropriate)  Flowsheets (Taken 3/26/2020 0136)  Problems Assessed (Pneumonia): infection progression; respiratory compromise  Problems Present (Pneumonia): infection progression; respiratory compromise

## 2020-03-26 NOTE — PLAN OF CARE
Continued Stay Note  [facility]     Patient Name: Fernanda Molina  MRN: [id number]  Today's Date: 3/26/2020    Admit Date: 3/25/2020    Discharge Plan       Row Name 03/26/20 1307       Plan    Plan  Home when stable    Provided Post Acute Provider List?  N/A    N/A Provider List Comment  No needs at this time    Provided Post Acute Provider Quality & Resource List?  N/A    N/A Quality & Resource List Comment  No needs at this time    Patient/Family in Agreement with Plan  yes    Plan Comments  Spoke with Mrs Molina via telephone as she is in isolation.  She and her  live in a home in [place].  Facesheet verified.  She does not have home health or oxygen at home.  She has a CPAP provided by  [employer].  She is independent with her ADL's and she drives herself.  Her pharmacy is [facility] in [place].  She has an advanced directive and a copy  was requested.  Plan is home when stable.  Will continue to follow            Discharge Codes    No documentation.               Hanna Shelby RN

## 2020-03-26 NOTE — H&P
Patient Name: Fernanda Molina  :1940  79 y.o.    Date of Hospital Visit: 3/25/2020  9:07 PM  00:35    Encounter Provider: Rashid Ge MD    Place of Service: [facility].  [facility] Hospitalist group.    Patient Care Team:  Rashid Ge MD as PCP - General (Family Medicine)  Thong Jones MD as Consulting Physician (Cardiology)  Mahin Galeas MD as Consulting Physician (Sleep Medicine)  Lara Ortiz MD as Consulting Physician (Obstetrics and Gynecology)  Betty Horowitz MD as Consulting Physician (Dermatology)  Jr Ben Saavedra MD as Surgeon (Cardiothoracic Surgery)  Mahin Galeas MD as Consulting Physician (Pulmonary Disease)  Chirag Rai MD as Consulting Physician (Ophthalmology)  Moisés Everett MD as Consulting Physician (Urology)      Chief complaint:weakness    History of Present Illness:  80 yo ill for 4 days at home with fever, weakness, mild cough  On Macrobid for UTI.  Presented with low sats, mental status changes, and fever 101  + sepsis screen. No rash. No ill contacts  No chest pain. Sugar 195. Chest xray ok, chest CT with mild bilateral lower lobe pneumonia vs atelectasis.          Past Medical History:   Diagnosis Date   • AICD (automatic cardioverter/defibrillator) present     left side chest   • Anemia    • Arthritis    • Atrial fibrillation (CMS/HCC)    • Cancer (CMS/HCC)     melanoma right back of knee   • CHF (congestive heart failure) (CMS/HCC)    • Congenital heart disease    • Coronary artery disease    • Diabetes mellitus (CMS/HCC)    • GERD (gastroesophageal reflux disease)    • Graves disease    • Hematuria    • High cholesterol    • History of kidney stones    • History of melanoma     RIGHT KNEE   • History of MI (myocardial infarction)    • Hypertension    • Hypothyroidism     had hyperthyroidism and was given radiated iodine to kill her thyroid now  on med   • Incontinence of urine     wears pads   • Kidney stones     bilateral   • On anticoagulant therapy    • HAMLET on CPAP     cpap   • Pelvic kidney 2020   • PONV (postoperative nausea and vomiting)    • Spinal stenosis    • Ureteral calculus, left 2020   • Valvular heart disease 2015    S/p MVR & TVR        Past Surgical History:   Procedure Laterality Date   • CARDIAC CATHETERIZATION     • CARDIAC DEFIBRILLATOR PLACEMENT Left     [employer]   • CARDIAC DEFIBRILLATOR PLACEMENT Left 2016    [employer]   •  SECTION     •  SECTION     • CHOLECYSTECTOMY     • COLONOSCOPY     • ENDOSCOPY     • EPIDURAL BLOCK  [id number]    Lumbar    • EXTRACORPOREAL SHOCK WAVE LITHOTRIPSY (ESWL) Right 2017   • EXTRACORPOREAL SHOCK WAVE LITHOTRIPSY (ESWL) Right 2017    Procedure: RT EXTRACORPOREAL SHOCKWAVE LITHOTRIPSY;  Surgeon: Moisés Everett MD;  Location: [facility];  Service:    • EYE SURGERY Right 09/10/2007    Cataract   • EYE SURGERY Left 10/29/2007    Cataract   • JOINT REPLACEMENT Left 2007    Knee   • JOINT REPLACEMENT Right 2010    Knee   • KNEE ARTHROSCOPY     • MAZE PROCEDURE  2015    Dr. Saavedra   • MITRAL VALVE REPAIR/REPLACEMENT     • MITRAL VALVE REPAIR/REPLACEMENT N/A 2019    Procedure: ALINE RE-OP STERNOTOMY MITRAL VALVE REPLACEMENT AND PRP;  Surgeon: Ben Saavedra MD;  Location: [facility] OR;  Service: Cardiothoracic   • MT ERCP DX COLLECTION SPECIMEN BRUSHING/WASHING N/A 2016    Procedure: ENDOSCOPIC RETROGRADE CHOLANGIOPANCREATOGRAPHY WITH SPHINCTEROTOMY AND BALLOON SWEEP OF CBD;  Surgeon: Lucas Campos MD;  Location: [facility] ENDOSCOPY;  Service: Gastroenterology   • SKIN CANCER EXCISION      MELANOMA RIGHT KNEE AREA -    • TRICUSPID VALVE SURGERY  2015    Dr. Saavedra  ring placed   • URETEROSCOPY LASER LITHOTRIPSY WITH STENT INSERTION Right 11/15/2017    Procedure: CYSTOSCOPY; RIGHT  URETEROSCOPY LASER LITHOTRIPSY WITH STENT INSERTION;  Surgeon: Moisés Everett MD;  Location: [facility] OR;  Service:    • URETEROSCOPY LASER LITHOTRIPSY WITH STENT INSERTION Left 1/30/2020    Procedure: URETEROSCOPY STONE BASKET EXTRACTION LASER LITHOTRIPSY WITH STENT INSERTION WITH STRING;  Surgeon: Moisés Everett MD;  Location: [facility] OR;  Service: Urology         Prior to Admission medications    Medication Sig Start Date End Date Taking? Authorizing Provider   nitrofurantoin, macro crystal-monohydrate, (MACROBID) 100 MG capsule Take 100 mg by mouth Daily. 3/17/20  Yes Maricruz Martins MD   acetaminophen (TYLENOL) 325 MG tablet Take 2 tablets by mouth Every 4 (Four) Hours As Needed for Mild Pain . 5/20/19   Mary Huerta PA   albuterol (PROVENTIL) (2.5 MG/3ML) 0.083% nebulizer solution Take 2.5 mg by nebulization Every 4 (Four) Hours As Needed for Wheezing. 1/3/20   Rashid Ge MD   albuterol sulfate  (90 Base) MCG/ACT inhaler Inhale 2 puffs Every 4 (Four) Hours As Needed for Wheezing.    Maricruz Martins MD   atorvastatin (LIPITOR) 20 MG tablet Take 0.5 tablets by mouth 2 (Two) Times a Week. 11/25/19   Rashid Ge MD   BD PEN NEEDLE YAIR U/F 32G X 4 MM misc USE WITH INSULIN PENS 6/3/19   Rashid Ge MD   digoxin (LANOXIN) 250 MCG tablet Take 1 tablet by mouth Daily. 11/25/19   Rashid Ge MD   ENTRESTO 24-26 MG tablet Take 1 tablet by mouth 2 (Two) Times a Day. 10/14/19   Maricruz Martins MD   ferrous sulfate 325 (65 FE) MG EC tablet Take 65 mg by mouth 1 (one) time per week. On Fernando 9/4/15   Maricruz Martins MD   furosemide (LASIX) 40 MG tablet Take 1 tablet by mouth Daily. 11/25/19   Rashid Ge MD   glucose blood test strip 1 each by Other route 3 (Three) Times a Day. Use as instructed 11/25/19   Rashid Ge MD   insulin glargine (LANTUS) 100 UNIT/ML injection Inject 40 Units under the skin into the  appropriate area as directed Daily.    Maricruz Martins MD   metFORMIN (GLUCOPHAGE) 1000 MG tablet Take 1 tablet by mouth Daily Before Supper.  Patient taking differently: Take 1,000 mg by mouth Every Night. 3/6/19   Rashid Ge MD   metFORMIN (GLUCOPHAGE) 500 MG tablet Take 1 tablet by mouth Daily With Breakfast. 3/6/19   Rashid Ge MD   metoprolol succinate XL (TOPROL-XL) 200 MG 24 hr tablet Take 200 mg by mouth Every Morning. 9/22/19   Maricruz Martins MD   mometasone (NASONEX) 50 MCG/ACT nasal spray 1 spray into the nostril(s) as directed by provider As Needed.    Maricruz Martins MD   Multiple Vitamin (MULTIVITAMIN) tablet Take 1 tablet by mouth Daily.    Maricruz Martins MD   pantoprazole (PROTONIX) 40 MG EC tablet TAKE 1 TABLET DAILY FOR GASTROESOPHAGEAL REFLUX DISEASE 10/7/19   Rashid Ge MD   potassium chloride ER (K-TAB) 20 MEQ tablet controlled-release ER tablet Take 1 tablet by mouth Daily. 11/25/19   Rashid Ge MD   Thyroid 90 MG PO tablet Take 90 mg by mouth Every Morning.    Maricruz Martins MD   Thyroid 90 MG PO tablet Take 45 mg by mouth Every Night.    Maricruz Martins MD   warfarin (COUMADIN) 2.5 MG tablet Take 2.5 mg by mouth. TUES  THURS  SAT  Sun  ( so total dose on TUES  THURS  SAT  SUN is 7.5mg because she has coumadin 5 mg daily)    Maricruz Martins MD   warfarin (COUMADIN) 5 MG tablet Take 5 mg by mouth Every Night.    Maricruz Martins MD       Allergies   Allergen Reactions   • Corticosteroids Other (See Comments)     RAISES BLOOD PRESSURE   • Adhesive Tape Rash   • Hydrocodone Nausea And Vomiting and Hallucinations   • Lisinopril Other (See Comments)     CHRONIC NASAL CONGESTION       Social History     Socioeconomic History   • Marital status:      Spouse name: Not on file   • Number of children: Not on file   • Years of education: Not on file   • Highest education level: Not on file   Tobacco Use   •  Smoking status: Never Smoker   • Smokeless tobacco: Never Used   Substance and Sexual Activity   • Alcohol use: No   • Drug use: No   • Sexual activity: Defer       Family History   Problem Relation Age of Onset   • Heart attack Mother    • Heart disease Mother    • Hypertension Mother    • Stroke Mother    • Macular degeneration Mother    • Heart attack Father    • Malig Hyperthermia Neg Hx        REVIEW OF SYSTEMS:   If not otherwise mentioned in the HPI the following: General, HEENT, cardio, pulmonary, GI, , skin, neuro, muscle skeletal, derm are negative or not pertinent to current case.         Objective:     Vitals:    03/25/20 2315 03/25/20 2330 03/25/20 2345 03/26/20 0000   BP: 138/73 129/75 127/75 115/70   BP Location:       Patient Position:       Pulse:  87 87 80   Resp:       Temp:       TempSrc:       SpO2:  96% 96% 97%   Weight:       Height:         Body mass index is 37.68 kg/m².    Constitutional: Patient is oriented to person, place, and time. pale  Patient appears well-developed. Does appear ill.   HENT:   Head: Normocephalic and atraumatic. Head is without contusion.   Right and Left Ear: Hearing normal to conversational tones. No visible drainage.    Nose: No epistaxis. External nose normal.   Eyes: Lids are normal. Pupils are equal, round, and reactive to light. Right and left eye exhibit no exudate. Conjunctiva has no hemorrhage.   Neck: No JVD present. No carotid bruit. No tracheal deviation present. No thyroid mass and no thyromegaly.  Cardiovascular: Normal rate, regular rhythm and normal heart sounds.    Pulses:present in radial and DP  Pulmonary/Chest: Effort normal and breath sounds normal.   Abdominal: Soft. Normal appearance and bowel sounds are normal. There is no tenderness.   Musculoskeletal: Normal range of motion.   Neurological: Patient is alert and oriented to person, place, and time.  Normal strength.   Skin: Skin is warm, dry and intact. No rash noted.   Psychiatric:Patient  has a normal mood and affect. Patient behavior is normal. Thought content normal.       Lab Review:   Lab Results   Component Value Date    WBC 11.13 (H) 03/25/2020    HGB 13.1 03/25/2020    HCT 39.5 03/25/2020    MCV 85.1 03/25/2020     03/25/2020           Glucose   Date Value Ref Range Status   03/25/2020 195 (H) 65 - 99 mg/dL Final     BUN   Date Value Ref Range Status   03/25/2020 18 8 - 23 mg/dL Final     Creatinine   Date Value Ref Range Status   03/25/2020 0.84 0.57 - 1.00 mg/dL Final     Sodium   Date Value Ref Range Status   03/25/2020 134 (L) 136 - 145 mmol/L Final     Potassium   Date Value Ref Range Status   03/25/2020 4.0 3.5 - 5.2 mmol/L Final     Chloride   Date Value Ref Range Status   03/25/2020 98 98 - 107 mmol/L Final     CO2   Date Value Ref Range Status   03/25/2020 17.9 (L) 22.0 - 29.0 mmol/L Final     Calcium   Date Value Ref Range Status   03/25/2020 11.5 (H) 8.6 - 10.5 mg/dL Final     Total Protein   Date Value Ref Range Status   03/25/2020 7.8 6.0 - 8.5 g/dL Final     Albumin   Date Value Ref Range Status   03/25/2020 4.20 3.50 - 5.20 g/dL Final     ALT (SGPT)   Date Value Ref Range Status   03/25/2020 23 1 - 33 U/L Final     AST (SGOT)   Date Value Ref Range Status   03/25/2020 24 1 - 32 U/L Final     Alkaline Phosphatase   Date Value Ref Range Status   03/25/2020 63 39 - 117 U/L Final     Total Bilirubin   Date Value Ref Range Status   03/25/2020 1.2 0.2 - 1.2 mg/dL Final     eGFR Non  Amer   Date Value Ref Range Status   03/25/2020 66 >60 mL/min/1.73 Final     BUN/Creatinine Ratio   Date Value Ref Range Status   03/25/2020 21.4 7.0 - 25.0 Final     Anion Gap   Date Value Ref Range Status   03/25/2020 18.1 (H) 5.0 - 15.0 mmol/L Final           I personally viewed:  the patient's EKG/Telemetry data, the current radiology reports, the data from the emergency room, and reviewed all pertinent data in our EMR.     Imaging Results (Last 24 Hours)     Procedure Component Value  Units Date/Time    CT Chest Without Contrast [[id number]] Collected:  03/25/20 2334     Updated:  03/25/20 2336    Narrative:       CT Chest WO    INDICATION:   Shortness of air with fever and generalized malaise over the last 3 days. Leukocytosis.    TECHNIQUE:   CT of the thorax without IV contrast. Coronal and sagittal reconstructions were obtained.  Radiation dose reduction techniques included automated exposure control or exposure modulation based on body size. Count of known CT and cardiac nuc med studies  performed in previous 12 months: 0.     COMPARISON:   None available.    FINDINGS:  Heart is enlarged. Patient is status post sternotomy and mitral valve replacement. There is a left-sided pacer in place. There is a trace amount of left-sided pleural fluid. There is no right pleural effusion or pericardial effusion. There is coronary  artery disease and atherosclerotic disease. Subcarinal adenopathy measures 1.9 cm. Right paratracheal adenopathy measures 1.0 cm. Right high paratracheal lymph node measures 1.1 cm. No other definite adenopathy is seen. Granulomatous calcifications are  present in the right upper lobe. There is mild septal thickening in both lungs with a basilar predominance which may reflect developing edema or volume overload. There is some more confluent consolidation in the left lower lobe which may reflect  atelectasis or pneumonia. There is some mild dependent atelectasis in the right lower lobe. Study is low confidence for COVID-19. Upper abdomen shows cholecystectomy. There is hepatic steatosis. The right kidney is ectopically located in the upper  pelvis/lower abdomen.      Impression:         1. Cardiomegaly with mild mediastinal adenopathy, nonspecific. Follow-up chest CT in 3 months recommended.  2. Septal thickening in the lungs with a basilar predominance may reflect mild edema/volume overload.  3. Trace left pleural effusion with some mild alveolar consolidation in the left lower  lobe may reflect atelectasis or pneumonia. There is some mild atelectasis in the right lower lobe. Study is low confidence for COVID-19.  4. Additional findings as above.    Signer Name: Braulio Dominguez MD   Signed: 3/25/2020 11:34 PM   Workstation Name: [id number]    [facility]    XR Chest 1 View [[id number]] Collected:  03/25/20 2222     Updated:  03/25/20 2224    Narrative:       CR Chest 1 Vw    INDICATION:   Shortness of breath with fever and malaise for the past 3 days     COMPARISON:    1/22/2019    FINDINGS:  Single portable AP view(s) of the chest.  Cardiomegaly is unchanged. Pacemaker leads appear satisfactory. Lung volumes are low. Vascular markings are within normal limits. No effusions or focal new infiltrates are seen. Linear scarring versus atelectasis  is seen at the left base and is unchanged.      Impression:       No acute cardiopulmonary findings.    Signer Name: Braulio Dubois MD   Signed: 3/25/2020 10:22 PM   Workstation Name: [id number]    [facility]            Assessment and Plan:       1.Sepsis from UTI vs viral syndrome. Low sats, weak. Febrile.  Has mild pneumonia in both lower lobes. Recent Macrobid from Urology office for UTI.  Will run IVF. Blood and urine cx. Respiratory viral swab and swab for COVID19  Isolation.    2. Insulin req diabetic    3. On warfarin for heart valve replacement.            Rashid Ge MD  03/26/20  00:35

## 2020-03-26 NOTE — ED NOTES
Patient O2 sats to 92%. Per MD place patient on 2L O2. Sats to 96%     Kathleen Barragan RN  03/25/20 2081       Kathleen Barragan RN  03/25/20 8313

## 2020-03-26 NOTE — PROGRESS NOTES
Chart reviewed in order to save PPE since Dr. Ge saw this patient after midnight.  Added Ferritin.  Await COVID-19 testing result.  Reviewed Dr. Eagle's note as well.

## 2020-03-26 NOTE — ED NOTES
Answering service for state lab contacted per Dr Ge's request. They will call Dr Ge's cell phone shortly to discuss the case of patient.      Judi Dang RN  03/26/20 0053

## 2020-03-26 NOTE — PROGRESS NOTES
[facility]  HISTORY AND PHYSICAL   [facility]        Patient Identification:  Name: Fernanda Molina  Age: 79 y.o.  Sex: female  :  1940  MRN: [id number]                     Primary Care Physician: Rashid Ge MD    Chief Complaint: Pneumonia possible COVID-19    History of Present Illness:   79-year-old female presents with fever, generalized weakness, nonproductive cough for 4 days.  Has been on Macrobid for urinary tract infection at home.  Had hypoxemia on presentation with low oxygen saturations in the 80s and altered mental status.  Fever up to 101.  Currently denies chest pain.  Shortness of breath is worse with exertion.  No known ill contacts.  No recent travel.        Past Medical History:  Past Medical History:   Diagnosis Date   • AICD (automatic cardioverter/defibrillator) present     left side chest   • Anemia    • Arthritis    • Atrial fibrillation (CMS/HCC)    • Cancer (CMS/HCC)     melanoma right back of knee   • CHF (congestive heart failure) (CMS/HCC)    • Congenital heart disease    • Coronary artery disease    • Diabetes mellitus (CMS/HCC)    • GERD (gastroesophageal reflux disease)    • Graves disease    • Hematuria    • High cholesterol    • History of kidney stones    • History of melanoma     RIGHT KNEE   • History of MI (myocardial infarction)    • Hypertension    • Hypothyroidism     had hyperthyroidism and was given radiated iodine to kill her thyroid now on med   • Incontinence of urine     wears pads   • Kidney stones     bilateral   • On anticoagulant therapy    • HAMLET on CPAP     cpap   • Pelvic kidney 2020   • PONV (postoperative nausea and vomiting)    • Spinal stenosis    • Ureteral calculus, left 2020   • Valvular heart disease 2015    S/p MVR & TVR      Past Surgical History:  Past Surgical History:   Procedure Laterality Date   • CARDIAC CATHETERIZATION     • CARDIAC DEFIBRILLATOR PLACEMENT Left      [employer]   • CARDIAC DEFIBRILLATOR PLACEMENT Left 2016    [employer]   •  SECTION     •  SECTION     • CHOLECYSTECTOMY     • COLONOSCOPY     • ENDOSCOPY     • EPIDURAL BLOCK  [id number]    Lumbar    • EXTRACORPOREAL SHOCK WAVE LITHOTRIPSY (ESWL) Right 2017   • EXTRACORPOREAL SHOCK WAVE LITHOTRIPSY (ESWL) Right 2017    Procedure: RT EXTRACORPOREAL SHOCKWAVE LITHOTRIPSY;  Surgeon: Moisés Everett MD;  Location: [facility] O[facility];  Service:    • EYE SURGERY Right 09/10/2007    Cataract   • EYE SURGERY Left 10/29/2007    Cataract   • JOINT REPLACEMENT Left 2007    Knee   • JOINT REPLACEMENT Right 2010    Knee   • KNEE ARTHROSCOPY     • MAZE PROCEDURE  2015    Dr. Saavedra   • MITRAL VALVE REPAIR/REPLACEMENT     • MITRAL VALVE REPAIR/REPLACEMENT N/A 2019    Procedure: ALINE RE-OP STERNOTOMY MITRAL VALVE REPLACEMENT AND PRP;  Surgeon: Ben Saavedra MD;  Location: [facility] OR;  Service: Cardiothoracic   • IN ERCP DX COLLECTION SPECIMEN BRUSHING/WASHING N/A 2016    Procedure: ENDOSCOPIC RETROGRADE CHOLANGIOPANCREATOGRAPHY WITH SPHINCTEROTOMY AND BALLOON SWEEP OF CBD;  Surgeon: Lucas Campos MD;  Location: [facility] ENDOSCOPY;  Service: Gastroenterology   • SKIN CANCER EXCISION      MELANOMA RIGHT KNEE AREA -    • TRICUSPID VALVE SURGERY  2015    Dr. Saavedra  ring placed   • URETEROSCOPY LASER LITHOTRIPSY WITH STENT INSERTION Right 11/15/2017    Procedure: CYSTOSCOPY; RIGHT URETEROSCOPY LASER LITHOTRIPSY WITH STENT INSERTION;  Surgeon: Moisés Everett MD;  Location: [facility] OR;  Service:    • URETEROSCOPY LASER LITHOTRIPSY WITH STENT INSERTION Left 2020    Procedure: URETEROSCOPY STONE BASKET EXTRACTION LASER LITHOTRIPSY WITH STENT INSERTION WITH STRING;  Surgeon: Moisés Everett MD;  Location: [facility] OR;  Service: Urology      Home Meds:  Medications Prior to Admission   Medication Sig Dispense Refill Last  Dose   • nitrofurantoin, macrocrystal-monohydrate, (MACROBID) 100 MG capsule Take 100 mg by mouth Daily.   3/25/2020 at Unknown time   • acetaminophen (TYLENOL) 325 MG tablet Take 2 tablets by mouth Every 4 (Four) Hours As Needed for Mild Pain .   Past Week at Unknown time   • albuterol (PROVENTIL) (2.5 MG/3ML) 0.083% nebulizer solution Take 2.5 mg by nebulization Every 4 (Four) Hours As Needed for Wheezing. 30 vial 5 Past Month at Unknown time   • albuterol sulfate  (90 Base) MCG/ACT inhaler Inhale 2 puffs Every 4 (Four) Hours As Needed for Wheezing.   More than a month at Unknown time   • atorvastatin (LIPITOR) 20 MG tablet Take 0.5 tablets by mouth 2 (Two) Times a Week. (Patient taking differently: Take 10 mg by mouth 2 (Two) Times a Week. mon and friday) 12 tablet 3 1/27/2020   • BD PEN NEEDLE YAIR U/F 32G X 4 MM misc USE WITH INSULIN PENS 270 each 3 Taking   • digoxin (LANOXIN) 250 MCG tablet Take 1 tablet by mouth Daily. 90 tablet 3 1/30/2020 at 0500   • ENTRESTO 24-26 MG tablet Take 1 tablet by mouth 2 (Two) Times a Day.   1/30/2020 at 0500   • ferrous sulfate 325 (65 FE) MG EC tablet Take 65 mg by mouth 1 (one) time per week. On Sunday 1/23/2020   • furosemide (LASIX) 40 MG tablet Take 1 tablet by mouth Daily. 90 tablet 3 1/30/2020 at 0500   • glucose blood test strip 1 each by Other route 3 (Three) Times a Day. Use as instructed 100 each 11    • insulin glargine (LANTUS) 100 UNIT/ML injection Inject 40 Units under the skin into the appropriate area as directed Daily.   1/29/2020   • metFORMIN (GLUCOPHAGE) 1000 MG tablet Take 1 tablet by mouth Daily Before Supper. (Patient taking differently: Take 1,000 mg by mouth Every Night.) 90 tablet 3 1/29/2020 at 2200   • metFORMIN (GLUCOPHAGE) 500 MG tablet Take 1 tablet by mouth Daily With Breakfast. 90 tablet 3 1/30/2020 at 0500   • metoprolol succinate XL (TOPROL-XL) 200 MG 24 hr tablet Take 200 mg by mouth Every Morning.   1/30/2020 at 0500   • Multiple  Vitamin (MULTIVITAMIN) tablet Take 1 tablet by mouth Daily.   1/23/2020   • pantoprazole (PROTONIX) 40 MG EC tablet TAKE 1 TABLET DAILY FOR GASTROESOPHAGEAL REFLUX DISEASE 90 tablet 3 1/30/2020 at 0500   • potassium chloride ER (K-TAB) 20 MEQ tablet controlled-release ER tablet Take 1 tablet by mouth Daily. 90 tablet 3 1/30/2020 at 0500   • Thyroid 90 MG PO tablet Take 90 mg by mouth Every Morning.   1/29/2020 at 0500   • Thyroid 90 MG PO tablet Take 45 mg by mouth Every Night.   1/29/2020 at 2200   • warfarin (COUMADIN) 2.5 MG tablet Take 2.5 mg by mouth. TUES THURS  SAT  Sun  ( so total dose on TUES THURS  SAT  SUN is 7.5mg because she has coumadin 5 mg daily)   1/25/2020   • warfarin (COUMADIN) 5 MG tablet Take 5 mg by mouth Every Night.   1/24/2020       Allergies:  Allergies   Allergen Reactions   • Corticosteroids Other (See Comments)     RAISES BLOOD PRESSURE   • Adhesive Tape Rash   • Hydrocodone Nausea And Vomiting and Hallucinations   • Lisinopril Other (See Comments)     CHRONIC NASAL CONGESTION     Immunizations:  Immunization History   Administered Date(s) Administered   • Flu Mist 10/15/2017   • Fluzone High Dose =>65 Years ([employer] ONLY) 10/30/2018, 10/16/2019   • Pneumococcal Conjugate 13-Valent (PCV13) 03/06/2015   • Pneumococcal Polysaccharide (PPSV23) 03/06/2008   • Zostavax 06/15/2014     Social History:   Social History     Social History Narrative   • Not on file     Social History     Tobacco Use   • Smoking status: Never Smoker   • Smokeless tobacco: Never Used   Substance Use Topics   • Alcohol use: No     Family History:  Family History   Problem Relation Age of Onset   • Heart attack Mother    • Heart disease Mother    • Hypertension Mother    • Stroke Mother    • Macular degeneration Mother    • Heart attack Father    • Malig Hyperthermia Neg Hx         Review of Systems  Positive fevers  Denies nausea or vomiting  No new vision or hearing changes  No chest pain  Positive cough and  "shortness of breath  No diarrhea, hematemesis or hematochezia, no dysuria or frequency  No musculoskeletal complaints  No heat or cold intolerance  No skin rashes  No dizziness or confusion.  No seizure activity  No new anxiety or depression  12 system review of systems performed and all else negative    Objective:  tMax 24 hrs: Temp (24hrs), Av.4 °F (37.4 °C), Min:97.2 °F (36.2 °C), Max:101.5 °F (38.6 °C)    Vitals Ranges:   Temp:  [97.2 °F (36.2 °C)-101.5 °F (38.6 °C)] 97.2 °F (36.2 °C)  Heart Rate:  [66-98] 66  Resp:  [18-30] 18  BP: (100-179)/() 109/63      Exam:  /63 (BP Location: Right arm, Patient Position: Lying)   Pulse 66   Temp 97.2 °F (36.2 °C) (Oral)   Resp 18   Ht 157.5 cm (62\")   Wt 91.7 kg (202 lb 3.2 oz)   SpO2 (P) 100%   BMI 36.98 kg/m²     GENERAL APPEARANCE:   · Well developed  · Well nourished  · No acute distress   EYES:    · PERRL                                                                           · Conjunctiva normal  · Sclera non-icteric.  HENT:   · Atraumatic, normocephalic  · External ears and nose normal  · Moist mucous membranes and no ulcers  NECK:  · Thyroid not enlarged  · Trachea midline   RESPIRATORY:    · Nonlabored breathing   · Diminished breath sounds  · No rales. No wheezing  · No dullness  CARDIOVASCULAR:    · RRR  · Normal S1, S2  · No murmur  · Lower extremity edema: none    GI:   · Bowel sounds normal  · Abdomen soft , non-distended, non-tender  · No abdominal masses.    MUSCULOSKELETAL:  · Normal movement of extremities  · No tenderness, no deformities  · No clubbing or cyanosis   Skin:    · No visible rashes  · No palpable nodules  PSYCHIATRIC:  · Speech and behavior appropriate  · Normal mood and affect  · Oriented to person, place and time  NEUROLOGIC:  .  Cranial nerves II through XII grossly intact.  Sensation intact.    Data Review:  Collected Updated Procedure Result Status    2020 0659 2020 0721 Protime-INR [[id number]]  "   (Abnormal)   Blood    Final result Component Value Units   Protime 27.6High  Seconds   INR 2.53High             03/26/2020 0520 03/26/2020 0601 Protime-INR [[id number]]    (Abnormal)   Blood    Final result Component Value Units   Protime 27.5High  Seconds   INR 2.52High             03/26/2020 0520 03/26/2020 0607 BNP [[id number]]    (Abnormal)   Blood    Final result Component Value Units   proBNP 2,355.0High  pg/mL           03/26/2020 0520 03/26/2020 0631 Procalcitonin [[id number]]    (Abnormal)   Blood    Final result Component Value Units   Procalcitonin 0.77High  ng/mL           03/26/2020 0520 03/26/2020 0608 TSH [[id number]]   Blood    Final result Component Value Units   TSH Baseline 0.858 uIU/mL           03/26/2020 0520 03/26/2020 0617 Basic Metabolic Panel [[id number]]    (Abnormal)   Blood    Final result Component Value Units   Glucose 164High  mg/dL   BUN 17 mg/dL   Creatinine 0.73 mg/dL   Sodium 136 mmol/L   Potassium 4.2 mmol/L   Chloride 103 mmol/L   CO2 21.8Low  mmol/L   Calcium 10.2 mg/dL   eGFR Non African Am 77 mL/min/1.73   BUN/Creatinine Ratio 23.3    Anion Gap 11.2 mmol/L           03/26/2020 0520 03/26/2020 0600 CBC Auto Differential [[id number]]   (Abnormal)   Blood    Final result Component Value Units   WBC 8.47 10*3/mm3   RBC 4.01 10*6/mm3   Hemoglobin 11.2Low  g/dL   Hematocrit 35.3 %   MCV 88.0 fL   MCH 27.9 pg   MCHC 31.7 g/dL   RDW 14.6 %   RDW-SD 47.4 fl   MPV 11.4 fL   Platelets 139Low  10*3/mm3           03/26/2020 0158 03/26/2020 0547 CORONAVIRUS-19(COVID-19),RT-PCR,[facility] - Swab, Nasopharynx [[id number]]   Swab from Nasopharynx    In process Component Value   No component results              03/26/2020 0136 03/26/2020 0157 Lactic Acid, Reflex [[id number]]   (Abnormal)   Blood    Final result Component Value Units   Lactate 2.6High Critical  mmol/L           03/25/2020 2159 03/25/2020 2235 Urinalysis With Culture If Indicated - Urine, Catheter [[id number]]   (Abnormal)   Urine,  Catheter    Final result Component Value   Color, UA Yellow   Appearance, UA Clear   pH, UA 5.5   Specific Gravity, UA 1.025    Glucose Negative   Ketones, UA TraceAbnormal    Bilirubin, UA Small (1+)Abnormal    Blood, UA Small (1+)Abnormal    Protein, UA >=300 mg/dL (3+)Abnormal    Leukocytes, UA Negative   Nitrite, UA Negative   Urobilinogen, UA 2.0 E.U./dLAbnormal            03/25/2020 2159 03/25/2020 2333 Urinalysis, Microscopic Only - Urine, Catheter [[id number]]   (Abnormal)   Urine, Catheter    Final result Component Value Units   RBC, UA 3-5Abnormal  /HPF   WBC, UA 6-12Abnormal  /HPF   Bacteria, UA 3+Abnormal  /HPF   Squamous Epithelial Cells, UA 3-6Abnormal  /HPF   Hyaline Casts, UA None Seen /LPF   Calcium Oxalate Crystals, UA Small/1+ /HPF   Amorphous Crystals, UA Moderate/2+ /HPF   Methodology: Manual Light Microscopy            03/25/2020 2159 03/25/2020 2333 Urine Culture - Urine, Urine, Catheter [[id number]]   Urine, Catheter    In process Component Value   No component results              03/25/2020 2135 03/25/2020 2230 Comprehensive Metabolic Panel [[id number]]    (Abnormal)   Blood    Final result Component Value Units   Glucose 195High  mg/dL   BUN 18 mg/dL   Creatinine 0.84 mg/dL   Sodium 134Low  mmol/L   Potassium 4.0 mmol/L   Chloride 98 mmol/L   CO2 17.9Low  mmol/L   Calcium 11.5High  mg/dL   Total Protein 7.8 g/dL   Albumin 4.20 g/dL   ALT (SGPT) 23 U/L   AST (SGOT) 24 U/L   Alkaline Phosphatase 63 U/L   Total Bilirubin 1.2 mg/dL   eGFR Non African Am 66 mL/min/1.73   Globulin 3.6 gm/dL   A/G Ratio 1.2 g/dL   BUN/Creatinine Ratio 21.4    Anion Gap 18.1High  mmol/L           03/25/2020 2135 03/25/2020 2230 Lactate Dehydrogenase [[id number]]   Blood    Final result Component Value Units    U/L           03/25/2020 2135 03/25/2020 2226 Procalcitonin [[id number]]    (Abnormal)   Blood    Final result Component Value Units   Procalcitonin 0.31High  ng/mL           03/25/2020 2135 03/26/2020  0411 Respiratory Panel, PCR - Swab, Nasopharynx [[id number]]    Swab from Nasopharynx    Final result Component Value   ADENOVIRUS, PCR Not Detected   Coronavirus 229E Not Detected   Coronavirus HKU1 Not Detected   Coronavirus NL63 Not Detected   Coronavirus OC43 Not Detected   Human Metapneumovirus Not Detected   Human Rhinovirus/Enterovirus Not Detected   Influenza B PCR Not Detected   Parainfluenza Virus 1 Not Detected   Parainfluenza Virus 2 Not Detected   Parainfluenza Virus 3 Not Detected   Parainfluenza Virus 4 Not Detected   Bordetella pertussis pcr Not Detected   Influenza A H1 2009 PCR Not Detected   Chlamydophila pneumoniae PCR Not Detected   Mycoplasma pneumo by PCR Not Detected   Influenza A PCR Not Detected   Influenza A H3 Not Detected   Influenza A H1 Not Detected   RSV, PCR Not Detected   Bordetella parapertussis PCR Not Detected              03/25/2020 2135 03/25/2020 2202 Influenza Antigen, Rapid - Swab, Nasopharynx [[id number]]   Swab from Nasopharynx    Final result Component Value   Influenza A Ag, EIA Negative   Influenza B Ag, EIA Negative              03/25/2020 2135 03/25/2020 2230 Lipase [[id number]]   Blood    Final result Component Value Units   Lipase 31 U/L           03/25/2020 2135 03/25/2020 2208 Protime-INR [[id number]]    (Abnormal)   Blood    Final result Component Value Units   Protime 26.8High  Seconds   INR 2.44High             03/25/2020 2135 03/25/2020 2230 Digoxin Level [[id number]]    Blood    Final result Component Value Units   Digoxin 1.07 ng/mL           03/25/2020 2135 03/26/2020 0258 C-reactive Protein [[id number]]   Blood    In process Component Value   No component results           03/25/2020 2134 03/25/2020 2214 Lactic Acid, Plasma [[id number]]   (Abnormal)   Blood    Final result Component Value Units   Lactate 3.7High Critical  mmol/L           03/25/2020 2134 03/25/2020 2158 CBC Auto Differential [[id number]]   (Abnormal)   Blood    Final result Component Value Units    WBC 11.13High  10*3/mm3   RBC 4.64 10*6/mm3   Hemoglobin 13.1 g/dL   Hematocrit 39.5 %   MCV 85.1 fL   MCH 28.2 pg   MCHC 33.2 g/dL   RDW 14.6 %   RDW-SD 45.4 fl   MPV 11.2 fL   Platelets 170 10*3/mm3   Neutrophil Rel % 87.2High  %   Lymphocyte Rel % 5.2Low  %   Monocyte Rel % 4.9Low  %   Eosinophil Rel % 2.0 %   Basophil Rel % 0.3 %   Immature Granulocyte Rel % 0.4 %   Neutrophils Absolute 9.71High  10*3/mm3   Lymphocytes Absolute 0.58Low  10*3/mm3   Monocytes Absolute 0.54 10*3/mm3   Eosinophils Absolute 0.22 10*3/mm3   Basophils Absolute 0.03 10*3/mm3   Immature Grans, Absolute 0.05 10*3/mm3   nRBC 0.0 /100 WBC           chest X-ray and CT chest viewed: Signs of cardiomegaly and reactive lymphadenopathy.  Interstitial edema.  Trace left pleural effusion.  Consolidation left lower lobe.  No overt signs of groundglass infiltrates that would suggest possible COVID-19.            Microbiology reviewed: Respiratory viral panel negative.  Blood cultures no growth to date.  No results found for: COVID19        Assessment:  Sepsis  UTI  Left lower infiltrate/atelectasis  Possible COVID-19  Chronic CHF  Diabetes mellitus  Chronic atrial fibrillation      Plan:  Continue antibiotics.  Encourage pulmonary toilet.  Wean oxygen as able.  COVID-19 testing pending but I am less suspicious that she has this.  Suspect sepsis all related to her urinary infection.      Chad Eagle MD  [facility], [facility]  Pulmonary and Critical Care Medicine    3/26/2020  09:19

## 2020-03-27 ENCOUNTER — APPOINTMENT (OUTPATIENT)
Dept: CARDIAC REHAB | Facility: HOSPITAL | Age: 80
End: 2020-03-27

## 2020-03-27 VITALS
DIASTOLIC BLOOD PRESSURE: 81 MMHG | TEMPERATURE: 97.4 F | WEIGHT: 202.16 LBS | RESPIRATION RATE: 18 BRPM | OXYGEN SATURATION: 96 % | HEIGHT: 62 IN | HEART RATE: 62 BPM | BODY MASS INDEX: 37.2 KG/M2 | SYSTOLIC BLOOD PRESSURE: 138 MMHG

## 2020-03-27 LAB
BACTERIA SPEC AEROBE CULT: NO GROWTH
D-LACTATE SERPL-SCNC: 0.9 MMOL/L (ref 0.5–2)
GLUCOSE BLDC GLUCOMTR-MCNC: 101 MG/DL (ref 70–130)
GLUCOSE BLDC GLUCOMTR-MCNC: 179 MG/DL (ref 70–130)
INR PPP: 2.82 (ref 0.9–1.1)
PROTHROMBIN TIME: 30.2 SECONDS (ref 12.1–15)
SARS-COV-2 RNA RESP QL NAA+PROBE: NOT DETECTED

## 2020-03-27 PROCEDURE — 63710000001 INSULIN DETEMIR PER 5 UNITS: Performed by: FAMILY MEDICINE

## 2020-03-27 PROCEDURE — 83605 ASSAY OF LACTIC ACID: CPT | Performed by: HOSPITALIST

## 2020-03-27 PROCEDURE — 63710000001 INSULIN ASPART PER 5 UNITS: Performed by: FAMILY MEDICINE

## 2020-03-27 PROCEDURE — 82962 GLUCOSE BLOOD TEST: CPT

## 2020-03-27 PROCEDURE — 25010000002 AZITHROMYCIN PER 500 MG: Performed by: HOSPITALIST

## 2020-03-27 PROCEDURE — 99238 HOSP IP/OBS DSCHRG MGMT 30/<: CPT | Performed by: HOSPITALIST

## 2020-03-27 PROCEDURE — 94799 UNLISTED PULMONARY SVC/PX: CPT

## 2020-03-27 PROCEDURE — 85610 PROTHROMBIN TIME: CPT | Performed by: FAMILY MEDICINE

## 2020-03-27 PROCEDURE — 97161 PT EVAL LOW COMPLEX 20 MIN: CPT

## 2020-03-27 PROCEDURE — 25010000002 AZITHROMYCIN PER 500 MG: Performed by: FAMILY MEDICINE

## 2020-03-27 RX ORDER — SACCHAROMYCES BOULARDII 250 MG
250 CAPSULE ORAL 2 TIMES DAILY
Qty: 14 CAPSULE | Refills: 0 | Status: SHIPPED | OUTPATIENT
Start: 2020-03-27 | End: 2020-04-03

## 2020-03-27 RX ORDER — LEVOTHYROXINE AND LIOTHYRONINE 57; 13.5 UG/1; UG/1
90 TABLET ORAL
Status: DISCONTINUED | OUTPATIENT
Start: 2020-03-27 | End: 2020-03-27 | Stop reason: HOSPADM

## 2020-03-27 RX ORDER — CEFDINIR 300 MG/1
300 CAPSULE ORAL 2 TIMES DAILY
Qty: 6 CAPSULE | Refills: 0 | Status: SHIPPED | OUTPATIENT
Start: 2020-03-27 | End: 2020-12-28

## 2020-03-27 RX ORDER — LEVOTHYROXINE AND LIOTHYRONINE 57; 13.5 UG/1; UG/1
45 TABLET ORAL NIGHTLY
Status: DISCONTINUED | OUTPATIENT
Start: 2020-03-27 | End: 2020-03-27 | Stop reason: HOSPADM

## 2020-03-27 RX ORDER — LEVOTHYROXINE AND LIOTHYRONINE 9.5; 2.25 UG/1; UG/1
45 TABLET ORAL NIGHTLY
Status: DISCONTINUED | OUTPATIENT
Start: 2020-03-27 | End: 2020-03-27

## 2020-03-27 RX ORDER — AZITHROMYCIN 250 MG/1
TABLET, FILM COATED ORAL
Qty: 3 TABLET | Refills: 0 | Status: SHIPPED | OUTPATIENT
Start: 2020-03-27 | End: 2020-04-06

## 2020-03-27 RX ADMIN — METOPROLOL SUCCINATE 200 MG: 50 TABLET, EXTENDED RELEASE ORAL at 09:02

## 2020-03-27 RX ADMIN — ACETAMINOPHEN 650 MG: 325 TABLET, FILM COATED ORAL at 00:33

## 2020-03-27 RX ADMIN — SACUBITRIL AND VALSARTAN 1 TABLET: 24; 26 TABLET, FILM COATED ORAL at 09:03

## 2020-03-27 RX ADMIN — SODIUM CHLORIDE 125 ML/HR: 9 INJECTION, SOLUTION INTRAVENOUS at 04:09

## 2020-03-27 RX ADMIN — PANTOPRAZOLE SODIUM 40 MG: 40 TABLET, DELAYED RELEASE ORAL at 09:03

## 2020-03-27 RX ADMIN — THYROID, PORCINE 90 MG: 90 TABLET ORAL at 09:03

## 2020-03-27 RX ADMIN — DIGOXIN 250 MCG: 250 TABLET ORAL at 09:03

## 2020-03-27 RX ADMIN — INSULIN ASPART 2 UNITS: 100 INJECTION, SOLUTION INTRAVENOUS; SUBCUTANEOUS at 11:48

## 2020-03-27 RX ADMIN — INSULIN DETEMIR 40 UNITS: 100 INJECTION, SOLUTION SUBCUTANEOUS at 09:02

## 2020-03-27 RX ADMIN — SODIUM CHLORIDE 500 MG: 900 INJECTION, SOLUTION INTRAVENOUS at 00:28

## 2020-03-27 RX ADMIN — SODIUM CHLORIDE, PRESERVATIVE FREE 10 ML: 5 INJECTION INTRAVENOUS at 09:31

## 2020-03-27 NOTE — DISCHARGE SUMMARY
Fernanda Molina  1940  [id number]    Hospitalists Discharge Summary    Date of Admission: 3/25/2020  Date of Discharge:  3/27/2020    Primary Discharge Diagnoses:  1.  LLL Community Acquired Bacterial Pneumonia  2.  Lactic Acidosis    Secondary Discharge Diagnoses:  1.  Hx/O Heart Valve replacement on coumadin  2.  Diabetes Mellitus, Type 2 A1c of 6.5%    History of Present Illness (taken from H&P):  80 yo ill for 4 days at home with fever, weakness, mild cough  On Macrobid for UTI.  Presented with low sats, mental status changes, and fever 101  + sepsis screen. No rash. No ill contacts  No chest pain. Sugar 195. Chest xray ok, chest CT with mild bilateral lower lobe pneumonia vs atelectasis.       Hospital Course:  Ms. Molina was admitted to the Med/Surg unit.  COVID-19 testing was negative.  She was seen in consultation by [facility].  Continued on antibiotics.  Lactic acidosis resolved w/ therapy.  She screened positive for sepsis, but is not septic.  She will need close monitoring of INR while on abx.  This was discussed w/ Ms. Molina.    PCP  Patient Care Team:  Rashid Ge MD as PCP - General (Family Medicine)  Thong Jones MD as Consulting Physician (Cardiology)  Mahin Galeas MD as Consulting Physician (Sleep Medicine)  Lara Ortiz MD as Consulting Physician (Obstetrics and Gynecology)  Betty Horowitz MD as Consulting Physician (Dermatology)  Jr Ben Saavedra MD as Surgeon (Cardiothoracic Surgery)  Mahin Galeas MD as Consulting Physician (Pulmonary Disease)  Chirag Rai MD as Consulting Physician (Ophthalmology)  Moisés Everett MD as Consulting Physician (Urology)    Consults:   Consults     Date and Time Order Name Status Description    3/26/2020 0107 Inpatient Pulmonology Consult            Operations and Procedures Performed:       Ct Chest Without Contrast    Result Date: 3/25/2020  Narrative: CT Chest WO INDICATION:  Shortness of air with fever and generalized malaise over the last 3 days. Leukocytosis. TECHNIQUE: CT of the thorax without IV contrast. Coronal and sagittal reconstructions were obtained.  Radiation dose reduction techniques included automated exposure control or exposure modulation based on body size. Count of known CT and cardiac nuc med studies performed in previous 12 months: 0. COMPARISON: None available. FINDINGS: Heart is enlarged. Patient is status post sternotomy and mitral valve replacement. There is a left-sided pacer in place. There is a trace amount of left-sided pleural fluid. There is no right pleural effusion or pericardial effusion. There is coronary artery disease and atherosclerotic disease. Subcarinal adenopathy measures 1.9 cm. Right paratracheal adenopathy measures 1.0 cm. Right high paratracheal lymph node measures 1.1 cm. No other definite adenopathy is seen. Granulomatous calcifications are present in the right upper lobe. There is mild septal thickening in both lungs with a basilar predominance which may reflect developing edema or volume overload. There is some more confluent consolidation in the left lower lobe which may reflect atelectasis or pneumonia. There is some mild dependent atelectasis in the right lower lobe. Study is low confidence for COVID-19. Upper abdomen shows cholecystectomy. There is hepatic steatosis. The right kidney is ectopically located in the upper pelvis/lower abdomen.     Impression: 1. Cardiomegaly with mild mediastinal adenopathy, nonspecific. Follow-up chest CT in 3 months recommended. 2. Septal thickening in the lungs with a basilar predominance may reflect mild edema/volume overload. 3. Trace left pleural effusion with some mild alveolar consolidation in the left lower lobe may reflect atelectasis or pneumonia. There is some mild atelectasis in the right lower lobe. Study is low confidence for COVID-19. 4. Additional findings as above. Signer Name: Braulio  MD Angelica  Signed: 3/25/2020 11:34 PM  Workstation Name: [id number]  [facility]    Xr Chest 1 View    Result Date: 3/25/2020  Narrative: CR Chest 1 Vw INDICATION: Shortness of breath with fever and malaise for the past 3 days COMPARISON:  1/22/2019 FINDINGS: Single portable AP view(s) of the chest.  Cardiomegaly is unchanged. Pacemaker leads appear satisfactory. Lung volumes are low. Vascular markings are within normal limits. No effusions or focal new infiltrates are seen. Linear scarring versus atelectasis is seen at the left base and is unchanged.     Impression: No acute cardiopulmonary findings. Signer Name: Braulio Dubois MD  Signed: 3/25/2020 10:22 PM  Workstation Name: [id number]  [facility]    Us Renal Bilateral    Result Date: 3/10/2020  Narrative: Renal ultrasound 03/10/2020  INDICATION: Hydronephrosis bilaterally on previous outside ultrasound 02/14/2018. Diabetes and hypertension.  FINDINGS: The right kidney measures 10.3 cm while the left kidney measures 10.6 cm in longitudinal dimensions. There is no evidence of hydronephrosis or nephrolithiasis. No cystic or solid mass lesions were seen on either kidney. There is normal renal cortical echogenicity. Images of the bladder are normal.      Impression: Normal renal ultrasound.  This report was finalized on 3/10/2020 10:54 AM by Dr. Braydon Gabriel MD.        Allergies:  is allergic to corticosteroids; adhesive tape; hydrocodone; and lisinopril.    Smooth  reviewed    Discharge Medications:     Discharge Medications      New Medications      Instructions Start Date   azithromycin 250 MG tablet  Commonly known as:  Zithromax   Take one tablet daily      cefdinir 300 MG capsule  Commonly known as:  OMNICEF   300 mg, Oral, 2 Times Daily      saccharomyces boulardii 250 MG capsule  Commonly known as:  Florastor   250 mg, Oral, 2 Times Daily         Changes to Medications      Instructions Start Date   atorvastatin  20 MG tablet  Commonly known as:  LIPITOR  What changed:  additional instructions   10 mg, Oral, 2 Times Weekly      metFORMIN 1000 MG tablet  Commonly known as:  GLUCOPHAGE  What changed:  when to take this   1,000 mg, Oral, Daily Before Supper      metFORMIN 500 MG tablet  Commonly known as:  GLUCOPHAGE  What changed:  Another medication with the same name was changed. Make sure you understand how and when to take each.   500 mg, Oral, Daily With Breakfast         Continue These Medications      Instructions Start Date   acetaminophen 325 MG tablet  Commonly known as:  TYLENOL   650 mg, Oral, Every 4 Hours PRN      albuterol sulfate  (90 Base) MCG/ACT inhaler  Commonly known as:  PROVENTIL HFA;VENTOLIN HFA;PROAIR HFA   2 puffs, Inhalation, Every 4 Hours PRN      albuterol (2.5 MG/3ML) 0.083% nebulizer solution  Commonly known as:  PROVENTIL   2.5 mg, Nebulization, Every 4 Hours PRN      BD Pen Needle Clara U/F 32G X 4 MM misc  Generic drug:  Insulin Pen Needle   USE WITH INSULIN PENS      digoxin 250 MCG tablet  Commonly known as:  LANOXIN   250 mcg, Oral, Daily Digoxin      Entresto 24-26 MG tablet  Generic drug:  sacubitril-valsartan   1 tablet, Oral, 2 Times Daily      ferrous sulfate 325 (65 FE) MG EC tablet   65 mg, Oral, Weekly, On Sunday      furosemide 40 MG tablet  Commonly known as:  LASIX   40 mg, Oral, Daily      glucose blood test strip   1 each, Other, 3 Times Daily, Use as instructed      insulin glargine 100 UNIT/ML injection  Commonly known as:  LANTUS   40 Units, Subcutaneous, Daily      metoprolol succinate  MG 24 hr tablet  Commonly known as:  TOPROL-XL   200 mg, Oral, Every Morning      multivitamin tablet   1 tablet, Oral, Daily      pantoprazole 40 MG EC tablet  Commonly known as:  PROTONIX   TAKE 1 TABLET DAILY FOR GASTROESOPHAGEAL REFLUX DISEASE      potassium chloride ER 20 MEQ tablet controlled-release ER tablet  Commonly known as:  K-TAB   20 mEq, Oral, Daily      Thyroid 90  MG tablet  Commonly known as:  ARMOUR   90 mg, Oral, Every Morning      Thyroid 90 MG tablet  Commonly known as:  ARMOUR   45 mg, Oral, Nightly      warfarin 5 MG tablet  Commonly known as:  COUMADIN   5 mg, Oral, Nightly      warfarin 2.5 MG tablet  Commonly known as:  COUMADIN   2.5 mg, Oral, TUES  THURS  SAT  Sun  ( so total dose on TUES  THURS  SAT  SUN is 7.5mg because she has coumadin 5 mg daily)         Stop These Medications    nitrofurantoin (macrocrystal-monohydrate) 100 MG capsule  Commonly known as:  MACROBID            Last Lab Results:   Lab Results (most recent)     Procedure Component Value Units Date/Time    Urine Culture - Urine, Urine, Catheter [[id number]]  (Normal) Collected:  03/25/20 2159    Specimen:  Urine, Catheter Updated:  03/27/20 1141     Urine Culture No growth    POC Glucose Once [[id number]]  (Abnormal) Collected:  03/27/20 1124    Specimen:  Blood Updated:  03/27/20 1133     Glucose 179 mg/dL     CORONAVIRUS-19(COVID-19),RT-PCR,[facility], NP Swab in Transport Media - Swab, Nasopharynx [[id number]]  (Normal) Collected:  03/26/20 0158    Specimen:  Swab from Nasopharynx Updated:  03/27/20 0834     COVID19 Not Detected    POC Glucose Once [[id number]]  (Normal) Collected:  03/27/20 0728    Specimen:  Blood Updated:  03/27/20 0734     Glucose 101 mg/dL     Protime-INR [[id number]]  (Abnormal) Collected:  03/27/20 0521    Specimen:  Blood Updated:  03/27/20 0554     Protime 30.2 Seconds      INR 2.82    Narrative:       Therapeutic Ranges for INR: 2.0-3.0 (PT 20-30)                              2.5-3.5 (PT 25-34)    Lactic Acid, Plasma [[id number]]  (Normal) Collected:  03/27/20 0521    Specimen:  Blood Updated:  03/27/20 0551     Lactate 0.9 mmol/L     Blood Culture - Blood, Arm, Right [[id number]] Collected:  03/25/20 2134    Specimen:  Blood from Arm, Right Updated:  03/26/20 2200     Blood Culture No growth at 24 hours    Blood Culture - Blood, Arm, Left [[id number]] Collected:  03/25/20  2134    Specimen:  Blood from Arm, Left Updated:  03/26/20 2200     Blood Culture No growth at 24 hours    Ferritin [[id number]]  (Abnormal) Collected:  03/26/20 0520    Specimen:  Blood Updated:  03/26/20 1521     Ferritin 168.00 ng/mL     Narrative:       Results may be falsely decreased if patient taking Biotin.      C-reactive Protein [[id number]]  (Abnormal) Collected:  03/25/20 2135    Specimen:  Blood Updated:  03/26/20 1037     C-Reactive Protein 8.38 mg/dL     Protime-INR [[id number]]  (Abnormal) Collected:  03/26/20 0659    Specimen:  Blood Updated:  03/26/20 0721     Protime 27.6 Seconds      INR 2.53    Narrative:       Therapeutic Ranges for INR: 2.0-3.0 (PT 20-30)                              2.5-3.5 (PT 25-34)    Procalcitonin [[id number]]  (Abnormal) Collected:  03/26/20 0520    Specimen:  Blood Updated:  03/26/20 0631     Procalcitonin 0.77 ng/mL     Narrative:       Results may be falsely decreased if patient taking Biotin.     Basic Metabolic Panel [[id number]]  (Abnormal) Collected:  03/26/20 0520    Specimen:  Blood Updated:  03/26/20 0617     Glucose 164 mg/dL      BUN 17 mg/dL      Creatinine 0.73 mg/dL      Sodium 136 mmol/L      Potassium 4.2 mmol/L      Chloride 103 mmol/L      CO2 21.8 mmol/L      Calcium 10.2 mg/dL      eGFR Non African Amer 77 mL/min/1.73      BUN/Creatinine Ratio 23.3     Anion Gap 11.2 mmol/L     Narrative:       GFR Normal >60  Chronic Kidney Disease <60  Kidney Failure <15      TSH [[id number]]  (Normal) Collected:  03/26/20 0520    Specimen:  Blood Updated:  03/26/20 0608     TSH 0.858 uIU/mL     BNP [[id number]]  (Abnormal) Collected:  03/26/20 0520    Specimen:  Blood Updated:  03/26/20 0607     proBNP 2,355.0 pg/mL     Narrative:       Among patients with dyspnea, NT-proBNP is highly sensitive for the detection of acute congestive heart failure. In addition NT-proBNP of <300 pg/ml effectively rules out acute congestive heart failure with 99% negative predictive  value.    Results may be falsely decreased if patient taking Biotin.      CBC & Differential [[id number]] Collected:  03/26/20 0520    Specimen:  Blood Updated:  03/26/20 0600    Narrative:       The following orders were created for panel order CBC & Differential.  Procedure                               Abnormality         Status                     ---------                               -----------         ------                     Manual Differential[[id number]]                                                         CBC Auto Differential[[id number]]        Abnormal            Final result                 Please view results for these tests on the individual orders.    CBC Auto Differential [[id number]]  (Abnormal) Collected:  03/26/20 0520    Specimen:  Blood Updated:  03/26/20 0600     WBC 8.47 10*3/mm3      RBC 4.01 10*6/mm3      Hemoglobin 11.2 g/dL      Hematocrit 35.3 %      MCV 88.0 fL      MCH 27.9 pg      MCHC 31.7 g/dL      RDW 14.6 %      RDW-SD 47.4 fl      MPV 11.4 fL      Platelets 139 10*3/mm3     Respiratory Panel, PCR - Swab, Nasopharynx [[id number]]  (Normal) Collected:  03/25/20 2135    Specimen:  Swab from Nasopharynx Updated:  03/26/20 0411     ADENOVIRUS, PCR Not Detected     Coronavirus 229E Not Detected     Coronavirus HKU1 Not Detected     Coronavirus NL63 Not Detected     Coronavirus OC43 Not Detected     Human Metapneumovirus Not Detected     Human Rhinovirus/Enterovirus Not Detected     Influenza B PCR Not Detected     Parainfluenza Virus 1 Not Detected     Parainfluenza Virus 2 Not Detected     Parainfluenza Virus 3 Not Detected     Parainfluenza Virus 4 Not Detected     Bordetella pertussis pcr Not Detected     Influenza A H1 2009 PCR Not Detected     Chlamydophila pneumoniae PCR Not Detected     Mycoplasma pneumo by PCR Not Detected     Influenza A PCR Not Detected     Influenza A H3 Not Detected     Influenza A H1 Not Detected     RSV, PCR Not Detected     Bordetella parapertussis  PCR Not Detected    Narrative:       The coronavirus on the RVP is NOT COVID-19 and is NOT indicative of infection with COVID-19.     Lactic Acid, Reflex [[id number]]  (Abnormal) Collected:  03/26/20 0136    Specimen:  Blood Updated:  03/26/20 0157     Lactate 2.6 mmol/L     Lactic Acid, Reflex Timer (This will reflex a repeat order 3-3:15 hours after ordered.) [[id number]] Collected:  03/25/20 2134    Specimen:  Blood Updated:  03/26/20 0115     Hold Tube Hold for add-ons.     Comment: Auto resulted.       Urinalysis, Microscopic Only - Urine, Catheter [[id number]]  (Abnormal) Collected:  03/25/20 2159    Specimen:  Urine, Catheter Updated:  03/25/20 2333     RBC, UA 3-5 /HPF      WBC, UA 6-12 /HPF      Bacteria, UA 3+ /HPF      Squamous Epithelial Cells, UA 3-6 /HPF      Hyaline Casts, UA None Seen /LPF      Calcium Oxalate Crystals, UA Small/1+ /HPF      Amorphous Crystals, UA Moderate/2+ /HPF      Methodology Manual Light Microscopy    Urinalysis With Culture If Indicated - Urine, Catheter [[id number]]  (Abnormal) Collected:  03/25/20 2159    Specimen:  Urine, Catheter Updated:  03/25/20 2235     Color, UA Yellow     Appearance, UA Clear     pH, UA 5.5     Specific Gravity, UA 1.025     Comment: Result obtained by Refractometer        Glucose, UA Negative     Ketones, UA Trace     Bilirubin, UA Small (1+)     Blood, UA Small (1+)     Protein, UA >=300 mg/dL (3+)     Leuk Esterase, UA Negative     Nitrite, UA Negative     Urobilinogen, UA 2.0 E.U./dL    Comprehensive Metabolic Panel [[id number]]  (Abnormal) Collected:  03/25/20 2135    Specimen:  Blood Updated:  03/25/20 2230     Glucose 195 mg/dL      BUN 18 mg/dL      Creatinine 0.84 mg/dL      Sodium 134 mmol/L      Potassium 4.0 mmol/L      Chloride 98 mmol/L      CO2 17.9 mmol/L      Calcium 11.5 mg/dL      Total Protein 7.8 g/dL      Albumin 4.20 g/dL      ALT (SGPT) 23 U/L      AST (SGOT) 24 U/L      Alkaline Phosphatase 63 U/L      Total Bilirubin 1.2 mg/dL       eGFR Non African Amer 66 mL/min/1.73      Globulin 3.6 gm/dL      A/G Ratio 1.2 g/dL      BUN/Creatinine Ratio 21.4     Anion Gap 18.1 mmol/L     Narrative:       GFR Normal >60  Chronic Kidney Disease <60  Kidney Failure <15      Lactate Dehydrogenase [[id number]]  (Normal) Collected:  03/25/20 2135    Specimen:  Blood Updated:  03/25/20 2230      U/L     Lipase [[id number]]  (Normal) Collected:  03/25/20 2135    Specimen:  Blood Updated:  03/25/20 2230     Lipase 31 U/L     Digoxin Level [[id number]]  (Normal) Collected:  03/25/20 2135    Specimen:  Blood Updated:  03/25/20 2230     Digoxin 1.07 ng/mL     Narrative:       Results may be falsely increased if patient taking Biotin.      Procalcitonin [[id number]]  (Abnormal) Collected:  03/25/20 2135    Specimen:  Blood Updated:  03/25/20 2226     Procalcitonin 0.31 ng/mL     Narrative:       Results may be falsely decreased if patient taking Biotin.     Lactic Acid, Plasma [[id number]]  (Abnormal) Collected:  03/25/20 2134    Specimen:  Blood Updated:  03/25/20 2214     Lactate 3.7 mmol/L     Influenza Antigen, Rapid - Swab, Nasopharynx [[id number]]  (Normal) Collected:  03/25/20 2135    Specimen:  Swab from Nasopharynx Updated:  03/25/20 2202     Influenza A Ag, EIA Negative     Influenza B Ag, EIA Negative    CBC & Differential [[id number]] Collected:  03/25/20 2134    Specimen:  Blood Updated:  03/25/20 2158    Narrative:       The following orders were created for panel order CBC & Differential.  Procedure                               Abnormality         Status                     ---------                               -----------         ------                     CBC Auto Differential[[id number]]        Abnormal            Final result                 Please view results for these tests on the individual orders.    CBC Auto Differential [[id number]]  (Abnormal) Collected:  03/25/20 2134    Specimen:  Blood Updated:  03/25/20 2158     WBC 11.13 10*3/mm3       RBC 4.64 10*6/mm3      Hemoglobin 13.1 g/dL      Hematocrit 39.5 %      MCV 85.1 fL      MCH 28.2 pg      MCHC 33.2 g/dL      RDW 14.6 %      RDW-SD 45.4 fl      MPV 11.2 fL      Platelets 170 10*3/mm3      Neutrophil % 87.2 %      Lymphocyte % 5.2 %      Monocyte % 4.9 %      Eosinophil % 2.0 %      Basophil % 0.3 %      Immature Grans % 0.4 %      Neutrophils, Absolute 9.71 10*3/mm3      Lymphocytes, Absolute 0.58 10*3/mm3      Monocytes, Absolute 0.54 10*3/mm3      Eosinophils, Absolute 0.22 10*3/mm3      Basophils, Absolute 0.03 10*3/mm3      Immature Grans, Absolute 0.05 10*3/mm3      nRBC 0.0 /100 WBC         Imaging Results (Most Recent)     Procedure Component Value Units Date/Time    CT Chest Without Contrast [[id number]] Collected:  03/25/20 2334     Updated:  03/25/20 2336    Narrative:       CT Chest WO    INDICATION:   Shortness of air with fever and generalized malaise over the last 3 days. Leukocytosis.    TECHNIQUE:   CT of the thorax without IV contrast. Coronal and sagittal reconstructions were obtained.  Radiation dose reduction techniques included automated exposure control or exposure modulation based on body size. Count of known CT and cardiac nuc med studies  performed in previous 12 months: 0.     COMPARISON:   None available.    FINDINGS:  Heart is enlarged. Patient is status post sternotomy and mitral valve replacement. There is a left-sided pacer in place. There is a trace amount of left-sided pleural fluid. There is no right pleural effusion or pericardial effusion. There is coronary  artery disease and atherosclerotic disease. Subcarinal adenopathy measures 1.9 cm. Right paratracheal adenopathy measures 1.0 cm. Right high paratracheal lymph node measures 1.1 cm. No other definite adenopathy is seen. Granulomatous calcifications are  present in the right upper lobe. There is mild septal thickening in both lungs with a basilar predominance which may reflect developing edema or volume  overload. There is some more confluent consolidation in the left lower lobe which may reflect  atelectasis or pneumonia. There is some mild dependent atelectasis in the right lower lobe. Study is low confidence for COVID-19. Upper abdomen shows cholecystectomy. There is hepatic steatosis. The right kidney is ectopically located in the upper  pelvis/lower abdomen.      Impression:         1. Cardiomegaly with mild mediastinal adenopathy, nonspecific. Follow-up chest CT in 3 months recommended.  2. Septal thickening in the lungs with a basilar predominance may reflect mild edema/volume overload.  3. Trace left pleural effusion with some mild alveolar consolidation in the left lower lobe may reflect atelectasis or pneumonia. There is some mild atelectasis in the right lower lobe. Study is low confidence for COVID-19.  4. Additional findings as above.    Signer Name: Braulio Dominguez MD   Signed: 3/25/2020 11:34 PM   Workstation Name: [id number]    [facility]    XR Chest 1 View [[id number]] Collected:  03/25/20 2222     Updated:  03/25/20 2224    Narrative:       CR Chest 1 Vw    INDICATION:   Shortness of breath with fever and malaise for the past 3 days     COMPARISON:    1/22/2019    FINDINGS:  Single portable AP view(s) of the chest.  Cardiomegaly is unchanged. Pacemaker leads appear satisfactory. Lung volumes are low. Vascular markings are within normal limits. No effusions or focal new infiltrates are seen. Linear scarring versus atelectasis  is seen at the left base and is unchanged.      Impression:       No acute cardiopulmonary findings.    Signer Name: Braulio Dubois MD   Signed: 3/25/2020 10:22 PM   Workstation Name: [id number]    [facility]          PROCEDURES      Condition on Discharge:  Stable    Physical Exam at Discharge  Vital Signs  Temp:  [97.1 °F (36.2 °C)-97.5 °F (36.4 °C)] 97.4 °F (36.3 °C)  Heart Rate:  [60-64] 62  Resp:  [18] 18  BP: (117-138)/(60-81)  138/81    Physical Exam:  Physical Exam   Constitutional: Patient appears stated age and in no acute distress   Cardiovascular: Regular rate, regular rhythm, S1 normal and S2 normal.  Exam reveals no gallop and no friction rub.  No murmur heard.  Pulmonary/Chest: Lungs are clear to auscultation bilaterally. No respiratory distress. No wheezes. No rhonchi. No rales.   Abdominal: Soft. Bowel sounds are normal. There is no tenderness.   Musculoskeletal: Normal Muscle tone  Extremities: No edema. No BLE asymmetry.  Neurological: Cranial nerves II-XII are grossly intact with no focal deficits.    Discharge Disposition  Home    Visiting Nurse:    No     Home PT/OT:  No     Home Safety Evaluation:  No     DME  None    Discharge Diet:      Dietary Orders (From admission, onward)     Start     Ordered    03/26/20 1114  Diet Regular; Cardiac, Consistent Carbohydrate  Diet Effective Now     Question Answer Comment   Diet Texture / Consistency Regular    Common Modifiers Cardiac    Common Modifiers Consistent Carbohydrate        03/26/20 1113                Activity at Discharge:  As tolerated      Follow-up Appointments  Future Appointments   Date Time Provider Department Center   7/16/2020 10:00 AM Mahin Galeas MD [id number] None     Additional Instructions for the Follow-ups that You Need to Schedule     Discharge Follow-up with PCP   As directed       Currently Documented PCP:    Rashid Ge MD    PCP Phone Number:    [phone]     Follow Up Details:  Next week               Test Results Pending at Discharge   Order Current Status    Blood Culture - Blood, Arm, Left Preliminary result    Blood Culture - Blood, Arm, Right Preliminary result           Aldo Barakat MD  03/27/20  14:04

## 2020-03-27 NOTE — PLAN OF CARE
Problem: Patient Care Overview  Goal: Plan of Care Review  Flowsheets (Taken 3/27/2020 1050)  Outcome Summary: Physical therapy evaluation complete.  Patient performs all transfers independently and gait x350 feet without use of device.  Patient manages direction changes without difficulty and no balance loss noted.  Patient reports current mobility is at baseline and reports no concerns regarding return home.  No further PT needs at this time.

## 2020-03-27 NOTE — THERAPY EVALUATION
Acute Care - Physical Therapy Initial Evaluation  OCTAVIANO Reid     Patient Name: Fernanda Molina  : 1940  MRN: [id number]  Today's Date: 3/27/2020   Onset of Illness/Injury or Date of Surgery: 20  Date of Referral to PT: 20  Referring Physician: Dr Barakat      Admit Date: 3/25/2020    Visit Dx:     ICD-10-CM ICD-9-CM   1. Pneumonia of left lower lobe due to infectious organism (CMS/[facility]) J18.1 486   2. Lymphocytopenia D72.810 288.51   3. Lactic acidosis E87.2 276.2     Patient Active Problem List   Diagnosis   • S/P mitral valve repair   • S/P tricuspid valve repair   • S/P Maze operation for atrial fibrillation   • Morbidly obese (CMS/HCC)   • Atrial fibrillation (CMS/HCC)   • Acquired hypothyroidism   • COPD (chronic obstructive pulmonary disease) (CMS/HCC)   • CAD (coronary artery disease), hx MI   • HAMLET on autoCPAP   • Renal calculus, right   • Hyperparathyroidism (CMS/HCC)   • Type 2 diabetes mellitus without complication, with long-term current use of insulin (CMS/HCC)   • Warfarin anticoagulation   • Medication monitoring encounter   • Medicare annual wellness visit, subsequent   • Pacemaker   • AICD (automatic cardioverter/defibrillator) present   • Right foot pain   • Chronic nonallergic rhinitis   • Pulmonary hypertension (CMS/HCC)   • Mitral valve disease   • Hospital discharge follow-up   • Hypersomnia due to medical condition   • Ureteral calculus, left   • Pelvic kidney     Past Medical History:   Diagnosis Date   • AICD (automatic cardioverter/defibrillator) present     left side chest   • Anemia    • Arthritis    • Atrial fibrillation (CMS/HCC)    • Cancer (CMS/HCC) 2002    melanoma right back of knee   • CHF (congestive heart failure) (CMS/HCC)    • Congenital heart disease    • Coronary artery disease    • Diabetes mellitus (CMS/HCC)    • GERD (gastroesophageal reflux disease)    • Graves disease    • Hematuria    • High cholesterol    • History of kidney stones    • History of  melanoma     RIGHT KNEE   • History of MI (myocardial infarction)    • Hypertension    • Hypothyroidism     had hyperthyroidism and was given radiated iodine to kill her thyroid now on med   • Incontinence of urine     wears pads   • Kidney stones     bilateral   • On anticoagulant therapy    • HAMLET on CPAP     cpap   • Pelvic kidney 2020   • PONV (postoperative nausea and vomiting)    • Spinal stenosis    • Ureteral calculus, left 2020   • Valvular heart disease 2015    S/p MVR & TVR      Past Surgical History:   Procedure Laterality Date   • CARDIAC CATHETERIZATION     • CARDIAC DEFIBRILLATOR PLACEMENT Left     [employer]   • CARDIAC DEFIBRILLATOR PLACEMENT Left 2016    [employer]   •  SECTION     •  SECTION     • CHOLECYSTECTOMY     • COLONOSCOPY     • ENDOSCOPY     • EPIDURAL BLOCK  [id number]    Lumbar    • EXTRACORPOREAL SHOCK WAVE LITHOTRIPSY (ESWL) Right 2017   • EXTRACORPOREAL SHOCK WAVE LITHOTRIPSY (ESWL) Right 2017    Procedure: RT EXTRACORPOREAL SHOCKWAVE LITHOTRIPSY;  Surgeon: Moisés Everett MD;  Location: [facility] OR OSC;  Service:    • EYE SURGERY Right 09/10/2007    Cataract   • EYE SURGERY Left 10/29/2007    Cataract   • JOINT REPLACEMENT Left 2007    Knee   • JOINT REPLACEMENT Right 2010    Knee   • KNEE ARTHROSCOPY     • MAZE PROCEDURE  2015    Dr. Saavedra   • MITRAL VALVE REPAIR/REPLACEMENT     • MITRAL VALVE REPAIR/REPLACEMENT N/A 2019    Procedure: ALINE RE-OP STERNOTOMY MITRAL VALVE REPLACEMENT AND PRP;  Surgeon: Ben Saavedra MD;  Location: [facility] OR;  Service: Cardiothoracic   • NM ERCP DX COLLECTION SPECIMEN BRUSHING/WASHING N/A 2016    Procedure: ENDOSCOPIC RETROGRADE CHOLANGIOPANCREATOGRAPHY WITH SPHINCTEROTOMY AND BALLOON SWEEP OF CBD;  Surgeon: Lucas Campos MD;  Location: [facility] ENDOSCOPY;  Service: Gastroenterology   • SKIN CANCER EXCISION      MELANOMA RIGHT KNEE AREA  - 2002   • TRICUSPID VALVE SURGERY  07/31/2015    Dr. Saavedra  ring placed   • URETEROSCOPY LASER LITHOTRIPSY WITH STENT INSERTION Right 11/15/2017    Procedure: CYSTOSCOPY; RIGHT URETEROSCOPY LASER LITHOTRIPSY WITH STENT INSERTION;  Surgeon: Moisés Everett MD;  Location: [facility];  Service:    • URETEROSCOPY LASER LITHOTRIPSY WITH STENT INSERTION Left 1/30/2020    Procedure: URETEROSCOPY STONE BASKET EXTRACTION LASER LITHOTRIPSY WITH STENT INSERTION WITH STRING;  Surgeon: Moisés Everett MD;  Location: [facility];  Service: Urology        PT ASSESSMENT (last 12 hours)      Physical Therapy Evaluation     Row Name 03/27/20 1050          PT Evaluation Time/Intention    Subjective Information  no complaints  -JW     Document Type  discharge evaluation/summary  -JW     Mode of Treatment  physical therapy  -JW     Patient Effort  excellent  -JW     Symptoms Noted During/After Treatment  none  -JW     Row Name 03/27/20 1050          General Information    Patient Profile Reviewed?  yes  -JW     Onset of Illness/Injury or Date of Surgery  03/25/20  -JW     Referring Physician  Dr Barakat  -JW     Patient Observations  alert;cooperative;agree to therapy  -JW     Patient/Family Observations  pt sitting in chair, agreeable to therapy  -JW     Prior Level of Function  independent:;all household mobility;community mobility  -JW     Equipment Currently Used at Home  none  -JW     Pertinent History of Current Functional Problem  pt admitted to hospital with fever, weakness and fatigue x3 days.  Patient reports prior to admission, she is independent with all functional mobility/ADLs without DME.   -JW     Existing Precautions/Restrictions  fall  -JW     Risks Reviewed  patient:;increased discomfort  -JW     Barriers to Rehab  none identified  -JW     Row Name 03/27/20 1059          Relationship/Environment    Lives With  spouse  -JW     Family Caregiver if Needed  spouse  -JW     Row Name 03/27/20 105           Resource/Environmental Concerns    Current Living Arrangements  home/apartment/condo 1 story house  -     Row Name 03/27/20 1050          Living Environment    Living Arrangements  house  -JW     Home Accessibility  stairs to enter home  -     Row Name 03/27/20 1050          Home Main Entrance    Number of Stairs, Main Entrance  two  -JW     Stair Railings, Main Entrance  railings on both sides of stairs  -     Row Name 03/27/20 1050          Cognitive Assessment/Intervention- PT/OT    Orientation Status (Cognition)  oriented x 3  -JW     Follows Commands (Cognition)  WFL  -     Personal Safety Interventions  gait belt;nonskid shoes/slippers when out of bed  -     Row Name 03/27/20 1050          Bed Mobility Assessment/Treatment    Comment (Bed Mobility)  deferred-up in chair  -     Row Name 03/27/20 1050          Transfer Assessment/Treatment    Transfer Assessment/Treatment  sit-stand transfer;stand-sit transfer  -     Sit-Stand Alexander (Transfers)  independent  -     Stand-Sit Alexander (Transfers)  independent  -     Row Name 03/27/20 1050          Sit-Stand Transfer    Assistive Device (Sit-Stand Transfers)  other (see comments) no DME used  -     Row Name 03/27/20 1050          Stand-Sit Transfer    Assistive Device (Stand-Sit Transfers)  other (see comments) no DME used  -     Row Name 03/27/20 1050          Gait/Stairs Assessment/Training    Alexander Level (Gait)  independent  -     Assistive Device (Gait)  other (see comments) no DME used  -     Distance in Feet (Gait)  350  -JW     Pattern (Gait)  swing-through  -JW     Deviations/Abnormal Patterns (Gait)  base of support, narrow  -JW     Bilateral Gait Deviations  forward flexed posture  -JW     Comment (Gait/Stairs)  pt reports current mobility is at baseline and reports no concerns regarding return home.  Patient with no episodes of balance loss or difficulty with direction changes.  -     Row Name 03/27/20  "1050          General ROM    GENERAL ROM COMMENTS  LE ROM WFL bilaterally  -     Row Name 03/27/20 1050          MMT (Manual Muscle Testing)    General MMT Comments  LE strength functional within task  -     Row Name 03/27/20 1050          Pain Assessment    Additional Documentation  -- no c/o pain  -     Row Name 03/27/20 1050          Plan of Care Review    Plan of Care Reviewed With  patient  -     Outcome Summary  Physical therapy evaluation complete.  Patient performs all transfers independently and gait x350 feet without use of device.  Patient manages direction changes without difficulty and no balance loss noted.  Patient reports current mobility is at baseline and reports no concerns regarding return home.  No further PT needs at this time.  -     Row Name 03/27/20 1050          Physical Therapy Clinical Impression    Date of Referral to PT  03/27/20  -     Patient/Family Goals Statement (PT Clinical Impression)  \"go home\"  -     Criteria for Skilled Interventions Met (PT Clinical Impression)  no problems identified which require skilled intervention;current level of function same as previous level of function  -     Care Plan Review (PT)  evaluation/treatment results reviewed;care plan/treatment goals reviewed;risks/benefits reviewed;patient/other agree to care plan  -[facility] Name 03/27/20 1050          Positioning and Restraints    Pre-Treatment Position  sitting in chair/recliner  -     Post Treatment Position  chair  -     In Chair  reclined;call light within reach;encouraged to call for assist;[place] [facility]  -       User Key  (r) = Recorded By, (t) = Taken By, (c) = Cosigned By    Initials Name Provider Type    Dodie Vieira, PT Physical Therapist          PT Recommendation and Plan  Anticipated Discharge Disposition (PT): home  Therapy Frequency (PT Clinical Impression): evaluation only  Outcome Summary/Treatment Plan (PT)  Anticipated Discharge Disposition (PT): " home  Plan of Care Reviewed With: patient  Outcome Summary: Physical therapy evaluation complete.  Patient performs all transfers independently and gait x350 feet without use of device.  Patient manages direction changes without difficulty and no balance loss noted.  Patient reports current mobility is at baseline and reports no concerns regarding return home.  No further PT needs at this time.  Outcome Measures     Row Name 03/27/20 1050             How much help from another person do you currently need...    Turning from your back to your side while in flat bed without using bedrails?  4  -JW      Moving from lying on back to sitting on the side of a flat bed without bedrails?  4  -JW      Moving to and from a bed to a chair (including a wheelchair)?  4  -JW      Standing up from a chair using your arms (e.g., wheelchair, bedside chair)?  4  -JW      Climbing 3-5 steps with a railing?  4  -JW      To walk in hospital room?  4  -JW      AM-PAC 6 Clicks Score (PT)  24  -         Functional Assessment    Outcome Measure Options  AM-PAC 6 Clicks Basic Mobility (PT)  -        User Key  (r) = Recorded By, (t) = Taken By, (c) = Cosigned By    Initials Name Provider Type    Dodie Vieira, PT Physical Therapist         Time Calculation:   PT Charges     Row Name 03/27/20 1134             Time Calculation    Start Time  1050  -JW      PT Received On  03/27/20  -        User Key  (r) = Recorded By, (t) = Taken By, (c) = Cosigned By    Initials Name Provider Type    Dodie Vieira PT Physical Therapist        Therapy Charges for Today     Code Description Service Date Service Provider Modifiers Qty    [id number]  PT EVAL LOW COMPLEXITY 1 3/27/2020 Dodie Galvez, PT GP 1          PT G-Codes  Outcome Measure Options: AM-PAC 6 Clicks Basic Mobility (PT)  AM-PAC 6 Clicks Score (PT): 24      Dodie Galvez PT  3/27/2020

## 2020-03-27 NOTE — PROGRESS NOTES
Adult Nutrition  Assessment/PES    Patient Name:  Fernanda Molina  YOB: 1940  MRN: [id number]  Admit Date:  3/25/2020    Assessment Date:  3/27/2020      Reason for Assessment     Row Name 03/27/20 1106          Reason for Assessment    Reason For Assessment  follow-up protocol coumadin         Nutrition/Diet History     Row Name 03/27/20 1107          Nutrition/Diet History    Typical Food/Fluid Intake  Spoke w pt at bedside. Familiar w pt from cardiac rehab & individual appt. Pt has been on coumadin & denies any questions. Pt been trying to lose wt at home. Encouraged pt to eat well to feel well & then work on that.                Education/Evaluation     Row Name 03/27/20 1108          Education    Education  Education topics Pt denies any needs, aware of vitamin K & coumadin has been on it at home. Encouraged consistency     Education Topics  Vitamin K What You need to Know About Warafin provided         Monitor/Evaluation    Education Follow-up  Other (comment) Expect compliance pt already aware of coumadin/vitamin K            Electronically signed by:  Yaritza Schwab RD  03/27/20 11:10

## 2020-03-27 NOTE — PLAN OF CARE
Problem: Patient Care Overview  Goal: Plan of Care Review  Outcome: Ongoing (interventions implemented as appropriate)  Flowsheets (Taken 3/27/2020 0621)  Progress: improving  Plan of Care Reviewed With: patient; spouse  Outcome Summary: pt did not get much rest throughout the night. States she feels much better.  updated via phone. New order for ATB to be given. Will monitor.

## 2020-03-27 NOTE — PROGRESS NOTES
Jermaine Camara MD                          [phone]      Patient ID:    Name:  Fernanda Molina    MRN:  [id number]    1940   79 y.o.  female            Patient Care Team:  Rashid Ge MD as PCP - General (Family Medicine)  Thong Jones MD as Consulting Physician (Cardiology)  Mahin Galeas MD as Consulting Physician (Sleep Medicine)  Lara Oritz MD as Consulting Physician (Obstetrics and Gynecology)  Betty Horowitz MD as Consulting Physician (Dermatology)  Jr Ben Saavedra MD as Surgeon (Cardiothoracic Surgery)  Mahin Galeas MD as Consulting Physician (Pulmonary Disease)  Chirag Rai MD as Consulting Physician (Ophthalmology)  Moisés Everett MD as Consulting Physician (Urology)    CC/ Reason for visit: Pneumonia respiratory failure follow-up    Subjective: Pt seen and examined this AM. No acute overnight events noted. Doing better.  Sitting up in the chair.  States that she is feeling better.  Does have some cough intermittently but no worsening shortness of breath or persistent fevers.  Pleased that covid testing is negative.    ROS: Denies any subjective fevers, syncope or presyncopal events, new neurological deficits, nausea or vomiting currently    Objective     Vital Signs past 24hrs    BP range: BP: (105-138)/(53-81) 138/81  Pulse range: Heart Rate:  [60-64] 63  Resp rate range: Resp:  [18] 18  Temp range: Temp (24hrs), Av.2 °F (36.2 °C), Min:97 °F (36.1 °C), Max:97.5 °F (36.4 °C)      Ventilator/Non-Invasive Ventilation Settings (From admission, onward)    None          Device (Oxygen Therapy): room air       91.7 kg (202 lb 2.6 oz); Body mass index is 36.98 kg/m².      Intake/Output Summary (Last 24 hours) at 3/27/2020 0935  Last data filed at 3/27/2020 0900  Gross per 24 hour   Intake 4909.58 ml   Output 775 ml   Net 4134.58 ml       PHYSICAL EXAM   Constitutional: Middle aged pt in bed, No  acute respiratory distress, No accessory muscle use  Head: - NCAT  Eyes: No pallor.  Anicteric sclerae, EOMI.  ENMT:  Mallampati 3, no oral thrush. Dry MM.   NECK: Trachea midline, No thyromegaly, no palpable cervical lymphadenopathy  Heart: RRR, no murmur. No pedal edema   Lungs: YOSEPH +, occasional rhonchi, no wheezes/ crackles heard    Abdomen: Soft. No tenderness, guarding or rigidity. No palpable masses  Extremities: Extremities warm and well perfused. No cyanosis/ clubbing  Neuro: Conscious, answers appropriately, no gross focal neuro deficits  Psych: Mood and affect appropriate    Scheduled meds:    atorvastatin 10 mg Oral Once per day on Mon Thu   azithromycin      azithromycin 500 mg Intravenous Once   azithromycin 500 mg Intravenous Q24H   cefTRIAXone 1 g Intravenous Q24H   cefTRIAXone 2 g Intravenous Once   digoxin 250 mcg Oral Daily   insulin aspart 0-7 Units Subcutaneous 4x Daily AC & at Bedtime   insulin detemir 40 Units Subcutaneous Daily   metoprolol succinate  mg Oral Daily   pantoprazole 40 mg Oral Daily   sacubitril-valsartan 1 tablet Oral BID   sodium chloride 10 mL Intravenous Q12H   Thyroid 45 mg Oral Nightly   Thyroid 90 mg Oral QAM AC   warfarin 5 mg Oral Once per day on Mon Wed Fri   warfarin 7.5 mg Oral Once per day on Sun Tue Thu Sat       IV meds:                        sodium chloride 125 mL/hr Last Rate: 125 mL/hr (03/27/20 0409)       Data Review:      Results from last 7 days   Lab Units 03/27/20  0521 03/26/20  0659 03/26/20  0520 03/25/20 2135  03/25/20 2134   SODIUM mmol/L  --   --  136 134*  --   --    POTASSIUM mmol/L  --   --  4.2 4.0  --   --    CHLORIDE mmol/L  --   --  103 98  --   --    CO2 mmol/L  --   --  21.8* 17.9*  --   --    BUN mg/dL  --   --  17 18  --   --    CREATININE mg/dL  --   --  0.73 0.84  --   --    CALCIUM mg/dL  --   --  10.2 11.5*  --   --    BILIRUBIN mg/dL  --   --   --  1.2  --   --    ALK PHOS U/L  --   --   --  63  --   --    ALT (SGPT) U/L  --    --   --  23  --   --    AST (SGOT) U/L  --   --   --  24  --   --    GLUCOSE mg/dL  --   --  164* 195*  --   --    WBC 10*3/mm3  --   --  8.47  --   --  11.13*   HEMOGLOBIN g/dL  --   --  11.2*  --   --  13.1   PLATELETS 10*3/mm3  --   --  139*  --   --  170   INR  2.82* 2.53* 2.52* 2.44*   < >  --    PROBNP pg/mL  --   --  2,355.0*  --   --   --    PROCALCITONIN ng/mL  --   --  0.77* 0.31*  --   --     < > = values in this interval not displayed.       Lab Results   Component Value Date    CALCIUM 10.2 03/26/2020    PHOS 2.8 05/15/2019       Results from last 7 days   Lab Units 03/25/20  2134   BLOODCX  No growth at 24 hours  No growth at 24 hours              Results Review:    I have reviewed the relevant laboratory results and independently reviewed the chest imaging from this hospitalization including the available echocardiogram reports personally and summarized it if/ when appropriate below    Assessment    Left lower lobe pneumonia  Covid 19 ruled out   Sepsis  Nonspecific mediastinal lymphadenopathy  UTI  Chronic congestive heart failure  Diabetes  Chronic A. Fib on AC   S/p MVR/TVR   HAMLET on CPAP     PLAN:  All problems are new to me  Patient is doing better with regards to his pneumonia.  Not requiring supplemental oxygen.  Novel coronavirus has been ruled out now.  Continue with antibiotics course given CT chest changes as well as elevated procalcitonin.  Will need outpatient follow-up imaging @ PCP for nonspecific mediastinal lymphadenopathy - explained same to pt.    Diuretics per congestive heart failure.  Out of bed and physical therapy  Should be okay to discharge from my standpoint    I have discussed my findings and recommendations with patient and nursing staff.     Jermaine Camara MD  3/27/2020

## 2020-03-28 ENCOUNTER — READMISSION MANAGEMENT (OUTPATIENT)
Dept: CALL CENTER | Facility: HOSPITAL | Age: 80
End: 2020-03-28

## 2020-03-28 NOTE — OUTREACH NOTE
Prep Survey      Responses   [facility] patient discharged from?  [place]   Is LACE score < 7 ?  No   Eligibility  Readm Mgmt   Discharge diagnosis  Sepsis from UTI vs. viral syndrome, mild pneumonia of Bilat. LL,    Does the patient have one of the following disease processes/diagnoses(primary or secondary)?  Sepsis   Does the patient have Home health ordered?  No   Is there a DME ordered?  No   Comments regarding appointments  Pt to schedule F/U with PCP   Prep survey completed?  Yes          Hanna Lo RN

## 2020-03-30 ENCOUNTER — APPOINTMENT (OUTPATIENT)
Dept: CARDIAC REHAB | Facility: HOSPITAL | Age: 80
End: 2020-03-30

## 2020-03-30 LAB
BACTERIA SPEC AEROBE CULT: NORMAL
BACTERIA SPEC AEROBE CULT: NORMAL

## 2020-04-01 ENCOUNTER — READMISSION MANAGEMENT (OUTPATIENT)
Dept: CALL CENTER | Facility: HOSPITAL | Age: 80
End: 2020-04-01

## 2020-04-01 NOTE — OUTREACH NOTE
Sepsis Week 1 Survey      Responses   [facility] patient discharged from?  [place]   Does the patient have one of the following disease processes/diagnoses(primary or secondary)?  Sepsis   Is there a successful TCM telephone encounter documented?  No   Week 1 attempt successful?  Yes   Call start time  1026   Call end time  1031   Discharge diagnosis  Sepsis from UTI vs. viral syndrome, mild pneumonia of Bilat. LL,    Meds reviewed with patient/caregiver?  Yes   Is the patient having any side effects they believe may be caused by any medication additions or changes?  No   Does the patient have all medications related to this admission filled (includes all antibiotics, inhalers, nebulizers,steroids,etc.)  Yes   Is the patient taking all medications as directed (includes completed medication regime)?  Yes   Does the patient have a primary care provider?   Yes   Comments regarding PCP  saw MD yesterday for INR check   Does the patient have an appointment with their PCP within 7 days of discharge?  Yes   Has the patient kept scheduled appointments due by today?  Yes   Has home health visited the patient within 72 hours of discharge?  N/A   Psychosocial issues?  No   Did the patient receive a copy of their discharge instructions?  Yes   Nursing interventions  Reviewed instructions with patient   What is the patient's perception of their health status since discharge?  Improving   Nursing interventions  Nurse provided patient education   Is the patient/caregiver able to teach back Sepsis?  S - Shivering,fever or very cold, E - Extreme pain or generalized discomfort (worst ever,especially abdomen), P - Pale or discolored skin, S - Sleepy, difficult to arouse,confused, I -   I feel like I might die-a feeling of hopelessness, S - Short of breath   Is the patient/caregiver able to teach back the hierarchy of who to call/visit for symptoms/problems? PCP, Specialist, Home health nurse, Urgent Care, ED, 911  Yes    Additional teach back comments  Patient says she is doing great, she just saw her MD yesterday, no questions or concerns at this time.   Week 1 call completed?  Yes   Revoked  No further contact(revokes)-requires comment   Graduated/Revoked comments  Patient says that since she is seeing her MD next week she will not need any further calls.          Debra Dang RN

## 2020-04-06 ENCOUNTER — APPOINTMENT (OUTPATIENT)
Dept: GENERAL RADIOLOGY | Facility: HOSPITAL | Age: 80
End: 2020-04-06

## 2020-04-06 ENCOUNTER — HOSPITAL ENCOUNTER (EMERGENCY)
Facility: HOSPITAL | Age: 80
Discharge: HOME | End: 2020-04-06
Attending: EMERGENCY MEDICINE | Admitting: EMERGENCY MEDICINE

## 2020-04-06 VITALS
OXYGEN SATURATION: 92 % | DIASTOLIC BLOOD PRESSURE: 68 MMHG | RESPIRATION RATE: 16 BRPM | HEIGHT: 62 IN | TEMPERATURE: 98.4 F | BODY MASS INDEX: 37.76 KG/M2 | SYSTOLIC BLOOD PRESSURE: 134 MMHG | HEART RATE: 74 BPM | WEIGHT: 205.2 LBS

## 2020-04-06 DIAGNOSIS — I48.91 ATRIAL FIBRILLATION WITH RVR (HCC): Primary | ICD-10-CM

## 2020-04-06 LAB
ANION GAP SERPL CALCULATED.3IONS-SCNC: 12.8 MMOL/L (ref 5–15)
APTT PPP: 51.3 SECONDS (ref 24.3–38.1)
BASOPHILS # BLD AUTO: 0.05 10*3/MM3 (ref 0–0.2)
BASOPHILS NFR BLD AUTO: 0.6 % (ref 0–1.5)
BUN BLD-MCNC: 16 MG/DL (ref 8–23)
BUN/CREAT SERPL: 20.8 (ref 7–25)
CALCIUM SPEC-SCNC: 11.6 MG/DL (ref 8.6–10.5)
CHLORIDE SERPL-SCNC: 100 MMOL/L (ref 98–107)
CO2 SERPL-SCNC: 25.2 MMOL/L (ref 22–29)
CREAT BLD-MCNC: 0.77 MG/DL (ref 0.57–1)
DEPRECATED RDW RBC AUTO: 46.7 FL (ref 37–54)
DIGOXIN SERPL-MCNC: 0.95 NG/ML (ref 0.6–1.2)
EOSINOPHIL # BLD AUTO: 0.23 10*3/MM3 (ref 0–0.4)
EOSINOPHIL NFR BLD AUTO: 2.9 % (ref 0.3–6.2)
ERYTHROCYTE [DISTWIDTH] IN BLOOD BY AUTOMATED COUNT: 14.5 % (ref 12.3–15.4)
GFR SERPL CREATININE-BSD FRML MDRD: 73 ML/MIN/1.73
GLUCOSE BLD-MCNC: 152 MG/DL (ref 65–99)
HCT VFR BLD AUTO: 41.5 % (ref 34–46.6)
HGB BLD-MCNC: 13.3 G/DL (ref 12–15.9)
IMM GRANULOCYTES # BLD AUTO: 0.04 10*3/MM3 (ref 0–0.05)
IMM GRANULOCYTES NFR BLD AUTO: 0.5 % (ref 0–0.5)
INR PPP: 2.51 (ref 0.9–1.1)
LYMPHOCYTES # BLD AUTO: 1.59 10*3/MM3 (ref 0.7–3.1)
LYMPHOCYTES NFR BLD AUTO: 19.9 % (ref 19.6–45.3)
MAGNESIUM SERPL-MCNC: 1.6 MG/DL (ref 1.6–2.4)
MCH RBC QN AUTO: 28.1 PG (ref 26.6–33)
MCHC RBC AUTO-ENTMCNC: 32 G/DL (ref 31.5–35.7)
MCV RBC AUTO: 87.6 FL (ref 79–97)
MONOCYTES # BLD AUTO: 0.64 10*3/MM3 (ref 0.1–0.9)
MONOCYTES NFR BLD AUTO: 8 % (ref 5–12)
NEUTROPHILS # BLD AUTO: 5.45 10*3/MM3 (ref 1.7–7)
NEUTROPHILS NFR BLD AUTO: 68.1 % (ref 42.7–76)
NRBC BLD AUTO-RTO: 0 /100 WBC (ref 0–0.2)
PLATELET # BLD AUTO: 273 10*3/MM3 (ref 140–450)
PMV BLD AUTO: 10.3 FL (ref 6–12)
POTASSIUM BLD-SCNC: 4.1 MMOL/L (ref 3.5–5.2)
PROTHROMBIN TIME: 27.4 SECONDS (ref 12.1–15)
RBC # BLD AUTO: 4.74 10*6/MM3 (ref 3.77–5.28)
SODIUM BLD-SCNC: 138 MMOL/L (ref 136–145)
TROPONIN T SERPL-MCNC: <0.01 NG/ML (ref 0–0.03)
TSH SERPL DL<=0.05 MIU/L-ACNC: 1.71 UIU/ML (ref 0.27–4.2)
WBC NRBC COR # BLD: 8 10*3/MM3 (ref 3.4–10.8)

## 2020-04-06 PROCEDURE — 80048 BASIC METABOLIC PNL TOTAL CA: CPT | Performed by: EMERGENCY MEDICINE

## 2020-04-06 PROCEDURE — 85610 PROTHROMBIN TIME: CPT | Performed by: EMERGENCY MEDICINE

## 2020-04-06 PROCEDURE — 84484 ASSAY OF TROPONIN QUANT: CPT | Performed by: EMERGENCY MEDICINE

## 2020-04-06 PROCEDURE — 71046 X-RAY EXAM CHEST 2 VIEWS: CPT

## 2020-04-06 PROCEDURE — 80162 ASSAY OF DIGOXIN TOTAL: CPT | Performed by: EMERGENCY MEDICINE

## 2020-04-06 PROCEDURE — 85025 COMPLETE CBC W/AUTO DIFF WBC: CPT | Performed by: EMERGENCY MEDICINE

## 2020-04-06 PROCEDURE — 99284 EMERGENCY DEPT VISIT MOD MDM: CPT | Performed by: EMERGENCY MEDICINE

## 2020-04-06 PROCEDURE — 83735 ASSAY OF MAGNESIUM: CPT | Performed by: EMERGENCY MEDICINE

## 2020-04-06 PROCEDURE — 85730 THROMBOPLASTIN TIME PARTIAL: CPT | Performed by: EMERGENCY MEDICINE

## 2020-04-06 PROCEDURE — 93005 ELECTROCARDIOGRAM TRACING: CPT | Performed by: EMERGENCY MEDICINE

## 2020-04-06 PROCEDURE — 84443 ASSAY THYROID STIM HORMONE: CPT | Performed by: EMERGENCY MEDICINE

## 2020-04-06 PROCEDURE — 99284 EMERGENCY DEPT VISIT MOD MDM: CPT

## 2020-04-06 PROCEDURE — 93010 ELECTROCARDIOGRAM REPORT: CPT | Performed by: INTERNAL MEDICINE

## 2020-04-06 RX ORDER — AMIODARONE HYDROCHLORIDE 200 MG/1
400 TABLET ORAL 2 TIMES DAILY
Qty: 120 TABLET | Refills: 0 | Status: SHIPPED | OUTPATIENT
Start: 2020-04-06 | End: 2020-04-14 | Stop reason: SINTOL

## 2020-04-06 RX ORDER — SODIUM CHLORIDE 0.9 % (FLUSH) 0.9 %
10 SYRINGE (ML) INJECTION AS NEEDED
Status: DISCONTINUED | OUTPATIENT
Start: 2020-04-06 | End: 2020-04-07 | Stop reason: HOSPADM

## 2020-04-06 NOTE — ED PROVIDER NOTES
Subjective   History of Present Illness  History of Present Illness    Chief complaint: Defibrillator fired twice    Location: Home    Quality/Severity: Asymptomatic    Timing/Onset/Duration: A couple times a day    Modifying Factors: Nothing seems to make it better or worse    Associated Symptoms: No headache.  No fever chills or cough.  No sore throat earache.  Patient has had nasal congestion.  No chest pain or shortness of breath.  No abdominal pain.  No diarrhea or burning in when she urinates.  No nausea or vomiting.    Narrative: This 79-year-old white female was directed to come to the emergency department because her AICD fired twice.  She talked to the advanced practitioner on for Dr. Pelaez, who told the patient to come to the emergency department.  The patient has clear nasal congestion that she relates is from her allergies.  She had a negative Scot test that was performed on March 26.    PCP:Joo      Review of Systems   Constitutional: Negative for chills and fever.   HENT: Positive for congestion and rhinorrhea. Negative for ear pain and sore throat.    Eyes: Negative for discharge and redness.   Respiratory: Negative for cough, chest tightness and shortness of breath.    Cardiovascular: Negative for chest pain and palpitations.   Gastrointestinal: Negative for abdominal pain, diarrhea, nausea and vomiting.   Genitourinary: Negative for dysuria.   Musculoskeletal: Negative for back pain.   Skin: Negative for rash.   Neurological: Negative for dizziness, speech difficulty, weakness, light-headedness, numbness and headaches.   Psychiatric/Behavioral: Negative.  Negative for confusion.        Medication List      CONTINUE taking these medications    BD Pen Needle Clara U/F 32G X 4 MM misc  Generic drug:  Insulin Pen Needle  USE WITH INSULIN PENS        ASK your doctor about these medications    acetaminophen 325 MG tablet  Commonly known as:  TYLENOL  Take 2 tablets by mouth Every 4 (Four) Hours As  Needed for Mild Pain .     * albuterol sulfate  (90 Base) MCG/ACT inhaler  Commonly known as:  PROVENTIL HFA;VENTOLIN HFA;PROAIR HFA     * albuterol (2.5 MG/3ML) 0.083% nebulizer solution  Commonly known as:  PROVENTIL  Take 2.5 mg by nebulization Every 4 (Four) Hours As Needed for Wheezing.     atorvastatin 20 MG tablet  Commonly known as:  LIPITOR  Take 0.5 tablets by mouth 2 (Two) Times a Week.     azithromycin 250 MG tablet  Commonly known as:  Zithromax  Take one tablet daily     cefdinir 300 MG capsule  Commonly known as:  OMNICEF  Take 1 capsule by mouth 2 (Two) Times a Day.     digoxin 250 MCG tablet  Commonly known as:  LANOXIN  Take 1 tablet by mouth Daily.     Entresto 24-26 MG tablet  Generic drug:  sacubitril-valsartan     ferrous sulfate 325 (65 FE) MG EC tablet     furosemide 40 MG tablet  Commonly known as:  LASIX  Take 1 tablet by mouth Daily.     glucose blood test strip  1 each by Other route 3 (Three) Times a Day. Use as instructed     insulin glargine 100 UNIT/ML injection  Commonly known as:  LANTUS     * metFORMIN 1000 MG tablet  Commonly known as:  GLUCOPHAGE  Take 1 tablet by mouth Daily Before Supper.     * metFORMIN 500 MG tablet  Commonly known as:  GLUCOPHAGE  Take 1 tablet by mouth Daily With Breakfast.     metoprolol succinate  MG 24 hr tablet  Commonly known as:  TOPROL-XL     multivitamin tablet     pantoprazole 40 MG EC tablet  Commonly known as:  PROTONIX  TAKE 1 TABLET DAILY FOR GASTROESOPHAGEAL REFLUX DISEASE     potassium chloride ER 20 MEQ tablet controlled-release ER tablet  Commonly known as:  K-TAB  Take 1 tablet by mouth Daily.     * Thyroid 90 MG tablet  Commonly known as:  ARMOUR     * Thyroid 90 MG tablet  Commonly known as:  ARMOUR     * warfarin 5 MG tablet  Commonly known as:  COUMADIN     * warfarin 2.5 MG tablet  Commonly known as:  COUMADIN         * This list has 8 medication(s) that are the same as other medications   prescribed for you. Read the  directions carefully, and ask your doctor or   other care provider to review them with you.              Past Medical History:   Diagnosis Date   • AICD (automatic cardioverter/defibrillator) present     left side chest   • Anemia    • Arthritis    • Atrial fibrillation (CMS/HCC)    • Cancer (CMS/HCC)     melanoma right back of knee   • CHF (congestive heart failure) (CMS/HCC)    • Congenital heart disease    • Coronary artery disease    • Diabetes mellitus (CMS/HCC)    • GERD (gastroesophageal reflux disease)    • Graves disease    • Hematuria    • High cholesterol    • History of kidney stones    • History of melanoma     RIGHT KNEE   • History of MI (myocardial infarction)    • Hypertension    • Hypothyroidism     had hyperthyroidism and was given radiated iodine to kill her thyroid now on med   • Incontinence of urine     wears pads   • Kidney stones     bilateral   • On anticoagulant therapy    • HAMLET on CPAP     cpap   • Pelvic kidney 2020   • PONV (postoperative nausea and vomiting)    • Spinal stenosis    • Ureteral calculus, left 2020   • Valvular heart disease 2015    S/p MVR & TVR        Allergies   Allergen Reactions   • Corticosteroids Other (See Comments)     RAISES BLOOD PRESSURE   • Adhesive Tape Rash   • Hydrocodone Nausea And Vomiting and Hallucinations   • Lisinopril Other (See Comments)     CHRONIC NASAL CONGESTION       Past Surgical History:   Procedure Laterality Date   • CARDIAC CATHETERIZATION     • CARDIAC DEFIBRILLATOR PLACEMENT Left     MEDTRONIC   • CARDIAC DEFIBRILLATOR PLACEMENT Left     [employer]TRONIC   •  SECTION     •  SECTION     • CHOLECYSTECTOMY     • COLONOSCOPY     • ENDOSCOPY     • EPIDURAL BLOCK  [id number]    Lumbar    • EXTRACORPOREAL SHOCK WAVE LITHOTRIPSY (ESWL) Right 2017   • EXTRACORPOREAL SHOCK WAVE LITHOTRIPSY (ESWL) Right 2017    Procedure: RT EXTRACORPOREAL SHOCKWAVE LITHOTRIPSY;  Surgeon:  Moisés Everett MD;  Location: [facility];  Service:    • EYE SURGERY Right 09/10/2007    Cataract   • EYE SURGERY Left 10/29/2007    Cataract   • JOINT REPLACEMENT Left 04/03/2007    Knee   • JOINT REPLACEMENT Right 01/11/2010    Knee   • KNEE ARTHROSCOPY  1980   • MAZE PROCEDURE  07/31/2015    Dr. Saavedra   • MITRAL VALVE REPAIR/REPLACEMENT  2015   • MITRAL VALVE REPAIR/REPLACEMENT N/A 5/14/2019    Procedure: ALINE RE-OP STERNOTOMY MITRAL VALVE REPLACEMENT AND PRP;  Surgeon: Ben Saavedra MD;  Location: [facility] OR;  Service: Cardiothoracic   • IA ERCP DX COLLECTION SPECIMEN BRUSHING/WASHING N/A 8/24/2016    Procedure: ENDOSCOPIC RETROGRADE CHOLANGIOPANCREATOGRAPHY WITH SPHINCTEROTOMY AND BALLOON SWEEP OF CBD;  Surgeon: Lucas Campos MD;  Location: [facility] ENDOSCOPY;  Service: Gastroenterology   • SKIN CANCER EXCISION      MELANOMA RIGHT KNEE AREA - 2002   • TRICUSPID VALVE SURGERY  07/31/2015    Dr. Saavedra  ring placed   • URETEROSCOPY LASER LITHOTRIPSY WITH STENT INSERTION Right 11/15/2017    Procedure: CYSTOSCOPY; RIGHT URETEROSCOPY LASER LITHOTRIPSY WITH STENT INSERTION;  Surgeon: Moisés Everett MD;  Location: [facility] OR;  Service:    • URETEROSCOPY LASER LITHOTRIPSY WITH STENT INSERTION Left 1/30/2020    Procedure: URETEROSCOPY STONE BASKET EXTRACTION LASER LITHOTRIPSY WITH STENT INSERTION WITH STRING;  Surgeon: Moisés Everett MD;  Location: [facility];  Service: Urology       Family History   Problem Relation Age of Onset   • Heart attack Mother    • Heart disease Mother    • Hypertension Mother    • Stroke Mother    • Macular degeneration Mother    • Heart attack Father    • Malig Hyperthermia Neg Hx        Social History     Socioeconomic History   • Marital status:      Spouse name: Not on file   • Number of children: Not on file   • Years of education: Not on file   • Highest education level: Not on file   Tobacco Use   • Smoking status: Never Smoker   •  Smokeless tobacco: Never Used   Substance and Sexual Activity   • Alcohol use: No   • Drug use: No   • Sexual activity: Defer           Objective   Physical Exam   Constitutional: She is oriented to person, place, and time. She appears well-developed and well-nourished. No distress.   ED Triage Vitals  Temp: 98.4 °F (36.9 °C) (04/06/20 1918)  Heart Rate: 82 (04/06/20 1918)  Resp: 16 (04/06/20 1918)  BP: 153/83 (04/06/20 1928)  SpO2: 96 % (04/06/20 1918)  Temp src: Oral (04/06/20 1918)  Heart Rate Source: Monitor (04/06/20 1918)  Patient Position: Lying (04/06/20 1918)  BP Location: Right arm (04/06/20 1918)  FiO2 (%): n/a    The patient's vitals were reviewed by me.  Unless otherwise noted they are within normal limits.     HENT:   Head: Normocephalic and atraumatic.   Right Ear: External ear normal.   Left Ear: External ear normal.   Nose: Nose normal.   Mouth/Throat: Oropharynx is clear and moist. No oropharyngeal exudate.   Eyes: Pupils are equal, round, and reactive to light. Conjunctivae and EOM are normal. Right eye exhibits no discharge. Left eye exhibits no discharge. No scleral icterus.   Neck: Normal range of motion. Neck supple. No JVD present. No tracheal deviation present. No thyromegaly present.   Cardiovascular: Normal rate, regular rhythm, normal heart sounds and intact distal pulses. Exam reveals no gallop and no friction rub.   No murmur heard.  Pulmonary/Chest: Effort normal and breath sounds normal. No stridor. No respiratory distress. She has no wheezes. She has no rales. She exhibits no tenderness.   Abdominal: Soft. Bowel sounds are normal. She exhibits no distension and no mass. There is no tenderness. There is no rebound and no guarding. No hernia.   Musculoskeletal: Normal range of motion. She exhibits no edema or deformity.   Lymphadenopathy:     She has no cervical adenopathy.   Neurological: She is alert and oriented to person, place, and time.   Skin: Skin is warm and dry. No rash  noted. She is not diaphoretic. No erythema. No pallor.   Psychiatric: Her behavior is normal.   Nursing note and vitals reviewed.      Procedures           ED Course  ED Course as of Apr 06 2123   Mon Apr 06, 2020 2016 The laboratory values reviewed by me.  The PT is 27.4.  The INR is 2.5.  The PTT is 51.  The glucose is 152.  The calcium is 11.6.  The laboratory values are otherwise unremarkable    [RC]      ED Course User Index  [RC] Heath Fine MD      19:32, 04/06/20:  The EKG was obtained at 1926 and read by me at 1928.  The EKG shows atrial fibrillation with rate of 87.  There is left axis deviation.  The QRS is 92 ms.  The QT intervals 348 ms.  There is borderline ST elevation in 3 and aVF with deep Q waves.  The EKG tonight was compared to an EKG dated 25 March 2025.  There is less repolarization abnormality tonight.    21:27, 04/06/20:  The implantable defibrillator was interrogated and the patient was shocked for atrial fibrillation 1 time.    21:28, 04/06/20:  Dr. Damon, the patient's cardiologist was paged out.     22:10, 04/06/20: I spoke with Dr. Acosta, on-call for the patient's cardiology group.  He recommended stopping the patient's digoxin.  He wanted the patient to begin amiodarone 400 mg p.o. twice daily.  The patient should call the office in the morning for follow-up.    22:10, 04/06/20:  The patient's diagnosis of atrial fibrillation with RVR was discussed with her.  It was discussed with the patient that we will have her stop her digoxin and start her on amiodarone.  She is to call Dr. Arana's office in the morning for further follow-up.  Patient should return to the emergency department if she has chest pain, shortness of breath, syncope, worse in any way at all.  All the patient's questions were answered she will be discharged in good condition.    22:22, 04/06/20:  The patient states that she does not want to go on amiodarone.  She states she will contact her cardiologist,  Dr. Damon, in the morning for further direction on her medications.  She does not take the digoxin till tomorrow afternoon.                                                                                                         [facility]  No orders to display     Labs Reviewed - No data to display  Ct Chest Without Contrast    Result Date: 3/25/2020  Narrative: CT Chest WO INDICATION: Shortness of air with fever and generalized malaise over the last 3 days. Leukocytosis. TECHNIQUE: CT of the thorax without IV contrast. Coronal and sagittal reconstructions were obtained.  Radiation dose reduction techniques included automated exposure control or exposure modulation based on body size. Count of known CT and cardiac nuc med studies performed in previous 12 months: 0. COMPARISON: None available. FINDINGS: Heart is enlarged. Patient is status post sternotomy and mitral valve replacement. There is a left-sided pacer in place. There is a trace amount of left-sided pleural fluid. There is no right pleural effusion or pericardial effusion. There is coronary artery disease and atherosclerotic disease. Subcarinal adenopathy measures 1.9 cm. Right paratracheal adenopathy measures 1.0 cm. Right high paratracheal lymph node measures 1.1 cm. No other definite adenopathy is seen. Granulomatous calcifications are present in the right upper lobe. There is mild septal thickening in both lungs with a basilar predominance which may reflect developing edema or volume overload. There is some more confluent consolidation in the left lower lobe which may reflect atelectasis or pneumonia. There is some mild dependent atelectasis in the right lower lobe. Study is low confidence for COVID-19. Upper abdomen shows cholecystectomy. There is hepatic steatosis. The right kidney is ectopically located in the upper pelvis/lower abdomen.     Impression: 1. Cardiomegaly with mild mediastinal adenopathy, nonspecific. Follow-up chest CT in 3 months  recommended. 2. Septal thickening in the lungs with a basilar predominance may reflect mild edema/volume overload. 3. Trace left pleural effusion with some mild alveolar consolidation in the left lower lobe may reflect atelectasis or pneumonia. There is some mild atelectasis in the right lower lobe. Study is low confidence for COVID-19. 4. Additional findings as above. Signer Name: Braulio Dominguez MD  Signed: 3/25/2020 11:34 PM  Workstation Name: [id number]  [facility]    Xr Chest 1 View    Result Date: 3/25/2020  Narrative: CR Chest 1 Vw INDICATION: Shortness of breath with fever and malaise for the past 3 days COMPARISON:  1/22/2019 FINDINGS: Single portable AP view(s) of the chest.  Cardiomegaly is unchanged. Pacemaker leads appear satisfactory. Lung volumes are low. Vascular markings are within normal limits. No effusions or focal new infiltrates are seen. Linear scarring versus atelectasis is seen at the left base and is unchanged.     Impression: No acute cardiopulmonary findings. Signer Name: Braulio Dubois MD  Signed: 3/25/2020 10:22 PM  Workstation Name: [id number]  [facility]    Us Renal Bilateral    Result Date: 3/10/2020  Narrative: Renal ultrasound 03/10/2020  INDICATION: Hydronephrosis bilaterally on previous outside ultrasound 02/14/2018. Diabetes and hypertension.  FINDINGS: The right kidney measures 10.3 cm while the left kidney measures 10.6 cm in longitudinal dimensions. There is no evidence of hydronephrosis or nephrolithiasis. No cystic or solid mass lesions were seen on either kidney. There is normal renal cortical echogenicity. Images of the bladder are normal.      Impression: Normal renal ultrasound.  This report was finalized on 3/10/2020 10:54 AM by Dr. Braydon Gabriel MD.        Final diagnoses:   Atrial fibrillation with RVR (CMS/[facility])         ED Medications:  Medications - No data to display    New Medications:     Medication List      CONTINUE  taking these medications    BD Pen Needle Clara U/F 32G X 4 MM misc  Generic drug:  Insulin Pen Needle  USE WITH INSULIN PENS        ASK your doctor about these medications    acetaminophen 325 MG tablet  Commonly known as:  TYLENOL  Take 2 tablets by mouth Every 4 (Four) Hours As Needed for Mild Pain .     * albuterol sulfate  (90 Base) MCG/ACT inhaler  Commonly known as:  PROVENTIL HFA;VENTOLIN HFA;PROAIR HFA     * albuterol (2.5 MG/3ML) 0.083% nebulizer solution  Commonly known as:  PROVENTIL  Take 2.5 mg by nebulization Every 4 (Four) Hours As Needed for Wheezing.     atorvastatin 20 MG tablet  Commonly known as:  LIPITOR  Take 0.5 tablets by mouth 2 (Two) Times a Week.     azithromycin 250 MG tablet  Commonly known as:  Zithromax  Take one tablet daily     cefdinir 300 MG capsule  Commonly known as:  OMNICEF  Take 1 capsule by mouth 2 (Two) Times a Day.     digoxin 250 MCG tablet  Commonly known as:  LANOXIN  Take 1 tablet by mouth Daily.     Entresto 24-26 MG tablet  Generic drug:  sacubitril-valsartan     ferrous sulfate 325 (65 FE) MG EC tablet     furosemide 40 MG tablet  Commonly known as:  LASIX  Take 1 tablet by mouth Daily.     glucose blood test strip  1 each by Other route 3 (Three) Times a Day. Use as instructed     insulin glargine 100 UNIT/ML injection  Commonly known as:  LANTUS     * metFORMIN 1000 MG tablet  Commonly known as:  GLUCOPHAGE  Take 1 tablet by mouth Daily Before Supper.     * metFORMIN 500 MG tablet  Commonly known as:  GLUCOPHAGE  Take 1 tablet by mouth Daily With Breakfast.     metoprolol succinate  MG 24 hr tablet  Commonly known as:  TOPROL-XL     multivitamin tablet     pantoprazole 40 MG EC tablet  Commonly known as:  PROTONIX  TAKE 1 TABLET DAILY FOR GASTROESOPHAGEAL REFLUX DISEASE     potassium chloride ER 20 MEQ tablet controlled-release ER tablet  Commonly known as:  K-TAB  Take 1 tablet by mouth Daily.     * Thyroid 90 MG tablet  Commonly known as:  ARMOUR      * Thyroid 90 MG tablet  Commonly known as:  ARMOUR     * warfarin 5 MG tablet  Commonly known as:  COUMADIN     * warfarin 2.5 MG tablet  Commonly known as:  COUMADIN         * This list has 8 medication(s) that are the same as other medications   prescribed for you. Read the directions carefully, and ask your doctor or   other care provider to review them with you.              Stopped Medications:     Medication List      CONTINUE taking these medications    BD Pen Needle Clara U/F 32G X 4 MM misc  Generic drug:  Insulin Pen Needle  USE WITH INSULIN PENS        ASK your doctor about these medications    acetaminophen 325 MG tablet  Commonly known as:  TYLENOL  Take 2 tablets by mouth Every 4 (Four) Hours As Needed for Mild Pain .     * albuterol sulfate  (90 Base) MCG/ACT inhaler  Commonly known as:  PROVENTIL HFA;VENTOLIN HFA;PROAIR HFA     * albuterol (2.5 MG/3ML) 0.083% nebulizer solution  Commonly known as:  PROVENTIL  Take 2.5 mg by nebulization Every 4 (Four) Hours As Needed for Wheezing.     atorvastatin 20 MG tablet  Commonly known as:  LIPITOR  Take 0.5 tablets by mouth 2 (Two) Times a Week.     azithromycin 250 MG tablet  Commonly known as:  Zithromax  Take one tablet daily     cefdinir 300 MG capsule  Commonly known as:  OMNICEF  Take 1 capsule by mouth 2 (Two) Times a Day.     digoxin 250 MCG tablet  Commonly known as:  LANOXIN  Take 1 tablet by mouth Daily.     Entresto 24-26 MG tablet  Generic drug:  sacubitril-valsartan     ferrous sulfate 325 (65 FE) MG EC tablet     furosemide 40 MG tablet  Commonly known as:  LASIX  Take 1 tablet by mouth Daily.     glucose blood test strip  1 each by Other route 3 (Three) Times a Day. Use as instructed     insulin glargine 100 UNIT/ML injection  Commonly known as:  LANTUS     * metFORMIN 1000 MG tablet  Commonly known as:  GLUCOPHAGE  Take 1 tablet by mouth Daily Before Supper.     * metFORMIN 500 MG tablet  Commonly known as:  GLUCOPHAGE  Take 1 tablet  by mouth Daily With Breakfast.     metoprolol succinate  MG 24 hr tablet  Commonly known as:  TOPROL-XL     multivitamin tablet     pantoprazole 40 MG EC tablet  Commonly known as:  PROTONIX  TAKE 1 TABLET DAILY FOR GASTROESOPHAGEAL REFLUX DISEASE     potassium chloride ER 20 MEQ tablet controlled-release ER tablet  Commonly known as:  K-TAB  Take 1 tablet by mouth Daily.     * Thyroid 90 MG tablet  Commonly known as:  ARMOUR     * Thyroid 90 MG tablet  Commonly known as:  ARMOUR     * warfarin 5 MG tablet  Commonly known as:  COUMADIN     * warfarin 2.5 MG tablet  Commonly known as:  COUMADIN         * This list has 8 medication(s) that are the same as other medications   prescribed for you. Read the directions carefully, and ask your doctor or   other care provider to review them with you.                Final diagnoses:   Atrial fibrillation with RVR (CMS/[facility])            Heath Fine MD  04/06/20 2211       Heath Fine MD  04/06/20 221       Heath Fine MD  04/06/20 2222

## 2020-04-07 ENCOUNTER — EPISODE CHANGES (OUTPATIENT)
Dept: CASE MANAGEMENT | Facility: OTHER | Age: 80
End: 2020-04-07

## 2020-04-07 ENCOUNTER — PATIENT OUTREACH (OUTPATIENT)
Dept: CASE MANAGEMENT | Facility: OTHER | Age: 80
End: 2020-04-07

## 2020-04-07 NOTE — ED NOTES
Patient education thoroughly reviewed.  Patient able to teach back new medications, how to obtain and take as well as potential side effects.  Patient able to teach back follow up recommendations and signs and symptoms to seek further medical care.      Patient discharged in stable condition per self ambulation to private transportation        Hima Mchugh RN  04/06/20 [id number]

## 2020-04-07 NOTE — DISCHARGE INSTRUCTIONS
Call  in the morning for follow-up.  Return to the emergency department for chest pain, shortness of breath, syncope, worse in any way at all.

## 2020-04-07 NOTE — OUTREACH NOTE
Care Coordination Assessment    Documented/Reviewed By:  Taniya Reyes RN Date/time:  4/7/2020 11:06 AM   Assessment completed with:  patient  Enrolled in care management program:  No  Living arrangement:  spouse  Support system:  spouse  Type of residence:  private residence  Home care services:  No  Equipment used at home:  oxygen/respiratory treatment (Comment: CPAP and nebulizer)  Communication device:  No  Bed or wheelchair confined:  No  Inadequate nutrition:  No  Medication adherence problem:  No  Experiencing side effects from current medications:  No  History of fall(s) in last 6 months:  No  Difficulty keeping appointments:  No  Family aware of the patient's advance care planning wishes:  Yes  Anabaptism or spiritual beliefs that impact treatment:  No  Chronic pain:  No

## 2020-04-07 NOTE — ED NOTES
Call placed to cardiology answering service to lc JIMENEZ to return our call.      Crissy Rolle  04/06/20 9524

## 2020-04-07 NOTE — OUTREACH NOTE
Care Plan Note      Responses   Lifestyle Goals  Avoid respiratory irritants, Eat a healthy diet, Medication management, Routine foot care, Routine follow-up with doctor(s), Routine eye exam, Record weight daily, Increase physical activity   Barriers  Disease education   Self Management  Check Weight Daily, Dietary Changes - Eat More Fruits/Vegetables, Home Glucose Monitoring, Medication Adherence   Annual Wellness Visit:   Patient Will Schedule   AWV Materials  Send Materials   Care Gaps Addressed  Diabetic Eye Exam   Diabetic Eye Exam Status  Up to Date   Diabetic Eye Exam Completion at [facility] or Other  Other   Other Location  states she has done   Specific Disease Process Teaching  Diabetes, Heart Disease, Heart Failure   Does patient have depression diagnosis?  No   Ed Visits past 12 months:  2 or 3   Hospitalizations past 12 months  2 or 3   Discharge destination:  Home   Medication Adherence  Medications understood   Goal Progress  Making Progress Toward Goal(s)   Readiness Scale  7   Confidence Scale  7        The main concerns and/or symptoms the patient would like to address are: COVID 19, Afib RVR    Education/instruction provided by Care Coordinator: Pt states she is doing well after ER visit with no more incidents. Recognized that AICD was doing its job and is very thankful. She has already reached out to PCP and is awaiting call back. Pt states she was recently in hospital with pneumonia and had to wait for COVID testing. She is aware of s/s. Review of protocol, Education provided regarding safety measures during quarantine such as social distancing if need to go out, frequent handwashing, keeping hands away from face and mouth. Staying away from people even within the home that are showing any symptoms such as cough, fever and body aches. Cleaning surfaces frequently with Lysol or chlorine wipes. Encouraged pt to sign up for my chart where e-visits can be used if needed. Pt verbalizes  understanding.    Pt made aware of her present health gaps including AWV, she does report that she has had an eye exam this year. Pt does not weigh herself daily to monitor for CHF. Reinforced need for daily weight at the same time and on the same scale. Need for daily journalling and to  Call MD for weight increase of more than 2lbs over night or 4lbs in one week. Reinforced adherence to low salt diet and fluids as recommended by your cardiologist. Pt able to verbalize understanding of heart failure education.     Pt has [facility] M[facility]. Pt directed to the back of insurance card for case management benefits provided by her insurance company. 24/7 care managers, community health workers, health coaches, transportation. Pt verbalizes understanding of above information.      Follow Up Outreach Due: none    Taniya Reyes RN  Ambulatory     4/7/2020, 11:14

## 2020-04-09 LAB
GLUCOSE BLDC GLUCOMTR-MCNC: 218 MG/DL (ref 70–130)
GLUCOSE BLDC GLUCOMTR-MCNC: 232 MG/DL (ref 70–130)

## 2020-04-14 ENCOUNTER — OFFICE VISIT (OUTPATIENT)
Dept: FAMILY MEDICINE CLINIC | Facility: CLINIC | Age: 80
End: 2020-04-14

## 2020-04-14 VITALS
BODY MASS INDEX: 37.36 KG/M2 | SYSTOLIC BLOOD PRESSURE: 120 MMHG | HEIGHT: 62 IN | HEART RATE: 80 BPM | WEIGHT: 203 LBS | OXYGEN SATURATION: 95 % | TEMPERATURE: 98.4 F | DIASTOLIC BLOOD PRESSURE: 74 MMHG

## 2020-04-14 DIAGNOSIS — Z09 HOSPITAL DISCHARGE FOLLOW-UP: Primary | ICD-10-CM

## 2020-04-14 DIAGNOSIS — J18.9 PNEUMONIA OF BOTH LOWER LOBES DUE TO INFECTIOUS ORGANISM: ICD-10-CM

## 2020-04-14 DIAGNOSIS — E11.9 TYPE 2 DIABETES MELLITUS WITHOUT COMPLICATION, WITHOUT LONG-TERM CURRENT USE OF INSULIN (HCC): ICD-10-CM

## 2020-04-14 PROCEDURE — 99214 OFFICE O/P EST MOD 30 MIN: CPT | Performed by: FAMILY MEDICINE

## 2020-04-14 RX ORDER — DIGOXIN 250 MCG
250 TABLET ORAL
COMMUNITY
End: 2021-01-11

## 2020-04-14 RX ORDER — MOMETASONE FUROATE 50 UG/1
2 SPRAY, METERED NASAL DAILY
Qty: 1 EACH | Refills: 5 | Status: SHIPPED | OUTPATIENT
Start: 2020-04-14 | End: 2021-06-11

## 2020-04-14 NOTE — PROGRESS NOTES
Subjective   Fernanda Molina is a 79 y.o. female who is here for   Chief Complaint   Patient presents with   • Hospital F/U   • Atrial Fibrillation   .     History of Present Illness   Hospital follow up for pneumonia  At [place]  Notes reviewed.  Covid testing was neg  Current outpatient and discharge medications have been reconciled for the patient.  Reviewed by: Rashid Ge MD    Then went home    Then went back to ER with a flair of her A fib with RVR    Then saw cardio in office for follow up  And Entresto was doubled.    Still on coumadin.    No AICD fires since ER    No SOB no fever  All her infection testing came back negative          The following portions of the patient's history were reviewed and updated as appropriate: allergies, current medications, past family history, past medical history, past social history, past surgical history and problem list.    Review of Systems    Objective   Physical Exam   Constitutional: She appears well-developed and well-nourished.   Cardiovascular: Normal rate.   Neurological: She is alert.   Psychiatric: She has a normal mood and affect.   Nursing note and vitals reviewed.      Assessment/Plan   Fernanda was seen today for hospital f/u and atrial fibrillation.    Diagnoses and all orders for this visit:    Hospital discharge follow-up    Pneumonia of both lower lobes due to infectious organism (CMS/HCC)    Type 2 diabetes mellitus without complication, without long-term current use of insulin (CMS/HCC)  -     metFORMIN (GLUCOPHAGE) 500 MG tablet; Take 1 tablet by mouth Daily With Breakfast. Indications: Type 2 Diabetes  -     metFORMIN (GLUCOPHAGE) 1000 MG tablet; Take 1 tablet by mouth Daily Before Supper. Indications: Type 2 Diabetes    Other orders  -     mometasone (Nasonex) 50 MCG/ACT nasal spray; 2 sprays into the nostril(s) as directed by provider Daily.      There are no Patient Instructions on file for this visit.    Medications Discontinued During  This Encounter   Medication Reason   • amiodarone (Pacerone) 200 MG tablet Side effects   • metFORMIN (GLUCOPHAGE) 500 MG tablet Reorder   • metFORMIN (GLUCOPHAGE) 1000 MG tablet Reorder        No follow-ups on file.    Dr. Rashid Ge  [place], [place]

## 2020-04-27 ENCOUNTER — TELEPHONE (OUTPATIENT)
Dept: FAMILY MEDICINE CLINIC | Facility: CLINIC | Age: 80
End: 2020-04-27

## 2020-04-27 DIAGNOSIS — E11.9 TYPE 2 DIABETES MELLITUS WITHOUT COMPLICATION, WITH LONG-TERM CURRENT USE OF INSULIN: Primary | ICD-10-CM

## 2020-04-27 DIAGNOSIS — Z79.4 TYPE 2 DIABETES MELLITUS WITHOUT COMPLICATION, WITH LONG-TERM CURRENT USE OF INSULIN: Primary | ICD-10-CM

## 2020-04-27 RX ORDER — INSULIN GLARGINE 100 [IU]/ML
40 INJECTION, SOLUTION SUBCUTANEOUS DAILY
Qty: 40 ML | Refills: 1 | Status: SHIPPED | OUTPATIENT
Start: 2020-04-27 | End: 2020-10-09

## 2020-04-27 NOTE — TELEPHONE ENCOUNTER
CARMEL CALLED [facility] TO GET A REFILL ON insulin glargine (LANTUS) 100 UNIT/ML injection    REFERENCE NUMBER [id number]    Ok  Vials of lantus sent in   40 U a day

## 2020-07-10 ENCOUNTER — OFFICE VISIT (OUTPATIENT)
Dept: SLEEP MEDICINE | Facility: HOSPITAL | Age: 80
End: 2020-07-10

## 2020-07-10 VITALS — WEIGHT: 209 LBS | BODY MASS INDEX: 38.46 KG/M2 | OXYGEN SATURATION: 93 % | HEIGHT: 62 IN | HEART RATE: 88 BPM

## 2020-07-10 DIAGNOSIS — Z99.89 OSA ON CPAP: Primary | ICD-10-CM

## 2020-07-10 DIAGNOSIS — G47.33 OSA ON CPAP: Primary | ICD-10-CM

## 2020-07-10 DIAGNOSIS — E66.01 CLASS 2 SEVERE OBESITY DUE TO EXCESS CALORIES WITH SERIOUS COMORBIDITY AND BODY MASS INDEX (BMI) OF 38.0 TO 38.9 IN ADULT (HCC): ICD-10-CM

## 2020-07-10 PROCEDURE — 99213 OFFICE O/P EST LOW 20 MIN: CPT | Performed by: INTERNAL MEDICINE

## 2020-07-10 PROCEDURE — G0463 HOSPITAL OUTPT CLINIC VISIT: HCPCS

## 2020-07-10 NOTE — PROGRESS NOTES
"Follow Up Sleep Disorders Center Note     Chief Complaint:  HAMLET     Primary Care Physician: Rashid Ge MD    Interval History:   The patient is a 79 y.o. female I last saw the patient 1 year ago.  She is stable without new complaints.  She goes to bed at 11 PM.  She will use the restroom during the nighttime.  She has no complaints of hypersomnolence and her [place] Sleepiness Scale is normal at 1.  She does complain of a dry mouth.    The patient was admitted to the hospital 3/25/075147 due to left lower lung pneumonia.    Review of Systems:    A complete review of systems was done and all were negative with the exception of postnasal drip    Social History:    Social History     Socioeconomic History   • Marital status:      Spouse name: Not on file   • Number of children: Not on file   • Years of education: Not on file   • Highest education level: Not on file   Social Needs   • Financial resource strain: Not hard at all   • Food insecurity:     Worry: Never true     Inability: Never true   • Transportation needs:     Medical: No     Non-medical: No   Tobacco Use   • Smoking status: Never Smoker   • Smokeless tobacco: Never Used   Substance and Sexual Activity   • Alcohol use: No   • Drug use: No   • Sexual activity: Defer       Allergies:  Amiodarone; Corticosteroids; Adhesive tape; Hydrocodone; and Lisinopril     Medication Review:  Reviewed.      Vital Signs:    Vitals:    07/10/20 1136   Pulse: 88   SpO2: 93%   Weight: 94.8 kg (209 lb)   Height: 157.5 cm (62\")     Body mass index is 38.23 kg/m².    Physical Exam:    Constitutional:  Well developed 79 y.o. female that appears in no apparent distress.  Awake & oriented times 3.  Normal mood with normal recent and remote memory and normal judgement.  Eyes:  Conjunctivae normal.  Oropharynx: Previously, moist mucous membranes without exudate and a large tongue and class III Mallampati airway, patient is wearing a facemask.     Results Review:  DME " is [facility] and she uses a nasal interface.  Downloads between 4/9 and 7/7/2020 compliance 100%.  Average usage is 9 hours and 8 minutes.  Average AHI is normal without a significant leak.  Average AutoCPAP pressure is 15.3 and her auto CPAP is 12-16.       Impression:   Obstructive sleep apnea adequately treated with auto CPAP with good compliance and usage and no complaints of hypersomnolence.    Plan:  Good sleep hygiene measures should be maintained.  Weight loss would be beneficial in this patient who is obese by BMI.  The patient is benefiting from the treatment being provided.     Due to her mouth dryness, she may try to increase her humidity.  She may also try to use Biotene.    The patient will continue auto CPAP and a new prescription will be sent to her DME    The patient will call for any problems and will follow up in 1 year.      Mahin Galeas MD  Sleep Medicine  07/10/20  11:43

## 2020-10-09 DIAGNOSIS — E11.9 TYPE 2 DIABETES MELLITUS WITHOUT COMPLICATION, WITH LONG-TERM CURRENT USE OF INSULIN (HCC): ICD-10-CM

## 2020-10-09 DIAGNOSIS — Z79.4 TYPE 2 DIABETES MELLITUS WITHOUT COMPLICATION, WITH LONG-TERM CURRENT USE OF INSULIN (HCC): ICD-10-CM

## 2020-10-09 RX ORDER — INSULIN GLARGINE 100 [IU]/ML
INJECTION, SOLUTION SUBCUTANEOUS
Qty: 30 ML | Refills: 3 | Status: SHIPPED | OUTPATIENT
Start: 2020-10-09 | End: 2021-07-02

## 2020-10-09 RX ORDER — PANTOPRAZOLE SODIUM 40 MG/1
TABLET, DELAYED RELEASE ORAL
Qty: 90 TABLET | Refills: 0 | Status: SHIPPED | OUTPATIENT
Start: 2020-10-09 | End: 2021-01-11

## 2020-10-12 ENCOUNTER — TELEPHONE (OUTPATIENT)
Dept: CARDIAC REHAB | Facility: HOSPITAL | Age: 80
End: 2020-10-12

## 2020-10-27 ENCOUNTER — OFFICE VISIT (OUTPATIENT)
Dept: FAMILY MEDICINE CLINIC | Facility: CLINIC | Age: 80
End: 2020-10-27

## 2020-10-27 VITALS
WEIGHT: 212 LBS | BODY MASS INDEX: 39.01 KG/M2 | OXYGEN SATURATION: 98 % | HEART RATE: 66 BPM | SYSTOLIC BLOOD PRESSURE: 132 MMHG | HEIGHT: 62 IN | DIASTOLIC BLOOD PRESSURE: 80 MMHG

## 2020-10-27 DIAGNOSIS — Z79.4 TYPE 2 DIABETES MELLITUS WITH COMPLICATION, WITH LONG-TERM CURRENT USE OF INSULIN (HCC): ICD-10-CM

## 2020-10-27 DIAGNOSIS — Z00.00 MEDICARE ANNUAL WELLNESS VISIT, SUBSEQUENT: Primary | ICD-10-CM

## 2020-10-27 DIAGNOSIS — E11.9 TYPE 2 DIABETES MELLITUS WITHOUT COMPLICATION, WITH LONG-TERM CURRENT USE OF INSULIN (HCC): ICD-10-CM

## 2020-10-27 DIAGNOSIS — Z79.4 TYPE 2 DIABETES MELLITUS WITHOUT COMPLICATION, WITH LONG-TERM CURRENT USE OF INSULIN (HCC): ICD-10-CM

## 2020-10-27 DIAGNOSIS — E03.9 ACQUIRED HYPOTHYROIDISM: ICD-10-CM

## 2020-10-27 DIAGNOSIS — E11.8 TYPE 2 DIABETES MELLITUS WITH COMPLICATION, WITH LONG-TERM CURRENT USE OF INSULIN (HCC): ICD-10-CM

## 2020-10-27 PROCEDURE — G0439 PPPS, SUBSEQ VISIT: HCPCS | Performed by: FAMILY MEDICINE

## 2020-10-27 RX ORDER — SACUBITRIL AND VALSARTAN 49; 51 MG/1; MG/1
TABLET, FILM COATED ORAL 2 TIMES DAILY
COMMUNITY
Start: 2020-09-01

## 2020-10-27 RX ORDER — DAPAGLIFLOZIN 10 MG/1
TABLET, FILM COATED ORAL DAILY
COMMUNITY
Start: 2020-08-24

## 2020-10-27 NOTE — PROGRESS NOTES
The ABCs of the Annual Wellness Visit  Subsequent Medicare Wellness Visit    Chief Complaint   Patient presents with   • Medicare Wellness-subsequent       Subjective   History of Present Illness:  Fernanda Molina is a 79 y.o. female who presents for a Subsequent Medicare Wellness Visit.    HEALTH RISK ASSESSMENT    Recent Hospitalizations:  [facility] at the [facility]    Current Medical Providers:  Patient Care Team:  Rashid Ge MD as PCP - General (Family Medicine)  Thong Jones MD as Consulting Physician (Cardiology)  Mahin Galeas MD as Consulting Physician (Sleep Medicine)  Lara Ortiz MD as Consulting Physician (Obstetrics and Gynecology)  Betty Horowitz MD as Consulting Physician (Dermatology)  Jr Ben Saavedra MD as Surgeon (Cardiothoracic Surgery)  Mahin Galeas MD as Consulting Physician (Pulmonary Disease)  Chirag Rai MD as Consulting Physician (Ophthalmology)  Moisés Everett MD as Consulting Physician (Urology)    Smoking Status:  Social History     Tobacco Use   Smoking Status Never Smoker   Smokeless Tobacco Never Used       Alcohol Consumption:  Social History     Substance and Sexual Activity   Alcohol Use No   • Frequency: Never       Depression Screen:   PHQ-2/PHQ-9 Depression Screening 10/27/2020   Little interest or pleasure in doing things 0   Feeling down, depressed, or hopeless 0   Trouble falling or staying asleep, or sleeping too much 0   Feeling tired or having little energy 0   Poor appetite or overeating 0   Feeling bad about yourself - or that you are a failure or have let yourself or your family down 0   Trouble concentrating on things, such as reading the newspaper or watching television 0   Moving or speaking so slowly that other people could have noticed. Or the opposite - being so fidgety or restless that you have been moving around a lot more than usual 0   Thoughts that you would  be better off dead, or of hurting yourself in some way 0   Total Score 0       Fall Risk Screen:  TACO Fall Risk Assessment has not been completed.    Health Habits and Functional and Cognitive Screening:  Functional & Cognitive Status 10/27/2020   Do you have difficulty preparing food and eating? No   Do you have difficulty bathing yourself, getting dressed or grooming yourself? No   Do you have difficulty using the toilet? No   Do you have difficulty moving around from place to place? No   Do you have trouble with steps or getting out of a bed or a chair? No   Current Diet Well Balanced Diet   Dental Exam Up to date   Eye Exam Up to date   Exercise (times per week) 3 times per week   Current Exercises Include Cardiovascular Workout   Current Exercise Activities Include -   Do you need help using the phone?  No   Are you deaf or do you have serious difficulty hearing?  No   Do you need help with transportation? No   Do you need help shopping? No   Do you need help preparing meals?  No   Do you need help with housework?  No   Do you need help with laundry? No   Do you need help taking your medications? No   Do you need help managing money? No   Do you ever drive or ride in a car without wearing a seat belt? No   Have you felt unusual stress, anger or loneliness in the last month? No   Who do you live with? Spouse   If you need help, do you have trouble finding someone available to you? No   Have you been bothered in the last four weeks by sexual problems? No   Do you have difficulty concentrating, remembering or making decisions? No         Does the patient have evidence of cognitive impairment? No    Asprin use counseling:Does not need ASA (and currently is not on it)    Age-appropriate Screening Schedule:  Refer to the list below for future screening recommendations based on patient's age, sex and/or medical conditions. Orders for these recommended tests are listed in the plan section. The patient has been  provided with a written plan.    Health Maintenance   Topic Date Due   • TDAP/TD VACCINES (1 - Tdap) 11/28/1959   • ZOSTER VACCINE (2 of 3) 08/10/2014   • DIABETIC EYE EXAM  09/15/2018   • INFLUENZA VACCINE  08/01/2020   • HEMOGLOBIN A1C  09/16/2020   • LIPID PANEL  03/16/2021   • URINE MICROALBUMIN  03/16/2021   • MAMMOGRAM  09/08/2022   • COLONOSCOPY  10/31/2024          The following portions of the patient's history were reviewed and updated as appropriate: allergies, current medications, past family history, past medical history, past social history, past surgical history and problem list.    Outpatient Medications Prior to Visit   Medication Sig Dispense Refill   • acetaminophen (TYLENOL) 325 MG tablet Take 2 tablets by mouth Every 4 (Four) Hours As Needed for Mild Pain .     • albuterol (PROVENTIL) (2.5 MG/3ML) 0.083% nebulizer solution Take 2.5 mg by nebulization Every 4 (Four) Hours As Needed for Wheezing. 30 vial 5   • albuterol sulfate  (90 Base) MCG/ACT inhaler Inhale 2 puffs Every 4 (Four) Hours As Needed for Wheezing.     • atorvastatin (LIPITOR) 20 MG tablet Take 0.5 tablets by mouth 2 (Two) Times a Week. (Patient taking differently: Take 10 mg by mouth 2 (Two) Times a Week. mon and friday) 12 tablet 3   • BD PEN NEEDLE YAIR U/F 32G X 4 MM misc USE WITH INSULIN PENS 270 each 3   • digoxin (LANOXIN) 250 MCG tablet Take 250 mcg by mouth Daily.     • Farxiga 10 MG tablet Daily.     • ferrous sulfate 325 (65 FE) MG EC tablet Take 65 mg by mouth 1 (one) time per week. On Sunday     • furosemide (LASIX) 40 MG tablet Take 1 tablet by mouth Daily. 90 tablet 3   • glucose blood test strip 1 each by Other route 3 (Three) Times a Day. Use as instructed 100 each 11   • Lantus SoloStar 100 UNIT/ML injection pen INJECT 40 UNITS UNDER THE SKIN INTO THE APPROPRIATE AREA AS DIRECTED DAILY 30 mL 3   • metFORMIN (GLUCOPHAGE) 1000 MG tablet Take 1 tablet by mouth Daily Before Supper. Indications: Type 2 Diabetes  90 tablet 3   • metFORMIN (GLUCOPHAGE) 500 MG tablet Take 1 tablet by mouth Daily With Breakfast. Indications: Type 2 Diabetes 90 tablet 3   • metoprolol succinate XL (TOPROL-XL) 200 MG 24 hr tablet Take 200 mg by mouth Every Morning.     • mometasone (Nasonex) 50 MCG/ACT nasal spray 2 sprays into the nostril(s) as directed by provider Daily. 1 each 5   • Multiple Vitamin (MULTIVITAMIN) tablet Take 1 tablet by mouth Daily.     • pantoprazole (PROTONIX) 40 MG EC tablet TAKE 1 TABLET DAILY FOR GASTROESOPHAGEAL REFLUX DISEASE 90 tablet 0   • potassium chloride ER (K-TAB) 20 MEQ tablet controlled-release ER tablet Take 1 tablet by mouth Daily. 90 tablet 3   • sacubitril-valsartan (Entresto) 49-51 MG tablet 2 (Two) Times a Day.     • Thyroid 90 MG PO tablet Take 90 mg by mouth Every Morning.     • warfarin (COUMADIN) 2.5 MG tablet Take 2.5 mg by mouth. TUES THURS  SAT  Sun  ( so total dose on TUES  THURS  SAT  SUN is 7.5mg because she has coumadin 5 mg daily)     • warfarin (COUMADIN) 5 MG tablet Take 5 mg by mouth Every Night.     • cefdinir (OMNICEF) 300 MG capsule Take 1 capsule by mouth 2 (Two) Times a Day. 6 capsule 0   • ENTRESTO 24-26 MG tablet Take 2 tablets by mouth 2 (Two) Times a Day. Taking 42-51     • Thyroid 90 MG PO tablet Take 45 mg by mouth Every Night.       No facility-administered medications prior to visit.        Patient Active Problem List   Diagnosis   • S/P mitral valve repair   • S/P tricuspid valve repair   • S/P Maze operation for atrial fibrillation   • Class 2 severe obesity due to excess calories with serious comorbidity and body mass index (BMI) of 38.0 to 38.9 in adult (CMS/[facility])   • Atrial fibrillation (CMS/HCC)   • Acquired hypothyroidism   • COPD (chronic obstructive pulmonary disease) (CMS/[facility])   • CAD (coronary artery disease), hx MI   • HAMLET on autoCPAP   • Renal calculus, right   • Hyperparathyroidism (CMS/HCC)   • Type 2 diabetes mellitus without complication, with long-term  "current use of insulin (CMS/[facility])   • Warfarin anticoagulation   • Medication monitoring encounter   • Medicare annual wellness visit, subsequent   • Pacemaker   • AICD (automatic cardioverter/defibrillator) present   • Right foot pain   • Chronic nonallergic rhinitis   • Pulmonary hypertension (CMS/[facility])   • Mitral valve disease   • Hospital discharge follow-up   • Hypersomnia due to medical condition   • Ureteral calculus, left   • Pelvic kidney   • Pneumonia of both lower lobes due to infectious organism       Advanced Care Planning:  ACP discussion was held with the patient during this visit. Patient has an advance directive (not in EMR), copy requested.    Review of Systems    Compared to one year ago, the patient feels her physical health is worse.  Compared to one year ago, the patient feels her mental health is worse.    Reviewed chart for potential of high risk medication in the elderly: yes  Reviewed chart for potential of harmful drug interactions in the elderly:yes    Objective         Vitals:    10/27/20 1343   BP: 132/80   BP Location: Left arm   Patient Position: Sitting   Cuff Size: Adult   Pulse: 66   SpO2: 98%   Weight: 96.2 kg (212 lb)   Height: 157.5 cm (62\")       Body mass index is 38.78 kg/m².  Discussed the patient's BMI with her. The BMI is above average; BMI management plan is completed.    Physical Exam          Assessment/Plan   Medicare Risks and Personalized Health Plan  CMS Preventative Services Quick Reference  Breast Cancer/Mammogram Screening  Cardiovascular risk  Chronic Pain   Colon Cancer Screening  Dementia/Memory   Depression/Dysphoria  Diabetic Lab Screening   Inactivity/Sedentary  Obesity/Overweight     The above risks/problems have been discussed with the patient.  Pertinent information has been shared with the patient in the After Visit Summary.  Follow up plans and orders are seen below in the Assessment/Plan Section.    Diagnoses and all orders for this visit:    1. " Medicare annual wellness visit, subsequent (Primary)    2. Type 2 diabetes mellitus without complication, with long-term current use of insulin (CMS/[facility])  -     CBC & Differential; Future  -     Comprehensive Metabolic Panel; Future  -     Lipid Panel; Future  -     TSH; Future  -     Hemoglobin A1c; Future    3. Acquired hypothyroidism  -     TSH; Future    is now able to re enroll in cardiac rehab in [place].  [facility].    Am sugars 150, ok to increase lantus to 42 U a day.          Follow Up:  Return in about 6 months (around 4/27/2021) for diabetes.     An After Visit Summary and PPPS were given to the patient.

## 2020-10-28 RX ORDER — PEN NEEDLE, DIABETIC 32GX 5/32"
1 NEEDLE, DISPOSABLE MISCELLANEOUS EVERY MORNING
Qty: 90 EACH | Refills: 3 | Status: SHIPPED | OUTPATIENT
Start: 2020-10-28 | End: 2021-09-23

## 2020-11-09 DIAGNOSIS — I50.22 CHRONIC SYSTOLIC HEART FAILURE (HCC): Primary | ICD-10-CM

## 2020-11-27 DIAGNOSIS — I25.10 CORONARY ARTERY DISEASE INVOLVING NATIVE HEART WITHOUT ANGINA PECTORIS, UNSPECIFIED VESSEL OR LESION TYPE: ICD-10-CM

## 2020-11-27 DIAGNOSIS — I25.10 CORONARY ARTERY DISEASE INVOLVING NATIVE CORONARY ARTERY OF NATIVE HEART WITHOUT ANGINA PECTORIS: ICD-10-CM

## 2020-11-30 RX ORDER — POTASSIUM CHLORIDE 1500 MG/1
TABLET, EXTENDED RELEASE ORAL
Qty: 90 TABLET | Refills: 1 | Status: SHIPPED | OUTPATIENT
Start: 2020-11-30 | End: 2021-05-24

## 2020-11-30 RX ORDER — ATORVASTATIN CALCIUM 20 MG/1
10 TABLET, FILM COATED ORAL 2 TIMES WEEKLY
Qty: 10 TABLET | Refills: 11 | Status: SHIPPED | OUTPATIENT
Start: 2020-11-30 | End: 2022-02-14

## 2020-12-15 ENCOUNTER — HOSPITAL ENCOUNTER (OUTPATIENT)
Dept: GENERAL RADIOLOGY | Facility: HOSPITAL | Age: 80
Discharge: HOME | End: 2020-12-15
Admitting: UROLOGY

## 2020-12-15 ENCOUNTER — TRANSCRIBE ORDERS (OUTPATIENT)
Dept: ADMINISTRATIVE | Facility: HOSPITAL | Age: 80
End: 2020-12-15

## 2020-12-15 DIAGNOSIS — R10.30 LOWER ABDOMINAL PAIN: Primary | ICD-10-CM

## 2020-12-15 DIAGNOSIS — N20.0 URIC ACID NEPHROLITHIASIS: Primary | ICD-10-CM

## 2020-12-15 DIAGNOSIS — N20.0 URIC ACID NEPHROLITHIASIS: ICD-10-CM

## 2020-12-15 PROCEDURE — 74018 RADEX ABDOMEN 1 VIEW: CPT

## 2020-12-21 ENCOUNTER — TELEPHONE (OUTPATIENT)
Dept: FAMILY MEDICINE CLINIC | Facility: CLINIC | Age: 80
End: 2020-12-21

## 2020-12-21 NOTE — TELEPHONE ENCOUNTER
Okay to leave it alone    If it breaks open, just clean the area once a day with soap and water  Then cover with Neosporin and wrap a band aid around.

## 2020-12-21 NOTE — TELEPHONE ENCOUNTER
PATIENT DROPPED A COOK BOOK ON HER BIG TOE.  PATIENT HAS A BLOOD BLISTER ON TOP OF BIG TOE THAT IS THE SIZE OF A NICKEL. PATIENT UNSURE OF WHAT TO DO.  SHOULD SHE OPEN IT OR LEAVE IT ALONE.    PLEASE ADVISE.    PATIENT CAN BE REACHED AT:  [phone]     [facility]  CHRIS DOWELL[address] [facility] [address] - [phone] [facility] [phone]   [phone]

## 2020-12-22 ENCOUNTER — HOSPITAL ENCOUNTER (OUTPATIENT)
Dept: CT IMAGING | Facility: HOSPITAL | Age: 80
Discharge: HOME | End: 2020-12-22
Admitting: UROLOGY

## 2020-12-22 DIAGNOSIS — R10.30 LOWER ABDOMINAL PAIN: ICD-10-CM

## 2020-12-22 LAB — CREAT BLDA-MCNC: 0.8 MG/DL (ref 0.6–1.3)

## 2020-12-22 PROCEDURE — 74178 CT ABD&PLV WO CNTR FLWD CNTR: CPT

## 2020-12-22 PROCEDURE — 82565 ASSAY OF CREATININE: CPT

## 2020-12-22 PROCEDURE — 0 IOPAMIDOL PER 1 ML: Performed by: UROLOGY

## 2020-12-22 RX ADMIN — IOPAMIDOL 100 ML: 755 INJECTION, SOLUTION INTRAVENOUS at 13:50

## 2020-12-28 ENCOUNTER — OFFICE VISIT (OUTPATIENT)
Dept: FAMILY MEDICINE CLINIC | Facility: CLINIC | Age: 80
End: 2020-12-28

## 2020-12-28 ENCOUNTER — HOSPITAL ENCOUNTER (OUTPATIENT)
Dept: GENERAL RADIOLOGY | Facility: HOSPITAL | Age: 80
Discharge: HOME | End: 2020-12-28
Admitting: PHYSICIAN ASSISTANT

## 2020-12-28 VITALS
BODY MASS INDEX: 38.46 KG/M2 | OXYGEN SATURATION: 96 % | HEART RATE: 80 BPM | SYSTOLIC BLOOD PRESSURE: 120 MMHG | HEIGHT: 62 IN | WEIGHT: 209 LBS | DIASTOLIC BLOOD PRESSURE: 76 MMHG

## 2020-12-28 DIAGNOSIS — S99.921A INJURY OF RIGHT GREAT TOE, INITIAL ENCOUNTER: ICD-10-CM

## 2020-12-28 DIAGNOSIS — M79.674 GREAT TOE PAIN, RIGHT: ICD-10-CM

## 2020-12-28 DIAGNOSIS — L03.031 CELLULITIS OF GREAT TOE OF RIGHT FOOT: Primary | ICD-10-CM

## 2020-12-28 PROCEDURE — 99213 OFFICE O/P EST LOW 20 MIN: CPT | Performed by: PHYSICIAN ASSISTANT

## 2020-12-28 PROCEDURE — 73630 X-RAY EXAM OF FOOT: CPT

## 2020-12-28 RX ORDER — CEPHALEXIN 500 MG/1
500 CAPSULE ORAL 2 TIMES DAILY
Qty: 14 CAPSULE | Refills: 0 | Status: SHIPPED | OUTPATIENT
Start: 2020-12-28 | End: 2021-04-27

## 2020-12-28 NOTE — PROGRESS NOTES
"Subjective   Fernanda Molina is a 80 y.o. female presents for   Chief Complaint   Patient presents with   • Blister     big toe left foot- popped friday night, painful and swelling now        History of Present Illness     Fernanda is a 80-year-old female who presents with left foot great toe blister.  States it popped on Friday night now is very painful and swelling.  The toe is now reddish in color and is very tender at base of toe.  She had called the office on 12/21/2020 and noted that she had dropped a book on her big toe.  She had noticed a blood blister that was the size of a nickel.  Dr. Ge had told her not to pop it and just clean it once a day with soap and water.  He also recommended using Neosporin and wrapping with a Band-Aid if the blister opened.    The following portions of the patient's history were reviewed and updated as appropriate: allergies, current medications, past family history, past medical history, past social history, past surgical history and problem list.    Review of Systems   Constitutional: Negative.    HENT: Negative.    Eyes: Negative.    Respiratory: Negative.    Cardiovascular: Negative.    Gastrointestinal: Negative.    Endocrine: Negative.    Genitourinary: Negative.    Musculoskeletal: Positive for gait problem.        Great toe pain with blister that burst with discoloration.   Skin: Positive for color change and wound.   Allergic/Immunologic: Negative.    Hematological: Negative.    Psychiatric/Behavioral: Negative.    All other systems reviewed and are negative.        Vitals:    12/28/20 1640   BP: 120/76   BP Location: Left arm   Patient Position: Sitting   Cuff Size: Adult   Pulse: 80   SpO2: 96%   Weight: 94.8 kg (209 lb)   Height: 157.5 cm (62\")     Wt Readings from Last 3 Encounters:   12/28/20 94.8 kg (209 lb)   10/27/20 96.2 kg (212 lb)   07/10/20 94.8 kg (209 lb)     BP Readings from Last 3 Encounters:   12/28/20 120/76   10/27/20 132/80   04/14/20 120/74 "     Social History     Socioeconomic History   • Marital status:      Spouse name: Not on file   • Number of children: Not on file   • Years of education: Not on file   • Highest education level: Not on file   Social Needs   • Financial resource strain: Not hard at all   • Food insecurity     Worry: Never true     Inability: Never true   • Transportation needs     Medical: No     Non-medical: No   Tobacco Use   • Smoking status: Never Smoker   • Smokeless tobacco: Never Used   Substance and Sexual Activity   • Alcohol use: No     Frequency: Never   • Drug use: No   • Sexual activity: Not Currently     Partners: Male     Birth control/protection: Post-menopausal       Allergies   Allergen Reactions   • Amiodarone Other (See Comments)     toxicity   • Corticosteroids Other (See Comments)     RAISES BLOOD PRESSURE   • Adhesive Tape Rash   • Hydrocodone Nausea And Vomiting and Hallucinations   • Lisinopril Other (See Comments)     CHRONIC NASAL CONGESTION       Body mass index is 38.23 kg/m².    Objective   Physical Exam  Constitutional:       Appearance: Normal appearance. She is well-developed and well-groomed. She is obese.      Interventions: Face mask in place.   Musculoskeletal:        Feet:    Feet:      Right foot:      Skin integrity: Blister and erythema present.   Skin:     General: Skin is warm and dry.      Capillary Refill: Capillary refill takes less than 2 seconds.   Neurological:      Mental Status: She is alert and oriented to person, place, and time.   Psychiatric:         Attention and Perception: Attention and perception normal.         Mood and Affect: Mood and affect normal.         Speech: Speech normal.         Behavior: Behavior normal. Behavior is cooperative.         Thought Content: Thought content normal.         Cognition and Memory: Cognition and memory normal.     I was wearing a surgical mask and face shield during the entire office visit/encounter.      Assessment/Plan    Diagnoses and all orders for this visit:    1. Cellulitis of great toe of right foot (Primary)  -     cephalexin (Keflex) 500 MG capsule; Take 1 capsule by mouth 2 (Two) Times a Day.  Dispense: 14 capsule; Refill: 0    2. Injury of right great toe, initial encounter  -     XR Foot 3+ View Right; Future    3. Great toe pain, right  -     XR Foot 3+ View Right; Future    Mrs. Fernanda Molina was seen  in office today with right great toe pain, cellulitis and right great toe injury.  I  will proceed with right/great toe x-ray at [facility] to rule out fracture.  Have placed on Keflex antibiotic for mild cellulitis.  She is a type II diabetic.  Stressed the importance of watching her diabetes due to the infectious process of her great toe.  I will consider referral to podiatry/orthopedist in future if warranted.  She was prescribed a walking shoe which she will be using when she is ambulating.  Fernanda will be notified of test results when completed.      [facility]  [address] [place] [id number]  Dept: [phone]  Dept Fax: [phone]  Loc: [phone]  Loc Fax: [phone]

## 2020-12-29 DIAGNOSIS — S92.424A CLOSED NONDISPLACED FRACTURE OF DISTAL PHALANX OF RIGHT GREAT TOE, INITIAL ENCOUNTER: Primary | ICD-10-CM

## 2021-01-10 DIAGNOSIS — I25.10 CORONARY ARTERY DISEASE INVOLVING NATIVE CORONARY ARTERY OF NATIVE HEART WITHOUT ANGINA PECTORIS: ICD-10-CM

## 2021-01-11 RX ORDER — PANTOPRAZOLE SODIUM 40 MG/1
TABLET, DELAYED RELEASE ORAL
Qty: 90 TABLET | Refills: 3 | Status: SHIPPED | OUTPATIENT
Start: 2021-01-11 | End: 2021-12-16 | Stop reason: SDUPTHER

## 2021-01-11 RX ORDER — DIGOXIN 250 MCG
TABLET ORAL
Qty: 90 TABLET | Refills: 3 | Status: SHIPPED | OUTPATIENT
Start: 2021-01-11

## 2021-01-11 RX ORDER — THYROID,PORK 90 MG
TABLET ORAL
Qty: 135 TABLET | Refills: 0 | Status: SHIPPED | OUTPATIENT
Start: 2021-01-11 | End: 2021-04-12

## 2021-01-11 RX ORDER — FUROSEMIDE 40 MG/1
TABLET ORAL
Qty: 90 TABLET | Refills: 0 | Status: SHIPPED | OUTPATIENT
Start: 2021-01-11 | End: 2021-04-12

## 2021-02-08 RX ORDER — LANCETS 33 GAUGE
EACH MISCELLANEOUS
Qty: 100 EACH | Refills: 11 | Status: SHIPPED | OUTPATIENT
Start: 2021-02-08 | End: 2022-03-01

## 2021-02-11 DIAGNOSIS — E11.9 TYPE 2 DIABETES MELLITUS WITHOUT COMPLICATION, WITH LONG-TERM CURRENT USE OF INSULIN (HCC): ICD-10-CM

## 2021-02-11 DIAGNOSIS — Z79.4 TYPE 2 DIABETES MELLITUS WITHOUT COMPLICATION, WITH LONG-TERM CURRENT USE OF INSULIN (HCC): ICD-10-CM

## 2021-03-05 ENCOUNTER — TREATMENT (OUTPATIENT)
Dept: CARDIAC REHAB | Facility: HOSPITAL | Age: 81
End: 2021-03-05

## 2021-03-05 DIAGNOSIS — I50.22 CHRONIC SYSTOLIC HEART FAILURE (HCC): Primary | ICD-10-CM

## 2021-03-05 LAB
GLUCOSE BLDC GLUCOMTR-MCNC: 105 MG/DL (ref 70–130)
GLUCOSE BLDC GLUCOMTR-MCNC: 119 MG/DL (ref 70–130)

## 2021-03-05 PROCEDURE — 82962 GLUCOSE BLOOD TEST: CPT

## 2021-03-05 PROCEDURE — 93797 PHYS/QHP OP CAR RHAB WO ECG: CPT

## 2021-03-05 PROCEDURE — 93798 PHYS/QHP OP CAR RHAB W/ECG: CPT

## 2021-03-08 ENCOUNTER — TREATMENT (OUTPATIENT)
Dept: CARDIAC REHAB | Facility: HOSPITAL | Age: 81
End: 2021-03-08

## 2021-03-08 DIAGNOSIS — I50.22 CHRONIC SYSTOLIC HEART FAILURE (HCC): Primary | ICD-10-CM

## 2021-03-08 LAB
GLUCOSE BLDC GLUCOMTR-MCNC: 102 MG/DL (ref 70–130)
GLUCOSE BLDC GLUCOMTR-MCNC: 115 MG/DL (ref 70–130)

## 2021-03-08 PROCEDURE — 82962 GLUCOSE BLOOD TEST: CPT

## 2021-03-08 PROCEDURE — 93798 PHYS/QHP OP CAR RHAB W/ECG: CPT

## 2021-03-10 ENCOUNTER — TREATMENT (OUTPATIENT)
Dept: CARDIAC REHAB | Facility: HOSPITAL | Age: 81
End: 2021-03-10

## 2021-03-10 DIAGNOSIS — I50.22 CHRONIC SYSTOLIC HEART FAILURE (HCC): Primary | ICD-10-CM

## 2021-03-10 LAB
GLUCOSE BLDC GLUCOMTR-MCNC: 126 MG/DL (ref 70–130)
GLUCOSE BLDC GLUCOMTR-MCNC: 188 MG/DL (ref 70–130)

## 2021-03-10 PROCEDURE — 93798 PHYS/QHP OP CAR RHAB W/ECG: CPT

## 2021-03-10 PROCEDURE — 82962 GLUCOSE BLOOD TEST: CPT

## 2021-03-12 ENCOUNTER — TREATMENT (OUTPATIENT)
Dept: CARDIAC REHAB | Facility: HOSPITAL | Age: 81
End: 2021-03-12

## 2021-03-12 DIAGNOSIS — I50.22 CHRONIC SYSTOLIC HEART FAILURE (HCC): Primary | ICD-10-CM

## 2021-03-12 PROCEDURE — 93798 PHYS/QHP OP CAR RHAB W/ECG: CPT

## 2021-03-15 ENCOUNTER — TREATMENT (OUTPATIENT)
Dept: CARDIAC REHAB | Facility: HOSPITAL | Age: 81
End: 2021-03-15

## 2021-03-15 DIAGNOSIS — I50.22 CHRONIC SYSTOLIC HEART FAILURE (HCC): Primary | ICD-10-CM

## 2021-03-15 PROCEDURE — 93798 PHYS/QHP OP CAR RHAB W/ECG: CPT

## 2021-03-15 RX ORDER — ALBUTEROL SULFATE 90 UG/1
2 AEROSOL, METERED RESPIRATORY (INHALATION) EVERY 4 HOURS PRN
Qty: 18 G | Refills: 5 | Status: SHIPPED | OUTPATIENT
Start: 2021-03-15 | End: 2022-06-21 | Stop reason: SDUPTHER

## 2021-03-15 NOTE — TELEPHONE ENCOUNTER
Caller: Fernanda Molina    Relationship: Self    Best call back number: [phone] (H)    Medication needed:   Requested Prescriptions     Pending Prescriptions Disp Refills   • albuterol sulfate  (90 Base) MCG/ACT inhaler       Sig: Inhale 2 puffs Every 4 (Four) Hours As Needed for Wheezing.       When do you need the refill by: 03/15/21     What details did the patient provide when requesting the medication: PATIENT IS COMPLETELY OUT    Does the patient have less than a 3 day supply:  [x] Yes  [] No    What is the patient's preferred pharmacy: [address] [facility] - [phone]  - [phone]

## 2021-03-17 ENCOUNTER — TREATMENT (OUTPATIENT)
Dept: CARDIAC REHAB | Facility: HOSPITAL | Age: 81
End: 2021-03-17

## 2021-03-17 DIAGNOSIS — I50.22 CHRONIC SYSTOLIC HEART FAILURE (HCC): Primary | ICD-10-CM

## 2021-03-17 PROCEDURE — 93798 PHYS/QHP OP CAR RHAB W/ECG: CPT

## 2021-03-19 ENCOUNTER — TREATMENT (OUTPATIENT)
Dept: CARDIAC REHAB | Facility: HOSPITAL | Age: 81
End: 2021-03-19

## 2021-03-19 DIAGNOSIS — I50.22 CHRONIC SYSTOLIC HEART FAILURE (HCC): Primary | ICD-10-CM

## 2021-03-19 PROCEDURE — 93798 PHYS/QHP OP CAR RHAB W/ECG: CPT

## 2021-03-22 ENCOUNTER — TREATMENT (OUTPATIENT)
Dept: CARDIAC REHAB | Facility: HOSPITAL | Age: 81
End: 2021-03-22

## 2021-03-22 DIAGNOSIS — I50.22 CHRONIC SYSTOLIC HEART FAILURE (HCC): Primary | ICD-10-CM

## 2021-03-22 PROCEDURE — 93798 PHYS/QHP OP CAR RHAB W/ECG: CPT

## 2021-03-24 ENCOUNTER — TREATMENT (OUTPATIENT)
Dept: CARDIAC REHAB | Facility: HOSPITAL | Age: 81
End: 2021-03-24

## 2021-03-24 DIAGNOSIS — I50.22 CHRONIC SYSTOLIC HEART FAILURE (HCC): Primary | ICD-10-CM

## 2021-03-24 PROCEDURE — 93798 PHYS/QHP OP CAR RHAB W/ECG: CPT

## 2021-03-29 ENCOUNTER — TREATMENT (OUTPATIENT)
Dept: CARDIAC REHAB | Facility: HOSPITAL | Age: 81
End: 2021-03-29

## 2021-03-29 DIAGNOSIS — I50.22 CHRONIC SYSTOLIC HEART FAILURE (HCC): Primary | ICD-10-CM

## 2021-03-29 PROCEDURE — 93798 PHYS/QHP OP CAR RHAB W/ECG: CPT

## 2021-03-31 ENCOUNTER — TREATMENT (OUTPATIENT)
Dept: CARDIAC REHAB | Facility: HOSPITAL | Age: 81
End: 2021-03-31

## 2021-03-31 DIAGNOSIS — I50.22 CHRONIC SYSTOLIC HEART FAILURE (HCC): Primary | ICD-10-CM

## 2021-03-31 PROCEDURE — 93798 PHYS/QHP OP CAR RHAB W/ECG: CPT

## 2021-04-02 ENCOUNTER — TREATMENT (OUTPATIENT)
Dept: CARDIAC REHAB | Facility: HOSPITAL | Age: 81
End: 2021-04-02

## 2021-04-02 DIAGNOSIS — I50.22 CHRONIC SYSTOLIC HEART FAILURE (HCC): Primary | ICD-10-CM

## 2021-04-02 PROCEDURE — 93798 PHYS/QHP OP CAR RHAB W/ECG: CPT

## 2021-04-05 ENCOUNTER — TREATMENT (OUTPATIENT)
Dept: CARDIAC REHAB | Facility: HOSPITAL | Age: 81
End: 2021-04-05

## 2021-04-05 DIAGNOSIS — I50.22 CHRONIC SYSTOLIC HEART FAILURE (HCC): Primary | ICD-10-CM

## 2021-04-05 PROCEDURE — 93798 PHYS/QHP OP CAR RHAB W/ECG: CPT

## 2021-04-07 ENCOUNTER — TREATMENT (OUTPATIENT)
Dept: CARDIAC REHAB | Facility: HOSPITAL | Age: 81
End: 2021-04-07

## 2021-04-07 DIAGNOSIS — I50.22 CHRONIC SYSTOLIC HEART FAILURE (HCC): Primary | ICD-10-CM

## 2021-04-07 PROCEDURE — 93798 PHYS/QHP OP CAR RHAB W/ECG: CPT

## 2021-04-09 ENCOUNTER — TREATMENT (OUTPATIENT)
Dept: CARDIAC REHAB | Facility: HOSPITAL | Age: 81
End: 2021-04-09

## 2021-04-09 DIAGNOSIS — I50.22 CHRONIC SYSTOLIC HEART FAILURE (HCC): Primary | ICD-10-CM

## 2021-04-09 PROCEDURE — 93798 PHYS/QHP OP CAR RHAB W/ECG: CPT

## 2021-04-10 DIAGNOSIS — I25.10 CORONARY ARTERY DISEASE INVOLVING NATIVE CORONARY ARTERY OF NATIVE HEART WITHOUT ANGINA PECTORIS: ICD-10-CM

## 2021-04-12 ENCOUNTER — TREATMENT (OUTPATIENT)
Dept: CARDIAC REHAB | Facility: HOSPITAL | Age: 81
End: 2021-04-12

## 2021-04-12 DIAGNOSIS — I50.22 CHRONIC SYSTOLIC HEART FAILURE (HCC): Primary | ICD-10-CM

## 2021-04-12 PROCEDURE — 93798 PHYS/QHP OP CAR RHAB W/ECG: CPT

## 2021-04-12 RX ORDER — FUROSEMIDE 40 MG/1
TABLET ORAL
Qty: 90 TABLET | Refills: 0 | Status: SHIPPED | OUTPATIENT
Start: 2021-04-12 | End: 2021-06-07

## 2021-04-12 RX ORDER — THYROID,PORK 90 MG
TABLET ORAL
Qty: 135 TABLET | Refills: 0 | Status: SHIPPED | OUTPATIENT
Start: 2021-04-12 | End: 2021-04-27

## 2021-04-14 ENCOUNTER — TREATMENT (OUTPATIENT)
Dept: CARDIAC REHAB | Facility: HOSPITAL | Age: 81
End: 2021-04-14

## 2021-04-14 DIAGNOSIS — I50.22 CHRONIC SYSTOLIC HEART FAILURE (HCC): Primary | ICD-10-CM

## 2021-04-14 PROCEDURE — 93798 PHYS/QHP OP CAR RHAB W/ECG: CPT

## 2021-04-16 ENCOUNTER — TREATMENT (OUTPATIENT)
Dept: CARDIAC REHAB | Facility: HOSPITAL | Age: 81
End: 2021-04-16

## 2021-04-16 DIAGNOSIS — I50.22 CHRONIC SYSTOLIC HEART FAILURE (HCC): Primary | ICD-10-CM

## 2021-04-16 PROCEDURE — 93798 PHYS/QHP OP CAR RHAB W/ECG: CPT

## 2021-04-19 ENCOUNTER — TREATMENT (OUTPATIENT)
Dept: CARDIAC REHAB | Facility: HOSPITAL | Age: 81
End: 2021-04-19

## 2021-04-19 DIAGNOSIS — I50.22 CHRONIC SYSTOLIC HEART FAILURE (HCC): Primary | ICD-10-CM

## 2021-04-19 DIAGNOSIS — E03.9 ACQUIRED HYPOTHYROIDISM: ICD-10-CM

## 2021-04-19 DIAGNOSIS — E11.9 TYPE 2 DIABETES MELLITUS WITHOUT COMPLICATION, WITH LONG-TERM CURRENT USE OF INSULIN (HCC): ICD-10-CM

## 2021-04-19 DIAGNOSIS — Z79.4 TYPE 2 DIABETES MELLITUS WITHOUT COMPLICATION, WITH LONG-TERM CURRENT USE OF INSULIN (HCC): ICD-10-CM

## 2021-04-19 PROCEDURE — 93798 PHYS/QHP OP CAR RHAB W/ECG: CPT

## 2021-04-21 ENCOUNTER — TREATMENT (OUTPATIENT)
Dept: CARDIAC REHAB | Facility: HOSPITAL | Age: 81
End: 2021-04-21

## 2021-04-21 DIAGNOSIS — I50.22 CHRONIC SYSTOLIC HEART FAILURE (HCC): Primary | ICD-10-CM

## 2021-04-21 LAB
ALBUMIN SERPL-MCNC: 4.5 G/DL (ref 3.5–5.2)
ALBUMIN/GLOB SERPL: 1.8 G/DL
ALP SERPL-CCNC: 61 U/L (ref 39–117)
ALT SERPL-CCNC: 32 U/L (ref 1–33)
AST SERPL-CCNC: 29 U/L (ref 1–32)
BASOPHILS # BLD AUTO: 0.05 10*3/MM3 (ref 0–0.2)
BASOPHILS NFR BLD AUTO: 0.9 % (ref 0–1.5)
BILIRUB SERPL-MCNC: 0.5 MG/DL (ref 0–1.2)
BUN SERPL-MCNC: 20 MG/DL (ref 8–23)
BUN/CREAT SERPL: 27.4 (ref 7–25)
CALCIUM SERPL-MCNC: 10.8 MG/DL (ref 8.6–10.5)
CHLORIDE SERPL-SCNC: 100 MMOL/L (ref 98–107)
CHOLEST SERPL-MCNC: 162 MG/DL (ref 0–200)
CO2 SERPL-SCNC: 26.9 MMOL/L (ref 22–29)
CREAT SERPL-MCNC: 0.73 MG/DL (ref 0.57–1)
EOSINOPHIL # BLD AUTO: 0.2 10*3/MM3 (ref 0–0.4)
EOSINOPHIL NFR BLD AUTO: 3.7 % (ref 0.3–6.2)
ERYTHROCYTE [DISTWIDTH] IN BLOOD BY AUTOMATED COUNT: 14.2 % (ref 12.3–15.4)
GLOBULIN SER CALC-MCNC: 2.5 GM/DL
GLUCOSE SERPL-MCNC: 134 MG/DL (ref 65–99)
HBA1C MFR BLD: 6.5 % (ref 4.8–5.6)
HCT VFR BLD AUTO: 45 % (ref 34–46.6)
HDLC SERPL-MCNC: 39 MG/DL (ref 40–60)
HGB BLD-MCNC: 14.8 G/DL (ref 12–15.9)
IMM GRANULOCYTES # BLD AUTO: 0.02 10*3/MM3 (ref 0–0.05)
IMM GRANULOCYTES NFR BLD AUTO: 0.4 % (ref 0–0.5)
LDLC SERPL CALC-MCNC: 77 MG/DL (ref 0–100)
LYMPHOCYTES # BLD AUTO: 1.59 10*3/MM3 (ref 0.7–3.1)
LYMPHOCYTES NFR BLD AUTO: 29.2 % (ref 19.6–45.3)
MCH RBC QN AUTO: 29.2 PG (ref 26.6–33)
MCHC RBC AUTO-ENTMCNC: 32.9 G/DL (ref 31.5–35.7)
MCV RBC AUTO: 88.8 FL (ref 79–97)
MONOCYTES # BLD AUTO: 0.55 10*3/MM3 (ref 0.1–0.9)
MONOCYTES NFR BLD AUTO: 10.1 % (ref 5–12)
NEUTROPHILS # BLD AUTO: 3.03 10*3/MM3 (ref 1.7–7)
NEUTROPHILS NFR BLD AUTO: 55.7 % (ref 42.7–76)
NRBC BLD AUTO-RTO: 0 /100 WBC (ref 0–0.2)
PLATELET # BLD AUTO: 189 10*3/MM3 (ref 140–450)
POTASSIUM SERPL-SCNC: 4.3 MMOL/L (ref 3.5–5.2)
PROT SERPL-MCNC: 7 G/DL (ref 6–8.5)
RBC # BLD AUTO: 5.07 10*6/MM3 (ref 3.77–5.28)
SODIUM SERPL-SCNC: 138 MMOL/L (ref 136–145)
TRIGL SERPL-MCNC: 283 MG/DL (ref 0–150)
TSH SERPL DL<=0.005 MIU/L-ACNC: 10.9 UIU/ML (ref 0.27–4.2)
VLDLC SERPL CALC-MCNC: 46 MG/DL (ref 5–40)
WBC # BLD AUTO: 5.44 10*3/MM3 (ref 3.4–10.8)

## 2021-04-21 PROCEDURE — 93798 PHYS/QHP OP CAR RHAB W/ECG: CPT

## 2021-04-23 ENCOUNTER — TREATMENT (OUTPATIENT)
Dept: CARDIAC REHAB | Facility: HOSPITAL | Age: 81
End: 2021-04-23

## 2021-04-23 DIAGNOSIS — I50.22 CHRONIC SYSTOLIC HEART FAILURE (HCC): Primary | ICD-10-CM

## 2021-04-23 PROCEDURE — 93798 PHYS/QHP OP CAR RHAB W/ECG: CPT

## 2021-04-26 ENCOUNTER — TREATMENT (OUTPATIENT)
Dept: CARDIAC REHAB | Facility: HOSPITAL | Age: 81
End: 2021-04-26

## 2021-04-26 DIAGNOSIS — I50.22 CHRONIC SYSTOLIC HEART FAILURE (HCC): Primary | ICD-10-CM

## 2021-04-26 PROCEDURE — 93798 PHYS/QHP OP CAR RHAB W/ECG: CPT

## 2021-04-27 ENCOUNTER — OFFICE VISIT (OUTPATIENT)
Dept: FAMILY MEDICINE CLINIC | Facility: CLINIC | Age: 81
End: 2021-04-27

## 2021-04-27 VITALS
HEIGHT: 62 IN | HEART RATE: 66 BPM | SYSTOLIC BLOOD PRESSURE: 120 MMHG | OXYGEN SATURATION: 98 % | BODY MASS INDEX: 39.56 KG/M2 | TEMPERATURE: 97.1 F | DIASTOLIC BLOOD PRESSURE: 80 MMHG | WEIGHT: 215 LBS

## 2021-04-27 DIAGNOSIS — E11.9 TYPE 2 DIABETES MELLITUS WITHOUT COMPLICATION, WITHOUT LONG-TERM CURRENT USE OF INSULIN (HCC): ICD-10-CM

## 2021-04-27 DIAGNOSIS — E03.9 ACQUIRED HYPOTHYROIDISM: Primary | ICD-10-CM

## 2021-04-27 PROCEDURE — 99214 OFFICE O/P EST MOD 30 MIN: CPT | Performed by: FAMILY MEDICINE

## 2021-04-27 RX ORDER — LEVOTHYROXINE SODIUM 0.12 MG/1
125 TABLET ORAL DAILY
Qty: 90 TABLET | Refills: 0 | Status: SHIPPED | OUTPATIENT
Start: 2021-04-27 | End: 2021-06-09 | Stop reason: SDUPTHER

## 2021-04-27 RX ORDER — CETIRIZINE HYDROCHLORIDE 10 MG/1
10 TABLET ORAL DAILY
COMMUNITY

## 2021-04-27 NOTE — PROGRESS NOTES
Chief Complaint   Patient presents with   • Diabetes   • Hypothyroidism       Subjective   Fernanda Molina is an 80 y.o. female who presents for follow up of diabetes. Current symptoms include: hyperglycemia. Patient denies foot ulcerations. Evaluation to date has included: fasting blood sugar, fasting lipid panel, hemoglobin A1C and microalbuminuria. Home sugars: BGs are high in the morning. Current treatments: more intensive attention to diet which has been somewhat effective, low cholesterol diet which has been somewhat effective, increased aerobic exercise which has been ineffective, Continued insulin which has been effective, Continued metformin which has been effective, Continued statin which has been discontinued because of side effects and Continued ACE inhibitor/ARB which has been effective. Discussed importance of yearly eye exams and checking feet for skin integrity.  A1c is 6.5.  Well controlled.  Will not change any of her medications.  She said no hypoglycemia.    With her thyroid testing her TSH is elevated 10.  She is currently on 120 mg of [place] Thyroid.  She was on levothyroxine many years ago.  But her previous family physician, Dr. Rivera stopped levothyroxine, and suggested [place] Thyroid.  We will go back to levothyroxine today.    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  Multiple surgeries.  No evidence of congestive heart failure today.  Sees her cardiologist regular.  Is maintained on warfarin therapy.    Still battling urinary incontinence.  Sees Dr. Moisés Everett urology.  She is now on 50 mg of Myrbetriq a day.  With no improvement.  She is discussing Botox injections as the next step.    Her tremor of her chin and jaw continues is slightly worse than last time.  She is having no speech difficulties no swallowing difficulties.  Now her family and friends are noticing it.  She now is a slight tremor in her right hand.  This may be tremor or may be the beginnings of  "Parkinson's.  Her father had Parkinson's.  Once the symptoms become more symptomatic we may try a few Parkinson's medications.  Neuro referral not needed at this time.    Her low back pain is still continuing.  She has lumbar stenosis.  But she points to the right and left lower pelvis area posteriorly.  Not much to do at this time for that.  Continue her back exercises and Tylenol.    She is now enrolled in cardiac rehab.  Gone to that most days of the week with improved exercise tolerance.            The following portions of the patient's history were reviewed and updated as appropriate: allergies, current medications, past medical history, past social history, past surgical history and problem list.        Review of Systems  A comprehensive review of systems was negative except for: Genitourinary: positive for urinary incontinence  Musculoskeletal: positive for back pain and stiff joints     Vitals:    04/27/21 1245   BP: 120/80   BP Location: Left arm   Patient Position: Sitting   Cuff Size: Large Adult   Pulse: 66   Temp: 97.1 °F (36.2 °C)   TempSrc: Temporal   SpO2: 98%   Weight: 97.5 kg (215 lb)   Height: 157.5 cm (62\")       Objective    Gen:  Alert, pleasant  Ears: canals clear, TMs normal  Throat: clear , no thrush, teeth ok  Neck: no bruit, no LAD  Lungs: clear  Heart: RR no murmur  Feet:  No rash, no skin breakdown, sensation grossly normal.  Neuro.  Noticeable tremor of the chin    Laboratory:  Results for orders placed or performed in visit on 04/19/21   Hemoglobin A1c    Specimen: Blood   Result Value Ref Range    Hemoglobin A1C 6.50 (H) 4.80 - 5.60 %   TSH    Specimen: Blood   Result Value Ref Range    TSH 10.900 (H) 0.270 - 4.200 uIU/mL   Lipid Panel    Specimen: Blood   Result Value Ref Range    Total Cholesterol 162 0 - 200 mg/dL    Triglycerides 283 (H) 0 - 150 mg/dL    HDL Cholesterol 39 (L) 40 - 60 mg/dL    VLDL Cholesterol Scottie 46 (H) 5 - 40 mg/dL    LDL Chol Calc (NIH) 77 0 - 100 mg/dL "   Comprehensive Metabolic Panel    Specimen: Blood   Result Value Ref Range    Glucose 134 (H) 65 - 99 mg/dL    BUN 20 8 - 23 mg/dL    Creatinine 0.73 0.57 - 1.00 mg/dL    eGFR Non African Am 77 >60 mL/min/1.73    eGFR African Am 93 >60 mL/min/1.73    BUN/Creatinine Ratio 27.4 (H) 7.0 - 25.0    Sodium 138 136 - 145 mmol/L    Potassium 4.3 3.5 - 5.2 mmol/L    Chloride 100 98 - 107 mmol/L    Total CO2 26.9 22.0 - 29.0 mmol/L    Calcium 10.8 (H) 8.6 - 10.5 mg/dL    Total Protein 7.0 6.0 - 8.5 g/dL    Albumin 4.50 3.50 - 5.20 g/dL    Globulin 2.5 gm/dL    A/G Ratio 1.8 g/dL    Total Bilirubin 0.5 0.0 - 1.2 mg/dL    Alkaline Phosphatase 61 39 - 117 U/L    AST (SGOT) 29 1 - 32 U/L    ALT (SGPT) 32 1 - 33 U/L   CBC & Differential    Specimen: Blood   Result Value Ref Range    WBC 5.44 3.40 - 10.80 10*3/mm3    RBC 5.07 3.77 - 5.28 10*6/mm3    Hemoglobin 14.8 12.0 - 15.9 g/dL    Hematocrit 45.0 34.0 - 46.6 %    MCV 88.8 79.0 - 97.0 fL    MCH 29.2 26.6 - 33.0 pg    MCHC 32.9 31.5 - 35.7 g/dL    RDW 14.2 12.3 - 15.4 %    Platelets 189 140 - 450 10*3/mm3    Neutrophil Rel % 55.7 42.7 - 76.0 %    Lymphocyte Rel % 29.2 19.6 - 45.3 %    Monocyte Rel % 10.1 5.0 - 12.0 %    Eosinophil Rel % 3.7 0.3 - 6.2 %    Basophil Rel % 0.9 0.0 - 1.5 %    Neutrophils Absolute 3.03 1.70 - 7.00 10*3/mm3    Lymphocytes Absolute 1.59 0.70 - 3.10 10*3/mm3    Monocytes Absolute 0.55 0.10 - 0.90 10*3/mm3    Eosinophils Absolute 0.20 0.00 - 0.40 10*3/mm3    Basophils Absolute 0.05 0.00 - 0.20 10*3/mm3    Immature Granulocyte Rel % 0.4 0.0 - 0.5 %    Immature Grans Absolute 0.02 0.00 - 0.05 10*3/mm3    nRBC 0.0 0.0 - 0.2 /100 WBC     *Note: Due to a large number of results and/or encounters for the requested time period, some results have not been displayed. A complete set of results can be found in Results Review.        Assessment/Plan        Discussed general issues about diabetes pathophysiology and management.  Continued insulin: same  Lantus.  Continued metformin; see  medication orders.  Continued ACE inhibitor; see medication orders.  Follow up in 6 months or as needed.    Diagnoses and all orders for this visit:    1. Acquired hypothyroidism (Primary)  -     levothyroxine (Synthroid) 125 MCG tablet; Take 1 tablet by mouth Daily. Indications: Underactive Thyroid  Dispense: 90 tablet; Refill: 0  -     TSH; Future  -     T4, Free; Future  -     T3; Future    2. Type 2 diabetes mellitus without complication, without long-term current use of insulin (CMS/[facility])  -     metFORMIN (GLUCOPHAGE) 500 MG tablet; Take 1 tablet by mouth Daily With Breakfast. Indications: Type 2 Diabetes  Dispense: 90 tablet; Refill: 3  -     metFORMIN (GLUCOPHAGE) 1000 MG tablet; Take 1 tablet by mouth Daily Before Supper. Indications: Type 2 Diabetes  Dispense: 90 tablet; Refill: 3    .  Stop Eliot Thyroid.  Replaced with levothyroxine today.  Return in 6 weeks for labs.      Discussed healthy diabetic eating plan.  May refer to ADA web site, diabetes.org    No follow-ups on file.  There are no Patient Instructions on file for this visit.  Medications Discontinued During This Encounter   Medication Reason   • cephalexin (Keflex) 500 MG capsule *Therapy completed   • Mirabegron ER (Myrbetriq) 25 MG tablet sustained-release 24 hour 24 hr tablet Duplicate order   • Eliot Thyroid 90 MG tablet *Therapy completed   • Thyroid 90 MG PO tablet *Therapy completed   • metFORMIN (GLUCOPHAGE) 500 MG tablet Reorder   • metFORMIN (GLUCOPHAGE) 1000 MG tablet Reorder   • ENTRESTO 24-26 MG tablet Duplicate order         Dr. Rashid Ge MD  [place], [place]  [facility].

## 2021-04-28 ENCOUNTER — TREATMENT (OUTPATIENT)
Dept: CARDIAC REHAB | Facility: HOSPITAL | Age: 81
End: 2021-04-28

## 2021-04-28 DIAGNOSIS — I50.22 CHRONIC SYSTOLIC HEART FAILURE (HCC): Primary | ICD-10-CM

## 2021-04-28 PROCEDURE — 93798 PHYS/QHP OP CAR RHAB W/ECG: CPT

## 2021-04-29 ENCOUNTER — TELEPHONE (OUTPATIENT)
Dept: FAMILY MEDICINE CLINIC | Facility: CLINIC | Age: 81
End: 2021-04-29

## 2021-04-29 DIAGNOSIS — Z12.11 SCREEN FOR COLON CANCER: Primary | ICD-10-CM

## 2021-04-29 NOTE — TELEPHONE ENCOUNTER
PATIENT IS NEEDING AN ORDER SENT OVER TO [facility] FOR HER YEARLY TEST THEY TOLD HER THEY DID NOT HAVE ONE.    PLEASE ADVISE  [phone]

## 2021-04-30 ENCOUNTER — TREATMENT (OUTPATIENT)
Dept: CARDIAC REHAB | Facility: HOSPITAL | Age: 81
End: 2021-04-30

## 2021-04-30 DIAGNOSIS — I50.22 CHRONIC SYSTOLIC HEART FAILURE (HCC): Primary | ICD-10-CM

## 2021-04-30 PROCEDURE — 93798 PHYS/QHP OP CAR RHAB W/ECG: CPT

## 2021-05-03 ENCOUNTER — TREATMENT (OUTPATIENT)
Dept: CARDIAC REHAB | Facility: HOSPITAL | Age: 81
End: 2021-05-03

## 2021-05-03 DIAGNOSIS — I50.22 CHRONIC SYSTOLIC HEART FAILURE (HCC): Primary | ICD-10-CM

## 2021-05-03 PROCEDURE — 93798 PHYS/QHP OP CAR RHAB W/ECG: CPT

## 2021-05-05 ENCOUNTER — TREATMENT (OUTPATIENT)
Dept: CARDIAC REHAB | Facility: HOSPITAL | Age: 81
End: 2021-05-05

## 2021-05-05 DIAGNOSIS — I50.22 CHRONIC SYSTOLIC HEART FAILURE (HCC): Primary | ICD-10-CM

## 2021-05-05 PROCEDURE — 93798 PHYS/QHP OP CAR RHAB W/ECG: CPT

## 2021-05-07 ENCOUNTER — TREATMENT (OUTPATIENT)
Dept: CARDIAC REHAB | Facility: HOSPITAL | Age: 81
End: 2021-05-07

## 2021-05-07 DIAGNOSIS — I50.22 CHRONIC SYSTOLIC HEART FAILURE (HCC): Primary | ICD-10-CM

## 2021-05-07 PROCEDURE — 93798 PHYS/QHP OP CAR RHAB W/ECG: CPT

## 2021-05-10 ENCOUNTER — TREATMENT (OUTPATIENT)
Dept: CARDIAC REHAB | Facility: HOSPITAL | Age: 81
End: 2021-05-10

## 2021-05-10 DIAGNOSIS — I50.22 CHRONIC SYSTOLIC HEART FAILURE (HCC): Primary | ICD-10-CM

## 2021-05-10 PROCEDURE — 93798 PHYS/QHP OP CAR RHAB W/ECG: CPT

## 2021-05-12 ENCOUNTER — TREATMENT (OUTPATIENT)
Dept: CARDIAC REHAB | Facility: HOSPITAL | Age: 81
End: 2021-05-12

## 2021-05-12 DIAGNOSIS — I50.22 CHRONIC SYSTOLIC HEART FAILURE (HCC): Primary | ICD-10-CM

## 2021-05-12 PROCEDURE — 93798 PHYS/QHP OP CAR RHAB W/ECG: CPT

## 2021-05-14 ENCOUNTER — TREATMENT (OUTPATIENT)
Dept: CARDIAC REHAB | Facility: HOSPITAL | Age: 81
End: 2021-05-14

## 2021-05-14 DIAGNOSIS — I50.22 CHRONIC SYSTOLIC HEART FAILURE (HCC): Primary | ICD-10-CM

## 2021-05-14 PROCEDURE — 93798 PHYS/QHP OP CAR RHAB W/ECG: CPT

## 2021-05-17 ENCOUNTER — TREATMENT (OUTPATIENT)
Dept: CARDIAC REHAB | Facility: HOSPITAL | Age: 81
End: 2021-05-17

## 2021-05-17 DIAGNOSIS — I50.22 CHRONIC SYSTOLIC HEART FAILURE (HCC): Primary | ICD-10-CM

## 2021-05-17 PROCEDURE — 93798 PHYS/QHP OP CAR RHAB W/ECG: CPT

## 2021-05-19 ENCOUNTER — TREATMENT (OUTPATIENT)
Dept: CARDIAC REHAB | Facility: HOSPITAL | Age: 81
End: 2021-05-19

## 2021-05-19 DIAGNOSIS — I50.22 CHRONIC SYSTOLIC HEART FAILURE (HCC): Primary | ICD-10-CM

## 2021-05-19 PROCEDURE — 93798 PHYS/QHP OP CAR RHAB W/ECG: CPT

## 2021-05-21 ENCOUNTER — TREATMENT (OUTPATIENT)
Dept: CARDIAC REHAB | Facility: HOSPITAL | Age: 81
End: 2021-05-21

## 2021-05-21 DIAGNOSIS — I50.22 CHRONIC SYSTOLIC HEART FAILURE (HCC): Primary | ICD-10-CM

## 2021-05-21 PROCEDURE — 93798 PHYS/QHP OP CAR RHAB W/ECG: CPT

## 2021-05-23 DIAGNOSIS — I25.10 CORONARY ARTERY DISEASE INVOLVING NATIVE CORONARY ARTERY OF NATIVE HEART WITHOUT ANGINA PECTORIS: ICD-10-CM

## 2021-05-24 ENCOUNTER — TREATMENT (OUTPATIENT)
Dept: CARDIAC REHAB | Facility: HOSPITAL | Age: 81
End: 2021-05-24

## 2021-05-24 DIAGNOSIS — I50.22 CHRONIC SYSTOLIC HEART FAILURE (HCC): Primary | ICD-10-CM

## 2021-05-24 PROCEDURE — 93798 PHYS/QHP OP CAR RHAB W/ECG: CPT

## 2021-05-24 RX ORDER — POTASSIUM CHLORIDE 1500 MG/1
TABLET, EXTENDED RELEASE ORAL
Qty: 90 TABLET | Refills: 1 | Status: SHIPPED | OUTPATIENT
Start: 2021-05-24 | End: 2021-12-07 | Stop reason: SDUPTHER

## 2021-05-26 ENCOUNTER — TREATMENT (OUTPATIENT)
Dept: CARDIAC REHAB | Facility: HOSPITAL | Age: 81
End: 2021-05-26

## 2021-05-26 DIAGNOSIS — I50.22 CHRONIC SYSTOLIC HEART FAILURE (HCC): Primary | ICD-10-CM

## 2021-05-26 PROCEDURE — 93798 PHYS/QHP OP CAR RHAB W/ECG: CPT

## 2021-06-02 ENCOUNTER — TREATMENT (OUTPATIENT)
Dept: CARDIAC REHAB | Facility: HOSPITAL | Age: 81
End: 2021-06-02

## 2021-06-02 DIAGNOSIS — I50.22 CHRONIC SYSTOLIC HEART FAILURE (HCC): Primary | ICD-10-CM

## 2021-06-04 ENCOUNTER — TREATMENT (OUTPATIENT)
Dept: CARDIAC REHAB | Facility: HOSPITAL | Age: 81
End: 2021-06-04

## 2021-06-04 DIAGNOSIS — I50.22 CHRONIC SYSTOLIC HEART FAILURE (HCC): Primary | ICD-10-CM

## 2021-06-06 DIAGNOSIS — I25.10 CORONARY ARTERY DISEASE INVOLVING NATIVE CORONARY ARTERY OF NATIVE HEART WITHOUT ANGINA PECTORIS: ICD-10-CM

## 2021-06-07 ENCOUNTER — TREATMENT (OUTPATIENT)
Dept: CARDIAC REHAB | Facility: HOSPITAL | Age: 81
End: 2021-06-07

## 2021-06-07 DIAGNOSIS — E03.9 ACQUIRED HYPOTHYROIDISM: ICD-10-CM

## 2021-06-07 DIAGNOSIS — I50.22 CHRONIC SYSTOLIC HEART FAILURE (HCC): Primary | ICD-10-CM

## 2021-06-07 RX ORDER — FUROSEMIDE 40 MG/1
TABLET ORAL
Qty: 90 TABLET | Refills: 1 | Status: SHIPPED | OUTPATIENT
Start: 2021-06-07 | End: 2022-04-28 | Stop reason: SDUPTHER

## 2021-06-09 ENCOUNTER — TREATMENT (OUTPATIENT)
Dept: CARDIAC REHAB | Facility: HOSPITAL | Age: 81
End: 2021-06-09

## 2021-06-09 DIAGNOSIS — I50.22 CHRONIC SYSTOLIC HEART FAILURE (HCC): Primary | ICD-10-CM

## 2021-06-09 DIAGNOSIS — E03.9 ACQUIRED HYPOTHYROIDISM: ICD-10-CM

## 2021-06-09 LAB
T3 SERPL-MCNC: 62.8 NG/DL (ref 80–200)
T4 FREE SERPL-MCNC: 1.19 NG/DL (ref 0.93–1.7)
TSH SERPL DL<=0.005 MIU/L-ACNC: 16.4 UIU/ML (ref 0.27–4.2)

## 2021-06-09 RX ORDER — LEVOTHYROXINE SODIUM 0.15 MG/1
150 TABLET ORAL DAILY
Qty: 90 TABLET | Refills: 0 | Status: SHIPPED | OUTPATIENT
Start: 2021-06-09 | End: 2021-08-24

## 2021-06-11 ENCOUNTER — TREATMENT (OUTPATIENT)
Dept: CARDIAC REHAB | Facility: HOSPITAL | Age: 81
End: 2021-06-11

## 2021-06-11 DIAGNOSIS — I50.22 CHRONIC SYSTOLIC HEART FAILURE (HCC): Primary | ICD-10-CM

## 2021-06-11 RX ORDER — MOMETASONE FUROATE 50 UG/1
SPRAY, METERED NASAL
Qty: 17 EACH | Refills: 4 | OUTPATIENT
Start: 2021-06-11 | End: 2022-03-27

## 2021-06-21 ENCOUNTER — TREATMENT (OUTPATIENT)
Dept: CARDIAC REHAB | Facility: HOSPITAL | Age: 81
End: 2021-06-21

## 2021-06-21 DIAGNOSIS — I50.22 CHRONIC SYSTOLIC HEART FAILURE (HCC): Primary | ICD-10-CM

## 2021-06-23 ENCOUNTER — TREATMENT (OUTPATIENT)
Dept: CARDIAC REHAB | Facility: HOSPITAL | Age: 81
End: 2021-06-23

## 2021-06-23 DIAGNOSIS — I50.22 CHRONIC SYSTOLIC HEART FAILURE (HCC): Primary | ICD-10-CM

## 2021-06-24 ENCOUNTER — OFFICE VISIT (OUTPATIENT)
Dept: SLEEP MEDICINE | Facility: HOSPITAL | Age: 81
End: 2021-06-24

## 2021-06-24 VITALS — HEIGHT: 62 IN | WEIGHT: 218 LBS | BODY MASS INDEX: 40.12 KG/M2

## 2021-06-24 DIAGNOSIS — Z99.89 OSA ON CPAP: Primary | ICD-10-CM

## 2021-06-24 DIAGNOSIS — E66.01 CLASS 2 SEVERE OBESITY DUE TO EXCESS CALORIES WITH SERIOUS COMORBIDITY AND BODY MASS INDEX (BMI) OF 39.0 TO 39.9 IN ADULT (HCC): ICD-10-CM

## 2021-06-24 DIAGNOSIS — G47.33 OSA ON CPAP: Primary | ICD-10-CM

## 2021-06-24 PROCEDURE — 99213 OFFICE O/P EST LOW 20 MIN: CPT | Performed by: INTERNAL MEDICINE

## 2021-06-25 ENCOUNTER — TREATMENT (OUTPATIENT)
Dept: CARDIAC REHAB | Facility: HOSPITAL | Age: 81
End: 2021-06-25

## 2021-06-25 DIAGNOSIS — I50.22 CHRONIC SYSTOLIC HEART FAILURE (HCC): Primary | ICD-10-CM

## 2021-06-26 NOTE — PROGRESS NOTES
"Follow Up Sleep Disorders Center Note     Chief Complaint:  HAMLET     Primary Care Physician: Rashid Ge MD    Interval History:   The patient is a 80 y.o. female  who I last saw 7/10/2020 and that note was reviewed.  The patient reports she is stable without new complaints.  She goes to bed at 11 PM and awakens at 9 AM.  She will use the restroom during the nighttime.    Self-administered [place] Sleepiness Scale test results: 4  0-5 Lower normal daytime sleepiness  6-10 Higher normal daytime sleepiness  11-12 Mild, 13-15 Moderate, & 16-24 Severe excessive daytime sleepiness    Review of Systems:    A complete review of systems was done and all were negative with the exception of the above    Social History:    Social History     Socioeconomic History   • Marital status:      Spouse name: Not on file   • Number of children: Not on file   • Years of education: Not on file   • Highest education level: Not on file   Tobacco Use   • Smoking status: Never Smoker   • Smokeless tobacco: Never Used   Vaping Use   • Vaping Use: Never used   Substance and Sexual Activity   • Alcohol use: No   • Drug use: No   • Sexual activity: Not Currently     Partners: Male     Birth control/protection: Post-menopausal       Allergies:  Amiodarone, Corticosteroids, Benzonatate, Cephalexin, Adhesive tape, Hydrocodone, and Lisinopril     Medication Review: Her list was reviewed.      Vital Signs:    Vitals:    06/24/21 0900   Weight: 98.9 kg (218 lb)   Height: 157.5 cm (62.01\")     Body mass index is 39.86 kg/m².    Physical Exam:    Constitutional:  Well developed 80 y.o. female that appears in no apparent distress.  Awake & oriented times 3.  Normal mood with normal recent and remote memory and normal judgement.  Eyes:  Conjunctivae normal.  Oropharynx: Previously, moist mucous membranes without exudate and a large tongue and class III Mallampati airway, patient is wearing a facemask.     Downloaded PAP Data Reviewed For " Compliance:  DME is [employer] and she uses a nasal interface.  Downloads between 3/26 and 6/23/2021 compliance 100%.  Average usage is 8 hours and 51 minutes.  Average AHI is normal without a significant leak.  Average auto CPAP pressure is 14.5 and her auto CPAP is 12-16.    I have reviewed the above results and compared them with the patient's last downloads and reviewed with the patient.    Impression:   Obstructive sleep apnea adequately treated with auto CPAP. The patient appears to be at goal with good compliance and usage. The patient has no complaints of hypersomnolence.    Plan:  Good sleep hygiene measures should be maintained.  Weight loss would be beneficial in this patient who is obese by BMI.      After evaluating the patient and assessing results available, the patient is benefiting from the treatment being provided.     The patient will continue auto CPAP.  After clinical evaluation and review of downloads, I recommend no changes to the patient's pressures.  A new prescription will be sent to the patient's DME.    I answered all of the patient's questions.  The patient will call for any problems and will follow up in 1 year.      Mahin Galeas MD  Sleep Medicine  06/26/21  11:42 EDT

## 2021-06-28 ENCOUNTER — TREATMENT (OUTPATIENT)
Dept: CARDIAC REHAB | Facility: HOSPITAL | Age: 81
End: 2021-06-28

## 2021-06-28 DIAGNOSIS — I50.22 CHRONIC SYSTOLIC HEART FAILURE (HCC): Primary | ICD-10-CM

## 2021-06-30 ENCOUNTER — TREATMENT (OUTPATIENT)
Dept: CARDIAC REHAB | Facility: HOSPITAL | Age: 81
End: 2021-06-30

## 2021-06-30 DIAGNOSIS — I50.22 CHRONIC SYSTOLIC HEART FAILURE (HCC): Primary | ICD-10-CM

## 2021-07-02 ENCOUNTER — TREATMENT (OUTPATIENT)
Dept: CARDIAC REHAB | Facility: HOSPITAL | Age: 81
End: 2021-07-02

## 2021-07-02 DIAGNOSIS — Z79.4 TYPE 2 DIABETES MELLITUS WITHOUT COMPLICATION, WITH LONG-TERM CURRENT USE OF INSULIN (HCC): ICD-10-CM

## 2021-07-02 DIAGNOSIS — I50.22 CHRONIC SYSTOLIC HEART FAILURE (HCC): Primary | ICD-10-CM

## 2021-07-02 DIAGNOSIS — E11.9 TYPE 2 DIABETES MELLITUS WITHOUT COMPLICATION, WITH LONG-TERM CURRENT USE OF INSULIN (HCC): ICD-10-CM

## 2021-07-02 RX ORDER — INSULIN GLARGINE 100 [IU]/ML
INJECTION, SOLUTION SUBCUTANEOUS
Qty: 30 ML | Refills: 3 | Status: SHIPPED | OUTPATIENT
Start: 2021-07-02 | End: 2022-05-23

## 2021-07-07 ENCOUNTER — TREATMENT (OUTPATIENT)
Dept: CARDIAC REHAB | Facility: HOSPITAL | Age: 81
End: 2021-07-07

## 2021-07-07 DIAGNOSIS — I50.22 CHRONIC SYSTOLIC HEART FAILURE (HCC): Primary | ICD-10-CM

## 2021-07-09 ENCOUNTER — TREATMENT (OUTPATIENT)
Dept: CARDIAC REHAB | Facility: HOSPITAL | Age: 81
End: 2021-07-09

## 2021-07-09 DIAGNOSIS — I50.22 CHRONIC SYSTOLIC HEART FAILURE (HCC): Primary | ICD-10-CM

## 2021-07-12 ENCOUNTER — TREATMENT (OUTPATIENT)
Dept: CARDIAC REHAB | Facility: HOSPITAL | Age: 81
End: 2021-07-12

## 2021-07-12 DIAGNOSIS — I50.22 CHRONIC SYSTOLIC HEART FAILURE (HCC): Primary | ICD-10-CM

## 2021-07-14 ENCOUNTER — TREATMENT (OUTPATIENT)
Dept: CARDIAC REHAB | Facility: HOSPITAL | Age: 81
End: 2021-07-14

## 2021-07-14 DIAGNOSIS — I50.22 CHRONIC SYSTOLIC HEART FAILURE (HCC): Primary | ICD-10-CM

## 2021-07-19 ENCOUNTER — TREATMENT (OUTPATIENT)
Dept: CARDIAC REHAB | Facility: HOSPITAL | Age: 81
End: 2021-07-19

## 2021-07-19 DIAGNOSIS — I50.22 CHRONIC SYSTOLIC HEART FAILURE (HCC): Primary | ICD-10-CM

## 2021-07-21 ENCOUNTER — TREATMENT (OUTPATIENT)
Dept: CARDIAC REHAB | Facility: HOSPITAL | Age: 81
End: 2021-07-21

## 2021-07-21 DIAGNOSIS — I50.22 CHRONIC SYSTOLIC HEART FAILURE (HCC): Primary | ICD-10-CM

## 2021-07-23 ENCOUNTER — TREATMENT (OUTPATIENT)
Dept: CARDIAC REHAB | Facility: HOSPITAL | Age: 81
End: 2021-07-23

## 2021-07-23 DIAGNOSIS — I50.22 CHRONIC SYSTOLIC HEART FAILURE (HCC): Primary | ICD-10-CM

## 2021-07-26 ENCOUNTER — TREATMENT (OUTPATIENT)
Dept: CARDIAC REHAB | Facility: HOSPITAL | Age: 81
End: 2021-07-26

## 2021-07-26 DIAGNOSIS — I50.22 CHRONIC SYSTOLIC HEART FAILURE (HCC): Primary | ICD-10-CM

## 2021-07-28 ENCOUNTER — TREATMENT (OUTPATIENT)
Dept: CARDIAC REHAB | Facility: HOSPITAL | Age: 81
End: 2021-07-28

## 2021-07-28 DIAGNOSIS — I50.22 CHRONIC SYSTOLIC HEART FAILURE (HCC): Primary | ICD-10-CM

## 2021-07-30 ENCOUNTER — TREATMENT (OUTPATIENT)
Dept: CARDIAC REHAB | Facility: HOSPITAL | Age: 81
End: 2021-07-30

## 2021-07-30 DIAGNOSIS — I50.22 CHRONIC SYSTOLIC HEART FAILURE (HCC): Primary | ICD-10-CM

## 2021-08-02 ENCOUNTER — TREATMENT (OUTPATIENT)
Dept: CARDIAC REHAB | Facility: HOSPITAL | Age: 81
End: 2021-08-02

## 2021-08-02 DIAGNOSIS — I50.22 CHRONIC SYSTOLIC HEART FAILURE (HCC): Primary | ICD-10-CM

## 2021-08-04 ENCOUNTER — TREATMENT (OUTPATIENT)
Dept: CARDIAC REHAB | Facility: HOSPITAL | Age: 81
End: 2021-08-04

## 2021-08-04 DIAGNOSIS — I50.22 CHRONIC SYSTOLIC HEART FAILURE (HCC): Primary | ICD-10-CM

## 2021-08-06 ENCOUNTER — TREATMENT (OUTPATIENT)
Dept: CARDIAC REHAB | Facility: HOSPITAL | Age: 81
End: 2021-08-06

## 2021-08-06 DIAGNOSIS — I50.22 CHRONIC SYSTOLIC HEART FAILURE (HCC): Primary | ICD-10-CM

## 2021-08-24 DIAGNOSIS — E03.9 ACQUIRED HYPOTHYROIDISM: ICD-10-CM

## 2021-08-24 RX ORDER — LEVOTHYROXINE SODIUM 0.15 MG/1
TABLET ORAL
Qty: 90 TABLET | Refills: 0 | Status: SHIPPED | OUTPATIENT
Start: 2021-08-24 | End: 2021-10-27 | Stop reason: SDUPTHER

## 2021-09-23 DIAGNOSIS — Z79.4 TYPE 2 DIABETES MELLITUS WITH COMPLICATION, WITH LONG-TERM CURRENT USE OF INSULIN (HCC): ICD-10-CM

## 2021-09-23 DIAGNOSIS — E11.8 TYPE 2 DIABETES MELLITUS WITH COMPLICATION, WITH LONG-TERM CURRENT USE OF INSULIN (HCC): ICD-10-CM

## 2021-09-24 RX ORDER — PEN NEEDLE, DIABETIC 32GX 5/32"
NEEDLE, DISPOSABLE MISCELLANEOUS
Qty: 100 EACH | Refills: 3 | Status: SHIPPED | OUTPATIENT
Start: 2021-09-24 | End: 2022-08-22 | Stop reason: SDUPTHER

## 2021-10-12 DIAGNOSIS — E11.8 TYPE 2 DIABETES MELLITUS WITH COMPLICATION, WITH LONG-TERM CURRENT USE OF INSULIN (HCC): ICD-10-CM

## 2021-10-12 DIAGNOSIS — Z79.4 TYPE 2 DIABETES MELLITUS WITH COMPLICATION, WITH LONG-TERM CURRENT USE OF INSULIN (HCC): ICD-10-CM

## 2021-10-12 DIAGNOSIS — Z79.4 TYPE 2 DIABETES MELLITUS WITH COMPLICATION, WITH LONG-TERM CURRENT USE OF INSULIN (HCC): Primary | ICD-10-CM

## 2021-10-12 DIAGNOSIS — E03.9 ACQUIRED HYPOTHYROIDISM: ICD-10-CM

## 2021-10-12 DIAGNOSIS — Z51.81 MEDICATION MONITORING ENCOUNTER: ICD-10-CM

## 2021-10-12 DIAGNOSIS — I25.10 CORONARY ARTERY DISEASE INVOLVING NATIVE CORONARY ARTERY OF NATIVE HEART WITHOUT ANGINA PECTORIS: ICD-10-CM

## 2021-10-12 DIAGNOSIS — E11.8 TYPE 2 DIABETES MELLITUS WITH COMPLICATION, WITH LONG-TERM CURRENT USE OF INSULIN (HCC): Primary | ICD-10-CM

## 2021-10-21 LAB
ALBUMIN SERPL-MCNC: 4.4 G/DL (ref 3.7–4.7)
ALBUMIN/GLOB SERPL: 1.6 {RATIO} (ref 1.2–2.2)
ALP SERPL-CCNC: 54 IU/L (ref 44–121)
ALT SERPL-CCNC: 28 IU/L (ref 0–32)
APPEARANCE UR: CLEAR
AST SERPL-CCNC: 26 IU/L (ref 0–40)
BACTERIA #/AREA URNS HPF: ABNORMAL /[HPF]
BILIRUB SERPL-MCNC: 0.5 MG/DL (ref 0–1.2)
BILIRUB UR QL STRIP: NEGATIVE
BUN SERPL-MCNC: 26 MG/DL (ref 8–27)
BUN/CREAT SERPL: 28 (ref 12–28)
CALCIUM SERPL-MCNC: 10.5 MG/DL (ref 8.7–10.3)
CASTS URNS QL MICRO: ABNORMAL /LPF
CHLORIDE SERPL-SCNC: 103 MMOL/L (ref 96–106)
CHOLEST SERPL-MCNC: 160 MG/DL (ref 100–199)
CO2 SERPL-SCNC: 22 MMOL/L (ref 20–29)
COLOR UR: YELLOW
CREAT SERPL-MCNC: 0.94 MG/DL (ref 0.57–1)
CRYSTALS URNS MICRO: ABNORMAL
DIGOXIN SERPL-MCNC: 0.8 NG/ML (ref 0.5–0.9)
EPI CELLS #/AREA URNS HPF: ABNORMAL /HPF (ref 0–10)
ERYTHROCYTE [DISTWIDTH] IN BLOOD BY AUTOMATED COUNT: 13.8 % (ref 11.7–15.4)
GLOBULIN SER CALC-MCNC: 2.7 G/DL (ref 1.5–4.5)
GLUCOSE SERPL-MCNC: 123 MG/DL (ref 65–99)
GLUCOSE UR QL: ABNORMAL
HBA1C MFR BLD: 6.8 % (ref 4.8–5.6)
HCT VFR BLD AUTO: 44 % (ref 34–46.6)
HDLC SERPL-MCNC: 37 MG/DL
HGB BLD-MCNC: 14 G/DL (ref 11.1–15.9)
HGB UR QL STRIP: NEGATIVE
KETONES UR QL STRIP: NEGATIVE
LDLC SERPL CALC-MCNC: 80 MG/DL (ref 0–99)
LEUKOCYTE ESTERASE UR QL STRIP: ABNORMAL
MCH RBC QN AUTO: 28.8 PG (ref 26.6–33)
MCHC RBC AUTO-ENTMCNC: 31.8 G/DL (ref 31.5–35.7)
MCV RBC AUTO: 91 FL (ref 79–97)
MICRO URNS: ABNORMAL
NITRITE UR QL STRIP: NEGATIVE
PH UR STRIP: 6 [PH] (ref 5–7.5)
PLATELET # BLD AUTO: 186 X10E3/UL (ref 150–450)
POTASSIUM SERPL-SCNC: 4.4 MMOL/L (ref 3.5–5.2)
PROT SERPL-MCNC: 7.1 G/DL (ref 6–8.5)
PROT UR QL STRIP: ABNORMAL
RBC # BLD AUTO: 4.86 X10E6/UL (ref 3.77–5.28)
RBC #/AREA URNS HPF: ABNORMAL /HPF (ref 0–2)
SODIUM SERPL-SCNC: 140 MMOL/L (ref 134–144)
SP GR UR: 1.02 (ref 1–1.03)
TRIGL SERPL-MCNC: 258 MG/DL (ref 0–149)
TSH SERPL DL<=0.005 MIU/L-ACNC: 16.8 UIU/ML (ref 0.45–4.5)
UNIDENT CRYS URNS QL MICRO: PRESENT
UROBILINOGEN UR STRIP-MCNC: 0.2 MG/DL (ref 0.2–1)
VLDLC SERPL CALC-MCNC: 43 MG/DL (ref 5–40)
WBC # BLD AUTO: 5.3 X10E3/UL (ref 3.4–10.8)
WBC #/AREA URNS HPF: ABNORMAL /HPF (ref 0–5)

## 2021-10-22 DIAGNOSIS — I25.10 CORONARY ARTERY DISEASE INVOLVING NATIVE CORONARY ARTERY OF NATIVE HEART WITHOUT ANGINA PECTORIS: ICD-10-CM

## 2021-10-22 DIAGNOSIS — E03.9 ACQUIRED HYPOTHYROIDISM: ICD-10-CM

## 2021-10-25 RX ORDER — POTASSIUM CHLORIDE 1500 MG/1
TABLET, EXTENDED RELEASE ORAL
Qty: 90 TABLET | Refills: 3 | OUTPATIENT
Start: 2021-10-25

## 2021-10-25 RX ORDER — LEVOTHYROXINE SODIUM 0.12 MG/1
TABLET ORAL
Qty: 90 TABLET | Refills: 3 | OUTPATIENT
Start: 2021-10-25

## 2021-10-27 ENCOUNTER — TELEPHONE (OUTPATIENT)
Dept: FAMILY MEDICINE CLINIC | Facility: CLINIC | Age: 81
End: 2021-10-27

## 2021-10-27 ENCOUNTER — HOSPITAL ENCOUNTER (OUTPATIENT)
Dept: GENERAL RADIOLOGY | Facility: HOSPITAL | Age: 81
Discharge: HOME | End: 2021-10-27

## 2021-10-27 ENCOUNTER — OFFICE VISIT (OUTPATIENT)
Dept: FAMILY MEDICINE CLINIC | Facility: CLINIC | Age: 81
End: 2021-10-27

## 2021-10-27 VITALS
WEIGHT: 220 LBS | SYSTOLIC BLOOD PRESSURE: 124 MMHG | BODY MASS INDEX: 40.48 KG/M2 | HEART RATE: 62 BPM | OXYGEN SATURATION: 97 % | DIASTOLIC BLOOD PRESSURE: 70 MMHG | TEMPERATURE: 98.2 F | HEIGHT: 62 IN

## 2021-10-27 DIAGNOSIS — M25.552 CHRONIC LEFT HIP PAIN: ICD-10-CM

## 2021-10-27 DIAGNOSIS — G57.02 PYRIFORMIS SYNDROME, LEFT: ICD-10-CM

## 2021-10-27 DIAGNOSIS — G89.29 CHRONIC LEFT HIP PAIN: Primary | ICD-10-CM

## 2021-10-27 DIAGNOSIS — Z96.653 STATUS POST BILATERAL KNEE REPLACEMENTS: ICD-10-CM

## 2021-10-27 DIAGNOSIS — G89.29 CHRONIC LEFT HIP PAIN: ICD-10-CM

## 2021-10-27 DIAGNOSIS — M25.552 CHRONIC LEFT HIP PAIN: Primary | ICD-10-CM

## 2021-10-27 DIAGNOSIS — E03.9 ACQUIRED HYPOTHYROIDISM: ICD-10-CM

## 2021-10-27 PROBLEM — M79.671 RIGHT FOOT PAIN: Status: RESOLVED | Noted: 2019-01-11 | Resolved: 2021-10-27

## 2021-10-27 PROBLEM — Z95.2 PRESENCE OF PROSTHETIC HEART VALVE: Status: ACTIVE | Noted: 2021-09-13

## 2021-10-27 PROCEDURE — 73502 X-RAY EXAM HIP UNI 2-3 VIEWS: CPT

## 2021-10-27 PROCEDURE — 99214 OFFICE O/P EST MOD 30 MIN: CPT | Performed by: FAMILY MEDICINE

## 2021-10-27 PROCEDURE — 72110 X-RAY EXAM L-2 SPINE 4/>VWS: CPT

## 2021-10-27 PROCEDURE — 73560 X-RAY EXAM OF KNEE 1 OR 2: CPT

## 2021-10-27 RX ORDER — LEVOTHYROXINE SODIUM 0.15 MG/1
150 TABLET ORAL DAILY
Qty: 90 TABLET | Refills: 1 | Status: SHIPPED | OUTPATIENT
Start: 2021-10-27 | End: 2022-04-28 | Stop reason: SDUPTHER

## 2021-10-27 RX ORDER — VERICIGUAT 5 MG/1
5 TABLET, FILM COATED ORAL DAILY
COMMUNITY
Start: 2021-10-19 | End: 2022-10-19

## 2021-10-27 NOTE — TELEPHONE ENCOUNTER
Pt wanted to clarify her synthroid dose, she has been taking 150 mcg but is confused if this needed to be increased or is that the correct dose?

## 2021-10-28 ENCOUNTER — TELEPHONE (OUTPATIENT)
Dept: FAMILY MEDICINE CLINIC | Facility: CLINIC | Age: 81
End: 2021-10-28

## 2021-10-28 DIAGNOSIS — G57.02 PYRIFORMIS SYNDROME, LEFT: Primary | ICD-10-CM

## 2021-10-28 PROBLEM — J18.9 PNEUMONIA OF BOTH LOWER LOBES DUE TO INFECTIOUS ORGANISM: Status: RESOLVED | Noted: 2020-04-14 | Resolved: 2021-10-28

## 2021-10-28 PROBLEM — R31.21 ASYMPTOMATIC MICROSCOPIC HEMATURIA: Status: ACTIVE | Noted: 2021-10-28

## 2021-10-28 PROBLEM — Z95.0 PACEMAKER: Status: RESOLVED | Noted: 2018-02-16 | Resolved: 2021-10-28

## 2021-10-28 RX ORDER — TRAMADOL HYDROCHLORIDE 50 MG/1
50 TABLET ORAL EVERY 8 HOURS PRN
Qty: 60 TABLET | Refills: 0 | OUTPATIENT
Start: 2021-10-28 | End: 2022-03-27

## 2021-10-28 NOTE — TELEPHONE ENCOUNTER
Caller: Fernanda Molina    Relationship: Self    Best call back number: [phone]    What is the best time to reach you:     Who are you requesting to speak with (clinical staff, provider,  specific staff member): DR. WYATT OR MEDICAL STAFF    What was the call regarding: PATIENT WAS TOLD THAT SHE WOULD GET A PAIN MEDICATION THAT STARTS WITH A T CALLED IN TO THE PHARMACY. WHEN PATIENT WENT TO PHARMACY THERE WAS NO PRESCRIPTION. PLEASE ADVISE PATIENT.     Do you require a callback: YES

## 2021-10-28 NOTE — PROGRESS NOTES
"  Subjective   Fernanda Molina is a 80 y.o. female who is here for   Chief Complaint   Patient presents with   • Diabetes   • Hypothyroidism   • Coronary Artery Disease   • COPD   • Hip Pain     left side pain getting worse    .     History of Present Illness   Main complaint today is new onset worsening left buttock pain.  She points right to the piriformis sinus.  No injuries.  Lumbar area does not hurt.  The pain radiates from the left buttock into the left upper leg.  She is status post bilateral knee replacement many years ago.  Her previous orthopedic physician has retired.  She is asking referral to see Dr. Zavala in [place] for follow-up.  Is also having left knee pain which predates the left buttock pain.  She is asking for a cortisone injection.  I explained that Dr. Zavala is qualified to provide steroid injections into an artificial joint, as I am not  There is no rash down her leg.  No injury.  No falls        The following portions of the patient's history were reviewed and updated as appropriate: allergies, current medications, past family history, past medical history, past social history, past surgical history and problem list.    Review of Systems    Objective   Vitals:    10/27/21 1059   BP: 124/70   BP Location: Left arm   Patient Position: Sitting   Cuff Size: Large Adult   Pulse: 62   Temp: 98.2 °F (36.8 °C)   TempSrc: Temporal   SpO2: 97%   Weight: 99.8 kg (220 lb)   Height: 157.5 cm (62.01\")      Physical Exam  Musculoskeletal:      Left hip: Tenderness present.        Legs:            Results for orders placed or performed in visit on 10/12/21   Digoxin level    Specimen: Blood   Result Value Ref Range    Digoxin 0.8 0.5 - 0.9 ng/mL   Urinalysis With Microscopic If Indicated (No Culture) - Urine, Clean Catch    Specimen: Urine, Clean Catch   Result Value Ref Range    Specific Gravity, UA 1.025 1.005 - 1.030    pH, UA 6.0 5.0 - 7.5    Color, UA Yellow Yellow    Appearance, UA Clear Clear    " Leukocytes, UA Trace (A) Negative    Protein 2+ (A) Negative/Trace    Glucose, UA Trace (A) Negative    Ketones Negative Negative    Blood, UA Negative Negative    Bilirubin, UA Negative Negative    Urobilinogen, UA 0.2 0.2 - 1.0 mg/dL    Nitrite, UA Negative Negative    Microscopic Examination See below:    TSH    Specimen: Blood   Result Value Ref Range    TSH 16.800 (H) 0.450 - 4.500 uIU/mL   Hemoglobin A1c    Specimen: Blood   Result Value Ref Range    Hemoglobin A1C 6.8 (H) 4.8 - 5.6 %   CBC (No Diff)    Specimen: Blood   Result Value Ref Range    WBC 5.3 3.4 - 10.8 x10E3/uL    RBC 4.86 3.77 - 5.28 x10E6/uL    Hemoglobin 14.0 11.1 - 15.9 g/dL    Hematocrit 44.0 34.0 - 46.6 %    MCV 91 79 - 97 fL    MCH 28.8 26.6 - 33.0 pg    MCHC 31.8 31.5 - 35.7 g/dL    RDW 13.8 11.7 - 15.4 %    Platelets 186 150 - 450 x10E3/uL   Comprehensive Metabolic Panel    Specimen: Blood   Result Value Ref Range    Glucose 123 (H) 65 - 99 mg/dL    BUN 26 8 - 27 mg/dL    Creatinine 0.94 0.57 - 1.00 mg/dL    eGFR Non African Am 58 (L) >59 mL/min/1.73    eGFR African Am 67 >59 mL/min/1.73    BUN/Creatinine Ratio 28 12 - 28    Sodium 140 134 - 144 mmol/L    Potassium 4.4 3.5 - 5.2 mmol/L    Chloride 103 96 - 106 mmol/L    Total CO2 22 20 - 29 mmol/L    Calcium 10.5 (H) 8.7 - 10.3 mg/dL    Total Protein 7.1 6.0 - 8.5 g/dL    Albumin 4.4 3.7 - 4.7 g/dL    Globulin 2.7 1.5 - 4.5 g/dL    A/G Ratio 1.6 1.2 - 2.2    Total Bilirubin 0.5 0.0 - 1.2 mg/dL    Alkaline Phosphatase 54 44 - 121 IU/L    AST (SGOT) 26 0 - 40 IU/L    ALT (SGPT) 28 0 - 32 IU/L   Lipid Panel    Specimen: Blood   Result Value Ref Range    Total Cholesterol 160 100 - 199 mg/dL    Triglycerides 258 (H) 0 - 149 mg/dL    HDL Cholesterol 37 (L) >39 mg/dL    VLDL Cholesterol Scottie 43 (H) 5 - 40 mg/dL    LDL Chol Calc (NIH) 80 0 - 99 mg/dL   Microscopic Examination -   Result Value Ref Range    WBC, UA 11-30 (A) 0 - 5 /hpf    RBC, UA 0-2 0 - 2 /hpf    Epithelial Cells (non renal) 0-10  0 - 10 /hpf    Casts None seen None seen /lpf    Crystals, UA Present (A) N/A    Crystal Type Calcium Oxalate N/A    Bacteria, UA None seen None seen/Few     *Note: Due to a large number of results and/or encounters for the requested time period, some results have not been displayed. A complete set of results can be found in Results Review.         Assessment/Plan   Diagnoses and all orders for this visit:    1. Chronic left hip pain (Primary)  -     XR Spine Lumbar 4+ View; Future  -     XR Hip With or Without Pelvis 2 - 3 View Left; Future  -     Cancel: XR Knee 3 View Bilateral; Future    2. Pyriformis syndrome, left  -     XR Spine Lumbar 4+ View; Future  -     XR Hip With or Without Pelvis 2 - 3 View Left; Future  -     Cancel: XR Knee 3 View Bilateral; Future  -     traMADol (ULTRAM) 50 MG tablet; Take 1 tablet by mouth Every 8 (Eight) Hours As Needed for Moderate Pain .  Dispense: 60 tablet; Refill: 0    3. Status post bilateral knee replacements  -     Cancel: XR Knee 3 View Bilateral; Future    4. Acquired hypothyroidism  -     levothyroxine (SYNTHROID, LEVOTHROID) 150 MCG tablet; Take 1 tablet by mouth Daily. Indications: Underactive Thyroid  Dispense: 90 tablet; Refill: 1    Will go ahead and x-ray her lumbar spine left hip and both of her knees.  Make referral to Dr. Zavala to evaluate the left knee pain and already replaced knee.  Tramadol for the pain.    We reviewed her labs her TSH is still elevated.  There was confusion during her last visit she did not understand that we increase her Synthroid dose from 125 to 150.  Start the 150s today    We will call with her x-ray results.  I am assuming the cause of her pain will be more lumbar disc disease.  At that is the case will  refer her to physical therapy he request would    There are no Patient Instructions on file for this visit.    Medications Discontinued During This Encounter   Medication Reason   • Ferrous Sulfate Dried (FERROUS SULFATE IRON PO)  Duplicate order   • levothyroxine (SYNTHROID, LEVOTHROID) 150 MCG tablet Reorder        Return in about 6 months (around 4/27/2022).    Dr. Rashid Ge  [place], [place]

## 2021-11-02 ENCOUNTER — TREATMENT (OUTPATIENT)
Dept: PHYSICAL THERAPY | Facility: CLINIC | Age: 81
End: 2021-11-02

## 2021-11-02 ENCOUNTER — TELEPHONE (OUTPATIENT)
Dept: ORTHOPEDICS | Facility: OTHER | Age: 81
End: 2021-11-02

## 2021-11-02 DIAGNOSIS — M62.89 MUSCLE TIGHTNESS: Primary | ICD-10-CM

## 2021-11-02 DIAGNOSIS — R26.89 ANTALGIC GAIT: ICD-10-CM

## 2021-11-02 DIAGNOSIS — M25.559 PAIN, HIP: ICD-10-CM

## 2021-11-02 DIAGNOSIS — M25.69 DECREASED ROM OF TRUNK AND BACK: ICD-10-CM

## 2021-11-02 DIAGNOSIS — Z74.09 IMPAIRED FUNCTIONAL MOBILITY, BALANCE, GAIT, AND ENDURANCE: ICD-10-CM

## 2021-11-02 PROCEDURE — 97110 THERAPEUTIC EXERCISES: CPT | Performed by: PHYSICAL THERAPIST

## 2021-11-02 PROCEDURE — 97162 PT EVAL MOD COMPLEX 30 MIN: CPT | Performed by: PHYSICAL THERAPIST

## 2021-11-02 NOTE — PROGRESS NOTES
Physical Therapy Initial Evaluation and Plan of Care      Patient: Fernanda Molina   : 1940  Diagnosis/ICD-10 Code:  Muscle tightness [M62.89]  Referring practitioner: Rashid Ge MD  Date of Initial Visit: 2021  Today's Date: 2021  Patient seen for 1 sessions           Subjective Questionnaire:   Back Index: 25/50 or 50%      Subjective Evaluation    History of Present Illness  Mechanism of injury: Pt presents to PT with worsening left buttock pain since coming out of walking boot on LLE after a book falling on her foot. MD was unsure if it was fractured and put her in the walking boot for 6 weeks; worsened hip pain over the past 2-3 months.  She points to the piriformis area.  No injuries.  Lumbar area does not hurt unless prolonged standing or walking.  The pain radiates from the left buttock into the left upper leg.   Pt currently using cane today; does not use cane regularly. Previously doing cardiac rehab in [place] prior to COVID-; has not returned    Hip imaging: normal  Spine imaging: mild diffuse degenerative changes; no fx.    PMH: cardiac history MI with pacemaker, B TKA ( and ), DMII, lumbar epidural ()    Quality of life: excellent    Pain  Current pain ratin (seated)  At best pain ratin  At worst pain rating: 10 (standing)  Location: L posterior hip  Quality: radiating (shooting at L hip)  Relieving factors: ice, heat and medications (temporary relief, tremadol)  Aggravating factors: stairs, standing, lifting, movement, ambulation, squatting and repetitive movement (sitting)  Progression: worsening    Social Support  Lives in: one-story house (2 steps to enter and then 2 steps to dining room)  Lives with: spouse    Diagnostic Tests  X-ray: abnormal (There is anterior ossified formation at L1-3 and mild disc space narrowing L1-L5. No subluxation, no pars defect, alignment normal.)    Treatments  No previous or current treatments  Previous treatment:  immobilization (LLE in walking boot 6 weeks end of 2020)  Patient Goals  Patient goals for therapy: decreased pain, improved balance, increased motion, increased strength, independence with ADLs/IADLs and return to sport/leisure activities             Objective          Postural Observations  Seated posture: fair  Standing posture: fair        Palpation   Left   Hypertonic in the erector spinae, gluteus nay, gluteus medius, lumbar interspinals, lumbar paraspinals, piriformis, proximal biceps femoris, proximal semimembranosus, proximal semitendinosus, quadratus lumborum and TFL.   Tenderness of the erector spinae, gluteus nay, gluteus medius, lumbar interspinals, lumbar paraspinals, piriformis, quadratus lumborum and TFL.     Right   Hypertonic in the erector spinae, lumbar interspinals, lumbar paraspinals and quadratus lumborum.     Left Hip Palpation Comments   Gluteus medius: sig bulge 12cm x 12cm .     Additional Palpation Details  Severe tenderness L piriformis and L glut med/TFL    Tenderness     Lumbar Spine  No tenderness in the spinous process and facet joint.     Left Hip   Tenderness in the PSIS.     Right Hip   No tenderness in the PSIS.     Neurological Testing     Sensation     Lumbar   Left   Intact: light touch    Right   Intact: light touch    Additional Neurological Details  Denies numbness/tingling down BLE    Active Range of Motion     Additional Active Range of Motion Details  Tested in seated:  Flexion: able to reach to floor; concordant sign in posterior L hip  Ext: able to achieve neutral; no pain  Lateral flexion to the R: 50%; concordant sign in posterior L hip  Lateral flexion to the L: 25%; concordant sign in posterior L hip  Rotation L: ~25%; no pain  Rotation R: 25%; no pain    Strength/Myotome Testing     Left Hip   Planes of Motion   Flexion: 4  Extension: 3-  Abduction: 3-  Adduction: 4  External rotation: 4  Internal rotation: 4    Right Hip   Planes of Motion   Flexion:  3+  Extension: 3-  Abduction: 3-  Adduction: 4-  External rotation: 3-  Internal rotation: 3-    Left Knee   Flexion: 4  Extension: 4+    Right Knee   Flexion: 4  Extension: 4    Left Ankle/Foot   Dorsiflexion: 5  Plantar flexion: 3  Great toe extension: 5    Right Ankle/Foot   Dorsiflexion: 4  Plantar flexion: 3  Great toe extension: 5    Tests     Lumbar   Positive repeated flexion.   Negative repeated extension.     Left   Positive passive SLR.   Negative crossed SLR.     Right   Negative crossed SLR.     Left Pelvic Girdle/Sacrum   Negative: sacrum compression.     Right Pelvic Girdle/Sacrum   Negative: sacrum compression.     Ambulation     Observational Gait   Gait: antalgic   Decreased walking speed, left stance time, left swing time and left step length.     Additional Observational Gait Details  Step to pattern          Assessment & Plan     Assessment  Impairments: abnormal gait, abnormal muscle firing, abnormal muscle tone, abnormal or restricted ROM, activity intolerance, impaired balance, impaired physical strength, lacks appropriate home exercise program and pain with function  Assessment details: Fernanda Molina is a 80 y.o. year-old female referred to physical therapy for L posterior hip pain. She presents with a stable clinical presentation.  She has comorbidities of B TKA, cardiac issues, DMII, COPD/pulmonary HTN but no personal factors that may affect her progress in the plan of care.  Pt with + piriformis sign on L hip however most likely source from lumbar spine; noted sig muscle tightness and severe tenderness at L glut med and piriformis. Pt demonstrates sig antalgic gait pattern with use of SPC; previously no need for AD. Signs and symptoms are consistent with physical therapy diagnosis of impaired L hip mobility, limited lumbar ROM, proximal hip weakness with antalgic gait. Patient is appropriate for skilled physical therapy in order to reduce pain and increase ease with daily mobility.  During evaluation, pt educated on anatomy, goal of interventions, use of heating pad, massage to L posterior/lateral hip and body mechanics to promote healthy lifestyle and improve quality of life.     Prognosis: good  Functional Limitations: carrying objects, lifting, walking, pulling, pushing, uncomfortable because of pain, moving in bed, sitting, standing, stooping, reaching behind back, reaching overhead and unable to perform repetitive tasks  Goals  Plan Goals: ST weeks  1. Pt will be able to perform/tolerate HEP without increased back/hip pain to increase compliance with HEP  2. Pt will improve lumbar AROM to ~50% in all planes with minimal pain to be able to complete transfers   3. Pt will improve Back Index score to <30% to improve subjective function during ADLs  4.Decrease L glut med and piriformis tightness and tenderness to moderate with palpation to be able to sleep through the night   5. Pt will demonstrate no antalgic gait with use of SPC for household distances of ~50 feet    LT weeks  1. Pt will be able to complete bed mobility and transfers I without AD without pain  2. Pt will be able to walk for 10 min without a rest break to be able to navigate the grocery store  3. Pt will improve Back Index to 25% or less to improve subjective function during ADLs  4. Pt will improve proximal BLE strength to grossly >4/5 to improve transfers and LLE support during gait  5. Pt will be able to return to walking without AD for household distance of at least 50 feet with minimal antalgic gait pattern  6. Decrease L glut med and piriformis tightness and tenderness to minimal with palpation to be able to sleep through the night    Plan  Therapy options: will be seen for skilled physical therapy services  Planned modality interventions: cryotherapy, traction, ultrasound, thermotherapy (hydrocollator packs) and dry needling  Planned therapy interventions: abdominal trunk stabilization, balance/weight-bearing  training, body mechanics training, flexibility, functional ROM exercises, gait training, home exercise program, joint mobilization, manual therapy, postural training, spinal/joint mobilization, strengthening, stretching, therapeutic activities, motor coordination training and soft tissue mobilization  Treatment plan discussed with: patient  Plan details: 2x per week for 12 weeks        Manual Therapy:    5     mins  71155;  Therapeutic Exercise:    15     mins  80942;     Neuromuscular Liz:    -    mins  77130;    Therapeutic Activity:     -     mins  17561;     Gait Training:      -     mins  09650;     Ultrasound:     -     mins  27201;    Electrical Stimulation:    -     mins  28387 ( );  Dry Needling     -     mins self-pay    Timed Treatment:   20   mins   Total Treatment:     70   mins    PT SIGNATURE: Jeanette Singh, ERICA   DATE TREATMENT INITIATED: 11/2/2021    Initial Certification  Certification Period: 1/31/2022  I certify that the therapy services are furnished while this patient is under my care.  The services outlined above are required by this patient, and will be reviewed every 90 days.     PHYSICIAN: Rashid Ge MD      DATE:     Please sign and return via fax to [phone]. Thank you, [facility].

## 2021-11-02 NOTE — PATIENT INSTRUCTIONS
Access Code: [email or web address]  URL: [email or web address]  Date: 11/02/2021  Prepared by: Jeanette Fuentes    Exercises  Supine Lower Trunk Rotation - 1 x daily - 7 x weekly - 10 reps  Supine Piriformis Stretch with Foot on Ground - 1 x daily - 7 x weekly - 3 sets - 10 hold  Seated Piriformis Stretch - 1 x daily - 7 x weekly - 3 sets - 10 hold  Supine Hamstring Stretch with Strap - 1 x daily - 7 x weekly - 3 sets - 10 hold  Seated Hamstring Stretch - 1 x daily - 7 x weekly - 3 sets - 10 hold  Hooklying Single Knee to Chest Stretch - 1 x daily - 7 x weekly - 3 sets - 10 hold

## 2021-11-04 ENCOUNTER — TREATMENT (OUTPATIENT)
Dept: PHYSICAL THERAPY | Facility: CLINIC | Age: 81
End: 2021-11-04

## 2021-11-04 DIAGNOSIS — M62.89 MUSCLE TIGHTNESS: ICD-10-CM

## 2021-11-04 DIAGNOSIS — R26.89 ANTALGIC GAIT: ICD-10-CM

## 2021-11-04 DIAGNOSIS — M25.69 DECREASED ROM OF TRUNK AND BACK: ICD-10-CM

## 2021-11-04 DIAGNOSIS — Z74.09 IMPAIRED FUNCTIONAL MOBILITY, BALANCE, GAIT, AND ENDURANCE: ICD-10-CM

## 2021-11-04 DIAGNOSIS — M25.559 PAIN, HIP: Primary | ICD-10-CM

## 2021-11-04 PROCEDURE — 97110 THERAPEUTIC EXERCISES: CPT | Performed by: PHYSICAL THERAPIST

## 2021-11-04 PROCEDURE — 97140 MANUAL THERAPY 1/> REGIONS: CPT | Performed by: PHYSICAL THERAPIST

## 2021-11-04 NOTE — PROGRESS NOTES
Physical Therapy Daily Progress Note        Patient: Fernanda Molina   : 1940  Diagnosis/ICD-10 Code:  Pain, hip [M25.559]  Referring practitioner: Rashid Ge MD  Date of Initial Visit: Type: THERAPY  Noted: 2021  Today's Date: 2021  Patient seen for 2 sessions         Fernanda Molina reports: she isn't having a good day.  She said she is ok right now but today it has generally been a 9-10/10.  She said it wasn't bad that night after the eval but it got worse the next morning.        Subjective     Objective          Palpation   Left   Hypertonic in the gluteus medius and TFL.   Tenderness of the gluteus nay, gluteus medius, piriformis and TFL.     Additional Palpation Details  Tenderness and tightness along length of IT band      See Exercise, Manual, and Modality Logs for complete treatment.       Assessment/Plan  Focused on pain control as pt reported akbar level of pain today.  Continued with initial stretches and STM however also added US to assist with pain control.  Pt demonstrated difficulty with bed mobility and demonstrated antalgic pattern with amb.  Instructed to use cane in (R) hand to offset pain on (L) side.  Improved gait following change.  Will monitor symtpoms to changes made this date and progress exercises as tolerated next session.     Progress per Plan of Care           Manual Therapy:    10     mins  59938;  Therapeutic Exercise:    10     mins  81284;     Neuromuscular Liz:        mins  07595;    Therapeutic Activity:          mins  67242;     Gait Training:           mins  45646;     Ultrasound:     8     mins  59626;    Electrical Stimulation:         mins  54862 ( );  Dry Needling          mins self-pay    Timed Treatment:   28   mins   Total Treatment:     30   mins    Paloma Everett PTA  Physical Therapist Assistant

## 2021-11-10 ENCOUNTER — TREATMENT (OUTPATIENT)
Dept: PHYSICAL THERAPY | Facility: CLINIC | Age: 81
End: 2021-11-10

## 2021-11-10 DIAGNOSIS — Z74.09 IMPAIRED FUNCTIONAL MOBILITY, BALANCE, GAIT, AND ENDURANCE: ICD-10-CM

## 2021-11-10 DIAGNOSIS — M25.69 DECREASED ROM OF TRUNK AND BACK: ICD-10-CM

## 2021-11-10 DIAGNOSIS — R26.89 ANTALGIC GAIT: ICD-10-CM

## 2021-11-10 DIAGNOSIS — M25.559 PAIN, HIP: Primary | ICD-10-CM

## 2021-11-10 DIAGNOSIS — M62.89 MUSCLE TIGHTNESS: ICD-10-CM

## 2021-11-10 PROCEDURE — 97035 APP MDLTY 1+ULTRASOUND EA 15: CPT | Performed by: PHYSICAL THERAPIST

## 2021-11-10 PROCEDURE — 97140 MANUAL THERAPY 1/> REGIONS: CPT | Performed by: PHYSICAL THERAPIST

## 2021-11-10 NOTE — PROGRESS NOTES
Physical Therapy Daily Progress Note        Patient: Fernanda Molina   : 1940  Diagnosis/ICD-10 Code:  Pain, hip [M25.559]  Referring practitioner: Rashid Ge MD  Date of Initial Visit: Type: THERAPY  Noted: 2021  Today's Date: 11/10/2021  Patient seen for 3 sessions         Fernanda Molina reports: she is hurting and her (R) is hurting in the groin.  She said she couldn't lift her leg into the car to drive so her  brought her.  She said the (L) is about what it was.  She rated her pain as a 10/10.  She reported she does the exercises everyday. She said she has been using her walker at home because of the pain.         Subjective     Objective          Palpation   Left   Hypertonic in the gluteus medius, piriformis and TFL.   Tenderness of the gluteus nay, gluteus medius, piriformis and TFL.       See Exercise, Manual, and Modality Logs for complete treatment.       Assessment/Plan  Pt with akbar level of pain this date and reported compliance with current exercise program thus held stretches this date with focus on pain relief measures.  She continued to amb into and out of clinic with antalgic pattern as well as required increased time and intermittent assistance for bed mobility.  Added (B) LAD distraction and pt reported stretch however no significant relief.  Continued with STM with decreased tightness noted compared to previous sessions.  Also continued with US as pt reported relief following last session and decreased tightness noted.  Pt reported she was able to get off the mat table without sharp pain in her back for the first time when treatment ended.  Will monitor pain level and progress to gentle core strengthening when tolerated.      Progress per Plan of Care           Manual Therapy:    20     mins  66786;  Therapeutic Exercise:         mins  15509;     Neuromuscular Liz:        mins  69563;    Therapeutic Activity:          mins  39027;     Gait Training:            mins  81163;     Ultrasound:     8     mins  89981;    Electrical Stimulation:         mins  73011 ( );  Dry Needling          mins self-pay    Timed Treatment:   28   mins   Total Treatment:     35   mins    Paloma Everett PTA  Physical Therapist Assistant

## 2021-11-12 ENCOUNTER — TREATMENT (OUTPATIENT)
Dept: PHYSICAL THERAPY | Facility: CLINIC | Age: 81
End: 2021-11-12

## 2021-11-12 DIAGNOSIS — R26.89 ANTALGIC GAIT: ICD-10-CM

## 2021-11-12 DIAGNOSIS — M62.89 MUSCLE TIGHTNESS: ICD-10-CM

## 2021-11-12 DIAGNOSIS — M25.69 DECREASED ROM OF TRUNK AND BACK: ICD-10-CM

## 2021-11-12 DIAGNOSIS — Z74.09 IMPAIRED FUNCTIONAL MOBILITY, BALANCE, GAIT, AND ENDURANCE: ICD-10-CM

## 2021-11-12 DIAGNOSIS — M25.559 PAIN, HIP: Primary | ICD-10-CM

## 2021-11-12 PROCEDURE — 97140 MANUAL THERAPY 1/> REGIONS: CPT | Performed by: PHYSICAL THERAPIST

## 2021-11-12 PROCEDURE — 97035 APP MDLTY 1+ULTRASOUND EA 15: CPT | Performed by: PHYSICAL THERAPIST

## 2021-11-12 NOTE — PROGRESS NOTES
Physical Therapy Daily Progress Note    Visit # : 4  Fernanda Molina reports: she is better today, but she is still having some right anterior thigh/groin pain intermittently.  Pt states that is why she can't drive herself because she does not know when it is going to occur and she can't lift the leg up on her own to get it into the car.  Pt states at least now she can walk with a cane and denies increased left hip and LBP.      Subjective     Objective   See Exercise, Manual, and Modality Logs for complete treatment.     Moderate left L-S, iliac crest and lateral hip pain and tightness.  Moderate left piriformis tightness      Assessment & Plan     Assessment  Assessment details: Pt tolerated treatment well with decreased c/o B LE and left LBP after treatment.  Discussed with pt to continue her stretches at home and start attempting to perform a PPT in standing to improve her tolerance to standing/walking and to create decompression at her lower spine.  Pt with moderate left piriformis, gluteals and lateral hip tightness, but tolerated the deep tissue massage.  Ended treatment with 15 minutes of heat to decrease pt's soreness from massage.  Will continue to see 2x/week for pain relief, stretching, and modalities PRN.          Progress per Plan of Care           Manual Therapy:  18       mins  35114;  Therapeutic Exercise:         mins  09423;     Neuromuscular Liz:        mins  23244;    Therapeutic Activity:          mins  01329;     Gait Training:           mins  75192;     Ultrasound:     8     mins  54113;    Electrical Stimulation:         mins  12262 ( );  Dry Needling          mins self-pay    Timed Treatment: 26     mins   Total Treatment:    60    mins    Leann Washington, PT  Physical Therapist

## 2021-11-16 ENCOUNTER — TREATMENT (OUTPATIENT)
Dept: PHYSICAL THERAPY | Facility: CLINIC | Age: 81
End: 2021-11-16

## 2021-11-16 DIAGNOSIS — M25.559 PAIN, HIP: Primary | ICD-10-CM

## 2021-11-16 DIAGNOSIS — M25.69 DECREASED ROM OF TRUNK AND BACK: ICD-10-CM

## 2021-11-16 DIAGNOSIS — R26.89 ANTALGIC GAIT: ICD-10-CM

## 2021-11-16 DIAGNOSIS — Z74.09 IMPAIRED FUNCTIONAL MOBILITY, BALANCE, GAIT, AND ENDURANCE: ICD-10-CM

## 2021-11-16 DIAGNOSIS — M62.89 MUSCLE TIGHTNESS: ICD-10-CM

## 2021-11-16 PROCEDURE — 97110 THERAPEUTIC EXERCISES: CPT | Performed by: PHYSICAL THERAPIST

## 2021-11-16 PROCEDURE — 97140 MANUAL THERAPY 1/> REGIONS: CPT | Performed by: PHYSICAL THERAPIST

## 2021-11-16 NOTE — PROGRESS NOTES
Physical Therapy Daily Progress Note    Visit # : 5  Fernanad Molina reports: states the pain is better than it was Friday.  Pt states as long as she does not sit the pain is better.  Pt is still having pain down both anterior thighs, but she states she can lift her right leg now.      Subjective     Objective   See Exercise, Manual, and Modality Logs for complete treatment.    pt with minimal left lateral hip and gluteal tightness with palpation     Moderate left L-S tenderness with palpation     Assessment & Plan     Assessment  Assessment details: Pt is responding to treatment with decreasing pain, tightness, and improving functional mobility.  Pt still having pain with transitions from sit to stand, but ambulating with more upright standing posture and improved ability to lift B LE's.   Pt with increasing tolerance to stretching and stabilization exercises.  Added PPT, PPT with shoulder flex, and PPT with LE marches.  Did not do deep tissue massage today due to pt reporting that she was still sore from last session.  Will continue to see 2x/week for pain relief, stretching, and modalities PRN.          Progress per Plan of Care           Manual Therapy:   10      mins  61356;  Therapeutic Exercise:    30     mins  68953;     Neuromuscular Liz:        mins  78176;    Therapeutic Activity:          mins  72688;     Gait Training:           mins  11993;     Ultrasound:    8      mins  67956;    Electrical Stimulation:         mins  73065 ( );  Dry Needling          mins self-pay    Timed Treatment:  48    mins   Total Treatment:    50    mins    Leann Washington, PT  Physical Therapist

## 2021-11-18 ENCOUNTER — TREATMENT (OUTPATIENT)
Dept: PHYSICAL THERAPY | Facility: CLINIC | Age: 81
End: 2021-11-18

## 2021-11-18 DIAGNOSIS — M25.552 CHRONIC LEFT HIP PAIN: ICD-10-CM

## 2021-11-18 DIAGNOSIS — M25.559 PAIN, HIP: Primary | ICD-10-CM

## 2021-11-18 DIAGNOSIS — Z74.09 IMPAIRED FUNCTIONAL MOBILITY, BALANCE, GAIT, AND ENDURANCE: ICD-10-CM

## 2021-11-18 DIAGNOSIS — G89.29 CHRONIC LEFT HIP PAIN: ICD-10-CM

## 2021-11-18 DIAGNOSIS — G57.02 PYRIFORMIS SYNDROME, LEFT: ICD-10-CM

## 2021-11-18 DIAGNOSIS — M25.69 DECREASED ROM OF TRUNK AND BACK: ICD-10-CM

## 2021-11-18 DIAGNOSIS — R26.89 ANTALGIC GAIT: ICD-10-CM

## 2021-11-18 DIAGNOSIS — M62.89 MUSCLE TIGHTNESS: ICD-10-CM

## 2021-11-18 PROCEDURE — 97035 APP MDLTY 1+ULTRASOUND EA 15: CPT | Performed by: PHYSICAL THERAPIST

## 2021-11-18 PROCEDURE — 97140 MANUAL THERAPY 1/> REGIONS: CPT | Performed by: PHYSICAL THERAPIST

## 2021-11-18 NOTE — PROGRESS NOTES
"Physical Therapy Daily Progress Note    Visit # : 6  Fernanda Molina reports: she is hurting more today than she has been in the last two days.  Pt reports she was feeling better, but woke up this morning hurting more in the left lower back and hip.  \"Not sure why?\"  It hurts more to walk today.      Subjective     Objective   See Exercise, Manual, and Modality Logs for complete treatment.   Holding exercises and stretches today secondary to pt c/o pain and difficulty walking.      Assessment & Plan     Assessment  Assessment details: Pt reports to the clinic with increased pain, tenderness, and decreased ability to ambulate.  Pt enters department with a slow galina using a single point cane moderate antalgic gait left LE due to increased pain with weight bearing onto left LE.  Pt with moderate left L-S, piriformis, and gluteal medius tenderness.  Held all exercises and performed manual LE Tx, STM, and modalities for pain relief.  Pt still with moderate pain with transitions from sit to stand and ambulation with STC after treatment.  Advised pt to call MD and or referral to a Spine Specialist for further testing due to questioning possible bulging disc due to pt getting relief with manual LE traction.  Will continue to see 2x/week for pain relief, stretching, and modalities PRN.        Progress per Plan of Care         Manual Therapy:   23      mins  70038;  Therapeutic Exercise:         mins  30810;     Neuromuscular Liz:        mins  88604;    Therapeutic Activity:          mins  43443;     Gait Training:           mins  13109;     Ultrasound:    8      mins  00401;    Electrical Stimulation:         mins  96688 ( );  Dry Needling          mins self-pay    Timed Treatment:  31    mins   Total Treatment:     60   mins    Leann Washington, PT  Physical Therapist    "

## 2021-11-20 DIAGNOSIS — E03.9 ACQUIRED HYPOTHYROIDISM: ICD-10-CM

## 2021-11-20 RX ORDER — LEVOTHYROXINE SODIUM 0.15 MG/1
TABLET ORAL
Qty: 90 TABLET | Refills: 1 | OUTPATIENT
Start: 2021-11-20

## 2021-11-23 ENCOUNTER — TREATMENT (OUTPATIENT)
Dept: PHYSICAL THERAPY | Facility: CLINIC | Age: 81
End: 2021-11-23

## 2021-11-23 DIAGNOSIS — M25.559 PAIN, HIP: Primary | ICD-10-CM

## 2021-11-23 DIAGNOSIS — R26.89 ANTALGIC GAIT: ICD-10-CM

## 2021-11-23 DIAGNOSIS — M62.89 MUSCLE TIGHTNESS: ICD-10-CM

## 2021-11-23 DIAGNOSIS — M25.69 DECREASED ROM OF TRUNK AND BACK: ICD-10-CM

## 2021-11-23 DIAGNOSIS — Z74.09 IMPAIRED FUNCTIONAL MOBILITY, BALANCE, GAIT, AND ENDURANCE: ICD-10-CM

## 2021-11-23 PROCEDURE — 97035 APP MDLTY 1+ULTRASOUND EA 15: CPT | Performed by: PHYSICAL THERAPIST

## 2021-11-23 PROCEDURE — 97140 MANUAL THERAPY 1/> REGIONS: CPT | Performed by: PHYSICAL THERAPIST

## 2021-11-23 NOTE — PROGRESS NOTES
Physical Therapy Daily Progress Note    Visit # : 7  Fernanda Molina reports: she is not any better.  Pt states she continues to have left leg pain with sit to stands and when trying to ambulate.      Subjective     Objective   See Exercise, Manual, and Modality Logs for complete treatment.     Pt with moderate left L-S point tenderness, minimal left lateral hip and piriformis tightness with palpation     Assessment & Plan     Assessment    Assessment details: Pt reports to the clinic with increased pain, tenderness, and decreased ability to ambulate.  Pt enters department with a slow galina using a single point cane moderate antalgic gait left LE due to increased pain with weight bearing onto left LE.  Pt with moderate left L-S, piriformis, and gluteal medius tenderness.  Held all exercises and performed manual LE Tx, US, and added KT taping to the left lateral hip and L-S area for pain relief.  Pt still with moderate pain with transitions from sit to stand and ambulation with STC after treatment.  Pt states her daughter is contacting MD to get her into a Spine Specialist/Neurologist for further testing.  Pt to call PT when she has an appointment with MD.  If symptoms continue over the rest of the week will hold PT until pt sees MD due to lack of progress in the last week.        Progress per Plan of Care           Manual Therapy:   15      mins  99893;  Therapeutic Exercise:         mins  91808;     Neuromuscular Liz:        mins  51658;    Therapeutic Activity:          mins  12125;     Gait Training:           mins  47818;     Ultrasound:   10       mins  76085;    Electrical Stimulation:         mins  18752 ( );  Dry Needling          mins self-pay    Timed Treatment:  25    mins   Total Treatment:    45    mins    Leann Washington, PT  Physical Therapist

## 2021-11-29 ENCOUNTER — TREATMENT (OUTPATIENT)
Dept: PHYSICAL THERAPY | Facility: CLINIC | Age: 81
End: 2021-11-29

## 2021-11-29 DIAGNOSIS — M25.559 PAIN, HIP: Primary | ICD-10-CM

## 2021-11-29 DIAGNOSIS — Z74.09 IMPAIRED FUNCTIONAL MOBILITY, BALANCE, GAIT, AND ENDURANCE: ICD-10-CM

## 2021-11-29 DIAGNOSIS — M25.69 DECREASED ROM OF TRUNK AND BACK: ICD-10-CM

## 2021-11-29 DIAGNOSIS — G57.02 PYRIFORMIS SYNDROME, LEFT: ICD-10-CM

## 2021-11-29 DIAGNOSIS — M62.89 MUSCLE TIGHTNESS: ICD-10-CM

## 2021-11-29 DIAGNOSIS — G89.29 CHRONIC LEFT HIP PAIN: ICD-10-CM

## 2021-11-29 DIAGNOSIS — M25.552 CHRONIC LEFT HIP PAIN: ICD-10-CM

## 2021-11-29 DIAGNOSIS — R26.89 ANTALGIC GAIT: ICD-10-CM

## 2021-11-29 PROCEDURE — 97140 MANUAL THERAPY 1/> REGIONS: CPT | Performed by: PHYSICAL THERAPIST

## 2021-11-29 PROCEDURE — 97035 APP MDLTY 1+ULTRASOUND EA 15: CPT | Performed by: PHYSICAL THERAPIST

## 2021-11-29 NOTE — PROGRESS NOTES
Physical Therapy Daily Progress Note        Patient: Fernanda Molina   : 1940  Diagnosis/ICD-10 Code:  Pain, hip [M25.559]  Referring practitioner: Rashid Ge MD  Date of Initial Visit: Type: THERAPY  Noted: 2021  Today's Date: 2021  Patient seen for 8 sessions         Fernanda Molina reports: she is better but not well.  She said at night after sitting still for awhile it really flares up.  She has been getting more pain down the groin into her leg.  Daughter is going to try to get her into see someone at the [facility].  She said she can lift her leg easier which has been one improvement.         Subjective     Objective   See Exercise, Manual, and Modality Logs for complete treatment.     Tenderness (L) piriformis    Assessment/Plan  Continued to focus on pain relief techniques as pt reported some improvements but continues to have a high amount of pain at night, relies on STC for amb, and demonstrates antalgic gait pattern.  Increased time required for change in positions.  Continued tenderness as noted thus addressed with US, manual traction, and KT tape.  Will monitor tolerance and wait for MD appointment to be scheduled however may go on hold if pain and symptoms remain consistent.      Progress per Plan of Care           Manual Therapy:    15     mins  83052;  Therapeutic Exercise:         mins  30964;     Neuromuscular Liz:        mins  66958;    Therapeutic Activity:          mins  60733;     Gait Training:           mins  52888;     Ultrasound:     10     mins  00840;    Electrical Stimulation:         mins  62824 ( );  Dry Needling          mins self-pay    Timed Treatment:   25   mins   Total Treatment:     35   mins    Paloma Everett PTA  Physical Therapist Assistant

## 2021-11-30 ENCOUNTER — TELEPHONE (OUTPATIENT)
Dept: PHYSICAL THERAPY | Facility: CLINIC | Age: 81
End: 2021-11-30

## 2021-12-02 ENCOUNTER — TREATMENT (OUTPATIENT)
Dept: PHYSICAL THERAPY | Facility: CLINIC | Age: 81
End: 2021-12-02

## 2021-12-02 DIAGNOSIS — M62.89 MUSCLE TIGHTNESS: ICD-10-CM

## 2021-12-02 DIAGNOSIS — G57.02 PYRIFORMIS SYNDROME, LEFT: ICD-10-CM

## 2021-12-02 DIAGNOSIS — Z74.09 IMPAIRED FUNCTIONAL MOBILITY, BALANCE, GAIT, AND ENDURANCE: ICD-10-CM

## 2021-12-02 DIAGNOSIS — M25.69 DECREASED ROM OF TRUNK AND BACK: ICD-10-CM

## 2021-12-02 DIAGNOSIS — G89.29 CHRONIC LEFT HIP PAIN: ICD-10-CM

## 2021-12-02 DIAGNOSIS — M25.559 PAIN, HIP: Primary | ICD-10-CM

## 2021-12-02 DIAGNOSIS — M25.552 CHRONIC LEFT HIP PAIN: ICD-10-CM

## 2021-12-02 DIAGNOSIS — R26.89 ANTALGIC GAIT: ICD-10-CM

## 2021-12-02 PROCEDURE — 97140 MANUAL THERAPY 1/> REGIONS: CPT | Performed by: PHYSICAL THERAPIST

## 2021-12-02 PROCEDURE — 97530 THERAPEUTIC ACTIVITIES: CPT | Performed by: PHYSICAL THERAPIST

## 2021-12-02 PROCEDURE — 97035 APP MDLTY 1+ULTRASOUND EA 15: CPT | Performed by: PHYSICAL THERAPIST

## 2021-12-02 NOTE — PROGRESS NOTES
"Re-Assessment / Re-Certification    Patient: Fernanda Molina   : 1940  Diagnosis/ICD-10 Code:  Pain, hip [M25.559]  Referring practitioner: Rashid Ge MD  Date of Initial Visit: 2021  Today's Date: 2021  Patient seen for 9 sessions      Subjective:   Fernanda Molina reports: she saw the MD and he told her she has Spinal stenosis and starting some Scoliosis.  Pt was started on Gabapentin and goes to pain managament about injections on .  Pt states she feels about the same rating her pain a 7/10-10/10.  Pt states \"It just plain hurts!\"  Whenever she puts weight on the left leg it hurts to stand, walk, or go from sit to stand.  Subjective Questionnaire: Oswestry: back index  53%  Clinical Progress: unchanged  Home Program Compliance: Yes  Treatment has included: therapeutic exercise, manual therapy, ultrasound, moist heat and manual LE traction and pt has been given a HEP with gentle stretching.      Subjective   Objective          Tenderness     Right Hip   Tenderness in the PSIS.     Additional Tenderness Details  Mild point tenderness, tightness over the left piriformis, gluteals, and L4-L5 intervertebral space     Neurological Testing     Reflexes   Left   Patellar (L4): absent (0)    Right   Patellar (L4): absent (0)    Strength/Myotome Testing     Left Hip   Planes of Motion   Flexion: 4    Right Hip   Planes of Motion   Flexion: 4+    Left Knee   Flexion: 4  Extension: 4    Right Knee   Flexion: 5  Extension: 5    Left Ankle/Foot   Dorsiflexion: 5  Plantar flexion: 5  Eversion: 5    Right Ankle/Foot   Dorsiflexion: 5  Plantar flexion: 5  Eversion: 5    Tests       Thoracic   Negative slump.     Lumbar     Left   Positive crossed SLR and passive SLR.     Right   Negative crossed SLR and passive SLR.     Ambulation     Observational Gait   Gait: antalgic   Decreased walking speed, stride length, left stance time, left swing time and left step length.   Left foot contact pattern: " foot flat    Additional Observational Gait Details  Pt ambulates into clinic with moderate antalgic gait in forward flexed posture, using a single point cane short step length and stance on the left due to increased left L-S and LE pain with WB.        Assessment & Plan     Assessment    Assessment details: Pt is making slow progress with therapy due to still having moderate-severe pain with position changes, standing and ambulation.  Pt saw spine Specialist and has referred her to pain management for an epidural but not until 2021.  Pt is getting temporary relief from US treatments, deep tissue massage and manual B LE traction.  Pt is doing some gentle ROM and LE stretching at home with a HEP.  Pt has met  goals and is making some progress towards accomplishing other goals.  Will continue to see pt for pain relief, but if pt continue to have limited improvements will hold treatment until she sees pain management.        Progress toward previous goals: Partially Met    Goals  Plan Goals: ST weeks  1. Pt will be able to perform/tolerate HEP without increased back/hip pain to increase compliance with HEP   PROGRESSING   2. Pt will improve lumbar AROM to ~50% in all planes with minimal pain to be able to complete transfers  NOT MET   3. Pt will improve Back Index score to <30% to improve subjective function during ADLs  NOT MET   4.Decrease L glut med and piriformis tightness and tenderness to moderate with palpation to be able to sleep through the night   MET   5. Pt will demonstrate no antalgic gait with use of SPC for household distances of ~50 feet  NOT MET     LT weeks  1. Pt will be able to complete bed mobility and transfers I without AD without pain  NOT MET   2. Pt will be able to walk for 10 min without a rest break to be able to navigate the grocery store  NOT MET   3. Pt will improve Back Index to 25% or less to improve subjective function during ADLs  NOT MET   4. Pt will improve proximal  BLE strength to grossly >4/5 to improve transfers and LLE support during gait  NT   5. Pt will be able to return to walking without AD for household distance of at least 50 feet with minimal antalgic gait pattern  NOT MET   6. Decrease L glut med and piriformis tightness and tenderness to minimal with palpation to be able to sleep through the night  NOT MET       Recommendations: Continue as planned  Timeframe: 1 month  Prognosis to achieve goals: fair    PT Signature: Leann Washington, PT [place] # [id number]  Electronically signed 12/6/2021   Manual Therapy:    15     mins  55152;  Therapeutic Exercise:         mins  63486;     Neuromuscular Liz:        mins  05351;    Therapeutic Activity:    15      mins  36147;     Gait Training:           mins  00764;     Ultrasound:     8     mins  14235;    Electrical Stimulation:         mins  13509 ( );  Traction                       ___ mins  54212    Timed Treatment:   38   mins   Total Treatment:     48   mins

## 2021-12-06 ENCOUNTER — TREATMENT (OUTPATIENT)
Dept: PHYSICAL THERAPY | Facility: CLINIC | Age: 81
End: 2021-12-06

## 2021-12-06 DIAGNOSIS — G57.02 PYRIFORMIS SYNDROME, LEFT: ICD-10-CM

## 2021-12-06 DIAGNOSIS — M25.69 DECREASED ROM OF TRUNK AND BACK: ICD-10-CM

## 2021-12-06 DIAGNOSIS — M62.89 MUSCLE TIGHTNESS: ICD-10-CM

## 2021-12-06 DIAGNOSIS — M25.559 PAIN, HIP: Primary | ICD-10-CM

## 2021-12-06 DIAGNOSIS — Z74.09 IMPAIRED FUNCTIONAL MOBILITY, BALANCE, GAIT, AND ENDURANCE: ICD-10-CM

## 2021-12-06 DIAGNOSIS — M25.552 CHRONIC LEFT HIP PAIN: ICD-10-CM

## 2021-12-06 DIAGNOSIS — G89.29 CHRONIC LEFT HIP PAIN: ICD-10-CM

## 2021-12-06 DIAGNOSIS — R26.89 ANTALGIC GAIT: ICD-10-CM

## 2021-12-06 PROCEDURE — 97140 MANUAL THERAPY 1/> REGIONS: CPT | Performed by: PHYSICAL THERAPIST

## 2021-12-06 PROCEDURE — 97035 APP MDLTY 1+ULTRASOUND EA 15: CPT | Performed by: PHYSICAL THERAPIST

## 2021-12-06 NOTE — PROGRESS NOTES
Physical Therapy Daily Progress Note      Visit # : 10  Fernanda Molina reports: she is about the same.      Subjective     Objective   See Exercise, Manual, and Modality Logs for complete treatment.     Pt with moderate left L-S and hip tenderness with palpation     Assessment & Plan     Assessment    Assessment details: Pt continues to c/o moderate L-S and left LE pain with standing, ambulation, and position changes.  Pt reports getting some decrease in pain with therapy.  Pt enters and leaves PT with a moderate antalgic gait left LE using her single point cane.  Will continue to see pt for pain relief, but if pt continue to have limited improvements will hold treatment until she sees pain management.        Progress per Plan of Care         Manual Therapy:    15     mins  92073;  Therapeutic Exercise:         mins  27845;     Neuromuscular Liz:        mins  94640;    Therapeutic Activity:          mins  90142;     Gait Training:           mins  86331;     Ultrasound:     10     mins  89008;    Electrical Stimulation:         mins  16322 ( );  Dry Needling          mins self-pay    Timed Treatment:  25    mins   Total Treatment:     45   mins    Leann Washington, PT  Physical Therapist

## 2021-12-07 DIAGNOSIS — I25.10 CORONARY ARTERY DISEASE INVOLVING NATIVE CORONARY ARTERY OF NATIVE HEART WITHOUT ANGINA PECTORIS: ICD-10-CM

## 2021-12-07 RX ORDER — POTASSIUM CHLORIDE 20 MEQ/1
20 TABLET, EXTENDED RELEASE ORAL DAILY
Qty: 90 TABLET | Refills: 1 | Status: SHIPPED | OUTPATIENT
Start: 2021-12-07 | End: 2022-05-31

## 2021-12-09 ENCOUNTER — TREATMENT (OUTPATIENT)
Dept: PHYSICAL THERAPY | Facility: CLINIC | Age: 81
End: 2021-12-09

## 2021-12-09 DIAGNOSIS — M25.552 CHRONIC LEFT HIP PAIN: ICD-10-CM

## 2021-12-09 DIAGNOSIS — G57.02 PYRIFORMIS SYNDROME, LEFT: ICD-10-CM

## 2021-12-09 DIAGNOSIS — R26.89 ANTALGIC GAIT: ICD-10-CM

## 2021-12-09 DIAGNOSIS — G89.29 CHRONIC LEFT HIP PAIN: ICD-10-CM

## 2021-12-09 DIAGNOSIS — M62.89 MUSCLE TIGHTNESS: ICD-10-CM

## 2021-12-09 DIAGNOSIS — Z74.09 IMPAIRED FUNCTIONAL MOBILITY, BALANCE, GAIT, AND ENDURANCE: ICD-10-CM

## 2021-12-09 DIAGNOSIS — M25.559 PAIN, HIP: Primary | ICD-10-CM

## 2021-12-09 DIAGNOSIS — M25.69 DECREASED ROM OF TRUNK AND BACK: ICD-10-CM

## 2021-12-09 PROCEDURE — 97035 APP MDLTY 1+ULTRASOUND EA 15: CPT | Performed by: PHYSICAL THERAPIST

## 2021-12-09 PROCEDURE — 97140 MANUAL THERAPY 1/> REGIONS: CPT | Performed by: PHYSICAL THERAPIST

## 2021-12-13 ENCOUNTER — TREATMENT (OUTPATIENT)
Dept: PHYSICAL THERAPY | Facility: CLINIC | Age: 81
End: 2021-12-13

## 2021-12-13 DIAGNOSIS — M25.559 PAIN, HIP: Primary | ICD-10-CM

## 2021-12-13 DIAGNOSIS — M62.89 MUSCLE TIGHTNESS: ICD-10-CM

## 2021-12-13 DIAGNOSIS — G89.29 CHRONIC LEFT HIP PAIN: ICD-10-CM

## 2021-12-13 DIAGNOSIS — R26.89 ANTALGIC GAIT: ICD-10-CM

## 2021-12-13 DIAGNOSIS — M25.552 CHRONIC LEFT HIP PAIN: ICD-10-CM

## 2021-12-13 DIAGNOSIS — G57.02 PYRIFORMIS SYNDROME, LEFT: ICD-10-CM

## 2021-12-13 DIAGNOSIS — M25.69 DECREASED ROM OF TRUNK AND BACK: ICD-10-CM

## 2021-12-13 DIAGNOSIS — Z74.09 IMPAIRED FUNCTIONAL MOBILITY, BALANCE, GAIT, AND ENDURANCE: ICD-10-CM

## 2021-12-13 PROCEDURE — 97140 MANUAL THERAPY 1/> REGIONS: CPT | Performed by: PHYSICAL THERAPIST

## 2021-12-13 PROCEDURE — 97035 APP MDLTY 1+ULTRASOUND EA 15: CPT | Performed by: PHYSICAL THERAPIST

## 2021-12-13 NOTE — PROGRESS NOTES
Physical Therapy Daily Progress Note    Visit # : 12  Fernanda Molina reports: she continued to have decreased pain and was able to walk around on Thursday, but by the next day the pain was back.  Pt states she is noticing the pain going into her leg.  Pt states she was pretty uncomfortable over the weekend.      Subjective     Objective   See Exercise, Manual, and Modality Logs for complete treatment.     Pt with moderate left L-S tenderness with palpation       Assessment & Plan     Assessment    Assessment details: Pt with increased pain and tenderness today.  Pt enters and leaves department with moderate antalgic gait on the LLE with forward flexed posture short stance using a single point cane.  Pt tolerated manual LLE traction, KT taping, and US treatments, but once pt goes from sitting to standing pt has increased L L-S and LE pain.  Will continue to see pt this week, but if she continues to have no improvement will hold remaining treatments until pt sees MD.          Progress per Plan of Care           Manual Therapy:   15      mins  22498;  Therapeutic Exercise:         mins  91502;     Neuromuscular Liz:        mins  13190;    Therapeutic Activity:          mins  49499;     Gait Training:           mins  74871;     Ultrasound:    8      mins  41359;    Electrical Stimulation:         mins  66559 ( );  Dry Needling          mins self-pay    Timed Treatment:  23    mins   Total Treatment:     45   mins    Leann Washington, PT  Physical Therapist

## 2021-12-16 ENCOUNTER — TREATMENT (OUTPATIENT)
Dept: PHYSICAL THERAPY | Facility: CLINIC | Age: 81
End: 2021-12-16

## 2021-12-16 ENCOUNTER — TELEPHONE (OUTPATIENT)
Dept: FAMILY MEDICINE CLINIC | Facility: CLINIC | Age: 81
End: 2021-12-16

## 2021-12-16 DIAGNOSIS — I25.10 CORONARY ARTERY DISEASE INVOLVING NATIVE CORONARY ARTERY OF NATIVE HEART WITHOUT ANGINA PECTORIS: ICD-10-CM

## 2021-12-16 DIAGNOSIS — G57.02 PYRIFORMIS SYNDROME, LEFT: ICD-10-CM

## 2021-12-16 DIAGNOSIS — M62.89 MUSCLE TIGHTNESS: ICD-10-CM

## 2021-12-16 DIAGNOSIS — M25.552 CHRONIC LEFT HIP PAIN: ICD-10-CM

## 2021-12-16 DIAGNOSIS — Z74.09 IMPAIRED FUNCTIONAL MOBILITY, BALANCE, GAIT, AND ENDURANCE: ICD-10-CM

## 2021-12-16 DIAGNOSIS — R26.89 ANTALGIC GAIT: Primary | ICD-10-CM

## 2021-12-16 DIAGNOSIS — M25.69 DECREASED ROM OF TRUNK AND BACK: ICD-10-CM

## 2021-12-16 DIAGNOSIS — G89.29 CHRONIC LEFT HIP PAIN: ICD-10-CM

## 2021-12-16 PROCEDURE — 97035 APP MDLTY 1+ULTRASOUND EA 15: CPT | Performed by: PHYSICAL THERAPIST

## 2021-12-16 PROCEDURE — 97140 MANUAL THERAPY 1/> REGIONS: CPT | Performed by: PHYSICAL THERAPIST

## 2021-12-16 RX ORDER — PANTOPRAZOLE SODIUM 40 MG/1
40 TABLET, DELAYED RELEASE ORAL DAILY
Qty: 90 TABLET | Refills: 3 | Status: SHIPPED | OUTPATIENT
Start: 2021-12-16 | End: 2023-01-06

## 2021-12-16 RX ORDER — DIGOXIN 250 MCG
TABLET ORAL
Qty: 90 TABLET | Refills: 3 | OUTPATIENT
Start: 2021-12-16

## 2021-12-16 RX ORDER — FUROSEMIDE 40 MG/1
TABLET ORAL
Qty: 90 TABLET | Refills: 3 | OUTPATIENT
Start: 2021-12-16

## 2021-12-16 RX ORDER — PANTOPRAZOLE SODIUM 40 MG/1
TABLET, DELAYED RELEASE ORAL
Qty: 90 TABLET | Refills: 3 | OUTPATIENT
Start: 2021-12-16

## 2021-12-16 NOTE — TELEPHONE ENCOUNTER
Her Lasix and her digoxin refills needs to go to her cardiologist.  Dr. Damon he is at [facility]  I will refill her Protonix

## 2021-12-16 NOTE — TELEPHONE ENCOUNTER
Requesting Protonix refill,   Okay 90-day, 3 refills sent to [facility]

## 2021-12-16 NOTE — PROGRESS NOTES
Physical Therapy Daily Progress Note    Visit # : 13  Fernanda Molina reports: as long as she is up and moving she is better.  Pt states her pain has not been bad since she has been baking.  Pt states the pain is still into the left knee.       Subjective     Objective   See Exercise, Manual, and Modality Logs for complete treatment.       Assessment & Plan     Assessment    Assessment details: Pt with decreased pain today and reports she has found that if she is standing and moving around the pain is better.  Pt still with moderate left LE pain and L-S pain with transitions from supine to sit and sit to stand.   Pt enters and leaves department with moderate antalgic gait on the LLE with forward flexed posture short stance using a single point cane.  Pt tolerated manual LLE traction, KT taping, and US treatments and reports decreased pain during treatments.  Will continue to see pt next week, but if she continues to have no improvement will hold remaining treatments until pt sees MD.        Progress per Plan of Care         Manual Therapy:    15     mins  37080;  Therapeutic Exercise:         mins  98059;     Neuromuscular Liz:        mins  54467;    Therapeutic Activity:          mins  50072;     Gait Training:           mins  99277;     Ultrasound:     8     mins  15079;    Electrical Stimulation:         mins  45615 ( );  Dry Needling          mins self-pay    Timed Treatment:   23   mins   Total Treatment:     36   mins    Leann Washington, PT  Physical Therapist

## 2021-12-17 DIAGNOSIS — I25.10 CORONARY ARTERY DISEASE INVOLVING NATIVE CORONARY ARTERY OF NATIVE HEART WITHOUT ANGINA PECTORIS: ICD-10-CM

## 2021-12-17 RX ORDER — FUROSEMIDE 40 MG/1
40 TABLET ORAL DAILY
Qty: 90 TABLET | Refills: 0 | OUTPATIENT
Start: 2021-12-17

## 2021-12-17 RX ORDER — DIGOXIN 250 MCG
250 TABLET ORAL DAILY
Qty: 90 TABLET | Refills: 0 | OUTPATIENT
Start: 2021-12-17

## 2021-12-20 ENCOUNTER — TREATMENT (OUTPATIENT)
Dept: PHYSICAL THERAPY | Facility: CLINIC | Age: 81
End: 2021-12-20

## 2021-12-20 DIAGNOSIS — G57.02 PYRIFORMIS SYNDROME, LEFT: ICD-10-CM

## 2021-12-20 DIAGNOSIS — R26.89 ANTALGIC GAIT: Primary | ICD-10-CM

## 2021-12-20 DIAGNOSIS — G89.29 CHRONIC LEFT HIP PAIN: ICD-10-CM

## 2021-12-20 DIAGNOSIS — M25.69 DECREASED ROM OF TRUNK AND BACK: ICD-10-CM

## 2021-12-20 DIAGNOSIS — M62.89 MUSCLE TIGHTNESS: ICD-10-CM

## 2021-12-20 DIAGNOSIS — M25.552 CHRONIC LEFT HIP PAIN: ICD-10-CM

## 2021-12-20 DIAGNOSIS — Z74.09 IMPAIRED FUNCTIONAL MOBILITY, BALANCE, GAIT, AND ENDURANCE: ICD-10-CM

## 2021-12-20 DIAGNOSIS — M25.559 PAIN, HIP: ICD-10-CM

## 2021-12-20 PROCEDURE — 97110 THERAPEUTIC EXERCISES: CPT | Performed by: PHYSICAL THERAPIST

## 2021-12-20 PROCEDURE — 97035 APP MDLTY 1+ULTRASOUND EA 15: CPT | Performed by: PHYSICAL THERAPIST

## 2021-12-20 NOTE — PROGRESS NOTES
Physical Therapy Daily Progress Note        Patient: Fernanda Molina   : 1940  Diagnosis/ICD-10 Code:  Antalgic gait [R26.89]  Referring practitioner: Rashid Ge MD  Date of Initial Visit: Type: THERAPY  Noted: 2021  Today's Date: 2021  Patient seen for 14 sessions         Fernanda Molina reports: she isn't as bad as yesterday.  Yesterday was rough.  Pt rated her pain a 5/10 and in the inner leg.  Overall she does not believe it is getting better.         Subjective     Objective   See Exercise, Manual, and Modality Logs for complete treatment.       Assessment/Plan  Pt reported no change in symptoms with continued antalgic gait and difficulty with bed mobility and transfers noted.  Continued tenderness to palpation (L) L-S region.  Continued with LAD, US, and KT tape to address.    Plan to go on hold after next session d/t limited to no improvement.           Manual Therapy:    15     mins  88480;  Therapeutic Exercise:         mins  18743;     Neuromuscular Liz:        mins  62298;    Therapeutic Activity:          mins  13191;     Gait Training:           mins  94932;     Ultrasound:     8     mins  85505;    Electrical Stimulation:         mins  88350 ( );  Dry Needling          mins self-pay    Timed Treatment:   23   mins   Total Treatment:     40   mins    Paloma Everett PTA  Physical Therapist Assistant

## 2021-12-23 ENCOUNTER — TREATMENT (OUTPATIENT)
Dept: PHYSICAL THERAPY | Facility: CLINIC | Age: 81
End: 2021-12-23

## 2021-12-23 DIAGNOSIS — R26.89 ANTALGIC GAIT: Primary | ICD-10-CM

## 2021-12-23 DIAGNOSIS — G57.02 PYRIFORMIS SYNDROME, LEFT: ICD-10-CM

## 2021-12-23 DIAGNOSIS — M25.69 DECREASED ROM OF TRUNK AND BACK: ICD-10-CM

## 2021-12-23 DIAGNOSIS — M62.89 MUSCLE TIGHTNESS: ICD-10-CM

## 2021-12-23 DIAGNOSIS — Z74.09 IMPAIRED FUNCTIONAL MOBILITY, BALANCE, GAIT, AND ENDURANCE: ICD-10-CM

## 2021-12-23 DIAGNOSIS — M25.559 PAIN, HIP: ICD-10-CM

## 2021-12-23 DIAGNOSIS — M25.552 CHRONIC LEFT HIP PAIN: ICD-10-CM

## 2021-12-23 DIAGNOSIS — G89.29 CHRONIC LEFT HIP PAIN: ICD-10-CM

## 2021-12-23 PROCEDURE — 97140 MANUAL THERAPY 1/> REGIONS: CPT | Performed by: PHYSICAL THERAPIST

## 2021-12-23 PROCEDURE — 97035 APP MDLTY 1+ULTRASOUND EA 15: CPT | Performed by: PHYSICAL THERAPIST

## 2021-12-23 NOTE — PROGRESS NOTES
Physical Therapy Daily Progress Note        Patient: Fernanda Molina   : 1940  Diagnosis/ICD-10 Code:  Antalgic gait [R26.89]  Referring practitioner: Rashid Ge MD  Date of Initial Visit: Type: THERAPY  Noted: 2021  Today's Date: 2021  Patient seen for 15 sessions         Fernanda Molina reports: she is doing good this morning.  Better than she does most mornings.  She rated her pain as 5/10 but doesn't feel like it is catching as bad this morning.  Last night was a different story.       Subjective     Objective   See Exercise, Manual, and Modality Logs for complete treatment.     Tender (L) SI joint    Assessment/Plan  Pt reported decreased pain overall however continued to be about a 5/10.  Increased ease with bed mobility initially this date however required assistance later in the session.  Pain moved this date to (L) SI joint thus assessed pelvic alignment however no deviation noted.  Continued with LAD, US, and KT tape to address pain.  Discussed going on hold this date however pt with concerns with increased pain after ~5 days thus will continue with possibility of decreasing to 1X/week to progress towards self management of condition.     Progress per Plan of Care           Manual Therapy:    15     mins  76708;  Therapeutic Exercise:         mins  09794;     Neuromuscular Liz:        mins  74138;    Therapeutic Activity:          mins  84022;     Gait Training:           mins  48247;     Ultrasound:     8     mins  56107;    Electrical Stimulation:         mins  71338 ( );  Dry Needling          mins self-pay    Timed Treatment:   23   mins   Total Treatment:     35   mins    Paloma Everett PTA  Physical Therapist Assistant

## 2021-12-27 ENCOUNTER — TREATMENT (OUTPATIENT)
Dept: PHYSICAL THERAPY | Facility: CLINIC | Age: 81
End: 2021-12-27

## 2021-12-27 DIAGNOSIS — M25.552 CHRONIC LEFT HIP PAIN: ICD-10-CM

## 2021-12-27 DIAGNOSIS — G89.29 CHRONIC LEFT HIP PAIN: ICD-10-CM

## 2021-12-27 DIAGNOSIS — R31.21 ASYMPTOMATIC MICROSCOPIC HEMATURIA: ICD-10-CM

## 2021-12-27 DIAGNOSIS — G57.02 PYRIFORMIS SYNDROME, LEFT: Primary | ICD-10-CM

## 2021-12-27 PROCEDURE — 97140 MANUAL THERAPY 1/> REGIONS: CPT | Performed by: PHYSICAL THERAPIST

## 2021-12-27 PROCEDURE — 97035 APP MDLTY 1+ULTRASOUND EA 15: CPT | Performed by: PHYSICAL THERAPIST

## 2021-12-27 NOTE — PROGRESS NOTES
Physical Therapy Daily Progress Note    Visit # : 16  Fernanda Molina reports: today is not a good day.  It still hurts, but today I'm starting to feel it into the other hip.  Pt states her appointment with pain management is not until 1/24.  Pt states she can't do anything without increased leg and left hip pain.      Subjective     Objective   See Exercise, Manual, and Modality Logs for complete treatment.       Assessment & Plan     Assessment    Assessment details: Pt continues to c/o LBP and left LE pain with limited mobility.  Pt now reports having some pain into the right leg.  Pt ambulates with a slow galina, moderate antalgic gait left LE using a single point cane in a forward flexed posture.  Pt with limited tolerance to any treatment or exercises.  Pt is only receiving manual LE traction, US treatments, and KT taping with minimal reports of decreased pain.  Pt reports of decreased pain during manual traction, but once pt stands or transitions into sitting the pain returns.  Advised pt to call MD and update MD office of her pain with little improvements.  Will see pt one more visit, but then will hold treatment until pt sees MD due to no improvements in pain with PT after 16 visits.         Progress per Plan of Care           Manual Therapy:    15     mins  07272;  Therapeutic Exercise:         mins  03350;     Neuromuscular Liz:        mins  79943;    Therapeutic Activity:          mins  59541;     Gait Training:           mins  02699;     Ultrasound:     8     mins  94031;    Electrical Stimulation:         mins  81321 ( );  Dry Needling          mins self-pay    Timed Treatment:  23    mins   Total Treatment:    60    mins    Leann Washington, PT  Physical Therapist

## 2021-12-30 ENCOUNTER — TREATMENT (OUTPATIENT)
Dept: PHYSICAL THERAPY | Facility: CLINIC | Age: 81
End: 2021-12-30

## 2021-12-30 DIAGNOSIS — G89.29 CHRONIC LEFT HIP PAIN: ICD-10-CM

## 2021-12-30 DIAGNOSIS — Z74.09 IMPAIRED FUNCTIONAL MOBILITY, BALANCE, GAIT, AND ENDURANCE: ICD-10-CM

## 2021-12-30 DIAGNOSIS — G57.02 PYRIFORMIS SYNDROME, LEFT: Primary | ICD-10-CM

## 2021-12-30 DIAGNOSIS — R31.21 ASYMPTOMATIC MICROSCOPIC HEMATURIA: ICD-10-CM

## 2021-12-30 DIAGNOSIS — M25.552 CHRONIC LEFT HIP PAIN: ICD-10-CM

## 2021-12-30 DIAGNOSIS — R26.89 ANTALGIC GAIT: ICD-10-CM

## 2021-12-30 DIAGNOSIS — M25.69 DECREASED ROM OF TRUNK AND BACK: ICD-10-CM

## 2021-12-30 PROCEDURE — 97140 MANUAL THERAPY 1/> REGIONS: CPT | Performed by: PHYSICAL THERAPIST

## 2021-12-30 PROCEDURE — 97035 APP MDLTY 1+ULTRASOUND EA 15: CPT | Performed by: PHYSICAL THERAPIST

## 2021-12-30 NOTE — PROGRESS NOTES
"Physical Therapy Daily Progress Note    Visit # : 17  Fernanda Molina reports: she is better today, but yesterday she could have \"jumped in a river!\"  Pt states it was bad yesterday and she could not even get out of the bed.  Pt states the pain was constant pain all day.  Pt called the Spine MD yesterday and left a message, but MD is out of town.  Pt rated her pain a 15/10 yesterday and a 8/10 today.      Subjective     Objective   See Exercise, Manual, and Modality Logs for complete treatment.     Moderate B lower lumbar PS tenderness with palpation       Assessment & Plan     Assessment    Assessment details: Pt continues with limited tolerance to therapy and is only tolerating the US and manual LE traction.  Pt denies having any pain with supine manual LE traction, heat, and US, but once pt starts to transition into a different position or stand and ambulate the pain returns.  Pt with difficulty ambulating due to increased pain with WB on left LE.  Holding treatment at this time due to lack of progress after 17 visits and only having temporary relief of pain while doing treatment.          Awaiting MD orders           Manual Therapy:    15     mins  01491;  Therapeutic Exercise:         mins  58878;     Neuromuscular Liz:        mins  46696;    Therapeutic Activity:          mins  54461;     Gait Training:           mins  01725;     Ultrasound:     8     mins  84948;    Electrical Stimulation:         mins  68681 ( );  Dry Needling          mins self-pay    Timed Treatment:  23    mins   Total Treatment:     50   mins    Leann Washington, PT  Physical Therapist  "

## 2022-02-14 DIAGNOSIS — I25.10 CORONARY ARTERY DISEASE INVOLVING NATIVE HEART WITHOUT ANGINA PECTORIS, UNSPECIFIED VESSEL OR LESION TYPE: ICD-10-CM

## 2022-02-15 RX ORDER — ATORVASTATIN CALCIUM 20 MG/1
TABLET, FILM COATED ORAL
Qty: 10 TABLET | Refills: 2 | Status: SHIPPED | OUTPATIENT
Start: 2022-02-15 | End: 2022-04-28 | Stop reason: SDUPTHER

## 2022-03-01 DIAGNOSIS — E11.9 TYPE 2 DIABETES MELLITUS WITHOUT COMPLICATION, WITH LONG-TERM CURRENT USE OF INSULIN: ICD-10-CM

## 2022-03-01 DIAGNOSIS — Z79.4 TYPE 2 DIABETES MELLITUS WITHOUT COMPLICATION, WITH LONG-TERM CURRENT USE OF INSULIN: ICD-10-CM

## 2022-03-01 RX ORDER — LANCETS 33 GAUGE
EACH MISCELLANEOUS
Qty: 100 EACH | Refills: 11 | Status: SHIPPED | OUTPATIENT
Start: 2022-03-01 | End: 2023-01-06 | Stop reason: SDUPTHER

## 2022-03-01 RX ORDER — BLOOD SUGAR DIAGNOSTIC
STRIP MISCELLANEOUS
Qty: 100 EACH | Refills: 11 | Status: SHIPPED | OUTPATIENT
Start: 2022-03-01 | End: 2023-01-06 | Stop reason: SDUPTHER

## 2022-03-11 ENCOUNTER — TRANSCRIBE ORDERS (OUTPATIENT)
Dept: ADMINISTRATIVE | Facility: HOSPITAL | Age: 82
End: 2022-03-11

## 2022-03-11 ENCOUNTER — LAB (OUTPATIENT)
Dept: LAB | Facility: HOSPITAL | Age: 82
End: 2022-03-11

## 2022-03-11 DIAGNOSIS — I48.0 PAROXYSMAL ATRIAL FIBRILLATION: ICD-10-CM

## 2022-03-11 DIAGNOSIS — I48.0 PAROXYSMAL ATRIAL FIBRILLATION: Primary | ICD-10-CM

## 2022-03-11 LAB — DIGOXIN SERPL-MCNC: 1.6 NG/ML (ref 0.6–1.2)

## 2022-03-11 PROCEDURE — 80162 ASSAY OF DIGOXIN TOTAL: CPT

## 2022-03-11 PROCEDURE — 36415 COLL VENOUS BLD VENIPUNCTURE: CPT

## 2022-03-27 ENCOUNTER — APPOINTMENT (OUTPATIENT)
Dept: CT IMAGING | Facility: HOSPITAL | Age: 82
End: 2022-03-27

## 2022-03-27 ENCOUNTER — HOSPITAL ENCOUNTER (EMERGENCY)
Facility: HOSPITAL | Age: 82
Discharge: HOME | End: 2022-03-27
Attending: EMERGENCY MEDICINE | Admitting: EMERGENCY MEDICINE

## 2022-03-27 ENCOUNTER — APPOINTMENT (OUTPATIENT)
Dept: GENERAL RADIOLOGY | Facility: HOSPITAL | Age: 82
End: 2022-03-27

## 2022-03-27 VITALS
BODY MASS INDEX: 40.61 KG/M2 | SYSTOLIC BLOOD PRESSURE: 148 MMHG | RESPIRATION RATE: 19 BRPM | DIASTOLIC BLOOD PRESSURE: 82 MMHG | HEIGHT: 62 IN | WEIGHT: 220.7 LBS | TEMPERATURE: 98.3 F | HEART RATE: 68 BPM | OXYGEN SATURATION: 95 %

## 2022-03-27 DIAGNOSIS — S00.03XA SCALP HEMATOMA, INITIAL ENCOUNTER: Primary | ICD-10-CM

## 2022-03-27 DIAGNOSIS — S09.90XA INJURY OF HEAD, INITIAL ENCOUNTER: ICD-10-CM

## 2022-03-27 DIAGNOSIS — S50.01XA CONTUSION OF RIGHT ELBOW, INITIAL ENCOUNTER: ICD-10-CM

## 2022-03-27 DIAGNOSIS — S80.01XA CONTUSION OF RIGHT KNEE, INITIAL ENCOUNTER: ICD-10-CM

## 2022-03-27 DIAGNOSIS — Z79.01 ANTICOAGULATED ON COUMADIN: ICD-10-CM

## 2022-03-27 DIAGNOSIS — I10 HYPERTENSION, UNSPECIFIED TYPE: ICD-10-CM

## 2022-03-27 LAB
DIGOXIN SERPL-MCNC: 1.04 NG/ML (ref 0.6–1.2)
INR PPP: 2.67 (ref 0.9–1.1)
PROTHROMBIN TIME: 29 SECONDS (ref 12.1–15)

## 2022-03-27 PROCEDURE — 36415 COLL VENOUS BLD VENIPUNCTURE: CPT

## 2022-03-27 PROCEDURE — 85610 PROTHROMBIN TIME: CPT | Performed by: EMERGENCY MEDICINE

## 2022-03-27 PROCEDURE — 99283 EMERGENCY DEPT VISIT LOW MDM: CPT

## 2022-03-27 PROCEDURE — 73560 X-RAY EXAM OF KNEE 1 OR 2: CPT

## 2022-03-27 PROCEDURE — 73080 X-RAY EXAM OF ELBOW: CPT

## 2022-03-27 PROCEDURE — 99284 EMERGENCY DEPT VISIT MOD MDM: CPT | Performed by: EMERGENCY MEDICINE

## 2022-03-27 PROCEDURE — 80162 ASSAY OF DIGOXIN TOTAL: CPT | Performed by: EMERGENCY MEDICINE

## 2022-03-27 PROCEDURE — 70450 CT HEAD/BRAIN W/O DYE: CPT

## 2022-03-27 RX ORDER — ACETAMINOPHEN 500 MG
500 TABLET ORAL ONCE
Status: COMPLETED | OUTPATIENT
Start: 2022-03-27 | End: 2022-03-27

## 2022-03-27 RX ADMIN — ACETAMINOPHEN 500 MG: 500 TABLET, FILM COATED ORAL at 23:18

## 2022-03-28 NOTE — ED PROVIDER NOTES
Subjective   History of Present Illness  History of Present Illness    Chief complaint: Fall, head injury, right-sided elbow and knee injury    Location: Top of the head, right elbow, right knee    Quality/Severity: Moderate pain, swelling    Timing/Duration: Just occurred this evening    Modifying Factors: None    Narrative: This patient presents for evaluation of injury sustained during an accidental fall at home.  She was leaning over to  a house (she lost her balance.  Unfortunately, she stumbled forward and struck the top of her head against a drywall surface in the room.  She actually caused a hole in the drywall by doing so.  She also landed on the ground onto her right side, injuring her right elbow and right knee.  Her  came to her assistance that helped her back up.  She was able to bear weight and ambulate on her legs without difficulty.  She never had any loss of consciousness or confusion.  She denies any nausea.  It sounds like she had some mild lightheadedness briefly but this resolved.  Patient does take Coumadin and has had history of heart valve surgery.    Associated Symptoms: None    Review of Systems   Constitutional: Negative for activity change, diaphoresis and fever.   Eyes: Negative for pain and visual disturbance.   Respiratory: Negative for cough and shortness of breath.    Cardiovascular: Negative for chest pain.   Gastrointestinal: Negative for abdominal pain, nausea and vomiting.   Musculoskeletal: Positive for arthralgias and joint swelling.   Skin: Negative for color change and wound.   Neurological: Positive for headaches. Negative for syncope.   All other systems reviewed and are negative.      Past Medical History:   Diagnosis Date   • AICD (automatic cardioverter/defibrillator) present     left side chest   • Anemia    • Arthritis    • Atrial fibrillation (HCC)    • Cancer (HCC) 2002    melanoma right back of knee   • CHF (congestive heart failure) ([facility]ly [facility])    •  Congenital heart disease    • GERD (gastroesophageal reflux disease)    • Graves disease    • Hematuria    • High cholesterol    • History of melanoma     RIGHT KNEE   • History of MI (myocardial infarction)    • Incontinence of urine     wears pads   • On anticoagulant therapy    • HAMLET on CPAP     cpap   • Pelvic kidney 2020   • PONV (postoperative nausea and vomiting)    • Spinal stenosis    • Ureteral calculus, left 2020   • Valvular heart disease 2015    S/p MVR & TVR        Allergies   Allergen Reactions   • Amiodarone Other (See Comments)     toxicity   • Corticosteroids Other (See Comments)     RAISES BLOOD PRESSURE   • Benzonatate Unknown - Low Severity   • Cephalexin Diarrhea   • Adhesive Tape Rash   • Hydrocodone Nausea And Vomiting and Hallucinations   • Lisinopril Other (See Comments)     CHRONIC NASAL CONGESTION       Past Surgical History:   Procedure Laterality Date   • CARDIAC CATHETERIZATION     • CARDIAC DEFIBRILLATOR PLACEMENT Left     [facility]   • CARDIAC DEFIBRILLATOR PLACEMENT Left 2016    [facility]   •  SECTION     •  SECTION     • CHOLECYSTECTOMY     • COLONOSCOPY     • ENDOSCOPY     • EPIDURAL BLOCK  [id number]    Lumbar    • EXTRACORPOREAL SHOCK WAVE LITHOTRIPSY (ESWL) Right 2017   • EXTRACORPOREAL SHOCK WAVE LITHOTRIPSY (ESWL) Right 2017    Procedure: RT EXTRACORPOREAL SHOCKWAVE LITHOTRIPSY;  Surgeon: Moisés Everett MD;  [place] [facility] [place] [facility] [place]:    • EYE SURGERY Right 09/10/2007    Cataract   • EYE SURGERY Left 10/29/2007    Cataract   • JOINT REPLACEMENT Left 2007    Knee   • JOINT REPLACEMENT Right 2010    Knee   • KNEE ARTHROSCOPY     • MAZE PROCEDURE  2015    Dr. Saavedra   • MITRAL VALVE REPAIR/REPLACEMENT     • MITRAL VALVE REPAIR/REPLACEMENT N/A 2019    Procedure: ALINE RE-OP STERNOTOMY MITRAL VALVE REPLACEMENT AND PRP;  Surgeon: Ben Saavedra MD;  Location: [facility]  MAIN OR;  Service: Cardiothoracic   • PACEMAKER IMPLANTATION Left 2010   • [place] ERCP DX COLLECTION SPECIMEN BRUSHING/WASHING N/A 08/24/2016    Procedure: ENDOSCOPIC RETROGRADE CHOLANGIOPANCREATOGRAPHY WITH SPHINCTEROTOMY AND BALLOON SWEEP OF CBD;  Surgeon: Lucas Campos MD;  Location: [facility] ENDOSCOPY;  Service: Gastroenterology   • SKIN CANCER EXCISION      MELANOMA RIGHT KNEE AREA - 2002   • TRICUSPID VALVE SURGERY  07/31/2015    Dr. Saavedra  ring placed   • URETEROSCOPY LASER LITHOTRIPSY WITH STENT INSERTION Right 11/15/2017    Procedure: CYSTOSCOPY; RIGHT URETEROSCOPY LASER LITHOTRIPSY WITH STENT INSERTION;  Surgeon: Moisés Everett MD;  Location: [facility] OR;  Service:    • URETEROSCOPY LASER LITHOTRIPSY WITH STENT INSERTION Left 01/30/2020    Procedure: URETEROSCOPY STONE BASKET EXTRACTION LASER LITHOTRIPSY WITH STENT INSERTION WITH STRING;  Surgeon: Moisés Everett MD;  Location: [facility] OR;  Service: Urology       Family History   Problem Relation Age of Onset   • Heart attack Mother    • Heart disease Mother    • Hypertension Mother    • Stroke Mother    • Macular degeneration Mother    • Heart attack Father    • Malig Hyperthermia Neg Hx        Social History     Socioeconomic History   • Marital status:    Tobacco Use   • Smoking status: Never Smoker   • Smokeless tobacco: Never Used   Vaping Use   • Vaping Use: Never used   Substance and Sexual Activity   • Alcohol use: No   • Drug use: No   • Sexual activity: Not Currently     Partners: Male     Birth control/protection: Post-menopausal     ED Triage Vitals [03/27/22 2207]   Temp Heart Rate Resp BP SpO2   98.3 °F (36.8 °C) 68 24 (!) 195/100 99 %      Temp src Heart Rate Source Patient Position BP Location FiO2 (%)   -- -- -- -- --     Objective   Physical Exam  Vitals and nursing note reviewed.   Constitutional:       General: She is not in acute distress.     Appearance: She is well-developed. She is not ill-appearing,  toxic-appearing or diaphoretic.      Comments: Pleasant, elderly lady.  No apparent distress.   HENT:      Head: Normocephalic.      Comments: There is a moderate sized scalp hematoma noted to the superior scalp with some surrounding tenderness.  There is no open laceration or wound.  Eyes:      General:         Right eye: No discharge.         Left eye: No discharge.      Pupils: Pupils are equal, round, and reactive to light.   Cardiovascular:      Rate and Rhythm: Normal rate and regular rhythm.      Pulses: Normal pulses.      Heart sounds: Murmur heard.   Pulmonary:      Effort: Pulmonary effort is normal. No respiratory distress.      Breath sounds: Normal breath sounds.      Comments: No rib or chest wall tenderness noted  Chest:      Chest wall: No tenderness.   Abdominal:      Palpations: Abdomen is soft.      Tenderness: There is no abdominal tenderness. There is no guarding.   Musculoskeletal:         General: Swelling and tenderness present. No deformity. Normal range of motion.      Cervical back: Normal range of motion and neck supple.      Comments: There is moderate tenderness and swelling to the right anterior lateral knee.  Patient is able to flex the knee partially but with some discomfort on movement because of the swelling.  The lower leg and ankle appears uninjured.  There is some moderate tenderness and swelling to the right posterior lateral elbow.  Patient is able to flex and extend the elbow through completely normal range of motion without any significant difficulty.  Distal neurovascular exam is normal to both right-sided extremities.   Skin:     General: Skin is warm and dry.      Findings: Bruising present. No erythema or rash.   Neurological:      General: No focal deficit present.      Mental Status: She is alert and oriented to person, place, and time. Mental status is at baseline.      Sensory: No sensory deficit.      Motor: No weakness.      Coordination: Coordination normal.  "  Psychiatric:         Mood and Affect: Mood normal.         Behavior: Behavior normal.         Thought Content: Thought content normal.         Judgment: Judgment normal.       .thisvisit  Results for orders placed or performed during the hospital encounter of 03/27/22   Protime-INR    Specimen: Blood   Result Value Ref Range    Protime 29.0 (H) 12.1 - 15.0 Seconds    INR 2.67 (H) 0.90 - 1.10   Digoxin Level    Specimen: Blood   Result Value Ref Range    Digoxin 1.04 0.60 - 1.20 ng/mL     *Note: Due to a large number of results and/or encounters for the requested time period, some results have not been displayed. A complete set of results can be found in Results Review.       RADIOLOGY        Study: CT head without contrast    Findings: 1. No clearly acute intracranial pathology demonstrated.  2. Generalized cerebral atrophy and nonspecific periventricular hypodensities which are thought to represent white matter microvascular change.  3. Scalp hematoma in the right parietal area.    Interpreted contemporaneously with treatment by Dr. Hernandez, independently viewed by me    RADIOLOGY        Study: X-ray right knee    Findings: 1. Status post total right knee arthroplasty. No acute fracture.    Interpreted contemporaneously with treatment by Dr. Das, independently viewed by me    RADIOLOGY        Study: X-ray right elbow    Findings: Negative    Interpreted contemporaneously with treatment by Dr. Das, independently viewed by me    Procedures           ED Course  ED Course as of 03/27/22 2327   Sun Mar 27, 2022   2324 I have reviewed the labs and the radiology reports from today's visit.  Thankfully there does not appear to be any intracranial injury or acute osseous injury.  Overall, patient looks well.  She is neurologically intact.  I think she is safe to discharge home with usual \"return to ER\" instructions for head injuries.  Advised Tylenol every 6-8 hours as needed pain.  Advised follow-up with PMD. [JONAS]    "   ED Course User Index  [JONAS] Ravinder Nicolas MD                                                 [facility]  Number of Diagnoses or Management Options     Amount and/or Complexity of Data Reviewed  Clinical lab tests: reviewed and ordered  Tests in the radiology section of CPT®: reviewed and ordered  Independent visualization of images, tracings, or specimens: yes    Risk of Complications, Morbidity, and/or Mortality  Presenting problems: moderate  Diagnostic procedures: moderate  Management options: moderate        Final diagnoses:   Scalp hematoma, initial encounter   Contusion of right knee, initial encounter   Contusion of right elbow, initial encounter   Injury of head, initial encounter   Anticoagulated on Coumadin   Hypertension, unspecified type       ED Disposition  ED Disposition     ED Disposition   Discharge    Condition   Stable    Comment   --             Rashid Ge MD  [address] [id number] [phone]    Schedule an appointment as soon as possible for a visit in 2 days  Follow-up with your PMD this week for repeat evaluation and recheck of blood pressure         Medication List      Stop    Mirabegron ER 50 MG tablet sustained-release 24 hour 24 hr tablet  Commonly known as: MYRBETRIQ     mometasone 50 MCG/ACT nasal spray  Commonly known as: NASONEX     traMADol 50 MG tablet  Commonly known as: Ravinder Waite MD  03/27/22 7212

## 2022-03-28 NOTE — DISCHARGE INSTRUCTIONS
Rest as needed.  Place ice on affected areas as needed.  Please return to the emergency room for any worsening pain, swelling, weakness, dizziness, nausea, confusion, mental status change or any other concerns.

## 2022-03-29 ENCOUNTER — OFFICE VISIT (OUTPATIENT)
Dept: FAMILY MEDICINE CLINIC | Facility: CLINIC | Age: 82
End: 2022-03-29

## 2022-03-29 VITALS
BODY MASS INDEX: 40.12 KG/M2 | HEART RATE: 63 BPM | TEMPERATURE: 97.5 F | DIASTOLIC BLOOD PRESSURE: 78 MMHG | OXYGEN SATURATION: 98 % | SYSTOLIC BLOOD PRESSURE: 120 MMHG | HEIGHT: 62 IN | WEIGHT: 218 LBS

## 2022-03-29 DIAGNOSIS — S00.03XS SCALP HEMATOMA, SEQUELA: ICD-10-CM

## 2022-03-29 DIAGNOSIS — B35.4 TINEA CORPORIS: ICD-10-CM
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
## 2022-03-29 PROCEDURE — 99214 OFFICE O/P EST MOD 30 MIN: CPT | Performed by: FAMILY MEDICINE

## 2022-03-29 RX ORDER — VIBEGRON 75 MG/1
75 TABLET, FILM COATED ORAL DAILY
COMMUNITY
End: 2022-04-28 | Stop reason: ALTCHOICE

## 2022-03-29 RX ORDER — OXYCODONE HYDROCHLORIDE AND ACETAMINOPHEN 5; 325 MG/1; MG/1
1 TABLET ORAL EVERY 4 HOURS PRN
Qty: 18 TABLET | Refills: 0 | Status: SHIPPED | OUTPATIENT
Start: 2022-03-29 | End: 2022-04-28

## 2022-03-29 RX ORDER — NYSTATIN 100000 [USP'U]/G
POWDER TOPICAL 3 TIMES DAILY
Qty: 60 G | Refills: 1 | Status: SHIPPED | OUTPATIENT
Start: 2022-03-29

## 2022-03-29 RX ORDER — DIGOXIN 250 MCG
250 TABLET ORAL DAILY
COMMUNITY
Start: 2021-12-20 | End: 2022-04-28 | Stop reason: SDUPTHER

## 2022-03-29 NOTE — PROGRESS NOTES
"  Subjective   Fernanda Molina is a 81 y.o. female who is here for   Chief Complaint   Patient presents with   • Fall     ER follow up- fell at home hit head, then fell on right side hurt elbow and knee    • Rash     Under both breast    .     History of Present Illness   Ms. Akbar is here with her  for an ER follow-up.  Patient fell in her home.  She was in the bedroom sitting down she bent over to get her house shoes when she did she tipped forward losing her balance and her head hit the wall breaking the drywall in her home.  Also bumped her right elbow and her right knee which has been replaced.  Went to the emergency room.  CT scan of head revealed scalp hematoma.  X-rays of right elbow and right knee were stable.  No fractures.  Her hardware in her right knee is in place.  Hematoma on the right parietal area has gotten better but is still fairly significant.  About 10 x 8 cm wide and of bout 2 cm in thickness.  She also has lots of bruising around her knee.    The following portions of the patient's history were reviewed and updated as appropriate: allergies, current medications, past family history, past medical history, past social history, past surgical history and problem list.    Review of Systems    Objective   Vitals:    03/29/22 1030   BP: 120/78   BP Location: Left arm   Patient Position: Sitting   Cuff Size: Large Adult   Pulse: 63   Temp: 97.5 °F (36.4 °C)   SpO2: 98%   Weight: 98.9 kg (218 lb)   Height: 157.5 cm (62\")      Physical Exam  HENT:      Head:     Musculoskeletal:      Right knee: Swelling and ecchymosis present.      Right lower leg: Edema present.      Left lower leg: Edema present.     Skin examination she has red irritated rash under both breasts    XR Elbow 3+ View Right (03/27/2022 23:06)  XR Knee 1 or 2 View Right (03/27/2022 23:05)  CT Head Without Contrast (03/27/2022 22:36)    Assessment/Plan   Diagnoses and all orders for this visit:    1. Fall, sequela (Primary)  -   "   oxyCODONE-acetaminophen (Percocet) 5-325 MG per tablet; Take 1 tablet by mouth Every 4 (Four) Hours As Needed for Severe Pain .  Dispense: 18 tablet; Refill: 0    2. Scalp hematoma, sequela  -     oxyCODONE-acetaminophen (Percocet) 5-325 MG per tablet; Take 1 tablet by mouth Every 4 (Four) Hours As Needed for Severe Pain .  Dispense: 18 tablet; Refill: 0    3. Tinea corporis  -     nystatin (MYCOSTATIN) 021001 UNIT/GM powder; Apply  topically to the appropriate area as directed 3 (Three) Times a Day. Indications: Skin Infection due to Candida Yeast  Dispense: 60 g; Refill: 1    Scalp hematoma is slowly healing.  Continue to alternate her heat and ice packs.  Percocet for pain.    Nystatin powder for her tenia skin infection on her breast.    There are no Patient Instructions on file for this visit.    There are no discontinued medications.     No follow-ups on file.    Dr. Rashid Ge  [place], [place]

## 2022-04-13 ENCOUNTER — TREATMENT (OUTPATIENT)
Dept: PHYSICAL THERAPY | Facility: CLINIC | Age: 82
End: 2022-04-13

## 2022-04-13 DIAGNOSIS — Z74.09 IMPAIRED FUNCTIONAL MOBILITY, BALANCE, GAIT, AND ENDURANCE: ICD-10-CM

## 2022-04-13 DIAGNOSIS — R26.89 ANTALGIC GAIT: Primary | ICD-10-CM

## 2022-04-13 DIAGNOSIS — M25.561 ACUTE PAIN OF RIGHT KNEE: ICD-10-CM

## 2022-04-13 DIAGNOSIS — M25.661 DECREASED ROM OF RIGHT KNEE: ICD-10-CM

## 2022-04-13 DIAGNOSIS — R29.898 DECREASED STRENGTH OF LOWER EXTREMITY: ICD-10-CM

## 2022-04-13 PROCEDURE — 97110 THERAPEUTIC EXERCISES: CPT | Performed by: PHYSICAL THERAPIST

## 2022-04-13 PROCEDURE — 97162 PT EVAL MOD COMPLEX 30 MIN: CPT | Performed by: PHYSICAL THERAPIST

## 2022-04-13 NOTE — PROGRESS NOTES
Physical Therapy Initial Evaluation and Plan of Care    Patient: Fernanda Molina   : 1940  Diagnosis/ICD-10 Code:  Antalgic gait [R26.89]  Referring practitioner: MD Viki Samuel APRN     Subjective Evaluation    History of Present Illness  Mechanism of injury: Pt is a 80 y/o WF who reports to the clinic with right LE pain, swelling, hematoma, and ecchymosis after falling on 3/27 when she bent over to  her house slippers and went down on her right leg.  Pt hit her head on the wall and made a hole in the dry wall.  Pt went to ER-had CT of head (negative), x-ray of right elbow and knee (negative for Fx).  Pt had a nerve ending ablation on  and it seemed to be helping the left LE pain and back, but then fell so not sure due to the increase in pain now in the right leg since the fall.  Pt F/U with pain management on 3/30-referred to PT for gait.  Pt does not know if she was dizzy or passed out, but her Cardiologist wants a defib reading and wants it unscheduled reading to see if something caused the fall.  Her last reading on  was ok.   Pt states now her right leg, posterior leg, lateral knee and thigh are hurting really bad that she can't walk or lift her leg without pain. Pt has an appointment with Ortho 2022 and sees HESHAM Mendez.  Pt states at times if feels like a pin is sticking in her lower leg.  It can happen at any time, but she rubs it and it stops.  Pt follows up with Pain Management on .  Pt does not follow up with Spine MD since he would not do surgery on her due to her medical Hx.        Subjective comment: Pt states her legs feel weak since the fall   Patient Occupation: retired  Quality of life: fair    Pain  Current pain ratin  At worst pain rating: 10  Location: right thigh and knee   Quality: sharp (shooting )  Relieving factors: ice and medications (rub it, hydrocodone PRN, cardio MD does not  want her to take due to INR changes )  Aggravating factors: standing and ambulation (constant pain- just hurts-no specific movement )    Hand dominance: right    Diagnostic Tests  X-ray: normal (ER-right elbow and knee )  CT scan: normal (head )    Treatments  No previous or current treatments  Previous treatment: physical therapy (back 12/2021)  Current treatment: medication  Patient Goals  Patient goals for therapy: decreased pain, increased strength, independence with ADLs/IADLs and improved balance             Objective          Observations     Right Hip  Positive for effusion.     Additional Hip Observation Details  Right lateral knee-hematome at Inferior patella 49.9 cm   Left 48.4 cm   Right lateral ankle moderate effusion, mild right knee to lower leg ecchymosis         Palpation     Right   No palpable tenderness to the vastus medialis. Tenderness of the rectus femoris and vastus lateralis.     Additional Palpation Details  Pt with moderate tenderness over the right lateral distal quad.      Active Range of Motion   Left Knee   Flexion: 100 degrees   Extension: 0 degrees     Right Knee   Flexion: 85 degrees with pain  Extension: 5 degrees with pain    Patellar Mobility   Left Knee Patellar tendons within functional limits include the medial, lateral, superior and inferior.     Right Knee Patellar tendons within functional limits include the medial, lateral, superior and inferior.     Strength/Myotome Testing     Left Hip   Planes of Motion   Flexion: 3+  Abduction: 4  Adduction: 4+    Right Hip   Planes of Motion   Flexion: 3  Abduction: 3  Adduction: 3    Left Knee   Flexion: 5  Extension: 5  Quadriceps contraction: good    Right Knee   Flexion: 2+  Extension: 1  Quadriceps contraction: fair    Additional Strength Details  Right hip flex tested in sitting.  Pt could not lift right LE to place it on table.  Supine right hip flex 1/5 due to pt being in too much pain to lift it.     Fair right QS due to pain  and swelling right knee     Tests     Additional Tests Details  NA due to pt with moderate right lateral knee hematoma-Pt had not seen an Ortho-had x-ray negative for Fx, but did not want to do testing until pt was seen by an Orthopedic.      Ambulation     Observational Gait   Gait: antalgic   Decreased walking speed, stride length, right stance time, right swing time and right step length.   Right foot contact pattern: foot flat    Additional Observational Gait Details  Pt ambulates with a moderate antalgic gait using a cane on the right LE           Assessment & Plan     Assessment  Impairments: abnormal gait, abnormal muscle firing, abnormal or restricted ROM, activity intolerance, impaired balance, impaired physical strength, lacks appropriate home exercise program, pain with function and weight-bearing intolerance  Functional Limitations: walking, uncomfortable because of pain, standing and unable to perform repetitive tasks  Assessment details: Pt is a 80 y/o WF who reports to the clinic with c/o right LE pain and knee pain after falling on 3/27 and developing a hematoma on her right lateral knee.  Pt with decreased gait, decreased right LE strength, decreased right knee AROM, right lower leg and ankle swelling, ecchymosis, right distal lateral quad tenderness, and decreased functional mobility due to increased right LE pain.  Pt did not receive treatment today due to not being evaluated by an Orthopedic and pt having 10/10 pain with a lack of strength.  Pt was educated on possible causes of her right LE pain, anatomy of the knee, discuss with pt about performing QS and ankle pumps until pt sees Ortho MD on 4/18.  Feel pt could benefit from PT once per has been evaluated by Ortho MD for decreased pain, improved strength, ROM, decreased effusion, and improved gait.  Will wait for additional orders from Ortho to start treatment on the right LE.      Prognosis: good    Goals  Plan Goals: STGs: 2-4 weeks  1.  Decrease right knee pain 5/10 with standing and gait.    2. Increase right knee AROM 0-100 degrees   3.  Decrease right lateral distal quad tenderness to minimal with palpation    4.  Pt is able to perform a right SLR independently   5.  Pt ambulates into clinic with AD minimal antalgic gait right LE   6.  Decrease right lateral knee hematoma at the IP with girth measurement to 49 cm.      LTGs: 4-8 weeks  1.  Pt Independent with [facility] for self management.    2.  Increase right knee ext and flex strength to 4+/5   3.  Increase right hip flex, abd, and add strength to 4-4+/5    4.  Decrease right lateral quad and knee tenderness to none with palpation   5.  Decrease right knee pain to a 2/10 with standing and ambulation.        Plan  Therapy options: will be seen for skilled therapy services  Planned modality interventions: cryotherapy, thermotherapy (hydrocollator packs) and ultrasound  Planned therapy interventions: joint mobilization, home exercise program, gait training, flexibility, strengthening, stretching, functional ROM exercises, manual therapy, soft tissue mobilization, therapeutic activities, balance/weight-bearing training and neuromuscular re-education  Frequency: 2x week  Duration in weeks: 8  Treatment plan discussed with: patient        Manual Therapy:         mins  00508;  Therapeutic Exercise:   10      mins  85616;     Neuromuscular Liz:        mins  36350;    Therapeutic Activity:          mins  99977;     Gait Training:           mins  02724;     Ultrasound:          mins  44394;    Electrical Stimulation:         mins  55603 ( );   Traction                     ___  mins 24857     Timed Treatment:  10    mins   Total Treatment:     54   mins    PT SIGNATURE: [facility] [place] # [id number]  DATE TREATMENT INITIATED: 4/21/2022  Electronically signed 4/21/2022     Certification Period: 4/21/2022 thru 7/19/2022  I certify that the therapy services are furnished while this patient is under my  care.  The services outlined above are required by this patient, and will be reviewed every 90 days.     _______________________________________________________________________  PHYSICIAN: Geraldo Velazquez MD      DATE:     Please sign and return via fax to [phone].. Thank you, [facility].

## 2022-04-14 ENCOUNTER — OFFICE VISIT (OUTPATIENT)
Dept: SLEEP MEDICINE | Facility: HOSPITAL | Age: 82
End: 2022-04-14

## 2022-04-14 VITALS — HEART RATE: 67 BPM | OXYGEN SATURATION: 97 % | HEIGHT: 62 IN | BODY MASS INDEX: 39.9 KG/M2 | WEIGHT: 216.8 LBS

## 2022-04-14 DIAGNOSIS — G47.14 HYPERSOMNIA DUE TO MEDICAL CONDITION: ICD-10-CM

## 2022-04-14 DIAGNOSIS — E66.01 CLASS 2 SEVERE OBESITY DUE TO EXCESS CALORIES WITH SERIOUS COMORBIDITY AND BODY MASS INDEX (BMI) OF 39.0 TO 39.9 IN ADULT: ICD-10-CM

## 2022-04-14 DIAGNOSIS — G47.33 OSA ON CPAP: Primary | ICD-10-CM

## 2022-04-14 DIAGNOSIS — Z99.89 OSA ON CPAP: Primary | ICD-10-CM

## 2022-04-14 PROCEDURE — G0463 HOSPITAL OUTPT CLINIC VISIT: HCPCS

## 2022-04-14 PROCEDURE — 99213 OFFICE O/P EST LOW 20 MIN: CPT | Performed by: INTERNAL MEDICINE

## 2022-04-14 NOTE — PROGRESS NOTES
"Follow Up [facility] Note     Chief Complaint:  HAMLET     Primary Care Physician: Rashid Ge MD    Interval History:   The patient is a 81 y.o. female  who I last saw 6/24/2021 and that note was reviewed.  The patient did obtain a new ResMed auto CPAP 2/13/2022.  The patient reports that she recently fell at the end of March.  Her usage of her auto CPAP has decreased some but is now increasing back to her normal.  She could not sleep due to her injuries to her knee and ankle.  Additionally, she had a contusion on her head where her head knocked a hole in the drywall.    Self-administered Lowell Sleepiness Scale test results: 7  0-5 Lower normal daytime sleepiness  6-10 Higher normal daytime sleepiness  11-12 Mild, 13-15 Moderate, & 16-24 Severe excessive daytime sleepiness    Review of Systems:    A complete review of systems was done and all were negative with the exception of the above    Social History:    Social History     Socioeconomic History   • Marital status:    Tobacco Use   • Smoking status: Never Smoker   • Smokeless tobacco: Never Used   Vaping Use   • Vaping Use: Never used   Substance and Sexual Activity   • Alcohol use: No   • Drug use: No   • Sexual activity: Not Currently     Partners: Male     Birth control/protection: Post-menopausal       Allergies:  Amiodarone, Corticosteroids, Benzonatate, Cephalexin, Adhesive tape, Hydrocodone, and Lisinopril     Medication Review:  Reviewed.      Vital Signs:    Vitals:    04/14/22 0900   Pulse: 67   SpO2: 97%   Weight: 98.3 kg (216 lb 12.8 oz)   Height: 157.5 cm (62\")     Body mass index is 39.65 kg/m².    Physical Exam:    Constitutional:  Well developed 81 y.o. female that appears in no apparent distress.  Awake & oriented times 3.  Normal mood with normal recent and remote memory and normal judgement.  Eyes:  Conjunctivae normal.  Oropharynx: Previously, moist mucous membranes without exudate and a large tongue and class III " Mallampati airway, patient is wearing a facemask.     Downloaded PAP Data Reviewed For Compliance:  DME is [facility] and she uses nasal pillows.  Downloads between 2/15 and 4/11/2022.  Average usage is 7 hours and 42 minutes.  Average AHI is normal without a significant leak.  Average auto CPAP pressure is 13.6 and her auto CPAP is 12-16.    I have reviewed the above results and compared them with the patient's last downloads and reviewed with the patient.    Impression:   Obstructive sleep apnea adequately treated with ResMed auto CPAP. The patient appears to be at goal with good compliance and usage. The patient has some complaints of hypersomnolence and her Volant Sleepiness Scale is borderline normal at 7.    Plan:  Good sleep hygiene measures should be maintained.  Weight loss would be beneficial in this patient who has class 3 severe obesity by BMI.      After evaluating the patient and assessing results available, the patient is benefiting from the treatment being provided.     The patient will continue ResMed auto CPAP.  After clinical evaluation and review of downloads, I recommend no changes to the patient's pressures.  A new prescription will be sent to the patient's DME.    I answered all of the patient's questions.  The patient will call for any problems and will follow up in June.      Mahin Galeas MD  Sleep Medicine  04/14/22  10:59 EDT

## 2022-04-18 ENCOUNTER — OFFICE VISIT (OUTPATIENT)
Dept: ORTHOPEDIC SURGERY | Facility: CLINIC | Age: 82
End: 2022-04-18

## 2022-04-18 ENCOUNTER — PATIENT ROUNDING (BHMG ONLY) (OUTPATIENT)
Dept: ORTHOPEDIC SURGERY | Facility: CLINIC | Age: 82
End: 2022-04-18

## 2022-04-18 VITALS
HEIGHT: 62 IN | DIASTOLIC BLOOD PRESSURE: 66 MMHG | SYSTOLIC BLOOD PRESSURE: 113 MMHG | BODY MASS INDEX: 39.56 KG/M2 | HEART RATE: 66 BPM | WEIGHT: 215 LBS

## 2022-04-18 DIAGNOSIS — T14.8XXA HEMATOMA: ICD-10-CM

## 2022-04-18 DIAGNOSIS — S80.01XA TRAUMATIC HEMATOMA OF RIGHT KNEE, INITIAL ENCOUNTER: ICD-10-CM

## 2022-04-18 DIAGNOSIS — M25.571 ACUTE RIGHT ANKLE PAIN: Primary | ICD-10-CM

## 2022-04-18 DIAGNOSIS — M25.471 ANKLE SWELLING, RIGHT: ICD-10-CM

## 2022-04-18 PROCEDURE — 73610 X-RAY EXAM OF ANKLE: CPT | Performed by: NURSE PRACTITIONER

## 2022-04-18 PROCEDURE — 99213 OFFICE O/P EST LOW 20 MIN: CPT | Performed by: NURSE PRACTITIONER

## 2022-04-18 NOTE — PROGRESS NOTES
April 18, 2022    Hello, may I speak with Fernanda Molina?    My name is IVANNA    I am  with [facility]  [address]  [place] [facility] [address] [phone].    Before we get started may I verify your date of birth? 1940    I am calling to officially welcome you to our practice and ask about your recent visit. Is this a good time to talk? YES    Tell me about your visit with us. What things went well?  EVERYTHING WENT GREAT       We're always looking for ways to make our patients' experiences even better. Do you have recommendations on ways we may improve? NO    Overall were you satisfied with your first visit to our practice? YES       I appreciate you taking the time to speak with me today. Is there anything else I can do for you? NO      Thank you, and have a great day.

## 2022-04-18 NOTE — PROGRESS NOTES
Subjective:     Patient ID: Fernanda Molina is a 81 y.o. female.    Chief Complaint:  Right lower extremity injury, new patient to examiner  History of Present Illness  Fernnada Molina 81-year-old female presents to clinic for evaluation of her right lower extremity.  On 3/27/2022 she fell with the knee flexed fell to the right side striking the lateral aspect of the knee as well as her ankle when she fell into a wall.  She did put a hole in the drywall.  She presented to [facility] ER for evaluation.  X-ray images were completed of her knee which are available for viewing in chart.  She is continued weightbearing as tolerated she was instructed to apply ice and rest which she has completed.  She has noted significantly decreased swelling at the right lower extremity however continues to experience pain at the distal thigh as well as the knee while swelling down into her ankle.  Her glucose has been fairly well controlled did initially increased to 180 but runs between 1/29 and 1/30/1930 which is what she was previously running prior to injury.  She was started on oxycodone/acetaminophen by her primary care.  She does advise that her cardiologist recommended that she do not take the pain medication unless she absolutely needed it.  Rates discomfort at 8-9 out of a 10 described as aching, burning in nature.  Increased pain noted with walking, standing, seated.  Pain does decrease at night when she is able to get in a comfortable position with in regards to the swelling but increases throughout the day.  Currently using a cane and walker for ambulating purposes.  She was referred for physical therapy prior to your injury for lumbar spine by pain management.  PT has not yet evaluated her for lower extremity injury.  She is experiencing some redness and bruising and swelling and stiffness down the right lower extremity.  Symptoms have decreased.  She had total knee arthroplasty January 2010 with Dr. Driscoll  the right lower extremity.  She has multiple cardiac issues has been wearing prior to injury support stockings but is unable to get them on at this time.  Denies other concerns present time.     Social History     Occupational History   • Not on file   Tobacco Use   • Smoking status: Never Smoker   • Smokeless tobacco: Never Used   Vaping Use   • Vaping Use: Never used   Substance and Sexual Activity   • Alcohol use: No   • Drug use: No   • Sexual activity: Not Currently     Partners: Male     Birth control/protection: Post-menopausal      Past Medical History:   Diagnosis Date   • AICD (automatic cardioverter/defibrillator) present     left side chest   • Anemia    • Arthritis    • Atrial fibrillation (HCC)    • Cancer (HCC)     melanoma right back of knee   • CHF (congestive heart failure) (HCC)    • Congenital heart disease    • GERD (gastroesophageal reflux disease)    • Graves disease    • Hematuria    • High cholesterol    • History of melanoma     RIGHT KNEE   • History of MI (myocardial infarction)    • Incontinence of urine     wears pads   • On anticoagulant therapy    • HAMLET on CPAP     cpap   • Pelvic kidney 2020   • PONV (postoperative nausea and vomiting)    • Spinal stenosis    • Ureteral calculus, left 2020   • Valvular heart disease 2015    S/p MVR & TVR      Past Surgical History:   Procedure Laterality Date   • CARDIAC CATHETERIZATION     • CARDIAC DEFIBRILLATOR PLACEMENT Left     [facility]   • CARDIAC DEFIBRILLATOR PLACEMENT Left     [facility]   •  SECTION     •  SECTION     • CHOLECYSTECTOMY     • COLONOSCOPY     • ENDOSCOPY     • EPIDURAL BLOCK  [id number]    Lumbar    • EXTRACORPOREAL SHOCK WAVE LITHOTRIPSY (ESWL) Right 2017   • EXTRACORPOREAL SHOCK WAVE LITHOTRIPSY (ESWL) Right 2017    Procedure: RT EXTRACORPOREAL SHOCKWAVE LITHOTRIPSY;  Surgeon: Moisés Everett MD;  [facility] [facility] [place] [facility]  Service:    • EYE  SURGERY Right 09/10/2007    Cataract   • EYE SURGERY Left 10/29/2007    Cataract   • JOINT REPLACEMENT Left 04/03/2007    Knee   • JOINT REPLACEMENT Right 01/11/2010    Knee   • KNEE ARTHROSCOPY  1980   • MAZE PROCEDURE  07/31/2015    Dr. Saavedra   • MITRAL VALVE REPAIR/REPLACEMENT  2015   • MITRAL VALVE REPAIR/REPLACEMENT N/A 05/14/2019    Procedure: ALINE RE-OP STERNOTOMY MITRAL VALVE REPLACEMENT AND PRP;  Surgeon: Ben Saavedra MD;  Location: [facility];  Service: Cardiothoracic   • PACEMAKER IMPLANTATION Left 2010   • W[place] ERCP DX COLLECTION SPECIMEN BRUSHING/WASHING N/A 08/24/2016    Procedure: ENDOSCOPIC RETROGRADE CHOLANGIOPANCREATOGRAPHY WITH SPHINCTEROTOMY AND BALLOON SWEEP OF CBD;  Surgeon: Lucas Campos MD;  Location: [facility] ENDOSCOPY;  Service: Gastroenterology   • SKIN CANCER EXCISION      MELANOMA RIGHT KNEE AREA - 2002   • TRICUSPID VALVE SURGERY  07/31/2015    Dr. Saavedra  ring placed   • URETEROSCOPY LASER LITHOTRIPSY WITH STENT INSERTION Right 11/15/2017    Procedure: CYSTOSCOPY; RIGHT URETEROSCOPY LASER LITHOTRIPSY WITH STENT INSERTION;  Surgeon: Moisés Everett MD;  Location: [facility];  Service:    • URETEROSCOPY LASER LITHOTRIPSY WITH STENT INSERTION Left 01/30/2020    Procedure: URETEROSCOPY STONE BASKET EXTRACTION LASER LITHOTRIPSY WITH STENT INSERTION WITH STRING;  Surgeon: Moisés Everett MD;  Location: [facility];  Service: Urology       Family History   Problem Relation Age of Onset   • Heart attack Mother    • Heart disease Mother    • Hypertension Mother    • Stroke Mother    • Macular degeneration Mother    • Heart attack Father    • Malig Hyperthermia Neg Hx          Objective:  Physical Exam    Vital signs reviewed.   General: No acute distress.  Eyes: conjunctiva clear; pupils equally round and reactive  ENT: external ears and nose atraumatic; oropharynx clear  CV: no peripheral edema  Resp: normal respiratory effort  Skin: no rashes or wounds; normal  "turgor  Psych: mood and affect appropriate; recent and remote memory intact    Vitals:    04/18/22 1431   BP: 113/66   Pulse: 66   Weight: 97.5 kg (215 lb)   Height: 157.5 cm (62\")         04/18/22  1431   Weight: 97.5 kg (215 lb)     Body mass index is 39.32 kg/m².      Ortho Exam     Right lower extremity examined  Hematoma lateral aspect of knee  Passive range of motion 0 degrees extension to 80 degrees flexion  Positive tenderness along the lateral and medial joint line as well as the anterior aspect of the knee with pain radiating down to the lateral aspect of the ankle.  Significant hematoma the lateral aspect of the knee, this hematoma also the top of her scalp, positive tenderness to the anterior lateral aspect of the distal thigh no evidence of any disruption of the quad tendon  Stable to varus and valgus stress at 0 degrees and 30 degrees  Right ankle moderate soft tissue swelling, maximal tenderness along the anterior lateral aspect of the ankle  All compartments soft easily compressible, positive sensation light touch the dorsum plantar surface of the foot and all digits  2+ dorsalis pedis pulse    Imaging:  Right Ankle X-Ray  Indication: Pain  Views: AP, Lateral, Mortise  Findings:  No fracture  No bony lesion  Positive soft tissue swelling  Normal joint spaces    No prior studies available for comparison.    Independently reviewed two-view x-ray images right knee status post total knee arthroplasty without any fracture, dislocation or other acute osseous abnormality no evidence of any loosening implant remains intact anatomical alignment appreciated    XR Knee 1 or 2 View Right    Result Date: 3/27/2022  1. Status post total right knee arthroplasty. No acute fracture. Signer Name: Ravinder Das MD  Signed: 3/27/2022 11:11 PM  Workstation Name: [employer]  [facility]    Assessment:        1. Acute right ankle pain    2. Hematoma    3. Ankle swelling, right    4. Traumatic " hematoma of right knee, initial encounter           Plan:  1. Discussed plan of care with patient.  She does have injuries from previous fall which are due to hematoma of the knee.  I do recommend she continue with physical therapy.  Discussed Ace wrap for compression for swelling of the right ankle as well as the right knee.  We will proceed with referral to physical therapy for range of motion strengthening.  Weightbearing as tolerated.  Discussed discontinuing the application of ice will have her apply heat to the knee.  Plan to see her back in clinic in approximately 3 to 4 weeks to reevaluate.  All questions answered.  Orders:  Orders Placed This Encounter   Procedures   • XR Ankle 3+ View Right     No orders of the defined types were placed in this encounter.          I ordered and reviewed the MARTÍN today.

## 2022-04-20 DIAGNOSIS — Z79.4 TYPE 2 DIABETES MELLITUS WITHOUT COMPLICATION, WITH LONG-TERM CURRENT USE OF INSULIN: Primary | ICD-10-CM

## 2022-04-20 DIAGNOSIS — E03.9 ACQUIRED HYPOTHYROIDISM: ICD-10-CM

## 2022-04-20 DIAGNOSIS — Z79.4 TYPE 2 DIABETES MELLITUS WITHOUT COMPLICATION, WITH LONG-TERM CURRENT USE OF INSULIN: ICD-10-CM

## 2022-04-20 DIAGNOSIS — E11.9 TYPE 2 DIABETES MELLITUS WITHOUT COMPLICATION, WITH LONG-TERM CURRENT USE OF INSULIN: ICD-10-CM

## 2022-04-20 DIAGNOSIS — Z51.81 MEDICATION MONITORING ENCOUNTER: ICD-10-CM

## 2022-04-20 DIAGNOSIS — I25.10 CORONARY ARTERY DISEASE INVOLVING NATIVE HEART WITHOUT ANGINA PECTORIS, UNSPECIFIED VESSEL OR LESION TYPE: ICD-10-CM

## 2022-04-20 DIAGNOSIS — E11.9 TYPE 2 DIABETES MELLITUS WITHOUT COMPLICATION, WITH LONG-TERM CURRENT USE OF INSULIN: Primary | ICD-10-CM

## 2022-04-21 DIAGNOSIS — T14.8XXA HEMATOMA: ICD-10-CM

## 2022-04-21 DIAGNOSIS — M25.471 ANKLE SWELLING, RIGHT: ICD-10-CM

## 2022-04-21 DIAGNOSIS — M25.571 ACUTE RIGHT ANKLE PAIN: Primary | ICD-10-CM

## 2022-04-21 PROBLEM — S80.01XA TRAUMATIC HEMATOMA OF RIGHT KNEE: Status: ACTIVE | Noted: 2022-04-21

## 2022-04-21 NOTE — PROGRESS NOTES
See session summary   Sent pt message via [employer] that Dr. John is out of the office until Tuesday and APRN cannot refill this medication

## 2022-04-22 LAB
ALT SERPL-CCNC: 22 IU/L (ref 0–32)
BUN SERPL-MCNC: 16 MG/DL (ref 8–27)
BUN/CREAT SERPL: 19 (ref 12–28)
CALCIUM SERPL-MCNC: 10.7 MG/DL (ref 8.7–10.3)
CHLORIDE SERPL-SCNC: 98 MMOL/L (ref 96–106)
CHOLEST SERPL-MCNC: 197 MG/DL (ref 100–199)
CO2 SERPL-SCNC: 22 MMOL/L (ref 20–29)
CREAT SERPL-MCNC: 0.85 MG/DL (ref 0.57–1)
EGFRCR SERPLBLD CKD-EPI 2021: 70 ML/MIN/1.73
ERYTHROCYTE [DISTWIDTH] IN BLOOD BY AUTOMATED COUNT: 14.5 % (ref 11.7–15.4)
GLUCOSE SERPL-MCNC: 135 MG/DL (ref 65–99)
HBA1C MFR BLD: 6.3 % (ref 4.8–5.6)
HCT VFR BLD AUTO: 43 % (ref 34–46.6)
HDLC SERPL-MCNC: 41 MG/DL
HGB BLD-MCNC: 14.3 G/DL (ref 11.1–15.9)
LDLC SERPL CALC-MCNC: 103 MG/DL (ref 0–99)
MCH RBC QN AUTO: 30.1 PG (ref 26.6–33)
MCHC RBC AUTO-ENTMCNC: 33.3 G/DL (ref 31.5–35.7)
MCV RBC AUTO: 91 FL (ref 79–97)
PLATELET # BLD AUTO: 228 X10E3/UL (ref 150–450)
POTASSIUM SERPL-SCNC: 4.1 MMOL/L (ref 3.5–5.2)
RBC # BLD AUTO: 4.75 X10E6/UL (ref 3.77–5.28)
SODIUM SERPL-SCNC: 138 MMOL/L (ref 134–144)
TRIGL SERPL-MCNC: 311 MG/DL (ref 0–149)
TSH SERPL DL<=0.005 MIU/L-ACNC: 16.1 UIU/ML (ref 0.45–4.5)
UNABLE TO VOID: NORMAL
VLDLC SERPL CALC-MCNC: 53 MG/DL (ref 5–40)
WBC # BLD AUTO: 6 X10E3/UL (ref 3.4–10.8)

## 2022-04-28 ENCOUNTER — TREATMENT (OUTPATIENT)
Dept: PHYSICAL THERAPY | Facility: CLINIC | Age: 82
End: 2022-04-28

## 2022-04-28 ENCOUNTER — OFFICE VISIT (OUTPATIENT)
Dept: FAMILY MEDICINE CLINIC | Facility: CLINIC | Age: 82
End: 2022-04-28

## 2022-04-28 VITALS
OXYGEN SATURATION: 99 % | HEIGHT: 62 IN | BODY MASS INDEX: 40.3 KG/M2 | HEART RATE: 63 BPM | WEIGHT: 219 LBS | DIASTOLIC BLOOD PRESSURE: 74 MMHG | TEMPERATURE: 96.8 F | SYSTOLIC BLOOD PRESSURE: 126 MMHG

## 2022-04-28 DIAGNOSIS — E03.9 ACQUIRED HYPOTHYROIDISM: ICD-10-CM

## 2022-04-28 DIAGNOSIS — B35.4 TINEA CORPORIS: Primary | ICD-10-CM

## 2022-04-28 DIAGNOSIS — I25.10 CORONARY ARTERY DISEASE INVOLVING NATIVE HEART WITHOUT ANGINA PECTORIS, UNSPECIFIED VESSEL OR LESION TYPE: ICD-10-CM

## 2022-04-28 DIAGNOSIS — M25.561 ACUTE PAIN OF RIGHT KNEE: ICD-10-CM

## 2022-04-28 DIAGNOSIS — Z51.81 MEDICATION MONITORING ENCOUNTER: ICD-10-CM

## 2022-04-28 DIAGNOSIS — M25.571 ACUTE RIGHT ANKLE PAIN: ICD-10-CM

## 2022-04-28 DIAGNOSIS — Z74.09 IMPAIRED FUNCTIONAL MOBILITY, BALANCE, GAIT, AND ENDURANCE: ICD-10-CM

## 2022-04-28 DIAGNOSIS — Z51.81 MEDICATION MONITORING ENCOUNTER: Primary | ICD-10-CM

## 2022-04-28 DIAGNOSIS — E11.9 TYPE 2 DIABETES MELLITUS WITHOUT COMPLICATION, WITHOUT LONG-TERM CURRENT USE OF INSULIN: ICD-10-CM

## 2022-04-28 DIAGNOSIS — I11.0 HYPERTENSIVE HEART DISEASE WITH HEART FAILURE: ICD-10-CM

## 2022-04-28 DIAGNOSIS — R26.89 ANTALGIC GAIT: Primary | ICD-10-CM

## 2022-04-28 DIAGNOSIS — I25.10 CORONARY ARTERY DISEASE INVOLVING NATIVE CORONARY ARTERY OF NATIVE HEART WITHOUT ANGINA PECTORIS: ICD-10-CM

## 2022-04-28 DIAGNOSIS — M25.661 DECREASED ROM OF RIGHT KNEE: ICD-10-CM

## 2022-04-28 DIAGNOSIS — R29.898 DECREASED STRENGTH OF LOWER EXTREMITY: ICD-10-CM

## 2022-04-28 PROCEDURE — 99214 OFFICE O/P EST MOD 30 MIN: CPT | Performed by: FAMILY MEDICINE

## 2022-04-28 PROCEDURE — 97110 THERAPEUTIC EXERCISES: CPT | Performed by: PHYSICAL THERAPIST

## 2022-04-28 PROCEDURE — 97530 THERAPEUTIC ACTIVITIES: CPT | Performed by: PHYSICAL THERAPIST

## 2022-04-28 RX ORDER — FUROSEMIDE 40 MG/1
40 TABLET ORAL DAILY
Qty: 90 TABLET | Refills: 1 | Status: SHIPPED | OUTPATIENT
Start: 2022-04-28

## 2022-04-28 RX ORDER — LEVOTHYROXINE SODIUM 175 UG/1
175 TABLET ORAL DAILY
Qty: 90 TABLET | Refills: 1 | Status: SHIPPED | OUTPATIENT
Start: 2022-04-28 | End: 2022-10-10

## 2022-04-28 RX ORDER — KETOCONAZOLE 20 MG/G
1 CREAM TOPICAL EVERY 12 HOURS SCHEDULED
Qty: 60 G | Refills: 5 | Status: SHIPPED | OUTPATIENT
Start: 2022-04-28

## 2022-04-28 RX ORDER — ATORVASTATIN CALCIUM 20 MG/1
20 TABLET, FILM COATED ORAL DAILY
Qty: 90 TABLET | Refills: 3 | Status: SHIPPED | OUTPATIENT
Start: 2022-04-28

## 2022-04-28 NOTE — PROGRESS NOTES
"  Chief Complaint   Patient presents with   • Diabetes   • Hypothyroidism   • Coronary Artery Disease       Subjective   Frenanda Molina is an 81 y.o. female who presents for follow up of diabetes. Current symptoms include: hyperglycemia, paresthesia of the feet and visual disturbances. Patient denies foot ulcerations. Evaluation to date has included: fasting blood sugar, fasting lipid panel and hemoglobin A1C. Home sugars: BGs are high in the morning. Current treatments: more intensive attention to diet which has been not very effective, low cholesterol diet which has been not very effective, increased aerobic exercise which has been ineffective, Continued insulin which has been effective, Continued metformin which has been somewhat effective, Continued statin which has been somewhat effective and Continued ACE inhibitor/ARB which has been effective. Discussed importance of yearly eye exams and checking feet for skin integrity.      The following portions of the patient's history were reviewed and updated as appropriate: allergies, current medications, past medical history, past social history, past surgical history and problem list.        Review of Systems  Pertinent items are noted in HPI.     Vitals:    04/28/22 1022   BP: 126/74   BP Location: Left arm   Patient Position: Sitting   Cuff Size: Adult   Pulse: 63   Temp: 96.8 °F (36 °C)   SpO2: 99%   Weight: 99.3 kg (219 lb)   Height: 157.5 cm (62\")       Objective    Gen:  Alert, pleasant  Ears: canals clear, TMs normal  Throat: clear , no thrush, teeth ok  Neck: no bruit, no LAD  Lungs: clear  Heart: RR + murmur  Feet:  No rash, no skin breakdown, sensation grossly normal. + edema  Skin: hematoma scalp, and right knee  Tinea, red , under breasts.      Laboratory:  Results for orders placed or performed in visit on 04/20/22   TSH    Specimen: Blood   Result Value Ref Range    TSH 16.100 (H) 0.450 - 4.500 uIU/mL   CBC (No Diff)    Specimen: Blood   Result Value " Ref Range    WBC 6.0 3.4 - 10.8 x10E3/uL    RBC 4.75 3.77 - 5.28 x10E6/uL    Hemoglobin 14.3 11.1 - 15.9 g/dL    Hematocrit 43.0 34.0 - 46.6 %    MCV 91 79 - 97 fL    MCH 30.1 26.6 - 33.0 pg    MCHC 33.3 31.5 - 35.7 g/dL    RDW 14.5 11.7 - 15.4 %    Platelets 228 150 - 450 x10E3/uL   Hemoglobin A1c    Specimen: Blood   Result Value Ref Range    Hemoglobin A1C 6.3 (H) 4.8 - 5.6 %   ALT    Specimen: Blood   Result Value Ref Range    ALT (SGPT) 22 0 - 32 IU/L   Basic Metabolic Panel    Specimen: Blood   Result Value Ref Range    Glucose 135 (H) 65 - 99 mg/dL    BUN 16 8 - 27 mg/dL    Creatinine 0.85 0.57 - 1.00 mg/dL    EGFR Result 70 >59 mL/min/1.73    BUN/Creatinine Ratio 19 12 - 28    Sodium 138 134 - 144 mmol/L    Potassium 4.1 3.5 - 5.2 mmol/L    Chloride 98 96 - 106 mmol/L    Total CO2 22 20 - 29 mmol/L    Calcium 10.7 (H) 8.7 - 10.3 mg/dL   Lipid Panel    Specimen: Blood   Result Value Ref Range    Total Cholesterol 197 100 - 199 mg/dL    Triglycerides 311 (H) 0 - 149 mg/dL    HDL Cholesterol 41 >39 mg/dL    VLDL Cholesterol Scottie 53 (H) 5 - 40 mg/dL    LDL Chol Calc (NIH) 103 (H) 0 - 99 mg/dL   Unable To Void   Result Value Ref Range    Unable to Void Comment      *Note: Due to a large number of results and/or encounters for the requested time period, some results have not been displayed. A complete set of results can be found in Results Review.        Assessment/Plan        Addressed ADA diet.  Encouraged aerobic exercise.  Continued insulin: same.  Continued metformin; see  medication orders.  Increased dose of statin drug see medication orders.  Continued ACE inhibitor; see medication orders.  Follow up in 6 months or as needed.    Diagnoses and all orders for this visit:    1. Tinea corporis (Primary)  -     ketoconazole (NIZORAL) 2 % cream; Apply 1 application topically to the appropriate area as directed Every 12 (Twelve) Hours.  Dispense: 60 g; Refill: 5    2. Acquired hypothyroidism  -     levothyroxine  (SYNTHROID, LEVOTHROID) 175 MCG tablet; Take 1 tablet by mouth Daily. Indications: Underactive Thyroid  Dispense: 90 tablet; Refill: 1  -     TSH; Future  -     T4, Free; Future  -     T3; Future    3. Coronary artery disease involving native heart without angina pectoris, unspecified vessel or lesion type  -     atorvastatin (LIPITOR) 20 MG tablet; Take 1 tablet by mouth Daily. Indications: Elevation of Both Cholesterol and Triglycerides in Blood  Dispense: 90 tablet; Refill: 3  -     BNP; Future  -     Digoxin level; Future    4. Coronary artery disease involving native coronary artery of native heart without angina pectoris  -     furosemide (LASIX) 40 MG tablet; Take 1 tablet by mouth Daily. Indications: Edema  Dispense: 90 tablet; Refill: 1    5. Type 2 diabetes mellitus without complication, without long-term current use of insulin (HCC)  -     metFORMIN (GLUCOPHAGE) 1000 MG tablet; Take 1 tablet by mouth Daily Before Supper. Indications: Type 2 Diabetes  Dispense: 90 tablet; Refill: 3  -     metFORMIN (GLUCOPHAGE) 500 MG tablet; Take 1 tablet by mouth Daily With Breakfast. Indications: Type 2 Diabetes  Dispense: 90 tablet; Refill: 3  -     Basic Metabolic Panel; Future  -     Lipid Panel; Future  -     Hemoglobin A1c; Future  -     Urinalysis With Microscopic If Indicated (No Culture) - Urine, Clean Catch; Future    6. Medication monitoring encounter  -     ALT; Future  -     Digoxin level; Future    7. Hypertensive heart disease with heart failure (HCC)   -     BNP; Future        Discussed healthy diabetic eating plan.  May refer to ADA web site, diabetes.org    Return in about 6 months (around 10/28/2022) for Medicare Wellness visit, blood pressure, new medication follow up, diabetes.  There are no Patient Instructions on file for this visit.  Medications Discontinued During This Encounter   Medication Reason   • digoxin (LANOXIN) 250 MCG tablet Duplicate order   • Ferrous Sulfate Dried (FERROUS SULFATE  IRON PO) Duplicate order   • oxyCODONE-acetaminophen (Percocet) 5-325 MG per tablet *Therapy completed   • Vibegron (Gemtesa) 75 MG tablet Discontinued by another clinician   • metFORMIN (GLUCOPHAGE) 500 MG tablet Reorder   • metFORMIN (GLUCOPHAGE) 1000 MG tablet Reorder   • furosemide (LASIX) 40 MG tablet Reorder   • levothyroxine (SYNTHROID, LEVOTHROID) 150 MCG tablet Reorder   • atorvastatin (LIPITOR) 20 MG tablet Reorder         Dr. Rashid Ge MD  [place], [place]  [facility].

## 2022-04-29 LAB
APPEARANCE UR: ABNORMAL
BACTERIA #/AREA URNS HPF: ABNORMAL /[HPF]
BILIRUB UR QL STRIP: NEGATIVE
CASTS URNS QL MICRO: ABNORMAL /LPF
COLOR UR: YELLOW
CRYSTALS URNS MICRO: ABNORMAL
EPI CELLS #/AREA URNS HPF: ABNORMAL /HPF (ref 0–10)
GLUCOSE UR QL STRIP: ABNORMAL
HGB UR QL STRIP: NEGATIVE
KETONES UR QL STRIP: NEGATIVE
LEUKOCYTE ESTERASE UR QL STRIP: ABNORMAL
MICRO URNS: ABNORMAL
NITRITE UR QL STRIP: NEGATIVE
PH UR STRIP: 6 [PH] (ref 5–7.5)
PROT UR QL STRIP: ABNORMAL
RBC #/AREA URNS HPF: ABNORMAL /HPF (ref 0–2)
SP GR UR STRIP: 1.03 (ref 1–1.03)
UNIDENT CRYS URNS QL MICRO: PRESENT
UROBILINOGEN UR STRIP-MCNC: 0.2 MG/DL (ref 0.2–1)
WBC #/AREA URNS HPF: >30 /HPF (ref 0–5)

## 2022-05-03 ENCOUNTER — TREATMENT (OUTPATIENT)
Dept: PHYSICAL THERAPY | Facility: CLINIC | Age: 82
End: 2022-05-03

## 2022-05-03 DIAGNOSIS — M25.661 DECREASED ROM OF RIGHT KNEE: ICD-10-CM

## 2022-05-03 DIAGNOSIS — R29.898 DECREASED STRENGTH OF LOWER EXTREMITY: ICD-10-CM

## 2022-05-03 DIAGNOSIS — M25.561 ACUTE PAIN OF RIGHT KNEE: ICD-10-CM

## 2022-05-03 DIAGNOSIS — Z74.09 IMPAIRED FUNCTIONAL MOBILITY, BALANCE, GAIT, AND ENDURANCE: ICD-10-CM

## 2022-05-03 DIAGNOSIS — R26.89 ANTALGIC GAIT: Primary | ICD-10-CM

## 2022-05-03 PROCEDURE — 97110 THERAPEUTIC EXERCISES: CPT | Performed by: PHYSICAL THERAPIST

## 2022-05-03 PROCEDURE — 97530 THERAPEUTIC ACTIVITIES: CPT | Performed by: PHYSICAL THERAPIST

## 2022-05-03 NOTE — PROGRESS NOTES
Physical Therapy Daily Progress Note    Visit # : 3  Fernanda Molina reports: the knee is doing pretty good.  Ankle is still sore in different spots.  Yesterday she walked around the house without her cane.  Today she used her cane for only coming down here.  Had a little pain in the left lower back and hip.  Pt sees Dr. Velazquez tomorrow.      Subjective     Objective   See Exercise, Manual, and Modality Logs for complete treatment.       Assessment & Plan     Assessment    Assessment details: Pt with improving tolerance to standing, ambulation, and exercises without c/o left LE pain, right ankle and knee pain.  Pt ambulates within clinic without cane, but a slow galina and short step lengths.  Pt was able to perform all exercises without pain, added 4-way ankle, lateral side stepping, disc walking, and lateral step ups without pain.  Pt needed VC to heel strike during disc walking and during gait within clinic.  Pt was able to correct gait with cueing to increase step length and heel strike at terminal stance.  Will continue to see pt 1-2x/week for strengthening, balance, and gait training per pt's tolerance.        Progress per Plan of Care         Manual Therapy:         mins  87862;  Therapeutic Exercise:   32      mins  39405;     Neuromuscular Liz:        mins  70838;    Therapeutic Activity:    15      mins  11032;     Gait Training:           mins  29370;     Ultrasound:          mins  99043;    Electrical Stimulation:         mins  50183 ( );  Dry Needling          mins self-pay    Timed Treatment:  47    mins   Total Treatment:     57   mins    Leann Washington, PT  Physical Therapist

## 2022-05-09 ENCOUNTER — OFFICE VISIT (OUTPATIENT)
Dept: ORTHOPEDIC SURGERY | Facility: CLINIC | Age: 82
End: 2022-05-09

## 2022-05-09 VITALS — WEIGHT: 219 LBS | HEIGHT: 62 IN | BODY MASS INDEX: 40.3 KG/M2

## 2022-05-09 DIAGNOSIS — M25.471 ANKLE SWELLING, RIGHT: Primary | ICD-10-CM

## 2022-05-09 DIAGNOSIS — M25.571 ACUTE RIGHT ANKLE PAIN: ICD-10-CM

## 2022-05-09 DIAGNOSIS — S80.01XA TRAUMATIC HEMATOMA OF RIGHT KNEE, INITIAL ENCOUNTER: ICD-10-CM

## 2022-05-09 PROCEDURE — 20605 DRAIN/INJ JOINT/BURSA W/O US: CPT | Performed by: NURSE PRACTITIONER

## 2022-05-09 PROCEDURE — 99213 OFFICE O/P EST LOW 20 MIN: CPT | Performed by: NURSE PRACTITIONER

## 2022-05-09 RX ORDER — LIDOCAINE HYDROCHLORIDE 10 MG/ML
3 INJECTION, SOLUTION EPIDURAL; INFILTRATION; INTRACAUDAL; PERINEURAL
Status: COMPLETED | OUTPATIENT
Start: 2022-05-09 | End: 2022-05-09

## 2022-05-09 RX ORDER — BETAMETHASONE SODIUM PHOSPHATE AND BETAMETHASONE ACETATE 3; 3 MG/ML; MG/ML
6 INJECTION, SUSPENSION INTRA-ARTICULAR; INTRALESIONAL; INTRAMUSCULAR; SOFT TISSUE
Status: COMPLETED | OUTPATIENT
Start: 2022-05-09 | End: 2022-05-09

## 2022-05-09 RX ADMIN — BETAMETHASONE SODIUM PHOSPHATE AND BETAMETHASONE ACETATE 6 MG: 3; 3 INJECTION, SUSPENSION INTRA-ARTICULAR; INTRALESIONAL; INTRAMUSCULAR; SOFT TISSUE at 13:35

## 2022-05-09 RX ADMIN — LIDOCAINE HYDROCHLORIDE 3 ML: 10 INJECTION, SOLUTION EPIDURAL; INFILTRATION; INTRACAUDAL; PERINEURAL at 13:35

## 2022-05-09 NOTE — PROGRESS NOTES
Subjective:     Patient ID: Fernanda Molina is a 81 y.o. female.    Chief Complaint:  Follow-up right ankle swelling  History of Present Illness  Fernanda Molina returns to clinic for follow-up of her right lower extremity.  Is continued with physical therapy tolerating well noted significant improvement of her right knee.  Maximal tenderness present right ankle along the lateral aspect of the ankle, posterior and the medial aspect.  Increased pain noted with weightbearing as tolerated.  Pain with eversion and inversion activities as well as dorsiflexion plantarflexion.  She is continued weightbearing as tolerated noticing significant swelling right ankle.  X-ray images completed last visit.  Denies other concerns present time.     Social History     Occupational History   • Not on file   Tobacco Use   • Smoking status: Never Smoker   • Smokeless tobacco: Never Used   Vaping Use   • Vaping Use: Never used   Substance and Sexual Activity   • Alcohol use: No   • Drug use: No   • Sexual activity: Not Currently     Partners: Male     Birth control/protection: Post-menopausal      Past Medical History:   Diagnosis Date   • AICD (automatic cardioverter/defibrillator) present     left side chest   • Anemia    • Arthritis    • Atrial fibrillation (HCC)    • Cancer (HCC) 2002    melanoma right back of knee   • CHF (congestive heart failure) (HCC)    • Congenital heart disease    • GERD (gastroesophageal reflux disease)    • Graves disease    • Hematuria    • High cholesterol    • History of melanoma 2002    RIGHT KNEE   • History of MI (myocardial infarction) 2010   • Incontinence of urine     wears pads   • On anticoagulant therapy    • HAMLET on CPAP     cpap   • 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   • PONV (postoperative nausea and vomiting)    • Spinal stenosis    • Ureteral calculus, left 1/30/2020   • Valvular heart disease 08/31/2015    S/p MVR & TVR      Past Surgical History:   Procedure Laterality Date   • CARDIAC CATHETERIZATION      • CARDIAC DEFIBRILLATOR PLACEMENT Left     [facility]   • CARDIAC DEFIBRILLATOR PLACEMENT Left 2016    [facility]   •  SECTION     •  SECTION     • CHOLECYSTECTOMY     • COLONOSCOPY     • ENDOSCOPY     • EPIDURAL BLOCK  [id number]    Lumbar    • EXTRACORPOREAL SHOCK WAVE LITHOTRIPSY (ESWL) Right 2017   • EXTRACORPOREAL SHOCK WAVE LITHOTRIPSY (ESWL) Right 2017    Procedure: RT EXTRACORPOREAL SHOCKWAVE LITHOTRIPSY;  Surgeon: Moisés Everett MD;  Location: [facility] OR [facility];  Service:    • EYE SURGERY Right 09/10/2007    Cataract   • EYE SURGERY Left 10/29/2007    Cataract   • JOINT REPLACEMENT Left 2007    Knee   • JOINT REPLACEMENT Right 2010    Knee   • KNEE ARTHROSCOPY     • MAZE PROCEDURE  2015    Dr. Saavedra   • MITRAL VALVE REPAIR/REPLACEMENT     • MITRAL VALVE REPAIR/REPLACEMENT N/A 2019    Procedure: ALINE RE-OP STERNOTOMY MITRAL VALVE REPLACEMENT AND PRP;  Surgeon: Ben Saavedra MD;  Location: [facility] OR;  Service: Cardiothoracic   • PACEMAKER IMPLANTATION Left    • MD ERCP DX COLLECTION SPECIMEN BRUSHING/WASHING N/A 2016    Procedure: ENDOSCOPIC RETROGRADE CHOLANGIOPANCREATOGRAPHY WITH SPHINCTEROTOMY AND BALLOON SWEEP OF CBD;  Surgeon: Lucas Campos MD;  Location: [facility] ENDOSCOPY;  Service: Gastroenterology   • SKIN CANCER EXCISION      MELANOMA RIGHT KNEE AREA -    • TRICUSPID VALVE SURGERY  2015    Dr. Saavedra  ring placed   • URETEROSCOPY LASER LITHOTRIPSY WITH STENT INSERTION Right 11/15/2017    Procedure: CYSTOSCOPY; RIGHT URETEROSCOPY LASER LITHOTRIPSY WITH STENT INSERTION;  Surgeon: Moisés Everett MD;  Location: [facility] OR;  Service:    • URETEROSCOPY LASER LITHOTRIPSY WITH STENT INSERTION Left 2020    Procedure: URETEROSCOPY STONE BASKET EXTRACTION LASER LITHOTRIPSY WITH STENT INSERTION WITH STRING;  Surgeon: Moisés Everett MD;  Location: [facility] OR;  Service:  "Urology       Family History   Problem Relation Age of Onset   • Heart attack Mother    • Heart disease Mother    • Hypertension Mother    • Stroke Mother    • Macular degeneration Mother    • Heart attack Father    • Malig Hyperthermia Neg Hx        Objective:  Physical Exam    Vital signs reviewed.   General: No acute distress.  Eyes: conjunctiva clear; pupils equally round and reactive  ENT: external ears and nose atraumatic; oropharynx clear  CV: no peripheral edema  Resp: normal respiratory effort  Skin: no rashes or wounds; normal turgor  Psych: mood and affect appropriate; recent and remote memory intact    Vitals:    05/09/22 1255   Weight: 99.3 kg (219 lb)   Height: 157.5 cm (62\")         05/09/22  1255   Weight: 99.3 kg (219 lb)     Body mass index is 40.06 kg/m².      Ortho Exam       Right lower extremity examined  Hematoma lateral aspect of knee  Passive range of motion 0 degrees extension to 90 degrees flexion  Positive tenderness along the lateral and medial joint line as well as the anterior aspect of the knee with pain radiating down to the lateral aspect of the ankle  Significant hematoma the lateral aspect of the knee, this hematoma also the top of her scalp, positive tenderness to the anterior lateral aspect of the distal thigh no evidence of any disruption of the quad tendon, can perform straight leg raise  Stable to varus and valgus stress at 0 degrees and 30 degrees  Right ankle moderate soft tissue swelling, maximal tenderness along the anterior lateral aspect of the ankle  All compartments soft easily compressible, positive sensation light touch the dorsum plantar surface of the foot and all digits  2+ dorsalis pedis pulse    Assessment:        1. Ankle swelling, right    2. Traumatic hematoma of right knee, initial encounter    3. Acute right ankle pain             Plan:  Medium Joint Arthrocentesis: R ankle  Date/Time: 5/9/2022 1:35 PM  Consent given by: patient  Site marked: site " marked  Timeout: Immediately prior to procedure a time out was called to verify the correct patient, procedure, equipment, support staff and site/side marked as required   Supporting Documentation  Indications: pain   Procedure Details  Location: ankle - R ankle  Preparation: Patient was prepped and draped in the usual sterile fashion  Needle size: 22 G  Approach: medial.  Medications administered: 6 mg betamethasone acetate-betamethasone sodium phosphate 6 (3-3) MG/ML; 3 mL lidocaine PF 1% 1 %  Patient tolerance: patient tolerated the procedure well with no immediate complications          1. Discussed plan of care with patient.  Discussed treatment options for ankle including corticosteroid injection.  At this time wishes to proceed with corticosteroid injection right ankle.  Discussed hold off for physical therapy until Thursday.  We will plan to see her back in clinic in approximately 4 weeks to reevaluate.  We will continue with physical therapy for strengthening range of motion.  Encouraged to call with any questions concerns that she has.  All questions answered.  Orders:  Orders Placed This Encounter   Procedures   • Medium Joint Arthrocentesis: R ankle     No orders of the defined types were placed in this encounter.          Dragon Dictation utilized.

## 2022-05-10 ENCOUNTER — OFFICE VISIT (OUTPATIENT)
Dept: FAMILY MEDICINE CLINIC | Facility: CLINIC | Age: 82
End: 2022-05-10

## 2022-05-10 ENCOUNTER — TELEPHONE (OUTPATIENT)
Dept: FAMILY MEDICINE CLINIC | Facility: CLINIC | Age: 82
End: 2022-05-10

## 2022-05-10 VITALS
DIASTOLIC BLOOD PRESSURE: 70 MMHG | SYSTOLIC BLOOD PRESSURE: 120 MMHG | HEIGHT: 62 IN | WEIGHT: 219 LBS | OXYGEN SATURATION: 98 % | BODY MASS INDEX: 40.3 KG/M2 | HEART RATE: 67 BPM | TEMPERATURE: 97.5 F

## 2022-05-10 DIAGNOSIS — T14.8XXA HEMATOMA: Primary | ICD-10-CM

## 2022-05-10 PROCEDURE — 99213 OFFICE O/P EST LOW 20 MIN: CPT | Performed by: FAMILY MEDICINE

## 2022-05-10 NOTE — PROGRESS NOTES
"  Subjective   Fernanda Molina is a 81 y.o. female who is here for   Chief Complaint   Patient presents with   • Wound Check     Scab fell off scalp last night - did drain, now is drying up    .   Pt called stating a big scab came off her head from her fall with a large chunk of her hair. She said it is a large area that's opened, should she cover it with a bandage or just clean with soap and water and leave open?   History of Present Illness     Follow-up right scalp hematoma.  Again when she fell and hit her head on the wall.  She is on blood thinners.  She had a 3 to 4 cm hematoma last time I saw it.  Has gotten smaller in size.  But over the weekend there was a scab developed in the middle of this.  Scab is fallen off.  Small amount of blood came out.  Over the blood stopped several days ago      The following portions of the patient's history were reviewed and updated as appropriate: allergies, current medications, past family history, past medical history, past social history, past surgical history and problem list.    Review of Systems    Objective   Vitals:    05/10/22 1444   BP: 120/70   BP Location: Left arm   Patient Position: Sitting   Cuff Size: Large Adult   Pulse: 67   Temp: 97.5 °F (36.4 °C)   SpO2: 98%   Weight: 99.3 kg (219 lb)   Height: 157.5 cm (62\")      Physical Exam  HENT:      Head:       There is hair loss over the previous size of hematoma.  This will take months to improve.      [unfilled]  Diagnoses and all orders for this visit:    1. Hematoma (Primary)    Okay to shower and shampoo.  Hold pressure if it restart to bleed.  Apply Neosporin and a doughnut fashion around the open area.  Okay to leave it open.  May want to wear a hat if she is out in public.    There are no Patient Instructions on file for this visit.    There are no discontinued medications.     No follow-ups on file.    Dr. Rashid Ge  [facility]  [place], [place]    "

## 2022-05-10 NOTE — TELEPHONE ENCOUNTER
Pt called stating a big scab came off her head from her fall with a large chunk of her hair. She said it is a large area that's opened, should she cover it with a bandage or just clean with soap and water and leave open?

## 2022-05-12 ENCOUNTER — TREATMENT (OUTPATIENT)
Dept: PHYSICAL THERAPY | Facility: CLINIC | Age: 82
End: 2022-05-12

## 2022-05-12 DIAGNOSIS — R26.89 ANTALGIC GAIT: Primary | ICD-10-CM

## 2022-05-12 DIAGNOSIS — M25.561 ACUTE PAIN OF RIGHT KNEE: ICD-10-CM

## 2022-05-12 DIAGNOSIS — Z74.09 IMPAIRED FUNCTIONAL MOBILITY, BALANCE, GAIT, AND ENDURANCE: ICD-10-CM

## 2022-05-12 DIAGNOSIS — R29.898 DECREASED STRENGTH OF LOWER EXTREMITY: ICD-10-CM

## 2022-05-12 DIAGNOSIS — M25.661 DECREASED ROM OF RIGHT KNEE: ICD-10-CM

## 2022-05-12 PROCEDURE — 97530 THERAPEUTIC ACTIVITIES: CPT | Performed by: PHYSICAL THERAPIST

## 2022-05-12 PROCEDURE — 97110 THERAPEUTIC EXERCISES: CPT | Performed by: PHYSICAL THERAPIST

## 2022-05-12 NOTE — PROGRESS NOTES
Physical Therapy Daily Progress Note    Visit # : 4  Fernanda Molina reports: she saw HESHAM Mendez on 5/9 and she received a steroid injection into her right ankle.  Pt states she is not having any ankle pain today.        Subjective     Objective   See Exercise, Manual, and Modality Logs for complete treatment.       Assessment & Plan     Assessment    Assessment details: Pt continues to tolerate treatment well with improving tolerance to open and closed kinetic chain exercises.  Pt increased reps with standing exercises and TB vs 4-way ankle without difficulty.  Pt still c/o some fatigue, but pt states that is not anything new.  Pt needs verbal cueing to increase her B step length and heel strike.  Will continue to see pt 1-2x/week for strengthening, balance, and gait training for improved functional mobility.          Progress per Plan of Care           Manual Therapy:         mins  91393;  Therapeutic Exercise:    40     mins  56177;     Neuromuscular Liz:        mins  00096;    Therapeutic Activity:    15      mins  50952;     Gait Training:           mins  38729;     Ultrasound:          mins  32652;    Electrical Stimulation:         mins  26765 ( );  Dry Needling          mins self-pay    Timed Treatment:  55    mins   Total Treatment:     66   mins    Leann Washington, PT  Physical Therapist

## 2022-05-17 ENCOUNTER — TREATMENT (OUTPATIENT)
Dept: PHYSICAL THERAPY | Facility: CLINIC | Age: 82
End: 2022-05-17

## 2022-05-17 DIAGNOSIS — M25.661 DECREASED ROM OF RIGHT KNEE: ICD-10-CM

## 2022-05-17 DIAGNOSIS — M25.561 ACUTE PAIN OF RIGHT KNEE: ICD-10-CM

## 2022-05-17 DIAGNOSIS — R26.89 ANTALGIC GAIT: Primary | ICD-10-CM

## 2022-05-17 DIAGNOSIS — Z74.09 IMPAIRED FUNCTIONAL MOBILITY, BALANCE, GAIT, AND ENDURANCE: ICD-10-CM

## 2022-05-17 DIAGNOSIS — R29.898 DECREASED STRENGTH OF LOWER EXTREMITY: ICD-10-CM

## 2022-05-17 DIAGNOSIS — M25.571 ACUTE RIGHT ANKLE PAIN: ICD-10-CM

## 2022-05-17 PROCEDURE — 97530 THERAPEUTIC ACTIVITIES: CPT | Performed by: PHYSICAL THERAPIST

## 2022-05-17 PROCEDURE — 97110 THERAPEUTIC EXERCISES: CPT | Performed by: PHYSICAL THERAPIST

## 2022-05-17 NOTE — PROGRESS NOTES
Physical Therapy Daily Progress Note/Reassessment right knee      Visit # : 5  Fernanda Molina reports: she went to the store yesterday and she was out shopping for 4 hours. Pt reports she felt good this morning and denied having any increased soreness.  Pt rates her right knee pain with ambulation a 4/10, but standing any longer than 10 minutes her pain will increase to 6-7/10.  Pt states her right knee will still give out intermittently.        Subjective     Objective          Active Range of Motion     Right Knee   Flexion: 110 degrees     Strength/Myotome Testing     Right Hip   Planes of Motion   Flexion: 3    Right Knee   Flexion: 3+  Extension: 3  Quadriceps contraction: fair    Swelling     Right Knee Girth Measurement (cm)   10 cm below joint line: right knee hematoma at the IP 47       See Exercise, Manual, and Modality Logs for complete treatment.       Assessment & Plan     Assessment    Assessment details: Pt is progressing well with therapy.  Pt has improved functional mobility, increased ROM, strength, decreasing pain, and improving tolerance to all exercises.  Pt has met 4/6 STGs and is showing some progress towards accomplishing her long term goals.  Pt is tolerating all strengthening, balance and gait exercises.  Pt was able to perform 10 independent SLRs today, but c/o right quad and hip flexor fatigue.  Will continue to see pt 2x/week for strengthening, balance, and gait for another month.        Goals  Plan Goals: STGs: 2-4 weeks  1. Decrease right knee pain 5/10 with standing and gait.  PARTIALLY MET   2. Increase right knee AROM 0-100 degrees  MET   3.  Decrease right lateral distal quad tenderness to minimal with palpation  NA  4.  Pt is able to perform a right SLR independently MET   5.  Pt ambulates into clinic with AD minimal antalgic gait right LE  MET   6.  Decrease right lateral knee hematoma at the IP with girth measurement to 49 cm.  MET     LTGs: 4-8 weeks  1.  Pt Independent with HEP  for self management.  PROGRESSING   2.  Increase right knee ext and flex strength to 4+/5  NOT MET   3.  Increase right hip flex, abd, and add strength to 4-4+/5  NOT MET   4.  Decrease right lateral quad and knee tenderness to none with palpation   NOT MET   5.  Decrease right knee pain to a 2/10 with standing and ambulation.  NOT MET        Progress per Plan of Care         Manual Therapy:         mins  80311;  Therapeutic Exercise:   30      mins  72180;     Neuromuscular Liz:        mins  92421;    Therapeutic Activity:    24      mins  49630;     Gait Training:           mins  93666;     Ultrasound:          mins  11173;    Electrical Stimulation:         mins  13373 ( );  Dry Needling          mins self-pay    Timed Treatment:  54    mins   Total Treatment:     60   mins    Leann Washington, PT  Physical Therapist

## 2022-05-19 ENCOUNTER — TREATMENT (OUTPATIENT)
Dept: PHYSICAL THERAPY | Facility: CLINIC | Age: 82
End: 2022-05-19

## 2022-05-19 DIAGNOSIS — Z74.09 IMPAIRED FUNCTIONAL MOBILITY, BALANCE, GAIT, AND ENDURANCE: ICD-10-CM

## 2022-05-19 DIAGNOSIS — M25.561 ACUTE PAIN OF RIGHT KNEE: ICD-10-CM

## 2022-05-19 DIAGNOSIS — R26.89 ANTALGIC GAIT: Primary | ICD-10-CM

## 2022-05-19 DIAGNOSIS — M25.661 DECREASED ROM OF RIGHT KNEE: ICD-10-CM

## 2022-05-19 DIAGNOSIS — R29.898 DECREASED STRENGTH OF LOWER EXTREMITY: ICD-10-CM

## 2022-05-19 PROCEDURE — 97530 THERAPEUTIC ACTIVITIES: CPT | Performed by: PHYSICAL THERAPIST

## 2022-05-19 PROCEDURE — 97110 THERAPEUTIC EXERCISES: CPT | Performed by: PHYSICAL THERAPIST

## 2022-05-19 NOTE — PROGRESS NOTES
Physical Therapy Daily Progress Note    Visit # : 6  Fernanda Molina reports: she is feeling sore today and it is difficult to breath due to the humidity outside.      Subjective     Objective   See Exercise, Manual, and Modality Logs for complete treatment.       Assessment & Plan     Assessment    Assessment details: Pt is tolerating treatment well.  Pt continues to have improvements in functional mobility, gait, strength, and balance.  Pt needs VC at times to heel strike and toe off on the right LE.  Pt is able to complete all OKC and CKC strengthening exercises without pain, but does fatigue easily.  Pt needed a little more short resting breaks between exercises today.  Will continue to see pt 2x/week for strengthening, balance, and gait for another month.          Progress per Plan of Care           Manual Therapy:         mins  33404;  Therapeutic Exercise:   30      mins  72922;     Neuromuscular Liz:        mins  69657;    Therapeutic Activity:    15      mins  63121;     Gait Training:           mins  04077;     Ultrasound:          mins  76794;    Electrical Stimulation:         mins  34301 ( );  Dry Needling          mins self-pay    Timed Treatment:  45    mins   Total Treatment:    50    mins    Leann Washington, PT  Physical Therapist

## 2022-05-23 DIAGNOSIS — E11.9 TYPE 2 DIABETES MELLITUS WITHOUT COMPLICATION, WITH LONG-TERM CURRENT USE OF INSULIN: ICD-10-CM

## 2022-05-23 DIAGNOSIS — Z79.4 TYPE 2 DIABETES MELLITUS WITHOUT COMPLICATION, WITH LONG-TERM CURRENT USE OF INSULIN: ICD-10-CM

## 2022-05-23 RX ORDER — INSULIN GLARGINE 100 [IU]/ML
INJECTION, SOLUTION SUBCUTANEOUS
Qty: 30 ML | Refills: 3 | Status: SHIPPED | OUTPATIENT
Start: 2022-05-23 | End: 2023-02-19

## 2022-05-24 ENCOUNTER — TREATMENT (OUTPATIENT)
Dept: PHYSICAL THERAPY | Facility: CLINIC | Age: 82
End: 2022-05-24

## 2022-05-24 DIAGNOSIS — Z74.09 IMPAIRED FUNCTIONAL MOBILITY, BALANCE, GAIT, AND ENDURANCE: ICD-10-CM

## 2022-05-24 DIAGNOSIS — R29.898 DECREASED STRENGTH OF LOWER EXTREMITY: ICD-10-CM

## 2022-05-24 DIAGNOSIS — M25.571 ACUTE RIGHT ANKLE PAIN: ICD-10-CM

## 2022-05-24 DIAGNOSIS — M25.561 ACUTE PAIN OF RIGHT KNEE: ICD-10-CM

## 2022-05-24 DIAGNOSIS — M25.661 DECREASED ROM OF RIGHT KNEE: ICD-10-CM

## 2022-05-24 DIAGNOSIS — R26.89 ANTALGIC GAIT: Primary | ICD-10-CM

## 2022-05-24 PROCEDURE — 97530 THERAPEUTIC ACTIVITIES: CPT | Performed by: PHYSICAL THERAPIST

## 2022-05-24 PROCEDURE — 97110 THERAPEUTIC EXERCISES: CPT | Performed by: PHYSICAL THERAPIST

## 2022-05-24 NOTE — PROGRESS NOTES
"Physical Therapy Daily Progress Note    Visit # : 7  Fernanda Molina reports: she is doing okay.      Subjective     Objective   See Exercise, Manual, and Modality Logs for complete treatment.       Assessment & Plan     Assessment    Assessment details: Pt is tolerating treatment well with improving tolerance to exercises and continues to having increasing functional mobility.  Pt was able to perform sit to stands from low table without using her UE, added TB vs standing 3-way hip B, increased reps with SLR, increased step height for step ups, and added toe taps on \"t\" for improved standing balance.  Pt was able to perform all increases without LOB or pain, but did have some fatigue.  Will continue to see 2x/week for strengthening, balance, and gait.         Progress per Plan of Care         Manual Therapy:         mins  16404;  Therapeutic Exercise:   30      mins  46026;     Neuromuscular Liz:        mins  05451;    Therapeutic Activity:    15      mins  78534;     Gait Training:           mins  80887;     Ultrasound:          mins  09728;    Electrical Stimulation:         mins  21592 ( );  Dry Needling          mins self-pay    Timed Treatment:  45    mins   Total Treatment:     50   mins    Leann Washington, PT  Physical Therapist  "

## 2022-05-26 ENCOUNTER — TREATMENT (OUTPATIENT)
Dept: PHYSICAL THERAPY | Facility: CLINIC | Age: 82
End: 2022-05-26

## 2022-05-26 DIAGNOSIS — M25.661 DECREASED ROM OF RIGHT KNEE: ICD-10-CM

## 2022-05-26 DIAGNOSIS — R26.89 ANTALGIC GAIT: Primary | ICD-10-CM

## 2022-05-26 DIAGNOSIS — M25.571 ACUTE RIGHT ANKLE PAIN: ICD-10-CM

## 2022-05-26 DIAGNOSIS — Z74.09 IMPAIRED FUNCTIONAL MOBILITY, BALANCE, GAIT, AND ENDURANCE: ICD-10-CM

## 2022-05-26 DIAGNOSIS — M25.561 ACUTE PAIN OF RIGHT KNEE: ICD-10-CM

## 2022-05-26 DIAGNOSIS — R29.898 DECREASED STRENGTH OF LOWER EXTREMITY: ICD-10-CM

## 2022-05-26 PROCEDURE — 97110 THERAPEUTIC EXERCISES: CPT | Performed by: PHYSICAL THERAPIST

## 2022-05-26 PROCEDURE — 97530 THERAPEUTIC ACTIVITIES: CPT | Performed by: PHYSICAL THERAPIST

## 2022-05-27 ENCOUNTER — HOSPITAL ENCOUNTER (OUTPATIENT)
Dept: GENERAL RADIOLOGY | Facility: HOSPITAL | Age: 82
Discharge: HOME | End: 2022-05-27
Admitting: UROLOGY

## 2022-05-27 ENCOUNTER — TRANSCRIBE ORDERS (OUTPATIENT)
Dept: ADMINISTRATIVE | Facility: HOSPITAL | Age: 82
End: 2022-05-27

## 2022-05-27 DIAGNOSIS — I25.10 CORONARY ARTERY DISEASE INVOLVING NATIVE CORONARY ARTERY OF NATIVE HEART WITHOUT ANGINA PECTORIS: ICD-10-CM

## 2022-05-27 DIAGNOSIS — N20.0 CALCULUS, RENAL: Primary | ICD-10-CM

## 2022-05-27 PROCEDURE — 74018 RADEX ABDOMEN 1 VIEW: CPT

## 2022-05-31 RX ORDER — POTASSIUM CHLORIDE 1500 MG/1
TABLET, EXTENDED RELEASE ORAL
Qty: 90 TABLET | Refills: 1 | Status: SHIPPED | OUTPATIENT
Start: 2022-05-31 | End: 2022-11-14

## 2022-06-01 ENCOUNTER — TREATMENT (OUTPATIENT)
Dept: PHYSICAL THERAPY | Facility: CLINIC | Age: 82
End: 2022-06-01

## 2022-06-01 DIAGNOSIS — Z74.09 IMPAIRED FUNCTIONAL MOBILITY, BALANCE, GAIT, AND ENDURANCE: ICD-10-CM

## 2022-06-01 DIAGNOSIS — M25.661 DECREASED ROM OF RIGHT KNEE: ICD-10-CM

## 2022-06-01 DIAGNOSIS — M25.571 ACUTE RIGHT ANKLE PAIN: ICD-10-CM

## 2022-06-01 DIAGNOSIS — M25.561 ACUTE PAIN OF RIGHT KNEE: ICD-10-CM

## 2022-06-01 DIAGNOSIS — R29.898 DECREASED STRENGTH OF LOWER EXTREMITY: ICD-10-CM

## 2022-06-01 DIAGNOSIS — R26.89 ANTALGIC GAIT: Primary | ICD-10-CM

## 2022-06-01 PROCEDURE — 97110 THERAPEUTIC EXERCISES: CPT | Performed by: PHYSICAL THERAPIST

## 2022-06-01 PROCEDURE — 97530 THERAPEUTIC ACTIVITIES: CPT | Performed by: PHYSICAL THERAPIST

## 2022-06-01 NOTE — PROGRESS NOTES
Physical Therapy Daily Progress Note    Visit # : 9  Fernanda Molina reports: she is doing okay today.     Subjective     Objective   See Exercise, Manual, and Modality Logs for complete treatment.       Assessment & Plan     Assessment    Assessment details: Pt is progressing well with therapy.  Pt was able to increase reps or resistance with several of her exercises today.  Pt was ambulating within clinic and during cup walking without her cane.  Added toe taps in diagonals and pt was able to perform without LOB.   Will continue to see pt 1-2x/week for strengthening, balance and gait per pt's tolerance.        Progress per Plan of Care         Manual Therapy:         mins  46897;  Therapeutic Exercise:   30      mins  73431;     Neuromuscular Liz:        mins  27679;    Therapeutic Activity:   24       mins  31796;     Gait Training:           mins  61414;     Ultrasound:          mins  65961;    Electrical Stimulation:         mins  35307 ( );  Dry Needling          mins self-pay    Timed Treatment:  54    mins   Total Treatment:     59   mins    Leann Washington, PT  Physical Therapist

## 2022-06-02 ENCOUNTER — TRANSCRIBE ORDERS (OUTPATIENT)
Dept: ADMINISTRATIVE | Facility: HOSPITAL | Age: 82
End: 2022-06-02

## 2022-06-02 DIAGNOSIS — N20.0 CALCULUS OF KIDNEY: Primary | ICD-10-CM

## 2022-06-03 ENCOUNTER — OFFICE VISIT (OUTPATIENT)
Dept: ORTHOPEDIC SURGERY | Facility: CLINIC | Age: 82
End: 2022-06-03

## 2022-06-03 VITALS — WEIGHT: 219 LBS | BODY MASS INDEX: 40.3 KG/M2 | HEIGHT: 62 IN

## 2022-06-03 DIAGNOSIS — M25.471 ANKLE SWELLING, RIGHT: Primary | ICD-10-CM

## 2022-06-03 PROCEDURE — 99213 OFFICE O/P EST LOW 20 MIN: CPT | Performed by: NURSE PRACTITIONER

## 2022-06-03 NOTE — PROGRESS NOTES
Subjective:     Patient ID: Fernanda Molina is a 81 y.o. female.    Chief Complaint:  Follow-up right ankle swelling  Corticosteroid injection right ankle 05/09/2022  History of Present Illness  Fernanda Molina returns to clinic for follow-up of her right ankle.  Superglue steroid injection wound right ankle 5/9/2022 with approximately 80 to 90% symptom relief.  Very pleased with symptom relief has continued with physical therapy tolerating well continue working on range of motion and strengthening.  Currently utilizing receiving 2 pound weight tolerating well.  She has continued weightbearing as tolerated which has improved.  Denies all other concerns present.     Social History     Occupational History   • Not on file   Tobacco Use   • Smoking status: Never Smoker   • Smokeless tobacco: Never Used   Vaping Use   • Vaping Use: Never used   Substance and Sexual Activity   • Alcohol use: No   • Drug use: No   • Sexual activity: Not Currently     Partners: Male     Birth control/protection: Post-menopausal      Past Medical History:   Diagnosis Date   • AICD (automatic cardioverter/defibrillator) present     left side chest   • Anemia    • Arthritis    • Atrial fibrillation (HCC)    • Cancer (HCC) 2002    melanoma right back of knee   • CHF (congestive heart failure) (HCC)    • Congenital heart disease    • GERD (gastroesophageal reflux disease)    • Graves disease    • Hematuria    • High cholesterol    • History of melanoma 2002    RIGHT KNEE   • History of MI (myocardial infarction) 2010   • Incontinence of urine     wears pads   • On anticoagulant therapy    • HAMLET on CPAP     cpap   • 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   • PONV (postoperative nausea and vomiting)    • Spinal stenosis    • Ureteral calculus, left 1/30/2020   • Valvular heart disease 08/31/2015    S/p MVR & TVR      Past Surgical History:   Procedure Laterality Date   • CARDIAC CATHETERIZATION     • CARDIAC DEFIBRILLATOR PLACEMENT Left 2010    [facility]   •  CARDIAC DEFIBRILLATOR PLACEMENT Left     [facility]   •  SECTION     •  SECTION     • CHOLECYSTECTOMY     • COLONOSCOPY     • ENDOSCOPY     • EPIDURAL BLOCK  [id number]    Lumbar    • EXTRACORPOREAL SHOCK WAVE LITHOTRIPSY (ESWL) Right 2017   • EXTRACORPOREAL SHOCK WAVE LITHOTRIPSY (ESWL) Right 2017    Procedure: RT EXTRACORPOREAL SHOCKWAVE LITHOTRIPSY;  Surgeon: Moisés Everett MD;  Location: [facility];  Service:    • EYE SURGERY Right 09/10/2007    Cataract   • EYE SURGERY Left 10/29/2007    Cataract   • JOINT REPLACEMENT Left 2007    Knee   • JOINT REPLACEMENT Right 2010    Knee   • KNEE ARTHROSCOPY     • MAZE PROCEDURE  2015    Dr. Saavedra   • MITRAL VALVE REPAIR/REPLACEMENT     • MITRAL VALVE REPAIR/REPLACEMENT N/A 2019    Procedure: ALINE RE-OP STERNOTOMY MITRAL VALVE REPLACEMENT AND PRP;  Surgeon: Ben Saavedra MD;  Location: [facility] OR;  Service: Cardiothoracic   • PACEMAKER IMPLANTATION Left    • NV ERCP DX COLLECTION SPECIMEN BRUSHING/WASHING N/A 2016    Procedure: ENDOSCOPIC RETROGRADE CHOLANGIOPANCREATOGRAPHY WITH SPHINCTEROTOMY AND BALLOON SWEEP OF CBD;  Surgeon: Lucas Campos MD;  Location: [facility] ENDOSCOPY;  Service: Gastroenterology   • SKIN CANCER EXCISION      MELANOMA RIGHT KNEE AREA -    • TRICUSPID VALVE SURGERY  2015    Dr. Saavedra  ring placed   • URETEROSCOPY LASER LITHOTRIPSY WITH STENT INSERTION Right 11/15/2017    Procedure: CYSTOSCOPY; RIGHT URETEROSCOPY LASER LITHOTRIPSY WITH STENT INSERTION;  Surgeon: Moisés Everett MD;  Location: [facility] OR;  Service:    • URETEROSCOPY LASER LITHOTRIPSY WITH STENT INSERTION Left 2020    Procedure: URETEROSCOPY STONE BASKET EXTRACTION LASER LITHOTRIPSY WITH STENT INSERTION WITH STRING;  Surgeon: Moisés Everett MD;  Location: [facility] OR;  Service: Urology       Family History   Problem Relation Age of Onset   •  "Heart attack Mother    • Heart disease Mother    • Hypertension Mother    • Stroke Mother    • Macular degeneration Mother    • Heart attack Father    • Malig Hyperthermia Neg Hx                Objective:  Physical Exam  General: No acute distress.  Eyes: conjunctiva clear; pupils equally round and reactive  ENT: external ears and nose atraumatic; oropharynx clear  CV: no peripheral edema  Resp: normal respiratory effort  Skin: no rashes or wounds; normal turgor  Psych: mood and affect appropriate; recent and remote memory intact    Vitals:    06/03/22 1514   Weight: 99.3 kg (219 lb)   Height: 157.5 cm (62\")         06/03/22  1514   Weight: 99.3 kg (219 lb)     Body mass index is 40.06 kg/m².      Right Ankle Exam   Swelling: moderate    Range of Motion   Dorsiflexion: 25   Plantar flexion: 45     Muscle Strength   Plantar flexion:  4/5    Tests   Anterior drawer: negative    Other   Erythema: absent  Sensation: normal  Pulse: present                    Assessment:        1. Ankle swelling, right           Plan:  1. Discussed plan of care with patient. Continue working with Continue range of motion and strengthening exercises right ankle with PT, weightbearing as tolerated.   2.Would like to make an appointment for evaluation of her left knee.  Prior TKA left knee approximately 16 years ago, outside facility.  New issue to examiner, will need x-ray imaging left knee.  All questions answered.  Orders:  No orders of the defined types were placed in this encounter.    No orders of the defined types were placed in this encounter.        Aby dictation utilized   "

## 2022-06-03 NOTE — PROGRESS NOTES
Right ankle area of physical therapy. Including strengthening range of motion, weightbearing as tolerated.  Would like to make an appointment for evaluation of her left knee. Prior to: ER left knee approximately 16 years ago. Following the presence. Is near back to clinic new issue left knee with x-ray images. All questions answered.

## 2022-06-08 ENCOUNTER — OFFICE VISIT (OUTPATIENT)
Dept: ORTHOPEDIC SURGERY | Facility: CLINIC | Age: 82
End: 2022-06-08

## 2022-06-08 VITALS
DIASTOLIC BLOOD PRESSURE: 62 MMHG | WEIGHT: 219 LBS | HEART RATE: 66 BPM | BODY MASS INDEX: 40.3 KG/M2 | SYSTOLIC BLOOD PRESSURE: 117 MMHG | HEIGHT: 62 IN

## 2022-06-08 DIAGNOSIS — T84.84XA PAIN DUE TO TOTAL LEFT KNEE REPLACEMENT, INITIAL ENCOUNTER: Primary | ICD-10-CM

## 2022-06-08 DIAGNOSIS — Z96.652 PAIN DUE TO TOTAL LEFT KNEE REPLACEMENT, INITIAL ENCOUNTER: Primary | ICD-10-CM

## 2022-06-08 DIAGNOSIS — G89.29 CHRONIC PAIN OF LEFT KNEE: ICD-10-CM

## 2022-06-08 DIAGNOSIS — M25.562 CHRONIC PAIN OF LEFT KNEE: ICD-10-CM

## 2022-06-08 PROCEDURE — 99213 OFFICE O/P EST LOW 20 MIN: CPT | Performed by: NURSE PRACTITIONER

## 2022-06-08 NOTE — PROGRESS NOTES
81-year-old female presents to clinic for evaluation of her left knee. She did total knee arthroplasty with Dr. Driscoll in 2007 at the left knee in 2010 at the right knee has been experiencing pain and swelling for the last several years. Increased pain noted with activities involving transitional activity such from seated to standing attempting to walk, pain with ambulating but significant swelling around the medial joint line as well as the anterior aspect. She does currently see pain management has received nerve ablation most recently. Is also had CTs of the lumbar spine

## 2022-06-08 NOTE — PROGRESS NOTES
Subjective:     Patient ID: Fernanda Molina is a 81 y.o. female.    Chief Complaint:  Total knee arthroplasty, 2017  Painful total knee  History of Present Illness  Fernanda Molina 81-year-old female presents to clinic for evaluation of her left knee.  She did total knee arthroplasty with Dr. Driscoll in 2007 at the left knee in 2010 at the right knee has been experiencing pain and swelling for the last several years.  Increased pain noted with activities involving transitional activity such from seated to standing attempting to walk, pain with ambulating but significant swelling around the medial joint line as well as the anterior aspect.  She does currently see pain management has received nerve ablation most recently.  Maximal tenderness present medial joint line as well as the anterior aspect.  She is experiencing pain posteriorly as well.  Positive for swelling again which has been present for several years.  Denies any recent x-ray, MRI, CT.  On occasion pain is radiating to the groin but denies any constant pain to the groin.  Denies other concerns present time.     Social History     Occupational History   • Not on file   Tobacco Use   • Smoking status: Never Smoker   • Smokeless tobacco: Never Used   Vaping Use   • Vaping Use: Never used   Substance and Sexual Activity   • Alcohol use: No   • Drug use: No   • Sexual activity: Not Currently     Partners: Male     Birth control/protection: Post-menopausal      Past Medical History:   Diagnosis Date   • AICD (automatic cardioverter/defibrillator) present     left side chest   • Anemia    • Arthritis    • Atrial fibrillation (HCC)    • Cancer (HCC) 2002    melanoma right back of knee   • CHF (congestive heart failure) (HCC)    • Congenital heart disease    • GERD (gastroesophageal reflux disease)    • Graves disease    • Hematuria    • High cholesterol    • History of melanoma 2002    RIGHT KNEE   • History of MI (myocardial infarction) 2010   • Incontinence of  urine     wears pads   • On anticoagulant therapy    • HAMLET on CPAP     cpap   • Pelvic kidney 2020   • PONV (postoperative nausea and vomiting)    • Spinal stenosis    • Ureteral calculus, left 2020   • Valvular heart disease 2015    S/p MVR & TVR      Past Surgical History:   Procedure Laterality Date   • CARDIAC CATHETERIZATION     • CARDIAC DEFIBRILLATOR PLACEMENT Left     [facility]   • CARDIAC DEFIBRILLATOR PLACEMENT Left 2016    [facility]   •  SECTION     •  SECTION     • CHOLECYSTECTOMY     • COLONOSCOPY     • ENDOSCOPY     • EPIDURAL BLOCK  1712-7020    Lumbar    • EXTRACORPOREAL SHOCK WAVE LITHOTRIPSY (ESWL) Right 2017   • EXTRACORPOREAL SHOCK WAVE LITHOTRIPSY (ESWL) Right 2017    Procedure: RT EXTRACORPOREAL SHOCKWAVE LITHOTRIPSY;  Surgeon: Moisés Everett MD;  Location: [facility];  Service:    • EYE SURGERY Right 09/10/2007    Cataract   • EYE SURGERY Left 10/29/2007    Cataract   • JOINT REPLACEMENT Left 2007    Knee   • JOINT REPLACEMENT Right 2010    Knee   • KNEE ARTHROSCOPY     • MAZE PROCEDURE  2015    Dr. Saavedra   • MITRAL VALVE REPAIR/REPLACEMENT     • MITRAL VALVE REPAIR/REPLACEMENT N/A 2019    Procedure: ALINE RE-OP STERNOTOMY MITRAL VALVE REPLACEMENT AND PRP;  Surgeon: Ben Saavedra MD;  Location: [facility] OR;  Service: Cardiothoracic   • PACEMAKER IMPLANTATION Left    • MT ERCP DX COLLECTION SPECIMEN BRUSHING/WASHING N/A 2016    Procedure: ENDOSCOPIC RETROGRADE CHOLANGIOPANCREATOGRAPHY WITH SPHINCTEROTOMY AND BALLOON SWEEP OF CBD;  Surgeon: Lucas Campos MD;  Location: [facility] ENDOSCOPY;  Service: Gastroenterology   • SKIN CANCER EXCISION      MELANOMA RIGHT KNEE AREA -    • TRICUSPID VALVE SURGERY  2015    Dr. Saavedra  ring placed   • URETEROSCOPY LASER LITHOTRIPSY WITH STENT INSERTION Right 11/15/2017    Procedure: CYSTOSCOPY; RIGHT URETEROSCOPY LASER  "LITHOTRIPSY WITH STENT INSERTION;  Surgeon: Moisés Everett MD;  Location: [facility];  Service:    • URETEROSCOPY LASER LITHOTRIPSY WITH STENT INSERTION Left 01/30/2020    Procedure: URETEROSCOPY STONE BASKET EXTRACTION LASER LITHOTRIPSY WITH STENT INSERTION WITH STRING;  Surgeon: Moisés Everett MD;  Location: [facility];  Service: Urology       Family History   Problem Relation Age of Onset   • Heart attack Mother    • Heart disease Mother    • Hypertension Mother    • Stroke Mother    • Macular degeneration Mother    • Heart attack Father    • Malig Hyperthermia Neg Hx                Objective:  Physical Exam    Vital signs reviewed.   General: No acute distress.  Eyes: conjunctiva clear; pupils equally round and reactive  ENT: external ears and nose atraumatic; oropharynx clear  CV: no peripheral edema  Resp: normal respiratory effort  Skin: no rashes or wounds; normal turgor  Psych: mood and affect appropriate; recent and remote memory intact    Vitals:    06/08/22 1120   BP: 117/62   Pulse: 66   Weight: 99.3 kg (219 lb)   Height: 157.5 cm (62\")         06/08/22  1120   Weight: 99.3 kg (219 lb)     Body mass index is 40.06 kg/m².      Left Knee Exam     Tenderness   The patient is experiencing tenderness in the medial joint line, pes anserinus and patella.    Range of Motion   Extension: 0   Flexion: 110     Tests   Varus: negative Valgus: negative  Patellar apprehension: positive    Other   Erythema: absent  Sensation: normal  Pulse: present  Swelling: severe    Comments:  Mildly positive logroll exam  Positive tenderness low back with straight leg raise               Imaging:  Left Knee X-Ray  Indication: Status post total knee arthroplasty 2007  Painful left knee    AP, Lateral, and Regent views    Findings:  Implant good positioning anatomical alignment appreciated no evidence of any acute dislocation no periprosthetic fracture no imaging available for comparison      Assessment:      "   1. Pain due to total left knee replacement, initial encounter ([facility])    2. Chronic pain of left knee           Plan:  1. Discussed treatment options with patient.  Do believe she is experiencing some swelling into the knee.  We will proceed with CT to evaluate for implant loosening.  Plan to see her back in clinic after completion of testing discussed results and further plan of care.  Discussed the option of blood work including sed rate and CRP as well as CBC to evaluate for infection however will hold off at this time as I do not believe there is any infection.  Will refer to physical therapy for strengthening of the quads, hamstring and calf muscles of her left lower extremity.  All questions answered.  Orders:  Orders Placed This Encounter   Procedures   • XR Knee 3 View Left   • CT knee left wo contrast   • Ambulatory Referral to Physical Therapy     No orders of the defined types were placed in this encounter.          I ordered and reviewed the MARTÍN today.       Dragon Dictation utilized.

## 2022-06-09 ENCOUNTER — TREATMENT (OUTPATIENT)
Dept: PHYSICAL THERAPY | Facility: CLINIC | Age: 82
End: 2022-06-09

## 2022-06-09 DIAGNOSIS — R26.89 ANTALGIC GAIT: Primary | ICD-10-CM

## 2022-06-09 DIAGNOSIS — R29.898 DECREASED STRENGTH OF LOWER EXTREMITY: ICD-10-CM

## 2022-06-09 DIAGNOSIS — M25.571 ACUTE RIGHT ANKLE PAIN: ICD-10-CM

## 2022-06-09 DIAGNOSIS — Z74.09 IMPAIRED FUNCTIONAL MOBILITY, BALANCE, GAIT, AND ENDURANCE: ICD-10-CM

## 2022-06-09 PROCEDURE — 97530 THERAPEUTIC ACTIVITIES: CPT | Performed by: PHYSICAL THERAPIST

## 2022-06-09 PROCEDURE — 97110 THERAPEUTIC EXERCISES: CPT | Performed by: PHYSICAL THERAPIST

## 2022-06-09 NOTE — PROGRESS NOTES
Physical Therapy Daily Progress Note    Visit # : 10  Fernanda Moilna reports: she is doing fine, but her back continues to hurt.      Subjective     Objective   See Exercise, Manual, and Modality Logs for complete treatment.       Assessment & Plan     Assessment    Assessment details: Pt is progressing well with therapy.  Pt continues to tolerate all OKC and CKC strengthening and balance exercises.  Pt was able to add TB to clam exercise and increase toe taps to 10 reps.  Pt is able to perform all exercises with less rest breaks.   Will continue to see pt 1-2x/week for strengthening, balance and gait per pt's tolerance.          Progress per Plan of Care           Manual Therapy:         mins  27811;  Therapeutic Exercise:   30      mins  12477;     Neuromuscular Liz:        mins  46336;    Therapeutic Activity:    30    mins  47135;     Gait Training:           mins  15841;     Ultrasound:          mins  04833;    Electrical Stimulation:         mins  18758 ( );  Dry Needling          mins self-pay    Timed Treatment:   60   mins   Total Treatment:     65   mins    Leann Washington, PT  Physical Therapist

## 2022-06-14 ENCOUNTER — TREATMENT (OUTPATIENT)
Dept: PHYSICAL THERAPY | Facility: CLINIC | Age: 82
End: 2022-06-14

## 2022-06-14 DIAGNOSIS — Z74.09 IMPAIRED FUNCTIONAL MOBILITY, BALANCE, GAIT, AND ENDURANCE: ICD-10-CM

## 2022-06-14 DIAGNOSIS — M25.571 ACUTE RIGHT ANKLE PAIN: ICD-10-CM

## 2022-06-14 DIAGNOSIS — R29.898 DECREASED STRENGTH OF LOWER EXTREMITY: ICD-10-CM

## 2022-06-14 DIAGNOSIS — R26.89 ANTALGIC GAIT: Primary | ICD-10-CM

## 2022-06-14 PROCEDURE — 97530 THERAPEUTIC ACTIVITIES: CPT | Performed by: PHYSICAL THERAPIST

## 2022-06-14 PROCEDURE — 97110 THERAPEUTIC EXERCISES: CPT | Performed by: PHYSICAL THERAPIST

## 2022-06-14 NOTE — PROGRESS NOTES
Physical Therapy Daily Progress Note    Visit # : 11  Fernanda Molina reports: she is doing fine.      Subjective     Objective   See Exercise, Manual, and Modality Logs for complete treatment.       Assessment & Plan     Assessment    Assessment details: Pt continues to tolerate all OKC and CKC strengthening and balance exercises.  Pt needed more short rest breaks between exercises today.  Pt was able to increase reps with standing 3 way hip.  Pt still has episodes of LOB, but is able to self correct.  Will continue to see pt 2x/week, but will add more balance exercises next visit.          Progress per Plan of Care           Manual Therapy:         mins  10812;  Therapeutic Exercise:   30      mins  35758;     Neuromuscular Liz:        mins  98138;    Therapeutic Activity:    30      mins  47863;     Gait Training:           mins  40583;     Ultrasound:          mins  20664;    Electrical Stimulation:         mins  89199 ( );  Dry Needling          mins self-pay    Timed Treatment:  60    mins   Total Treatment:    65    mins    Leann Washington, PT  Physical Therapist

## 2022-06-16 ENCOUNTER — TREATMENT (OUTPATIENT)
Dept: PHYSICAL THERAPY | Facility: CLINIC | Age: 82
End: 2022-06-16

## 2022-06-16 ENCOUNTER — HOSPITAL ENCOUNTER (OUTPATIENT)
Dept: CT IMAGING | Facility: HOSPITAL | Age: 82
Discharge: HOME | End: 2022-06-16
Admitting: NURSE PRACTITIONER

## 2022-06-16 DIAGNOSIS — R29.898 DECREASED STRENGTH OF LOWER EXTREMITY: ICD-10-CM

## 2022-06-16 DIAGNOSIS — M25.562 CHRONIC PAIN OF LEFT KNEE: ICD-10-CM

## 2022-06-16 DIAGNOSIS — R26.89 ANTALGIC GAIT: Primary | ICD-10-CM

## 2022-06-16 DIAGNOSIS — G89.29 CHRONIC PAIN OF LEFT KNEE: ICD-10-CM

## 2022-06-16 DIAGNOSIS — Z74.09 IMPAIRED FUNCTIONAL MOBILITY, BALANCE, GAIT, AND ENDURANCE: ICD-10-CM

## 2022-06-16 DIAGNOSIS — M25.571 ACUTE RIGHT ANKLE PAIN: ICD-10-CM

## 2022-06-16 DIAGNOSIS — T84.84XA PAIN DUE TO TOTAL LEFT KNEE REPLACEMENT, INITIAL ENCOUNTER: ICD-10-CM

## 2022-06-16 DIAGNOSIS — M25.561 ACUTE PAIN OF RIGHT KNEE: ICD-10-CM

## 2022-06-16 DIAGNOSIS — Z96.652 PAIN DUE TO TOTAL LEFT KNEE REPLACEMENT, INITIAL ENCOUNTER: ICD-10-CM

## 2022-06-16 PROCEDURE — 73700 CT LOWER EXTREMITY W/O DYE: CPT

## 2022-06-16 PROCEDURE — 97110 THERAPEUTIC EXERCISES: CPT | Performed by: PHYSICAL THERAPIST

## 2022-06-16 PROCEDURE — 97530 THERAPEUTIC ACTIVITIES: CPT | Performed by: PHYSICAL THERAPIST

## 2022-06-16 NOTE — PROGRESS NOTES
Physical Therapy Daily Progress Note/Reassessment     Visit # : 12  Fernanda Molina reports: she is doing better and is able to do more around the house, but it still is not normal.  Pt c/o left L-S pain today.  Pt just had her CT scan of the left knee prior to coming to PT.  Pt rates her right LE and B knee pain a 5/10 described as aching.  Pt states her right knee has started popping.  Pt states she does not feel secure when she is walking.  Pt states her knees feel like they are going to give out on her.      Subjective     Objective          Tenderness     Right Ankle/Foot   Tenderness in the lateral malleolus.     Additional Tenderness Details  Minimal right lateral inferior lateral malleolus tenderness-pt states it does not hurt until someone presses on it.      Active Range of Motion   Left Knee   Flexion: 101 degrees     Right Knee   Flexion: 103 degrees     Strength/Myotome Testing     Left Hip   Planes of Motion   Flexion: 3+  Abduction: 3+  Adduction: 2+    Right Hip   Planes of Motion   Flexion: 3  Abduction: 3+  Adduction: 3+    Left Knee   Flexion: 4  Extension: 4+    Right Knee   Flexion: 4  Extension: 4    Left Ankle/Foot   Dorsiflexion: 5  Plantar flexion: 5  Inversion: 5  Eversion: 4    Right Ankle/Foot   Dorsiflexion: 5  Plantar flexion: 5  Inversion: 4  Eversion: 4    Swelling     Left Knee Girth Measurement (cm)   10 cm below joint line: hematoma IP 46.4.    Ambulation     Observational Gait   Gait: within functional limits   Right stance time, left swing time and right swing time within functional limits. Decreased walking speed, stride length, left stance time, left step length and right step length.   Left foot contact pattern: heel to toe  Right foot contact pattern: heel to toe    Additional Observational Gait Details  Pt ambulates with a single point cane at a slow galina and short step lengths       See Exercise, Manual, and Modality Logs for complete treatment.       Assessment & Plan      Assessment    Assessment details: Pt is making some progress towards her goals, but today pt was more fatigue and needed to take rest breaks due to her left L-S pain.  Pt was unable to initially perform a right SLR, but after several attempts she could do one.  Pt has been performing 20 independent right SLRs and not sure why pt could not perform SLR today due to pt not having any particular fall or injury causing her pain or weakness in right LE.  Pt has met 6/16 goals, but is showing progress towards some of her other goals.  Pt still has moderate B hip weakness, hamstring weakness, and B eversion and right Inversion weakness.  Question if some of patients B LE's weakness could be radicular pain from her lumbar spine.  Did not complete all of pt's CKC strengthening exercises today due to limited time from doing reassessment today and pt was having additional left L-S pain in standing.  Will continue to see pt 2x/week for LE strengthening, balance and once pt's CT scan results for her left knee pt has new orders to evaluate and treat the left knee.  Plan to continue to see pt for another 4 weeks and progress pt's exercises per pt's tolerance.        Goals  Plan Goals: STGs: 2-4 weeks  1. Decrease right knee pain 5/10 with standing and gait.   MET   2. Increase right knee AROM 0-100 degrees  MET   3.  Decrease right lateral distal quad tenderness to minimal with palpation   MET   4.  Pt is able to perform a right SLR independently  PARTIALLY MET-PT HAS BEEN DOING 20 INDEPENDENT SLR, BUT TODAY COULD NOT LIFT LEG INITIALLY   5.  Pt ambulates into clinic with AD minimal antalgic gait right LE  MET   6.  Decrease right lateral knee hematoma at the IP with girth measurement to 49 cm.  MET     LTGs: 4-8 weeks  1.  Pt Independent with HEP for self management.   PROGRESSING   2.  Increase right knee ext and flex strength to 4+/5  PARTIALLY MET   3.  Increase right hip flex, abd, and add strength to 4-4+/5  NOT MET   4.   Decrease right lateral quad and knee tenderness to none with palpation  PROGRESSING   5.  Decrease right knee pain to a 2/10 with standing and ambulation.   NOT MET       New STGs for ankle:  4 weeks  1.  Decrease right ankle pain to 0-1/10 with ambulation   No pain until some one pushes on it   2.  Increase right ankle DF AROM 4 degrees NOT ASSESSED TODAY   3.  Increase right ankle DF, PF, and Ever strength 5/5.  PARTIALLY MET   4.  Decrease right ankle tenderness over the lateral and dorsum of foot to minimal to none with palpation.  NOT MET     LTGs: 8 weeks  1.  Increase right ankle DF AROM 8 degrees  NOT MET     Progress per Plan of Care         Manual Therapy:         mins  08021;  Therapeutic Exercise:   35     mins  35583;     Neuromuscular Liz:        mins  79891;    Therapeutic Activity:    30      mins  41609;     Gait Training:           mins  16228;     Ultrasound:          mins  20830;    Electrical Stimulation:         mins  57286 ( );  Dry Needling          mins self-pay    Timed Treatment:  65    mins   Total Treatment:     74   mins    Leann Washington, PT  Physical Therapist

## 2022-06-21 ENCOUNTER — OFFICE VISIT (OUTPATIENT)
Dept: FAMILY MEDICINE CLINIC | Facility: CLINIC | Age: 82
End: 2022-06-21

## 2022-06-21 VITALS
SYSTOLIC BLOOD PRESSURE: 120 MMHG | DIASTOLIC BLOOD PRESSURE: 76 MMHG | HEIGHT: 62 IN | WEIGHT: 219 LBS | HEART RATE: 76 BPM | TEMPERATURE: 97.3 F | OXYGEN SATURATION: 95 % | BODY MASS INDEX: 40.3 KG/M2

## 2022-06-21 DIAGNOSIS — R53.83 FATIGUE, UNSPECIFIED TYPE: ICD-10-CM

## 2022-06-21 DIAGNOSIS — J01.00 ACUTE NON-RECURRENT MAXILLARY SINUSITIS: ICD-10-CM

## 2022-06-21 DIAGNOSIS — J04.0 LARYNGITIS: Primary | ICD-10-CM

## 2022-06-21 DIAGNOSIS — R05.9 COUGH: Primary | ICD-10-CM

## 2022-06-21 DIAGNOSIS — R05.9 COUGH: ICD-10-CM

## 2022-06-21 LAB
EXPIRATION DATE: NORMAL
FLUAV AG UPPER RESP QL IA.RAPID: NOT DETECTED
FLUBV AG UPPER RESP QL IA.RAPID: NOT DETECTED
INTERNAL CONTROL: NORMAL
Lab: NORMAL
SARS-COV-2 AG UPPER RESP QL IA.RAPID: NOT DETECTED

## 2022-06-21 PROCEDURE — 87428 SARSCOV & INF VIR A&B AG IA: CPT | Performed by: FAMILY MEDICINE

## 2022-06-21 PROCEDURE — 99213 OFFICE O/P EST LOW 20 MIN: CPT | Performed by: FAMILY MEDICINE

## 2022-06-21 RX ORDER — LEVOTHYROXINE SODIUM 175 UG/1
125 TABLET ORAL
COMMUNITY
Start: 2022-04-28 | End: 2022-11-03 | Stop reason: SDUPTHER

## 2022-06-21 RX ORDER — ALBUTEROL SULFATE 90 UG/1
2 AEROSOL, METERED RESPIRATORY (INHALATION) EVERY 4 HOURS PRN
Qty: 18 G | Refills: 5 | Status: SHIPPED | OUTPATIENT
Start: 2022-06-21

## 2022-06-21 RX ORDER — ALBUTEROL SULFATE 2.5 MG/3ML
2.5 SOLUTION RESPIRATORY (INHALATION) EVERY 4 HOURS PRN
Qty: 30 EACH | Refills: 5 | Status: SHIPPED | OUTPATIENT
Start: 2022-06-21

## 2022-06-21 RX ORDER — INSULIN GLARGINE 100 [IU]/ML
40 INJECTION, SOLUTION SUBCUTANEOUS DAILY
COMMUNITY
Start: 2022-05-23 | End: 2022-06-21 | Stop reason: SDUPTHER

## 2022-06-21 RX ORDER — AMOXICILLIN 500 MG/1
500 CAPSULE ORAL 2 TIMES DAILY
Qty: 14 CAPSULE | Refills: 0 | Status: SHIPPED | OUTPATIENT
Start: 2022-06-21 | End: 2022-06-28

## 2022-06-21 NOTE — PROGRESS NOTES
Chief Complaint   Patient presents with   • Cough   • Fatigue       Subjective   Fernanda Molina is a 81 y.o. female.     History of Present Illness   Here for acute add-on visit    She lost her voice. Her  had a cough and she seems to have gotten a URI.  VSS. No fevers or chills.  She has a tendency to get laryngitis with URI type symptoms.  She denies any shortness of breath.  No red flag symptoms.      The following portions of the patient's history were reviewed and updated as appropriate: allergies, current medications, past family history, past medical history, past social history, past surgical history and problem list.      Past Medical History:   Diagnosis Date   • AICD (automatic cardioverter/defibrillator) present     left side chest   • Anemia    • Arthritis    • Atrial fibrillation (HCC)    • Cancer (HCC)     melanoma right back of knee   • CHF (congestive heart failure) (HCC)    • Congenital heart disease    • GERD (gastroesophageal reflux disease)    • Graves disease    • Hematuria    • High cholesterol    • History of melanoma     RIGHT KNEE   • History of MI (myocardial infarction)    • Incontinence of urine     wears pads   • On anticoagulant therapy    • HAMLET on CPAP     cpap   • Pelvic kidney 2020   • PONV (postoperative nausea and vomiting)    • Spinal stenosis    • Ureteral calculus, left 2020   • Valvular heart disease 2015    S/p MVR & TVR        Past Surgical History:   Procedure Laterality Date   • CARDIAC CATHETERIZATION     • CARDIAC DEFIBRILLATOR PLACEMENT Left     [facility]   • CARDIAC DEFIBRILLATOR PLACEMENT Left     [facility]   •  SECTION     •  SECTION     • CHOLECYSTECTOMY     • COLONOSCOPY     • ENDOSCOPY     • EPIDURAL BLOCK  8750-0171    Lumbar    • EXTRACORPOREAL SHOCK WAVE LITHOTRIPSY (ESWL) Right 2017   • EXTRACORPOREAL SHOCK WAVE LITHOTRIPSY (ESWL) Right 2017    Procedure: RT EXTRACORPOREAL  SHOCKWAVE LITHOTRIPSY;  Surgeon: Moisés Everett MD;  Location: [facility];  Service:    • EYE SURGERY Right 09/10/2007    Cataract   • EYE SURGERY Left 10/29/2007    Cataract   • JOINT REPLACEMENT Left 04/03/2007    Knee   • JOINT REPLACEMENT Right 01/11/2010    Knee   • KNEE ARTHROSCOPY  1980   • MAZE PROCEDURE  07/31/2015    Dr. Saavedra   • MITRAL VALVE REPAIR/REPLACEMENT  2015   • MITRAL VALVE REPAIR/REPLACEMENT N/A 05/14/2019    Procedure: ALINE RE-OP STERNOTOMY MITRAL VALVE REPLACEMENT AND PRP;  Surgeon: Ben Saavedra MD;  Location: [facility] OR;  Service: Cardiothoracic   • PACEMAKER IMPLANTATION Left 2010   • MT ERCP DX COLLECTION SPECIMEN BRUSHING/WASHING N/A 08/24/2016    Procedure: ENDOSCOPIC RETROGRADE CHOLANGIOPANCREATOGRAPHY WITH SPHINCTEROTOMY AND BALLOON SWEEP OF CBD;  Surgeon: Lucas Campos MD;  Location: [facility] ENDOSCOPY;  Service: Gastroenterology   • SKIN CANCER EXCISION      MELANOMA RIGHT KNEE AREA - 2002   • TRICUSPID VALVE SURGERY  07/31/2015    Dr. Saavedra  ring placed   • URETEROSCOPY LASER LITHOTRIPSY WITH STENT INSERTION Right 11/15/2017    Procedure: CYSTOSCOPY; RIGHT URETEROSCOPY LASER LITHOTRIPSY WITH STENT INSERTION;  Surgeon: Moisés Everett MD;  Location: [facility] OR;  Service:    • URETEROSCOPY LASER LITHOTRIPSY WITH STENT INSERTION Left 01/30/2020    Procedure: URETEROSCOPY STONE BASKET EXTRACTION LASER LITHOTRIPSY WITH STENT INSERTION WITH STRING;  Surgeon: Moisés Everett MD;  Location: [facility] OR;  Service: Urology       Family History   Problem Relation Age of Onset   • Heart attack Mother    • Heart disease Mother    • Hypertension Mother    • Stroke Mother    • Macular degeneration Mother    • Heart attack Father    • Malig Hyperthermia Neg Hx        Social History     Socioeconomic History   • Marital status:    Tobacco Use   • Smoking status: Never Smoker   • Smokeless tobacco: Never Used   Vaping Use   • Vaping Use: Never used    Substance and Sexual Activity   • Alcohol use: No   • Drug use: No   • Sexual activity: Not Currently     Partners: Male     Birth control/protection: Post-menopausal       Current Outpatient Medications on File Prior to Visit   Medication Sig Dispense Refill   • acetaminophen (TYLENOL) 325 MG tablet Take 2 tablets by mouth Every 4 (Four) Hours As Needed for Mild Pain .     • atorvastatin (LIPITOR) 20 MG tablet Take 1 tablet by mouth Daily. Indications: Elevation of Both Cholesterol and Triglycerides in Blood 90 tablet 3   • BD Pen Needle Clara U/F 32G X 4 MM misc USE 1 PEN NEEDLE TO INJECT UNDER THE SKIN OF THE APPROPRIATE AREA EVERY MORNING, WITH INSULIN PEN, AS DIRECTED. 100 each 3   • cetirizine (zyrTEC) 10 MG tablet Take 10 mg by mouth Daily.     • digoxin (LANOXIN) 250 MCG tablet TAKE 1 TABLET DAILY 90 tablet 3   • Farxiga 10 MG tablet Daily.     • ferrous sulfate 325 (65 FE) MG EC tablet Take 65 mg by mouth 1 (one) time per week. On Sunday     • furosemide (LASIX) 40 MG tablet Take 1 tablet by mouth Daily. Indications: Edema 90 tablet 1   • ketoconazole (NIZORAL) 2 % cream Apply 1 application topically to the appropriate area as directed Every 12 (Twelve) Hours. 60 g 5   • KLOR-CON 20 MEQ CR tablet TAKE 1 TABLET DAILY 90 tablet 1   • Lancets (OneTouch Delica Plus [id number]) misc USE TO TEST BLOOD SUGAR THREE TIMES A  each 11   • Lantus SoloStar 100 UNIT/ML injection pen INJECT 40 UNITS UNDER THE SKIN INTO THE APPROPRIATE AREA AS DIRECTED DAILY 30 mL 3   • levothyroxine (SYNTHROID, LEVOTHROID) 175 MCG tablet Take 1 tablet by mouth Daily. Indications: Underactive Thyroid 90 tablet 1   • levothyroxine (SYNTHROID, LEVOTHROID) 175 MCG tablet Take 125 mcg by mouth.     • metFORMIN (GLUCOPHAGE) 1000 MG tablet Take 1 tablet by mouth Daily Before Supper. Indications: Type 2 Diabetes 90 tablet 3   • metFORMIN (GLUCOPHAGE) 500 MG tablet Take 1 tablet by mouth Daily With Breakfast. Indications: Type 2 Diabetes 90  tablet 3   • metoprolol succinate XL (TOPROL-XL) 200 MG 24 hr tablet Take 200 mg by mouth Every Morning.     • Multiple Vitamin (MULTIVITAMIN) tablet Take 1 tablet by mouth Daily.     • nystatin (MYCOSTATIN) 059519 UNIT/GM powder Apply  topically to the appropriate area as directed 3 (Three) Times a Day. Indications: Skin Infection due to Candida Yeast 60 g 1   • OneTouch Ultra test strip USE TO TEST BLOOD GLUCOSE THREE TIMES A  each 11   • pantoprazole (PROTONIX) 40 MG EC tablet Take 1 tablet by mouth Daily. for gastroesophageal reflux disease  Indications: Gastroesophageal Reflux Disease 90 tablet 3   • sacubitril-valsartan (Entresto) 49-51 MG tablet 2 (Two) Times a Day.     • Vericiguat (Verquvo) 5 MG tablet Take 5 mg by mouth Daily.     • warfarin (COUMADIN) 2.5 MG tablet Take 2.5 mg by mouth. TUES THURS  SAT  Sun  ( so total dose on TUES THURS  SAT  SUN is 7.5mg because she has coumadin 5 mg daily)     • warfarin (COUMADIN) 5 MG tablet Take 5 mg by mouth Every Night.     • [DISCONTINUED] mometasone (NASONEX) 50 MCG/ACT nasal spray SPRAY TWO SPRAYS IN EACH NOSTRIL ONCE DAILY 17 each 4     No current facility-administered medications on file prior to visit.       Review of Systems   Constitutional: Negative for fever.   HENT: Positive for congestion.    Respiratory: Negative for cough and shortness of breath.    Cardiovascular: Negative for chest pain.   Gastrointestinal: Negative for abdominal pain.   Genitourinary: Negative for decreased urine volume.   Musculoskeletal: Negative for back pain.   Neurological: Negative for weakness.           Vitals:    06/21/22 1112   BP: 120/76   Pulse: 76   Temp: 97.3 °F (36.3 °C)   SpO2: 95%      Objective   Physical Exam  Vitals reviewed.   Constitutional:       Appearance: She is ill-appearing.   Cardiovascular:      Rate and Rhythm: Normal rate.      Pulses: Normal pulses.   Pulmonary:      Effort: Pulmonary effort is normal. No respiratory distress.   Abdominal:       General: Abdomen is flat.   Skin:     General: Skin is warm.      Capillary Refill: Capillary refill takes less than 2 seconds.   Neurological:      General: No focal deficit present.   Psychiatric:         Mood and Affect: Mood normal.           Assessment & Plan   Problems Addressed this Visit    None     Visit Diagnoses     Laryngitis    -  Primary    Acute non-recurrent maxillary sinusitis        Relevant Medications    amoxicillin (AMOXIL) 500 MG capsule    Fatigue, unspecified type        Relevant Orders    POCT SARS-CoV-2 Antigen SERGEI + Flu (Completed)    Cough        Relevant Orders    POCT SARS-CoV-2 Antigen SERGEI + Flu (Completed)      Diagnoses       Codes Comments    Laryngitis    -  Primary ICD-10-CM: J04.0  ICD-9-CM: 464.00     Acute non-recurrent maxillary sinusitis     ICD-10-CM: J01.00  ICD-9-CM: 461.0     Fatigue, unspecified type     ICD-10-CM: R53.83  ICD-9-CM: 780.79     Cough     ICD-10-CM: R05.9  ICD-9-CM: 786.2         -Likely viral 2/2 infection from  for URI, vaccinated for covid  -Eating and drinking  -Neg for flu and Covid     Would continue conservative care notify us if symptoms are not improving                 Dee Devi MD

## 2022-06-23 ENCOUNTER — APPOINTMENT (OUTPATIENT)
Dept: SLEEP MEDICINE | Facility: HOSPITAL | Age: 82
End: 2022-06-23

## 2022-07-07 ENCOUNTER — TREATMENT (OUTPATIENT)
Dept: PHYSICAL THERAPY | Facility: CLINIC | Age: 82
End: 2022-07-07

## 2022-07-07 DIAGNOSIS — M25.661 DECREASED ROM OF RIGHT KNEE: ICD-10-CM

## 2022-07-07 DIAGNOSIS — R26.89 ANTALGIC GAIT: Primary | ICD-10-CM

## 2022-07-07 DIAGNOSIS — R29.898 DECREASED STRENGTH OF LOWER EXTREMITY: ICD-10-CM

## 2022-07-07 DIAGNOSIS — M25.571 ACUTE RIGHT ANKLE PAIN: ICD-10-CM

## 2022-07-07 DIAGNOSIS — Z74.09 IMPAIRED FUNCTIONAL MOBILITY, BALANCE, GAIT, AND ENDURANCE: ICD-10-CM

## 2022-07-07 DIAGNOSIS — M25.561 ACUTE PAIN OF RIGHT KNEE: ICD-10-CM

## 2022-07-07 PROCEDURE — 97530 THERAPEUTIC ACTIVITIES: CPT | Performed by: PHYSICAL THERAPIST

## 2022-07-07 PROCEDURE — 97110 THERAPEUTIC EXERCISES: CPT | Performed by: PHYSICAL THERAPIST

## 2022-07-07 PROCEDURE — 97112 NEUROMUSCULAR REEDUCATION: CPT | Performed by: PHYSICAL THERAPIST

## 2022-07-07 NOTE — PROGRESS NOTES
Physical Therapy Daily Progress Note    Visit # : 13  Fernanda Molina reports: she is doing pretty well.  Pt states she goes to Pain Management next week.  Pt states her left lower back, buttock and left leg hurts a lot at night.      Subjective     Objective   See Exercise, Manual, and Modality Logs for complete treatment.       Assessment & Plan     Assessment    Assessment details: Pt is tolerating treatment much better today than her last visit.  Pt was able to complete 10 reps of SLR on both LEs today, but still fatigues easily.  Added dynamic standing balance with ball toss on stable and unstable surface.  Pt was able to maintain balance independent during both surfaces without LOB.  Did lower some reps with strengthening exercises due to pt having some SOB today with the hot weather and she forgot to do her inhaler prior to appointment.  Will continue to see pt 2x/week for LE strengthening, balance, and will schedule eval to left knee now that pt had CT scan and it was negative.        Progress per Plan of Care         Manual Therapy:         mins  21629;  Therapeutic Exercise:   31      mins  64399;     Neuromuscular Liz:   8     mins  91035;    Therapeutic Activity:   30       mins  94583;     Gait Training:           mins  04188;     Ultrasound:          mins  68977;    Electrical Stimulation:         mins  67071 ( );  Dry Needling          mins self-pay    Timed Treatment: 69     mins   Total Treatment:    74    mins    Leann Washington, PT  Physical Therapist

## 2022-07-09 DIAGNOSIS — E03.9 ACQUIRED HYPOTHYROIDISM: ICD-10-CM

## 2022-07-11 RX ORDER — LEVOTHYROXINE SODIUM 0.15 MG/1
TABLET ORAL
Qty: 90 TABLET | Refills: 3 | OUTPATIENT
Start: 2022-07-11

## 2022-07-13 ENCOUNTER — TREATMENT (OUTPATIENT)
Dept: PHYSICAL THERAPY | Facility: CLINIC | Age: 82
End: 2022-07-13

## 2022-07-13 DIAGNOSIS — M25.571 ACUTE RIGHT ANKLE PAIN: ICD-10-CM

## 2022-07-13 DIAGNOSIS — M25.661 DECREASED ROM OF RIGHT KNEE: ICD-10-CM

## 2022-07-13 DIAGNOSIS — R26.89 ANTALGIC GAIT: Primary | ICD-10-CM

## 2022-07-13 DIAGNOSIS — Z74.09 IMPAIRED FUNCTIONAL MOBILITY, BALANCE, GAIT, AND ENDURANCE: ICD-10-CM

## 2022-07-13 DIAGNOSIS — M25.561 ACUTE PAIN OF RIGHT KNEE: ICD-10-CM

## 2022-07-13 DIAGNOSIS — R29.898 DECREASED STRENGTH OF LOWER EXTREMITY: ICD-10-CM

## 2022-07-13 PROCEDURE — 97530 THERAPEUTIC ACTIVITIES: CPT | Performed by: PHYSICAL THERAPIST

## 2022-07-13 PROCEDURE — 97110 THERAPEUTIC EXERCISES: CPT | Performed by: PHYSICAL THERAPIST

## 2022-07-13 PROCEDURE — 97112 NEUROMUSCULAR REEDUCATION: CPT | Performed by: PHYSICAL THERAPIST

## 2022-07-13 NOTE — PROGRESS NOTES
"Physical Therapy Daily Treatment Note  Patient: Fernanda Molina   : 1940  Referring practitioner: Geraldo Velazquez MD  Date of Initial Visit: Type: THERAPY  Noted: 2022  Today's Date: 2022  Patient seen for 14 sessions       Visit Diagnoses:    ICD-10-CM ICD-9-CM   1. Antalgic gait  R26.89 781.2   2. Impaired functional mobility, balance, gait, and endurance  Z74.09 V49.89   3. Decreased strength of lower extremity  R29.898 729.89   4. Acute right ankle pain  M25.571 719.47     338.19   5. Acute pain of right knee  M25.561 719.46   6. Decreased ROM of right knee  M25.661 719.56       Subjective     Pt reports she is about the same.  Pt states her lower back and left hip are still hurting.      Objective   See Exercise, Manual, and Modality Logs for complete treatment.       Assessment & Plan     Assessment    Assessment details: Pt is tolerating treatment fairly well and required less rest breaks today due to SOA.  Pt did have some c/o \"weakness/light-headedness\" during dynamic standing balance exercise with reaching in diagonals. Pt needed to take a short sitting rest break.  Question if pt maybe having some vertigo from hitting her head during her fall. Pt was able to perform all other exercises well.  Will continue to see pt 2x/week for LE strengthening, balance, and will schedule eval to left knee now that pt had CT scan and it was negative.  Plan to take MMT and ROM measurements to left knee next visit.        Timed:         Manual Therapy:         mins  62436;     Therapeutic Exercise:   20      mins  26176;     Neuromuscular Liz:   10    mins  49652;    Therapeutic Activity:    27      mins  35328;     Gait Training:           mins  23286;     Ultrasound:          mins  96218;    Ionto                                   mins   02084  Self Care                            mins   69054  Canalith Repos         mins 20914      Un-Timed:  Electrical Stimulation:         mins  01216 ( );  Dry " Needling          mins self-pay  Traction          mins 12217      Timed Treatment:   57   mins   Total Treatment:     63   mins    Leann Washington, PT  [place] License: [id number]

## 2022-07-15 ENCOUNTER — TREATMENT (OUTPATIENT)
Dept: PHYSICAL THERAPY | Facility: CLINIC | Age: 82
End: 2022-07-15

## 2022-07-15 ENCOUNTER — TELEPHONE (OUTPATIENT)
Dept: ORTHOPEDIC SURGERY | Facility: CLINIC | Age: 82
End: 2022-07-15

## 2022-07-15 DIAGNOSIS — M25.661 DECREASED ROM OF RIGHT KNEE: ICD-10-CM

## 2022-07-15 DIAGNOSIS — M25.571 ACUTE RIGHT ANKLE PAIN: ICD-10-CM

## 2022-07-15 DIAGNOSIS — M25.561 ACUTE PAIN OF RIGHT KNEE: ICD-10-CM

## 2022-07-15 DIAGNOSIS — Z74.09 IMPAIRED FUNCTIONAL MOBILITY, BALANCE, GAIT, AND ENDURANCE: ICD-10-CM

## 2022-07-15 DIAGNOSIS — R29.898 DECREASED STRENGTH OF LOWER EXTREMITY: ICD-10-CM

## 2022-07-15 DIAGNOSIS — R26.89 ANTALGIC GAIT: Primary | ICD-10-CM

## 2022-07-15 PROCEDURE — 97112 NEUROMUSCULAR REEDUCATION: CPT | Performed by: PHYSICAL THERAPIST

## 2022-07-15 PROCEDURE — 97110 THERAPEUTIC EXERCISES: CPT | Performed by: PHYSICAL THERAPIST

## 2022-07-15 PROCEDURE — 97530 THERAPEUTIC ACTIVITIES: CPT | Performed by: PHYSICAL THERAPIST

## 2022-07-15 NOTE — PROGRESS NOTES
"Physical Therapy Daily Treatment Note      Patient: Fernanda Molina   : 1940  Referring practitioner: Geraldo Velazquez MD  Date of Initial Visit: Type: THERAPY  Noted: 2022  Today's Date: 7/15/2022  Patient seen for 15 sessions       Visit Diagnoses:    ICD-10-CM ICD-9-CM   1. Antalgic gait  R26.89 781.2   2. Impaired functional mobility, balance, gait, and endurance  Z74.09 V49.89   3. Decreased strength of lower extremity  R29.898 729.89   4. Acute right ankle pain  M25.571 719.47     338.19   5. Acute pain of right knee  M25.561 719.46   6. Decreased ROM of right knee  M25.661 719.56       Fernanda Molina reports: she is doing better.  Pt states today is a good day.  Pt denies having any increased pain or soreness from last visit.      Subjective       Objective          Active Range of Motion   Left Knee   Flexion: 96 degrees       See Exercise, Manual, and Modality Logs for complete treatment.       Assessment & Plan     Assessment    Assessment details: Pt is tolerating treatment well and denies having any increased pain or soreness from the increases last visit.  Did not increase or change any exercises this visit due to the increases last visit. Pt denied having any episodes of \"weakness/light-headedness\" during dynamic standing balance exercise with reaching in diagonals today. Pt still has weakness with B hip flex and abd.  Will continue to see pt 2x/week for LE strengthening and balance.  Due to limited time did not assess pt's left knee, but will take MMT and ROM assessment next visit.            Timed:         Manual Therapy:         mins  18699;     Therapeutic Exercise:   20      mins  86436;     Neuromuscular Liz:  8      mins  36939;    Therapeutic Activity:    27      mins  31782;     Gait Training:           mins  72222;     Ultrasound:          mins  91954;    Ionto                                   mins   67683  Self Care                            mins   60393  Canalith Repos       "   mins 73508      Un-Timed:  Electrical Stimulation:         mins  21238 ( );  Dry Needling          mins self-pay  Traction          mins 86738      Timed Treatment:  55    mins   Total Treatment:     60   mins    Leann Washington, PT  [place] License: [id number]

## 2022-07-15 NOTE — TELEPHONE ENCOUNTER
----- Message from Leo Han sent at 7/14/2022  3:53 PM EDT -----  PLEASE CALL FOR FU  ----- Message -----  From: Viki Padilla APRN  Sent: 7/14/2022  11:46 AM EDT  To: Leo Han    Needs follow-up from CT scan, please. With me

## 2022-07-15 NOTE — TELEPHONE ENCOUNTER
I called patient to schedule her follow-up appointment. Patient was not at home and stated she would call back later to schedule the appointment as she needs to look at her calendar.

## 2022-07-19 ENCOUNTER — OFFICE VISIT (OUTPATIENT)
Dept: ORTHOPEDIC SURGERY | Facility: CLINIC | Age: 82
End: 2022-07-19

## 2022-07-19 VITALS — WEIGHT: 219 LBS | HEIGHT: 62 IN | BODY MASS INDEX: 40.3 KG/M2

## 2022-07-19 DIAGNOSIS — T84.84XA PAIN DUE TO TOTAL LEFT KNEE REPLACEMENT, INITIAL ENCOUNTER: Primary | ICD-10-CM

## 2022-07-19 DIAGNOSIS — Z96.652 PAIN DUE TO TOTAL LEFT KNEE REPLACEMENT, INITIAL ENCOUNTER: Primary | ICD-10-CM

## 2022-07-19 PROCEDURE — 20610 DRAIN/INJ JOINT/BURSA W/O US: CPT | Performed by: NURSE PRACTITIONER

## 2022-07-19 PROCEDURE — 99214 OFFICE O/P EST MOD 30 MIN: CPT | Performed by: NURSE PRACTITIONER

## 2022-07-19 RX ORDER — LIDOCAINE HYDROCHLORIDE 10 MG/ML
8 INJECTION, SOLUTION EPIDURAL; INFILTRATION; INTRACAUDAL; PERINEURAL
Status: COMPLETED | OUTPATIENT
Start: 2022-07-19 | End: 2022-07-19

## 2022-07-19 RX ORDER — BETAMETHASONE SODIUM PHOSPHATE AND BETAMETHASONE ACETATE 3; 3 MG/ML; MG/ML
6 INJECTION, SUSPENSION INTRA-ARTICULAR; INTRALESIONAL; INTRAMUSCULAR; SOFT TISSUE
Status: COMPLETED | OUTPATIENT
Start: 2022-07-19 | End: 2022-07-19

## 2022-07-19 RX ADMIN — BETAMETHASONE SODIUM PHOSPHATE AND BETAMETHASONE ACETATE 6 MG: 3; 3 INJECTION, SUSPENSION INTRA-ARTICULAR; INTRALESIONAL; INTRAMUSCULAR; SOFT TISSUE at 11:01

## 2022-07-19 RX ADMIN — LIDOCAINE HYDROCHLORIDE 8 ML: 10 INJECTION, SOLUTION EPIDURAL; INFILTRATION; INTRACAUDAL; PERINEURAL at 11:01

## 2022-07-19 NOTE — PROGRESS NOTES
Subjective:     Patient ID: Fernanda Molina is a 81 y.o. female.    Chief Complaint:  Total knee arthroplasty, 2017  Painful total knee  History of Present Illness  Fernanda Molina returns to clinic for follow-up of her left knee.  Continues to experience discomfort across the anterior aspect of the knee as well as the medial and lateral joint line.  Has completed the CT presents to clinic for evaluation and further plan of care.  Total knee arthroplasty completed outside facility in 2007 of the left knee is experiencing pain and swelling for the last few years. Increased pain noted with activities involving transitional activity such from seated to standing attempting to walk, pain with ambulating but significant swelling around the medial joint line as well as the anterior aspect.  She does currently see pain management has received nerve ablation most recently.  Maximal tenderness present medial joint line as well as the anterior aspect.  She is experiencing pain posteriorly as well.  Positive for swelling again which has been present for several years.  Denies pain radiating to the groin.  Denies other concerns present time.    Social History     Occupational History   • Not on file   Tobacco Use   • Smoking status: Never Smoker   • Smokeless tobacco: Never Used   Vaping Use   • Vaping Use: Never used   Substance and Sexual Activity   • Alcohol use: No   • Drug use: No   • Sexual activity: Not Currently     Partners: Male     Birth control/protection: Post-menopausal      Past Medical History:   Diagnosis Date   • AICD (automatic cardioverter/defibrillator) present     left side chest   • Anemia    • Arthritis    • Atrial fibrillation (HCC)    • Cancer (HCC) 2002    melanoma right back of knee   • CHF (congestive heart failure) (HCC)    • Congenital heart disease    • GERD (gastroesophageal reflux disease)    • Graves disease    • Hematuria    • High cholesterol    • History of melanoma 2002    RIGHT KNEE   •  History of MI (myocardial infarction)    • Incontinence of urine     wears pads   • On anticoagulant therapy    • HAMLET on CPAP     cpap   • Pelvic kidney 2020   • PONV (postoperative nausea and vomiting)    • Spinal stenosis    • Ureteral calculus, left 2020   • Valvular heart disease 2015    S/p MVR & TVR      Past Surgical History:   Procedure Laterality Date   • CARDIAC CATHETERIZATION     • CARDIAC DEFIBRILLATOR PLACEMENT Left     [facility]   • CARDIAC DEFIBRILLATOR PLACEMENT Left 2016    [facility]   •  SECTION     •  SECTION     • CHOLECYSTECTOMY     • COLONOSCOPY     • ENDOSCOPY     • EPIDURAL BLOCK  [id number]    Lumbar    • EXTRACORPOREAL SHOCK WAVE LITHOTRIPSY (ESWL) Right 2017   • EXTRACORPOREAL SHOCK WAVE LITHOTRIPSY (ESWL) Right 2017    Procedure: RT EXTRACORPOREAL SHOCKWAVE LITHOTRIPSY;  Surgeon: Moisés Everett MD;  Location: [facility];  Service:    • EYE SURGERY Right 09/10/2007    Cataract   • EYE SURGERY Left 10/29/2007    Cataract   • JOINT REPLACEMENT Left 2007    Knee   • JOINT REPLACEMENT Right 2010    Knee   • KNEE ARTHROSCOPY     • MAZE PROCEDURE  2015    Dr. Saavedra   • MITRAL VALVE REPAIR/REPLACEMENT     • MITRAL VALVE REPAIR/REPLACEMENT N/A 2019    Procedure: ALINE RE-OP STERNOTOMY MITRAL VALVE REPLACEMENT AND PRP;  Surgeon: Ben Saavedra MD;  Location: [facility] OR;  Service: Cardiothoracic   • PACEMAKER IMPLANTATION Left    • KS ERCP DX COLLECTION SPECIMEN BRUSHING/WASHING N/A 2016    Procedure: ENDOSCOPIC RETROGRADE CHOLANGIOPANCREATOGRAPHY WITH SPHINCTEROTOMY AND BALLOON SWEEP OF CBD;  Surgeon: Lucas Campos MD;  Location: [facility] ENDOSCOPY;  Service: Gastroenterology   • SKIN CANCER EXCISION      MELANOMA RIGHT KNEE AREA -    • TRICUSPID VALVE SURGERY  2015    Dr. Saavedra  ring placed   • URETEROSCOPY LASER LITHOTRIPSY WITH STENT INSERTION Right  "11/15/2017    Procedure: CYSTOSCOPY; RIGHT URETEROSCOPY LASER LITHOTRIPSY WITH STENT INSERTION;  Surgeon: Moisés Everett MD;  Location: [facility];  Service:    • URETEROSCOPY LASER LITHOTRIPSY WITH STENT INSERTION Left 01/30/2020    Procedure: URETEROSCOPY STONE BASKET EXTRACTION LASER LITHOTRIPSY WITH STENT INSERTION WITH STRING;  Surgeon: Moisés Everett MD;  Location: [facility];  Service: Urology       Family History   Problem Relation Age of Onset   • Heart attack Mother    • Heart disease Mother    • Hypertension Mother    • Stroke Mother    • Macular degeneration Mother    • Heart attack Father    • Malig Hyperthermia Neg Hx                Objective:  Physical Exam  General: No acute distress.  Eyes: conjunctiva clear; pupils equally round and reactive  ENT: external ears and nose atraumatic; oropharynx clear  CV: no peripheral edema  Resp: normal respiratory effort  Skin: no rashes or wounds; normal turgor  Psych: mood and affect appropriate; recent and remote memory intact    Vitals:    07/19/22 1033   Weight: 99.3 kg (219 lb)   Height: 157.5 cm (62.01\")         07/19/22  1033   Weight: 99.3 kg (219 lb)     Body mass index is 40.04 kg/m².      Ortho Exam       Left Knee Exam      Tenderness   The patient is experiencing tenderness in the medial joint line, pes anserinus and patella.     Range of Motion   Extension: 0   Flexion: 110      Tests   Varus: negative Valgus: negative  Patellar apprehension: positive     Other   Erythema: absent  Sensation: normal  Pulse: present  Swelling: severe     Comments:    Mildly positive logroll exam  Positive tenderness low back with straight leg raise    Imaging:    CT knee left wo contrast    Result Date: 6/17/2022  1. Total left knee arthroplasty. No CT evidence of hardware failure or loosening. 2. No periprosthetic fracture or osteolysis. 3. No significant joint effusion. Signer Name: Flakito Hernandez MD  Signed: 6/17/2022 2:48 PM  Workstation Name: " [facility]  [facility]    Independently reviewed CT results left knee total knee arthroplasty no evidence of hardware failure or loosening.  No periprosthetic fracture or osteolysis noted.  No significant joint effusion.    Assessment:        1. Pain due to total left knee replacement, initial encounter ([facility])           Plan:    Large Joint Arthrocentesis: L knee  Date/Time: 7/19/2022 11:01 AM  Consent given by: patient  Site marked: site marked  Timeout: Immediately prior to procedure a time out was called to verify the correct patient, procedure, equipment, support staff and site/side marked as required   Supporting Documentation  Indications: pain   Procedure Details  Location: knee - L knee  Preparation: Patient was prepped and draped in the usual sterile fashion (double cleaned per SEC)  Needle size: 22 G  Approach: superior  Medications administered: 6 mg betamethasone acetate-betamethasone sodium phosphate 6 (3-3) MG/ML; 8 mL lidocaine PF 1% 1 %  Patient tolerance: patient tolerated the procedure well with no immediate complications        1. Discussed treatment options with patient.  Wish to proceed with corticosteroid injection left knee.  We recommend application of ice at injection site this evening.  We will see her back in clinic in 4 weeks to reevaluate.  She does have upcoming physical therapy appointment softening we will hold off on PT for now.  She also has a follow-up with pain management on Friday to discuss further treatment options of lumbar spine.  All questions answered.  Orders:  Orders Placed This Encounter   Procedures   • Large Joint Arthrocentesis: L knee     No orders of the defined types were placed in this encounter.      Class 3 Severe Obesity (BMI >=40). Obesity-related health conditions include the following: osteoarthritis. Obesity is unchanged. BMI is is above average; BMI management plan is completed. We discussed portion control and increasing  exercise.      Dragon Dictation utilized.

## 2022-07-26 ENCOUNTER — TREATMENT (OUTPATIENT)
Dept: PHYSICAL THERAPY | Facility: CLINIC | Age: 82
End: 2022-07-26

## 2022-07-26 DIAGNOSIS — Z74.09 IMPAIRED FUNCTIONAL MOBILITY, BALANCE, GAIT, AND ENDURANCE: ICD-10-CM

## 2022-07-26 DIAGNOSIS — R29.898 DECREASED STRENGTH OF LOWER EXTREMITY: ICD-10-CM

## 2022-07-26 DIAGNOSIS — M25.571 ACUTE RIGHT ANKLE PAIN: ICD-10-CM

## 2022-07-26 DIAGNOSIS — G89.29 CHRONIC LEFT HIP PAIN: ICD-10-CM

## 2022-07-26 DIAGNOSIS — M25.661 DECREASED ROM OF RIGHT KNEE: ICD-10-CM

## 2022-07-26 DIAGNOSIS — M25.552 CHRONIC LEFT HIP PAIN: ICD-10-CM

## 2022-07-26 DIAGNOSIS — R26.89 ANTALGIC GAIT: Primary | ICD-10-CM

## 2022-07-26 DIAGNOSIS — M25.561 ACUTE PAIN OF RIGHT KNEE: ICD-10-CM

## 2022-07-26 PROCEDURE — 97110 THERAPEUTIC EXERCISES: CPT | Performed by: PHYSICAL THERAPIST

## 2022-07-26 PROCEDURE — 97530 THERAPEUTIC ACTIVITIES: CPT | Performed by: PHYSICAL THERAPIST

## 2022-07-26 NOTE — PROGRESS NOTES
"Re-Assessment / Re-Certification      Patient: Fernanda Molina   : 1940  Diagnosis/ICD-10 Code:  Antalgic gait [R26.89]  Referring practitioner: MD Viki Samuel APRN   Date of Initial Visit: 2022  Today's Date: 2022  Patient seen for 16 sessions      Subjective:   Fernanda Molina reports: Pain management is not doing the ablation for now, going to try SI injections on both sides.  Pt states she is not sure what type of injection.  Pt is waiting to have the appointment scheduled. Back is still hurting, but the left leg pain is better.  Pt rates the right knee pain a 0/10, left knee pain is a 4/10.  Pt gets left knee pain with Side to side movements described as \"It just hurts!\"  Pt states she does not have right ankle pain.    Subjective Questionnaire: LEFS:   Clinical Progress: improved  Home Program Compliance: Yes  Treatment has included: therapeutic exercise, neuromuscular re-education, therapeutic activity, gait training and pt has been instructed in a HEP.      Subjective   Objective          Palpation   Left   Tenderness of the vastus medialis.     Tenderness   Left Knee   Tenderness in the lateral joint line and pes anserinus.     Right Knee   Tenderness in the medial joint line and medial patella.     Right Ankle/Foot   Tenderness in the fifth metatarsal base and lateral malleolus.     Additional Tenderness Details  Minimal right lateral ankle tenderness     Active Range of Motion   Left Knee   Flexion: 107 degrees     Right Knee   Flexion: 108 degrees     Right Ankle/Foot   Dorsiflexion (kf): 4 degrees     Strength/Myotome Testing     Left Hip   Planes of Motion   Flexion: 4  Abduction: 4+  Adduction: 3+    Right Hip   Planes of Motion   Flexion: 4+  Abduction: 4  Adduction: 3    Left Knee   Flexion: 4  Extension: 4+    Right Knee   Flexion: 4+  Extension: 4+    Right Ankle/Foot   Dorsiflexion: 5  Plantar flexion: 5  Inversion: " 5  Eversion: 5      Assessment & Plan     Assessment    Assessment details: Pt is making progress towards accomplishing all her goals.  Pt still has B hip and hamstring weakness, but improving.  Pt has accomplished all left knee STGs, 3/5 LTGs, 3/5 ankle goals, and now has new STGs for the left knee.  Discharging right ankle and knee treatment due to pt having no pain and functional strength.  Pt still has lateral ankle point tenderness, but minimal.  Pt tolerated all B supine and standing strengthening exercises for B LE's.  Will continue to see pt once per week starting next week for exercise progression for the left knee, B hips, gait and balance for improved functional mobility and safety for 4 more weeks.        Progress toward previous goals: Partially Met    Goals  Plan Goals: STGs: 2-4 weeks  1. Decrease right knee pain 5/10 with standing and gait.  MET   2. Increase right knee AROM 0-100 degrees  MET   3.  Decrease right lateral distal quad tenderness to minimal with palpation  MET   4.  Pt is able to perform a right SLR independently MET   5.  Pt ambulates into clinic with AD minimal antalgic gait right LE  MET   6.  Decrease right lateral knee hematoma at the IP with girth measurement to 49 cm.  MET     LTGs: 4-8 weeks  1.  Pt Independent with [facility] for self management.  PROGRESSING   2.  Increase right knee ext and flex strength to 4+/5  MET   3.  Increase right hip flex, abd, and add strength to 4-4+/5  NOT MET   4.  Decrease right lateral quad and knee tenderness to none with palpation   MET   5.  Decrease right knee pain to a 2/10 with standing and ambulation.  MET         New STGs for ankle:  4 weeks  1.  Decrease right ankle pain to 0-1/10 with ambulation   MET  2.  Increase right ankle DF AROM 4 degrees MET   3.  Increase right ankle DF, PF, and Ever strength 5/5.   MET   4.  Decrease right ankle tenderness over the lateral and dorsum of foot to minimal to none with palpation.  NOT MET     LTGs: 8  weeks  1.  Increase right ankle DF AROM 8 degrees  NOT MET       NEW STGs- left knee/hip-4 weeks  1.  Decrease left knee pain to a 1-2/10 with standing and ambulation   2.  Increase left hip flex and abd strength 4+/5-5/5  3.  Increase B hip add 3+-4/5   4.  Increase left hamstring strength to 4+-5/5.      Progress per Plan of Care        Recommendations: Continue with recommendations decrease frequency to once per week for B LE strengthening and balance.    Timeframe: 1 month  Prognosis to achieve goals: good    PT Signature: Leann Washington, PT [place] # [id number]  Electronically signed 7/26/2022       90 Day Recertification  Certification Period: 7/26/2022 thru 10/23/2022  I certify that the therapy services are furnished while this patient is under my care.  The services outlined above are required by this patient, and will be reviewed every 90 days.     _______________________________________________________________________  PHYSICIAN: Geraldo Velazquez MD/  HESHAM Mendez      DATE:     Please sign and return via fax to [phone].. Thank you, [facility].    Manual Therapy:         mins  98919;  Therapeutic Exercise:   35      mins  58388;     Neuromuscular Liz:        mins  70649;    Therapeutic Activity:    40      mins  66577;     Gait Training:           mins  91595;     Ultrasound:          mins  10301;    Electrical Stimulation:         mins  64233 ( );  Traction                       ___ mins  22013    Timed Treatment:  75    mins   Total Treatment:     80   mins

## 2022-07-29 ENCOUNTER — TREATMENT (OUTPATIENT)
Dept: PHYSICAL THERAPY | Facility: CLINIC | Age: 82
End: 2022-07-29

## 2022-07-29 DIAGNOSIS — R29.898 DECREASED STRENGTH OF LOWER EXTREMITY: ICD-10-CM

## 2022-07-29 DIAGNOSIS — R26.89 ANTALGIC GAIT: Primary | ICD-10-CM

## 2022-07-29 DIAGNOSIS — Z74.09 IMPAIRED FUNCTIONAL MOBILITY, BALANCE, GAIT, AND ENDURANCE: ICD-10-CM

## 2022-07-29 PROCEDURE — 97110 THERAPEUTIC EXERCISES: CPT | Performed by: PHYSICAL THERAPIST

## 2022-07-29 PROCEDURE — 97530 THERAPEUTIC ACTIVITIES: CPT | Performed by: PHYSICAL THERAPIST

## 2022-07-29 NOTE — PROGRESS NOTES
Physical Therapy Daily Treatment Note    Patient: Fernanda Molina   : 1940  Referring practitioner: Geraldo Velazquez MD  Date of Initial Visit: Type: THERAPY  Noted: 2022  Today's Date: 2022  Patient seen for 17 sessions       Visit Diagnoses:    ICD-10-CM ICD-9-CM   1. Antalgic gait  R26.89 781.2   2. Impaired functional mobility, balance, gait, and endurance  Z74.09 V49.89   3. Decreased strength of lower extremity  R29.898 729.89       Fernanda Molina reports: she was really sore Wed and Thursday after last visit.  Pt states she is much better today.      Subjective       Objective   See Exercise, Manual, and Modality Logs for complete treatment.       Assessment & Plan     Assessment    Assessment details: Pt is doing fairly well today, but reports having a 48 hour delayed muscle soreness since her last visit.  Continued to limit some of patients exercises today due to pt's previous c/o soreness from last visit.  Pt enters department and ambulated within department without cane.  Pt did well ambulating without her cane at a slow galina and without any episodes of LOB, but educated pt about keeping her cane in the car and taking her time.  Pt tolerated all exercises today and pt denied having any c/o pain or soreness post treatment.  Will continue to see pt once per week for B LE strengthening, balance and gait.          Progress per Plan of Care    Timed:         Manual Therapy:         mins  21947;     Therapeutic Exercise:   33      mins  13323;     Neuromuscular Liz:        mins  11145;    Therapeutic Activity:    15      mins  73514;     Gait Training:           mins  31687;     Ultrasound:          mins  74336;    Ionto                                   mins   92943  Self Care                            mins   91642  Canalith Repos         mins 48964      Un-Timed:  Electrical Stimulation:         mins  27173 ( );  Dry Needling          mins self-pay  Traction          mins  51743      Timed Treatment:   48   mins   Total Treatment:     53   mins    Leann Washington, PT  [place] License: [id number]

## 2022-08-03 ENCOUNTER — OFFICE VISIT (OUTPATIENT)
Dept: SLEEP MEDICINE | Facility: HOSPITAL | Age: 82
End: 2022-08-03

## 2022-08-03 ENCOUNTER — TREATMENT (OUTPATIENT)
Dept: PHYSICAL THERAPY | Facility: CLINIC | Age: 82
End: 2022-08-03

## 2022-08-03 VITALS — HEART RATE: 73 BPM | BODY MASS INDEX: 40.12 KG/M2 | WEIGHT: 218 LBS | OXYGEN SATURATION: 97 % | HEIGHT: 62 IN

## 2022-08-03 DIAGNOSIS — Z99.89 OSA ON CPAP: Primary | ICD-10-CM

## 2022-08-03 DIAGNOSIS — E66.01 CLASS 2 SEVERE OBESITY DUE TO EXCESS CALORIES WITH SERIOUS COMORBIDITY AND BODY MASS INDEX (BMI) OF 39.0 TO 39.9 IN ADULT: ICD-10-CM

## 2022-08-03 DIAGNOSIS — Z74.09 IMPAIRED FUNCTIONAL MOBILITY, BALANCE, GAIT, AND ENDURANCE: ICD-10-CM

## 2022-08-03 DIAGNOSIS — R29.898 DECREASED STRENGTH OF LOWER EXTREMITY: ICD-10-CM

## 2022-08-03 DIAGNOSIS — G47.14 HYPERSOMNIA DUE TO MEDICAL CONDITION: ICD-10-CM

## 2022-08-03 DIAGNOSIS — R26.89 ANTALGIC GAIT: Primary | ICD-10-CM

## 2022-08-03 DIAGNOSIS — G47.33 OSA ON CPAP: Primary | ICD-10-CM

## 2022-08-03 PROCEDURE — 97110 THERAPEUTIC EXERCISES: CPT | Performed by: PHYSICAL THERAPIST

## 2022-08-03 PROCEDURE — G0463 HOSPITAL OUTPT CLINIC VISIT: HCPCS

## 2022-08-03 PROCEDURE — 99213 OFFICE O/P EST LOW 20 MIN: CPT | Performed by: INTERNAL MEDICINE

## 2022-08-03 PROCEDURE — 97530 THERAPEUTIC ACTIVITIES: CPT | Performed by: PHYSICAL THERAPIST

## 2022-08-03 NOTE — PROGRESS NOTES
"[facility] Note     Chief Complaint:  HAMLET     Primary Care Physician: Rashid Ge MD    Interval History:   The patient is a 81 y.o. female  who I last saw 4/14/2022 and that note was reviewed.  The patient reports she is stable without new complaints.  She goes to bed between 10 and 11 PM and awakens between 8 and 9 AM.  She will use the restroom.    The patient is here using a cane.  She states she had fallen.    Review of Systems:    A complete review of systems was done and all were negative with the exception of the above    Social History:    Social History     Socioeconomic History   • Marital status:    Tobacco Use   • Smoking status: Never Smoker   • Smokeless tobacco: Never Used   Vaping Use   • Vaping Use: Never used   Substance and Sexual Activity   • Alcohol use: No   • Drug use: No   • Sexual activity: Not Currently     Partners: Male     Birth control/protection: Post-menopausal       Allergies:  Amiodarone, Corticosteroids, Benzonatate, Cephalexin, Adhesive tape, Hydrocodone, and Lisinopril     Medication Review:  Reviewed.      Vital Signs:    Vitals:    08/03/22 0928   Pulse: 73   SpO2: 97%   Weight: 98.9 kg (218 lb)   Height: 157.5 cm (62.01\")     Body mass index is 39.86 kg/m².    Physical Exam:    Constitutional:  Well developed 81 y.o. female that appears in no apparent distress.  Awake & oriented times 3.  Normal mood with normal recent and remote memory and normal judgement.  Eyes:  Conjunctivae normal.  Oropharynx: Previously, moist mucous membranes without exudate and a large tongue and class III Mallampati airway, patient is wearing a facemask.    Self-administered Garland Sleepiness Scale test results: 11  0-5 Lower normal daytime sleepiness  6-10 Higher normal daytime sleepiness  11-12 Mild, 13-15 Moderate, & 16-24 Severe excessive daytime sleepiness     Downloaded PAP Data Reviewed For Compliance:  DME is [facility] and she uses nasal pillows.  " Downloads between 5/3 and 7/31/2022 compliance 100%.  Average usage is 9 hours and 7 minutes.  Average AHI is normal without a significant leak.  Average auto CPAP pressure is 13.7 and her ResMed auto CPAP is 12-16    I have reviewed the above results and compared them with the patient's last downloads and reviewed with the patient.    Impression:   Obstructive sleep apnea adequately treated with ResMed auto CPAP. The patient appears to be at goal with good compliance and usage. The patient has some complaints of hypersomnolence.    Plan:  Good sleep hygiene measures should be maintained.  Weight loss would be beneficial in this patient who has class II severe obesity by BMI.      After evaluating the patient and assessing results available, the patient is benefiting from the treatment being provided.     The patient will continue auto CPAP.  After clinical evaluation and review of downloads, I recommend no changes to the patient's pressures.  A new prescription will be sent to the patient's DME.    I answered all of the patient's questions.  The patient will call for any problems and will follow up in 1 year.      Mahin aGleas MD  Sleep Medicine  08/03/22  09:30 EDT

## 2022-08-03 NOTE — PROGRESS NOTES
Physical Therapy Daily Treatment Note    Patient: Fernanda Molina   : 1940  Referring practitioner: Geraldo Velazquez MD  Date of Initial Visit: Type: THERAPY  Noted: 2022  Today's Date: 8/3/2022  Patient seen for 18 sessions       Visit Diagnoses:    ICD-10-CM ICD-9-CM   1. Antalgic gait  R26.89 781.2   2. Impaired functional mobility, balance, gait, and endurance  Z74.09 V49.89   3. Decreased strength of lower extremity  R29.898 729.89       Fernanda Molina reports: she is doing okay today, but she has been hurting into her left leg and hip.  Pt is getting a SI injection on .  Pt states she has been more short of breath lately.  She is going to her Cardiologist next Friday.      Subjective       Objective   See Exercise, Manual, and Modality Logs for complete treatment.       Assessment & Plan     Assessment    Assessment details: Pt is doing fairly well today, but is c/o more left LE and LBP.  Pt states reported some LBP when pt was positioning sit to supine and had some discomfort with bridges.  Pt needed cueing to hold core tight during bridges due to having some LBP.   Pt tolerated all LE strengthening exercises today, but needed some rest breaks due to shortness of air.  Did not do steps and balance exercises today due to pt's increased c/o left LE pain and pt's shortness of air.  Will continue to see pt once per week for B LE strengthening, balance and gait.        Progress per Plan of Care      Timed:         Manual Therapy:         mins  00278;     Therapeutic Exercise:    30     mins  58040;     Neuromuscular Liz:        mins  41500;    Therapeutic Activity:   10       mins  58921;     Gait Training:           mins  48485;     Ultrasound:          mins  97342;    Ionto                                   mins   22020  Self Care                            mins   89984  Canalith Repos         mins 97165      Un-Timed:  Electrical Stimulation:         mins  94279 ( );  Dry  Needling          mins self-pay  Traction          mins 20860      Timed Treatment:  40    mins   Total Treatment:     45   mins    Leann Washington, PT  [place] License: [id number]

## 2022-08-11 ENCOUNTER — TREATMENT (OUTPATIENT)
Dept: PHYSICAL THERAPY | Facility: CLINIC | Age: 82
End: 2022-08-11

## 2022-08-11 DIAGNOSIS — R29.898 DECREASED STRENGTH OF LOWER EXTREMITY: ICD-10-CM

## 2022-08-11 DIAGNOSIS — R26.89 ANTALGIC GAIT: Primary | ICD-10-CM

## 2022-08-11 DIAGNOSIS — Z74.09 IMPAIRED FUNCTIONAL MOBILITY, BALANCE, GAIT, AND ENDURANCE: ICD-10-CM

## 2022-08-11 PROCEDURE — 97110 THERAPEUTIC EXERCISES: CPT | Performed by: PHYSICAL THERAPIST

## 2022-08-11 PROCEDURE — 97530 THERAPEUTIC ACTIVITIES: CPT | Performed by: PHYSICAL THERAPIST

## 2022-08-11 NOTE — PROGRESS NOTES
Physical Therapy Daily Treatment Note      Patient: Fernanda Molina   : 1940  Referring practitioner: Geraldo Velazquez MD  Date of Initial Visit: Type: THERAPY  Noted: 2022  Today's Date: 2022  Patient seen for 19 sessions       Visit Diagnoses:    ICD-10-CM ICD-9-CM   1. Antalgic gait  R26.89 781.2   2. Impaired functional mobility, balance, gait, and endurance  Z74.09 V49.89   3. Decreased strength of lower extremity  R29.898 729.89       Fernanda Molina reports: she feels much better today.  She received her SI injections on Tuesday, so she took the last two days to rest.     Subjective       Objective   See Exercise, Manual, and Modality Logs for complete treatment.     Pt ambulated within the clinic with good upright standing posture and without her cane.       Assessment & Plan     Assessment    Assessment details: Pt continues to tolerate all standing strengthening and balance exercises today.  Pt was able to add step ups, lateral step ups, proprioception exercises back into her routine without pain or LOB.  Did decrease some reps with LE strengthening due to pt just getting her SI injections and not wanting to increase inflammation/soreness from strengthening exercises.  Pt with limited time today due to having to leave for her  having an appointment.  Pt was ambulating without the clinic with good upright standing posture, increased step lengths, increased galina and without her SPC.  Will continue to see pt once per week for strengthening, balance, and gait.          Progress per Plan of Care      Timed:         Manual Therapy:         mins  24805;     Therapeutic Exercise:   25      mins  46427;     Neuromuscular Liz:        mins  13956;    Therapeutic Activity:   15       mins  07168;     Gait Training:           mins  58503;     Ultrasound:          mins  59893;    Ionto                                   mins   62461  Self Care                            mins   98607  Luc  Repos         mins 15091      Un-Timed:  Electrical Stimulation:         mins  73632 ( );  Dry Needling          mins self-pay  Traction          mins 26410      Timed Treatment:  40    mins   Total Treatment:    50    mins    Leann Washington, PT  [place] License: [id number]

## 2022-08-16 ENCOUNTER — OFFICE VISIT (OUTPATIENT)
Dept: ORTHOPEDIC SURGERY | Facility: CLINIC | Age: 82
End: 2022-08-16

## 2022-08-16 VITALS — HEIGHT: 62 IN | BODY MASS INDEX: 40.12 KG/M2 | WEIGHT: 218 LBS

## 2022-08-16 DIAGNOSIS — T84.84XA PAIN DUE TO TOTAL LEFT KNEE REPLACEMENT, INITIAL ENCOUNTER: Primary | ICD-10-CM

## 2022-08-16 DIAGNOSIS — Z96.652 PAIN DUE TO TOTAL LEFT KNEE REPLACEMENT, INITIAL ENCOUNTER: Primary | ICD-10-CM

## 2022-08-16 PROCEDURE — 99213 OFFICE O/P EST LOW 20 MIN: CPT | Performed by: NURSE PRACTITIONER

## 2022-08-16 NOTE — PROGRESS NOTES
Subjective:     Patient ID: Fernanda Molina is a 81 y.o. female.    Chief Complaint:  Total knee arthroplasty, 2017  History of Present Illness  Fernanda Molina returns clinic for follow-up of her left knee.  Has received SI joint injections with pain management as it is significant symptom improvement.  She does continue to experience some discomfort with ambulatory activities along the anterior aspect of the knee from time to time but has noted significant improvement of symptoms.  Denies any is locking, catching or giving way.  She was able to ambulate much more comfortably.  Denies other concerns present this time.     Social History     Occupational History   • Not on file   Tobacco Use   • Smoking status: Never Smoker   • Smokeless tobacco: Never Used   Vaping Use   • Vaping Use: Never used   Substance and Sexual Activity   • Alcohol use: No   • Drug use: No   • Sexual activity: Not Currently     Partners: Male     Birth control/protection: Post-menopausal      Past Medical History:   Diagnosis Date   • AICD (automatic cardioverter/defibrillator) present     left side chest   • Anemia    • Arthritis    • Atrial fibrillation (HCC)    • Cancer (HCC) 2002    melanoma right back of knee   • CHF (congestive heart failure) (HCC)    • Congenital heart disease    • GERD (gastroesophageal reflux disease)    • Graves disease    • Hematuria    • High cholesterol    • History of melanoma 2002    RIGHT KNEE   • History of MI (myocardial infarction) 2010   • Incontinence of urine     wears pads   • On anticoagulant therapy    • HAMLET on CPAP     cpap   • 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   • PONV (postoperative nausea and vomiting)    • Spinal stenosis    • Ureteral calculus, left 1/30/2020   • Valvular heart disease 08/31/2015    S/p MVR & TVR      Past Surgical History:   Procedure Laterality Date   • CARDIAC CATHETERIZATION     • CARDIAC DEFIBRILLATOR PLACEMENT Left 2010    [facility]   • CARDIAC DEFIBRILLATOR PLACEMENT Left 2016     [facility]   •  SECTION     •  SECTION     • CHOLECYSTECTOMY     • COLONOSCOPY     • ENDOSCOPY     • EPIDURAL BLOCK  [id number]    Lumbar    • EXTRACORPOREAL SHOCK WAVE LITHOTRIPSY (ESWL) Right 2017   • EXTRACORPOREAL SHOCK WAVE LITHOTRIPSY (ESWL) Right 2017    Procedure: RT EXTRACORPOREAL SHOCKWAVE LITHOTRIPSY;  Surgeon: Moisés Everett MD;  Location: [facility];  Service:    • EYE SURGERY Right 09/10/2007    Cataract   • EYE SURGERY Left 10/29/2007    Cataract   • JOINT REPLACEMENT Left 2007    Knee   • JOINT REPLACEMENT Right 2010    Knee   • KNEE ARTHROSCOPY     • MAZE PROCEDURE  2015    Dr. Saavedra   • MITRAL VALVE REPAIR/REPLACEMENT     • MITRAL VALVE REPAIR/REPLACEMENT N/A 2019    Procedure: ALINE RE-OP STERNOTOMY MITRAL VALVE REPLACEMENT AND PRP;  Surgeon: Ben Saavedra MD;  Location: [facility] OR;  Service: Cardiothoracic   • PACEMAKER IMPLANTATION Left    • [place] ERCP DX COLLECTION SPECIMEN BRUSHING/WASHING N/A 2016    Procedure: ENDOSCOPIC RETROGRADE CHOLANGIOPANCREATOGRAPHY WITH SPHINCTEROTOMY AND BALLOON SWEEP OF CBD;  Surgeon: Lucas Campso MD;  Location: [facility];  Service: Gastroenterology   • SKIN CANCER EXCISION      MELANOMA RIGHT KNEE AREA -    • TRICUSPID VALVE SURGERY  2015    Dr. Saavedra  ring placed   • URETEROSCOPY LASER LITHOTRIPSY WITH STENT INSERTION Right 11/15/2017    Procedure: CYSTOSCOPY; RIGHT URETEROSCOPY LASER LITHOTRIPSY WITH STENT INSERTION;  Surgeon: Moisés Everett MD;  Location: [facility] OR;  Service:    • URETEROSCOPY LASER LITHOTRIPSY WITH STENT INSERTION Left 2020    Procedure: URETEROSCOPY STONE BASKET EXTRACTION LASER LITHOTRIPSY WITH STENT INSERTION WITH STRING;  Surgeon: Moisés Everett MD;  Location: [facility] OR;  Service: Urology       Family History   Problem Relation Age of Onset   • Heart attack Mother    • Heart disease Mother   "  • Hypertension Mother    • Stroke Mother    • Macular degeneration Mother    • Heart attack Father    • Malig Hyperthermia Neg Hx             Objective:  Physical Exam    General: No acute distress.  Eyes: conjunctiva clear; pupils equally round and reactive  ENT: external ears and nose atraumatic; oropharynx clear  CV: no peripheral edema  Resp: normal respiratory effort  Skin: no rashes or wounds; normal turgor  Psych: mood and affect appropriate; recent and remote memory intact    Vitals:    08/16/22 1359   Weight: 98.9 kg (218 lb)   Height: 157.5 cm (62.01\")         08/16/22  1359   Weight: 98.9 kg (218 lb)     Body mass index is 39.86 kg/m².      Left Knee Exam     Tenderness   The patient is experiencing tenderness in the patella.    Range of Motion   Extension: 0   Flexion: 120     Tests   Varus: negative Valgus: negative  Patellar apprehension: positive    Other   Erythema: absent  Sensation: normal  Pulse: present  Swelling: moderate    Comments:  Positive active patellar compression test                  Assessment:        1. Pain due to total left knee replacement, initial encounter ([facility])           Plan:  discussed plan of care with patient.  We will plan to release her back to all previously tolerated activities.  Discussed to continue with physical therapy she continues to experience pain at the left knee we will gladly see her back in clinic.  All questions answered.  Orders:  No orders of the defined types were placed in this encounter.    No orders of the defined types were placed in this encounter.          Dragon Dictation utilized.  "

## 2022-08-18 ENCOUNTER — TREATMENT (OUTPATIENT)
Dept: PHYSICAL THERAPY | Facility: CLINIC | Age: 82
End: 2022-08-18

## 2022-08-18 DIAGNOSIS — R26.89 ANTALGIC GAIT: Primary | ICD-10-CM

## 2022-08-18 DIAGNOSIS — Z74.09 IMPAIRED FUNCTIONAL MOBILITY, BALANCE, GAIT, AND ENDURANCE: ICD-10-CM

## 2022-08-18 DIAGNOSIS — R29.898 DECREASED STRENGTH OF LOWER EXTREMITY: ICD-10-CM

## 2022-08-18 PROCEDURE — 97530 THERAPEUTIC ACTIVITIES: CPT | Performed by: PHYSICAL THERAPIST

## 2022-08-18 PROCEDURE — 97110 THERAPEUTIC EXERCISES: CPT | Performed by: PHYSICAL THERAPIST

## 2022-08-18 NOTE — PROGRESS NOTES
Physical Therapy Daily Treatment Note      Patient: Fernanda Molina   : 1940  Referring practitioner: Geraldo Velazquez MD  Date of Initial Visit: Type: THERAPY  Noted: 2022  Today's Date: 2022  Patient seen for 20 sessions       Visit Diagnoses:    ICD-10-CM ICD-9-CM   1. Antalgic gait  R26.89 781.2   2. Impaired functional mobility, balance, gait, and endurance  Z74.09 V49.89   3. Decreased strength of lower extremity  R29.898 729.89       Fernanda Molina reports: it has been great this week.  She went to [employer] on Monday.  Pt states she still feels it, but she can take it at this level.  Pt rates her left SI pain a 4/10 described as a dull ache.  Pt saw HESHAM Mendez this week and she does not need to go back unless she has increased pain.      Subjective       Objective   See Exercise, Manual, and Modality Logs for complete treatment.       Assessment & Plan     Assessment    Assessment details: Pt continues to do well this week since her SI injections.  Pt was able to perform most of her exercises today and added lateral side stepping vs bands back into her exercise routine.  Pt ambulates into and within clinic without cane or antalgic gait.  Pt reports being more active in the community this week without increased symptoms.  Pt denied having any increased pain after treatment today.  Will continue to see pt once per week for strengthening, balance, and gait, but if symptoms continue to be minimal to none will plan for discharge to a [facility].          Progress per Plan of Care    Timed:         Manual Therapy:         mins  50041;     Therapeutic Exercise:   40      mins  90682;     Neuromuscular Liz:        mins  90422;    Therapeutic Activity:    20      mins  76956;     Gait Training:           mins  52682;     Ultrasound:          mins  10916;    Ionto                                   mins   77184  Self Care                            mins   92786  [facility] Repos         mins  43236      Un-Timed:  Electrical Stimulation:         mins  55158 ( );  Dry Needling          mins self-pay  Traction          mins 70901      Timed Treatment:  60    mins   Total Treatment:     60   mins    Leann Washington, PT  [place] License: [id number]

## 2022-08-22 DIAGNOSIS — Z79.4 TYPE 2 DIABETES MELLITUS WITH COMPLICATION, WITH LONG-TERM CURRENT USE OF INSULIN: ICD-10-CM

## 2022-08-22 DIAGNOSIS — E11.8 TYPE 2 DIABETES MELLITUS WITH COMPLICATION, WITH LONG-TERM CURRENT USE OF INSULIN: ICD-10-CM

## 2022-08-23 RX ORDER — PEN NEEDLE, DIABETIC 32GX 5/32"
1 NEEDLE, DISPOSABLE MISCELLANEOUS DAILY
Qty: 100 EACH | Refills: 4 | Status: SHIPPED | OUTPATIENT
Start: 2022-08-23 | End: 2023-01-06 | Stop reason: SDUPTHER

## 2022-08-24 ENCOUNTER — TREATMENT (OUTPATIENT)
Dept: PHYSICAL THERAPY | Facility: CLINIC | Age: 82
End: 2022-08-24

## 2022-08-24 DIAGNOSIS — R26.89 ANTALGIC GAIT: Primary | ICD-10-CM

## 2022-08-24 DIAGNOSIS — R29.898 DECREASED STRENGTH OF LOWER EXTREMITY: ICD-10-CM

## 2022-08-24 DIAGNOSIS — Z74.09 IMPAIRED FUNCTIONAL MOBILITY, BALANCE, GAIT, AND ENDURANCE: ICD-10-CM

## 2022-08-24 PROCEDURE — 97110 THERAPEUTIC EXERCISES: CPT | Performed by: PHYSICAL THERAPIST

## 2022-08-24 PROCEDURE — 97530 THERAPEUTIC ACTIVITIES: CPT | Performed by: PHYSICAL THERAPIST

## 2022-08-24 NOTE — PROGRESS NOTES
Physical Therapy Daily Treatment Note      Patient: Fernanda Molina   : 1940  Referring practitioner: Geraldo Velazquez MD  Date of Initial Visit: Type: THERAPY  Noted: 2022  Today's Date: 2022  Patient seen for 21 sessions       Visit Diagnoses:    ICD-10-CM ICD-9-CM   1. Antalgic gait  R26.89 781.2   2. Impaired functional mobility, balance, gait, and endurance  Z74.09 V49.89   3. Decreased strength of lower extremity  R29.898 729.89       Fernanda Molina reports: she is still doing okay since her injections, but she fell Monday stepping down into her [place] room area.  Pt is okay and states she did not hurt anything, but her left buttocks is sore.  Pt feels like she did not have her foot on the step all the way prior to stepping down with her other foot.      Subjective       Objective   See Exercise, Manual, and Modality Logs for complete treatment.       Assessment & Plan     Assessment    Assessment details: Pt continues to do well this week even though she fell two days ago.  Pt ambulates within the clinic without her cane and without LOB.  Pt is sore in her left L-S area after her fall, but states the SI injections are still working.  Pt was able to perform all of her exercises today and added weights with standing marches and increased weights with LAQ.   Pt is doing well with therapy and feel she may be ready for discharge soon to a [facility].  Will see pt once next week and will reassess symptoms and strength to determine if pt is ready for discharge.        Anticipate DC next Visit    Timed:         Manual Therapy:         mins  12730;     Therapeutic Exercise:   40      mins  38715;     Neuromuscular Liz:        mins  22444;    Therapeutic Activity:     20     mins  44440;     Gait Training:           mins  61854;     Ultrasound:          mins  81006;    Ionto                                   mins   79648  Self Care                            mins   06958  Canalith Repos         mins  17434      Un-Timed:  Electrical Stimulation:         mins  52446 ( );  Dry Needling          mins self-pay  Traction          mins 53655      Timed Treatment:  60    mins   Total Treatment:     70   mins    Leann Washington, PT  [place] License: [id number]

## 2022-09-06 ENCOUNTER — TELEPHONE (OUTPATIENT)
Dept: FAMILY MEDICINE CLINIC | Facility: CLINIC | Age: 82
End: 2022-09-06

## 2022-09-06 NOTE — TELEPHONE ENCOUNTER
Pt tested positive today for Covid .   Her s/s started 9/2/22   What would you recommend for pt ? paxlovid ect....   Pt's s/s are coughing, sneezing, congestion, with green drainage   You don't have any appointments until Thursday . She really didn't want to do a video visit   She has been using her inhaler more .    We cannot prescribe Paxlovid.  The medication has several drug interactions with her list of meds including: Lipitor and digoxin and her warfarin.    Ask her to continue her 5-day home quarantine.  Over-the-counter cough cold medications  Avoid decongestants  Tylenol for headache fever body aches    Rashid Ge MD

## 2022-09-06 NOTE — TELEPHONE ENCOUNTER
Patient notified that due to several medications she is on Dr. Ge could not prescibe Paxlovid.  Patient was advised to take Over-the-Counter cold medications, Avoid decongestants, tylenol for headache, fever and body aches.

## 2022-09-07 ENCOUNTER — TELEPHONE (OUTPATIENT)
Dept: FAMILY MEDICINE CLINIC | Facility: CLINIC | Age: 82
End: 2022-09-07

## 2022-09-07 NOTE — TELEPHONE ENCOUNTER
Caller: Fernanda Molina    Relationship to patient: Self    Best call back number: [phone]    Patient is needing: PATIENT WAS CALLING TO SPEAK TO CICI.  SHE SPOKE TO HER YESTERDAY AND WOULD LIKE TO SPEAK WITH HER AGAIN. SHE HAS TALKED TO HER CARDIOLOGIST AND NEEDS CICI TO CALL HER BACK ASAP.

## 2022-09-07 NOTE — TELEPHONE ENCOUNTER
Pt spoke with her cardiologist who has now stopped her lipitor, digoxin and lowered her warfarin down to 4 mg so she can take paxlovid. She asked if you could also call it in for  who now has tested positive for covid as well. Please advise       Okay spoke with patient.  Those medications being held for 5 days are acceptable through her cardiologist.  We will send in the COVID medication.  We will attach the instructions to the prescription so pharmacist is aware of the instructions      Rashid Ge MD

## 2022-09-13 ENCOUNTER — TRANSCRIBE ORDERS (OUTPATIENT)
Dept: ADMINISTRATIVE | Facility: HOSPITAL | Age: 82
End: 2022-09-13

## 2022-09-13 ENCOUNTER — LAB (OUTPATIENT)
Dept: LAB | Facility: HOSPITAL | Age: 82
End: 2022-09-13

## 2022-09-13 DIAGNOSIS — I48.0 PAROXYSMAL ATRIAL FIBRILLATION: Primary | ICD-10-CM

## 2022-09-13 DIAGNOSIS — I48.0 PAROXYSMAL ATRIAL FIBRILLATION: ICD-10-CM

## 2022-09-13 LAB
INR PPP: 1.8 (ref 0.9–1.1)
PROTHROMBIN TIME: 21.2 SECONDS (ref 12.1–15)

## 2022-09-13 PROCEDURE — 85610 PROTHROMBIN TIME: CPT

## 2022-09-13 PROCEDURE — 36415 COLL VENOUS BLD VENIPUNCTURE: CPT

## 2022-09-26 ENCOUNTER — TREATMENT (OUTPATIENT)
Dept: PHYSICAL THERAPY | Facility: CLINIC | Age: 82
End: 2022-09-26

## 2022-09-26 DIAGNOSIS — Z74.09 IMPAIRED FUNCTIONAL MOBILITY, BALANCE, GAIT, AND ENDURANCE: ICD-10-CM

## 2022-09-26 DIAGNOSIS — R29.898 DECREASED STRENGTH OF LOWER EXTREMITY: ICD-10-CM

## 2022-09-26 DIAGNOSIS — R26.89 ANTALGIC GAIT: Primary | ICD-10-CM

## 2022-09-26 DIAGNOSIS — M25.571 ACUTE RIGHT ANKLE PAIN: ICD-10-CM

## 2022-09-26 PROCEDURE — 97530 THERAPEUTIC ACTIVITIES: CPT | Performed by: PHYSICAL THERAPIST

## 2022-09-26 PROCEDURE — 97110 THERAPEUTIC EXERCISES: CPT | Performed by: PHYSICAL THERAPIST

## 2022-10-04 ENCOUNTER — TREATMENT (OUTPATIENT)
Dept: PHYSICAL THERAPY | Facility: CLINIC | Age: 82
End: 2022-10-04

## 2022-10-04 DIAGNOSIS — Z74.09 IMPAIRED FUNCTIONAL MOBILITY, BALANCE, GAIT, AND ENDURANCE: ICD-10-CM

## 2022-10-04 DIAGNOSIS — R26.89 ANTALGIC GAIT: Primary | ICD-10-CM

## 2022-10-04 DIAGNOSIS — R29.898 DECREASED STRENGTH OF LOWER EXTREMITY: ICD-10-CM

## 2022-10-04 PROCEDURE — 97530 THERAPEUTIC ACTIVITIES: CPT | Performed by: PHYSICAL THERAPIST

## 2022-10-04 PROCEDURE — 97112 NEUROMUSCULAR REEDUCATION: CPT | Performed by: PHYSICAL THERAPIST

## 2022-10-04 NOTE — PROGRESS NOTES
"Physical Therapy Daily Treatment Note    Patient: Fernanda Molina   : 1940  Referring practitioner: Geraldo Velazquez MD  Date of Initial Visit: Type: THERAPY  Noted: 2022  Today's Date: 10/4/2022  Patient seen for 23 sessions       Visit Diagnoses:    ICD-10-CM ICD-9-CM   1. Antalgic gait  R26.89 781.2   2. Impaired functional mobility, balance, gait, and endurance  Z74.09 V49.89   3. Decreased strength of lower extremity  R29.898 729.89       Fernanda Molina reports: she feels the same.  Pt continues to report of increased left LE pain with standing and ambulation.  Pt needs to leave by 14:00 to get to another appointment.      Subjective       Objective   See Exercise, Manual, and Modality Logs for complete treatment.        Assessment & Plan     Assessment    Assessment details: Pt continues to tolerate all balance exercises requiring minimal to CGA to maintain balance during treatment.  I feel that most of her balance issues are due to having left radicular pain with WB vs a \"true gait/balance issue.\"  Pt was able to perform tandem stance, gait and standing toe taps today with minimal to CGA.  Will continue to see pt once per week for strengthening, gait, balance, and monitoring symptoms for vertigo.        Fernanda Molina presented w/ a hx of vertigo, that subjectively sounds like BPPV.  Her inability to lay down easily, caused us to test her upon her side.  She tested positive on her (L) side.  She was Tx w/ the (L) [place].  She was symptomatic in multiple positions upon the treatment.  S/P Rx provided pt was escorted to a chair with CGA to a full seated position.  Pt sat for 20 mins with head stationary.  Pt was instructed to not to lay down or do activities that change head level beyond 45 degrees from upright until laying down for bed for the day.    Assisted in treatment by Chad Daugherty PT [facility] [id number]    Progress per Plan of Care      Timed:         Manual Therapy:         mins  67878;   "   Therapeutic Exercise:        mins  30086;     Neuromuscular Liz:   15     mins  33668;    Therapeutic Activity:     34     mins  63569;     Gait Training:           mins  12635;     Ultrasound:          mins  36714;    Ionto                                   mins   39478  Self Care                            mins   54444  Canalith Repos         mins 33246      Un-Timed:  Electrical Stimulation:         mins  76125 ( );  Dry Needling          mins self-pay  Traction          mins 73657      Timed Treatment:  49    mins   Total Treatment:     55   mins    Leann Washington, PT  [place] License: [id number]

## 2022-10-10 DIAGNOSIS — E03.9 ACQUIRED HYPOTHYROIDISM: ICD-10-CM

## 2022-10-10 RX ORDER — LEVOTHYROXINE SODIUM 175 UG/1
TABLET ORAL
Qty: 90 TABLET | Refills: 0 | Status: SHIPPED | OUTPATIENT
Start: 2022-10-10 | End: 2023-01-06

## 2022-10-11 ENCOUNTER — TREATMENT (OUTPATIENT)
Dept: PHYSICAL THERAPY | Facility: CLINIC | Age: 82
End: 2022-10-11

## 2022-10-11 DIAGNOSIS — R29.898 DECREASED STRENGTH OF LOWER EXTREMITY: ICD-10-CM

## 2022-10-11 DIAGNOSIS — R26.89 ANTALGIC GAIT: Primary | ICD-10-CM

## 2022-10-11 DIAGNOSIS — Z74.09 IMPAIRED FUNCTIONAL MOBILITY, BALANCE, GAIT, AND ENDURANCE: ICD-10-CM

## 2022-10-11 PROCEDURE — 97530 THERAPEUTIC ACTIVITIES: CPT | Performed by: PHYSICAL THERAPIST

## 2022-10-11 PROCEDURE — 97112 NEUROMUSCULAR REEDUCATION: CPT | Performed by: PHYSICAL THERAPIST

## 2022-10-11 NOTE — PROGRESS NOTES
Physical Therapy Daily Treatment Note      Patient: Fernanda Molina   : 1940  Referring practitioner: Geraldo Velazquez MD  Date of Initial Visit: Type: THERAPY  Noted: 2022  Today's Date: 10/11/2022  Patient seen for 24 sessions       Visit Diagnoses:    ICD-10-CM ICD-9-CM   1. Antalgic gait  R26.89 781.2   2. Impaired functional mobility, balance, gait, and endurance  Z74.09 V49.89   3. Decreased strength of lower extremity  R29.898 729.89       Fernanda Molina reports: she feels the same as before.  Pt states her left leg is really hurting.  Pt states she may feel a little better since last visit with position changes.      Subjective       Objective   See Exercise, Manual, and Modality Logs for complete treatment.     S/P Rx provided pt was escorted to a chair with CGA to a full seated position.  Pt sat for 20 mins with head stationary.      Pt not to lay down or do activities that change head level beyond 45 degrees from upright until laying down for bed for the day.      Assisted in treatment by Chad Daugherty PT [facility] [id number]    Assessment & Plan     Assessment    Assessment details: Pt continues to c/o left LE pain, limiting her mobility, decreased balance and gait.  Pt with improved tolerance and balance today during LE and proprioception exercises.  Pt was able to  tandem stance B LE's with SBA and toss a ball without LOB.  During left LE over right LE pt was able to turn head to the right and caught ball without LOB.  Pt continues to c/o blurred vision with position changes towards the L>R, but is improving as symptoms are clearing in less time.  Will continue to see pt once per week for 3 more weeks to work on gait, balance, and LE strengthening.        Progress per Plan of Care      Timed:         Manual Therapy:         mins  88481;     Therapeutic Exercise:         mins  18922;     Neuromuscular Liz:  15      mins  41078;    Therapeutic Activity:    40      mins  71462;     Gait  Training:           mins  03209;     Ultrasound:          mins  29723;    Ionto                                   mins   98823  Self Care                            mins   02076  Canalith Repos         mins 77087      Un-Timed:  Electrical Stimulation:         mins  00369 ( );  Dry Needling          mins self-pay  Traction          mins 35556      Timed Treatment:  55    mins   Total Treatment:     60   mins    Leann Washington, PT  [place] License: [id number]

## 2022-10-18 ENCOUNTER — TELEPHONE (OUTPATIENT)
Dept: FAMILY MEDICINE CLINIC | Facility: CLINIC | Age: 82
End: 2022-10-18

## 2022-10-18 ENCOUNTER — TREATMENT (OUTPATIENT)
Dept: PHYSICAL THERAPY | Facility: CLINIC | Age: 82
End: 2022-10-18

## 2022-10-18 DIAGNOSIS — R29.898 DECREASED STRENGTH OF LOWER EXTREMITY: ICD-10-CM

## 2022-10-18 DIAGNOSIS — Z74.09 IMPAIRED FUNCTIONAL MOBILITY, BALANCE, GAIT, AND ENDURANCE: ICD-10-CM

## 2022-10-18 DIAGNOSIS — R26.89 ANTALGIC GAIT: Primary | ICD-10-CM

## 2022-10-18 PROCEDURE — 97112 NEUROMUSCULAR REEDUCATION: CPT | Performed by: PHYSICAL THERAPIST

## 2022-10-18 PROCEDURE — 97530 THERAPEUTIC ACTIVITIES: CPT | Performed by: PHYSICAL THERAPIST

## 2022-10-18 NOTE — PROGRESS NOTES
Physical Therapy Daily Treatment Note    Patient: Fernanda Molina   : 1940  Referring practitioner: Geraldo Velazquez MD  Date of Initial Visit: Type: THERAPY  Noted: 2022  Today's Date: 10/18/2022  Patient seen for 25 sessions       Visit Diagnoses:    ICD-10-CM ICD-9-CM   1. Antalgic gait  R26.89 781.2   2. Impaired functional mobility, balance, gait, and endurance  Z74.09 V49.89   3. Decreased strength of lower extremity  R29.898 729.89   4. Acute right ankle pain  M25.571 719.47     338.19       Fernanda Molina reports: she is doing about the same.  Pt went to the eye MD and she is getting new glasses with prisms.  Pt states they told her the prisms will help with the double vision.      Subjective       Objective   See Exercise, Manual, and Modality Logs for complete treatment.     S/P Rx provided pt was escorted to a chair with CGA to a full seated position.  Pt sat for 20 mins with head stationary.       Pt not to lay down or do activities that change head level beyond 45 degrees from upright until laying down for bed for the day.       Assisted in treatment by Chad Daugherty PT [facility] [id number]      Assessment & Plan     Assessment    Assessment details: Pt continues to c/o left LE pain, limiting her mobility, decreased balance and gait.  Pt with improved tolerance and balance today during LE and proprioception exercises.  Pt tolerates all balance and proprioception exercises with minimal LOB.  Pt did require minimal assist to gain balance with standing toe taps in diagonals to the right using her left leg.  Pt continues to have blurred vision during position changes and    Will continue to see pt once per week for 3 more weeks to work on gait, balance, and LE strengthening.            Progress per Plan of Care      Timed:         Manual Therapy:         mins  63688;     Therapeutic Exercise:         mins  87520;     Neuromuscular Liz:    15    mins  63625;    Therapeutic Activity:     40     mins   56009;     Gait Training:           mins  40783;     Ultrasound:          mins  33993;    Ionto                                   mins   13291  Self Care                            mins   47866  Canalith Repos         mins 76996      Un-Timed:  Electrical Stimulation:         mins  03667 ( );  Dry Needling         mins self-pay  Traction          mins 69007      Timed Treatment:  55    mins   Total Treatment:    75    mins    Leann Washington, PT  [place] License: [id number]

## 2022-10-18 NOTE — TELEPHONE ENCOUNTER
Caller: ZENAIDA BRAUN    Relationship: Other [place] [employer]    Best call back number: [phone]    Do you require a callback: YES OR FAX COVER SHEET BACK [phone]    THEY ARE CHECKING TO MAKE SURE THE 7 PAGE FAX WAS RECEIVED. PLEASE ADVISE.

## 2022-10-19 DIAGNOSIS — I11.0 HYPERTENSIVE HEART DISEASE WITH HEART FAILURE: ICD-10-CM

## 2022-10-19 DIAGNOSIS — I25.10 CORONARY ARTERY DISEASE INVOLVING NATIVE HEART WITHOUT ANGINA PECTORIS, UNSPECIFIED VESSEL OR LESION TYPE: ICD-10-CM

## 2022-10-19 DIAGNOSIS — E11.9 TYPE 2 DIABETES MELLITUS WITHOUT COMPLICATION, WITHOUT LONG-TERM CURRENT USE OF INSULIN: ICD-10-CM

## 2022-10-19 DIAGNOSIS — E03.9 ACQUIRED HYPOTHYROIDISM: ICD-10-CM

## 2022-10-19 DIAGNOSIS — Z51.81 MEDICATION MONITORING ENCOUNTER: ICD-10-CM

## 2022-10-20 ENCOUNTER — TELEPHONE (OUTPATIENT)
Dept: FAMILY MEDICINE CLINIC | Facility: CLINIC | Age: 82
End: 2022-10-20

## 2022-10-20 NOTE — TELEPHONE ENCOUNTER
Caller: Fernanda Molina    Relationship: Self    Best call back number: [phone]    What was the call regarding: PATIENT STATES SHE HAD LABS DONE ON 10/12/22 AND 10/19/22 BY DR BERKOWITZ. PATIENT IS SCHEDULED FOR A LAB APPOINTMENT ON 10/27/22 SO THE PATIENT IS WANTING TO KNOW IF SHE NEEDS TO KEEP THAT APPOINTMENT OR CAN DR FLOWER JUST USE THE RESULTS FROM THE LABS SHE HAD DONE BY DR BERKOWITZ.    PATIENT ALSO HAS ANOTHER LAB APPOINTMENT WITH DR BERKOWITZ ON 11/2/22       PLEASE CALL AND ADVISE     Do you require a callback: YES

## 2022-10-26 ENCOUNTER — TRANSCRIBE ORDERS (OUTPATIENT)
Dept: ADMINISTRATIVE | Facility: HOSPITAL | Age: 82
End: 2022-10-26

## 2022-10-26 ENCOUNTER — LAB (OUTPATIENT)
Dept: LAB | Facility: HOSPITAL | Age: 82
End: 2022-10-26

## 2022-10-26 DIAGNOSIS — I25.5 ISCHEMIC CARDIOMYOPATHY: Primary | ICD-10-CM

## 2022-10-26 DIAGNOSIS — I25.5 ISCHEMIC CARDIOMYOPATHY: ICD-10-CM

## 2022-10-26 LAB
ALBUMIN SERPL-MCNC: 4.4 G/DL (ref 3.5–5.2)
ALBUMIN/GLOB SERPL: 1.8 G/DL
ALP SERPL-CCNC: 54 U/L (ref 39–117)
ALT SERPL W P-5'-P-CCNC: 26 U/L (ref 1–33)
ANION GAP SERPL CALCULATED.3IONS-SCNC: 13.1 MMOL/L (ref 5–15)
AST SERPL-CCNC: 25 U/L (ref 1–32)
BILIRUB SERPL-MCNC: 0.9 MG/DL (ref 0–1.2)
BUN SERPL-MCNC: 19 MG/DL (ref 8–23)
BUN/CREAT SERPL: 22.6 (ref 7–25)
CALCIUM SPEC-SCNC: 10.7 MG/DL (ref 8.6–10.5)
CHLORIDE SERPL-SCNC: 102 MMOL/L (ref 98–107)
CO2 SERPL-SCNC: 23.9 MMOL/L (ref 22–29)
CREAT SERPL-MCNC: 0.84 MG/DL (ref 0.57–1)
EGFRCR SERPLBLD CKD-EPI 2021: 70.8 ML/MIN/1.73
GLOBULIN UR ELPH-MCNC: 2.4 GM/DL
GLUCOSE SERPL-MCNC: 194 MG/DL (ref 65–99)
NT-PROBNP SERPL-MCNC: 439 PG/ML (ref 0–1800)
POTASSIUM SERPL-SCNC: 3.9 MMOL/L (ref 3.5–5.2)
PROT SERPL-MCNC: 6.8 G/DL (ref 6–8.5)
SODIUM SERPL-SCNC: 139 MMOL/L (ref 136–145)

## 2022-10-26 PROCEDURE — 36415 COLL VENOUS BLD VENIPUNCTURE: CPT

## 2022-10-26 PROCEDURE — 83880 ASSAY OF NATRIURETIC PEPTIDE: CPT

## 2022-10-26 PROCEDURE — 80053 COMPREHEN METABOLIC PANEL: CPT

## 2022-11-01 ENCOUNTER — TREATMENT (OUTPATIENT)
Dept: PHYSICAL THERAPY | Facility: CLINIC | Age: 82
End: 2022-11-01

## 2022-11-01 DIAGNOSIS — R26.89 ANTALGIC GAIT: Primary | ICD-10-CM

## 2022-11-01 DIAGNOSIS — Z74.09 IMPAIRED FUNCTIONAL MOBILITY, BALANCE, GAIT, AND ENDURANCE: ICD-10-CM

## 2022-11-01 DIAGNOSIS — R29.898 DECREASED STRENGTH OF LOWER EXTREMITY: ICD-10-CM

## 2022-11-01 PROCEDURE — 97140 MANUAL THERAPY 1/> REGIONS: CPT | Performed by: PHYSICAL THERAPIST

## 2022-11-01 PROCEDURE — 97530 THERAPEUTIC ACTIVITIES: CPT | Performed by: PHYSICAL THERAPIST

## 2022-11-03 ENCOUNTER — OFFICE VISIT (OUTPATIENT)
Dept: FAMILY MEDICINE CLINIC | Facility: CLINIC | Age: 82
End: 2022-11-03

## 2022-11-03 VITALS
HEART RATE: 62 BPM | SYSTOLIC BLOOD PRESSURE: 122 MMHG | BODY MASS INDEX: 39.38 KG/M2 | WEIGHT: 214 LBS | HEIGHT: 62 IN | DIASTOLIC BLOOD PRESSURE: 78 MMHG | TEMPERATURE: 96.9 F | OXYGEN SATURATION: 98 %

## 2022-11-03 DIAGNOSIS — Z00.00 MEDICARE ANNUAL WELLNESS VISIT, SUBSEQUENT: Primary | ICD-10-CM

## 2022-11-03 PROBLEM — G89.29 CHRONIC PAIN OF LEFT KNEE: Status: RESOLVED | Noted: 2022-06-08 | Resolved: 2022-11-03

## 2022-11-03 PROBLEM — M25.471 ANKLE SWELLING, RIGHT: Status: RESOLVED | Noted: 2022-04-18 | Resolved: 2022-11-03

## 2022-11-03 PROBLEM — C43.70: Status: ACTIVE | Noted: 2022-11-03

## 2022-11-03 PROBLEM — T14.8XXA HEMATOMA: Status: RESOLVED | Noted: 2022-04-18 | Resolved: 2022-11-03

## 2022-11-03 PROBLEM — M25.562 CHRONIC PAIN OF LEFT KNEE: Status: RESOLVED | Noted: 2022-06-08 | Resolved: 2022-11-03

## 2022-11-03 PROCEDURE — 1160F RVW MEDS BY RX/DR IN RCRD: CPT | Performed by: FAMILY MEDICINE

## 2022-11-03 PROCEDURE — G0439 PPPS, SUBSEQ VISIT: HCPCS | Performed by: FAMILY MEDICINE

## 2022-11-03 PROCEDURE — 1170F FXNL STATUS ASSESSED: CPT | Performed by: FAMILY MEDICINE

## 2022-11-03 RX ORDER — TRAMADOL HYDROCHLORIDE 50 MG/1
50 TABLET ORAL
COMMUNITY
Start: 2022-10-21

## 2022-11-03 RX ORDER — GABAPENTIN 100 MG/1
100 CAPSULE ORAL 3 TIMES DAILY
Qty: 90 CAPSULE | Refills: 1 | COMMUNITY
Start: 2022-09-23 | End: 2022-11-20

## 2022-11-03 RX ORDER — VERICIGUAT 5 MG/1
5 TABLET, FILM COATED ORAL DAILY
Qty: 30 TABLET | Refills: 11 | COMMUNITY
Start: 2022-10-20 | End: 2023-10-20

## 2022-11-03 NOTE — PROGRESS NOTES
The ABCs of the Annual Wellness Visit  Subsequent Medicare Wellness Visit    Chief Complaint   Patient presents with   • Medicare Wellness-subsequent      Subjective    History of Present Illness:  Fernanda Molina is a 81 y.o. female who presents for a Subsequent Medicare Wellness Visit.    The following portions of the patient's history were reviewed and   updated as appropriate: allergies, current medications, past family history, past medical history, past social history, past surgical history and problem list.    Compared to one year ago, the patient feels her physical   health is worse.    Compared to one year ago, the patient feels her mental   health is worse.    Recent Hospitalizations:  She was not admitted to the hospital during the last year.       Current Medical Providers:  Patient Care Team:  Rashid Ge MD as PCP - General (Family Medicine)  Thong Jones MD as Consulting Physician (Cardiology)  Mahin Galeas MD as Consulting Physician (Sleep Medicine)  Lara Ortiz MD as Consulting Physician (Obstetrics and Gynecology)  Betty Horowitz MD as Consulting Physician (Dermatology)  Jr Ben Saavedra MD as Surgeon (Cardiothoracic Surgery)  Chirag Rai MD as Consulting Physician (Ophthalmology)  Moisés Everett MD as Consulting Physician (Urology)  Geraldo Velazquez MD (Anesthesiology)    Outpatient Medications Prior to Visit   Medication Sig Dispense Refill   • acetaminophen (TYLENOL) 325 MG tablet Take 2 tablets by mouth Every 4 (Four) Hours As Needed for Mild Pain .     • albuterol (PROVENTIL) (2.5 MG/3ML) 0.083% nebulizer solution Take 2.5 mg by nebulization Every 4 (Four) Hours As Needed for Wheezing. 30 each 5   • albuterol sulfate  (90 Base) MCG/ACT inhaler Inhale 2 puffs Every 4 (Four) Hours As Needed for Wheezing. 18 g 5   • atorvastatin (LIPITOR) 20 MG tablet Take 1 tablet by mouth Daily. Indications: Elevation of Both Cholesterol and  Triglycerides in Blood 90 tablet 3   • BD Pen Needle Clara U/F 32G X 4 MM misc Inject 1 Units under the skin into the appropriate area as directed Daily. 100 each 4   • cetirizine (zyrTEC) 10 MG tablet Take 10 mg by mouth Daily.     • digoxin (LANOXIN) 250 MCG tablet TAKE 1 TABLET DAILY 90 tablet 3   • Farxiga 10 MG tablet Daily.     • ferrous sulfate 325 (65 FE) MG EC tablet Take 65 mg by mouth 1 (one) time per week. On Sunday     • furosemide (LASIX) 40 MG tablet Take 1 tablet by mouth Daily. Indications: Edema 90 tablet 1   • gabapentin (NEURONTIN) 100 MG capsule Take 1 capsule by mouth 3 (Three) Times a Day. 90 capsule 1   • ketoconazole (NIZORAL) 2 % cream Apply 1 application topically to the appropriate area as directed Every 12 (Twelve) Hours. 60 g 5   • KLOR-CON 20 MEQ CR tablet TAKE 1 TABLET DAILY 90 tablet 1   • Lancets (OneTouch Delica Plus [id number]) misc USE TO TEST BLOOD SUGAR THREE TIMES A  each 11   • Lantus SoloStar 100 UNIT/ML injection pen INJECT 40 UNITS UNDER THE SKIN INTO THE APPROPRIATE AREA AS DIRECTED DAILY 30 mL 3   • levothyroxine (SYNTHROID, LEVOTHROID) 175 MCG tablet TAKE 1 TABLET DAILY FOR UNDERACTIVE THYROID 90 tablet 0   • metFORMIN (GLUCOPHAGE) 1000 MG tablet Take 1 tablet by mouth Daily Before Supper. Indications: Type 2 Diabetes 90 tablet 3   • metFORMIN (GLUCOPHAGE) 500 MG tablet Take 1 tablet by mouth Daily With Breakfast. Indications: Type 2 Diabetes 90 tablet 3   • metoprolol succinate XL (TOPROL-XL) 200 MG 24 hr tablet Take 200 mg by mouth Every Morning.     • Multiple Vitamin (MULTIVITAMIN) tablet Take 1 tablet by mouth Daily.     • nystatin (MYCOSTATIN) 032127 UNIT/GM powder Apply  topically to the appropriate area as directed 3 (Three) Times a Day. Indications: Skin Infection due to Candida Yeast 60 g 1   • OneTouch Ultra test strip USE TO TEST BLOOD GLUCOSE THREE TIMES A  each 11   • pantoprazole (PROTONIX) 40 MG EC tablet Take 1 tablet by mouth Daily. for  gastroesophageal reflux disease  Indications: Gastroesophageal Reflux Disease 90 tablet 3   • sacubitril-valsartan (Entresto) 49-51 MG tablet 2 (Two) Times a Day.     • traMADol (ULTRAM) 50 MG tablet Take 1 tablet by mouth.     • Vericiguat (Verquvo) 5 MG tablet Take 1 tablet by mouth Daily. 30 tablet 11   • warfarin (COUMADIN) 2.5 MG tablet Take 2.5 mg by mouth. TUES THURS  SAT  Sun  ( so total dose on TUES THURS  SAT  SUN is 7.5mg because she has coumadin 5 mg daily)     • warfarin (COUMADIN) 5 MG tablet Take 5 mg by mouth Every Night.     • levothyroxine (SYNTHROID, LEVOTHROID) 175 MCG tablet Take 125 mcg by mouth.       No facility-administered medications prior to visit.       Opioid medication/s are on active medication list.  and I have evaluated her active treatment plan and pain score trends (see table).  There were no vitals filed for this visit.  I have reviewed the chart for potential of high risk medication and harmful drug interactions in the elderly.            Aspirin is not on active medication list.  Aspirin use is not indicated based on review of current medical condition/s. Risk of harm outweighs potential benefits.  .    Patient Active Problem List   Diagnosis   • S/P mitral valve repair   • S/P tricuspid valve repair   • S/P Maze operation for atrial fibrillation   • Class 2 severe obesity due to excess calories with serious comorbidity and body mass index (BMI) of 39.0 to 39.9 in adult ([facility])   • Atrial fibrillation (HCC)   • Acquired hypothyroidism   • COPD (chronic obstructive pulmonary disease) ([facility])   • CAD (coronary artery disease), hx MI   • HAMLET on autoCPAP   • Renal calculus, right   • Hyperparathyroidism ([facility])   • Type 2 diabetes mellitus without complication, with long-term current use of insulin ([facility])   • Warfarin anticoagulation   • Medication monitoring encounter   • Medicare annual wellness visit, subsequent   • AICD (automatic cardioverter/defibrillator) present   • Chronic  "nonallergic rhinitis   • Pulmonary hypertension (HCC)   • Mitral valve disease   • Hospital discharge follow-up   • Hypersomnia due to medical condition   • Ureteral calculus, left   • Pelvic kidney   • Presence of prosthetic heart valve   • Chronic left hip pain   • Pyriformis syndrome, left   • Status post bilateral knee replacements   • Asymptomatic microscopic hematuria   • Traumatic hematoma of right knee   • Pain due to total left knee replacement (HCC)   • Malignant melanoma of skin of lower extremity (HCC)     Advance Care Planning  Advance Directive is not on file.  ACP discussion was held with the patient during this visit. Patient has an advance directive (not in EMR), copy requested.          Objective    Vitals:    11/03/22 1024   BP: 122/78   BP Location: Left arm   Patient Position: Sitting   Cuff Size: Large Adult   Pulse: 62   Temp: 96.9 °F (36.1 °C)   SpO2: 98%   Weight: 97.1 kg (214 lb)   Height: 157.5 cm (62\")     Estimated body mass index is 39.14 kg/m² as calculated from the following:    Height as of this encounter: 157.5 cm (62\").    Weight as of this encounter: 97.1 kg (214 lb).    Class 2 Severe Obesity (BMI >=35 and <=39.9). Obesity-related health conditions include the following: obstructive sleep apnea, hypertension, coronary heart disease, diabetes mellitus, dyslipidemias, GERD, peripheral vascular disease, osteoarthritis, lower extremity venous stasis disease, urinary stress incontinence and peripheral vascular disease. Obesity is unchanged. BMI is is above average; no BMI management plan is appropriate. We discussed portion control.      Does the patient have evidence of cognitive impairment? No    Physical Exam  Vitals reviewed.   Cardiovascular:      Rate and Rhythm: Rhythm irregular.   Musculoskeletal:      Right lower leg: Edema present.      Left lower leg: Edema present.   Neurological:      Mental Status: She is alert.       Lab Results   Component Value Date    CHLPL 104 (L) " 10/12/2022    TRIG 236 (H) 10/12/2022    HDL 29 (L) 10/12/2022            HEALTH RISK ASSESSMENT    Smoking Status:  Social History     Tobacco Use   Smoking Status Never   Smokeless Tobacco Never     Alcohol Consumption:  Social History     Substance and Sexual Activity   Alcohol Use No     Fall Risk Screen:    MODEADI Fall Risk Assessment was completed, and patient is at HIGH risk for falls. Assessment completed on:11/3/2022    Depression Screening:  PHQ-2/PHQ-9 Depression Screening 11/3/2022   Retired PHQ-9 Total Score -   Retired Total Score -   Little Interest or Pleasure in Doing Things 1-->several days   Feeling Down, Depressed or Hopeless 1-->several days   Trouble Falling or Staying Asleep, or Sleeping Too Much 0-->not at all   Feeling Tired or Having Little Energy 0-->not at all   Poor Appetite or Overeating 0-->not at all   Feeling Bad about Yourself - or that You are a Failure or Have Let Yourself or Your Family Down 0-->not at all   Trouble Concentrating on Things, Such as Reading the Newspaper or Watching Television 0-->not at all   Moving or Speaking So Slowly that Other People Could Have Noticed? Or the Opposite - Being So Fidgety 1-->several days   Thoughts that You Would be Better Off Dead or of Hurting Yourself in Some Way 0-->not at all   PHQ-9: Brief Depression Severity Measure Score 3   If You Checked Off Any Problems, How Difficult Have These Problems Made It For You to Do Your Work, Take Care of Things at Home, or Get Along with Other People? somewhat difficult       Health Habits and Functional and Cognitive Screening:  Functional & Cognitive Status 11/3/2022   Do you have difficulty preparing food and eating? No   Do you have difficulty bathing yourself, getting dressed or grooming yourself? No   Do you have difficulty using the toilet? No   Do you have difficulty moving around from place to place? No   Do you have trouble with steps or getting out of a bed or a chair? No   Current Diet Well  Balanced Diet   Dental Exam Up to date   Eye Exam Up to date   Exercise (times per week) 0 times per week   Current Exercises Include No Regular Exercise   Current Exercise Activities Include -   Do you need help using the phone?  No   Are you deaf or do you have serious difficulty hearing?  No   Do you need help with transportation? No   Do you need help shopping? No   Do you need help preparing meals?  No   Do you need help with housework?  No   Do you need help with laundry? No   Do you need help taking your medications? No   Do you need help managing money? No   Do you ever drive or ride in a car without wearing a seat belt? No   Have you felt unusual stress, anger or loneliness in the last month? No   Who do you live with? Spouse   If you need help, do you have trouble finding someone available to you? No   Have you been bothered in the last four weeks by sexual problems? No   Do you have difficulty concentrating, remembering or making decisions? No       Age-appropriate Screening Schedule:  Refer to the list below for future screening recommendations based on patient's age, sex and/or medical conditions. Orders for these recommended tests are listed in the plan section. The patient has been provided with a written plan.    Health Maintenance   Topic Date Due   • TDAP/TD VACCINES (1 - Tdap) Never done   • DXA SCAN  06/19/2011   • ZOSTER VACCINE (2 of 3) 08/10/2014   • URINE MICROALBUMIN  03/16/2021   • DIABETIC EYE EXAM  08/18/2022   • HEMOGLOBIN A1C  04/12/2023   • LIPID PANEL  10/12/2023   • MAMMOGRAM  07/06/2024   • INFLUENZA VACCINE  Completed              Assessment & Plan   CMS Preventative Services Quick Reference  Risk Factors Identified During Encounter  Cardiovascular Disease  Chronic Pain   Fall Risk-High or Moderate  Immunizations Discussed/Encouraged (specific Immunizations; COVID19  Inactivity/Sedentary  Obesity/Overweight   Polypharmacy  The above risks/problems have been discussed with the  patient.  Follow up actions/plans if indicated are seen below in the Assessment/Plan Section.  Pertinent information has been shared with the patient in the After Visit Summary.    Diagnoses and all orders for this visit:    1. Medicare annual wellness visit, subsequent (Primary)    Multiple medical issues.  Is current with her cardiology at [facility].  Pacemaker status is okay  Chronic congestive heart failure  Atrial fibrillation on warfarin    Also increasing pain lumbar spine with left sciatica.  We will be in line for a epidural at [facility] next few weeks    Labs reviewed.    Reports her home blood sugars range in the low to mid 100s.  We will stay on the 4 units of Lantus for now.      Follow Up:   Return in about 6 months (around 5/3/2023) for diabetes.     An After Visit Summary and PPPS were made available to the patient.

## 2022-11-08 ENCOUNTER — TREATMENT (OUTPATIENT)
Dept: PHYSICAL THERAPY | Facility: CLINIC | Age: 82
End: 2022-11-08

## 2022-11-08 DIAGNOSIS — R26.89 ANTALGIC GAIT: Primary | ICD-10-CM

## 2022-11-08 DIAGNOSIS — Z74.09 IMPAIRED FUNCTIONAL MOBILITY, BALANCE, GAIT, AND ENDURANCE: ICD-10-CM

## 2022-11-08 DIAGNOSIS — R29.898 DECREASED STRENGTH OF LOWER EXTREMITY: ICD-10-CM

## 2022-11-08 PROCEDURE — 97140 MANUAL THERAPY 1/> REGIONS: CPT | Performed by: PHYSICAL THERAPIST

## 2022-11-08 PROCEDURE — 97530 THERAPEUTIC ACTIVITIES: CPT | Performed by: PHYSICAL THERAPIST

## 2022-11-08 NOTE — PROGRESS NOTES
Physical Therapy Daily Treatment Note      Patient: Fernanda Molina   : 1940  Referring practitioner: Geraldo Velazquez MD  Date of Initial Visit: Type: THERAPY  Noted: 2022  Today's Date: 2022  Patient seen for 27 sessions       Visit Diagnoses:    ICD-10-CM ICD-9-CM   1. Antalgic gait  R26.89 781.2   2. Impaired functional mobility, balance, gait, and endurance  Z74.09 V49.89   3. Decreased strength of lower extremity  R29.898 729.89       Fernanda Molina reports: she is not that great.  Pt states her left leg, back and hip pain was better for a couple of days after last visit.  Pt is scheduled for an epidural on .      Subjective       Objective   See Exercise, Manual, and Modality Logs for complete treatment.     Pt received cupping down the left L-S area-over the left gluteals, piriformis, and B lumbar PS.  Pt was is right sidelying with pillow between knees.      Assessment & Plan     Assessment    Assessment details: Pt enters department today with improved standing posture and gait using her cane.  Pt still ambulates with a minimal antalgic gait left LE due to pain in the left LE and lower back with weight bearing.  Pt with improved tolerance to standing and balance exercises, but needed a couple short standing breaks between exercises due to pain in the left LE with standing.  Pt was able to perform all standing and balance exercises with minimal episodes of LOB requiring CGA to maintain balance.  Pt did have difficulty maintaining tandem stance during ball toss with left LE in the back.  Pt reported a little improvement in left LE pain after cupping today.  Will continue to see pt once per week for balance, gait and strengthening.        Progress per Plan of Care      Timed:         Manual Therapy:  10       mins  41708;     Therapeutic Exercise:         mins  30530;     Neuromuscular Liz:        mins  65442;    Therapeutic Activity:   44       mins  85278;     Gait Training:            mins  94068;     Ultrasound:          mins  55694;    Ionto                                   mins   54721  Self Care                            mins   28272  Canalith Repos         mins 12593      Un-Timed:  Electrical Stimulation:         mins  73881 ( );  Dry Needling          mins self-pay  Traction          mins 36404      Timed Treatment:  54    mins   Total Treatment:     60   mins    Leann Washington, PT  [place] License: [id number]

## 2022-11-12 DIAGNOSIS — I25.10 CORONARY ARTERY DISEASE INVOLVING NATIVE CORONARY ARTERY OF NATIVE HEART WITHOUT ANGINA PECTORIS: ICD-10-CM

## 2022-11-14 RX ORDER — POTASSIUM CHLORIDE 1500 MG/1
TABLET, EXTENDED RELEASE ORAL
Qty: 90 TABLET | Refills: 1 | Status: SHIPPED | OUTPATIENT
Start: 2022-11-14

## 2022-11-15 ENCOUNTER — TREATMENT (OUTPATIENT)
Dept: PHYSICAL THERAPY | Facility: CLINIC | Age: 82
End: 2022-11-15

## 2022-11-15 DIAGNOSIS — R26.89 ANTALGIC GAIT: Primary | ICD-10-CM

## 2022-11-15 DIAGNOSIS — Z74.09 IMPAIRED FUNCTIONAL MOBILITY, BALANCE, GAIT, AND ENDURANCE: ICD-10-CM

## 2022-11-15 DIAGNOSIS — R29.898 DECREASED STRENGTH OF LOWER EXTREMITY: ICD-10-CM

## 2022-11-15 PROCEDURE — 97140 MANUAL THERAPY 1/> REGIONS: CPT | Performed by: PHYSICAL THERAPIST

## 2022-11-15 PROCEDURE — 97110 THERAPEUTIC EXERCISES: CPT | Performed by: PHYSICAL THERAPIST

## 2022-11-15 NOTE — PROGRESS NOTES
Physical Therapy Daily Treatment Note    Patient: Fernanda Molina   : 1940  Referring practitioner: Geraldo Velazquez MD  Date of Initial Visit: Type: THERAPY  Noted: 2022  Today's Date: 11/15/2022  Patient seen for 28 sessions       Visit Diagnoses:    ICD-10-CM ICD-9-CM   1. Antalgic gait  R26.89 781.2   2. Impaired functional mobility, balance, gait, and endurance  Z74.09 V49.89   3. Decreased strength of lower extremity  R29.898 729.89       Fernanda Molina reports: today is a bad day and she almost did not come.  Pt states she did too much yesterday, so now her left leg pain is really bad today.  Pt does not get her epidural injection until .  Pt states she did not notice much difference after the cupping last visit.      Subjective       Objective   See Exercise, Manual, and Modality Logs for complete treatment.     Pt enters department with moderate antalgic gait left LE using her cane.  Pt with a slight forward flexed posture.      Pt received cupping over the left L-S, piriformis, gluteals, and left lateral hip in right sidelying pillow between knees.  Cups stayed in place at each spot for no more than 5 minutes.      Assessment & Plan     Assessment    Assessment details: Pt with decreased tolerance to standing, gait and balance exercises today due to pt's increased c/o left LE pain.  Pt was able to perform some standing exercises, but decreased reps due to pt's pain.  Pt received cupping treatment today to the left L-S area and left hip.  Pt reported less left LE pain in standing and during gait with a little more upright standing posture.  Will continue to see pt once per week for balance, gait, and strengthening per pt's tolerance.            Progress per Plan of Care      Timed:         Manual Therapy:  10       mins  27824;     Therapeutic Exercise:         mins  88189;     Neuromuscular Liz:        mins  07392;    Therapeutic Activity:    30      mins  11755;     Gait Training:            mins  99861;     Ultrasound:          mins  51665;    Ionto                                   mins   09642  Self Care                            mins   51037  Canalith Repos         mins 16936      Un-Timed:  Electrical Stimulation:         mins  51167 ( );  Dry Needling          mins self-pay  Traction          mins 87610      Timed Treatment:  40    mins   Total Treatment:    45    mins    Leann Washington, PT  [place] License: [id number]

## 2022-11-22 ENCOUNTER — HOSPITAL ENCOUNTER (OUTPATIENT)
Dept: ULTRASOUND IMAGING | Facility: HOSPITAL | Age: 82
Discharge: HOME | End: 2022-11-22
Admitting: UROLOGY

## 2022-11-22 DIAGNOSIS — N20.0 CALCULUS OF KIDNEY: ICD-10-CM

## 2022-11-22 PROCEDURE — 76775 US EXAM ABDO BACK WALL LIM: CPT

## 2022-12-02 ENCOUNTER — TRANSCRIBE ORDERS (OUTPATIENT)
Dept: ADMINISTRATIVE | Facility: HOSPITAL | Age: 82
End: 2022-12-02

## 2022-12-02 DIAGNOSIS — I25.5 ISCHEMIC CARDIOMYOPATHY: Primary | ICD-10-CM

## 2022-12-06 ENCOUNTER — TREATMENT (OUTPATIENT)
Dept: PHYSICAL THERAPY | Facility: CLINIC | Age: 82
End: 2022-12-06

## 2022-12-06 DIAGNOSIS — R29.898 DECREASED STRENGTH OF LOWER EXTREMITY: ICD-10-CM

## 2022-12-06 DIAGNOSIS — R26.89 ANTALGIC GAIT: Primary | ICD-10-CM

## 2022-12-06 DIAGNOSIS — Z74.09 IMPAIRED FUNCTIONAL MOBILITY, BALANCE, GAIT, AND ENDURANCE: ICD-10-CM

## 2022-12-06 PROCEDURE — 97530 THERAPEUTIC ACTIVITIES: CPT | Performed by: PHYSICAL THERAPIST

## 2022-12-06 PROCEDURE — 97110 THERAPEUTIC EXERCISES: CPT | Performed by: PHYSICAL THERAPIST

## 2022-12-06 NOTE — PROGRESS NOTES
Physical Therapy Daily Treatment Note/Discharge Note       Patient: Fernanda Molina   : 1940  Referring practitioner: Geraldo Velazquez MD  Date of Initial Visit: Type: THERAPY  Noted: 2022  Today's Date:2022  Patient seen for 29 sessions       Visit Diagnoses:    ICD-10-CM ICD-9-CM   1. Antalgic gait  R26.89 781.2   2. Impaired functional mobility, balance, gait, and endurance  Z74.09 V49.89   3. Decreased strength of lower extremity  R29.898 729.89       Fernanda Molina reports: no night pain, but during the day the pain goes down into the foot as soon as she stands.    It gets better after being up.  Pt rates her pain a 5/10 now and a 0/10 with lying down.       Subjective       Objective          Strength/Myotome Testing     Left Hip   Planes of Motion   Flexion: 4  Abduction: 4  Adduction: 4+    Right Hip   Planes of Motion   Flexion: 4+  Abduction: 4+  Adduction: 3+    Left Knee   Flexion: 4  Extension: 4    Right Knee   Flexion: 4  Extension: 4+    Left Ankle/Foot   Dorsiflexion: 5  Plantar flexion: 5  Inversion: 4  Eversion: 4+    Right Ankle/Foot   Dorsiflexion: 5  Plantar flexion: 5  Inversion: 5  Eversion: 4    Additional Strength Details  Supine right hip flex 4/5  Left 3+/5     Ambulation     Observational Gait   Gait: within functional limits   Walking speed and left swing time within functional limits. Decreased stride length, left stance time and left step length.   Left foot contact pattern: foot flat  Left arm swing: decreased    Additional Observational Gait Details  Pt enters department WBAT left LE using her cane.  Pt with more upright standing posture, but continues to c/o left LE pain.  Pt states pain is better since her injection.        See Exercise, Manual, and Modality Logs for complete treatment.     Goals  Plan Goals: STGs: 2-4 weeks  1. Decrease right knee pain 5/10 with standing and gait.  MET   2. Increase right knee AROM 0-100 degrees  MET   3.  Decrease right lateral  distal quad tenderness to minimal with palpation  MET   4.  Pt is able to perform a right SLR independently MET   5.  Pt ambulates into clinic with AD minimal antalgic gait right LE  MET   6.  Decrease right lateral knee hematoma at the IP with girth measurement to 49 cm.  MET     LTGs: 4-8 weeks  1.  Pt Independent with HEP for self management.  Partially met, pt knows HEP, but is not compliant with doing them.    2.  Increase right knee ext and flex strength to 4+/5  MET   3.  Increase right hip flex, abd, and add strength to 4-4+/5.  Partially met-did not met hip add   4.  Decrease right lateral quad and knee tenderness to none with palpation   MET   5.  Decrease right knee pain to a 2/10 with standing and ambulation.  MET         New STGs for ankle:  4 weeks  1.  Decrease right ankle pain to 0-1/10 with ambulation   MET  2.  Increase right ankle DF AROM 4 degrees MET   3.  Increase right ankle DF, PF, and Ever strength 5/5.   MET   4.  Decrease right ankle tenderness over the lateral and dorsum of foot to minimal to none with palpation.  NOT MET     LTGs: 8 weeks  1.  Increase right ankle DF AROM 8 degrees  NOT MET         NEW STGs- left knee/hip-4 weeks  1.  Decrease left knee pain to a 1-2/10 with standing and ambulation  Not met   2.  Increase left hip flex and abd strength 4+/5-5/5   NOT MET   3.  Increase B hip add 3+-4/5  Met   4.  Increase left hamstring strength to 4+-5/5.  not met        Assessment & Plan     Assessment    Assessment details: Pt is improving since her last visit due to receiving her epidural injection on 11/29.  Pt is still having a 5/10 left leg pain with standing and ambulation limiting her tolerance to gait and balance exercises.  Pt reports 0/10 pain with lying down, so today pt performed back stabilization and LE strengthening exercises.  Pt has plateaued with PT at this time and is limited by her left LE radicular pain.  Pt has only met 1/4 new STGs for her left knee and hip.   Feel pt can continue managing her symptoms as a HEP and ambulate with her cane/rollator depending on pt's pain level to improve her safety and mobility.  Discharging pt today after treatment with the recommendation that she continue her LE/back stabilization exercises daily.        Other      Timed:         Manual Therapy:         mins  70263;     Therapeutic Exercise:   35      mins  58576;     Neuromuscular Liz:        mins  64971;    Therapeutic Activity:   15       mins  47911;     Gait Training:           mins  69473;     Ultrasound:          mins  32651;    Ionto                                   mins   05776  Self Care                            mins   91865  Canalith Repos         mins 74134      Un-Timed:  Electrical Stimulation:         mins  96167 ( );  Dry Needling          mins self-pay  Traction          mins 55225      Timed Treatment:  50    mins   Total Treatment:    55    mins    Leann Washington, PT  [place] License: [id number]

## 2023-01-06 DIAGNOSIS — E11.8 TYPE 2 DIABETES MELLITUS WITH COMPLICATION, WITH LONG-TERM CURRENT USE OF INSULIN: ICD-10-CM

## 2023-01-06 DIAGNOSIS — E11.9 TYPE 2 DIABETES MELLITUS WITHOUT COMPLICATION, WITH LONG-TERM CURRENT USE OF INSULIN: ICD-10-CM

## 2023-01-06 DIAGNOSIS — Z79.4 TYPE 2 DIABETES MELLITUS WITHOUT COMPLICATION, WITH LONG-TERM CURRENT USE OF INSULIN: ICD-10-CM

## 2023-01-06 DIAGNOSIS — Z79.4 TYPE 2 DIABETES MELLITUS WITH COMPLICATION, WITH LONG-TERM CURRENT USE OF INSULIN: ICD-10-CM

## 2023-01-06 DIAGNOSIS — E03.9 ACQUIRED HYPOTHYROIDISM: ICD-10-CM

## 2023-01-06 RX ORDER — PEN NEEDLE, DIABETIC 32GX 5/32"
1 NEEDLE, DISPOSABLE MISCELLANEOUS DAILY
Qty: 100 EACH | Refills: 4 | Status: SHIPPED | OUTPATIENT
Start: 2023-01-06

## 2023-01-06 RX ORDER — LEVOTHYROXINE SODIUM 175 UG/1
TABLET ORAL
Qty: 90 TABLET | Refills: 1 | Status: SHIPPED | OUTPATIENT
Start: 2023-01-06

## 2023-01-06 RX ORDER — LANCETS 33 GAUGE
1 EACH MISCELLANEOUS 3 TIMES DAILY
Qty: 100 EACH | Refills: 11 | Status: SHIPPED | OUTPATIENT
Start: 2023-01-06

## 2023-01-06 RX ORDER — PANTOPRAZOLE SODIUM 40 MG/1
TABLET, DELAYED RELEASE ORAL
Qty: 90 TABLET | Refills: 1 | Status: SHIPPED | OUTPATIENT
Start: 2023-01-06

## 2023-01-06 NOTE — TELEPHONE ENCOUNTER
Caller: Jesse Fernanda C    Relationship: Self    Best call back number: [phone]    Requested Prescriptions:   Requested Prescriptions     Pending Prescriptions Disp Refills   • glucose blood (OneTouch Ultra) test strip 100 each 11     Si each 3 (Three) Times a Day. Test blood glucose   • Lancets (OneTouch Delica Plus [id number]) misc 100 each 11     Si each by Other route 3 (Three) Times a Day.   • BD Pen Needle Clara U/F 32G X 4 MM misc 100 each 4     Sig: Inject 1 Units under the skin into the appropriate area as directed Daily.        Pharmacy where request should be sent: [facility] [place], [place] - [address] [facility] - [phone]  - [phone]     Additional details provided by patient:PATIENT STATES THAT [facility] NO LONGER TAKES HER INSURANCE, AND SHE NEEDS THE PRESCRIPTIONS THAT USUALLY GO THERE TRANSFERRED TO [facility]      Does the patient have less than a 3 day supply:  [] Yes  [x] No    Would you like a call back once the refill request has been completed: [] Yes [x] No    If the office needs to give you a call back, can they leave a voicemail: [] Yes [x] No    Caro Zamora Rep   23 10:40 EST

## 2023-02-18 DIAGNOSIS — E11.9 TYPE 2 DIABETES MELLITUS WITHOUT COMPLICATION, WITH LONG-TERM CURRENT USE OF INSULIN: ICD-10-CM

## 2023-02-18 DIAGNOSIS — Z79.4 TYPE 2 DIABETES MELLITUS WITHOUT COMPLICATION, WITH LONG-TERM CURRENT USE OF INSULIN: ICD-10-CM

## 2023-02-19 RX ORDER — INSULIN GLARGINE 100 [IU]/ML
INJECTION, SOLUTION SUBCUTANEOUS
Qty: 30 ML | Refills: 0 | Status: SHIPPED | OUTPATIENT
Start: 2023-02-19

## 2023-03-03 DIAGNOSIS — Z79.4 TYPE 2 DIABETES MELLITUS WITHOUT COMPLICATION, WITH LONG-TERM CURRENT USE OF INSULIN: ICD-10-CM

## 2023-03-03 DIAGNOSIS — E11.9 TYPE 2 DIABETES MELLITUS WITHOUT COMPLICATION, WITH LONG-TERM CURRENT USE OF INSULIN: ICD-10-CM

## 2023-04-10 ENCOUNTER — TELEPHONE (OUTPATIENT)
Dept: FAMILY MEDICINE CLINIC | Facility: CLINIC | Age: 83
End: 2023-04-10

## 2023-04-10 NOTE — TELEPHONE ENCOUNTER
Caller: Fernanda Molina    Relationship: Self    Best call back number: [phone]    What is the best time to reach you: ANY    Who are you requesting to speak with (clinical staff, provider,  specific staff member): TARIK    What was the call regarding: PATIENT IS REQUESTING A CALL BACK FROM TARIK TO DISCUSS AN ORDER SHE IS NEEDING FOR REHAB.     Do you require a callback: YES

## 2023-04-11 NOTE — TELEPHONE ENCOUNTER
Spoke with pt. She requested an new order for physical therapy. She has not been since December. We have scheduled her for an earlier appointment to discuss that along with her DM.

## 2023-04-12 DIAGNOSIS — E03.9 ACQUIRED HYPOTHYROIDISM: ICD-10-CM

## 2023-04-12 DIAGNOSIS — Z79.4 TYPE 2 DIABETES MELLITUS WITHOUT COMPLICATION, WITH LONG-TERM CURRENT USE OF INSULIN: ICD-10-CM

## 2023-04-12 DIAGNOSIS — E11.9 TYPE 2 DIABETES MELLITUS WITHOUT COMPLICATION, WITH LONG-TERM CURRENT USE OF INSULIN: ICD-10-CM

## 2023-04-12 DIAGNOSIS — I48.0 PAROXYSMAL ATRIAL FIBRILLATION: ICD-10-CM

## 2023-04-12 DIAGNOSIS — I48.0 PAROXYSMAL ATRIAL FIBRILLATION: Primary | ICD-10-CM

## 2023-04-12 DIAGNOSIS — Z51.81 MEDICATION MONITORING ENCOUNTER: ICD-10-CM

## 2023-04-13 LAB
ALBUMIN SERPL-MCNC: 4.4 G/DL (ref 3.5–5.2)
ALBUMIN/GLOB SERPL: 1.8 G/DL
ALP SERPL-CCNC: 47 U/L (ref 39–117)
ALT SERPL-CCNC: 24 U/L (ref 1–33)
AST SERPL-CCNC: 21 U/L (ref 1–32)
BILIRUB SERPL-MCNC: 0.9 MG/DL (ref 0–1.2)
BUN SERPL-MCNC: 17 MG/DL (ref 8–23)
BUN/CREAT SERPL: 22.4 (ref 7–25)
CALCIUM SERPL-MCNC: 11 MG/DL (ref 8.6–10.5)
CHLORIDE SERPL-SCNC: 103 MMOL/L (ref 98–107)
CHOLEST SERPL-MCNC: 112 MG/DL (ref 0–200)
CO2 SERPL-SCNC: 28.3 MMOL/L (ref 22–29)
CREAT SERPL-MCNC: 0.76 MG/DL (ref 0.57–1)
DIGOXIN SERPL-MCNC: 1.1 NG/ML (ref 0.6–1.2)
EGFRCR SERPLBLD CKD-EPI 2021: 79.3 ML/MIN/1.73
GLOBULIN SER CALC-MCNC: 2.5 GM/DL
GLUCOSE SERPL-MCNC: 132 MG/DL (ref 65–99)
HBA1C MFR BLD: 6.7 % (ref 4.8–5.6)
HDLC SERPL-MCNC: 39 MG/DL (ref 40–60)
LDLC SERPL CALC-MCNC: 44 MG/DL (ref 0–100)
POTASSIUM SERPL-SCNC: 4.2 MMOL/L (ref 3.5–5.2)
PROT SERPL-MCNC: 6.9 G/DL (ref 6–8.5)
SODIUM SERPL-SCNC: 142 MMOL/L (ref 136–145)
TRIGL SERPL-MCNC: 172 MG/DL (ref 0–150)
TSH SERPL DL<=0.005 MIU/L-ACNC: 1.81 UIU/ML (ref 0.27–4.2)
UNABLE TO VOID: NORMAL
VLDLC SERPL CALC-MCNC: 29 MG/DL (ref 5–40)

## 2023-04-14 DIAGNOSIS — I25.10 CORONARY ARTERY DISEASE INVOLVING NATIVE CORONARY ARTERY OF NATIVE HEART WITHOUT ANGINA PECTORIS: ICD-10-CM

## 2023-04-14 RX ORDER — FUROSEMIDE 40 MG/1
40 TABLET ORAL DAILY
Qty: 90 TABLET | Refills: 1 | Status: SHIPPED | OUTPATIENT
Start: 2023-04-14 | End: 2023-04-18 | Stop reason: SDUPTHER

## 2023-04-14 NOTE — TELEPHONE ENCOUNTER
Caller: Jesse Fernanda C    Relationship: Self    Best call back number: [phone]     Requested Prescriptions:   Requested Prescriptions     Pending Prescriptions Disp Refills   • furosemide (LASIX) 40 MG tablet 90 tablet 1     Sig: Take 1 tablet by mouth Daily. Indications: Edema        Pharm[place] where [place] be sent[place] [facility] [place] - [address] [facility] - [phone]  - [phone] FX     Last office visit with prescribing clinician: 11/3/2022   Last telemedicine visit with prescribing clinician: 4/18/2023   Next office visit with prescribing clinician: 4/18/2023     Additional details provided by patient: 2 DAYS LEFT     Does the patient have less than a 3 day supply:  [x] Yes  [] No    Would you like a call back once the refill request has been completed: [x] Yes [] No    If the office needs to give you a call back, can they leave a voicemail: [x] Yes [] No    Caro Meadows Rep   04/14/23 11:50 EDT

## 2023-04-18 ENCOUNTER — OFFICE VISIT (OUTPATIENT)
Dept: FAMILY MEDICINE CLINIC | Facility: CLINIC | Age: 83
End: 2023-04-18
Payer: MEDICARE

## 2023-04-18 VITALS
HEART RATE: 60 BPM | TEMPERATURE: 97.3 F | WEIGHT: 216 LBS | OXYGEN SATURATION: 99 % | SYSTOLIC BLOOD PRESSURE: 110 MMHG | DIASTOLIC BLOOD PRESSURE: 64 MMHG | HEIGHT: 62 IN | BODY MASS INDEX: 39.75 KG/M2

## 2023-04-18 DIAGNOSIS — Z79.4 TYPE 2 DIABETES MELLITUS WITHOUT COMPLICATION, WITH LONG-TERM CURRENT USE OF INSULIN: ICD-10-CM

## 2023-04-18 DIAGNOSIS — E11.9 TYPE 2 DIABETES MELLITUS WITHOUT COMPLICATION, WITH LONG-TERM CURRENT USE OF INSULIN: ICD-10-CM

## 2023-04-18 DIAGNOSIS — I25.10 CORONARY ARTERY DISEASE INVOLVING NATIVE HEART WITHOUT ANGINA PECTORIS, UNSPECIFIED VESSEL OR LESION TYPE: ICD-10-CM

## 2023-04-18 DIAGNOSIS — G57.02 PYRIFORMIS SYNDROME, LEFT: Primary | ICD-10-CM

## 2023-04-18 DIAGNOSIS — E03.9 ACQUIRED HYPOTHYROIDISM: ICD-10-CM

## 2023-04-18 DIAGNOSIS — E11.9 TYPE 2 DIABETES MELLITUS TREATED WITH INSULIN: ICD-10-CM

## 2023-04-18 DIAGNOSIS — M51.36 DDD (DEGENERATIVE DISC DISEASE), LUMBAR: ICD-10-CM

## 2023-04-18 DIAGNOSIS — I25.10 CORONARY ARTERY DISEASE INVOLVING NATIVE CORONARY ARTERY OF NATIVE HEART WITHOUT ANGINA PECTORIS: ICD-10-CM

## 2023-04-18 DIAGNOSIS — Z79.4 TYPE 2 DIABETES MELLITUS TREATED WITH INSULIN: ICD-10-CM

## 2023-04-18 DIAGNOSIS — I11.0 HYPERTENSIVE HEART DISEASE WITH HEART FAILURE: ICD-10-CM

## 2023-04-18 PROBLEM — M51.369 DDD (DEGENERATIVE DISC DISEASE), LUMBAR: Status: ACTIVE | Noted: 2023-04-18

## 2023-04-18 PROBLEM — Z09 HOSPITAL DISCHARGE FOLLOW-UP: Status: RESOLVED | Noted: 2019-05-29 | Resolved: 2023-04-18

## 2023-04-18 PROBLEM — S80.01XA TRAUMATIC HEMATOMA OF RIGHT KNEE: Status: RESOLVED | Noted: 2022-04-21 | Resolved: 2023-04-18

## 2023-04-18 PROBLEM — Z96.653 STATUS POST BILATERAL KNEE REPLACEMENTS: Status: RESOLVED | Noted: 2021-10-27 | Resolved: 2023-04-18

## 2023-04-18 RX ORDER — INSULIN GLARGINE 100 [IU]/ML
40 INJECTION, SOLUTION SUBCUTANEOUS DAILY
Qty: 30 ML | Refills: 3 | Status: SHIPPED | OUTPATIENT
Start: 2023-04-18

## 2023-04-18 RX ORDER — FUROSEMIDE 40 MG/1
40 TABLET ORAL DAILY
Qty: 7 TABLET | Refills: 0 | Status: SHIPPED | OUTPATIENT
Start: 2023-04-18

## 2023-04-18 RX ORDER — GABAPENTIN 300 MG/1
CAPSULE ORAL
COMMUNITY
Start: 2023-03-30

## 2023-04-18 RX ORDER — ATORVASTATIN CALCIUM 20 MG/1
20 TABLET, FILM COATED ORAL DAILY
Qty: 90 TABLET | Refills: 3 | Status: SHIPPED | OUTPATIENT
Start: 2023-04-18

## 2023-04-18 NOTE — PROGRESS NOTES
"  Chief Complaint   Patient presents with   • Diabetes   • physical therapy referral       Subjective   Fernanda Molina is an 82 y.o. female who presents for follow up of diabetes. Current symptoms include: hyperglycemia and paresthesia of the feet. Patient denies foot ulcerations. Evaluation to date has included: fasting blood sugar, fasting lipid panel, hemoglobin A1C and microalbuminuria. Home sugars: BGs are high in the morning. Current treatments: more intensive attention to diet which has been not very effective, increased aerobic exercise which has been ineffective, Continued insulin which has been effective, Continued metformin which has been effective, Continued statin which has been effective, Continued ACE inhibitor/ARB which has been effective and Continued farxiga which has been effective. Discussed importance of yearly eye exams and checking feet for skin integrity.  Diabetes faily well controlled  Current with cardio and pain MD  On gabapentin and tramadol  Asking for PT for LBP and lack of mobility      The following portions of the patient's history were reviewed and updated as appropriate: allergies, current medications, past medical history, past social history, past surgical history and problem list.        Review of Systems  Pertinent items are noted in HPI.     Vitals:    04/18/23 1104   BP: 110/64   Pulse: 60   Temp: 97.3 °F (36.3 °C)   SpO2: 99%   Weight: 98 kg (216 lb)   Height: 157.5 cm (62\")       Objective    Gen:  Alert, pleasant  Ears: canals clear, TMs normal  Throat: clear , no thrush, teeth ok  Neck: no bruit, no LAD  Lungs: clear  Heart: RR no murmur  Feet:  No rash, no skin breakdown, sensation grossly normal.    Laboratory:  Results for orders placed or performed in visit on 04/12/23   TSH Rfx On Abnormal To Free T4    Specimen: Blood   Result Value Ref Range    TSH 1.810 0.270 - 4.200 uIU/mL   Digoxin level    Specimen: Blood   Result Value Ref Range    Digoxin 1.10 0.60 - 1.20 " ng/mL   Hemoglobin A1c    Specimen: Blood   Result Value Ref Range    Hemoglobin A1C 6.70 (H) 4.80 - 5.60 %   Comprehensive Metabolic Panel    Specimen: Blood   Result Value Ref Range    Glucose 132 (H) 65 - 99 mg/dL    BUN 17 8 - 23 mg/dL    Creatinine 0.76 0.57 - 1.00 mg/dL    EGFR Result 79.3 >60.0 mL/min/1.73    BUN/Creatinine Ratio 22.4 7.0 - 25.0    Sodium 142 136 - 145 mmol/L    Potassium 4.2 3.5 - 5.2 mmol/L    Chloride 103 98 - 107 mmol/L    Total CO2 28.3 22.0 - 29.0 mmol/L    Calcium 11.0 (H) 8.6 - 10.5 mg/dL    Total Protein 6.9 6.0 - 8.5 g/dL    Albumin 4.4 3.5 - 5.2 g/dL    Globulin 2.5 gm/dL    A/G Ratio 1.8 g/dL    Total Bilirubin 0.9 0.0 - 1.2 mg/dL    Alkaline Phosphatase 47 39 - 117 U/L    AST (SGOT) 21 1 - 32 U/L    ALT (SGPT) 24 1 - 33 U/L   Lipid panel    Specimen: Blood   Result Value Ref Range    Total Cholesterol 112 0 - 200 mg/dL    Triglycerides 172 (H) 0 - 150 mg/dL    HDL Cholesterol 39 (L) 40 - 60 mg/dL    VLDL Cholesterol Scottie 29 5 - 40 mg/dL    LDL Chol Calc (NIH) 44 0 - 100 mg/dL   Unable To Void   Result Value Ref Range    Unable to Void Comment      *Note: Due to a large number of results and/or encounters for the requested time period, some results have not been displayed. A complete set of results can be found in Results Review.        Assessment & Plan        Discussed general issues about diabetes pathophysiology and management.  Continued insulin: same.  Continued metformin; see  medication orders.  Continued statin drug see medication orders.  Continued ACE inhibitor; see medication orders.  Follow up in 6 months or as needed.    Diagnoses and all orders for this visit:    1. Pyriformis syndrome, left (Primary)  -     Ambulatory Referral to Physical Therapy Evaluate and treat; Full weight bearing    2. DDD (degenerative disc disease), lumbar  -     Ambulatory Referral to Physical Therapy Evaluate and treat; Full weight bearing    3. Coronary artery disease involving native  coronary artery of native heart without angina pectoris  -     furosemide (LASIX) 40 MG tablet; Take 1 tablet by mouth Daily. Indications: Edema  Dispense: 7 tablet; Refill: 0  -     BNP; Future    4. Coronary artery disease involving native heart without angina pectoris, unspecified vessel or lesion type  -     atorvastatin (LIPITOR) 20 MG tablet; Take 1 tablet by mouth Daily. Indications: Elevation of Both Cholesterol and Triglycerides in Blood  Dispense: 90 tablet; Refill: 3    5. Type 2 diabetes mellitus without complication, with long-term current use of insulin  -     Insulin Glargine (Lantus SoloStar) 100 UNIT/ML injection pen; Inject 40 Units under the skin into the appropriate area as directed Daily. Indications: Type 2 Diabetes  Dispense: 30 mL; Refill: 3  -     MicroAlbumin, Urine, Random - Urine, Clean Catch; Future  -     Basic Metabolic Panel; Future  -     Lipid Panel; Future  -     Hemoglobin A1c; Future    6. Type 2 diabetes mellitus treated with insulin  -     metFORMIN (GLUCOPHAGE) 1000 MG tablet; Take 1 tablet by mouth Daily Before Supper. Indications: Type 2 Diabetes  Dispense: 90 tablet; Refill: 3    7. Acquired hypothyroidism  -     TSH Rfx On Abnormal To Free T4; Future    8. Hypertensive heart disease with heart failure  -     BNP; Future    needs a week of lasix until mail order arrives.      Discussed healthy diabetic eating plan.  May refer to ADA web site, diabetes.org    Return in about 6 months (around 10/18/2023) for Medicare Wellness visit.  There are no Patient Instructions on file for this visit.  Medications Discontinued During This Encounter   Medication Reason   • furosemide (LASIX) 40 MG tablet Reorder   • atorvastatin (LIPITOR) 20 MG tablet Reorder   • metFORMIN (GLUCOPHAGE) 1000 MG tablet Reorder   • Lantus SoloStar 100 UNIT/ML injection pen Reorder         Dr. Rashid Ge MD  [place], [place]  [facility].

## 2023-04-20 ENCOUNTER — TREATMENT (OUTPATIENT)
Dept: PHYSICAL THERAPY | Facility: CLINIC | Age: 83
End: 2023-04-20
Payer: MEDICARE

## 2023-04-20 DIAGNOSIS — M51.36 DDD (DEGENERATIVE DISC DISEASE), LUMBAR: ICD-10-CM

## 2023-04-20 DIAGNOSIS — G57.02 PIRIFORMIS SYNDROME, LEFT: ICD-10-CM

## 2023-04-20 DIAGNOSIS — Z74.09 IMPAIRED FUNCTIONAL MOBILITY, BALANCE, GAIT, AND ENDURANCE: ICD-10-CM

## 2023-04-20 DIAGNOSIS — R26.89 ANTALGIC GAIT: Primary | ICD-10-CM

## 2023-04-20 DIAGNOSIS — R29.898 DECREASED STRENGTH OF LOWER EXTREMITY: ICD-10-CM

## 2023-04-20 PROCEDURE — 97110 THERAPEUTIC EXERCISES: CPT | Performed by: PHYSICAL THERAPIST

## 2023-04-20 PROCEDURE — 97162 PT EVAL MOD COMPLEX 30 MIN: CPT | Performed by: PHYSICAL THERAPIST

## 2023-04-20 PROCEDURE — 97035 APP MDLTY 1+ULTRASOUND EA 15: CPT | Performed by: PHYSICAL THERAPIST

## 2023-04-20 NOTE — PROGRESS NOTES
Physical Therapy Initial Evaluation and Plan of Care    Patient: Fernanda Molina   : 1940  Diagnosis/ICD-10 Code:  Antalgic gait [R26.89]  Referring practitioner: Rashid Ge MD  Date of Initial Visit: 2023  Today's Date: 2023  Patient seen for 18 session       Visit Diagnoses:    ICD-10-CM ICD-9-CM   1. Antalgic gait  R26.89 781.2   2. Impaired functional mobility, balance, gait, and endurance  Z74.09 V49.89   3. Decreased strength of lower extremity  R29.898 729.89   4. DDD (degenerative disc disease), lumbar  M51.36 722.52   5. Piriformis syndrome, left  G57.02 355.0         Subjective Questionnaire: back index 56%      Subjective Evaluation    History of Present Illness  Onset date: pt with LBP for at least one year.    Mechanism of injury: Pt is a 81 y/o WF who reports to the clinic with c/o progressive LBP since she was last in PT end of Nov/Dec 2022.  Pt states the pain goes from her left buttock to thigh when she is standing or walking.  Pt denies having any left foot pain.  Her right heel and arch of foot is killing her, but thinks this maybe from having to put all of her weight through her right LE.  Pt states yesterday was a busy day and did not sit much. Pt usually will try to sit and put feet up for one hour with the heating pad on her back.  Pt has been managing her pain with pain management.  Pt saw Cardiologist 2 weeks ago and he recommended pt to get back into PT before she started loosing her mobility, but she had to get Dr. Ge to write the order.  Pt has had SI injections-Radiofrequency-Epidural injections will minimal improvements.  Pt has not followed up with Spine MD.  Pt is taking Gabapentin, but did decrease dose due to causing her dizziness.        Patient Occupation: retired-likes to cook  Quality of life: fair    Pain  Current pain ratin  At worst pain rating: 10  Location: left buttock to left thigh   Quality: dull ache (it just hurts, has numbness in  the left leg occasionally )  Relieving factors: rest, change in position, heat and medications (sits down )  Aggravating factors: standing, ambulation and sleeping (bending over, it can wake her up at night )    Hand dominance: right    Diagnostic Tests  X-ray: abnormal (B hips-mild OA of hips )    Treatments  Previous treatment: physical therapy, injection treatment and medication  Current treatment: medication and physical therapy  Patient Goals  Patient goals for therapy: decreased pain  Patient goal: to be able to walk better            Objective          Postural Observations    Additional Postural Observation Details  Forward flexed posture- when standing upright pt with increased left LE and buttock pain     Palpation     Additional Palpation Details  Moderate tenderness left L-S area, piriformis, gluteals     Tenderness     Left Hip   Tenderness in the PSIS.     Additional Tenderness Details  Pt with moderate point tenderness over the L3-L4-L5 intervertebral space     Neurological Testing     Sensation     Lumbar   Left   Intact: light touch    Right   Intact: light touch    Active Range of Motion     Lumbar   Flexion: Active lumbar flexion: 90%   Extension: Active lumbar extension: pt unable to stand upright. with pain  Left lateral flexion: Active left lumbar lateral flexion: 15% with pain  Right lateral flexion: Active right lumbar lateral flexion: 25%   Left rotation: Active left lumbar rotation: 15% with pain  Right rotation: Active right lumbar rotation: 25% with pain    Strength/Myotome Testing     Left Hip   Planes of Motion   Flexion: 3    Right Hip   Planes of Motion   Flexion: 3+    Left Knee   Flexion: 3  Extension: 3+    Right Knee   Flexion: 3+  Extension: 4    Left Ankle/Foot   Dorsiflexion: 4+  Plantar flexion: 5    Right Ankle/Foot   Dorsiflexion: 4+  Plantar flexion: 5    Tests     Lumbar     Left   Positive crossed SLR and passive SLR.     Right   Negative crossed SLR and passive SLR.      Left Hip   Positive FADIR and piriformis.   90/90 SLR: Positive.   SLR: Positive.     Right Hip   Positive FADIR and piriformis.   90/90 SLR: Negative.  SLR: Negative.     Additional Tests Details  Pt with mild B hip IR tightness and pain with passive hip IR     Pt needing assistance to transfer from supine to sit and rolling over in bed due to increase LBP and left LE pain.  Pt was unable to lift left LE up onto treatment table     Ambulation     Observational Gait   Gait: antalgic   Decreased walking speed, stride length, left stance time and left step length.   Left foot contact pattern: foot flat    Additional Observational Gait Details  Pt enters department with a moderate antalgic gait, forward flexed posture using a cane foot flat gait pattern, scooting left LE at times due to pain in the left LE with WB.            Assessment & Plan     Assessment  Impairments: abnormal gait, abnormal or restricted ROM, activity intolerance, impaired physical strength, lacks appropriate home exercise program, pain with function and weight-bearing intolerance  Functional Limitations: lifting, sleeping, walking, uncomfortable because of pain, moving in bed, standing and unable to perform repetitive tasks  Assessment details: Pt is a 81 y/o WF who reports to the clinic with LBP pain, left LE pain with standing and ambulation, decreased hip flexibility, tenderness, pain with bending, lifting, transitioning from sit to stand, supine to sit, and poor tolerance to land PT.  Pt has co-morbidities of B TKR, CHF, and AICD, which may affect progress in plan of care. Signs and symptoms are consistent with physical therapy diagnosis of lumbar DDD and spinal stenosis. Patient is appropriate for skilled PT to address impairments and reduce pain in order to improve ease with daily mobility and ADLs.  Pt was referred to aquatic therapy due to pt having a poor tolerance to land PT.  Pt with moderate pain during gentle supine back  stabilization exercises today, with minimal symptom relief after US treatment.  Feel pt is more appropriate for aquatic therapy for back stabilization and LE strengthening to increase pt's tolerance to standing and ambulation.  Pt was educated on course of treatment, lack of progress with land PT and the benefit of doing aquatic therapy.              Barriers to therapy: continues to have moderate pain with minimal improvements from pain management   Prognosis: fair    Goals  Plan Goals: STGs: 2-4 weeks  1.  Decrease LBP to a 5/10 with standing and ambulation using her rollator or cane.   2.  Pt tolerates 30 minutes of aquatic therapy for improved endurance and stamina to cook dinner.       3.  Decrease left L-S tenderness to minimal for improved ability to perform her ADLs    LTGs: 6-10 weeks  1.  Decrease LBP to a 3/10 with standing and ambulation using her cane or rollator.   2.  Pt tolerates 45 minutes of aquatic therapy for improved endurance to perform her daily household chores.    3.  Pt is independent with HEP with her aquatic exercise program for self management of symptoms.         Plan  Therapy options: will be seen for skilled therapy services  Planned modality interventions: hydrotherapy  Planned therapy interventions: abdominal trunk stabilization, flexibility, functional ROM exercises, home exercise program, stretching, strengthening, neuromuscular re-education, therapeutic activities, soft tissue mobilization and manual therapy  Frequency: 2x week  Duration in weeks: 10  Treatment plan discussed with: patient      Timed:         Manual Therapy:         mins  84137;     Therapeutic Exercise:    20     mins  39108;     Neuromuscular Liz:        mins  40778;    Therapeutic Activity:          mins  78073;     Gait Training:           mins  00470;     Ultrasound:     8     mins  64021;    Ionto                                   mins   19106  Self Care                            mins   37233  Luc  Repos         mins 71821      Un-Timed:  Electrical Stimulation:         mins  64663 ( );  Dry Needling          mins self-pay  Traction          mins 44979  Low Eval          Mins  39157  Mod Eval     20    Mins  57843  High Eval                            Mins  73427      Timed Treatment:   28   mins   Total Treatment:     50   mins      PT: Leann Washington, PT     License Number: [id number]  Electronically signed by Leann Washington, PT, 04/20/23, 2:12 PM EDT    Certification Period: 4/21/2023 thru 7/19/2023  I certify that the therapy services are furnished while this patient is under my care.  The services outlined above are required by this patient, and will be reviewed every 90 days.         Physician Signature:__________________________________________________    PHYSICIAN: Rashid Ge MD  NPI: [id number]                                      DATE:      Please sign and return via fax to [email or web address] . Thank you, [facility].

## 2023-04-21 ENCOUNTER — TELEPHONE (OUTPATIENT)
Dept: PHYSICAL THERAPY | Facility: OTHER | Age: 83
End: 2023-04-21

## 2023-05-04 ENCOUNTER — TELEPHONE (OUTPATIENT)
Dept: FAMILY MEDICINE CLINIC | Facility: CLINIC | Age: 83
End: 2023-05-04

## 2023-05-04 DIAGNOSIS — J30.89 NON-SEASONAL ALLERGIC RHINITIS, UNSPECIFIED TRIGGER: Primary | ICD-10-CM

## 2023-05-04 RX ORDER — ALBUTEROL SULFATE 90 UG/1
2 AEROSOL, METERED RESPIRATORY (INHALATION) EVERY 4 HOURS PRN
Qty: 18 G | Refills: 5 | Status: SHIPPED | OUTPATIENT
Start: 2023-05-04

## 2023-05-04 RX ORDER — MOMETASONE FUROATE 50 UG/1
2 SPRAY, METERED NASAL DAILY
Qty: 17 G | Refills: 0 | Status: SHIPPED | OUTPATIENT
Start: 2023-05-04

## 2023-05-04 NOTE — TELEPHONE ENCOUNTER
Pt needs new script sent in for her mometasone nasal spray to [facility], she only uses as needed during allergy season. Medication was taken off list in 2021, has not been refilled since then.

## 2023-05-04 NOTE — TELEPHONE ENCOUNTER
Caller: Jesse Fernanda C    Relationship: Self    Best call back number: [phone]    Requested Prescriptions:   Requested Prescriptions     Pending Prescriptions Disp Refills   • albuterol sulfate  (90 Base) MCG/ACT inhaler 18 g 5     Sig: Inhale 2 puffs Every 4 (Four) Hours As Needed for Wheezing.        Pharmacy where request should be sent: [facility] [place], [place] - [address] [facility] [phone] [facility] [phone] FX     Last office visit with prescribing clinician: 4/18/2023   Last telemedicine visit with prescribing clinician: 10/30/2023   Next office visit with prescribing clinician: 11/6/2023     Additional details provided by patient: PATIENT IS OUT OF THIS MEDICATION, THIS MEDICATION IS STILL UNDER DR TO WRAY AND PHARMACY NEEDS NEW PRESCRIPTION UNDER DR VICKY FLOWER.     Does the patient have less than a 3 day supply:  [x] Yes  [] No    Would you like a call back once the refill request has been completed: [] Yes [x] No    If the office needs to give you a call back, can they leave a voicemail: [x] Yes [] No    Caro Reeves Rep   05/04/23 09:26 EDT

## 2023-05-04 NOTE — TELEPHONE ENCOUNTER
Caller: Fernanda Molina    Relationship: Self    Best call back number: [phone]    What medication are you requesting: MOMETASONE NASAL SPRAY    What are your current symptoms: HEAD CONGESTION     How long have you been experiencing symptoms: ONGOING    Have you had these symptoms before:    [x] Yes  [] No    Have you been treated for these symptoms before:   [x] Yes  [] No    If a prescription is needed, what is your preferred pharmacy and phone number: [facility] [place] [address] [facility] [phone] [facility] [phone]      Additional notes: THIS MEDICATION IS NOT SHOWING AS ACTIVE ON PATIENT'S MEDICATION LIST. PATIENT STATES SHE IS OUT OF THIS MEDICATION.    PLEASE CALL PATIENT IF THIS HAS BEEN DISCONTINUED BY PROVIDER.

## 2023-05-12 DIAGNOSIS — I25.10 CORONARY ARTERY DISEASE INVOLVING NATIVE CORONARY ARTERY OF NATIVE HEART WITHOUT ANGINA PECTORIS: ICD-10-CM

## 2023-05-12 DIAGNOSIS — E11.9 TYPE 2 DIABETES MELLITUS WITHOUT COMPLICATION, WITHOUT LONG-TERM CURRENT USE OF INSULIN: ICD-10-CM

## 2023-05-14 RX ORDER — POTASSIUM CHLORIDE 1500 MG/1
TABLET, EXTENDED RELEASE ORAL
Qty: 90 TABLET | Refills: 1 | Status: SHIPPED | OUTPATIENT
Start: 2023-05-14

## 2023-06-01 DIAGNOSIS — J30.89 NON-SEASONAL ALLERGIC RHINITIS, UNSPECIFIED TRIGGER: ICD-10-CM

## 2023-06-01 RX ORDER — MOMETASONE FUROATE 50 UG/1
SPRAY, METERED NASAL
Qty: 17 G | Refills: 0 | Status: SHIPPED | OUTPATIENT
Start: 2023-06-01

## 2023-08-02 ENCOUNTER — OFFICE VISIT (OUTPATIENT)
Dept: SLEEP MEDICINE | Facility: HOSPITAL | Age: 83
End: 2023-08-02
Payer: MEDICARE

## 2023-08-02 VITALS — BODY MASS INDEX: 39.42 KG/M2 | OXYGEN SATURATION: 95 % | WEIGHT: 214.2 LBS | HEIGHT: 62 IN | HEART RATE: 75 BPM

## 2023-08-02 DIAGNOSIS — E66.01 CLASS 2 SEVERE OBESITY DUE TO EXCESS CALORIES WITH SERIOUS COMORBIDITY AND BODY MASS INDEX (BMI) OF 39.0 TO 39.9 IN ADULT: ICD-10-CM

## 2023-08-02 DIAGNOSIS — Z99.89 OSA ON CPAP: Primary | ICD-10-CM

## 2023-08-02 DIAGNOSIS — G47.33 OSA ON CPAP: Primary | ICD-10-CM

## 2023-08-02 PROCEDURE — G0463 HOSPITAL OUTPT CLINIC VISIT: HCPCS

## 2023-08-02 NOTE — PROGRESS NOTES
"Follow Up Sleep Disorders Center Note     Chief Complaint:  HAMLET     Primary Care Physician: Rashid Ge MD    Interval History:   The patient is a 82 y.o. female  who I last saw 8/3/2022 and that note was reviewed.  The patient reports she is worse because she has been falling and she has been having increased back issues.    The patient goes to bed 11 PM and gets out of bed at 9:30 AM.  She just wakes up.    Review of Systems:    A complete review of systems was done and all were negative with the exception of some postnasal drip    Social History:    Social History     Socioeconomic History    Marital status:    Tobacco Use    Smoking status: Never    Smokeless tobacco: Never   Vaping Use    Vaping Use: Never used   Substance and Sexual Activity    Alcohol use: No    Drug use: No    Sexual activity: Not Currently     Partners: Male     Birth control/protection: Post-menopausal       Allergies:  Amiodarone, Corticosteroids, Propoxyphene, Benzonatate, Cephalexin, Adhesive tape, Hydrocodone, and Lisinopril     Medication Review: Her list was reviewed.      Vital Signs:    Vitals:    08/02/23 0900   Pulse: 75   SpO2: 95%   Weight: 97.2 kg (214 lb 3.2 oz)   Height: 157.5 cm (62\")     Body mass index is 39.18 kg/mý.    Physical Exam:    Constitutional:  Well developed 82 y.o. female that appears in no apparent distress.  Awake & oriented times 3.  Normal mood with normal recent and remote memory and normal judgement.  Eyes:  Conjunctivae normal.  Oropharynx: Previously, moist mucous membranes without exudate and a large tongue and class III Mallampati airway.    Self-administered Lorane Sleepiness Scale test results: 4  0-5 Lower normal daytime sleepiness  6-10 Higher normal daytime sleepiness  11-12 Mild, 13-15 Moderate, & 16-24 Severe excessive daytime sleepiness     Downloaded PAP Data Evaluated For Therapeutic Response and Compliance:  DME is [facility] and she uses nasal pillows.  Downloads between " 4/30 and 7/28/2023 compliance 100%.  Average usage is 9 hours and 25 minutes.  Average AHI is mildly abnormal at 6.2 but all subsets are normal and she does have a borderline leak.  Average auto CPAP pressure is 14 and her ResMed auto CPAP is 12-16    I have reviewed the above results and compared them with the patient's last downloads and reviewed with the patient.    Impression:   Obstructive sleep apnea adequately treated with ResMed auto CPAP. The patient appears to be at goal with good compliance and usage. The patient has no complaints of hypersomnolence with increase hours of usage.    Plan:  Good sleep hygiene measures should be maintained.  Weight loss would be beneficial in this patient who has class II severe obesity by Body mass index is 39.18 kg/mý..      After evaluating the patient and assessing results available, the patient is benefiting from the treatment being provided.     The patient will continue ResMed auto CPAP.  Potential side effects of CPAP therapy reviewed and addressed as needed.  After clinical evaluation and review of downloads, I recommend no changes to the patient's pressures.  A new prescription will be sent to the patient's DME.    I answered all of the patient's questions.  The patient will call the [facility] Center for any problems with side effects of CPAP therapy and will follow up in 1 year.      Mahin Galeas MD  Sleep Medicine  08/02/23  10:02 EDT

## 2023-08-09 ENCOUNTER — OFFICE VISIT (OUTPATIENT)
Dept: FAMILY MEDICINE CLINIC | Facility: CLINIC | Age: 83
End: 2023-08-09
Payer: MEDICARE

## 2023-08-09 VITALS
HEART RATE: 98 BPM | OXYGEN SATURATION: 97 % | SYSTOLIC BLOOD PRESSURE: 120 MMHG | BODY MASS INDEX: 39.93 KG/M2 | HEIGHT: 62 IN | WEIGHT: 217 LBS | TEMPERATURE: 97.8 F | DIASTOLIC BLOOD PRESSURE: 72 MMHG

## 2023-08-09 DIAGNOSIS — R19.5 LOOSE STOOLS: Primary | ICD-10-CM

## 2023-08-09 PROCEDURE — 99213 OFFICE O/P EST LOW 20 MIN: CPT | Performed by: FAMILY MEDICINE

## 2023-08-09 PROCEDURE — 1160F RVW MEDS BY RX/DR IN RCRD: CPT | Performed by: FAMILY MEDICINE

## 2023-08-09 PROCEDURE — 1159F MED LIST DOCD IN RCRD: CPT | Performed by: FAMILY MEDICINE

## 2023-08-09 RX ORDER — DIPHENOXYLATE HYDROCHLORIDE AND ATROPINE SULFATE 2.5; .025 MG/1; MG/1
1 TABLET ORAL 4 TIMES DAILY PRN
Qty: 30 TABLET | Refills: 0 | Status: SHIPPED | OUTPATIENT
Start: 2023-08-09

## 2023-08-09 NOTE — PROGRESS NOTES
"  Subjective   Fernanda Molina is a 82 y.o. female who is here for   Chief Complaint   Patient presents with    Abdominal Pain    Diarrhea     Worsened the last 3 days   .     History of Present Illness     Recent increase in the severity intensity of her stools.  Has had loose stools ever since her gallbladder surgery.  Back in 1996  Usually has 1 explosive loose stool per day  But she can usually anticipate when the BM is going to occur  But since Sunday she has not has had several fecal accidents.  No recent antibiotics  No fever no nausea no vomiting  She has been planning on a road trip to [place] next week    The following portions of the patient's history were reviewed and updated as appropriate: allergies, current medications, past medical history, past social history, past surgical history, and problem list.    Review of Systems    Objective   Vitals:    08/09/23 1510   BP: 120/72   Pulse: 98   Temp: 97.8 øF (36.6 øC)   SpO2: 97%   Weight: 98.4 kg (217 lb)   Height: 157.5 cm (62\")      Physical Exam  Vitals reviewed.   Neurological:      Mental Status: She is alert.       Assessment & Plan   Diagnoses and all orders for this visit:    1. Loose stools (Primary)  -     diphenoxylate-atropine (Lomotil) 2.5-0.025 MG per tablet; Take 1 tablet by mouth 4 (Four) Times a Day As Needed for Diarrhea.  Dispense: 30 tablet; Refill: 0    Over-the-counter Imodium should be fine  If not try the Lomotil    There are no Patient Instructions on file for this visit.    There are no discontinued medications.     No follow-ups on file.    Dr. Rashid Ge  [place]    "

## 2023-11-06 ENCOUNTER — OFFICE VISIT (OUTPATIENT)
Dept: FAMILY MEDICINE CLINIC | Facility: CLINIC | Age: 83
End: 2023-11-06
Payer: MEDICARE

## 2023-11-06 VITALS
BODY MASS INDEX: 38.46 KG/M2 | TEMPERATURE: 97.3 F | OXYGEN SATURATION: 99 % | WEIGHT: 209 LBS | HEIGHT: 62 IN | SYSTOLIC BLOOD PRESSURE: 120 MMHG | HEART RATE: 76 BPM | DIASTOLIC BLOOD PRESSURE: 84 MMHG

## 2023-11-06 DIAGNOSIS — Z00.00 MEDICARE ANNUAL WELLNESS VISIT, SUBSEQUENT: ICD-10-CM

## 2023-11-06 DIAGNOSIS — Z79.4 TYPE 2 DIABETES MELLITUS WITHOUT COMPLICATION, WITH LONG-TERM CURRENT USE OF INSULIN: Primary | ICD-10-CM

## 2023-11-06 DIAGNOSIS — E11.9 TYPE 2 DIABETES MELLITUS WITHOUT COMPLICATION, WITH LONG-TERM CURRENT USE OF INSULIN: Primary | ICD-10-CM

## 2023-11-06 PROCEDURE — 1160F RVW MEDS BY RX/DR IN RCRD: CPT | Performed by: FAMILY MEDICINE

## 2023-11-06 PROCEDURE — G0439 PPPS, SUBSEQ VISIT: HCPCS | Performed by: FAMILY MEDICINE

## 2023-11-06 PROCEDURE — 1159F MED LIST DOCD IN RCRD: CPT | Performed by: FAMILY MEDICINE

## 2023-11-06 PROCEDURE — 1170F FXNL STATUS ASSESSED: CPT | Performed by: FAMILY MEDICINE

## 2023-11-06 RX ORDER — VERICIGUAT 5 MG/1
TABLET, FILM COATED ORAL
COMMUNITY

## 2023-11-06 RX ORDER — TRAMADOL HYDROCHLORIDE 50 MG/1
50 TABLET ORAL EVERY 8 HOURS PRN
COMMUNITY
Start: 2023-11-03

## 2023-11-06 NOTE — PROGRESS NOTES
The ABCs of the Annual Wellness Visit  Subsequent Medicare Wellness Visit    Subjective      Fernanda Molina is a 82 y.o. female who presents for a Subsequent Medicare Wellness Visit.    The following portions of the patient's history were reviewed and   updated as appropriate: allergies, current medications, past family history, past medical history, past social history, past surgical history, and problem list.    Compared to one year ago, the patient feels her physical   health is worse.    Compared to one year ago, the patient feels her mental   health is worse.    Recent Hospitalizations:  She was not admitted to the hospital during the last year.       Current Medical Providers:  Patient Care Team:  Rashid Ge MD as PCP - General (Family Medicine)  Thong Jones MD as Consulting Physician (Cardiology)  Mahin Galeas MD as Consulting Physician (Sleep Medicine)  Lara Ortiz MD as Consulting Physician (Obstetrics and Gynecology)  Betty Horowitz MD as Consulting Physician (Dermatology)  Jr Ben Saavedra MD as Surgeon (Cardiothoracic Surgery)  Chirag Rai MD as Consulting Physician (Ophthalmology)  Moisés Everett MD as Consulting Physician (Urology)  Geraldo Velazquez MD (Anesthesiology)    Outpatient Medications Prior to Visit   Medication Sig Dispense Refill    acetaminophen (TYLENOL) 325 MG tablet Take 2 tablets by mouth Every 4 (Four) Hours As Needed for Mild Pain .      albuterol (PROVENTIL) (2.5 MG/3ML) 0.083% nebulizer solution Take 2.5 mg by nebulization Every 4 (Four) Hours As Needed for Wheezing. 30 each 5    albuterol sulfate  (90 Base) MCG/ACT inhaler Inhale 2 puffs Every 4 (Four) Hours As Needed for Wheezing. 18 g 5    atorvastatin (LIPITOR) 20 MG tablet Take 1 tablet by mouth Daily. Indications: Elevation of Both Cholesterol and Triglycerides in Blood 90 tablet 3    BD Pen Needle Clara U/F 32G X 4 MM misc Inject 1 Units under the skin into  the appropriate area as directed Daily. 100 each 4    cetirizine (zyrTEC) 10 MG tablet Take 1 tablet by mouth Daily.      digoxin (LANOXIN) 250 MCG tablet TAKE 1 TABLET DAILY 90 tablet 3    diphenoxylate-atropine (Lomotil) 2.5-0.025 MG per tablet Take 1 tablet by mouth 4 (Four) Times a Day As Needed for Diarrhea. 30 tablet 0    Farxiga 10 MG tablet Daily.      ferrous sulfate 325 (65 FE) MG EC tablet Take 65 mg by mouth 1 (one) time per week. On Sunday      furosemide (LASIX) 40 MG tablet Take 1 tablet by mouth Daily. Indications: Edema 7 tablet 0    glucose blood (OneTouch Ultra) test strip 1 each by Other route 3 (Three) Times a Day. Test blood glucose 100 each 8    Insulin Glargine (Lantus SoloStar) 100 UNIT/ML injection pen Inject 40 Units under the skin into the appropriate area as directed Daily. Indications: Type 2 Diabetes 30 mL 3    Lancets (OneTouch Delica Plus Ghrhoz88S) misc 1 each by Other route 3 (Three) Times a Day. 100 each 11    levothyroxine (SYNTHROID, LEVOTHROID) 175 MCG tablet TAKE 1 TABLET DAILY FOR UNDERACTIVE THYROID 90 tablet 1    metFORMIN (GLUCOPHAGE) 1000 MG tablet       metoprolol succinate XL (TOPROL-XL) 200 MG 24 hr tablet Take 1 tablet by mouth Every Morning.      mometasone (NASONEX) 50 MCG/ACT nasal spray INSTILL 2 SPRAYS INTO THE NOSTRIL(S) AS DIRECTED BY PROVIDER DAILY. 17 g 0    Multiple Vitamin (MULTIVITAMIN) tablet Take 1 tablet by mouth Daily.      pantoprazole (PROTONIX) 40 MG EC tablet TAKE 1 TABLET DAILY FOR GASTROESOPHAGEAL REFLUX DISEASE 90 tablet 1    sacubitril-valsartan (Entresto) 49-51 MG tablet 2 (Two) Times a Day.      traMADol (ULTRAM) 50 MG tablet Take 1 tablet by mouth Every 8 (Eight) Hours As Needed.      Vericiguat (Verquvo) 5 MG tablet Take  by mouth.      warfarin (COUMADIN) 2.5 MG tablet Take 1 tablet by mouth. MON TUES WED THURS FRI (AS WELL AS THE 5MG TO TOTAL 7.5MG)      warfarin (COUMADIN) 5 MG tablet Take 1 tablet by mouth Every Night. SAT AND SUN       metFORMIN (GLUCOPHAGE) 500 MG tablet TAKE 1 TABLET DAILY WITH BREAKFAST FOR TYPE 2 DIABETES 90 tablet 1    traMADol (ULTRAM) 50 MG tablet Take 1 tablet by mouth.      gabapentin (NEURONTIN) 300 MG capsule       ketoconazole (NIZORAL) 2 % cream Apply 1 application topically to the appropriate area as directed Every 12 (Twelve) Hours. 60 g 5    KLOR-CON 20 MEQ CR tablet TAKE 1 TABLET DAILY 90 tablet 1    nystatin (MYCOSTATIN) 273214 UNIT/GM powder Apply  topically to the appropriate area as directed 3 (Three) Times a Day. Indications: Skin Infection due to Candida Yeast 60 g 1     No facility-administered medications prior to visit.       Opioid medication/s are on active medication list.  and I have evaluated her active treatment plan and pain score trends (see table).  There were no vitals filed for this visit.  I have reviewed the chart for potential of high risk medication and harmful drug interactions in the elderly.          Aspirin is not on active medication list.  Aspirin use is not indicated based on review of current medical condition/s. Risk of harm outweighs potential benefits.  .    Patient Active Problem List   Diagnosis    S/P mitral valve repair    S/P tricuspid valve repair    S/P Maze operation for atrial fibrillation    Class 2 severe obesity due to excess calories with serious comorbidity and body mass index (BMI) of 39.0 to 39.9 in adult    Atrial fibrillation    Acquired hypothyroidism    COPD (chronic obstructive pulmonary disease)    CAD (coronary artery disease), hx MI    HAMLET on autoCPAP    Renal calculus, right    Hyperparathyroidism    Type 2 diabetes mellitus without complication, with long-term current use of insulin    Warfarin anticoagulation    Medication monitoring encounter    Medicare annual wellness visit, subsequent    AICD (automatic cardioverter/defibrillator) present    Chronic nonallergic rhinitis    Pulmonary hypertension    Mitral valve disease    Hypersomnia due to medical  "condition    Ureteral calculus, left    Pelvic kidney    Presence of prosthetic heart valve    Chronic left hip pain    Pyriformis syndrome, left    Asymptomatic microscopic hematuria    Pain due to total left knee replacement    Malignant melanoma of skin of lower extremity    DDD (degenerative disc disease), lumbar     Advance Care Planning   Advance Care Planning     Advance Directive is not on file.  ACP discussion was held with the patient during this visit. Patient has an advance directive (not in EMR), copy requested.     Objective    Vitals:    23 1514   BP: 120/84   Pulse: 76   Temp: 97.3 °F (36.3 °C)   SpO2: 99%   Weight: 94.8 kg (209 lb)   Height: 157.5 cm (62\")     Estimated body mass index is 38.23 kg/m² as calculated from the following:    Height as of this encounter: 157.5 cm (62\").    Weight as of this encounter: 94.8 kg (209 lb).           Does the patient have evidence of cognitive impairment?   No    Lab Results   Component Value Date    CHLPL 111 10/27/2023    TRIG 171 (H) 10/27/2023    HDL 39 (L) 10/27/2023    LDL 44 10/27/2023    VLDL 28 10/27/2023    HGBA1C 6.4 (H) 10/27/2023          HEALTH RISK ASSESSMENT    Smoking Status:  Social History     Tobacco Use   Smoking Status Never   Smokeless Tobacco Never     Alcohol Consumption:  Social History     Substance and Sexual Activity   Alcohol Use No     Fall Risk Screen:    STEADI Fall Risk Assessment was completed, and patient is at MODERATE risk for falls. Assessment completed on:2023    Depression Screenin/6/2023     3:23 PM   PHQ-2/PHQ-9 Depression Screening   Little Interest or Pleasure in Doing Things 0-->not at all   Feeling Down, Depressed or Hopeless 0-->not at all   PHQ-9: Brief Depression Severity Measure Score 0       Health Habits and Functional and Cognitive Screenin/6/2023     3:24 PM   Functional & Cognitive Status   Do you have difficulty preparing food and eating? No   Do you have difficulty bathing " yourself, getting dressed or grooming yourself? No   Do you have difficulty using the toilet? No   Do you have difficulty moving around from place to place? No   Do you have trouble with steps or getting out of a bed or a chair? No   Current Diet Well Balanced Diet   Dental Exam Up to date   Eye Exam Up to date   Exercise (times per week) 0 times per week   Current Exercises Include No Regular Exercise   Do you need help using the phone?  No   Are you deaf or do you have serious difficulty hearing?  No   Do you need help to go to places out of walking distance? Yes   Do you need help shopping? No   Do you need help preparing meals?  No   Do you need help with housework?  No   Do you need help with laundry? No   Do you need help taking your medications? No   Do you need help managing money? No   Do you ever drive or ride in a car without wearing a seat belt? No   Have you felt unusual stress, anger or loneliness in the last month? No   Who do you live with? Spouse   If you need help, do you have trouble finding someone available to you? No   Have you been bothered in the last four weeks by sexual problems? No   Do you have difficulty concentrating, remembering or making decisions? No       Age-appropriate Screening Schedule:  Refer to the list below for future screening recommendations based on patient's age, sex and/or medical conditions. Orders for these recommended tests are listed in the plan section. The patient has been provided with a written plan.    Health Maintenance   Topic Date Due    TDAP/TD VACCINES (1 - Tdap) Never done    DXA SCAN  06/19/2011    ZOSTER VACCINE (2 of 3) 08/10/2014    INFLUENZA VACCINE  08/01/2023    COVID-19 Vaccine (5 - 2023-24 season) 09/01/2023    ANNUAL WELLNESS VISIT  11/03/2023    BMI FOLLOWUP  04/18/2024    HEMOGLOBIN A1C  04/27/2024    DIABETIC EYE EXAM  08/23/2024    LIPID PANEL  10/27/2024    URINE MICROALBUMIN  10/27/2024    COLORECTAL CANCER SCREENING  10/31/2024     Pneumococcal Vaccine 65+  Completed                  CMS Preventative Services Quick Reference  Risk Factors Identified During Encounter:    Chronic Pain: Natural history and expected course discussed. Questions answered.  Polypharmacy: Medication List reviewed    The above risks/problems have been discussed with the patient.  Pertinent information has been shared with the patient in the After Visit Summary.    Diagnoses and all orders for this visit:    1. Type 2 diabetes mellitus without complication, with long-term current use of insulin (Primary)  -     Semaglutide,0.25 or 0.5MG/DOS, (OZEMPIC) 2 MG/3ML solution pen-injector; Inject 0.25 mg under the skin into the appropriate area as directed 1 (One) Time Per Week for 11 doses.  Dispense: 2 mL; Refill: 1  -     metFORMIN (GLUCOPHAGE) 500 MG tablet; Take 1 tablet by mouth Daily With Breakfast. Indications: Type 2 Diabetes    2. Medicare annual wellness visit, subsequent    Other orders  -     Discontinue: metFORMIN (GLUCOPHAGE) 500 MG tablet; Take 1 tablet by mouth Daily With Breakfast. Indications: Type 2 Diabetes  Dispense: 90 tablet; Refill: 3    Patient would like to try Ozempic for weight loss.  We will send that in today for her.  Diabetes fairly well controlled.  Labs reviewed  Current with cardiology  Current with pain management  Was having double vision, thought to be from the gabapentin which has been stopped by her pain management provider  See her back in 4 weeks    Follow Up:   Next Medicare Wellness visit to be scheduled in 1 year.      An After Visit Summary and PPPS were made available to the patient.

## 2023-11-27 ENCOUNTER — TELEPHONE (OUTPATIENT)
Dept: FAMILY MEDICINE CLINIC | Facility: CLINIC | Age: 83
End: 2023-11-27

## 2023-11-27 NOTE — TELEPHONE ENCOUNTER
Caller: Fernanda Molina    Relationship: Self    Best call back number: [phone]     Requested Prescriptions:   POTASSIUM CHLORIDE ER 20 MEQ    Pharmacy where request [place] be sent[place] [facility] [place] - [address] [facility] - [phone]  - [phone] FX     Last office visit with prescribing clinician: 11/6/2023   Last telemedicine visit with prescribing clinician: Visit date not found   Next office visit with prescribing clinician: 12/8/2023     Additional details provided by patient: PHARMACY INFORMED PATIENT THAT DR. FLOWER REFUSED TO FILL THIS MEDICATION.    Does the patient have less than a 3 day supply:  [x] Yes  [] No    Would you like a call back once the refill request has been completed: [x] Yes [] No    If the office needs to give you a call back, can they leave a voicemail: [x] Yes [] No    Caro Calzada Rep   11/27/23 14:26 EST

## 2023-11-28 ENCOUNTER — TELEPHONE (OUTPATIENT)
Dept: FAMILY MEDICINE CLINIC | Facility: CLINIC | Age: 83
End: 2023-11-28
Payer: MEDICARE

## 2023-11-28 RX ORDER — ALBUTEROL SULFATE 2.5 MG/3ML
2.5 SOLUTION RESPIRATORY (INHALATION) EVERY 4 HOURS PRN
Qty: 30 EACH | Refills: 2 | Status: SHIPPED | OUTPATIENT
Start: 2023-11-28 | End: 2023-11-29 | Stop reason: SDUPTHER

## 2023-11-28 NOTE — TELEPHONE ENCOUNTER
Spoke with pt.  Pt says that she still takes Lasix 40 mg daily prescribed by Carlito Mckinney.  I asked her to call his office to see if he wants her to be on Potassium.

## 2023-11-28 NOTE — TELEPHONE ENCOUNTER
Ozempic is not a covered medication.  PA denied.  Is there something else you can send in for her?

## 2023-11-29 ENCOUNTER — OFFICE VISIT (OUTPATIENT)
Dept: FAMILY MEDICINE CLINIC | Facility: CLINIC | Age: 83
End: 2023-11-29
Payer: MEDICARE

## 2023-11-29 VITALS
DIASTOLIC BLOOD PRESSURE: 70 MMHG | BODY MASS INDEX: 37.73 KG/M2 | OXYGEN SATURATION: 96 % | TEMPERATURE: 97.7 F | WEIGHT: 205 LBS | SYSTOLIC BLOOD PRESSURE: 120 MMHG | HEART RATE: 72 BPM | HEIGHT: 62 IN

## 2023-11-29 DIAGNOSIS — J06.9 ACUTE URI: ICD-10-CM

## 2023-11-29 DIAGNOSIS — R05.1 ACUTE COUGH: Primary | ICD-10-CM

## 2023-11-29 PROCEDURE — 87428 SARSCOV & INF VIR A&B AG IA: CPT | Performed by: FAMILY MEDICINE

## 2023-11-29 PROCEDURE — 1159F MED LIST DOCD IN RCRD: CPT | Performed by: FAMILY MEDICINE

## 2023-11-29 PROCEDURE — 1160F RVW MEDS BY RX/DR IN RCRD: CPT | Performed by: FAMILY MEDICINE

## 2023-11-29 PROCEDURE — 99213 OFFICE O/P EST LOW 20 MIN: CPT | Performed by: FAMILY MEDICINE

## 2023-11-29 RX ORDER — AMOXICILLIN 500 MG/1
500 TABLET, FILM COATED ORAL 3 TIMES DAILY
Qty: 21 TABLET | Refills: 0 | Status: SHIPPED | OUTPATIENT
Start: 2023-11-29 | End: 2023-12-06

## 2023-11-29 RX ORDER — POTASSIUM CHLORIDE 20 MEQ/1
20 TABLET, EXTENDED RELEASE ORAL
Qty: 30 TABLET | Refills: 0 | Status: SHIPPED | OUTPATIENT
Start: 2023-11-29 | End: 2023-11-29 | Stop reason: SDUPTHER

## 2023-11-29 RX ORDER — ALBUTEROL SULFATE 2.5 MG/3ML
2.5 SOLUTION RESPIRATORY (INHALATION) EVERY 4 HOURS PRN
Qty: 30 EACH | Refills: 1 | Status: SHIPPED | OUTPATIENT
Start: 2023-11-29

## 2023-11-29 RX ORDER — POTASSIUM CHLORIDE 20 MEQ/1
20 TABLET, EXTENDED RELEASE ORAL
Qty: 90 TABLET | Refills: 3 | Status: SHIPPED | OUTPATIENT
Start: 2023-11-29

## 2023-11-29 NOTE — PROGRESS NOTES
"  Chief Complaint   Patient presents with    Cough    Sore Throat       Upper Respiratory Infection: Patient complains of symptoms of a URI. Symptoms include congestion, fever, and sore throat. Onset of symptoms was 3 days ago, gradually worsening since that time. She also c/o low grade fever for the past 3 days .  She is drinking plenty of fluids. Evaluation to date: none. Treatment to date: none.  Ill contacts at home  discussed.  Was at a very large family gathering for Thanksgiving with 81 people        Vitals:    23 1321   BP: 120/70   Pulse: 72   Temp: 97.7 °F (36.5 °C)   SpO2: 96%   Weight: 93 kg (205 lb)   Height: 157.5 cm (62\")     Gen: mildly ill appearing, alert  Ears: Tm's bulging without redness  Nose:  Congestion  Throat:  Red without exudate, some drainage, tonsils okay  Neck: no LAD  Lung: mild rales, good air movement, regular RR  Heart: RR without murmur  Skin: no rash.      Assessment & Plan   Diagnoses and all orders for this visit:    1. Acute cough (Primary)  -     POCT SARS-CoV-2 + Flu Antigen SERGEI    2. Acute URI  -     albuterol (PROVENTIL) (2.5 MG/3ML) 0.083% nebulizer solution; Take 2.5 mg by nebulization Every 4 (Four) Hours As Needed for Wheezing.  Dispense: 30 each; Refill: 1  -     amoxicillin (AMOXIL) 500 MG tablet; Take 1 tablet by mouth 3 (Three) Times a Day for 7 days.  Dispense: 21 tablet; Refill: 0    Other orders  -     Discontinue: potassium chloride (K-DUR,KLOR-CON) 20 MEQ CR tablet; Take 1 tablet by mouth Daily With Breakfast.  Dispense: 30 tablet; Refill: 0  -     potassium chloride (K-DUR,KLOR-CON) 20 MEQ CR tablet; Take 1 tablet by mouth Daily With Breakfast.  Dispense: 90 tablet; Refill: 3    In office swab for influenza and COVID-19 is negative    We will treat with amoxicillin  Her albuterol inhaler is     Needs routine refill of her potassium           Patient Instructions     Results for orders placed or performed in visit on 23   POCT SARS-CoV-2 + " Flu Antigen SERGEI    Specimen: Swab   Result Value Ref Range    SARS Antigen Not Detected Not Detected, Presumptive Negative    Influenza A Antigen SERGEI Not Detected Not Detected    Influenza B Antigen SERGEI Not Detected Not Detected    Internal Control Passed Passed    Lot Number [id number]     Expiration Date 10/27/2024      *Note: Due to a large number of results and/or encounters for the requested time period, some results have not been displayed. A complete set of results can be found in Results Review.         Tylenol or Advil as needed for pain, fever, muscle aches  Plenty of fluids  Hand washing discussed    Warm tea for throat.  Pros and cons of antibiotic use discussed    Dr. Rashid Ge MD  [place], [place]  [facility]

## 2023-11-29 NOTE — PATIENT INSTRUCTIONS
Results for orders placed or performed in visit on 11/29/23   POCT SARS-CoV-2 + Flu Antigen SERGEI    Specimen: Swab   Result Value Ref Range    SARS Antigen Not Detected Not Detected, Presumptive Negative    Influenza A Antigen SERGEI Not Detected Not Detected    Influenza B Antigen SERGEI Not Detected Not Detected    Internal Control Passed Passed    Lot Number [id number]     Expiration Date 10/27/2024      *Note: Due to a large number of results and/or encounters for the requested time period, some results have not been displayed. A complete set of results can be found in Results Review.

## 2023-12-08 ENCOUNTER — OFFICE VISIT (OUTPATIENT)
Dept: FAMILY MEDICINE CLINIC | Facility: CLINIC | Age: 83
End: 2023-12-08
Payer: MEDICARE

## 2023-12-08 VITALS
DIASTOLIC BLOOD PRESSURE: 76 MMHG | SYSTOLIC BLOOD PRESSURE: 130 MMHG | TEMPERATURE: 97.8 F | HEART RATE: 66 BPM | BODY MASS INDEX: 38.09 KG/M2 | HEIGHT: 62 IN | WEIGHT: 207 LBS | OXYGEN SATURATION: 96 %

## 2023-12-08 DIAGNOSIS — E11.9 TYPE 2 DIABETES MELLITUS WITHOUT COMPLICATION, WITH LONG-TERM CURRENT USE OF INSULIN: Primary | ICD-10-CM

## 2023-12-08 DIAGNOSIS — Z79.4 TYPE 2 DIABETES MELLITUS WITHOUT COMPLICATION, WITH LONG-TERM CURRENT USE OF INSULIN: Primary | ICD-10-CM

## 2023-12-08 RX ORDER — DULAGLUTIDE 0.75 MG/.5ML
0.75 INJECTION, SOLUTION SUBCUTANEOUS WEEKLY
Qty: 6 ML | Refills: 0 | Status: SHIPPED | OUTPATIENT
Start: 2023-12-08 | End: 2024-02-24

## 2023-12-08 NOTE — PROGRESS NOTES
"  Subjective   Fernanda Molina is a 83 y.o. female who is here for   Chief Complaint   Patient presents with    Diabetes   .     Diabetes       Following up on diabetes management.  We initially tried Ozempic for the indication for improved cardiovascular outcomes by her cardiologist.  But her insurance denied the medication    Trulicity is on their list so we will try that 1.  Explained for the treatment of type 2 diabetes  Not indicated for weight loss nor cardiovascular.  But will hope we may get some those benefits due to possible class effect?      The following portions of the patient's history were reviewed and updated as appropriate: allergies, current medications, past family history, past medical history, past social history, past surgical history, and problem list.    Review of Systems    Objective   Vitals:    12/08/23 1058   BP: 130/76   Pulse: 66   Temp: 97.8 °F (36.6 °C)   SpO2: 96%   Weight: 93.9 kg (207 lb)   Height: 157.5 cm (62.01\")      Physical Exam  Vitals reviewed.   Neurological:      Mental Status: She is alert.         Assessment & Plan   Diagnoses and all orders for this visit:    1. Type 2 diabetes mellitus without complication, with long-term current use of insulin (Primary)  -     Dulaglutide (Trulicity) 0.75 MG/0.5ML solution pen-injector; Inject 0.75 mg under the skin into the appropriate area as directed 1 (One) Time Per Week for 12 doses. Indications: Type 2 Diabetes  Dispense: 6 mL; Refill: 0      There are no Patient Instructions on file for this visit.    There are no discontinued medications.     No follow-ups on file.    Dr. Rashid Ge  [place], [place]    "

## 2023-12-26 RX ORDER — POTASSIUM CHLORIDE 20 MEQ/1
20 TABLET, EXTENDED RELEASE ORAL
Qty: 30 TABLET | Refills: 0 | Status: SHIPPED | OUTPATIENT
Start: 2023-12-26

## 2023-12-30 DIAGNOSIS — E03.9 ACQUIRED HYPOTHYROIDISM: ICD-10-CM

## 2023-12-30 RX ORDER — PANTOPRAZOLE SODIUM 40 MG/1
TABLET, DELAYED RELEASE ORAL
Qty: 90 TABLET | Refills: 3 | Status: SHIPPED | OUTPATIENT
Start: 2023-12-30

## 2023-12-30 RX ORDER — LEVOTHYROXINE SODIUM 175 UG/1
TABLET ORAL
Qty: 90 TABLET | Refills: 3 | Status: SHIPPED | OUTPATIENT
Start: 2023-12-30

## 2024-01-08 NOTE — PROGRESS NOTES
END OF SHIFT NOTE:    INTAKE/OUTPUT  01/07 0701 - 01/08 0700  In: 370 [P.O.:370]  Out: -   Voiding: Yes  Catheter: No  Drain:              Flatus: Patient does have flatus present.    Stool:  occurrences.    Characteristics:  Stool Appearance: Loose  Stool Color: Brown  Stool Amount: Smear  Stool Assessment  Incontinence: Yes  Stool Appearance: Loose  Stool Color: Brown  Stool Amount: Smear  Stool Source: Rectum  Last BM (including prior to admit): 01/07/24    Emesis:  occurrences.    Characteristics:        VITAL SIGNS  Patient Vitals for the past 12 hrs:   Temp Pulse Resp BP SpO2   01/08/24 0545 97.7 °F (36.5 °C) -- -- -- --   01/08/24 0415 97.5 °F (36.4 °C) -- -- -- --   01/08/24 0334 97.7 °F (36.5 °C) 69 19 107/63 97 %   01/08/24 0233 97.4 °F (36.3 °C) -- -- -- --   01/08/24 0130 97.1 °F (36.2 °C) -- -- -- --   01/07/24 2358 97.2 °F (36.2 °C) 63 -- (!) 121/59 --   01/07/24 2251 97.2 °F (36.2 °C) 68 20 (!) 168/88 97 %   01/07/24 2233 -- 66 -- -- --   01/07/24 1927 97.2 °F (36.2 °C) 65 20 (!) 153/85 100 %       Pain Assessment  Pain Level: 0 (01/07/24 2035)     Patient's Stated Pain Goal: 0 - No pain    Ambulating  No    Shift report given to oncoming nurse at the bedside.    Luz Grace RN      Physical Therapy Daily Progress Note    Visit # : 8  Fernanda Molina reports: her lower back has been hurting her again.  Pt states the last three night she has finally gotten some good night sleep.       Subjective     Objective   See Exercise, Manual, and Modality Logs for complete treatment.       Assessment & Plan     Assessment    Assessment details: Pt is progressing well with therapy.  Pt is ambulating within the clinic without her cane SBA without LOB and upright standing posture.  Pt is getting heel strikes, but continues to take short step lengths.  Pt was able to increase her reps with sit to stands, SLR, forward step ups, increased ankle TB, and added a band to lateral stepping.  Will continue to see pt 2x/week for strengthening, balance and gait per pt's tolerance.        Progress per Plan of Care         Manual Therapy:         mins  36273;  Therapeutic Exercise:   30      mins  29189;     Neuromuscular Lzi:        mins  64018;    Therapeutic Activity:   26       mins  88535;     Gait Training:           mins  62867;     Ultrasound:          mins  39476;    Electrical Stimulation:         mins  60589 ( );  Dry Needling          mins self-pay    Timed Treatment:  56    mins   Total Treatment:    61    mins    Leann Washington, PT  Physical Therapist

## 2024-01-17 RX ORDER — LANCETS 33 GAUGE
1 EACH MISCELLANEOUS 3 TIMES DAILY
Qty: 100 EACH | Refills: 10 | Status: SHIPPED | OUTPATIENT
Start: 2024-01-17

## 2024-02-12 ENCOUNTER — OFFICE VISIT (OUTPATIENT)
Dept: FAMILY MEDICINE CLINIC | Facility: CLINIC | Age: 84
End: 2024-02-12
Payer: MEDICARE

## 2024-02-12 VITALS
OXYGEN SATURATION: 98 % | SYSTOLIC BLOOD PRESSURE: 120 MMHG | WEIGHT: 203 LBS | TEMPERATURE: 97.5 F | HEART RATE: 65 BPM | BODY MASS INDEX: 37.36 KG/M2 | DIASTOLIC BLOOD PRESSURE: 80 MMHG | HEIGHT: 62 IN

## 2024-02-12 DIAGNOSIS — L02.93 CARBUNCLE: Primary | ICD-10-CM

## 2024-02-12 PROCEDURE — 1159F MED LIST DOCD IN RCRD: CPT | Performed by: FAMILY MEDICINE

## 2024-02-12 PROCEDURE — 99213 OFFICE O/P EST LOW 20 MIN: CPT | Performed by: FAMILY MEDICINE

## 2024-02-12 PROCEDURE — 1160F RVW MEDS BY RX/DR IN RCRD: CPT | Performed by: FAMILY MEDICINE

## 2024-02-12 RX ORDER — ENOXAPARIN SODIUM 100 MG/ML
100 INJECTION SUBCUTANEOUS EVERY 12 HOURS
COMMUNITY
Start: 2024-01-29

## 2024-02-12 RX ORDER — CEFUROXIME AXETIL 500 MG/1
500 TABLET ORAL 2 TIMES DAILY
Qty: 14 TABLET | Refills: 0 | Status: SHIPPED | OUTPATIENT
Start: 2024-02-12

## 2024-02-15 DIAGNOSIS — Z79.4 TYPE 2 DIABETES MELLITUS WITH COMPLICATION, WITH LONG-TERM CURRENT USE OF INSULIN: ICD-10-CM

## 2024-02-15 DIAGNOSIS — E11.8 TYPE 2 DIABETES MELLITUS WITH COMPLICATION, WITH LONG-TERM CURRENT USE OF INSULIN: ICD-10-CM

## 2024-02-15 RX ORDER — PEN NEEDLE, DIABETIC 32GX 5/32"
1 NEEDLE, DISPOSABLE MISCELLANEOUS DAILY
Qty: 100 EACH | Refills: 3 | Status: SHIPPED | OUTPATIENT
Start: 2024-02-15

## 2024-02-19 DIAGNOSIS — E11.9 TYPE 2 DIABETES MELLITUS WITHOUT COMPLICATION, WITH LONG-TERM CURRENT USE OF INSULIN: ICD-10-CM

## 2024-02-19 DIAGNOSIS — Z79.4 TYPE 2 DIABETES MELLITUS WITHOUT COMPLICATION, WITH LONG-TERM CURRENT USE OF INSULIN: ICD-10-CM

## 2024-02-20 ENCOUNTER — OFFICE VISIT (OUTPATIENT)
Dept: FAMILY MEDICINE CLINIC | Facility: CLINIC | Age: 84
End: 2024-02-20
Payer: MEDICARE

## 2024-02-20 VITALS
DIASTOLIC BLOOD PRESSURE: 80 MMHG | HEIGHT: 62 IN | WEIGHT: 204 LBS | SYSTOLIC BLOOD PRESSURE: 124 MMHG | OXYGEN SATURATION: 95 % | TEMPERATURE: 97.1 F | BODY MASS INDEX: 37.54 KG/M2 | HEART RATE: 65 BPM

## 2024-02-20 DIAGNOSIS — L72.3 INFLAMED EPIDERMOID CYST OF SKIN: Primary | ICD-10-CM

## 2024-02-20 PROCEDURE — 1160F RVW MEDS BY RX/DR IN RCRD: CPT | Performed by: FAMILY MEDICINE

## 2024-02-20 PROCEDURE — 99213 OFFICE O/P EST LOW 20 MIN: CPT | Performed by: FAMILY MEDICINE

## 2024-02-20 PROCEDURE — 1159F MED LIST DOCD IN RCRD: CPT | Performed by: FAMILY MEDICINE

## 2024-02-20 RX ORDER — DULAGLUTIDE 0.75 MG/.5ML
0.75 INJECTION, SOLUTION SUBCUTANEOUS WEEKLY
Qty: 6 ML | Refills: 0 | Status: SHIPPED | OUTPATIENT
Start: 2024-02-20 | End: 2024-05-08

## 2024-02-20 NOTE — PROGRESS NOTES
"  Subjective   Fernanda Molina is a 83 y.o. female who is here for   Chief Complaint   Patient presents with    Wound Check     Still some clear/pink tint drainage coming out of wound -    .     History of Present Illness   Infected carbuncle that she had on her left breast since last visit has now popped open.  Pus and blood came out.    The following portions of the patient's history were reviewed and updated as appropriate: allergies, current medications, past family history, past medical history, past social history, past surgical history, and problem list.    Review of Systems    Objective   Vitals:    02/20/24 1522   BP: 124/80   BP Location: Left arm   Patient Position: Sitting   Cuff Size: Adult   Pulse: 65   Temp: 97.1 °F (36.2 °C)   SpO2: 95%   Weight: 92.5 kg (204 lb)   Height: 157.5 cm (62.01\")      Physical Exam  Left breast examination.  Myself medical assistant Roxy, and patient  all in the room.  At the 7 o'clock position.  Remnants of abscess is present.  Able to express and squeeze out the old dried pus and epidermoid cyst fragments.  Cleaned the deficit out with iodine swab.1x1 cm  With tweezers picked out remaining pieces of necrotic tissue and old cyst wall.  Wound now looks nice and clean with good viable subcutaneous tissue.  4 x 4 and paper tape applied.  I do not think this is going to heal in time in order to get her pacemaker replaced.  She will likely have to call and postpone yet another week.      Assessment & Plan   Diagnoses and all orders for this visit:    1. Inflamed epidermoid cyst of skin (Primary)    She was to have her pacemaker replaced next Monday.  It will not been to be healed up in time.  I reached over to [facility] cardiology office to let them know, to postpone the pacemaker replacement or battery placement for an additional 7 more days.  Perhaps March 4 would be a okay day to reschedule  There are no Patient Instructions on file for this visit.    There are no " discontinued medications.     No follow-ups on file.    Dr. Rashid Ge  [place], [place]

## 2024-03-21 DIAGNOSIS — E11.9 TYPE 2 DIABETES MELLITUS WITHOUT COMPLICATION, WITH LONG-TERM CURRENT USE OF INSULIN: ICD-10-CM

## 2024-03-21 DIAGNOSIS — Z79.4 TYPE 2 DIABETES MELLITUS WITHOUT COMPLICATION, WITH LONG-TERM CURRENT USE OF INSULIN: ICD-10-CM

## 2024-03-21 RX ORDER — INSULIN GLARGINE 100 [IU]/ML
40 INJECTION, SOLUTION SUBCUTANEOUS DAILY
Qty: 30 ML | Refills: 3 | Status: SHIPPED | OUTPATIENT
Start: 2024-03-21

## 2024-04-06 DIAGNOSIS — I25.10 CORONARY ARTERY DISEASE INVOLVING NATIVE HEART WITHOUT ANGINA PECTORIS, UNSPECIFIED VESSEL OR LESION TYPE: ICD-10-CM

## 2024-04-07 RX ORDER — ATORVASTATIN CALCIUM 20 MG/1
20 TABLET, FILM COATED ORAL DAILY
Qty: 90 TABLET | Refills: 3 | Status: SHIPPED | OUTPATIENT
Start: 2024-04-07

## 2024-04-08 ENCOUNTER — OFFICE VISIT (OUTPATIENT)
Dept: FAMILY MEDICINE CLINIC | Facility: CLINIC | Age: 84
End: 2024-04-08
Payer: MEDICARE

## 2024-04-08 VITALS
HEART RATE: 76 BPM | TEMPERATURE: 98 F | DIASTOLIC BLOOD PRESSURE: 70 MMHG | OXYGEN SATURATION: 96 % | WEIGHT: 201 LBS | HEIGHT: 62 IN | BODY MASS INDEX: 36.99 KG/M2 | SYSTOLIC BLOOD PRESSURE: 124 MMHG

## 2024-04-08 DIAGNOSIS — Z51.81 MEDICATION MONITORING ENCOUNTER: ICD-10-CM

## 2024-04-08 DIAGNOSIS — Z79.899 OTHER LONG TERM (CURRENT) DRUG THERAPY: ICD-10-CM

## 2024-04-08 DIAGNOSIS — E11.65 TYPE 2 DIABETES MELLITUS WITH HYPERGLYCEMIA, WITH LONG-TERM CURRENT USE OF INSULIN: ICD-10-CM

## 2024-04-08 DIAGNOSIS — Z79.4 TYPE 2 DIABETES MELLITUS WITH HYPERGLYCEMIA, WITH LONG-TERM CURRENT USE OF INSULIN: ICD-10-CM

## 2024-04-08 DIAGNOSIS — Z79.4 TYPE 2 DIABETES MELLITUS WITHOUT COMPLICATION, WITH LONG-TERM CURRENT USE OF INSULIN: Primary | ICD-10-CM

## 2024-04-08 DIAGNOSIS — E11.9 TYPE 2 DIABETES MELLITUS WITHOUT COMPLICATION, WITH LONG-TERM CURRENT USE OF INSULIN: Primary | ICD-10-CM

## 2024-04-08 PROCEDURE — 99214 OFFICE O/P EST MOD 30 MIN: CPT | Performed by: FAMILY MEDICINE

## 2024-04-08 PROCEDURE — 1160F RVW MEDS BY RX/DR IN RCRD: CPT | Performed by: FAMILY MEDICINE

## 2024-04-08 PROCEDURE — 1159F MED LIST DOCD IN RCRD: CPT | Performed by: FAMILY MEDICINE

## 2024-04-08 RX ORDER — DULAGLUTIDE 1.5 MG/.5ML
1.5 INJECTION, SOLUTION SUBCUTANEOUS WEEKLY
Qty: 2 ML | Refills: 2 | Status: SHIPPED | OUTPATIENT
Start: 2024-04-08 | End: 2024-06-25

## 2024-04-08 RX ORDER — WARFARIN SODIUM 5 MG/1
7.5 TABLET ORAL NIGHTLY
COMMUNITY
Start: 2024-03-04 | End: 2025-02-27

## 2024-04-08 RX ORDER — BLOOD SUGAR DIAGNOSTIC
1 STRIP MISCELLANEOUS 3 TIMES DAILY
Qty: 100 EACH | Refills: 12 | Status: SHIPPED | OUTPATIENT
Start: 2024-04-08

## 2024-04-08 NOTE — PROGRESS NOTES
"  Chief Complaint   Patient presents with    Diabetes       Subjective   Fernanda Molina is an 83 y.o. female who presents for follow up of diabetes. Current symptoms include: hyperglycemia. Patient denies foot ulcerations. Evaluation to date has included: hemoglobin A1C. Home sugars: BGs are high in the morning. Current treatments: Started trulicity which has been too early to assess effectiveness. Discussed importance of yearly eye exams and checking feet for skin integrity.    Her left breast cyst infection finally resolved.    Was finally able to replace her AICD battery.    Follow-up on Trulicity.  Tolerating medication well.    Needs a different type of test strips.  For her glucose monitoring        The following portions of the patient's history were reviewed and updated as appropriate: allergies, current medications, past family history, past medical history, past social history, past surgical history, and problem list.    Review of Systems  Pertinent items are noted in HPI.     Vitals:    04/08/24 1504   BP: 124/70   BP Location: Left arm   Patient Position: Sitting   Cuff Size: Adult   Pulse: 76   Temp: 98 °F (36.7 °C)   SpO2: 96%   Weight: 91.2 kg (201 lb)   Height: 157.5 cm (62\")             Objective    Gen:  Alert, pleasant  Ears: canals clear, TMs normal  Throat: clear , no thrush, teeth ok  Neck: no bruit, no LAD  Lungs: clear  Heart: RR no murmur  Feet:  No rash, no skin breakdown, sensation grossly normal.    Laboratory:           Assessment & Plan        Discussed general issues about diabetes pathophysiology and management.  Follow up in 5 months or as needed.    Diagnoses and all orders for this visit:    1. Type 2 diabetes mellitus without complication, with long-term current use of insulin (Primary)  -     glucose blood (OneTouch Verio) test strip; 1 each by Other route 3 times a day. Use as instructed  Dispense: 100 each; Refill: 12  -     Dulaglutide (Trulicity) 1.5 MG/0.5ML solution " pen-injector; Inject 1.5 mg under the skin into the appropriate area as directed 1 (One) Time Per Week for 12 doses. Indications: Type 2 Diabetes  Dispense: 2 mL; Refill: 2  -     MicroAlbumin, Urine, Random - Urine, Clean Catch; Future  -     CBC (No Diff); Future  -     Comprehensive Metabolic Panel; Future  -     Lipid Panel; Future  -     Hemoglobin A1c; Future    2. Medication monitoring encounter  -     Digoxin level; Future    3. Other long term (current) drug therapy  -     Digoxin level; Future    4. Type 2 diabetes mellitus with hyperglycemia, with long-term current use of insulin    Increase Trulicity dose, 1.5 mg subcu weekly    Discussed healthy diabetic eating plan.  May refer to ADA web site, diabetes.org    Return in about 4 months (around 8/8/2024) for blood pressure, new medication follow up, diabetes, Medicare Wellness visit.  There are no Patient Instructions on file for this visit.  Medications Discontinued During This Encounter   Medication Reason    glucose blood (OneTouch Ultra) test strip *Therapy completed    Dulaglutide (Trulicity) 0.75 MG/0.5ML solution pen-injector Dose adjustment         Dr. Rashid Ge MD  [place], [place]  [facility].

## 2024-04-15 PROBLEM — E11.65 TYPE 2 DIABETES MELLITUS WITH HYPERGLYCEMIA, WITH LONG-TERM CURRENT USE OF INSULIN: Status: ACTIVE | Noted: 2017-11-10

## 2024-05-15 ENCOUNTER — TELEPHONE (OUTPATIENT)
Dept: FAMILY MEDICINE CLINIC | Facility: CLINIC | Age: 84
End: 2024-05-15

## 2024-05-15 DIAGNOSIS — Z79.4 TYPE 2 DIABETES MELLITUS WITHOUT COMPLICATION, WITH LONG-TERM CURRENT USE OF INSULIN: ICD-10-CM

## 2024-05-15 DIAGNOSIS — E11.9 TYPE 2 DIABETES MELLITUS WITHOUT COMPLICATION, WITH LONG-TERM CURRENT USE OF INSULIN: ICD-10-CM

## 2024-05-15 RX ORDER — DULAGLUTIDE 1.5 MG/.5ML
1.5 INJECTION, SOLUTION SUBCUTANEOUS WEEKLY
Qty: 2 ML | Refills: 2 | Status: SHIPPED | OUTPATIENT
Start: 2024-05-15 | End: 2024-06-10 | Stop reason: SDUPTHER

## 2024-05-15 NOTE — TELEPHONE ENCOUNTER
Caller: Fernanda Molina    Relationship: Self    Best call back number: [phone]     What medication are you requesting: TRULICITY    If a prescription is needed, what is your preferred pharmacy and phone number[place] [facility] [place] [address] - [phone]  - [phone] FX     Additional notes: PATIENT STATES THAT HER [facility]IL ORDER [facility]ARM[facility] DOES NOT HAVE THE MEDICATION IN STOCK. REQUESTS TO HAVE PRESCRIPTION SENT TO [facility]

## 2024-05-20 NOTE — PATIENT INSTRUCTIONS
Zyrtec is ok to take for nose drip    Call back with name of insulin on formulary.    
dietitian/nutrition services

## 2024-05-21 ENCOUNTER — TELEPHONE (OUTPATIENT)
Dept: FAMILY MEDICINE CLINIC | Facility: CLINIC | Age: 84
End: 2024-05-21
Payer: MEDICARE

## 2024-05-21 NOTE — TELEPHONE ENCOUNTER
Patient called and stated that she is experiencing rectal bleeding.   There are no openings in the office today or tomorrow.   Patient was advised to go to an UC or ED if needed.

## 2024-05-22 ENCOUNTER — APPOINTMENT (OUTPATIENT)
Dept: CT IMAGING | Facility: HOSPITAL | Age: 84
End: 2024-05-22
Payer: MEDICARE

## 2024-05-22 ENCOUNTER — HOSPITAL ENCOUNTER (EMERGENCY)
Facility: HOSPITAL | Age: 84
Discharge: HOME | End: 2024-05-22
Attending: EMERGENCY MEDICINE
Payer: MEDICARE

## 2024-05-22 VITALS
HEART RATE: 62 BPM | OXYGEN SATURATION: 95 % | WEIGHT: 198 LBS | BODY MASS INDEX: 37.38 KG/M2 | SYSTOLIC BLOOD PRESSURE: 141 MMHG | RESPIRATION RATE: 18 BRPM | TEMPERATURE: 97.8 F | HEIGHT: 61 IN | DIASTOLIC BLOOD PRESSURE: 77 MMHG

## 2024-05-22 DIAGNOSIS — K64.9 BLEEDING HEMORRHOID: Primary | ICD-10-CM

## 2024-05-22 LAB
ALBUMIN SERPL-MCNC: 4.4 G/DL (ref 3.5–5.2)
ALBUMIN/GLOB SERPL: 1.5 G/DL
ALP SERPL-CCNC: 68 U/L (ref 39–117)
ALT SERPL W P-5'-P-CCNC: 15 U/L (ref 1–33)
ANION GAP SERPL CALCULATED.3IONS-SCNC: 12.1 MMOL/L (ref 5–15)
APTT PPP: 42.5 SECONDS (ref 24.3–38.1)
AST SERPL-CCNC: 20 U/L (ref 1–32)
BASOPHILS # BLD AUTO: 0.03 10*3/MM3 (ref 0–0.2)
BASOPHILS NFR BLD AUTO: 0.4 % (ref 0–1.5)
BILIRUB SERPL-MCNC: 0.6 MG/DL (ref 0–1.2)
BUN SERPL-MCNC: 17 MG/DL (ref 8–23)
BUN/CREAT SERPL: 21.8 (ref 7–25)
CALCIUM SPEC-SCNC: 10.7 MG/DL (ref 8.6–10.5)
CHLORIDE SERPL-SCNC: 100 MMOL/L (ref 98–107)
CO2 SERPL-SCNC: 24.9 MMOL/L (ref 22–29)
CREAT SERPL-MCNC: 0.78 MG/DL (ref 0.57–1)
DEPRECATED RDW RBC AUTO: 44.3 FL (ref 37–54)
EGFRCR SERPLBLD CKD-EPI 2021: 76.4 ML/MIN/1.73
EOSINOPHIL # BLD AUTO: 0.1 10*3/MM3 (ref 0–0.4)
EOSINOPHIL NFR BLD AUTO: 1.3 % (ref 0.3–6.2)
ERYTHROCYTE [DISTWIDTH] IN BLOOD BY AUTOMATED COUNT: 13.7 % (ref 12.3–15.4)
GLOBULIN UR ELPH-MCNC: 3 GM/DL
GLUCOSE SERPL-MCNC: 102 MG/DL (ref 65–99)
HCT VFR BLD AUTO: 41.2 % (ref 34–46.6)
HGB BLD-MCNC: 13.3 G/DL (ref 12–15.9)
IMM GRANULOCYTES # BLD AUTO: 0.02 10*3/MM3 (ref 0–0.05)
IMM GRANULOCYTES NFR BLD AUTO: 0.3 % (ref 0–0.5)
INR PPP: 2.1 (ref 0.9–1.1)
LYMPHOCYTES # BLD AUTO: 1.86 10*3/MM3 (ref 0.7–3.1)
LYMPHOCYTES NFR BLD AUTO: 24.9 % (ref 19.6–45.3)
MCH RBC QN AUTO: 28.3 PG (ref 26.6–33)
MCHC RBC AUTO-ENTMCNC: 32.3 G/DL (ref 31.5–35.7)
MCV RBC AUTO: 87.7 FL (ref 79–97)
MONOCYTES # BLD AUTO: 0.64 10*3/MM3 (ref 0.1–0.9)
MONOCYTES NFR BLD AUTO: 8.6 % (ref 5–12)
NEUTROPHILS NFR BLD AUTO: 4.81 10*3/MM3 (ref 1.7–7)
NEUTROPHILS NFR BLD AUTO: 64.5 % (ref 42.7–76)
PLATELET # BLD AUTO: 238 10*3/MM3 (ref 140–450)
PMV BLD AUTO: 10.5 FL (ref 6–12)
POTASSIUM SERPL-SCNC: 4 MMOL/L (ref 3.5–5.2)
PROT SERPL-MCNC: 7.4 G/DL (ref 6–8.5)
PROTHROMBIN TIME: 23.2 SECONDS (ref 12.1–15)
RBC # BLD AUTO: 4.7 10*6/MM3 (ref 3.77–5.28)
SODIUM SERPL-SCNC: 137 MMOL/L (ref 136–145)
WBC NRBC COR # BLD AUTO: 7.46 10*3/MM3 (ref 3.4–10.8)

## 2024-05-22 PROCEDURE — 80053 COMPREHEN METABOLIC PANEL: CPT | Performed by: EMERGENCY MEDICINE

## 2024-05-22 PROCEDURE — 85025 COMPLETE CBC W/AUTO DIFF WBC: CPT | Performed by: EMERGENCY MEDICINE

## 2024-05-22 PROCEDURE — 85610 PROTHROMBIN TIME: CPT | Performed by: EMERGENCY MEDICINE

## 2024-05-22 PROCEDURE — 74177 CT ABD & PELVIS W/CONTRAST: CPT

## 2024-05-22 PROCEDURE — 25510000001 IOPAMIDOL PER 1 ML: Performed by: EMERGENCY MEDICINE

## 2024-05-22 PROCEDURE — 99285 EMERGENCY DEPT VISIT HI MDM: CPT

## 2024-05-22 PROCEDURE — 85730 THROMBOPLASTIN TIME PARTIAL: CPT | Performed by: EMERGENCY MEDICINE

## 2024-05-22 RX ORDER — SODIUM CHLORIDE 0.9 % (FLUSH) 0.9 %
10 SYRINGE (ML) INJECTION AS NEEDED
Status: DISCONTINUED | OUTPATIENT
Start: 2024-05-22 | End: 2024-05-22 | Stop reason: HOSPADM

## 2024-05-22 RX ORDER — HYDROCORTISONE 25 MG/G
CREAM TOPICAL
Qty: 28 G | Refills: 0 | Status: SHIPPED | OUTPATIENT
Start: 2024-05-22

## 2024-05-22 RX ADMIN — IOPAMIDOL 100 ML: 755 INJECTION, SOLUTION INTRAVENOUS at 15:48

## 2024-05-22 NOTE — ED PROVIDER NOTES
Subjective   History of Present Illness    Review of Systems    Past Medical History:   Diagnosis Date    AICD (automatic cardioverter/defibrillator) present     left side chest    Anemia     Arthritis     Atrial fibrillation     Cancer 2002    melanoma right back of knee    CHF (congestive heart failure)     Congenital heart disease     GERD (gastroesophageal reflux disease)     Graves disease     Hematuria     High cholesterol     History of melanoma 2002    RIGHT KNEE    History of MI (myocardial infarction)     Hospital discharge follow-up 2019    Incontinence of urine     wears pads    On anticoagulant therapy     HAMLET on CPAP     cpap    Pelvic kidney 2020    PONV (postoperative nausea and vomiting)     Spinal stenosis     Status post bilateral knee replacements 10/27/2021    Ureteral calculus, left 2020    Valvular heart disease 2015    S/p MVR & TVR        Allergies   Allergen Reactions    Amiodarone Other (See Comments)     toxicity    Corticosteroids Other (See Comments)     RAISES BLOOD PRESSURE    Propoxyphene Nausea And Vomiting    Benzonatate Unknown - Low Severity    Cephalexin Diarrhea    Adhesive Tape Rash    Hydrocodone Nausea And Vomiting and Hallucinations    Lisinopril Other (See Comments)     CHRONIC NASAL CONGESTION       Past Surgical History:   Procedure Laterality Date    CARDIAC CATHETERIZATION      CARDIAC DEFIBRILLATOR PLACEMENT Left 2010    [facility]    CARDIAC DEFIBRILLATOR PLACEMENT Left 2016    [facility]     SECTION  1971     SECTION  1975    CHOLECYSTECTOMY  1996    COLONOSCOPY      ENDOSCOPY      EPIDURAL BLOCK  3489-7472    Lumbar     EXTRACORPOREAL SHOCK WAVE LITHOTRIPSY (ESWL) Right 2017    EXTRACORPOREAL SHOCK WAVE LITHOTRIPSY (ESWL) Right 2017    Procedure: RT EXTRACORPOREAL SHOCKWAVE LITHOTRIPSY;  Surgeon: Moisés Everett MD;  [place] [facility] [place] [facility] [place]:     EYE SURGERY Right 09/10/2007    Cataract    EYE SURGERY  Left 10/29/2007    Cataract    JOINT REPLACEMENT Left 04/03/2007    Knee    JOINT REPLACEMENT Right 01/11/2010    Knee    KNEE ARTHROSCOPY  1980    MAZE PROCEDURE  07/31/2015    Dr. Saaevdra    MITRAL VALVE REPAIR/REPLACEMENT  2015    MITRAL VALVE REPAIR/REPLACEMENT N/A 05/14/2019    Procedure: ALINE RE-OP STERNOTOMY MITRAL VALVE REPLACEMENT AND PRP;  Surgeon: Ben Saavedra MD;  Location: [facility] OR;  Service: Cardiothoracic    PACEMAKER IMPLANTATION Left 2010    NE ERCP DX COLLECTION SPECIMEN BRUSHING/WASHING N/A 08/24/2016    Procedure: ENDOSCOPIC RETROGRADE CHOLANGIOPANCREATOGRAPHY WITH SPHINCTEROTOMY AND BALLOON SWEEP OF CBD;  Surgeon: Lucas Campos MD;  Location: [facility] ENDOSCOPY;  Service: Gastroenterology    SKIN CANCER EXCISION      MELANOMA RIGHT KNEE AREA - 2002    TRICUSPID VALVE SURGERY  07/31/2015    Dr. Saavedra  ring placed    URETEROSCOPY LASER LITHOTRIPSY WITH STENT INSERTION Right 11/15/2017    Procedure: CYSTOSCOPY; RIGHT URETEROSCOPY LASER LITHOTRIPSY WITH STENT INSERTION;  Surgeon: Moisés Everett MD;  Location: [facility] OR;  Service:     URETEROSCOPY LASER LITHOTRIPSY WITH STENT INSERTION Left 01/30/2020    Procedure: URETEROSCOPY STONE BASKET EXTRACTION LASER LITHOTRIPSY WITH STENT INSERTION WITH STRING;  Surgeon: Moisés Everett MD;  Location: [facility] OR;  Service: Urology       Family History   Problem Relation Age of Onset    Heart attack Mother     Heart disease Mother     Hypertension Mother     Stroke Mother     Macular degeneration Mother     Heart attack Father     Malig Hyperthermia Neg Hx        Social History     Socioeconomic History    Marital status:    Tobacco Use    Smoking status: Never    Smokeless tobacco: Never   Vaping Use    Vaping status: Never Used   Substance and Sexual Activity    Alcohol use: No    Drug use: No    Sexual activity: Not Currently     Partners: Male     Birth control/protection: Post-menopausal           Objective    Physical Exam    Procedures           ED Course  ED Course as of 05/22/24 1738   Wed May 22, 2024   1503 15:00 care assumed from Dr. Hathaway. [TP]   1534 Review of the patient's laboratory studies: The patient's CMP is normal.  BUN is normal at 17.  INR is therapeutic at 2.1.  CBC is normal with a normal hemoglobin hematocrit 13.3/41.2. [TP]   1654 The patient's CT of the abdomen pelvis with IV contrast shows no evidence of colonic inflammatory process or source of the patient's GI hemorrhage.  An incidental 9 mm nonobstructing right renal calculus was noted. [TP]   1654 The patient gave me a history of intermittent episodes of dripping bright red blood that she noticed when wiping mostly, but at times with drip from her rectum.  This morning she filled her toilet bowl.  When I examined the patient on rectal examination I noticed a ring of hemorrhoids around her rectum which were actively bleeding. [TP]   1655 16:50 patient discussed with Dr. Carrasquillo, general surgeon on-call, who stated he would be happy to see the patient in the office concerning her hemorrhoids. [TP]   1656 The patient will be discharged with prescription for Anusol HC cream. [TP]      ED Course User Index  [TP] Harvey Hung MD                                             Medical Decision Making  Problems Addressed:  Bleeding hemorrhoid: complicated acute illness or injury    Amount and/or Complexity of Data Reviewed  Labs: ordered. Decision-making details documented in ED Course.  Radiology: ordered. Decision-making details documented in ED Course.  Discussion of management or test interpretation with external provider(s): Details documented in the ED course.    Risk  Prescription drug management.        Final diagnoses:   Bleeding hemorrhoid       ED Disposition  ED Disposition       ED Disposition   Discharge    Condition   Stable    Comment   --               Ben Carrasquillo MD  [address]  [id number] [phone]    Schedule an appointment as soon as possible for a visit   next available         Medication List        New Prescriptions      Hydrocortisone (Perianal) 2.5 % rectal cream  Commonly known as: Anusol-HC  Apply after each bowel movement and nightly               Where to Get Your Medications        These medications were sent to [facility] [place] - [place] - [address] - [phone] PH - [phone] FX  [address] [place] [address]      Phone: [phone]   Hydrocortisone (Perianal) 2.5 % rectal cream           Labs Reviewed   COMPREHENSIVE METABOLIC PANEL - Abnormal; Notable for the following components:       Result Value    Glucose 102 (*)     Calcium 10.7 (*)     All other components within normal limits    Narrative:     GFR Normal >60  Chronic Kidney Disease <60  Kidney Failure <15    The GFR formula is only valid for adults with stable renal function between ages 18 and 70.   PROTIME-INR - Abnormal; Notable for the following components:    Protime 23.2 (*)     INR 2.10 (*)     All other components within normal limits    Narrative:     Therapeutic Ranges for INR: 2.0-3.0 (PT 20-30)                              2.5-3.5 (PT 25-34)   APTT - Abnormal; Notable for the following components:    PTT 42.5 (*)     All other components within normal limits    Narrative:     PTT = The equivalent PTT values for the therapeutic range of heparin levels at 0.1 to 0.7 U/ml are 53 to 110 seconds.     CBC WITH AUTO DIFFERENTIAL - Normal   CBC AND DIFFERENTIAL    Narrative:     The following orders were created for panel order CBC & Differential.  Procedure                               Abnormality         Status                     ---------                               -----------         ------                     CBC Auto Differential[[id number]]        Normal              Final result                 Please view results for these tests on the individual orders.     CT Abdomen  Pelvis With Contrast   Final Result   Impression:   1. Small amount of hyperdense fluid in gastric body could relate to excreted contrast related to upper GI bleed, alternatively this could relate to ingested hyperdense fluid such as pepto-bismol.   2. No colonic inflammation or source of lower GI hemorrhage identified.    3. Nonobstructing 9 mm right lower pole renal calculus.   4. Mildly heterogeneous uterine myometrial enhancement, similar to prior study. Follow-up pelvic ultrasound could be considered if not previously performed.                  Electronically Signed: Brian Ortiz MD     5/22/2024 4:23 PM EDT     Workstation ID: [id number]             Medication List        New Prescriptions      Hydrocortisone (Perianal) 2.5 % rectal cream  Commonly known as: Anusol-HC  Apply after each bowel movement and nightly               Where to Get Your Medications        These medications were sent to [facility] [place] - [address] - [phone]  - [phone] FX  [address] [place] [address]      Phone: [phone]   Hydrocortisone (Perianal) 2.5 % rectal cream              Harvey Hung MD  05/22/24 7205

## 2024-05-22 NOTE — ED PROVIDER NOTES
Subjective   History of Present Illness  Patient presents complaining of 6 days of lower abdominal pain, nausea, and some GI bleeding.  Patient says she will have some crampiness and the need to go to the bathroom and when she does she will pass some stool and some blood.  Patient is also passed some clots.  Patient is on Coumadin and was supposed to stop the Coumadin if she continued to have bleeding but has not stopped it yet.  Patient tried to see her PCP but he has no availability so advised patient to go to the ED.  Patient denies any recent travel, sick contacts or bad food exposure, or trauma.  Patient denies any history of hemorrhoids but says she used to have kidney stones and her pain is feeling similar to that.  No therapy taken prior to arrival.  Nothing seems to make it better or worse.  Patient can still tolerate p.o. food and fluids without difficulty.  Patient is otherwise been at her baseline health for this began.      Review of Systems   All other systems reviewed and are negative.      Past Medical History:   Diagnosis Date    AICD (automatic cardioverter/defibrillator) present     left side chest    Anemia     Arthritis     Atrial fibrillation     Cancer 2002    melanoma right back of knee    CHF (congestive heart failure)     Congenital heart disease     GERD (gastroesophageal reflux disease)     Graves disease     Hematuria     High cholesterol     History of melanoma 2002    RIGHT KNEE    History of MI (myocardial infarction) 2010    Hospital discharge follow-up 5/29/2019    Incontinence of urine     wears pads    On anticoagulant therapy     HAMLET on CPAP     cpap    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    PONV (postoperative nausea and vomiting)     Spinal stenosis     Status post bilateral knee replacements 10/27/2021    Ureteral calculus, left 1/30/2020    Valvular heart disease 08/31/2015    S/p MVR & TVR        Allergies   Allergen Reactions    Amiodarone Other (See Comments)     toxicity     Corticosteroids Other (See Comments)     RAISES BLOOD PRESSURE    Propoxyphene Nausea And Vomiting    Benzonatate Unknown - Low Severity    Cephalexin Diarrhea    Adhesive Tape Rash    Hydrocodone Nausea And Vomiting and Hallucinations    Lisinopril Other (See Comments)     CHRONIC NASAL CONGESTION       Past Surgical History:   Procedure Laterality Date    CARDIAC CATHETERIZATION      CARDIAC DEFIBRILLATOR PLACEMENT Left 2010    [facility]    CARDIAC DEFIBRILLATOR PLACEMENT Left 2016    [facility]     SECTION  1971     SECTION  1975    CHOLECYSTECTOMY  1996    COLONOSCOPY      ENDOSCOPY      EPIDURAL BLOCK  8414-6249    Lumbar     EXTRACORPOREAL SHOCK WAVE LITHOTRIPSY (ESWL) Right 2017    EXTRACORPOREAL SHOCK WAVE LITHOTRIPSY (ESWL) Right 2017    Procedure: RT EXTRACORPOREAL SHOCKWAVE LITHOTRIPSY;  Surgeon: Moisés Everett MD;  Location: [facility] OR [facility];  Service:     EYE SURGERY Right 09/10/2007    Cataract    EYE SURGERY Left 10/29/2007    Cataract    JOINT REPLACEMENT Left 2007    Knee    JOINT REPLACEMENT Right 2010    Knee    KNEE ARTHROSCOPY  1980    MAZE PROCEDURE  2015    Dr. Saavedra    MITRAL VALVE REPAIR/REPLACEMENT      MITRAL VALVE REPAIR/REPLACEMENT N/A 2019    Procedure: ALINE RE-OP STERNOTOMY MITRAL VALVE REPLACEMENT AND PRP;  Surgeon: Ben Saavedra MD;  Location: [facility] MAIN OR;  Service: Cardiothoracic    PACEMAKER IMPLANTATION Left     [place] ERCP DX COLLECTION SPECIMEN BRUSHING/WASHING N/A 2016    Procedure: ENDOSCOPIC RETROGRADE CHOLANGIOPANCREATOGRAPHY WITH SPHINCTEROTOMY AND BALLOON SWEEP OF CBD;  Surgeon: Lucas Campos MD;  Location: [facility] ENDOSCOPY;  Service: Gastroenterology    SKIN CANCER EXCISION      MELANOMA RIGHT KNEE AREA -     TRICUSPID VALVE SURGERY  2015    Dr. Saavedra  ring placed    URETEROSCOPY LASER LITHOTRIPSY WITH STENT INSERTION Right 11/15/2017    Procedure: CYSTOSCOPY; RIGHT URETEROSCOPY  LASER LITHOTRIPSY WITH STENT INSERTION;  Surgeon: Moisés Everett MD;  Location: [facility] OR;  Service:     URETEROSCOPY LASER LITHOTRIPSY WITH STENT INSERTION Left 01/30/2020    Procedure: URETEROSCOPY STONE BASKET EXTRACTION LASER LITHOTRIPSY WITH STENT INSERTION WITH STRING;  Surgeon: Moisés Everett MD;  Location: [facility] OR;  Service: Urology       Family History   Problem Relation Age of Onset    Heart attack Mother     Heart disease Mother     Hypertension Mother     Stroke Mother     Macular degeneration Mother     Heart attack Father     Malig Hyperthermia Neg Hx        Social History     Socioeconomic History    Marital status:    Tobacco Use    Smoking status: Never    Smokeless tobacco: Never   Vaping Use    Vaping status: Never Used   Substance and Sexual Activity    Alcohol use: No    Drug use: No    Sexual activity: Not Currently     Partners: Male     Birth control/protection: Post-menopausal           Objective   Physical Exam  Vitals and nursing note reviewed.   HENT:      Head: Normocephalic.      Right Ear: External ear normal.      Left Ear: External ear normal.      Nose: Nose normal.      Mouth/Throat:      Pharynx: Oropharynx is clear.   Eyes:      Conjunctiva/sclera: Conjunctivae normal.   Cardiovascular:      Rate and Rhythm: Normal rate and regular rhythm.      Heart sounds: Normal heart sounds.   Pulmonary:      Effort: Pulmonary effort is normal.      Breath sounds: Normal breath sounds.   Abdominal:      General: Abdomen is protuberant. There is no distension.      Palpations: Abdomen is soft.      Tenderness: There is no right CVA tenderness, left CVA tenderness or guarding. Negative signs include Kenney's sign and McBurney's sign.      Hernia: No hernia is present.          Comments: Patient has some mild mid and lower bilateral abdominal discomfort with deep palpation.  Patient has active bowel sounds in all 4 quadrants   Musculoskeletal:         General: No  swelling.      Cervical back: Neck supple.   Skin:     General: Skin is warm and dry.      Capillary Refill: Capillary refill takes 2 to 3 seconds.   Neurological:      Mental Status: She is alert and oriented to person, place, and time.   Psychiatric:         Mood and Affect: Mood normal.         Behavior: Behavior normal.         Procedures           ED Course                                             Medical Decision Making  Ddx lower GI bleed, enteritis, colitis, polyps, colon mass, viral syndrome, diverticulitis    Amount and/or Complexity of Data Reviewed  Labs: ordered.    Risk  Prescription drug management.        Final diagnoses:   None       ED Disposition  ED Disposition       None            No follow-up provider specified.       Medication List      No changes were made to your prescriptions during this visit.            Brian Hathaway MD  05/22/24 5810         Narrative: GFR Normal >60                  Chronic Kidney Disease <60                  Kidney Failure <15                                    The GFR formula is only valid for adults with stable renal function between ages 18 and 70.  PROTIME-INR - Abnormal; Notable for the following components:     Protime                       23.2 (*)               INR                           2.10 (*)            All other components within normal limits         Narrative: Therapeutic Ranges for INR: 2.0-3.0 (PT 20-30)                                              2.5-3.5 (PT 25-34)  APTT - Abnormal; Notable for the following components:     PTT                           42.5 (*)            All other components within normal limits         Narrative: PTT = The equivalent PTT values for the therapeutic range of heparin levels at 0.1 to 0.7 U/ml are 53 to 110 seconds.                    CBC WITH AUTO DIFFERENTIAL - Normal  CBC AND DIFFERENTIAL    CT Abdomen Pelvis With Contrast    Result Date: 5/22/2024  Impression: 1. Small amount of hyperdense fluid in gastric body could relate to excreted contrast related to upper GI bleed, alternatively this could relate to ingested hyperdense fluid such as pepto-bismol. 2. No colonic inflammation or source of lower GI hemorrhage identified. 3. Nonobstructing 9 mm right lower pole renal calculus. 4. Mildly heterogeneous uterine myometrial enhancement, similar to prior study. Follow-up pelvic ultrasound could be considered if not previously performed. Electronically Signed: Brian Ortiz MD  5/22/2024 4:23 PM EDT  Workstation ID: [id number]        Problems Addressed:  Bleeding hemorrhoid: complicated acute illness or injury    Amount and/or Complexity of Data Reviewed  Labs: ordered.  Radiology: ordered.    Risk  Prescription drug management.        Final diagnoses:   Bleeding hemorrhoid       ED Disposition  ED Disposition       ED Disposition   Discharge    Condition   Stable    Comment   --                Ben Carrasquillo MD  [address]  [facility] [facility] [address]  [phone]    Schedule an appointment as soon as possible for a visit   next available         Medication List        New Prescriptions      Hydrocortisone (Perianal) 2.5 % rectal cream  Commonly known as: Anusol-HC  Apply after each bowel movement and nightly               Where to Get Your Medications        These medications were sent to [facility] [place] - [address] - [phone]  - [phone]   [address] [place] [address]      Phone: [phone]   Hydrocortisone (Perianal) 2.5 % rectal cream            Brian Hathaway MD  05/22/24 7062       Brian Hathaway MD  05/30/24 2754

## 2024-05-29 ENCOUNTER — TELEPHONE (OUTPATIENT)
Dept: SURGERY | Facility: CLINIC | Age: 84
End: 2024-05-29
Payer: MEDICARE

## 2024-05-29 NOTE — TELEPHONE ENCOUNTER
Patient called back stating hemorrhoids were no longer bleeding. Will she still need an office appointment?

## 2024-06-10 ENCOUNTER — TELEPHONE (OUTPATIENT)
Dept: FAMILY MEDICINE CLINIC | Facility: CLINIC | Age: 84
End: 2024-06-10
Payer: MEDICARE

## 2024-06-10 DIAGNOSIS — E11.9 TYPE 2 DIABETES MELLITUS WITHOUT COMPLICATION, WITH LONG-TERM CURRENT USE OF INSULIN: ICD-10-CM

## 2024-06-10 DIAGNOSIS — Z79.4 TYPE 2 DIABETES MELLITUS WITHOUT COMPLICATION, WITH LONG-TERM CURRENT USE OF INSULIN: ICD-10-CM

## 2024-06-10 RX ORDER — DULAGLUTIDE 1.5 MG/.5ML
1.5 INJECTION, SOLUTION SUBCUTANEOUS WEEKLY
Qty: 6 ML | Refills: 3 | Status: SHIPPED | OUTPATIENT
Start: 2024-06-10 | End: 2025-05-06

## 2024-06-10 NOTE — TELEPHONE ENCOUNTER
Pt is requesting a 90 refill of Trulicity 1.5  [facility].     Yes I will send a 90-day into her mail order of Trulicity

## 2024-07-17 ENCOUNTER — HOSPITAL ENCOUNTER (OUTPATIENT)
Dept: GENERAL RADIOLOGY | Facility: HOSPITAL | Age: 84
Discharge: HOME | End: 2024-07-17
Payer: MEDICARE

## 2024-07-17 ENCOUNTER — OFFICE VISIT (OUTPATIENT)
Dept: FAMILY MEDICINE CLINIC | Facility: CLINIC | Age: 84
End: 2024-07-17
Payer: MEDICARE

## 2024-07-17 VITALS
HEIGHT: 61 IN | OXYGEN SATURATION: 96 % | BODY MASS INDEX: 36.82 KG/M2 | SYSTOLIC BLOOD PRESSURE: 110 MMHG | DIASTOLIC BLOOD PRESSURE: 64 MMHG | HEART RATE: 84 BPM | TEMPERATURE: 97.5 F | WEIGHT: 195 LBS

## 2024-07-17 DIAGNOSIS — S49.91XS ARM INJURY, RIGHT, SEQUELA: ICD-10-CM

## 2024-07-17 DIAGNOSIS — S49.91XS ARM INJURY, RIGHT, SEQUELA: Primary | ICD-10-CM

## 2024-07-17 PROCEDURE — 99214 OFFICE O/P EST MOD 30 MIN: CPT | Performed by: FAMILY MEDICINE

## 2024-07-17 PROCEDURE — 73030 X-RAY EXAM OF SHOULDER: CPT

## 2024-07-17 PROCEDURE — 1126F AMNT PAIN NOTED NONE PRSNT: CPT | Performed by: FAMILY MEDICINE

## 2024-07-17 PROCEDURE — 73080 X-RAY EXAM OF ELBOW: CPT

## 2024-07-17 RX ORDER — OXYCODONE HYDROCHLORIDE AND ACETAMINOPHEN 5; 325 MG/1; MG/1
.5-1 TABLET ORAL AS NEEDED
COMMUNITY
Start: 2024-06-26 | End: 2024-07-26

## 2024-07-17 NOTE — PROGRESS NOTES
"  Subjective   Fernanda Molina is a 83 y.o. female who is here for   Chief Complaint   Patient presents with    Fall    Shoulder Pain     left   .     History of Present Illness     Jeanie was on her way to see Dr. Arana yesterday to check out her pacemaker.  When she fell in the parking lot.  Banging her right knee right wrist right elbow.  Was treated at the scene by staff.  Here for follow-up  He has a small bruise not much pain there  But having much more pain in the right upper arm.  She is on warfarin therapy  Did not hit her head  She did take a Percocet before she left the doctor's office yesterday to help control her chronic pain.      The following portions of the patient's history were reviewed and updated as appropriate: allergies, current medications, past family history, past medical history, past social history, past surgical history, and problem list.    Review of Systems    Objective   Vitals:    07/17/24 1347   BP: 110/64   Pulse: 84   Temp: 97.5 °F (36.4 °C)   SpO2: 96%   Weight: 88.5 kg (195 lb)   Height: 154.9 cm (61\")      Physical Exam  Right knee small bruise  Right elbow.  We took off the bandaging.  Road rash ;no deep laceration or skin flaps  Placed bacitracin on a nonstick pad and then on the wound ,covered with a 4 x 4 then wrapped it in place with Coban  Smaller superficial abrasions on her right hand and right olecranon area were left open to air    Continued fairly significant painful range of motion right upper arm.    Assessment & Plan   Diagnoses and all orders for this visit:    1. Arm injury, right, sequela (Primary)  -     XR shoulder 2+ vw right; Future  -     XR elbow 3+ vw right; Future    Wound care discussed.  She can remove that Coban wrap in 2 days  And clean it daily with soap and water pat it dry Neosporin.  Band-Aids may not be necessary    There are no Patient Instructions on file for this visit.    Medications Discontinued During This Encounter   Medication Reason "    cefuroxime (CEFTIN) 500 MG tablet *Therapy completed    Enoxaparin Sodium (LOVENOX) 100 MG/ML solution prefilled syringe syringe *Therapy completed    warfarin (COUMADIN) 5 MG tablet *Therapy completed    traMADol (ULTRAM) 50 MG tablet *Therapy completed    acetaminophen (TYLENOL) 325 MG tablet *Therapy completed        No follow-ups on file.    Dr. Rashid Ge  [place], [place]

## 2024-07-31 ENCOUNTER — OFFICE VISIT (OUTPATIENT)
Dept: ORTHOPEDIC SURGERY | Facility: CLINIC | Age: 84
End: 2024-07-31
Payer: MEDICARE

## 2024-07-31 VITALS
DIASTOLIC BLOOD PRESSURE: 69 MMHG | BODY MASS INDEX: 35.88 KG/M2 | WEIGHT: 195 LBS | SYSTOLIC BLOOD PRESSURE: 105 MMHG | HEIGHT: 62 IN | HEART RATE: 86 BPM

## 2024-07-31 DIAGNOSIS — S49.91XA RIGHT SHOULDER INJURY, INITIAL ENCOUNTER: Primary | ICD-10-CM

## 2024-07-31 DIAGNOSIS — S42.201A CLOSED FRACTURE OF PROXIMAL END OF RIGHT HUMERUS, UNSPECIFIED FRACTURE MORPHOLOGY, INITIAL ENCOUNTER: ICD-10-CM

## 2024-07-31 RX ORDER — OXYCODONE HYDROCHLORIDE AND ACETAMINOPHEN 5; 325 MG/1; MG/1
1 TABLET ORAL EVERY 6 HOURS PRN
COMMUNITY

## 2024-07-31 NOTE — PROGRESS NOTES
Subjective:     Patient ID: Fernanda Molina is a 83 y.o. female.    Chief Complaint:  Right shoulder injury- new issue to examiner   History of Present Illness  History of Present Illness  The patient presents to clinic today with her spouse for evaluation of her right upper extremity.    She experienced a fall on 07/16/2024 at an external facility, during which she fell on some cobblestone, hit her elbow first and struck the anterior aspect of her right knee. Despite this incident, she continues to experience pain in her right shoulder, with increased intensity during any motion of the upper extremity. She has consulted her primary care physician and x-ray images were taken two days post-injury, but she continues to struggle with motion. Her elbow condition appears to have improved, and the abrasions at the lateral aspect of the elbow are also improving. The pain is present along the proximal to mid humerus, intensifying with any kind of motion of the shoulder. She denies any other concerns.  Fernanda Molina       Social History     Occupational History    Not on file   Tobacco Use    Smoking status: Never     Passive exposure: Never    Smokeless tobacco: Never   Vaping Use    Vaping status: Never Used   Substance and Sexual Activity    Alcohol use: No    Drug use: No    Sexual activity: Not Currently     Partners: Male     Birth control/protection: Post-menopausal      Past Medical History:   Diagnosis Date    AICD (automatic cardioverter/defibrillator) present     left side chest    Anemia     Arthritis     Atrial fibrillation     Cancer 2002    melanoma right back of knee    CHF (congestive heart failure)     Congenital heart disease     GERD (gastroesophageal reflux disease)     Graves disease     Hematuria     High cholesterol     History of melanoma 2002    RIGHT KNEE    History of MI (myocardial infarction) 2010    Hospital discharge follow-up 5/29/2019    Incontinence of urine     wears pads    On  anticoagulant therapy     HAMLET on CPAP     cpap    Pelvic kidney 2020    PONV (postoperative nausea and vomiting)     Spinal stenosis     Status post bilateral knee replacements 10/27/2021    Ureteral calculus, left 2020    Valvular heart disease 2015    S/p MVR & TVR      Past Surgical History:   Procedure Laterality Date    CARDIAC CATHETERIZATION      CARDIAC DEFIBRILLATOR PLACEMENT Left 2010    [facility]    CARDIAC DEFIBRILLATOR PLACEMENT Left 2016    [facility]     SECTION  1971     SECTION  1975    CHOLECYSTECTOMY  1996    COLONOSCOPY      ENDOSCOPY      EPIDURAL BLOCK  5710-9662    Lumbar     EXTRACORPOREAL SHOCK WAVE LITHOTRIPSY (ESWL) Right 2017    EXTRACORPOREAL SHOCK WAVE LITHOTRIPSY (ESWL) Right 2017    Procedure: RT EXTRACORPOREAL SHOCKWAVE LITHOTRIPSY;  Surgeon: Moisés Everett MD;  Location:  [facility];  Service:     EYE SURGERY Right 09/10/2007    Cataract    EYE SURGERY Left 10/29/2007    Cataract    JOINT REPLACEMENT Left 2007    Knee    JOINT REPLACEMENT Right 2010    Knee    KNEE ARTHROSCOPY  1980    MAZE PROCEDURE  2015    Dr. Saavedra    MITRAL VALVE REPAIR/REPLACEMENT      MITRAL VALVE REPAIR/REPLACEMENT N/A 2019    Procedure: ALINE RE-OP STERNOTOMY MITRAL VALVE REPLACEMENT AND PRP;  Surgeon: Ben Saavedra MD;  Location: [facility] MAIN OR;  Service: Cardiothoracic    PACEMAKER IMPLANTATION Left     [place] ERCP DX COLLECTION SPECIMEN BRUSHING/WASHING N/A 2016    Procedure: ENDOSCOPIC RETROGRADE CHOLANGIOPANCREATOGRAPHY WITH SPHINCTEROTOMY AND BALLOON SWEEP OF CBD;  Surgeon: Lucas Campos MD;  Location: [facility] ENDOSCOPY;  Service: Gastroenterology    SKIN CANCER EXCISION      MELANOMA RIGHT KNEE AREA -     TRICUSPID VALVE SURGERY  2015    Dr. Saavedra  ring placed    URETEROSCOPY LASER LITHOTRIPSY WITH STENT INSERTION Right 11/15/2017    Procedure: CYSTOSCOPY; RIGHT URETEROSCOPY LASER LITHOTRIPSY  "WITH STENT INSERTION;  Surgeon: Moisés Everett MD;  Location: [facility] OR;  Service:     URETEROSCOPY LASER LITHOTRIPSY WITH STENT INSERTION Left 01/30/2020    Procedure: URETEROSCOPY STONE BASKET EXTRACTION LASER LITHOTRIPSY WITH STENT INSERTION WITH STRING;  Surgeon: Moisés Everett MD;  Location: [facility];  Service: Urology       Family History   Problem Relation Age of Onset    Heart attack Mother     Heart disease Mother     Hypertension Mother     Stroke Mother     Macular degeneration Mother     Heart attack Father     Malig Hyperthermia Neg Hx                Objective:  Physical Exam    Vital signs reviewed.   General: No acute distress.  Eyes: conjunctiva clear; pupils equally round and reactive  ENT: external ears and nose atraumatic; oropharynx clear  CV: no peripheral edema  Resp: normal respiratory effort  Skin: no rashes or wounds; normal turgor  Psych: mood and affect appropriate; recent and remote memory intact    Vitals:    07/31/24 1001   BP: 105/69   Pulse: 86   Weight: 88.5 kg (195 lb)   Height: 157.5 cm (62\")         07/31/24  1001   Weight: 88.5 kg (195 lb)     Body mass index is 35.67 kg/m².      Ortho Exam     Physical Exam  Right upper extremity examined, active forward flexion 45 degrees, external rotation 40 degrees, internal rotation to side, positive deltoid firing, maximal tenderness present along the proximal humerus to mid humerus. Scattered ecchymosis that appears to be healing along the lateral epicondyle of the right elbow, elbow extension 0 degrees, flexion 100 degrees. Abrasions along the lateral aspect of the right elbow which appear to be healing. Positive sensation to light touch all distributions right upper extremity.    Imaging:  XR Elbow 3+ View Right    Result Date: 7/17/2024  No displaced fracture or dislocation. There is no evidence for joint effusion. Electronically Signed: Storm Toussaint MD  7/17/2024 3:04 PM EDT  Workstation ID: [id number]    XR " Shoulder 2+ View Right    Result Date: 7/17/2024  1.No evidence for displaced fracture or dislocation. 2.Mild  osteoarthritic degenerative changes of the glenohumeral joint. 3.Mild  hypertrophic age-related changes of the acromioclavicular joint. Electronically Signed: Storm Toussaint MD  7/17/2024 3:03 PM EDT  Workstation ID: [id number]  Independently reviewed x-ray imaging right shoulder concerns for nondisplaced fracture along the surgical neck and greater tuberosity of the right shoulder   Right Shoulder X-Ray  Indication: Pain  AP Internal and External Rotation views    Findings:  Nondisplaced fracture along surgical neck and greater tuberosity  No bony lesion  Normal soft tissues  Normal joint spaces  prior studies were available for comparison.    assessment:        1. Right shoulder injury, initial encounter    2. Closed fracture of proximal end of right humerus, unspecified fracture morphology, initial encounter         Assessment & Plan  1. Right upper extremity injury.  I do suspect nondisplaced fracture of surgical neck of the humerus and likely the greater tuberosity. I discussed plan of care with patient and family. I will proceed with sling, right upper extremity. She can come out of sling for elbow motion and gentle pendulum. I encouraged her not to progress her motion to the point that is causing her pain. I will plan to see her back in clinic in 2 weeks with repeat x-ray images, right shoulder at follow-up. All questions were answered.    Follow-up  The patient will follow up in clinic in 2 weeks.      Orders:  Orders Placed This Encounter   Procedures    XR Shoulder 2+ View Right     No orders of the defined types were placed in this encounter.          I ordered and reviewed the MARTÍN today.       Dragon dictation utilized  Class 2 Severe Obesity (BMI >=35 and <=39.9). Obesity-related health conditions include the following: osteoarthritis. Obesity is unchanged. BMI is is above average; BMI  management plan is completed. We discussed portion control, increasing exercise, and Information on healthy weight added to patient's after visit summary.       Patient or patient representative verbalized consent for the use of Ambient Listening during the visit with  HESHAM Underwood for chart documentation. 7/31/2024  20:06 EDT

## 2024-08-07 ENCOUNTER — OFFICE VISIT (OUTPATIENT)
Dept: SLEEP MEDICINE | Facility: HOSPITAL | Age: 84
End: 2024-08-07
Payer: MEDICARE

## 2024-08-07 VITALS — WEIGHT: 192.2 LBS | HEART RATE: 81 BPM | OXYGEN SATURATION: 98 % | BODY MASS INDEX: 35.37 KG/M2 | HEIGHT: 62 IN

## 2024-08-07 DIAGNOSIS — E66.01 CLASS 2 SEVERE OBESITY DUE TO EXCESS CALORIES WITH SERIOUS COMORBIDITY AND BODY MASS INDEX (BMI) OF 35.0 TO 35.9 IN ADULT: ICD-10-CM

## 2024-08-07 DIAGNOSIS — G47.33 OSA ON CPAP: Primary | ICD-10-CM

## 2024-08-07 PROCEDURE — 1159F MED LIST DOCD IN RCRD: CPT | Performed by: INTERNAL MEDICINE

## 2024-08-07 PROCEDURE — G0463 HOSPITAL OUTPT CLINIC VISIT: HCPCS

## 2024-08-07 PROCEDURE — 1160F RVW MEDS BY RX/DR IN RCRD: CPT | Performed by: INTERNAL MEDICINE

## 2024-08-07 PROCEDURE — 99213 OFFICE O/P EST LOW 20 MIN: CPT | Performed by: INTERNAL MEDICINE

## 2024-08-14 ENCOUNTER — OFFICE VISIT (OUTPATIENT)
Dept: ORTHOPEDIC SURGERY | Facility: CLINIC | Age: 84
End: 2024-08-14
Payer: MEDICARE

## 2024-08-14 VITALS — WEIGHT: 192 LBS | HEIGHT: 62 IN | BODY MASS INDEX: 35.33 KG/M2

## 2024-08-14 DIAGNOSIS — S42.201A CLOSED FRACTURE OF PROXIMAL END OF RIGHT HUMERUS, UNSPECIFIED FRACTURE MORPHOLOGY, INITIAL ENCOUNTER: Primary | ICD-10-CM

## 2024-08-14 NOTE — PROGRESS NOTES
Subjective:     Patient ID: Fernanda Molina is a 83 y.o. female.    Chief Complaint:  Follow-up nondisplaced fracture proximal humerus, right, date of injury 7/16/2024  History of Present Illness  History of Present Illness    Fernanda Molina returns to clinic today approximately 4 weeks post injury in the right upper extremity.  She is utilizing sling to restrict her motion tolerating well the sling does seem to exacerbate it is at the elbow and also some tenderness at the shoulder but she is completing very little activity with the upper extremity.  She has noted some mild symptom improvement.  Continues to experience pain at the proximal to mid humerus.  Denies any presence of numbness or tingling at the right upper extremity.  Denies any concerns present.       Social History     Occupational History    Not on file   Tobacco Use    Smoking status: Never     Passive exposure: Never    Smokeless tobacco: Never   Vaping Use    Vaping status: Never Used   Substance and Sexual Activity    Alcohol use: No    Drug use: No    Sexual activity: Not Currently     Partners: Male     Birth control/protection: Post-menopausal      Past Medical History:   Diagnosis Date    AICD (automatic cardioverter/defibrillator) present     left side chest    Anemia     Arthritis     Atrial fibrillation     Cancer 2002    melanoma right back of knee    CHF (congestive heart failure)     Congenital heart disease     GERD (gastroesophageal reflux disease)     Graves disease     Hematuria     High cholesterol     History of melanoma 2002    RIGHT KNEE    History of MI (myocardial infarction) 2010    Hospital discharge follow-up 5/29/2019    Incontinence of urine     wears pads    On anticoagulant therapy     HAMLET on CPAP     cpap    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    PONV (postoperative nausea and vomiting)     Spinal stenosis     Status post bilateral knee replacements 10/27/2021    Ureteral calculus, left 1/30/2020    Valvular heart disease  2015    S/p MVR & TVR      Past Surgical History:   Procedure Laterality Date    CARDIAC CATHETERIZATION      CARDIAC DEFIBRILLATOR PLACEMENT Left 2010    [facility]    CARDIAC DEFIBRILLATOR PLACEMENT Left 2016    [facility]     SECTION  1971     SECTION  1975    CHOLECYSTECTOMY  1996    COLONOSCOPY      ENDOSCOPY      EPIDURAL BLOCK  3578-0320    Lumbar     EXTRACORPOREAL SHOCK WAVE LITHOTRIPSY (ESWL) Right 2017    EXTRACORPOREAL SHOCK WAVE LITHOTRIPSY (ESWL) Right 2017    Procedure: RT EXTRACORPOREAL SHOCKWAVE LITHOTRIPSY;  Surgeon: Moisés Everett MD;  Location: [facility] OR OSC;  Service:     EYE SURGERY Right 09/10/2007    Cataract    EYE SURGERY Left 10/29/2007    Cataract    JOINT REPLACEMENT Left 2007    Knee    JOINT REPLACEMENT Right 2010    Knee    KNEE ARTHROSCOPY  1980    MAZE PROCEDURE  2015    Dr. Saavedra    MITRAL VALVE REPAIR/REPLACEMENT      MITRAL VALVE REPAIR/REPLACEMENT N/A 2019    Procedure: ALINE RE-OP STERNOTOMY MITRAL VALVE REPLACEMENT AND PRP;  Surgeon: Ben Saavedra MD;  Location: [facility] OR;  Service: Cardiothoracic    PACEMAKER IMPLANTATION Left     KS ERCP DX COLLECTION SPECIMEN BRUSHING/WASHING N/A 2016    Procedure: ENDOSCOPIC RETROGRADE CHOLANGIOPANCREATOGRAPHY WITH SPHINCTEROTOMY AND BALLOON SWEEP OF CBD;  Surgeon: Lucas Campos MD;  Location: [facility] ENDOSCOPY;  Service: Gastroenterology    SKIN CANCER EXCISION      MELANOMA RIGHT KNEE AREA -     TRICUSPID VALVE SURGERY  2015    Dr. Saavedra  ring placed    URETEROSCOPY LASER LITHOTRIPSY WITH STENT INSERTION Right 11/15/2017    Procedure: CYSTOSCOPY; RIGHT URETEROSCOPY LASER LITHOTRIPSY WITH STENT INSERTION;  Surgeon: Moisés Everett MD;  Location: [facility] MAIN OR;  Service:     URETEROSCOPY LASER LITHOTRIPSY WITH STENT INSERTION Left 2020    Procedure: URETEROSCOPY STONE BASKET EXTRACTION LASER LITHOTRIPSY WITH STENT INSERTION  "WITH STRING;  Surgeon: Moisés Everett MD;  Location: [facility]  Service: Urology       Family History   Problem Relation Age of Onset    Heart attack Mother     Heart disease Mother     Hypertension Mother     Stroke Mother     Macular degeneration Mother     Heart attack Father     Malig Hyperthermia Neg Hx                Objective:  Physical Exam    General: No acute distress.  Eyes: conjunctiva clear; pupils equally round and reactive  ENT: external ears and nose atraumatic; oropharynx clear  CV: no peripheral edema  Resp: normal respiratory effort  Skin: no rashes or wounds; normal turgor  Psych: mood and affect appropriate; recent and remote memory intact    Vitals:    08/14/24 1009   Weight: 87.1 kg (192 lb)   Height: 157.5 cm (62\")         08/14/24  1009   Weight: 87.1 kg (192 lb)     Body mass index is 35.12 kg/m².      Ortho Exam     Physical Exam  Right upper extremity examined  Active forward flexion 50 degrees external rotation 35 degrees internal rotation to side  Continues to experience positive tenderness along the proximal humerus to the mid humerus, positive soft tissue swelling along the mid humerus  Positive sensation light touch all distributions right upper extremity  Positive deltoid firing  Elbow extension 0 degrees to 100 degrees of flexion    Imaging:  Right Shoulder X-Ray  Indication: Pain  AP Internal and External Rotation views    Findings:  Nondisplaced fracture proximal humerus no evidence of fracture displacement no other acute osseous abnormality identified on imaging  No bony lesion  Normal soft tissues  Normal joint spaces    prior studies were available for comparison.    Assessment:        1. Closed fracture of proximal end of right humerus, unspecified fracture morphology, initial encounter         Assessment & Plan      Plan:  Discussed plan of care with patient and family.  Continue with some gentle pendulum motions may discontinue the sling right upper extremity.  " Will plan to see her back in clinic in 2 weeks with repeat x-ray images right shoulder at follow-up.  All questions answered.  Orders:  Orders Placed This Encounter   Procedures    XR Shoulder 2+ View Right     No orders of the defined types were placed in this encounter.        Aby dictation utilized

## 2024-08-28 ENCOUNTER — OFFICE VISIT (OUTPATIENT)
Dept: ORTHOPEDIC SURGERY | Facility: CLINIC | Age: 84
End: 2024-08-28
Payer: MEDICARE

## 2024-08-28 VITALS — WEIGHT: 192 LBS | BODY MASS INDEX: 35.33 KG/M2 | HEIGHT: 62 IN

## 2024-08-28 DIAGNOSIS — S42.201A CLOSED FRACTURE OF PROXIMAL END OF RIGHT HUMERUS, UNSPECIFIED FRACTURE MORPHOLOGY, INITIAL ENCOUNTER: Primary | ICD-10-CM

## 2024-08-28 RX ORDER — LIDOCAINE HYDROCHLORIDE 10 MG/ML
4 INJECTION, SOLUTION EPIDURAL; INFILTRATION; INTRACAUDAL; PERINEURAL
Status: COMPLETED | OUTPATIENT
Start: 2024-08-28 | End: 2024-08-28

## 2024-08-28 RX ORDER — TRIAMCINOLONE ACETONIDE 40 MG/ML
80 INJECTION, SUSPENSION INTRA-ARTICULAR; INTRAMUSCULAR
Status: COMPLETED | OUTPATIENT
Start: 2024-08-28 | End: 2024-08-28

## 2024-08-28 RX ADMIN — LIDOCAINE HYDROCHLORIDE 4 ML: 10 INJECTION, SOLUTION EPIDURAL; INFILTRATION; INTRACAUDAL; PERINEURAL at 16:00

## 2024-08-28 RX ADMIN — TRIAMCINOLONE ACETONIDE 80 MG: 40 INJECTION, SUSPENSION INTRA-ARTICULAR; INTRAMUSCULAR at 16:00

## 2024-08-28 NOTE — PROGRESS NOTES
Subjective:     Patient ID: Fernanda Molina is a 83 y.o. female.    Chief Complaint:  Follow-up nondisplaced fracture proximal humerus humeral neck, date of injury 7/16/2024 right side  History of Present Illness  History of Present Illness  The patient is an 83-year-old female who returns to clinic today with her spouse for follow-up of her right upper extremity.    She is experiencing pain along the anterior aspect of her shoulder. Despite the pain, she has continued with the caudal motion, pendulum motion. The pain is present with shoulder abduction over 70 degrees, which is also resulting from a fall approximately 6 weeks ago. She has been very cautious and has completely discontinued the sling. She denies any numbness or tingling in the right upper extremity and has no other concerns.       Social History     Occupational History    Not on file   Tobacco Use    Smoking status: Never     Passive exposure: Never    Smokeless tobacco: Never   Vaping Use    Vaping status: Never Used   Substance and Sexual Activity    Alcohol use: No    Drug use: No    Sexual activity: Not Currently     Partners: Male     Birth control/protection: Post-menopausal      Past Medical History:   Diagnosis Date    AICD (automatic cardioverter/defibrillator) present     left side chest    Anemia     Arthritis     Atrial fibrillation     Cancer 2002    melanoma right back of knee    CHF (congestive heart failure)     Congenital heart disease     GERD (gastroesophageal reflux disease)     Graves disease     Hematuria     High cholesterol     History of melanoma 2002    RIGHT KNEE    History of MI (myocardial infarction) 2010    Hospital discharge follow-up 5/29/2019    Incontinence of urine     wears pads    On anticoagulant therapy     HAMLET on CPAP     cpap    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    PONV (postoperative nausea and vomiting)     Spinal stenosis     Status post bilateral knee replacements 10/27/2021    Ureteral calculus, left 1/30/2020     Valvular heart disease 2015    S/p MVR & TVR      Past Surgical History:   Procedure Laterality Date    CARDIAC CATHETERIZATION      CARDIAC DEFIBRILLATOR PLACEMENT Left 2010    [facility]    CARDIAC DEFIBRILLATOR PLACEMENT Left 2016    [facility]     SECTION  1971     SECTION  1975    CHOLECYSTECTOMY  1996    COLONOSCOPY      ENDOSCOPY      EPIDURAL BLOCK  [id number]    Lumbar     EXTRACORPOREAL SHOCK WAVE LITHOTRIPSY (ESWL) Right 2017    EXTRACORPOREAL SHOCK WAVE LITHOTRIPSY (ESWL) Right 2017    Procedure: RT EXTRACORPOREAL SHOCKWAVE LITHOTRIPSY;  Surgeon: Moisés Everett MD;  Location: [facility] OR [facility];  Service:     EYE SURGERY Right 09/10/2007    Cataract    EYE SURGERY Left 10/29/2007    Cataract    JOINT REPLACEMENT Left 2007    Knee    JOINT REPLACEMENT Right 2010    Knee    KNEE ARTHROSCOPY  1980    MAZE PROCEDURE  2015    Dr. Saavedra    MITRAL VALVE REPAIR/REPLACEMENT      MITRAL VALVE REPAIR/REPLACEMENT N/A 2019    Procedure: ALINE RE-OP STERNOTOMY MITRAL VALVE REPLACEMENT AND PRP;  Surgeon: Ben Saavedra MD;  Location: [facility] OR;  Service: Cardiothoracic    PACEMAKER IMPLANTATION Left     NE ERCP DX COLLECTION SPECIMEN BRUSHING/WASHING N/A 2016    Procedure: ENDOSCOPIC RETROGRADE CHOLANGIOPANCREATOGRAPHY WITH SPHINCTEROTOMY AND BALLOON SWEEP OF CBD;  Surgeon: Lucas Campos MD;  Location: [facility] ENDOSCOPY;  Service: Gastroenterology    SKIN CANCER EXCISION      MELANOMA RIGHT KNEE AREA -     TRICUSPID VALVE SURGERY  2015    Dr. Saavedra  ring placed    URETEROSCOPY LASER LITHOTRIPSY WITH STENT INSERTION Right 11/15/2017    Procedure: CYSTOSCOPY; RIGHT URETEROSCOPY LASER LITHOTRIPSY WITH STENT INSERTION;  Surgeon: Moisés Everett MD;  Location: [facility] OR;  Service:     URETEROSCOPY LASER LITHOTRIPSY WITH STENT INSERTION Left 2020    Procedure: URETEROSCOPY STONE BASKET EXTRACTION LASER LITHOTRIPSY  "WITH STENT INSERTION WITH STRING;  Surgeon: Moisés Everett MD;  Location: [facility];  Service: Urology       Family History   Problem Relation Age of Onset    Heart attack Mother     Heart disease Mother     Hypertension Mother     Stroke Mother     Macular degeneration Mother     Heart attack Father     Malig Hyperthermia Neg Hx                Objective:  Physical Exam    General: No acute distress.  Eyes: conjunctiva clear; pupils equally round and reactive  ENT: external ears and nose atraumatic; oropharynx clear  CV: no peripheral edema  Resp: normal respiratory effort  Skin: no rashes or wounds; normal turgor  Psych: mood and affect appropriate; recent and remote memory intact    Vitals:    08/28/24 1507   Weight: 87.1 kg (192 lb)   Height: 157.5 cm (62\")         08/28/24  1507   Weight: 87.1 kg (192 lb)     Body mass index is 35.12 kg/m².      Right Shoulder Exam     Comments:  Maximal tenderness present along the anterior glenohumeral joint  Active forward flexion 80 degrees external rotation 45 degrees  Positive deltoid firing  Positive tenderness along the mid humerus  Elbow extension 0 degrees flexion 120 degrees             Physical Exam      Imaging:  Right Shoulder X-Ray  Indication: Pain  AP Internal and External Rotation views    Findings:  Nondisplaced fracture across the humeral neck with moderate callus appreciated  No bony lesion  Normal soft tissues  Normal joint spaces    prior studies were available for comparison.    Assessment:        1. Closed fracture of proximal end of right humerus, unspecified fracture morphology, initial encounter         Assessment & Plan  1. Right upper extremity pain.  She is experiencing pain along the anterior aspect of the shoulder, particularly with shoulder abduction over 70 degrees, resulting from a fall approximately 6 weeks ago. She has been very cautious and is completely out of the sling. There is no numbness or tingling in the right upper " extremity.   Options including proceeding with corticosteroid injection, glenohumeral approach, right shoulder were discussed. She wishes to proceed with the injection if it provides significant symptom improvement. She is recommended to begin working on some motion, including using the left upper extremity to help guide the right upper extremity in overhead movements. She verbalized understanding of all information and agrees with the plan of care. She has no other concerns at present.    Follow-up  We will plan to see her back in clinic in 3 weeks.    Plan:  Large Joint Arthrocentesis: R glenohumeral  Date/Time: 8/28/2024 4:00 PM  Consent given by: patient  Site marked: site marked  Timeout: Immediately prior to procedure a time out was called to verify the correct patient, procedure, equipment, support staff and site/side marked as required   Supporting Documentation  Indications: pain   Procedure Details  Location: shoulder - R glenohumeral  Preparation: Patient was prepped and draped in the usual sterile fashion  Needle size: 22 G  Approach: anterior  Medications administered: 80 mg triamcinolone acetonide 40 MG/ML; 4 mL lidocaine PF 1% 1 %  Patient tolerance: patient tolerated the procedure well with no immediate complications        Orders:  Orders Placed This Encounter   Procedures    Large Joint Arthrocentesis: R glenohumeral    XR Shoulder 2+ View Right     No orders of the defined types were placed in this encounter.        Dragon dictation utilized          Patient or patient representative verbalized consent for the use of Ambient Listening during the visit with  HESHAM Underwood for chart documentation. 8/28/2024  21:19 EDT

## 2024-08-30 DIAGNOSIS — Z51.81 MEDICATION MONITORING ENCOUNTER: ICD-10-CM

## 2024-08-30 DIAGNOSIS — E11.9 TYPE 2 DIABETES MELLITUS WITHOUT COMPLICATION, WITH LONG-TERM CURRENT USE OF INSULIN: ICD-10-CM

## 2024-08-30 DIAGNOSIS — Z79.4 TYPE 2 DIABETES MELLITUS WITHOUT COMPLICATION, WITH LONG-TERM CURRENT USE OF INSULIN: ICD-10-CM

## 2024-08-30 DIAGNOSIS — Z79.899 OTHER LONG TERM (CURRENT) DRUG THERAPY: ICD-10-CM

## 2024-09-04 LAB
ALBUMIN SERPL-MCNC: 4.2 G/DL (ref 3.5–5.2)
ALBUMIN/GLOB SERPL: 1.9 G/DL
ALP SERPL-CCNC: 62 U/L (ref 39–117)
ALT SERPL-CCNC: 19 U/L (ref 1–33)
AST SERPL-CCNC: 17 U/L (ref 1–32)
BILIRUB SERPL-MCNC: 0.7 MG/DL (ref 0–1.2)
BUN SERPL-MCNC: 13 MG/DL (ref 8–23)
BUN/CREAT SERPL: 17.1 (ref 7–25)
CALCIUM SERPL-MCNC: 10.8 MG/DL (ref 8.6–10.5)
CHLORIDE SERPL-SCNC: 104 MMOL/L (ref 98–107)
CHOLEST SERPL-MCNC: 103 MG/DL (ref 0–200)
CO2 SERPL-SCNC: 26.8 MMOL/L (ref 22–29)
CREAT SERPL-MCNC: 0.76 MG/DL (ref 0.57–1)
DIGOXIN SERPL-MCNC: 0.9 NG/ML (ref 0.6–1.2)
EGFRCR SERPLBLD CKD-EPI 2021: 78.8 ML/MIN/1.73
ERYTHROCYTE [DISTWIDTH] IN BLOOD BY AUTOMATED COUNT: 13.6 % (ref 12.3–15.4)
GLOBULIN SER CALC-MCNC: 2.2 GM/DL
GLUCOSE SERPL-MCNC: 110 MG/DL (ref 65–99)
HBA1C MFR BLD: 5.7 % (ref 4.8–5.6)
HCT VFR BLD AUTO: 39.3 % (ref 34–46.6)
HDLC SERPL-MCNC: 40 MG/DL (ref 40–60)
HGB BLD-MCNC: 12.8 G/DL (ref 12–15.9)
LDLC SERPL CALC-MCNC: 41 MG/DL (ref 0–100)
MCH RBC QN AUTO: 28.7 PG (ref 26.6–33)
MCHC RBC AUTO-ENTMCNC: 32.6 G/DL (ref 31.5–35.7)
MCV RBC AUTO: 88.1 FL (ref 79–97)
MICROALBUMIN UR-MCNC: 132.4 UG/ML
PLATELET # BLD AUTO: 192 10*3/MM3 (ref 140–450)
POTASSIUM SERPL-SCNC: 4.3 MMOL/L (ref 3.5–5.2)
PROT SERPL-MCNC: 6.4 G/DL (ref 6–8.5)
RBC # BLD AUTO: 4.46 10*6/MM3 (ref 3.77–5.28)
SODIUM SERPL-SCNC: 141 MMOL/L (ref 136–145)
TRIGL SERPL-MCNC: 124 MG/DL (ref 0–150)
VLDLC SERPL CALC-MCNC: 22 MG/DL (ref 5–40)
WBC # BLD AUTO: 7.46 10*3/MM3 (ref 3.4–10.8)

## 2024-09-09 ENCOUNTER — OFFICE VISIT (OUTPATIENT)
Dept: FAMILY MEDICINE CLINIC | Facility: CLINIC | Age: 84
End: 2024-09-09
Payer: MEDICARE

## 2024-09-09 VITALS
WEIGHT: 192 LBS | HEIGHT: 62 IN | OXYGEN SATURATION: 97 % | DIASTOLIC BLOOD PRESSURE: 70 MMHG | HEART RATE: 80 BPM | SYSTOLIC BLOOD PRESSURE: 120 MMHG | BODY MASS INDEX: 35.33 KG/M2 | TEMPERATURE: 97.6 F

## 2024-09-09 DIAGNOSIS — E11.65 TYPE 2 DIABETES MELLITUS WITH HYPERGLYCEMIA, WITH LONG-TERM CURRENT USE OF INSULIN: ICD-10-CM

## 2024-09-09 DIAGNOSIS — Z79.4 TYPE 2 DIABETES MELLITUS WITH HYPERGLYCEMIA, WITH LONG-TERM CURRENT USE OF INSULIN: ICD-10-CM

## 2024-09-09 DIAGNOSIS — R29.6 FALLS FREQUENTLY: ICD-10-CM

## 2024-09-09 DIAGNOSIS — K64.4 EXTERNAL HEMORRHOIDS: Primary | ICD-10-CM

## 2024-09-09 PROCEDURE — 99214 OFFICE O/P EST MOD 30 MIN: CPT | Performed by: FAMILY MEDICINE

## 2024-09-09 PROCEDURE — 1159F MED LIST DOCD IN RCRD: CPT | Performed by: FAMILY MEDICINE

## 2024-09-09 PROCEDURE — 1160F RVW MEDS BY RX/DR IN RCRD: CPT | Performed by: FAMILY MEDICINE

## 2024-09-09 PROCEDURE — 1126F AMNT PAIN NOTED NONE PRSNT: CPT | Performed by: FAMILY MEDICINE

## 2024-09-09 PROCEDURE — G2211 COMPLEX E/M VISIT ADD ON: HCPCS | Performed by: FAMILY MEDICINE

## 2024-09-09 RX ORDER — SACUBITRIL AND VALSARTAN 49; 51 MG/1; MG/1
1 TABLET, FILM COATED ORAL 2 TIMES DAILY
COMMUNITY
Start: 2024-09-05 | End: 2024-09-09 | Stop reason: SDUPTHER

## 2024-09-09 RX ORDER — ZOSTER VACCINE RECOMBINANT, ADJUVANTED 50 MCG/0.5
50 KIT INTRAMUSCULAR
Qty: 1 EACH | Refills: 1 | Status: SHIPPED | OUTPATIENT
Start: 2024-09-09 | End: 2025-03-09

## 2024-09-09 RX ORDER — HYDROCORTISONE 25 MG/G
CREAM TOPICAL
Qty: 28 G | Refills: 1 | Status: SHIPPED | OUTPATIENT
Start: 2024-09-09

## 2024-09-09 NOTE — PROGRESS NOTES
"  Chief Complaint   Patient presents with    Diabetes       Subjective   Fernanda Molina is an 83 y.o. female who presents for follow up of diabetes. Current symptoms include: none. Patient denies foot ulcerations. Evaluation to date has included: hemoglobin A1C. Home sugars: BGs consistently in an acceptable range. Current treatments:  Adding on Trulicity . Discussed importance of yearly eye exams and checking feet for skin integrity.      The following portions of the patient's history were reviewed and updated as appropriate: allergies, current medications, past medical history, past social history, past surgical history, and problem list.    Review of Systems  Pertinent items are noted in HPI.     Vitals:    09/09/24 1413   BP: 120/70   BP Location: Left arm   Patient Position: Sitting   Cuff Size: Adult   Pulse: 80   Temp: 97.6 °F (36.4 °C)   SpO2: 97%   Weight: 87.1 kg (192 lb)   Height: 157.5 cm (62\")             Objective    Gen:  Alert, pleasant  Ears: canals clear, TMs normal  Throat: clear , no thrush, teeth ok  Neck: no bruit, no LAD  Lungs: clear  Heart: RR no murmur  Feet:  No rash, no skin breakdown, sensation grossly normal.    Laboratory:  Results for orders placed or performed in visit on 08/30/24   Digoxin level    Specimen: Blood   Result Value Ref Range    Digoxin 0.90 0.60 - 1.20 ng/mL   Hemoglobin A1c    Specimen: Blood   Result Value Ref Range    Hemoglobin A1C 5.70 (H) 4.80 - 5.60 %   Lipid Panel    Specimen: Blood   Result Value Ref Range    Total Cholesterol 103 0 - 200 mg/dL    Triglycerides 124 0 - 150 mg/dL    HDL Cholesterol 40 40 - 60 mg/dL    VLDL Cholesterol Scottie 22 5 - 40 mg/dL    LDL Chol Calc (NIH) 41 0 - 100 mg/dL   Comprehensive Metabolic Panel    Specimen: Blood   Result Value Ref Range    Glucose 110 (H) 65 - 99 mg/dL    BUN 13 8 - 23 mg/dL    Creatinine 0.76 0.57 - 1.00 mg/dL    EGFR Result 78.8 >60.0 mL/min/1.73    BUN/Creatinine Ratio 17.1 7.0 - 25.0    Sodium 141 136 - 145 " mmol/L    Potassium 4.3 3.5 - 5.2 mmol/L    Chloride 104 98 - 107 mmol/L    Total CO2 26.8 22.0 - 29.0 mmol/L    Calcium 10.8 (H) 8.6 - 10.5 mg/dL    Total Protein 6.4 6.0 - 8.5 g/dL    Albumin 4.2 3.5 - 5.2 g/dL    Globulin 2.2 gm/dL    A/G Ratio 1.9 g/dL    Total Bilirubin 0.7 0.0 - 1.2 mg/dL    Alkaline Phosphatase 62 39 - 117 U/L    AST (SGOT) 17 1 - 32 U/L    ALT (SGPT) 19 1 - 33 U/L   CBC (No Diff)    Specimen: Blood   Result Value Ref Range    WBC 7.46 3.40 - 10.80 10*3/mm3    RBC 4.46 3.77 - 5.28 10*6/mm3    Hemoglobin 12.8 12.0 - 15.9 g/dL    Hematocrit 39.3 34.0 - 46.6 %    MCV 88.1 79.0 - 97.0 fL    MCH 28.7 26.6 - 33.0 pg    MCHC 32.6 31.5 - 35.7 g/dL    RDW 13.6 12.3 - 15.4 %    Platelets 192 140 - 450 10*3/mm3   MicroAlbumin, Urine, Random - Urine, Clean Catch    Specimen: Urine, Clean Catch   Result Value Ref Range    Microalbumin, Urine 132.4 Not Estab. ug/mL     *Note: Due to a large number of results and/or encounters for the requested time period, some results have not been displayed. A complete set of results can be found in Results Review.        Assessment & Plan        Discussed general issues about diabetes pathophysiology and management.  Follow up in 6 months or as needed.    Diagnoses and all orders for this visit:    1. External hemorrhoids (Primary)  -     Hydrocortisone, Perianal, (Anusol-HC) 2.5 % rectal cream; Apply after each bowel movement and nightly  Indications: Itching of Anus and/or Rectum  Dispense: 28 g; Refill: 1    2. Falls frequently  -     Ambulatory Referral to Physical Therapy for Evaluation & Treatment    3. Type 2 diabetes mellitus with hyperglycemia, with long-term current use of insulin  -     MicroAlbumin, Urine, Random - Urine, Clean Catch; Future  -     CBC (No Diff); Future  -     Comprehensive Metabolic Panel; Future  -     Lipid Panel; Future  -     Hemoglobin A1c; Future  -     Urinalysis With Microscopic If Indicated (No Culture) - Urine, Clean Catch;  Future    Other orders  -     Zoster Vac Recomb Adjuvanted (Shingrix) 50 MCG/0.5ML reconstituted suspension; Inject 0.5 mL into the appropriate muscle as directed by prescriber Every 6 (Six) Months for 2 doses.  Dispense: 1 each; Refill: 1    Diabetes much improved.  Nice drop in A1c.  She reports a 10 pound weight loss.  Stay with Trulicity    Follow-up on her right arm.  Had continued pain in the right humerus.  Went to orthopedics and was diagnosed with a humeral fracture  Not seen on original shoulder and elbow x-rays    She continues to fall.  She fell a second time just recently  She is now using a rollator in the home  We would like to enroll her in physical therapy for fall prevention and gait training    Discussed upcoming vaccinations including high dose flu shot, RSV vaccine and shingles vaccine    Also has a continued bleeding hemorrhoid.,  Will refill her Anusol HC        Education: Reviewed ‘ABCs’ of diabetes management (respective goals in parentheses):  A1C (<7), blood pressure (<130/80), and cholesterol (LDL <100).  Discussed healthy diabetic eating plan.  May refer to ADA web site, diabetes.org    Return in about 6 months (around 3/9/2025) for blood pressure, Medicare Wellness visit, diabetes.  There are no Patient Instructions on file for this visit.  Medications Discontinued During This Encounter   Medication Reason    Hydrocortisone, Perianal, (Anusol-HC) 2.5 % rectal cream Reorder    sacubitril-valsartan (Entresto) 49-51 MG tablet Duplicate order         Dr. Rashid Ge MD  [place], [place]  [facility].

## 2024-09-18 ENCOUNTER — OFFICE VISIT (OUTPATIENT)
Dept: ORTHOPEDIC SURGERY | Facility: CLINIC | Age: 84
End: 2024-09-18
Payer: MEDICARE

## 2024-09-18 VITALS — HEIGHT: 62 IN | BODY MASS INDEX: 35.33 KG/M2 | WEIGHT: 192 LBS

## 2024-09-18 DIAGNOSIS — M75.51 SUBACROMIAL BURSITIS OF RIGHT SHOULDER JOINT: ICD-10-CM

## 2024-09-18 DIAGNOSIS — S42.201A CLOSED FRACTURE OF PROXIMAL END OF RIGHT HUMERUS, UNSPECIFIED FRACTURE MORPHOLOGY, INITIAL ENCOUNTER: Primary | ICD-10-CM

## 2024-09-20 PROBLEM — M75.51 SUBACROMIAL BURSITIS OF RIGHT SHOULDER JOINT: Status: ACTIVE | Noted: 2024-09-20

## 2024-09-26 NOTE — NURSING NOTE
Problem: Pain  Goal: Verbalizes/displays adequate comfort level or baseline comfort level  Outcome: Progressing     Problem: Safety - Adult  Goal: Free from fall injury  Outcome: Progressing     Problem: Skin/Tissue Integrity  Goal: Absence of new skin breakdown  Description: 1.  Monitor for areas of redness and/or skin breakdown  2.  Assess vascular access sites hourly  3.  Every 4-6 hours minimum:  Change oxygen saturation probe site  4.  Every 4-6 hours:  If on nasal continuous positive airway pressure, respiratory therapy assess nares and determine need for appliance change or resting period.  Outcome: Progressing      Continued Stay Note  OCTAVIANO Reid     Patient Name: Fernanda Molina  MRN: [id number]  Today's Date: 3/27/2020    Admit Date: 3/25/2020    Discharge Plan     Row Name 03/27/20 1315       Plan    Plan Comments  Spoke with Mrs Molina at bedside.  She is up in the chair and states she is feeling well.  Plan is home when stable.  She does not feel the need for home health and is not homebound.  Will continue to follow        Discharge Codes    No documentation.             Hanna Shelby RN

## 2024-10-01 ENCOUNTER — OFFICE VISIT (OUTPATIENT)
Dept: SURGERY | Facility: CLINIC | Age: 84
End: 2024-10-01
Payer: MEDICARE

## 2024-10-01 ENCOUNTER — ANESTHESIA (OUTPATIENT)
Dept: PERIOP | Facility: HOSPITAL | Age: 84
End: 2024-10-01
Payer: MEDICARE

## 2024-10-01 ENCOUNTER — ANESTHESIA EVENT (OUTPATIENT)
Dept: PERIOP | Facility: HOSPITAL | Age: 84
End: 2024-10-01
Payer: MEDICARE

## 2024-10-01 ENCOUNTER — HOSPITAL ENCOUNTER (OUTPATIENT)
Facility: HOSPITAL | Age: 84
Setting detail: OBSERVATION
LOS: 1 days | Discharge: HOME | End: 2024-10-02
Attending: SURGERY | Admitting: SURGERY
Payer: MEDICARE

## 2024-10-01 VITALS — HEIGHT: 62 IN | BODY MASS INDEX: 36.07 KG/M2 | WEIGHT: 196 LBS

## 2024-10-01 DIAGNOSIS — K61.1 PERIRECTAL ABSCESS: ICD-10-CM

## 2024-10-01 DIAGNOSIS — Z79.4 TYPE 2 DIABETES MELLITUS WITHOUT COMPLICATION, WITH LONG-TERM CURRENT USE OF INSULIN: ICD-10-CM

## 2024-10-01 DIAGNOSIS — E11.9 TYPE 2 DIABETES MELLITUS WITHOUT COMPLICATION, WITH LONG-TERM CURRENT USE OF INSULIN: ICD-10-CM

## 2024-10-01 DIAGNOSIS — K61.1 PERIRECTAL ABSCESS: Primary | ICD-10-CM

## 2024-10-01 LAB
ABO GROUP BLD: NORMAL
ALBUMIN SERPL-MCNC: 4.1 G/DL (ref 3.5–5.2)
ALBUMIN/GLOB SERPL: 1.3 G/DL
ALP SERPL-CCNC: 59 U/L (ref 39–117)
ALT SERPL W P-5'-P-CCNC: 18 U/L (ref 1–33)
ANION GAP SERPL CALCULATED.3IONS-SCNC: 6.7 MMOL/L (ref 5–15)
AST SERPL-CCNC: 20 U/L (ref 1–32)
BILIRUB SERPL-MCNC: 0.6 MG/DL (ref 0–1.2)
BLD GP AB SCN SERPL QL: NEGATIVE
BUN SERPL-MCNC: 15 MG/DL (ref 8–23)
BUN/CREAT SERPL: 20 (ref 7–25)
CALCIUM SPEC-SCNC: 11.1 MG/DL (ref 8.6–10.5)
CHLORIDE SERPL-SCNC: 100 MMOL/L (ref 98–107)
CO2 SERPL-SCNC: 25.3 MMOL/L (ref 22–29)
CREAT SERPL-MCNC: 0.75 MG/DL (ref 0.57–1)
DEPRECATED RDW RBC AUTO: 45.8 FL (ref 37–54)
EGFRCR SERPLBLD CKD-EPI 2021: 80.1 ML/MIN/1.73
ERYTHROCYTE [DISTWIDTH] IN BLOOD BY AUTOMATED COUNT: 13.8 % (ref 12.3–15.4)
GLOBULIN UR ELPH-MCNC: 3.1 GM/DL
GLUCOSE BLDC GLUCOMTR-MCNC: 140 MG/DL (ref 70–130)
GLUCOSE BLDC GLUCOMTR-MCNC: 78 MG/DL (ref 70–130)
GLUCOSE BLDC GLUCOMTR-MCNC: 78 MG/DL (ref 70–130)
GLUCOSE SERPL-MCNC: 87 MG/DL (ref 65–99)
HCT VFR BLD AUTO: 40.8 % (ref 34–46.6)
HGB BLD-MCNC: 13.1 G/DL (ref 12–15.9)
INR PPP: 1.95 (ref 0.9–1.1)
MCH RBC QN AUTO: 29 PG (ref 26.6–33)
MCHC RBC AUTO-ENTMCNC: 32.1 G/DL (ref 31.5–35.7)
MCV RBC AUTO: 90.3 FL (ref 79–97)
PLATELET # BLD AUTO: 222 10*3/MM3 (ref 140–450)
PMV BLD AUTO: 10.5 FL (ref 6–12)
POTASSIUM SERPL-SCNC: 4.1 MMOL/L (ref 3.5–5.2)
PROT SERPL-MCNC: 7.2 G/DL (ref 6–8.5)
PROTHROMBIN TIME: 22.5 SECONDS (ref 11.7–14.2)
RBC # BLD AUTO: 4.52 10*6/MM3 (ref 3.77–5.28)
RH BLD: POSITIVE
SODIUM SERPL-SCNC: 132 MMOL/L (ref 136–145)
T&S EXPIRATION DATE: NORMAL
WBC NRBC COR # BLD AUTO: 7.43 10*3/MM3 (ref 3.4–10.8)

## 2024-10-01 PROCEDURE — 1160F RVW MEDS BY RX/DR IN RCRD: CPT | Performed by: SURGERY

## 2024-10-01 PROCEDURE — 87186 SC STD MICRODIL/AGAR DIL: CPT | Performed by: SURGERY

## 2024-10-01 PROCEDURE — 86901 BLOOD TYPING SEROLOGIC RH(D): CPT | Performed by: SURGERY

## 2024-10-01 PROCEDURE — 25010000002 PIPERACILLIN SOD-TAZOBACTAM PER 1 G: Performed by: SURGERY

## 2024-10-01 PROCEDURE — G0378 HOSPITAL OBSERVATION PER HR: HCPCS

## 2024-10-01 PROCEDURE — 25010000002 PROPOFOL 10 MG/ML EMULSION: Performed by: NURSE ANESTHETIST, CERTIFIED REGISTERED

## 2024-10-01 PROCEDURE — 87205 SMEAR GRAM STAIN: CPT | Performed by: SURGERY

## 2024-10-01 PROCEDURE — 99204 OFFICE O/P NEW MOD 45 MIN: CPT | Performed by: SURGERY

## 2024-10-01 PROCEDURE — 87077 CULTURE AEROBIC IDENTIFY: CPT | Performed by: SURGERY

## 2024-10-01 PROCEDURE — 85610 PROTHROMBIN TIME: CPT | Performed by: SURGERY

## 2024-10-01 PROCEDURE — 86900 BLOOD TYPING SEROLOGIC ABO: CPT | Performed by: SURGERY

## 2024-10-01 PROCEDURE — 80053 COMPREHEN METABOLIC PANEL: CPT | Performed by: SURGERY

## 2024-10-01 PROCEDURE — 36430 TRANSFUSION BLD/BLD COMPNT: CPT

## 2024-10-01 PROCEDURE — 25010000002 LIDOCAINE 2% SOLUTION: Performed by: NURSE ANESTHETIST, CERTIFIED REGISTERED

## 2024-10-01 PROCEDURE — 86850 RBC ANTIBODY SCREEN: CPT | Performed by: SURGERY

## 2024-10-01 PROCEDURE — 87075 CULTR BACTERIA EXCEPT BLOOD: CPT | Performed by: SURGERY

## 2024-10-01 PROCEDURE — 25810000003 LACTATED RINGERS PER 1000 ML: Performed by: STUDENT IN AN ORGANIZED HEALTH CARE EDUCATION/TRAINING PROGRAM

## 2024-10-01 PROCEDURE — 86927 PLASMA FRESH FROZEN: CPT

## 2024-10-01 PROCEDURE — 25010000002 ONDANSETRON PER 1 MG: Performed by: NURSE ANESTHETIST, CERTIFIED REGISTERED

## 2024-10-01 PROCEDURE — 25010000002 FENTANYL CITRATE (PF) 50 MCG/ML SOLUTION: Performed by: NURSE ANESTHETIST, CERTIFIED REGISTERED

## 2024-10-01 PROCEDURE — 82948 REAGENT STRIP/BLOOD GLUCOSE: CPT

## 2024-10-01 PROCEDURE — P9059 PLASMA, FRZ BETWEEN 8-24HOUR: HCPCS

## 2024-10-01 PROCEDURE — 25810000003 LACTATED RINGERS PER 1000 ML: Performed by: NURSE ANESTHETIST, CERTIFIED REGISTERED

## 2024-10-01 PROCEDURE — 25010000002 SUGAMMADEX 200 MG/2ML SOLUTION: Performed by: NURSE ANESTHETIST, CERTIFIED REGISTERED

## 2024-10-01 PROCEDURE — 85027 COMPLETE CBC AUTOMATED: CPT | Performed by: SURGERY

## 2024-10-01 PROCEDURE — 46040 I&D ISCHIORCT&/PERIRCT ABSC: CPT | Performed by: SURGERY

## 2024-10-01 PROCEDURE — 1159F MED LIST DOCD IN RCRD: CPT | Performed by: SURGERY

## 2024-10-01 PROCEDURE — S0260 H&P FOR SURGERY: HCPCS | Performed by: SURGERY

## 2024-10-01 PROCEDURE — 87070 CULTURE OTHR SPECIMN AEROBIC: CPT | Performed by: SURGERY

## 2024-10-01 DEVICE — ABSORBABLE HEMOSTAT (OXIDIZED REGENERATED CELLULOSE)
Type: IMPLANTABLE DEVICE | Site: PERIANAL | Status: FUNCTIONAL
Brand: SURGICEL

## 2024-10-01 RX ORDER — PROMETHAZINE HYDROCHLORIDE 25 MG/1
25 TABLET ORAL ONCE AS NEEDED
Status: DISCONTINUED | OUTPATIENT
Start: 2024-10-01 | End: 2024-10-01 | Stop reason: HOSPADM

## 2024-10-01 RX ORDER — DIGOXIN 250 MCG
250 TABLET ORAL DAILY
Status: DISCONTINUED | OUTPATIENT
Start: 2024-10-02 | End: 2024-10-02 | Stop reason: HOSPADM

## 2024-10-01 RX ORDER — ONDANSETRON 2 MG/ML
4 INJECTION INTRAMUSCULAR; INTRAVENOUS ONCE AS NEEDED
Status: DISCONTINUED | OUTPATIENT
Start: 2024-10-01 | End: 2024-10-01 | Stop reason: HOSPADM

## 2024-10-01 RX ORDER — SODIUM CHLORIDE 0.9 % (FLUSH) 0.9 %
3 SYRINGE (ML) INJECTION EVERY 12 HOURS SCHEDULED
Status: DISCONTINUED | OUTPATIENT
Start: 2024-10-01 | End: 2024-10-01 | Stop reason: HOSPADM

## 2024-10-01 RX ORDER — POTASSIUM CHLORIDE 750 MG/1
20 TABLET, FILM COATED, EXTENDED RELEASE ORAL
Status: DISCONTINUED | OUTPATIENT
Start: 2024-10-02 | End: 2024-10-02 | Stop reason: HOSPADM

## 2024-10-01 RX ORDER — SODIUM CHLORIDE, SODIUM LACTATE, POTASSIUM CHLORIDE, CALCIUM CHLORIDE 600; 310; 30; 20 MG/100ML; MG/100ML; MG/100ML; MG/100ML
INJECTION, SOLUTION INTRAVENOUS CONTINUOUS PRN
Status: DISCONTINUED | OUTPATIENT
Start: 2024-10-01 | End: 2024-10-01 | Stop reason: SURG

## 2024-10-01 RX ORDER — MAGNESIUM HYDROXIDE 1200 MG/15ML
LIQUID ORAL AS NEEDED
Status: DISCONTINUED | OUTPATIENT
Start: 2024-10-01 | End: 2024-10-01 | Stop reason: HOSPADM

## 2024-10-01 RX ORDER — ACETAMINOPHEN 500 MG
1000 TABLET ORAL EVERY 6 HOURS PRN
Status: DISCONTINUED | OUTPATIENT
Start: 2024-10-01 | End: 2024-10-02 | Stop reason: HOSPADM

## 2024-10-01 RX ORDER — DEXTROSE MONOHYDRATE 25 G/50ML
25 INJECTION, SOLUTION INTRAVENOUS
Status: DISCONTINUED | OUTPATIENT
Start: 2024-10-01 | End: 2024-10-02 | Stop reason: HOSPADM

## 2024-10-01 RX ORDER — PROPOFOL 10 MG/ML
VIAL (ML) INTRAVENOUS CONTINUOUS PRN
Status: DISCONTINUED | OUTPATIENT
Start: 2024-10-01 | End: 2024-10-01 | Stop reason: SURG

## 2024-10-01 RX ORDER — FENTANYL CITRATE 50 UG/ML
INJECTION, SOLUTION INTRAMUSCULAR; INTRAVENOUS AS NEEDED
Status: DISCONTINUED | OUTPATIENT
Start: 2024-10-01 | End: 2024-10-01 | Stop reason: SURG

## 2024-10-01 RX ORDER — LEVOTHYROXINE SODIUM 175 UG/1
175 TABLET ORAL
Status: DISCONTINUED | OUTPATIENT
Start: 2024-10-02 | End: 2024-10-02 | Stop reason: HOSPADM

## 2024-10-01 RX ORDER — BUPIVACAINE HYDROCHLORIDE AND EPINEPHRINE 5; 5 MG/ML; UG/ML
INJECTION, SOLUTION PERINEURAL AS NEEDED
Status: DISCONTINUED | OUTPATIENT
Start: 2024-10-01 | End: 2024-10-01 | Stop reason: HOSPADM

## 2024-10-01 RX ORDER — PROMETHAZINE HYDROCHLORIDE 25 MG/1
25 SUPPOSITORY RECTAL ONCE AS NEEDED
Status: DISCONTINUED | OUTPATIENT
Start: 2024-10-01 | End: 2024-10-01 | Stop reason: HOSPADM

## 2024-10-01 RX ORDER — OXYCODONE AND ACETAMINOPHEN 5; 325 MG/1; MG/1
1 TABLET ORAL ONCE AS NEEDED
Status: DISCONTINUED | OUTPATIENT
Start: 2024-10-01 | End: 2024-10-01 | Stop reason: HOSPADM

## 2024-10-01 RX ORDER — FLUMAZENIL 0.1 MG/ML
0.2 INJECTION INTRAVENOUS AS NEEDED
Status: DISCONTINUED | OUTPATIENT
Start: 2024-10-01 | End: 2024-10-01 | Stop reason: HOSPADM

## 2024-10-01 RX ORDER — HYDRALAZINE HYDROCHLORIDE 20 MG/ML
5 INJECTION INTRAMUSCULAR; INTRAVENOUS
Status: DISCONTINUED | OUTPATIENT
Start: 2024-10-01 | End: 2024-10-01 | Stop reason: HOSPADM

## 2024-10-01 RX ORDER — FAMOTIDINE 10 MG/ML
20 INJECTION, SOLUTION INTRAVENOUS ONCE
Status: COMPLETED | OUTPATIENT
Start: 2024-10-01 | End: 2024-10-01

## 2024-10-01 RX ORDER — INSULIN LISPRO 100 [IU]/ML
2-9 INJECTION, SOLUTION INTRAVENOUS; SUBCUTANEOUS
Status: DISCONTINUED | OUTPATIENT
Start: 2024-10-01 | End: 2024-10-02 | Stop reason: HOSPADM

## 2024-10-01 RX ORDER — OXYCODONE HYDROCHLORIDE 5 MG/1
5 TABLET ORAL EVERY 4 HOURS PRN
Status: DISCONTINUED | OUTPATIENT
Start: 2024-10-01 | End: 2024-10-02 | Stop reason: HOSPADM

## 2024-10-01 RX ORDER — CETIRIZINE HYDROCHLORIDE 10 MG/1
10 TABLET ORAL DAILY
Status: DISCONTINUED | OUTPATIENT
Start: 2024-10-02 | End: 2024-10-02 | Stop reason: HOSPADM

## 2024-10-01 RX ORDER — EPHEDRINE SULFATE 50 MG/ML
5 INJECTION, SOLUTION INTRAVENOUS ONCE AS NEEDED
Status: DISCONTINUED | OUTPATIENT
Start: 2024-10-01 | End: 2024-10-01 | Stop reason: HOSPADM

## 2024-10-01 RX ORDER — SODIUM CHLORIDE 0.9 % (FLUSH) 0.9 %
3-10 SYRINGE (ML) INJECTION AS NEEDED
Status: DISCONTINUED | OUTPATIENT
Start: 2024-10-01 | End: 2024-10-01 | Stop reason: HOSPADM

## 2024-10-01 RX ORDER — IBUPROFEN 600 MG/1
1 TABLET ORAL
Status: DISCONTINUED | OUTPATIENT
Start: 2024-10-01 | End: 2024-10-02 | Stop reason: HOSPADM

## 2024-10-01 RX ORDER — LABETALOL HYDROCHLORIDE 5 MG/ML
5 INJECTION, SOLUTION INTRAVENOUS
Status: DISCONTINUED | OUTPATIENT
Start: 2024-10-01 | End: 2024-10-01 | Stop reason: HOSPADM

## 2024-10-01 RX ORDER — FENTANYL CITRATE 50 UG/ML
25 INJECTION, SOLUTION INTRAMUSCULAR; INTRAVENOUS
Status: DISCONTINUED | OUTPATIENT
Start: 2024-10-01 | End: 2024-10-01 | Stop reason: HOSPADM

## 2024-10-01 RX ORDER — NALOXONE HCL 0.4 MG/ML
0.2 VIAL (ML) INJECTION AS NEEDED
Status: DISCONTINUED | OUTPATIENT
Start: 2024-10-01 | End: 2024-10-01 | Stop reason: HOSPADM

## 2024-10-01 RX ORDER — FENTANYL CITRATE 50 UG/ML
25 INJECTION, SOLUTION INTRAMUSCULAR; INTRAVENOUS ONCE AS NEEDED
Status: DISCONTINUED | OUTPATIENT
Start: 2024-10-01 | End: 2024-10-01 | Stop reason: HOSPADM

## 2024-10-01 RX ORDER — ROCURONIUM BROMIDE 10 MG/ML
INJECTION, SOLUTION INTRAVENOUS AS NEEDED
Status: DISCONTINUED | OUTPATIENT
Start: 2024-10-01 | End: 2024-10-01 | Stop reason: SURG

## 2024-10-01 RX ORDER — IPRATROPIUM BROMIDE AND ALBUTEROL SULFATE 2.5; .5 MG/3ML; MG/3ML
3 SOLUTION RESPIRATORY (INHALATION) ONCE AS NEEDED
Status: DISCONTINUED | OUTPATIENT
Start: 2024-10-01 | End: 2024-10-01 | Stop reason: HOSPADM

## 2024-10-01 RX ORDER — ONDANSETRON 2 MG/ML
4 INJECTION INTRAMUSCULAR; INTRAVENOUS EVERY 6 HOURS PRN
Status: DISCONTINUED | OUTPATIENT
Start: 2024-10-01 | End: 2024-10-02 | Stop reason: HOSPADM

## 2024-10-01 RX ORDER — DIPHENHYDRAMINE HYDROCHLORIDE 50 MG/ML
12.5 INJECTION INTRAMUSCULAR; INTRAVENOUS
Status: DISCONTINUED | OUTPATIENT
Start: 2024-10-01 | End: 2024-10-01 | Stop reason: HOSPADM

## 2024-10-01 RX ORDER — ONDANSETRON 4 MG/1
4 TABLET, ORALLY DISINTEGRATING ORAL EVERY 6 HOURS PRN
Status: DISCONTINUED | OUTPATIENT
Start: 2024-10-01 | End: 2024-10-02 | Stop reason: HOSPADM

## 2024-10-01 RX ORDER — NICOTINE POLACRILEX 4 MG
15 LOZENGE BUCCAL
Status: DISCONTINUED | OUTPATIENT
Start: 2024-10-01 | End: 2024-10-02 | Stop reason: HOSPADM

## 2024-10-01 RX ORDER — ONDANSETRON 2 MG/ML
INJECTION INTRAMUSCULAR; INTRAVENOUS AS NEEDED
Status: DISCONTINUED | OUTPATIENT
Start: 2024-10-01 | End: 2024-10-01 | Stop reason: SURG

## 2024-10-01 RX ORDER — METOPROLOL SUCCINATE 50 MG/1
200 TABLET, EXTENDED RELEASE ORAL EVERY MORNING
Status: DISCONTINUED | OUTPATIENT
Start: 2024-10-02 | End: 2024-10-02 | Stop reason: HOSPADM

## 2024-10-01 RX ORDER — SODIUM CHLORIDE, SODIUM LACTATE, POTASSIUM CHLORIDE, CALCIUM CHLORIDE 600; 310; 30; 20 MG/100ML; MG/100ML; MG/100ML; MG/100ML
9 INJECTION, SOLUTION INTRAVENOUS CONTINUOUS
Status: DISCONTINUED | OUTPATIENT
Start: 2024-10-01 | End: 2024-10-01

## 2024-10-01 RX ORDER — ALBUTEROL SULFATE 0.83 MG/ML
2.5 SOLUTION RESPIRATORY (INHALATION) EVERY 4 HOURS PRN
Status: DISCONTINUED | OUTPATIENT
Start: 2024-10-01 | End: 2024-10-02 | Stop reason: HOSPADM

## 2024-10-01 RX ORDER — LIDOCAINE HYDROCHLORIDE 10 MG/ML
0.5 INJECTION, SOLUTION INFILTRATION; PERINEURAL ONCE AS NEEDED
Status: DISCONTINUED | OUTPATIENT
Start: 2024-10-01 | End: 2024-10-01 | Stop reason: HOSPADM

## 2024-10-01 RX ORDER — DROPERIDOL 2.5 MG/ML
0.62 INJECTION, SOLUTION INTRAMUSCULAR; INTRAVENOUS
Status: DISCONTINUED | OUTPATIENT
Start: 2024-10-01 | End: 2024-10-01 | Stop reason: HOSPADM

## 2024-10-01 RX ORDER — ATORVASTATIN CALCIUM 10 MG/1
20 TABLET, FILM COATED ORAL DAILY
Status: DISCONTINUED | OUTPATIENT
Start: 2024-10-02 | End: 2024-10-02 | Stop reason: HOSPADM

## 2024-10-01 RX ORDER — OXYCODONE HYDROCHLORIDE 5 MG/1
10 TABLET ORAL EVERY 4 HOURS PRN
Status: DISCONTINUED | OUTPATIENT
Start: 2024-10-01 | End: 2024-10-02 | Stop reason: HOSPADM

## 2024-10-01 RX ORDER — LIDOCAINE HYDROCHLORIDE 20 MG/ML
INJECTION, SOLUTION INFILTRATION; PERINEURAL AS NEEDED
Status: DISCONTINUED | OUTPATIENT
Start: 2024-10-01 | End: 2024-10-01 | Stop reason: SURG

## 2024-10-01 RX ORDER — ALBUTEROL SULFATE 90 UG/1
2 INHALANT RESPIRATORY (INHALATION) EVERY 4 HOURS PRN
Status: DISCONTINUED | OUTPATIENT
Start: 2024-10-01 | End: 2024-10-01 | Stop reason: SDUPTHER

## 2024-10-01 RX ORDER — PANTOPRAZOLE SODIUM 40 MG/1
40 TABLET, DELAYED RELEASE ORAL DAILY
Status: DISCONTINUED | OUTPATIENT
Start: 2024-10-02 | End: 2024-10-02 | Stop reason: HOSPADM

## 2024-10-01 RX ORDER — FUROSEMIDE 40 MG
40 TABLET ORAL DAILY
Status: DISCONTINUED | OUTPATIENT
Start: 2024-10-02 | End: 2024-10-02 | Stop reason: HOSPADM

## 2024-10-01 RX ADMIN — SODIUM CHLORIDE, POTASSIUM CHLORIDE, SODIUM LACTATE AND CALCIUM CHLORIDE: 600; 310; 30; 20 INJECTION, SOLUTION INTRAVENOUS at 18:10

## 2024-10-01 RX ADMIN — FAMOTIDINE 20 MG: 10 INJECTION INTRAVENOUS at 15:26

## 2024-10-01 RX ADMIN — LIDOCAINE HYDROCHLORIDE 60 MG: 20 INJECTION, SOLUTION INFILTRATION; PERINEURAL at 18:21

## 2024-10-01 RX ADMIN — SACUBITRIL AND VALSARTAN 1 TABLET: 49; 51 TABLET, FILM COATED ORAL at 23:28

## 2024-10-01 RX ADMIN — PIPERACILLIN AND TAZOBACTAM 3.38 G: 3; .375 INJECTION, POWDER, FOR SOLUTION INTRAVENOUS at 18:05

## 2024-10-01 RX ADMIN — SODIUM CHLORIDE, POTASSIUM CHLORIDE, SODIUM LACTATE AND CALCIUM CHLORIDE 9 ML/HR: 600; 310; 30; 20 INJECTION, SOLUTION INTRAVENOUS at 15:28

## 2024-10-01 RX ADMIN — PROPOFOL 120 MG: 10 INJECTION, EMULSION INTRAVENOUS at 18:21

## 2024-10-01 RX ADMIN — SUGAMMADEX 200 MG: 100 INJECTION, SOLUTION INTRAVENOUS at 18:50

## 2024-10-01 RX ADMIN — ONDANSETRON 4 MG: 2 INJECTION INTRAMUSCULAR; INTRAVENOUS at 18:50

## 2024-10-01 RX ADMIN — FENTANYL CITRATE 25 MCG: 50 INJECTION, SOLUTION INTRAMUSCULAR; INTRAVENOUS at 18:33

## 2024-10-01 RX ADMIN — FENTANYL CITRATE 25 MCG: 50 INJECTION, SOLUTION INTRAMUSCULAR; INTRAVENOUS at 18:50

## 2024-10-01 RX ADMIN — ROCURONIUM BROMIDE 40 MG: 10 INJECTION, SOLUTION INTRAVENOUS at 18:21

## 2024-10-01 RX ADMIN — PROPOFOL 125 MCG/KG/MIN: 10 INJECTION, EMULSION INTRAVENOUS at 18:21

## 2024-10-01 NOTE — ANESTHESIA PROCEDURE NOTES
Airway  Urgency: elective    Date/Time: 10/1/2024 6:23 PM  Airway not difficult    General Information and Staff    Patient location during procedure: OR    Indications and Patient Condition  Indications for airway management: airway protection    Preoxygenated: yes  Mask difficulty assessment: 0 - not attempted    Final Airway Details  Final airway type: endotracheal airway      Successful airway: ETT  Cuffed: yes   Successful intubation technique: video laryngoscopy  Facilitating devices/methods: intubating stylet  Endotracheal tube insertion site: oral  Blade: CMAC  Blade size: D  ETT size (mm): 7.0  Cormack-Lehane Classification: grade I - full view of glottis  Placement verified by: chest auscultation and capnometry   Measured from: lips  ETT/EBT  to lips (cm): 21  Number of attempts at approach: 1  Assessment: lips, teeth, and gum same as pre-op and atraumatic intubation

## 2024-10-01 NOTE — ANESTHESIA PREPROCEDURE EVALUATION
Anesthesia Evaluation     Patient summary reviewed and Nursing notes reviewed   history of anesthetic complications:  PONV prolonged sedation  NPO Solid Status: > 8 hours  NPO Liquid Status: > 2 hours           Airway   Mallampati: II  Dental    (+) poor dentition    Comment: Multiple missing teeth      Pulmonary - normal exam   (+) COPD,sleep apnea on CPAP  (-) asthma    ROS comment: Pulm HTN  Cardiovascular   Exercise tolerance: good (4-7 METS)    PT is on anticoagulation therapy  Patient on routine beta blocker  Rate: normal    (+) pacemaker ICD, valvular problems/murmurs (s/p MVR, TVR 2019), past MI (2010) , CAD, dysrhythmias (s/p MAZE) Atrial Fib, CHF , hyperlipidemia  (-) angina, cardiac stents    ROS comment: Requesting ECG from [facility]    Neuro/Psych  (-) seizures, CVA  GI/Hepatic/Renal/Endo    (+) obesity (BMI 35), GERD well controlled, renal disease- stones, diabetes mellitus type 2 using insulin, thyroid problem (h/o Graves d/z) hypothyroidism  (-) liver disease    Musculoskeletal     (+) back pain (stenosis, DDD), chronic pain (On pain meds PRN, not needed everyday)  Abdominal    Substance History - negative use     OB/GYN          Other   arthritis,                       Anesthesia Plan    ASA 3     general   total IV anesthesia  (I have reviewed the patient's history and chart with the patient, including all pertinent laboratory results and imaging. I have explained the risks of anesthesia including but not limited to dental or airway injury, nausea, cardio-pulmonary complications, such as aspiration, MI, stroke, or death.     VITALS:  There were no vitals taken for this visit.)  intravenous induction     Anesthetic plan, risks, benefits, and alternatives have been provided, discussed and informed consent has been obtained with: patient.  Pre-procedure education provided      CODE STATUS:

## 2024-10-01 NOTE — PROGRESS NOTES
Colorectal & General Surgery  Consultation    Patient: Fernanda Molina  YOB: 1940  MRN: [id number]      Assessment  Fernanda Molina is a 83 y.o. female with significant cardiac history on Coumadin who presents with a large perirectal abscess.  We discussed proceeding to the operating room urgently for incision and drainage.  We discussed the risk, benefits, alternatives, including the risk of finding a fistula.    I will direct admit her to the hospital, check her INR, and start IV antibiotics.  Plan to proceed the operating room urgently this afternoon.    Referring Provider: Rashid Ge MD    Reason for Consultation: Anal pain and bleeding    History of Present Illness   Fernanda Molina is a 83 y.o. female who presents to the office with what sounds like a few weeks of anal pain with bleeding.  She suspected that she had a hemorrhoid but says that in the past 2 weeks, the pain in bleeding is gotten significantly worse.  Denies fevers and chills.  She has tried topical steroids with some benefit but not in the last 2 weeks.    Most recent colonoscopy: 2015, polyps.    Past Medical History   Past Medical History:   Diagnosis Date    AICD (automatic cardioverter/defibrillator) present     left side chest    Anemia     Arthritis     Atrial fibrillation     Cancer 2002    melanoma right back of knee    CHF (congestive heart failure)     Cholelithiasis     Congenital heart disease     Coronary artery disease 2010    GERD (gastroesophageal reflux disease)     Graves disease     Hematuria     High cholesterol     History of melanoma 2002    RIGHT KNEE    History of MI (myocardial infarction) 2010    Hospital discharge follow-up 05/29/2019    Incontinence of urine     wears pads    Myocardial infarction 2010    On anticoagulant therapy     HAMLET on CPAP     cpap    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    PONV (postoperative nausea and vomiting)     Spinal stenosis     Status post bilateral knee replacements  10/27/2021    Ureteral calculus, left 2020    Valvular heart disease 2015    S/p MVR & TVR         Past Surgical History   Past Surgical History:   Procedure Laterality Date    CARDIAC CATHETERIZATION      CARDIAC DEFIBRILLATOR PLACEMENT Left     MEDTRONIC    CARDIAC DEFIBRILLATOR PLACEMENT Left 2016    MEDTRONIC     SECTION  1971     SECTION  1975    CHOLECYSTECTOMY  1996    COLONOSCOPY N/A     ENDOSCOPY      EPIDURAL BLOCK  1327-1630    Lumbar     EXTRACORPOREAL SHOCK WAVE LITHOTRIPSY (ESWL) Right 2017    EXTRACORPOREAL SHOCK WAVE LITHOTRIPSY (ESWL) Right 2017    Procedure: RT EXTRACORPOREAL SHOCKWAVE LITHOTRIPSY;  Surgeon: Moisés Everett MD;  Location: [facility];  Service:     EYE SURGERY Right 09/10/2007    Cataract    EYE SURGERY Left 10/29/2007    Cataract    JOINT REPLACEMENT Left 2007    Knee    JOINT REPLACEMENT Right 2010    Knee    KNEE ARTHROSCOPY  1980    MAZE PROCEDURE  2015    Dr. Saavedra    MITRAL VALVE REPAIR/REPLACEMENT      MITRAL VALVE REPAIR/REPLACEMENT N/A 2019    Procedure: ALINE RE-OP STERNOTOMY MITRAL VALVE REPLACEMENT AND PRP;  Surgeon: Ben Saavedra MD;  Location: [facility] OR;  Service: Cardiothoracic    PACEMAKER IMPLANTATION Left     TN ERCP DX COLLECTION SPECIMEN BRUSHING/WASHING N/A 2016    Procedure: ENDOSCOPIC RETROGRADE CHOLANGIOPANCREATOGRAPHY WITH SPHINCTEROTOMY AND BALLOON SWEEP OF CBD;  Surgeon: Lucas Campos MD;  Location: [facility] ENDOSCOPY;  Service: Gastroenterology    SKIN CANCER EXCISION      MELANOMA RIGHT KNEE AREA -     TRICUSPID VALVE SURGERY  2015    Dr. Saavedra  ring placed    URETEROSCOPY LASER LITHOTRIPSY WITH STENT INSERTION Right 11/15/2017    Procedure: CYSTOSCOPY; RIGHT URETEROSCOPY LASER LITHOTRIPSY WITH STENT INSERTION;  Surgeon: Moisés Everett MD;  Location: [facility] OR;  Service:     URETEROSCOPY LASER LITHOTRIPSY WITH STENT  INSERTION Left 01/30/2020    Procedure: URETEROSCOPY STONE BASKET EXTRACTION LASER LITHOTRIPSY WITH STENT INSERTION WITH STRING;  Surgeon: Moisés Everett MD;  Location: [facility];  Service: Urology       Social History  Social History     Socioeconomic History    Marital status:    Tobacco Use    Smoking status: Never     Passive exposure: Never    Smokeless tobacco: Never   Vaping Use    Vaping status: Never Used   Substance and Sexual Activity    Alcohol use: No    Drug use: No    Sexual activity: Not Currently     Partners: Male     Birth control/protection: Post-menopausal, Tubal ligation, Birth control pill       Family History  Family History   Problem Relation Age of Onset    Heart attack Mother     Heart disease Mother     Hypertension Mother     Stroke Mother     Macular degeneration Mother     Heart attack Father     Arthritis Maternal Aunt     Malig Hyperthermia Neg Hx        Colorectal cancer family history: None    Review of Systems  Negative except as documented in the HPI.     Allergies  Allergies   Allergen Reactions    Amiodarone Other (See Comments)     toxicity    Corticosteroids Other (See Comments)     RAISES BLOOD PRESSURE    Propoxyphene Nausea And Vomiting    Benzonatate Unknown - Low Severity    Cephalexin Diarrhea    Adhesive Tape Rash    Hydrocodone Nausea And Vomiting and Hallucinations    Lisinopril Other (See Comments)     CHRONIC NASAL CONGESTION       Medications    Current Outpatient Medications:     albuterol (PROVENTIL) (2.5 MG/3ML) 0.083% nebulizer solution, Take 2.5 mg by nebulization Every 4 (Four) Hours As Needed for Wheezing., Disp: 30 each, Rfl: 1    albuterol sulfate  (90 Base) MCG/ACT inhaler, Inhale 2 puffs Every 4 (Four) Hours As Needed for Wheezing., Disp: 18 g, Rfl: 5    atorvastatin (LIPITOR) 20 MG tablet, TAKE 1 TABLET DAILY FOR ELEVATION OF BOTH CHOLESTEROL AND TRIGLYCERIDES IN BLOOD, Disp: 90 tablet, Rfl: 3    BD Pen Needle Clara U/F 32G X 4  MM misc, INJECT 1 UNITS UNDER THE SKIN INTO THE APPROPRIATE AREA AS DIRECTED DAILY., Disp: 100 each, Rfl: 3    cetirizine (zyrTEC) 10 MG tablet, Take 1 tablet by mouth Daily., Disp: , Rfl:     digoxin (LANOXIN) 250 MCG tablet, TAKE 1 TABLET DAILY, Disp: 90 tablet, Rfl: 3    diphenoxylate-atropine (Lomotil) 2.5-0.025 MG per tablet, Take 1 tablet by mouth 4 (Four) Times a Day As Needed for Diarrhea. (Patient taking differently: Take 1 tablet by mouth As Needed for Diarrhea.), Disp: 30 tablet, Rfl: 0    Dulaglutide (Trulicity) 1.5 MG/0.5ML solution pen-injector, Inject 1.5 mg under the skin into the appropriate area as directed 1 (One) Time Per Week for 48 doses. Indications: Type 2 Diabetes, Disp: 6 mL, Rfl: 3    Farxiga 10 MG tablet, Daily., Disp: , Rfl:     ferrous sulfate 325 (65 FE) MG EC tablet, Take 65 mg by mouth 1 (one) time per week. On Sunday, Disp: , Rfl:     furosemide (LASIX) 40 MG tablet, Take 1 tablet by mouth Daily. Indications: Edema, Disp: 7 tablet, Rfl: 0    glucose blood (OneTouch Verio) test strip, 1 each by Other route 3 times a day. Use as instructed, Disp: 100 each, Rfl: 12    Hydrocortisone, Perianal, (Anusol-HC) 2.5 % rectal cream, Apply after each bowel movement and nightly  Indications: Itching of Anus and/or Rectum, Disp: 28 g, Rfl: 1    Insulin Glargine (Lantus SoloStar) 100 UNIT/ML injection pen, Inject 40 Units under the skin into the appropriate area as directed Daily. Indications: Type 2 Diabetes (Patient taking differently: Inject 30 Units under the skin into the appropriate area as directed Daily. Indications: Type 2 Diabetes), Disp: 30 mL, Rfl: 3    Lancets (OneTouch Delica Plus Zulhqi70M) misc, 1 EACH BY OTHER ROUTE 3 (THREE) TIMES A DAY., Disp: 100 each, Rfl: 10    levothyroxine (SYNTHROID, LEVOTHROID) 175 MCG tablet, TAKE 1 TABLET DAILY FOR UNDERACTIVE THYROID, Disp: 90 tablet, Rfl: 3    metFORMIN (GLUCOPHAGE) 1000 MG tablet, Take 1 tablet by mouth Daily Before Supper.  Indications: Type 2 Diabetes, Disp: 90 tablet, Rfl: 3    metFORMIN (GLUCOPHAGE) 500 MG tablet, Take 1 tablet by mouth Daily With Breakfast. Indications: Type 2 Diabetes, Disp: , Rfl:     metoprolol succinate XL (TOPROL-XL) 200 MG 24 hr tablet, Take 1 tablet by mouth Every Morning., Disp: , Rfl:     mometasone (NASONEX) 50 MCG/ACT nasal spray, INSTILL 2 SPRAYS INTO THE NOSTRIL(S) AS DIRECTED BY PROVIDER DAILY., Disp: 17 g, Rfl: 0    Multiple Vitamin (MULTIVITAMIN) tablet, Take 1 tablet by mouth Daily., Disp: , Rfl:     mupirocin (BACTROBAN) 2 % ointment, Apply 1 Application topically to the appropriate area as directed 2 (Two) Times a Day., Disp: 30 g, Rfl: 0    oxyCODONE-acetaminophen (PERCOCET) 5-325 MG per tablet, Take 1 tablet by mouth Every 6 (Six) Hours As Needed., Disp: , Rfl:     pantoprazole (PROTONIX) 40 MG EC tablet, TAKE 1 TABLET DAILY FOR GASTROESOPHAGEAL REFLUX DISEASE, Disp: 90 tablet, Rfl: 3    potassium chloride (K-DUR,KLOR-CON) 20 MEQ CR tablet, TAKE 1 TABLET BY MOUTH DAILY WITH BREAKFAST., Disp: 30 tablet, Rfl: 0    sacubitril-valsartan (Entresto) 49-51 MG tablet, 2 (Two) Times a Day., Disp: , Rfl:     Vericiguat (Verquvo) 5 MG tablet, Take  by mouth., Disp: , Rfl:     warfarin (COUMADIN) 2.5 MG tablet, Take 1 tablet by mouth. MON TUES THURS SUN(AS WELL AS THE 5MG TO TOTAL 7.5MG), Disp: , Rfl:     warfarin (COUMADIN) 5 MG tablet, Take 1 tablet by mouth Every Night. SAT AND SUN, Disp: , Rfl:     Zoster Vac Recomb Adjuvanted (Shingrix) 50 MCG/0.5ML reconstituted suspension, Inject 0.5 mL into the appropriate muscle as directed by prescriber Every 6 (Six) Months for 2 doses., Disp: 1 each, Rfl: 1    Vital Signs  There were no vitals filed for this visit.     Physical Exam  Constitutional: Resting comfortably, no acute distress  Neck: Supple, trachea midline  Respiratory: No increased work of breathing, Symmetric excursion  Cardiovascular: Well pefursed, no jugular venous distention evident    Abdominal:  Soft, non-tender, non-distended  Lymphatics: No cervical or suprascapular adenopathy  Skin: Warm, dry, no rash on visualized skin surfaces  Musculoskeletal: Symmetric strength, no obvious gross abnormalities  Psychiatric: Alert and oriented ×3, normal affect   Perianal: Large perirectal abscess on the left.     Laboratory Results  I have personally reviewed CBC with WBC 7, hemoglobin 12, platelets 192.  CMP with creatinine 0.76, albumin 4.2.  Hemoglobin A1c 5.7.     Radiology  None to review    Endoscopy  Colonoscopy from 2015 with multiple polyps.    I spent 38 minutes caring for Fernanda on this date of service. This time includes time spent by me in the following activities:preparing for the visit, obtaining and/or reviewing a separately obtained history, performing a medically appropriate examination and/or evaluation , counseling and educating the patient/family/caregiver, ordering medications, tests, or procedures, documenting information in the medical record, and care coordination     Blaze Carrasquillo MD  Colorectal & General Surgery  [facility]    [address] [place] [address], [address]  [place], [place], [address]  P: [phone]  F: [phone]

## 2024-10-01 NOTE — OP NOTE
Colorectal & General Surgery  Operative Report    Patient: Fernanda Molina  YOB: 1940  MRN: [id number]  DATE OF PROCEDURE: 10/01/24     PREOPERATIVE DIAGNOSIS:  Perirectal abscess    POSTOPERATIVE DIAGNOSIS:  Same    PROCEDURE:  Incision and drainage of perirectal abscess    FINDINGS:  Left lateral perirectal abscess.  No obvious fistulous communication to the anal canal.  Not a lot of purulent material, but a it was opened  relatively large abscess cavity.  A disc of overlying skin and subcutaneous tissue was excised to allow for adequate drainage and reapproximation by secondary intention.  Hemostasis observed.  1 piece Surgicel in 1 piece 4 x 4 gauze packed into wound.    SURGEON:  Blaze Carrasquillo MD    ASSISTANT:  None    ANESTHESIA:  General-endotracheal    EBL:  15 mL    SPECIMEN:  Wound culture    OPERATIVE DESCRIPTION:  The patient was brought to the operating room under the care of the nursing staff.  The patient was placed on the operating room table in the supine position where anesthesia was induced.  The patient was then prepped and positioned in the usual sterile fashion in lithotomy position.  A standardized timeout was then performed.    External exam demonstrated approximately 4 x 5 cm indurated abscess in the left lateral position.  Normal external exam.  Mild hemorrhoids.  Normal anal canal.  Stab incision made over point of maximal fluctuance.  No significant rush of purulent fluid but some serosanguineous fluid.  This fluid was cultured.  Multiple loculations were broken up.  The incision was extended to a circular disc excision of skin and subcutaneous tissue to allow for adequate visualization and drainage.  No evidence of significant necrotic tissue, just abscess cavity.  Wound was copiously irrigated.  Hemostasis obtained with the Bovie electrocautery.  Wound was packed with Surgicel and 4 x 4 gauze.  ABD and mesh pants placed for dressing.    All needle, sponge, and  instrument counts were correct at the end of the case.    The patient tolerated the procedure well and was transferred to the postanesthesia care unit in stable condition.    Blaze Carrasquillo M.D.  Colorectal & General Surgery  [facility]    [address] [place] [address], [address]  [place], [place], [address]  P: [phone]  F: [phone]

## 2024-10-01 NOTE — H&P
83-year-old lady admitted for perirectal abscess.  Please see note from office today for full H&P.

## 2024-10-02 ENCOUNTER — READMISSION MANAGEMENT (OUTPATIENT)
Dept: CALL CENTER | Facility: HOSPITAL | Age: 84
End: 2024-10-02
Payer: MEDICARE

## 2024-10-02 VITALS
OXYGEN SATURATION: 94 % | SYSTOLIC BLOOD PRESSURE: 107 MMHG | DIASTOLIC BLOOD PRESSURE: 60 MMHG | RESPIRATION RATE: 16 BRPM | HEART RATE: 61 BPM | TEMPERATURE: 97.6 F

## 2024-10-02 LAB
BH BB BLOOD EXPIRATION DATE: NORMAL
BH BB BLOOD TYPE BARCODE: 7300
BH BB DISPENSE STATUS: NORMAL
BH BB PRODUCT CODE: NORMAL
BH BB UNIT NUMBER: NORMAL
GLUCOSE BLDC GLUCOMTR-MCNC: 120 MG/DL (ref 70–130)
GLUCOSE BLDC GLUCOMTR-MCNC: 94 MG/DL (ref 70–130)
UNIT  ABO: NORMAL
UNIT  RH: NORMAL

## 2024-10-02 PROCEDURE — 99024 POSTOP FOLLOW-UP VISIT: CPT | Performed by: SURGERY

## 2024-10-02 PROCEDURE — G0378 HOSPITAL OBSERVATION PER HR: HCPCS

## 2024-10-02 PROCEDURE — 63710000001 INSULIN GLARGINE PER 5 UNITS: Performed by: SURGERY

## 2024-10-02 PROCEDURE — 82948 REAGENT STRIP/BLOOD GLUCOSE: CPT

## 2024-10-02 RX ORDER — INSULIN GLARGINE 100 [IU]/ML
30 INJECTION, SOLUTION SUBCUTANEOUS DAILY
Start: 2024-10-02

## 2024-10-02 RX ADMIN — POTASSIUM CHLORIDE 20 MEQ: 750 TABLET, EXTENDED RELEASE ORAL at 08:14

## 2024-10-02 RX ADMIN — CETIRIZINE HYDROCHLORIDE 10 MG: 10 TABLET ORAL at 08:13

## 2024-10-02 RX ADMIN — OXYCODONE HYDROCHLORIDE 5 MG: 5 TABLET ORAL at 11:52

## 2024-10-02 RX ADMIN — INSULIN GLARGINE 20 UNITS: 100 INJECTION, SOLUTION SUBCUTANEOUS at 08:08

## 2024-10-02 RX ADMIN — DIGOXIN 250 MCG: 250 TABLET ORAL at 08:13

## 2024-10-02 RX ADMIN — FUROSEMIDE 40 MG: 40 TABLET ORAL at 08:14

## 2024-10-02 RX ADMIN — SACUBITRIL AND VALSARTAN 1 TABLET: 49; 51 TABLET, FILM COATED ORAL at 08:14

## 2024-10-02 RX ADMIN — LEVOTHYROXINE SODIUM 175 MCG: 175 TABLET ORAL at 06:32

## 2024-10-02 RX ADMIN — ATORVASTATIN CALCIUM 20 MG: 10 TABLET, FILM COATED ORAL at 08:13

## 2024-10-02 RX ADMIN — METOPROLOL SUCCINATE 200 MG: 50 TABLET, FILM COATED, EXTENDED RELEASE ORAL at 06:32

## 2024-10-02 RX ADMIN — PANTOPRAZOLE SODIUM 40 MG: 40 TABLET, DELAYED RELEASE ORAL at 08:14

## 2024-10-02 NOTE — PLAN OF CARE
Goal Outcome Evaluation:  Plan of Care Reviewed With: patient        Progress: no change  Outcome Evaluation: Transfer from OR after perirectal I&D. All needs met. Rested well. Up w/ assist x 1 to toilet. 2 BMs tonight. Purewick applied for incontinence. Perirectal dressing changed x2 tonight. CC diet. VSS. Oriented x 4. 1L NC for sleep. No complaints. Blood glucose monitoring.

## 2024-10-02 NOTE — ANESTHESIA POSTPROCEDURE EVALUATION
Patient: Fernanda Molina    Procedure Summary       Date: 10/01/24 Room / Location: [facility] OR [address] [place] [address] MAIN OR    Anesthesia Start: 1810 Anesthesia Stop: 1911    Procedure: INCISION AND DRAINAGE OF PERIRECTAL ABSCESS (Perirectal) Diagnosis:       Perirectal abscess      (Perirectal abscess [K61.1])    Surgeons: Ben Carrasquillo MD Provider: Ky Candelaria MD    Anesthesia Type: general ASA Status: 3            Anesthesia Type: general    Vitals  Vitals Value Taken Time   /86 10/01/24 2015   Temp 36.9 °C (98.4 °F) 10/01/24 1945   Pulse 60 10/01/24 2024   Resp 16 10/01/24 1910   SpO2 96 % 10/01/24 2024   Vitals shown include unfiled device data.        Post Anesthesia Care and Evaluation    Level of consciousness: awake and alert  Pain management: adequate    Airway patency: patent  Anesthetic complications: No anesthetic complications  PONV Status: none  Cardiovascular status: acceptable  Respiratory status: acceptable  Hydration status: acceptable    Comments: /81   Pulse 62   Temp 36.9 °C (98.4 °F) (Oral)   Resp 16   SpO2 97%

## 2024-10-02 NOTE — NURSING NOTE
Pt discharged to home. Both IV's removed. Went over follow-up and discharge instructions. Spouse providing transportation.

## 2024-10-02 NOTE — DISCHARGE INSTRUCTIONS
Dr. Blaze Carrasquillo  [address] [facility] [address]  [place] [id number]  [phone]    Discharge Instructions for Anorectal Procedure    Restart coumadin tomorrow    Go home, rest and take it easy today; however, you should get up and move about several times today to reduce the risk of developing a clot in your legs.      You may experience some dizziness or memory loss from the anesthesia.  This may last for the next 24 hours.  Someone should plan on staying with you for the first 24 hours for your safety.    Do not make any important legal decisions or sign any legal papers for the next 24 hours.      Eat and drink lightly today.  Start off with liquids, jello, soup, crackers or other bland foods at first. Please drink plenty of fluids. You may advance your diet tomorrow as tolerated as long as you do not experience any nausea or vomiting.     You should apply ice packs to the anal area today and begin your sitz baths tomorrow.    The best method of pain relief is a sitz bath (sitting in a tub or warm water) at least 3 times daily for 15 minutes.  This helps to reduce pain and aids with hygiene/drainage.  The drainage may have an unpleasant odor.  This is not unexpected and should be controlled with baths and showers.      If you have packing, remove the packing tomorrow.  You do not have to replace the packing once removed.  It will be critical to keep the area clean so take a sitz bath or a warm shower and allow soapy water to run over the perianal area after each bowel movement.    Bleeding and drainage are to be expected and may persist for as long as 2-4 weeks. Bleeding may occur with your bowel movements as well. Wear a cotton liner such as a Kotex pad or panty liner inside your underwear to protect your clothing.      Pain Medications  Alternate taking tylenol and ibuprofen at the doses below. If you have been instructed to not take either of those medicines due to another medical issue, please do not take  Medical record request has processed on 4/27/2023 them.  Tylenol 650 mg every 6 hours as needed for pain  Ibuprofen 600 mg every 6 hours as needed for pain  You have prescription strength pain medications available for you if you experience severe pain. Do not take the oxycodone and diazepam (Valium) simultaneously. Space them out by at least one hour.   Oxycodone 5 mg tablets. Take 1 to 2 tablets every 4 to 6 hours as needed for severe pain.   Diazepam (Valium) 5 mg tablets. Take 1 tablet every 8 hours as needed for severe anal pain or anal spasms. Do NOT take simultaneously with oxycodone.   You will not be totally pain free, but your pain medicine should make the pain tolerable.  Please take your pain medicine as prescribed and always take your pills with food to prevent nausea.  If you are having severe pain that cannot be controlled by the pain medicine, please contact me.    Remember that warm sitz baths are often very effective in controlling pain as well.    The goal is for your bowel movements to be soft which will help to minimize pain.  The pain medicine used to keep your comfortable may also cause some constipation so I recommend the following:    Metamucil powder 1 tablespoon in 8oz of water twice daily  Miralax daily as needed to produce one daily bowel movement   Contact me if the above does not result in soft bowel movements as you may need to add to the regimen.      No driving for 24 hours and for as long as you are taking your prescription pain medicine.  You may resume your activities gradually after 2 weeks. No heavy lifting (> 10 pounds) for 2 weeks.     You may return to work on the second day after surgery as you are able to tolerate.       The office will schedule you a follow up appointment. If you do not have one or do not hear from the office after one week, please call to schedule.     Remember to contact me for any of the following:    Fever > 101 degrees  Severe pain that cannot be controlled by taking your pain pills  Severe nausea  or vomiting that cannot be controlled by taking your nausea pills  Significant bleeding   Inability to urinate  Any other questions or concerns

## 2024-10-02 NOTE — OUTREACH NOTE
Prep Survey      Flowsheet Row Responses   [facility] patient discharged from? [place]   Is LACE score < 7 ? No   Eligibility [facility]   Date of Admission 10/01/24   Date of Discharge 10/02/24   Discharge Disposition Home or Self Care   Discharge diagnosis Perirectal abscess, INCISION AND DRAINAGE OF PERIRECTAL ABSCESS   Does the patient have one of the following disease processes/diagnoses(primary or secondary)? General Surgery   Does the patient have Home health ordered? No   Is there a DME ordered? No   Prep survey completed? Yes            Anjelica MA - Registered Nurse

## 2024-10-02 NOTE — DISCHARGE SUMMARY
Colorectal & General Surgery  Discharge Summary    DATE OF ADMIT:    10/01/2024     DATE OF DISCHARGE:    10/02/24     DIAGNOSIS:    Perirectal abscess    PROCEDURES:    Incision and drainage of perirectal abscess    FINAL PATHOLOGY:    Perirectal abscess    SUMMARY OF HOSPITAL COURSE:     She was admitted with a perirectal abscess and taken to the operating room for incision and drainage.  She tolerated this well.  Wound was hemostatic.  Discharged on postoperative day 1.  No further indication for antibiotics.  Resume all usual home medications including Coumadin tomorrow.    PHYSICAL EXAM  Constitutional: Resting comfortably, no acute distress  Neck: Supple, trachea midline  Respiratory: No increased work of breathing, Symmetric excursion  Cardiovascular: Well pefursed, no jugular venous distention evident   Abdominal:  Soft, non-tender, non-distended  Lymphatics: No cervical or suprascapular adenopathy  Skin: Warm, dry, no rash on visualized skin surfaces  Musculoskeletal: Symmetric strength, no obvious gross abnormalities  Psychiatric: Alert and oriented ×3, normal affect    Perianal: Well-healing abscess cavity with no further erythema or induration.    DIET: Regular, as tolerated    ACTIVITY: As tolerated    MEDICATIONS: Refer to MAR    FOLLOW-UP: 2 weeks in the office.  My office will call to schedule.    Blaze Carrasquillo MD  Colorectal & General Surgery  [facility]    [address], [address]  [place], [place], [address]  P: [phone]  F: [phone]

## 2024-10-02 NOTE — CASE MANAGEMENT/SOCIAL WORK
Discharge Planning Assessment  [facility]     Patient Name: Fernanda Molina  MRN: [id number]  Today's Date: 10/2/2024    Admit Date: 10/1/2024    Plan: Home with spouse.   Discharge Needs Assessment       Row Name 10/02/24 1255       Living Environment    People in Home spouse    Current Living Arrangements home    Primary Care Provided by self    Provides Primary Care For no one    Family Caregiver if Needed spouse    Family Caregiver Names Crispin [phone]    Quality of Family Relationships helpful;involved    Able to Return to Prior Arrangements yes       Transition Planning    Patient/Family Anticipates Transition to home with family    Transportation Anticipated family or friend will provide       Discharge Needs Assessment    Readmission Within the Last 30 Days no previous admission in last 30 days    Equipment Currently Used at Home walker, rolling;wheelchair;cane, straight;cpap;rollator    Concerns to be Addressed denies needs/concerns at this time                   Discharge Plan       Row Name 10/02/24 0662       Plan    Plan Home with spouse.    Patient/Family in Agreement with Plan yes    Plan Comments MOON letter given. Met with patient and family including spouse-Crispin [phone] at bedside, introduced self and explained CCP role. Verified facesheet and PCP- Rashid Lau. Patient lives at home with spouse and home has 2 steps to enter with rail. patient was ind with ADLS with rollator prior to admission. Patient has used [facility] in the past. Patient had orders for OP rehab prior to admission and will resume upon DC.Patient uses [facility] pharmacy in [place] and reports no difficulty affording medications. Family will transport at DC. CCP to follow. Drew MA Rn                  Continued Care and Services - Admitted Since 10/1/2024    No active coordination exists for this encounter.       Expected Discharge Date and Time       Expected Discharge Date Expected Discharge Time    Oct 4, 2024             Demographic Summary       Row Name 10/02/24 1255       General Information    Admission Type observation    Required Notices Provided Observation Status Notice    Referral Source admission list    Reason for Consult discharge planning    Preferred Language English                   Functional Status       Row Name 10/02/24 1252       Functional Status    Usual Activity Tolerance good    Current Activity Tolerance good       Functional Status, IADL    Medications independent    Meal Preparation independent    Housekeeping independent    Laundry independent    Shopping independent       Mental Status    General Appearance WDL WDL                   Psychosocial    No documentation.                  Abuse/Neglect    No documentation.                  Legal    No documentation.                  Substance Abuse    No documentation.                  Patient Forms    No documentation.                     Drew Muhammad RN

## 2024-10-03 ENCOUNTER — TRANSITIONAL CARE MANAGEMENT TELEPHONE ENCOUNTER (OUTPATIENT)
Dept: CALL CENTER | Facility: HOSPITAL | Age: 84
End: 2024-10-03
Payer: MEDICARE

## 2024-10-03 NOTE — OUTREACH NOTE
Call Center TCM Note      Flowsheet Row Responses   [facility] patient discharged from? [facility]   Does the patient have one of the following disease processes/diagnoses(primary or secondary)? General Surgery   TCM attempt successful? Yes   Call start time 1011   Call end time 1024   Discharge diagnosis Perirectal abscess, INCISION AND DRAINAGE OF PERIRECTAL ABSCESS   Person spoke with today (if not patient) and relationship Patient and Spouse Crispin Molina   Meds reviewed with patient/caregiver? Yes   Is the patient having any side effects they believe may be caused by any medication additions or changes? No   Does the patient have all medications related to this admission filled (includes all antibiotics, pain medications, etc.) Yes   Is the patient taking all medications as directed (includes completed medication regime)? Yes   Medication comments Discussed Docusate Sodium OTC for stool softener. Patient states she is anxious about having a bowel movement. Advised Docusate Sodium would soften the stool to ease having a bm.   Comments Patient states she will f/u with Dr. Carrasquillo at this time since she was hospitalized for surgery only. No other health issues at this time per patient.   Does the patient have an appointment with their PCP within 7-14 days of discharge? No   Nursing Interventions Patient declined scheduling/rescheduling appointment at this time, Patient desires to follow up with specialty only   Has home health visited the patient within 72 hours of discharge? N/A   Psychosocial issues? No   Comments Patient states she is unable to do sitz bath, she cannot get down into the tub. Patient does have a shower wand. Advised patient to use shower wand several times a day and after bm to keep area clean. Patient verbalized understanding. Patient states very little bleeding overnight. She reports it is uncomfortable to sit down on her bottom.   Did the patient receive a copy of their discharge  instructions? Yes   Nursing interventions Reviewed instructions with patient   What is the patient's perception of their health status since discharge? Improving   Nursing interventions Nurse provided patient education   Is the patient /caregiver able to teach back basic post-op care? Take showers only when approved by MD-sponge bathe until then, Keep incision areas clean,dry and protected   Is the patient/caregiver able to teach back signs and symptoms of incisional infection? Fever, Increased redness, swelling or pain at the incisonal site, Increased drainage or bleeding, Pus or odor from incision, Incisional warmth   Is the patient/caregiver able to teach back steps to recovery at home? Set small, achievable goals for return to baseline health, Rest and rebuild strength, gradually increase activity   If the patient is a current smoker, are they able to teach back resources for cessation? Not a smoker   Is the patient/caregiver able to teach back the hierarchy of who to call/visit for symptoms/problems? PCP, Specialist, Home health nurse, Urgent Care, ED, 911 Yes   TCM call completed? Yes   Wrap up additional comments Patient states she will f/u with Dr. Carrasquillo at this time since she was hospitalized for surgery only. No other health issues at this time per patient.   Call end time 1024   Would this patient benefit from a Referral to Amb Social Work? No   Is the patient interested in additional calls from an ambulatory ? No            Carole Euceda RN    10/3/2024, 10:24 EDT

## 2024-10-03 NOTE — PROGRESS NOTES
Case Management Discharge Note      Final Note: Discharged home. Shanice De La Cruz RN         Selected Continued Care - Discharged on 10/2/2024 Admission date: 10/1/2024 - Discharge disposition: Home or Self Care         Transportation Services  Private: Car    Final Discharge Disposition Code: 01 - home or self-care

## 2024-10-04 LAB
BACTERIA SPEC AEROBE CULT: ABNORMAL
BACTERIA SPEC AEROBE CULT: ABNORMAL
GRAM STN SPEC: ABNORMAL
GRAM STN SPEC: ABNORMAL

## 2024-10-06 LAB — BACTERIA SPEC ANAEROBE CULT: NORMAL

## 2024-10-17 ENCOUNTER — OFFICE VISIT (OUTPATIENT)
Dept: SURGERY | Facility: CLINIC | Age: 84
End: 2024-10-17
Payer: MEDICARE

## 2024-10-17 VITALS
DIASTOLIC BLOOD PRESSURE: 78 MMHG | BODY MASS INDEX: 35.59 KG/M2 | WEIGHT: 193.4 LBS | HEART RATE: 75 BPM | SYSTOLIC BLOOD PRESSURE: 122 MMHG | OXYGEN SATURATION: 98 % | HEIGHT: 62 IN

## 2024-10-17 DIAGNOSIS — K61.1 PERIRECTAL ABSCESS: Primary | ICD-10-CM

## 2024-10-17 PROCEDURE — 99024 POSTOP FOLLOW-UP VISIT: CPT | Performed by: SURGERY

## 2024-10-17 NOTE — PROGRESS NOTES
Colorectal & General Surgery  Post - Op    Patient: Fernanda Molina  YOB: 1940  MRN: [id number]      Assessment  Fernanda Molina is a 83 y.o. female status post recent incision and drainage of perianal and perirectal abscess.  She is recovering nicely.  Wound is granulating well.  No further signs of infection and no indication for antibiotics.  She is welcome to follow-up with me on an as-needed basis.    History of Present Illness   Fernanda Molina is a 83 y.o. female who presents in follow-up today.  Overall, she has no complaints.  Continue drainage but continues to decrease.    Vital Signs  Vitals:    10/17/24 1024   BP: 122/78   Pulse: 75   SpO2: 98%        Physical Exam  Constitutional: Resting comfortably, no acute distress  Neck: Supple, trachea midline  Respiratory: No increased work of breathing, Symmetric excursion  Cardiovascular: Well pefursed, no jugular venous distention evident   Abdominal:  Soft, non-tender, non-distended  Lymphatics: No cervical or suprascapular adenopathy  Skin: Warm, dry, no rash on visualized skin surfaces  Musculoskeletal: Symmetric strength, no obvious gross abnormalities  Psychiatric: Alert and oriented ×3, normal affect   Perianal: Well-healing perianal abscess.    Blaze Carrasquillo MD  Colorectal & General Surgery  [facility]    [address], [address]  [place], [place], [address]  P: [phone]  F: [phone]

## 2024-11-02 DIAGNOSIS — Z79.4 TYPE 2 DIABETES MELLITUS WITHOUT COMPLICATION, WITH LONG-TERM CURRENT USE OF INSULIN: ICD-10-CM

## 2024-11-02 DIAGNOSIS — E11.9 TYPE 2 DIABETES MELLITUS WITHOUT COMPLICATION, WITH LONG-TERM CURRENT USE OF INSULIN: ICD-10-CM

## 2024-11-17 RX ORDER — POTASSIUM CHLORIDE 1500 MG/1
20 TABLET, EXTENDED RELEASE ORAL
Qty: 90 TABLET | Refills: 3 | Status: SHIPPED | OUTPATIENT
Start: 2024-11-17

## 2024-11-30 DIAGNOSIS — E03.9 ACQUIRED HYPOTHYROIDISM: ICD-10-CM

## 2024-12-01 RX ORDER — LEVOTHYROXINE SODIUM 175 UG/1
TABLET ORAL
Qty: 90 TABLET | Refills: 1 | Status: SHIPPED | OUTPATIENT
Start: 2024-12-01

## 2024-12-10 DIAGNOSIS — E11.8 TYPE 2 DIABETES MELLITUS WITH COMPLICATION, WITH LONG-TERM CURRENT USE OF INSULIN: ICD-10-CM

## 2024-12-10 DIAGNOSIS — Z79.4 TYPE 2 DIABETES MELLITUS WITH COMPLICATION, WITH LONG-TERM CURRENT USE OF INSULIN: ICD-10-CM

## 2024-12-11 RX ORDER — PEN NEEDLE, DIABETIC 32GX 5/32"
1 NEEDLE, DISPOSABLE MISCELLANEOUS DAILY
Qty: 100 EACH | Refills: 2 | Status: SHIPPED | OUTPATIENT
Start: 2024-12-11

## 2024-12-15 RX ORDER — PANTOPRAZOLE SODIUM 40 MG/1
TABLET, DELAYED RELEASE ORAL
Qty: 90 TABLET | Refills: 3 | Status: SHIPPED | OUTPATIENT
Start: 2024-12-15

## 2024-12-16 ENCOUNTER — OFFICE VISIT (OUTPATIENT)
Dept: FAMILY MEDICINE CLINIC | Facility: CLINIC | Age: 84
End: 2024-12-16
Payer: MEDICARE

## 2024-12-16 VITALS
HEART RATE: 82 BPM | BODY MASS INDEX: 35.7 KG/M2 | TEMPERATURE: 97.7 F | SYSTOLIC BLOOD PRESSURE: 120 MMHG | WEIGHT: 194 LBS | OXYGEN SATURATION: 96 % | HEIGHT: 62 IN | DIASTOLIC BLOOD PRESSURE: 70 MMHG

## 2024-12-16 DIAGNOSIS — J06.9 ACUTE URI: ICD-10-CM

## 2024-12-16 DIAGNOSIS — R05.1 ACUTE COUGH: Primary | ICD-10-CM

## 2024-12-16 PROCEDURE — 1126F AMNT PAIN NOTED NONE PRSNT: CPT | Performed by: FAMILY MEDICINE

## 2024-12-16 PROCEDURE — 87428 SARSCOV & INF VIR A&B AG IA: CPT | Performed by: FAMILY MEDICINE

## 2024-12-16 PROCEDURE — 99213 OFFICE O/P EST LOW 20 MIN: CPT | Performed by: FAMILY MEDICINE

## 2024-12-16 RX ORDER — AMOXICILLIN 500 MG/1
500 TABLET, FILM COATED ORAL 3 TIMES DAILY
Qty: 21 TABLET | Refills: 0 | Status: SHIPPED | OUTPATIENT
Start: 2024-12-16 | End: 2024-12-23

## 2024-12-16 NOTE — PROGRESS NOTES
"  Chief Complaint   Patient presents with    Cough    Shortness of Breath     Upper Respiratory Infection: Patient complains of symptoms of a URI. Symptoms include congestion, cough, fever, and sore throat. Onset of symptoms was several days ago, gradually worsening since that time. .  She is drinking plenty of fluids. Evaluation to date: none. Treatment to date: none.  Ill contacts at home discussed.  Under vaccinated  Laryngitis  Vitals:    12/16/24 1604   BP: 120/70   BP Location: Left arm   Patient Position: Sitting   Cuff Size: Adult   Pulse: 82   Temp: 97.7 °F (36.5 °C)   SpO2: 96%   Weight: 88 kg (194 lb)   Height: 157.5 cm (62\")     Gen: mildly ill appearing, alert  Ears: Tm's bulging without redness  Nose:  Congestion  Throat:  Red without exudate, some drainage, tonsils okay  Neck: no LAD  Lung: mild rales, good air movement, regular RR  Heart: RR without murmur  Skin: no rash.  Results for orders placed or performed in visit on 12/16/24   POCT SARS-CoV-2 + Flu Antigen SERGEI    Collection Time: 12/16/24  4:31 PM    Specimen: Swab   Result Value Ref Range    SARS Antigen Not Detected Not Detected, Presumptive Negative    Influenza A Antigen SERGEI Not Detected Not Detected    Influenza B Antigen SERGEI Not Detected Not Detected    Internal Control Passed Passed    Lot Number [id number]     Expiration Date 10,232,025      *Note: Due to a large number of results and/or encounters for the requested time period, some results have not been displayed. A complete set of results can be found in Results Review.       Assessment & Plan   Diagnoses and all orders for this visit:    1. Acute cough (Primary)  -     POCT SARS-CoV-2 + Flu Antigen SERGEI  -     amoxicillin (AMOXIL) 500 MG tablet; Take 1 tablet by mouth 3 (Three) Times a Day for 7 days.  Dispense: 21 tablet; Refill: 0    2. Acute URI  -     amoxicillin (AMOXIL) 500 MG tablet; Take 1 tablet by mouth 3 (Three) Times a Day for 7 days.  Dispense: 21 tablet; Refill: 0       "   There are no Patient Instructions on file for this visit.  Tylenol or Advil as needed for pain, fever, muscle aches  Plenty of fluids  Hand washing discussed    Warm tea for throat.  Pros and cons of antibiotic use discussed    Dr. Rashid Ge MD  [place]  [facility]

## 2024-12-18 DIAGNOSIS — J06.9 ACUTE URI: ICD-10-CM

## 2024-12-18 RX ORDER — ALBUTEROL SULFATE 0.83 MG/ML
2.5 SOLUTION RESPIRATORY (INHALATION) EVERY 4 HOURS PRN
Qty: 30 EACH | Refills: 1 | Status: SHIPPED | OUTPATIENT
Start: 2024-12-18

## 2024-12-18 NOTE — TELEPHONE ENCOUNTER
Caller: Fernanda Molina    Relationship: Self    Best call back number:     Requested Prescriptions:   Requested Prescriptions     Pending Prescriptions Disp Refills    albuterol (PROVENTIL) (2.5 MG/3ML) 0.083% nebulizer solution 30 each 1     Sig: Take 2.5 mg by nebulization Every 4 (Four) Hours As Needed for Wheezing.        [place]arm[place] where [place] be sent[place] [facility] [place] [address] [place] - [phone] [facility] [phone]      Last office visit with prescribing clinician: 12/16/2024   Last telemedicine visit with prescribing clinician: Visit date not found   Next office visit with prescribing clinician: 3/11/2025     Additional details provided by patient:     Does the patient have less than a 3 day supply:  [x] Yes  [] No    Caro Stanley Rep   12/18/24 10:13 EST

## 2025-01-09 ENCOUNTER — TELEPHONE (OUTPATIENT)
Dept: FAMILY MEDICINE CLINIC | Facility: CLINIC | Age: 85
End: 2025-01-09

## 2025-01-09 NOTE — TELEPHONE ENCOUNTER
Caller: Fernanda Molina    Relationship: Self    Best call back number: [phone]    PATIENT WONDERED THE NAME OF THE PROVIDER DR FLOWER REFERRED HER TO FOR PODIATRY.  THIS WAS SEVERAL YEARS AGO.    PLEASE GIVE PATIENT A CALLBACK

## 2025-02-03 RX ORDER — LANCETS 33 GAUGE
1 EACH MISCELLANEOUS 3 TIMES DAILY
Qty: 100 EACH | Refills: 10 | Status: SHIPPED | OUTPATIENT
Start: 2025-02-03

## 2025-02-18 DIAGNOSIS — Z79.4 TYPE 2 DIABETES MELLITUS WITHOUT COMPLICATION, WITH LONG-TERM CURRENT USE OF INSULIN: ICD-10-CM

## 2025-02-18 DIAGNOSIS — E11.9 TYPE 2 DIABETES MELLITUS WITHOUT COMPLICATION, WITH LONG-TERM CURRENT USE OF INSULIN: ICD-10-CM

## 2025-02-18 RX ORDER — BLOOD SUGAR DIAGNOSTIC
1 STRIP MISCELLANEOUS 3 TIMES DAILY
Qty: 100 EACH | Refills: 1 | Status: SHIPPED | OUTPATIENT
Start: 2025-02-18

## 2025-02-18 NOTE — TELEPHONE ENCOUNTER
Received a call from pharmacy that her test strips will need to be sent to their [place] store to be ran through the members Medicare Part B plan and then she can  from the [place] location.

## 2025-02-24 DIAGNOSIS — J30.89 NON-SEASONAL ALLERGIC RHINITIS, UNSPECIFIED TRIGGER: ICD-10-CM

## 2025-02-24 RX ORDER — MOMETASONE FUROATE MONOHYDRATE 50 UG/1
2 SPRAY, METERED NASAL DAILY
Qty: 17 G | Refills: 0 | Status: SHIPPED | OUTPATIENT
Start: 2025-02-24

## 2025-03-04 ENCOUNTER — APPOINTMENT (OUTPATIENT)
Dept: CT IMAGING | Facility: HOSPITAL | Age: 85
End: 2025-03-04
Payer: MEDICARE

## 2025-03-04 ENCOUNTER — HOSPITAL ENCOUNTER (EMERGENCY)
Facility: HOSPITAL | Age: 85
Discharge: HOME | End: 2025-03-04
Attending: STUDENT IN AN ORGANIZED HEALTH CARE EDUCATION/TRAINING PROGRAM | Admitting: STUDENT IN AN ORGANIZED HEALTH CARE EDUCATION/TRAINING PROGRAM
Payer: MEDICARE

## 2025-03-04 VITALS
OXYGEN SATURATION: 95 % | HEIGHT: 61 IN | DIASTOLIC BLOOD PRESSURE: 66 MMHG | HEART RATE: 64 BPM | WEIGHT: 195 LBS | TEMPERATURE: 97.5 F | RESPIRATION RATE: 18 BRPM | SYSTOLIC BLOOD PRESSURE: 121 MMHG | BODY MASS INDEX: 36.82 KG/M2

## 2025-03-04 DIAGNOSIS — R31.9 URINARY TRACT INFECTION WITH HEMATURIA, SITE UNSPECIFIED: ICD-10-CM

## 2025-03-04 DIAGNOSIS — N39.0 URINARY TRACT INFECTION WITH HEMATURIA, SITE UNSPECIFIED: ICD-10-CM

## 2025-03-04 DIAGNOSIS — R10.9 FLANK PAIN: Primary | ICD-10-CM

## 2025-03-04 DIAGNOSIS — K44.9 HIATAL HERNIA: ICD-10-CM

## 2025-03-04 LAB
ALBUMIN SERPL-MCNC: 4 G/DL (ref 3.5–5.2)
ALBUMIN/GLOB SERPL: 1.6 G/DL
ALP SERPL-CCNC: 46 U/L (ref 39–117)
ALT SERPL W P-5'-P-CCNC: 19 U/L (ref 1–33)
ANION GAP SERPL CALCULATED.3IONS-SCNC: 12.2 MMOL/L (ref 5–15)
AST SERPL-CCNC: 22 U/L (ref 1–32)
BACTERIA UR QL AUTO: ABNORMAL /HPF
BASOPHILS # BLD AUTO: 0.04 10*3/MM3 (ref 0–0.2)
BASOPHILS NFR BLD AUTO: 0.6 % (ref 0–1.5)
BILIRUB SERPL-MCNC: 0.8 MG/DL (ref 0–1.2)
BILIRUB UR QL STRIP: NEGATIVE
BUN SERPL-MCNC: 14 MG/DL (ref 8–23)
BUN/CREAT SERPL: 18.9 (ref 7–25)
CALCIUM SPEC-SCNC: 10.5 MG/DL (ref 8.6–10.5)
CHLORIDE SERPL-SCNC: 102 MMOL/L (ref 98–107)
CLARITY UR: ABNORMAL
CO2 SERPL-SCNC: 23.8 MMOL/L (ref 22–29)
COLOR UR: YELLOW
CREAT SERPL-MCNC: 0.74 MG/DL (ref 0.57–1)
DEPRECATED RDW RBC AUTO: 48.8 FL (ref 37–54)
EGFRCR SERPLBLD CKD-EPI 2021: 80.9 ML/MIN/1.73
EOSINOPHIL # BLD AUTO: 0.09 10*3/MM3 (ref 0–0.4)
EOSINOPHIL NFR BLD AUTO: 1.3 % (ref 0.3–6.2)
ERYTHROCYTE [DISTWIDTH] IN BLOOD BY AUTOMATED COUNT: 14.2 % (ref 12.3–15.4)
GLOBULIN UR ELPH-MCNC: 2.5 GM/DL
GLUCOSE SERPL-MCNC: 192 MG/DL (ref 65–99)
GLUCOSE UR STRIP-MCNC: ABNORMAL MG/DL
HCT VFR BLD AUTO: 41 % (ref 34–46.6)
HGB BLD-MCNC: 13 G/DL (ref 12–15.9)
HGB UR QL STRIP.AUTO: ABNORMAL
HYALINE CASTS UR QL AUTO: ABNORMAL /LPF
IMM GRANULOCYTES # BLD AUTO: 0.02 10*3/MM3 (ref 0–0.05)
IMM GRANULOCYTES NFR BLD AUTO: 0.3 % (ref 0–0.5)
INR PPP: 3.55 (ref 0.9–1.1)
KETONES UR QL STRIP: NEGATIVE
LEUKOCYTE ESTERASE UR QL STRIP.AUTO: ABNORMAL
LIPASE SERPL-CCNC: 30 U/L (ref 13–60)
LYMPHOCYTES # BLD AUTO: 1.42 10*3/MM3 (ref 0.7–3.1)
LYMPHOCYTES NFR BLD AUTO: 20.5 % (ref 19.6–45.3)
MCH RBC QN AUTO: 29.2 PG (ref 26.6–33)
MCHC RBC AUTO-ENTMCNC: 31.7 G/DL (ref 31.5–35.7)
MCV RBC AUTO: 92.1 FL (ref 79–97)
MONOCYTES # BLD AUTO: 0.54 10*3/MM3 (ref 0.1–0.9)
MONOCYTES NFR BLD AUTO: 7.8 % (ref 5–12)
NEUTROPHILS NFR BLD AUTO: 4.82 10*3/MM3 (ref 1.7–7)
NEUTROPHILS NFR BLD AUTO: 69.5 % (ref 42.7–76)
NITRITE UR QL STRIP: NEGATIVE
PH UR STRIP.AUTO: 6 [PH] (ref 4.5–8)
PLATELET # BLD AUTO: 180 10*3/MM3 (ref 140–450)
PMV BLD AUTO: 11 FL (ref 6–12)
POTASSIUM SERPL-SCNC: 4.2 MMOL/L (ref 3.5–5.2)
PROT SERPL-MCNC: 6.5 G/DL (ref 6–8.5)
PROT UR QL STRIP: ABNORMAL
PROTHROMBIN TIME: 36.3 SECONDS (ref 12.1–15)
RBC # BLD AUTO: 4.45 10*6/MM3 (ref 3.77–5.28)
RBC # UR STRIP: ABNORMAL /HPF
REF LAB TEST METHOD: ABNORMAL
SODIUM SERPL-SCNC: 138 MMOL/L (ref 136–145)
SP GR UR STRIP: 1.01 (ref 1–1.03)
SQUAMOUS #/AREA URNS HPF: ABNORMAL /HPF
UROBILINOGEN UR QL STRIP: ABNORMAL
WBC # UR STRIP: ABNORMAL /HPF
WBC NRBC COR # BLD AUTO: 6.93 10*3/MM3 (ref 3.4–10.8)

## 2025-03-04 PROCEDURE — 87077 CULTURE AEROBIC IDENTIFY: CPT | Performed by: STUDENT IN AN ORGANIZED HEALTH CARE EDUCATION/TRAINING PROGRAM

## 2025-03-04 PROCEDURE — 87186 SC STD MICRODIL/AGAR DIL: CPT | Performed by: STUDENT IN AN ORGANIZED HEALTH CARE EDUCATION/TRAINING PROGRAM

## 2025-03-04 PROCEDURE — 74177 CT ABD & PELVIS W/CONTRAST: CPT

## 2025-03-04 PROCEDURE — 96365 THER/PROPH/DIAG IV INF INIT: CPT

## 2025-03-04 PROCEDURE — 25510000001 IOPAMIDOL PER 1 ML: Performed by: STUDENT IN AN ORGANIZED HEALTH CARE EDUCATION/TRAINING PROGRAM

## 2025-03-04 PROCEDURE — 96361 HYDRATE IV INFUSION ADD-ON: CPT

## 2025-03-04 PROCEDURE — 25010000002 CEFTRIAXONE PER 250 MG: Performed by: STUDENT IN AN ORGANIZED HEALTH CARE EDUCATION/TRAINING PROGRAM

## 2025-03-04 PROCEDURE — 99285 EMERGENCY DEPT VISIT HI MDM: CPT | Performed by: STUDENT IN AN ORGANIZED HEALTH CARE EDUCATION/TRAINING PROGRAM

## 2025-03-04 PROCEDURE — 81001 URINALYSIS AUTO W/SCOPE: CPT | Performed by: STUDENT IN AN ORGANIZED HEALTH CARE EDUCATION/TRAINING PROGRAM

## 2025-03-04 PROCEDURE — 85610 PROTHROMBIN TIME: CPT | Performed by: STUDENT IN AN ORGANIZED HEALTH CARE EDUCATION/TRAINING PROGRAM

## 2025-03-04 PROCEDURE — 25810000003 SODIUM CHLORIDE 0.9 % SOLUTION: Performed by: STUDENT IN AN ORGANIZED HEALTH CARE EDUCATION/TRAINING PROGRAM

## 2025-03-04 PROCEDURE — 85025 COMPLETE CBC W/AUTO DIFF WBC: CPT | Performed by: STUDENT IN AN ORGANIZED HEALTH CARE EDUCATION/TRAINING PROGRAM

## 2025-03-04 PROCEDURE — 80053 COMPREHEN METABOLIC PANEL: CPT | Performed by: STUDENT IN AN ORGANIZED HEALTH CARE EDUCATION/TRAINING PROGRAM

## 2025-03-04 PROCEDURE — 83690 ASSAY OF LIPASE: CPT | Performed by: STUDENT IN AN ORGANIZED HEALTH CARE EDUCATION/TRAINING PROGRAM

## 2025-03-04 PROCEDURE — 87086 URINE CULTURE/COLONY COUNT: CPT | Performed by: STUDENT IN AN ORGANIZED HEALTH CARE EDUCATION/TRAINING PROGRAM

## 2025-03-04 RX ORDER — CEPHALEXIN 500 MG/1
500 CAPSULE ORAL 4 TIMES DAILY
Qty: 40 CAPSULE | Refills: 0 | Status: SHIPPED | OUTPATIENT
Start: 2025-03-04 | End: 2025-03-14

## 2025-03-04 RX ORDER — IOPAMIDOL 755 MG/ML
100 INJECTION, SOLUTION INTRAVASCULAR
Status: COMPLETED | OUTPATIENT
Start: 2025-03-04 | End: 2025-03-04

## 2025-03-04 RX ADMIN — SODIUM CHLORIDE 500 ML: 9 INJECTION, SOLUTION INTRAVENOUS at 11:54

## 2025-03-04 RX ADMIN — IOPAMIDOL 100 ML: 755 INJECTION, SOLUTION INTRAVENOUS at 13:19

## 2025-03-04 RX ADMIN — CEFTRIAXONE SODIUM 1000 MG: 1 INJECTION, POWDER, FOR SOLUTION INTRAMUSCULAR; INTRAVENOUS at 15:39

## 2025-03-04 NOTE — ED PROVIDER NOTES
EMERGENCY ROOM NOTE  [facility]    SUBJECTIVE    Flank Pain (Right sided, 1 week)      Fernanda Molina is a 84 y.o. female with a past medical history of mitral valve and tricuspid valve repair on warfarin, A-fib, COPD, CAD, pulmonary hypertension, AICD in place, history of ureterolithiasis presenting to the ER with right-sided abdominal pain.  Patient states she has had right-sided abdominal pain for 1 week however she states over the last 2 days it is worsened.  The pain is in her right lower quadrant.  She has had multiple kidney stones in the past and states this feels similar.  She does have a known 9 mm stone that was in her kidney on her last CT scan.  She denies any burning with urination, blood in her urine.  No fevers, chills, nausea or vomiting.  Denies chest pain or shortness of breath.      A 10-point review of systems was obtained and otherwise negative unless stated in the HPI    Past Medical History:   Diagnosis Date    AICD (automatic cardioverter/defibrillator) present     left side chest    Anemia     Arthritis     Atrial fibrillation     Cancer 2002    melanoma right back of knee    CHF (congestive heart failure)     Cholelithiasis     Congenital heart disease     Coronary artery disease 2010    GERD (gastroesophageal reflux disease)     Graves disease     Hematuria     High cholesterol     History of melanoma 2002    RIGHT KNEE    History of MI (myocardial infarction) 2010    Hospital discharge follow-up 05/29/2019    Incontinence of urine     wears pads    Myocardial infarction 2010    On anticoagulant therapy     HAMLET on CPAP     cpap    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    PONV (postoperative nausea and vomiting)     Spinal stenosis     Status post bilateral knee replacements 10/27/2021    Ureteral calculus, left 01/30/2020    Valvular heart disease 08/31/2015    S/p MVR & TVR        Allergies   Allergen Reactions    Amiodarone Other (See Comments)     toxicity    Corticosteroids Other  (See Comments)     RAISES BLOOD PRESSURE    Propoxyphene Nausea And Vomiting    Benzonatate Unknown - Low Severity    Cephalexin Diarrhea     Beta lactam allergy details  Antibiotic reaction: unknown  Age at reaction: unknown  Dose to reaction time: unknown  Reason for antibiotic: unknown  Epinephrine required for reaction?: unknown  Tolerated antibiotics: unknown        Adhesive Tape Rash    Hydrocodone Nausea And Vomiting and Hallucinations    Lisinopril Other (See Comments)     CHRONIC NASAL CONGESTION       Past Surgical History:   Procedure Laterality Date    CARDIAC CATHETERIZATION      CARDIAC DEFIBRILLATOR PLACEMENT Left 2010    [facility]    CARDIAC DEFIBRILLATOR PLACEMENT Left 2016    [facility]     SECTION  1971     SECTION  1975    CHOLECYSTECTOMY  1996    COLONOSCOPY N/A     ENDOSCOPY      EPIDURAL BLOCK  [id number]    Lumbar     EXTRACORPOREAL SHOCK WAVE LITHOTRIPSY (ESWL) Right 2017    EXTRACORPOREAL SHOCK WAVE LITHOTRIPSY (ESWL) Right 2017    Procedure: RT EXTRACORPOREAL SHOCKWAVE LITHOTRIPSY;  Surgeon: Moisés Everett MD;  Location: [facility];  Service:     EYE SURGERY Right 09/10/2007    Cataract    EYE SURGERY Left 10/29/2007    Cataract    INCISION AND DRAINAGE PERIRECTAL ABSCESS N/A 10/1/2024    Procedure: INCISION AND DRAINAGE OF PERIRECTAL ABSCESS;  Surgeon: Ben Carrasquillo MD;  Location: [facility];  Service: General;  Laterality: N/A;    JOINT REPLACEMENT Left 2007    Knee    JOINT REPLACEMENT Right 2010    Knee    KNEE ARTHROSCOPY  1980    MAZE PROCEDURE  2015    Dr. Saavedra    MITRAL VALVE REPAIR/REPLACEMENT      MITRAL VALVE REPAIR/REPLACEMENT N/A 2019    Procedure: ALINE RE-OP STERNOTOMY MITRAL VALVE REPLACEMENT AND PRP;  Surgeon: Ben Saavedra MD;  Location: [facility];  Service: Cardiothoracic    PACEMAKER IMPLANTATION Left     FL ERCP DX COLLECTION SPECIMEN BRUSHING/WASHING N/A 2016     "Procedure: ENDOSCOPIC RETROGRADE CHOLANGIOPANCREATOGRAPHY WITH SPHINCTEROTOMY AND BALLOON SWEEP OF CBD;  Surgeon: Lucas Campos MD;  Location: [facility] ENDOSCOPY;  Service: Gastroenterology    SKIN CANCER EXCISION      MELANOMA RIGHT KNEE AREA - 2002    TRICUSPID VALVE SURGERY  07/31/2015    Dr. Saavedra  ring placed    URETEROSCOPY LASER LITHOTRIPSY WITH STENT INSERTION Right 11/15/2017    Procedure: CYSTOSCOPY; RIGHT URETEROSCOPY LASER LITHOTRIPSY WITH STENT INSERTION;  Surgeon: Moisés Everett MD;  Location: [facility] OR;  Service:     URETEROSCOPY LASER LITHOTRIPSY WITH STENT INSERTION Left 01/30/2020    Procedure: URETEROSCOPY STONE BASKET EXTRACTION LASER LITHOTRIPSY WITH STENT INSERTION WITH STRING;  Surgeon: Moisés Everett MD;  Location: [facility] OR;  Service: Urology       Family History   Problem Relation Age of Onset    Heart attack Mother     Heart disease Mother     Hypertension Mother     Stroke Mother     Macular degeneration Mother     Heart attack Father     Arthritis Maternal Aunt     Malig Hyperthermia Neg Hx        Social History     Socioeconomic History    Marital status:    Tobacco Use    Smoking status: Never     Passive exposure: Never    Smokeless tobacco: Never   Vaping Use    Vaping status: Never Used   Substance and Sexual Activity    Alcohol use: No    Drug use: No    Sexual activity: Not Currently     Partners: Male     Birth control/protection: Post-menopausal, Tubal ligation, Birth control pill         OBJECTIVE  VITAL SIGNS:   Vitals:    03/04/25 1138 03/04/25 1250 03/04/25 1442   BP: 120/67 114/63 114/57   Pulse: 86 60 61   Resp: 16 16 18   Temp: 97.5 °F (36.4 °C)     TempSrc: Oral     SpO2: 95% 93% 97%   Weight: 88.5 kg (195 lb)     Height: 154.9 cm (61\")          Constitutional: Elderly, but in no acute distress  HENT:  Atraumatic. Normocephalic. Bilateral external ears normal. Nose normal.    Eyes:  Conjunctiva normal.  No periorbital edema.  Neck:  " ROM normal, supple.    Cardiovascular:   RRR without murmurs, rubs, or gallops. Extremities appear warm and well perfused.  Thorax & Lungs:  Respiratory effort normal.  Lungs CTAB   Abdomen:  Nondistended.  Tenderness palpation both the right lower and left lower quadrant, no rebound, guarding or peritoneal signs.  Musculoskeletal:  No deformity or swelling. No edema.    Skin:  Warm and dry.    Neurologic:  Awake and alert .  Psychiatric:  Affect normal. Thought process is linear and goal directed       EKG  Procedures    PROCEDURES/ULTRASOUND      ED COURSE AND MEDICAL DECISION MAKING  Pertinent Labs & Imaging studies reviewed. (See chart for details).    Fernanda Molina is a 84 y.o. female presenting to the ER with right flank pain.  On arrival patient was noted to be afebrile and hemodynamically stable.  On examination today of tenderness to palpation both the right and left lower quadrant.  Patient does have a history of ureterolithiasis and states this feels similar, she did have a prior CT scan that showed 9 mm stone in the right kidney.  Will obtain labs, urine and CT scan to further evaluate.        ED Course as of 03/04/25 1505   Tue Mar 04, 2025   1255 CBC without leukocytosis, anemia or thrombocytopenia [CC]   1256 CMP with slightly elevated glucose at 192, otherwise stable electrolytes, renal function.  LFTs within normal limit.  Lipase normal. [CC]   1256 Urinalysis notable for protein and small leukocytes along with blood.  BCs and WBCs noted on micro. [CC]   1504 At this time CT scan of the abdomen and pelvis is pending.  Patient was signed out to Dr. Gallardo in stable condition. He will follow-up CT scan and consult  Urology is appropriate.  [CC]   1505 INR 3.55, slightly supratherapeutic [CC]      ED Course User Index  [CC] Linda Pepe MD       Medications Administered  Medications   sodium chloride 0.9 % bolus 500 mL (0 mL Intravenous Stopped 3/4/25 1300)   iopamidol (ISOVUE-370) 76 %  injection 100 mL (100 mL Intravenous Given 3/4/25 2535)       Final diagnoses:   None     ED Disposition       None          No follow-up provider specified.     Medication List      No changes were made to your prescriptions during this visit.            In cases where narcotics are prescribed, MARTÍN report was obtained, reviewed, and made part of record. After examining available information, and risks of prescribing or dispensing controlled substances was explained to the patient (including non-treatment or other treatment), it is considered medically appropriate to administer  narcotics as prescribed.    Part of this note may be an electronic transcription/translation of spoken language to printed text using the Dragon Dictation System.     MD Afshin Simpson Calyn Michele, MD  03/04/25 5342       Linda Pepe MD  03/04/25 1743

## 2025-03-04 NOTE — DISCHARGE INSTRUCTIONS

## 2025-03-04 NOTE — ED PROVIDER NOTES
15:31 EST    I have discussed Fernanda Molina's ED presentation and ED course with Dr. Pepe.  I will assume care of the patient. Please see the prior note for further details.     CHIEF COMPLAINT      Chief Complaint   Patient presents with    Flank Pain     Right sided, 1 week       HPI    Fernanda Molina is a 84 y.o. female who presents with flank pain. Pending CT at signout     PAST MEDICAL HISTORY      Past Medical History:   Diagnosis Date    AICD (automatic cardioverter/defibrillator) present     left side chest    Anemia     Arthritis     Atrial fibrillation     Cancer 2002    melanoma right back of knee    CHF (congestive heart failure)     Cholelithiasis     Congenital heart disease     Coronary artery disease 2010    GERD (gastroesophageal reflux disease)     Graves disease     Hematuria     High cholesterol     History of melanoma     RIGHT KNEE    History of MI (myocardial infarction) 2010    Hospital discharge follow-up 2019    Incontinence of urine     wears pads    Myocardial infarction 2010    On anticoagulant therapy     HAMLET on CPAP     cpap    Pelvic kidney 2020    PONV (postoperative nausea and vomiting)     Spinal stenosis     Status post bilateral knee replacements 10/27/2021    Ureteral calculus, left 2020    Valvular heart disease 2015    S/p MVR & TVR        SURGICAL HISTORY      Past Surgical History:   Procedure Laterality Date    CARDIAC CATHETERIZATION      CARDIAC DEFIBRILLATOR PLACEMENT Left 2010    [facility]    CARDIAC DEFIBRILLATOR PLACEMENT Left 2016    [facility]     SECTION  1971     SECTION  1975    CHOLECYSTECTOMY  1996    COLONOSCOPY N/A     ENDOSCOPY      EPIDURAL BLOCK  [id number]    Lumbar     EXTRACORPOREAL SHOCK WAVE LITHOTRIPSY (ESWL) Right 2017    EXTRACORPOREAL SHOCK WAVE LITHOTRIPSY (ESWL) Right 2017    Procedure: RT EXTRACORPOREAL SHOCKWAVE LITHOTRIPSY;  Surgeon: Moisés Everett MD;  Location: [facility]  OS[facility];  Service:     EYE SURGERY Right 09/10/2007    Cataract    EYE SURGERY Left 10/29/2007    Cataract    INCISION AND DRAINAGE PERIRECTAL ABSCESS N/A 10/1/2024    Procedure: INCISION AND DRAINAGE OF PERIRECTAL ABSCESS;  Surgeon: Ben Carrasquillo MD;  Location: [facility] OR;  Service: General;  Laterality: N/A;    JOINT REPLACEMENT Left 04/03/2007    Knee    JOINT REPLACEMENT Right 01/11/2010    Knee    KNEE ARTHROSCOPY  1980    MAZE PROCEDURE  07/31/2015    Dr. Saavedra    MITRAL VALVE REPAIR/REPLACEMENT  2015    MITRAL VALVE REPAIR/REPLACEMENT N/A 05/14/2019    Procedure: ALINE RE-OP STERNOTOMY MITRAL VALVE REPLACEMENT AND PRP;  Surgeon: Ben Saavedra MD;  Location: [facility] MAIN OR;  Service: Cardiothoracic    PACEMAKER IMPLANTATION Left 2010    [place] ERCP DX COLLECTION SPECIMEN BRUSHING/WASHING N/A 08/24/2016    Procedure: ENDOSCOPIC RETROGRADE CHOLANGIOPANCREATOGRAPHY WITH SPHINCTEROTOMY AND BALLOON SWEEP OF CBD;  Surgeon: Lucas Campos MD;  Location: [facility] ENDOSCOPY;  Service: Gastroenterology    SKIN CANCER EXCISION      MELANOMA RIGHT KNEE AREA - 2002    TRICUSPID VALVE SURGERY  07/31/2015    Dr. Saavedra  ring placed    URETEROSCOPY LASER LITHOTRIPSY WITH STENT INSERTION Right 11/15/2017    Procedure: CYSTOSCOPY; RIGHT URETEROSCOPY LASER LITHOTRIPSY WITH STENT INSERTION;  Surgeon: Moisés Everett MD;  Location: [facility] OR;  Service:     URETEROSCOPY LASER LITHOTRIPSY WITH STENT INSERTION Left 01/30/2020    Procedure: URETEROSCOPY STONE BASKET EXTRACTION LASER LITHOTRIPSY WITH STENT INSERTION WITH STRING;  Surgeon: Moisés Everett MD;  Location: [facility] OR;  Service: Urology       CURRENT ED MEDICATIONS  (Meds given Today)    Medications   cefTRIAXone (ROCEPHIN) 1,000 mg in sodium chloride 0.9 % 100 mL IVPB-VTB (has no administration in time range)   sodium chloride 0.9 % bolus 500 mL (0 mL Intravenous Stopped 3/4/25 1300)   iopamidol (ISOVUE-370) 76 % injection 100 mL (100  mL Intravenous Given 3/4/25 7789)       ALLERGIES      Allergies   Allergen Reactions    Amiodarone Other (See Comments)     toxicity    Corticosteroids Other (See Comments)     RAISES BLOOD PRESSURE    Propoxyphene Nausea And Vomiting    Benzonatate Unknown - Low Severity    Cephalexin Diarrhea     Beta lactam allergy details  Antibiotic reaction: unknown  Age at reaction: unknown  Dose to reaction time: unknown  Reason for antibiotic: unknown  Epinephrine required for reaction?: unknown  Tolerated antibiotics: unknown        Adhesive Tape Rash    Hydrocodone Nausea And Vomiting and Hallucinations    Lisinopril Other (See Comments)     CHRONIC NASAL CONGESTION       FAMILY HISTORY      Family History   Problem Relation Age of Onset    Heart attack Mother     Heart disease Mother     Hypertension Mother     Stroke Mother     Macular degeneration Mother     Heart attack Father     Arthritis Maternal Aunt     Malig Hyperthermia Neg Hx        SOCIAL HISTORY      Social History     Socioeconomic History    Marital status:    Tobacco Use    Smoking status: Never     Passive exposure: Never    Smokeless tobacco: Never   Vaping Use    Vaping status: Never Used   Substance and Sexual Activity    Alcohol use: No    Drug use: No    Sexual activity: Not Currently     Partners: Male     Birth control/protection: Post-menopausal, Tubal ligation, Birth control pill       RESULTS:   - See lab and imaging In patient chart for this visit.      MDM:    This is a pleasant 84-year-old for whom I assumed care at 1500, I agree with the history and physical previously performed.  Pending CT scan at time of signout.  Patient remains hemodynamically stable on my assumption of care and pain is well-controlled.  On evaluation of the patient's CT there are no acute findings in the abdomen or pelvis but there is a 9 mm nonobstructing renal stone.  Periumbilical hernia is noted containing fat only.  There is a heterogenous rim-enhancing  appearance to the uterus which is unchanged from prior.  Prior mitral valve replacement noted and a transvenous pacemaker placement is seen.  Prior cholecystectomy noted.  It is possible that this patient's flank pain is now secondary only to her urinary tract infection causing an early pyelonephritis.  The patient will be given a dose of Rocephin here, I will give Keflex 4 times daily for outpatient management and she will follow-up with urology and her PCP on an outpatient basis.  Otherwise stable for discharge.      The patient has been given very strict return precautions to return to the emergency department should there be any acute change or worsening of their condition.  I have explained my findings and the patient has expressed understanding to me.  I explained that the work-up performed in the ED has been based on the specific complaint and concern, as the nature of emergency medicine is complaint driven and they understand that new symptoms may arise.  I have told them that, should there be any new symptoms, worsening or changing symptoms, a new work-up may be indicated that they are encouraged to return to the emergency department or promptly contact their primary care physician. We have employed a shared decision-making process as the discussion of their disposition.  The patient has been educated as to the nature of the visit, the tests and work-up performed and the findings from today's visit. At this time, there does not appear to be any acute emergent process that necessitates admission to the hospital, however, the patient understands that this can change unexpectedly. At this time, the patient is stable for discharge home and agrees to follow-up with her primary care physician in the next 24 to 48 hours or earlier should they be able to obtain an appointment.    The patient was counseled regarding diagnostic results and treatment plan and patient has indicated understanding of these  instructions.          Clinical Impression:    1. Flank pain    2. Urinary tract infection with hematuria, site unspecified    3. Hiatal hernia           Binh Gallardo,   03/04/25 6026

## 2025-03-08 LAB
BACTERIA SPEC AEROBE CULT: ABNORMAL
BACTERIA SPEC AEROBE CULT: ABNORMAL

## 2025-03-11 ENCOUNTER — OFFICE VISIT (OUTPATIENT)
Dept: FAMILY MEDICINE CLINIC | Facility: CLINIC | Age: 85
End: 2025-03-11
Payer: MEDICARE

## 2025-03-11 VITALS
WEIGHT: 198.9 LBS | SYSTOLIC BLOOD PRESSURE: 110 MMHG | OXYGEN SATURATION: 96 % | BODY MASS INDEX: 37.55 KG/M2 | HEART RATE: 63 BPM | HEIGHT: 61 IN | DIASTOLIC BLOOD PRESSURE: 62 MMHG | TEMPERATURE: 97.3 F

## 2025-03-11 DIAGNOSIS — I25.10 CORONARY ARTERY DISEASE INVOLVING NATIVE HEART WITHOUT ANGINA PECTORIS, UNSPECIFIED VESSEL OR LESION TYPE: ICD-10-CM

## 2025-03-11 DIAGNOSIS — Z00.00 MEDICARE ANNUAL WELLNESS VISIT, SUBSEQUENT: Primary | ICD-10-CM

## 2025-03-11 DIAGNOSIS — Z79.4 TYPE 2 DIABETES MELLITUS WITHOUT COMPLICATION, WITH LONG-TERM CURRENT USE OF INSULIN: ICD-10-CM

## 2025-03-11 DIAGNOSIS — N30.00 ACUTE CYSTITIS WITHOUT HEMATURIA: ICD-10-CM

## 2025-03-11 DIAGNOSIS — E03.9 ACQUIRED HYPOTHYROIDISM: ICD-10-CM

## 2025-03-11 DIAGNOSIS — E11.9 TYPE 2 DIABETES MELLITUS WITHOUT COMPLICATION, WITH LONG-TERM CURRENT USE OF INSULIN: ICD-10-CM

## 2025-03-11 PROBLEM — S42.201A CLOSED FRACTURE OF RIGHT PROXIMAL HUMERUS: Status: RESOLVED | Noted: 2024-07-31 | Resolved: 2025-03-11

## 2025-03-11 PROBLEM — N20.1 URETERAL CALCULUS, LEFT: Status: RESOLVED | Noted: 2020-01-30 | Resolved: 2025-03-11

## 2025-03-11 PROBLEM — I05.9 MITRAL VALVE DISEASE: Status: RESOLVED | Noted: 2019-05-10 | Resolved: 2025-03-11

## 2025-03-11 PROBLEM — R31.21 ASYMPTOMATIC MICROSCOPIC HEMATURIA: Status: RESOLVED | Noted: 2021-10-28 | Resolved: 2025-03-11

## 2025-03-11 RX ORDER — KETOCONAZOLE 20 MG/G
CREAM TOPICAL
COMMUNITY
Start: 2025-01-13

## 2025-03-11 RX ORDER — ATORVASTATIN CALCIUM 20 MG/1
20 TABLET, FILM COATED ORAL DAILY
Qty: 90 TABLET | Refills: 3 | Status: SHIPPED | OUTPATIENT
Start: 2025-03-11

## 2025-03-11 NOTE — PROGRESS NOTES
Subjective   The ABCs of the Annual Wellness Visit  Medicare Wellness Visit      Fernanda Molina is a 84 y.o. patient who presents for a Medicare Wellness Visit.    The following portions of the patient's history were reviewed and   updated as appropriate: allergies, current medications, past medical history, past social history, past surgical history, and problem list.    Compared to one year ago, the patient's physical   health is worse.  Compared to one year ago, the patient's mental   health is the same.    Recent Hospitalizations:  She was admitted within the past 365 days at [facility].     Current Medical Providers:  Patient Care Team:  Rashid Ge MD as PCP - General (Family Medicine)  Thong Jones MD as Consulting Physician (Cardiology)  Mahin Galeas MD as Consulting Physician (Sleep Medicine)  Lraa Ortiz MD as Consulting Physician (Obstetrics and Gynecology)  Betty Horowitz MD as Consulting Physician (Dermatology)  Jr Ben Saavedra MD as Surgeon (Cardiothoracic Surgery)  Chirag Rai MD as Consulting Physician (Ophthalmology)  Moisés Everett MD as Consulting Physician (Urology)  Geraldo Velazquez MD as Consulting Physician (Pain Medicine)  Ben Carrasquillo MD as Surgeon (Colon and Rectal Surgery)    Outpatient Medications Prior to Visit   Medication Sig Dispense Refill    albuterol (PROVENTIL) (2.5 MG/3ML) 0.083% nebulizer solution Take 2.5 mg by nebulization Every 4 (Four) Hours As Needed for Wheezing. 30 each 1    albuterol sulfate  (90 Base) MCG/ACT inhaler Inhale 2 puffs Every 4 (Four) Hours As Needed for Wheezing. 18 g 5    BD Pen Needle Clara U/F 32G X 4 MM misc INJECT 1 UNITS UNDER THE SKIN INTO THE APPROPRIATE AREA AS DIRECTED DAILY. 100 each 2    cephalexin (KEFLEX) 500 MG capsule Take 1 capsule by mouth 4 (Four) Times a Day for 10 days. 40 capsule 0    cetirizine (zyrTEC) 10 MG tablet Take 1 tablet by mouth Daily.       digoxin (LANOXIN) 250 MCG tablet TAKE 1 TABLET DAILY 90 tablet 3    Dulaglutide (Trulicity) 1.5 MG/0.5ML solution pen-injector Inject 1.5 mg under the skin into the appropriate area as directed 1 (One) Time Per Week for 48 doses. Indications: Type 2 Diabetes 6 mL 3    Farxiga 10 MG tablet Daily.      ferrous sulfate 325 (65 FE) MG EC tablet Take 65 mg by mouth 1 (one) time per week. On Sunday      furosemide (LASIX) 40 MG tablet Take 1 tablet by mouth Daily. Indications: Edema 7 tablet 0    glucose blood (OneTouch Verio) test strip 1 each by Other route 3 times a day. Use as instructed 100 each 1    Hydrocortisone, Perianal, (Anusol-HC) 2.5 % rectal cream Apply after each bowel movement and nightly  Indications: Itching of Anus and/or Rectum 28 g 1    Insulin Glargine (Lantus SoloStar) 100 UNIT/ML injection pen Inject 30 Units under the skin into the appropriate area as directed Daily. Indications: Type 2 Diabetes      ketoconazole (NIZORAL) 2 % cream       Klor-Con M20 20 MEQ CR tablet TAKE 1 TABLET DAILY WITH BREAKFAST 90 tablet 3    Lancets (OneTouch Delica Plus [id number]) misc 1 each by Other route 3 (Three) Times a Day. 100 each 10    levothyroxine (SYNTHROID, LEVOTHROID) 175 MCG tablet TAKE 1 TABLET DAILY FOR UNDERACTIVE THYROID 90 tablet 1    metoprolol succinate XL (TOPROL-XL) 200 MG 24 hr tablet Take 1 tablet by mouth Every Morning.      mometasone (NASONEX) 50 MCG/ACT nasal spray Administer 2 sprays into the nostril(s) as directed by provider Daily. 17 g 0    Multiple Vitamin (MULTIVITAMIN) tablet Take 1 tablet by mouth Daily.      oxyCODONE-acetaminophen (PERCOCET) 5-325 MG per tablet Take 1 tablet by mouth Every 6 (Six) Hours As Needed.      pantoprazole (PROTONIX) 40 MG EC tablet TAKE 1 TABLET DAILY FOR GASTROESOPHAGEAL REFLUX DISEASE 90 tablet 3    sacubitril-valsartan (Entresto) 49-51 MG tablet 2 (Two) Times a Day.      Vericiguat (Verquvo) 5 MG tablet Take  by mouth.      warfarin (COUMADIN) 2.5 MG  tablet Take 1 tablet by mouth. MON TUES THURS SUN(AS WELL AS THE 5MG TO TOTAL 7.5MG)      warfarin (COUMADIN) 5 MG tablet Take 1 tablet by mouth Every Night. SAT AND SUN      atorvastatin (LIPITOR) 20 MG tablet TAKE 1 TABLET DAILY FOR ELEVATION OF BOTH CHOLESTEROL AND TRIGLYCERIDES IN BLOOD 90 tablet 3    metFORMIN (GLUCOPHAGE) 1000 MG tablet Take 1 tablet by mouth Daily Before Supper. Indications: Type 2 Diabetes 90 tablet 3    metFORMIN (GLUCOPHAGE) 500 MG tablet TAKE 1 TABLET DAILY WITH BREAKFAST FOR TYPE 2 DIABETES 90 tablet 1    mupirocin (BACTROBAN) 2 % ointment Apply 1 Application topically to the appropriate area as directed 2 (Two) Times a Day. (Patient not taking: Reported on 3/11/2025) 30 g 0     No facility-administered medications prior to visit.     Opioid medication/s are on active medication list.  and I have evaluated her active treatment plan and pain score trends (see table).  Vitals:    03/11/25 1024   PainSc: 6    PainLoc: Back     I have reviewed the chart for potential of high risk medication and harmful drug interactions in the elderly.        Aspirin is not on active medication list.  Aspirin use is not indicated based on review of current medical condition/s. Risk of harm outweighs potential benefits.  .    Patient Active Problem List   Diagnosis    S/P mitral valve repair    S/P tricuspid valve repair    S/P Maze operation for atrial fibrillation    Class 2 severe obesity due to excess calories with serious comorbidity and body mass index (BMI) of 35.0 to 35.9 in adult    Atrial fibrillation    Acquired hypothyroidism    COPD (chronic obstructive pulmonary disease)    CAD (coronary artery disease), hx MI    HAMLET on autoCPAP    Renal calculus, right    Hyperparathyroidism    Type 2 diabetes mellitus with hyperglycemia, with long-term current use of insulin    Warfarin anticoagulation    Medication monitoring encounter    Medicare annual wellness visit, subsequent    AICD (automatic  "cardioverter/defibrillator) present    Chronic nonallergic rhinitis    Pulmonary hypertension    Hypersomnia due to medical condition    Pelvic kidney    Presence of prosthetic heart valve    Chronic left hip pain    Pyriformis syndrome, left    Pain due to total left knee replacement    Malignant melanoma of skin of lower extremity    DDD (degenerative disc disease), lumbar    Falls frequently    Subacromial bursitis of right shoulder joint     Advance Care Planning Advance Directive is not on file.  ACP discussion was held with the patient during this visit. Patient has an advance directive (not in EMR), copy requested.            Objective   Vitals:    03/11/25 1024   BP: 110/62   Pulse: 63   Temp: 97.3 °F (36.3 °C)   TempSrc: Infrared   SpO2: 96%   Weight: 90.2 kg (198 lb 14.4 oz)   Height: 154.9 cm (61\")   PainSc: 6    PainLoc: Back       Estimated body mass index is 37.58 kg/m² as calculated from the following:    Height as of this encounter: 154.9 cm (61\").    Weight as of this encounter: 90.2 kg (198 lb 14.4 oz).            Gait and Balance Evaluation:  Loss of Balance, Slow Tentative Pace, Shuffle, Limp, Steadying Self on Walls, Needs Assistance, and Walker      Does the patient have evidence of cognitive impairment? No  Lab Results   Component Value Date    CHLPL 122 03/04/2025    TRIG 175 (H) 03/04/2025    HDL 41 03/04/2025    LDL 52 03/04/2025    VLDL 29 03/04/2025    HGBA1C 6.10 (H) 03/04/2025                                                                                               Health  Risk Assessment    Smoking Status:  Social History     Tobacco Use   Smoking Status Never    Passive exposure: Never   Smokeless Tobacco Never     Alcohol Consumption:  Social History     Substance and Sexual Activity   Alcohol Use No       Fall Risk Screen  STEADI Fall Risk Assessment was completed, and patient is at LOW risk for falls.Assessment completed on:3/11/2025    Depression Screening   Little interest or " pleasure in doing things? Several days   Feeling down, depressed, or hopeless? Not at all   PHQ-2 Total Score 1      Health Habits and Functional and Cognitive Screening:      3/11/2025    10:33 AM   Functional & Cognitive Status   Do you have difficulty preparing food and eating? No   Do you have difficulty bathing yourself, getting dressed or grooming yourself? No   Do you have difficulty using the toilet? No   Do you have difficulty moving around from place to place? Yes   Do you have trouble with steps or getting out of a bed or a chair? Yes   Current Diet Well Balanced Diet   Dental Exam Up to date   Eye Exam Up to date   Exercise (times per week) 0 times per week   Do you need help using the phone?  No   Are you deaf or do you have serious difficulty hearing?  No   Do you need help to go to places out of walking distance? Yes   Do you need help shopping? No   Do you need help preparing meals?  No   Do you need help with housework?  Yes   Do you need help with laundry? No   Do you need help taking your medications? No   Do you need help managing money? No   Do you ever drive or ride in a car without wearing a seat belt? No   Have you felt unusual stress, anger or loneliness in the last month? No   Who do you live with? Spouse   If you need help, do you have trouble finding someone available to you? No   Have you been bothered in the last four weeks by sexual problems? No   Do you have difficulty concentrating, remembering or making decisions? Yes           Age-appropriate Screening Schedule:  Refer to the list below for future screening recommendations based on patient's age, sex and/or medical conditions. Orders for these recommended tests are listed in the plan section. The patient has been provided with a written plan.    Health Maintenance List  Health Maintenance   Topic Date Due    DXA SCAN  06/19/2011    URINE MICROALBUMIN-CREATININE RATIO (uACR)  08/24/2019    DIABETIC EYE EXAM  08/23/2024    INFLUENZA  VACCINE  11/11/2025 (Originally 7/1/2024)    COVID-19 Vaccine (5 - 2024-25 season) 03/11/2026 (Originally 9/1/2024)    RSV Vaccine - Adults (1 - 1-dose 75+ series) 03/11/2026 (Originally 11/28/2017)    TDAP/TD VACCINES (1 - Tdap) 03/11/2026 (Originally 11/28/1959)    ZOSTER VACCINE (3 of 3) 03/11/2026 (Originally 3/11/2025)    HEMOGLOBIN A1C  09/04/2025    BMI FOLLOWUP  09/09/2025    LIPID PANEL  03/04/2026    ANNUAL WELLNESS VISIT  03/11/2026    Pneumococcal Vaccine 50+  Completed    MAMMOGRAM  Discontinued    COLORECTAL CANCER SCREENING  Discontinued                                                                                                                                                CMS Preventative Services Quick Reference  Risk Factors Identified During Encounter  Chronic Pain: Natural history and expected course discussed. Questions answered.  Fall Risk-High or Moderate: Discussed Fall Prevention in the home  Polypharmacy: Medication List reviewed    The above risks/problems have been discussed with the patient.  Pertinent information has been shared with the patient in the After Visit Summary.  An After Visit Summary and PPPS were made available to the patient.    Follow Up:   Next Medicare Wellness visit to be scheduled in 1 year.     Assessment & Plan  Medicare annual wellness visit, subsequent         Acute cystitis without hematuria    Orders:    Urine Culture - Urine, Urine, Clean Catch; Future    Coronary artery disease involving native heart without angina pectoris, unspecified vessel or lesion type  Coronary Artery Disease (OPTIONAL): Coronary artery disease is stable.  Continue current treatment regimen.  Cardiac status will be reassessed in 6 months.    Orders:    atorvastatin (LIPITOR) 20 MG tablet; Take 1 tablet by mouth Daily. Indications: High Amount of Fats in the Blood    Type 2 diabetes mellitus without complication, with long-term current use of insulin  Diabetes is stable.   Continue  current treatment regimen.  Diabetes will be reassessed in 6 months    Orders:    metFORMIN (GLUCOPHAGE) 1000 MG tablet; Take 1 tablet by mouth Daily Before Supper. Indications: Type 2 Diabetes    metFORMIN (GLUCOPHAGE) 500 MG tablet; Take 1 tablet by mouth Daily With Breakfast. Indications: Type 2 Diabetes    Hemoglobin A1c; Future    Basic Metabolic Panel; Future    Lipid Panel; Future    Microalbumin / Creatinine Urine Ratio - Urine, Clean Catch; Future    Acquired hypothyroidism    Orders:    TSH Rfx On Abnormal To Free T4; Future         Follow Up:   No follow-ups on file.  Urine Culture - Urine, Urine, Clean Catch (03/04/2025 11:53)   Currently on Keflex now.  Urine culture with Klebsiella  Will ask her to come back on Friday for repeat urine culture once antibiotics are complete.  Recent ER visit for right abdominal pain  CT scan reviewed    Overall has had a pretty rough year.  Suffered a perirectal abscess  Also suffered a fall and a broken right humerus  Now using a rollator    Labs reviewed

## 2025-03-11 NOTE — ASSESSMENT & PLAN NOTE
Coronary Artery Disease (OPTIONAL): Coronary artery disease is stable.  Continue current treatment regimen.  Cardiac status will be reassessed in 6 months.    Orders:    atorvastatin (LIPITOR) 20 MG tablet; Take 1 tablet by mouth Daily. Indications: High Amount of Fats in the Blood

## 2025-03-18 ENCOUNTER — TELEPHONE (OUTPATIENT)
Dept: FAMILY MEDICINE CLINIC | Facility: CLINIC | Age: 85
End: 2025-03-18

## 2025-03-18 NOTE — TELEPHONE ENCOUNTER
Caller: Fernanda Molina    Relationship: Self    Best call back number:     Caller requesting test results:     What test was performed: URINE TESTING    When was the test performed: 03/14/25    Where was the test performed: OFFICE OF DR FLOWER    Additional notes: PATIENT IS CALLING IN TO ASK IF DR FLOWER HAS RECEIVED HER URINE TEST RESULTS YET.

## 2025-04-18 DIAGNOSIS — Z79.4 TYPE 2 DIABETES MELLITUS WITHOUT COMPLICATION, WITH LONG-TERM CURRENT USE OF INSULIN: ICD-10-CM

## 2025-04-18 DIAGNOSIS — E11.9 TYPE 2 DIABETES MELLITUS WITHOUT COMPLICATION, WITH LONG-TERM CURRENT USE OF INSULIN: ICD-10-CM

## 2025-04-20 RX ORDER — INSULIN GLARGINE 100 [IU]/ML
INJECTION, SOLUTION SUBCUTANEOUS
Qty: 30 ML | Refills: 3 | Status: SHIPPED | OUTPATIENT
Start: 2025-04-20

## 2025-05-03 DIAGNOSIS — E11.9 TYPE 2 DIABETES MELLITUS WITHOUT COMPLICATION, WITH LONG-TERM CURRENT USE OF INSULIN: ICD-10-CM

## 2025-05-03 DIAGNOSIS — Z79.4 TYPE 2 DIABETES MELLITUS WITHOUT COMPLICATION, WITH LONG-TERM CURRENT USE OF INSULIN: ICD-10-CM

## 2025-05-05 RX ORDER — DULAGLUTIDE 1.5 MG/.5ML
INJECTION, SOLUTION SUBCUTANEOUS
Qty: 6 ML | Refills: 1 | Status: SHIPPED | OUTPATIENT
Start: 2025-05-05

## 2025-06-28 DIAGNOSIS — E03.9 ACQUIRED HYPOTHYROIDISM: ICD-10-CM

## 2025-06-29 RX ORDER — LEVOTHYROXINE SODIUM 175 UG/1
175 TABLET ORAL DAILY
Qty: 90 TABLET | Refills: 1 | Status: SHIPPED | OUTPATIENT
Start: 2025-06-29

## 2025-07-25 DIAGNOSIS — Z79.4 TYPE 2 DIABETES MELLITUS WITHOUT COMPLICATION, WITH LONG-TERM CURRENT USE OF INSULIN: ICD-10-CM

## 2025-07-25 DIAGNOSIS — E11.9 TYPE 2 DIABETES MELLITUS WITHOUT COMPLICATION, WITH LONG-TERM CURRENT USE OF INSULIN: ICD-10-CM

## 2025-07-25 RX ORDER — BLOOD SUGAR DIAGNOSTIC
STRIP MISCELLANEOUS
Qty: 100 EACH | Refills: 0 | Status: SHIPPED | OUTPATIENT
Start: 2025-07-25

## 2025-08-06 ENCOUNTER — APPOINTMENT (OUTPATIENT)
Dept: GENERAL RADIOLOGY | Facility: HOSPITAL | Age: 85
End: 2025-08-06
Payer: MEDICARE

## 2025-08-06 ENCOUNTER — HOSPITAL ENCOUNTER (EMERGENCY)
Facility: HOSPITAL | Age: 85
Discharge: HOME | End: 2025-08-06
Attending: STUDENT IN AN ORGANIZED HEALTH CARE EDUCATION/TRAINING PROGRAM | Admitting: STUDENT IN AN ORGANIZED HEALTH CARE EDUCATION/TRAINING PROGRAM
Payer: MEDICARE

## 2025-08-06 VITALS
WEIGHT: 193 LBS | SYSTOLIC BLOOD PRESSURE: 143 MMHG | TEMPERATURE: 99.7 F | RESPIRATION RATE: 18 BRPM | HEIGHT: 62 IN | OXYGEN SATURATION: 95 % | DIASTOLIC BLOOD PRESSURE: 65 MMHG | BODY MASS INDEX: 35.51 KG/M2 | HEART RATE: 81 BPM

## 2025-08-06 DIAGNOSIS — U07.1 COVID: Primary | ICD-10-CM

## 2025-08-06 LAB
FLUAV RNA RESP QL NAA+PROBE: NOT DETECTED
FLUBV RNA NPH QL NAA+NON-PROBE: NOT DETECTED
RSV RNA RESP QL NAA+PROBE: NOT DETECTED
SARS-COV-2 RNA RESP QL NAA+PROBE: DETECTED

## 2025-08-06 PROCEDURE — 99283 EMERGENCY DEPT VISIT LOW MDM: CPT | Performed by: STUDENT IN AN ORGANIZED HEALTH CARE EDUCATION/TRAINING PROGRAM

## 2025-08-06 PROCEDURE — 71046 X-RAY EXAM CHEST 2 VIEWS: CPT

## 2025-08-06 PROCEDURE — 87637 SARSCOV2&INF A&B&RSV AMP PRB: CPT | Performed by: STUDENT IN AN ORGANIZED HEALTH CARE EDUCATION/TRAINING PROGRAM

## 2025-08-06 RX ORDER — BENZONATATE 200 MG/1
200 CAPSULE ORAL 3 TIMES DAILY PRN
Qty: 30 CAPSULE | Refills: 0 | Status: SHIPPED | OUTPATIENT
Start: 2025-08-06

## 2025-08-11 DIAGNOSIS — E11.9 TYPE 2 DIABETES MELLITUS WITHOUT COMPLICATION, WITH LONG-TERM CURRENT USE OF INSULIN: ICD-10-CM

## 2025-08-11 DIAGNOSIS — Z79.4 TYPE 2 DIABETES MELLITUS WITHOUT COMPLICATION, WITH LONG-TERM CURRENT USE OF INSULIN: ICD-10-CM

## 2025-08-11 RX ORDER — DULAGLUTIDE 1.5 MG/.5ML
1.5 INJECTION, SOLUTION SUBCUTANEOUS WEEKLY
Qty: 12 ML | Refills: 3 | Status: SHIPPED | OUTPATIENT
Start: 2025-08-11 | End: 2025-08-12 | Stop reason: CLARIF

## 2025-08-12 DIAGNOSIS — Z79.4 TYPE 2 DIABETES MELLITUS WITH HYPERGLYCEMIA, WITH LONG-TERM CURRENT USE OF INSULIN: Primary | ICD-10-CM

## 2025-08-12 DIAGNOSIS — E11.65 TYPE 2 DIABETES MELLITUS WITH HYPERGLYCEMIA, WITH LONG-TERM CURRENT USE OF INSULIN: Primary | ICD-10-CM

## 2025-08-12 RX ORDER — SEMAGLUTIDE 1.34 MG/ML
1 INJECTION, SOLUTION SUBCUTANEOUS WEEKLY
Qty: 3 ML | Refills: 0 | Status: SHIPPED | OUTPATIENT
Start: 2025-08-12

## 2025-08-28 ENCOUNTER — OFFICE VISIT (OUTPATIENT)
Dept: SLEEP MEDICINE | Facility: HOSPITAL | Age: 85
End: 2025-08-28
Payer: MEDICARE

## 2025-08-28 VITALS — HEART RATE: 86 BPM | WEIGHT: 197 LBS | OXYGEN SATURATION: 95 % | BODY MASS INDEX: 36.25 KG/M2 | HEIGHT: 62 IN

## 2025-08-28 DIAGNOSIS — E66.01 CLASS 2 SEVERE OBESITY DUE TO EXCESS CALORIES WITH SERIOUS COMORBIDITY AND BODY MASS INDEX (BMI) OF 36.0 TO 36.9 IN ADULT: Primary | ICD-10-CM

## 2025-08-28 DIAGNOSIS — E66.812 CLASS 2 SEVERE OBESITY DUE TO EXCESS CALORIES WITH SERIOUS COMORBIDITY AND BODY MASS INDEX (BMI) OF 36.0 TO 36.9 IN ADULT: Primary | ICD-10-CM

## 2025-08-28 DIAGNOSIS — G47.33 OSA ON CPAP: ICD-10-CM

## 2025-08-28 PROCEDURE — G0463 HOSPITAL OUTPT CLINIC VISIT: HCPCS

## (undated) DEVICE — SPNG GZ WOVN 4X4IN 12PLY 10/BX STRL

## (undated) DEVICE — SUT PROLN 5/0 RB1 D/A 36IN 8556H

## (undated) DEVICE — OPTIFOAM GENTLE SA, POSTOP, 4X12: Brand: MEDLINE

## (undated) DEVICE — OASIS DRAIN, DUAL, IN-LINE, ATS COMPATIBLE: Brand: OASIS

## (undated) DEVICE — 28 FR STRAIGHT – SOFT PVC CATHETER: Brand: PVC THORACIC CATHETERS

## (undated) DEVICE — DRSNG SURESITE WNDW 2.38X2.75

## (undated) DEVICE — GLV SURG BIOGEL LTX PF 6 1/2

## (undated) DEVICE — 32 FR RIGHT ANGLE – SOFT PVC CATHETER: Brand: PVC THORACIC CATHETERS

## (undated) DEVICE — SUT GUT CHRM 3/0 SH 27IN G122H

## (undated) DEVICE — CANN ART EOPA 3D NV W/CONN 20F

## (undated) DEVICE — PANTY KNIT MATERN L/XL

## (undated) DEVICE — SPONGE,LAP,18"X18",DLX,XR,ST,5/PK,40/PK: Brand: MEDLINE

## (undated) DEVICE — SUT VIC 0 CT1 CR8 27IN JJ41G

## (undated) DEVICE — Device

## (undated) DEVICE — PK URETSCP 40

## (undated) DEVICE — PK HEART OPN 40

## (undated) DEVICE — TIDISHIELD UROLOGY DRAIN BAGS FROSTY VINYL STERILE FITS SIEMENS UROSKOP ACCESS 20 PER CASE: Brand: TIDISHIELD

## (undated) DEVICE — PK ATS CUST W CARDIOTOMY RESEVOIR

## (undated) DEVICE — CLAMP INSERT: Brand: STEALTH® CLAMP INSERT

## (undated) DEVICE — SUT SILK 0 CT1 CR8 18IN C021D

## (undated) DEVICE — IRRIGATOR BULB ASEPTO 60CC STRL

## (undated) DEVICE — NITINOL STONE RETRIEVAL BASKET: Brand: ZERO TIP

## (undated) DEVICE — LOU OPEN HEART DR POLLOCK: Brand: MEDLINE INDUSTRIES, INC.

## (undated) DEVICE — NDL HYPO PRECISIONGLIDE REG 25G 1 1/2

## (undated) DEVICE — 3M™ MEDIPORE™ H SOFT CLOTH SURGICAL TAPE 2862, 2 INCH X 10 YARD (5CM X 9,1M), 12 ROLLS/CASE: Brand: 3M™ MEDIPORE™

## (undated) DEVICE — SUT PROLN 4/0 BB D/A 36IN 8581H

## (undated) DEVICE — GLV SURG BIOGEL M LTX PF 7 1/2

## (undated) DEVICE — GLV SURG SENSICARE PI LF PF 7.5 GRN STRL

## (undated) DEVICE — Device: Brand: LEVEL 1

## (undated) DEVICE — SKIN PREP TRAY 4 COMPARTM TRAY: Brand: MEDLINE INDUSTRIES, INC.

## (undated) DEVICE — SHEATH URETRL ACC NAVIGATOR 13/15F 28CM 5PK

## (undated) DEVICE — JELLY,LUBE,STERILE,FLIP TOP,TUBE,4-OZ: Brand: MEDLINE

## (undated) DEVICE — PRT BIOP SEALS

## (undated) DEVICE — TUBING EXTENSION SET: Brand: ARGYLE

## (undated) DEVICE — ANTIBACTERIAL UNDYED BRAIDED (POLYGLACTIN 910), SYNTHETIC ABSORBABLE SUTURE: Brand: COATED VICRYL

## (undated) DEVICE — SAFEDGE 2108 SERIES SAGITTAL BLADE STERNUM REVISION (40.3 X 0.64 X 48.4MM): Brand: SAFEDGE

## (undated) DEVICE — FIBR LASR HOLMIUM ACCUMAX 200 1PT USE

## (undated) DEVICE — 32 FR STRAIGHT – SOFT PVC CATHETER: Brand: PVC THORACIC CATHETERS

## (undated) DEVICE — GOWN,NON-REINFORCED,SIRUS,SET IN SLV,XL: Brand: MEDLINE

## (undated) DEVICE — HEMOCONCENTRATOR PERFUS LPS06

## (undated) DEVICE — SUT PROLN 3/0 SH D/A 36IN 8522H

## (undated) DEVICE — SUT VIC 2 TP 1MS/4 27IN DYED J649G

## (undated) DEVICE — SENSR CERBRL O2 PK/2

## (undated) DEVICE — BLAKE CARDIO CONNECTOR 1:1: Brand: J-VAC

## (undated) DEVICE — GLV SURG BIOGEL LTX PF 7

## (undated) DEVICE — BIOPATCH™ ANTIMICROBIAL DRESSING WITH CHLORHEXIDINE GLUCONATE IS A HYDROPHILLIC POLYURETHANE ABSORPTIVE FOAM WITH CHLORHEXIDINE GLUCONATE (CHG) WHICH INHIBITS BACTERIAL GROWTH UNDER THE DRESSING. THE DRESSING IS INTENDED TO BE USED TO ABSORB EXUDATE, COVER A WOUND CAUSED BY VASCULAR AND NONVASCULAR PERCUTANEOUS MEDICAL DEVICES DURING SURGERY, AS WELL AS REDUCE LOCAL INFECTION AND COLONIZATION OF MICROORGANISMS.: Brand: BIOPATCH

## (undated) DEVICE — CATHETER,URETHRAL,REDRUBBER,STERILE,20FR: Brand: MEDLINE

## (undated) DEVICE — 12 FOOT DISPOSABLE EXTENSION CABLE WITH SAFE CONNECT / SCREW-DOWN

## (undated) DEVICE — CATH URETRL FLXITP POLLACK STD 5F 70CM

## (undated) DEVICE — SYS PERFUS SEP PLATLT W TIPS CUST

## (undated) DEVICE — SOL ISO/ALC RUB 70PCT 4OZ

## (undated) DEVICE — PATIENT RETURN ELECTRODE, SINGLE-USE, CONTACT QUALITY MONITORING, ADULT, WITH 9FT CORD, FOR PATIENTS WEIGING OVER 33LBS. (15KG): Brand: MEGADYNE

## (undated) DEVICE — LOU MINOR PROCEDURE: Brand: MEDLINE INDUSTRIES, INC.

## (undated) DEVICE — GLV SURG PREMIERPRO ORTHO LTX PF SZ7 BRN

## (undated) DEVICE — DRSNG WND GEL FIBR OPTICELL AG PLS W/SLV LF 4X5IN  STRL

## (undated) DEVICE — DRAIN,WOUND,ROUND,24FR,5/16",FULL-FLUTED: Brand: MEDLINE

## (undated) DEVICE — SYR LL TP 10ML STRL

## (undated) DEVICE — ST TOURNI COMPL A/ 7IN

## (undated) DEVICE — PK PERFUS CUST W/CARDIOPLEGIA

## (undated) DEVICE — ST. SORBAVIEW ULTIMATE IJ SYSTEM A,C: Brand: CENTURION